# Patient Record
Sex: FEMALE | Race: WHITE | NOT HISPANIC OR LATINO
[De-identification: names, ages, dates, MRNs, and addresses within clinical notes are randomized per-mention and may not be internally consistent; named-entity substitution may affect disease eponyms.]

---

## 2018-05-10 ENCOUNTER — APPOINTMENT (OUTPATIENT)
Dept: GASTROENTEROLOGY | Facility: CLINIC | Age: 58
End: 2018-05-10

## 2019-03-20 ENCOUNTER — APPOINTMENT (OUTPATIENT)
Dept: PSYCHIATRY | Facility: CLINIC | Age: 59
End: 2019-03-20
Payer: MEDICARE

## 2019-03-20 PROCEDURE — 99205 OFFICE O/P NEW HI 60 MIN: CPT

## 2019-04-15 ENCOUNTER — RX RENEWAL (OUTPATIENT)
Age: 59
End: 2019-04-15

## 2019-05-13 ENCOUNTER — APPOINTMENT (OUTPATIENT)
Dept: PSYCHIATRY | Facility: CLINIC | Age: 59
End: 2019-05-13
Payer: MEDICARE

## 2019-05-13 PROCEDURE — 99214 OFFICE O/P EST MOD 30 MIN: CPT

## 2019-07-15 ENCOUNTER — EMERGENCY (EMERGENCY)
Facility: HOSPITAL | Age: 59
LOS: 1 days | Discharge: HOME | End: 2019-07-15
Attending: EMERGENCY MEDICINE
Payer: MEDICARE

## 2019-07-15 VITALS
DIASTOLIC BLOOD PRESSURE: 81 MMHG | OXYGEN SATURATION: 100 % | TEMPERATURE: 99 F | WEIGHT: 108.03 LBS | SYSTOLIC BLOOD PRESSURE: 149 MMHG | RESPIRATION RATE: 18 BRPM | HEIGHT: 63 IN | HEART RATE: 73 BPM

## 2019-07-15 VITALS
HEART RATE: 83 BPM | DIASTOLIC BLOOD PRESSURE: 84 MMHG | OXYGEN SATURATION: 100 % | TEMPERATURE: 98 F | SYSTOLIC BLOOD PRESSURE: 152 MMHG | RESPIRATION RATE: 17 BRPM

## 2019-07-15 LAB
ALBUMIN SERPL ELPH-MCNC: 4 G/DL — SIGNIFICANT CHANGE UP (ref 3.3–5)
ALP SERPL-CCNC: 51 U/L — SIGNIFICANT CHANGE UP (ref 40–120)
ALT FLD-CCNC: 64 U/L — HIGH (ref 10–45)
ANION GAP SERPL CALC-SCNC: 17 MMOL/L — SIGNIFICANT CHANGE UP (ref 5–17)
APTT BLD: 34.3 SEC — SIGNIFICANT CHANGE UP (ref 27.5–36.3)
AST SERPL-CCNC: 79 U/L — HIGH (ref 10–40)
BILIRUB SERPL-MCNC: 0.6 MG/DL — SIGNIFICANT CHANGE UP (ref 0.2–1.2)
BUN SERPL-MCNC: 8 MG/DL — SIGNIFICANT CHANGE UP (ref 7–23)
CALCIUM SERPL-MCNC: 9.4 MG/DL — SIGNIFICANT CHANGE UP (ref 8.4–10.5)
CHLORIDE SERPL-SCNC: 91 MMOL/L — LOW (ref 96–108)
CO2 SERPL-SCNC: 24 MMOL/L — SIGNIFICANT CHANGE UP (ref 22–31)
CREAT SERPL-MCNC: 0.57 MG/DL — SIGNIFICANT CHANGE UP (ref 0.5–1.3)
GLUCOSE SERPL-MCNC: 96 MG/DL — SIGNIFICANT CHANGE UP (ref 70–99)
HCT VFR BLD CALC: 39.8 % — SIGNIFICANT CHANGE UP (ref 34.5–45)
HGB BLD-MCNC: 13.7 G/DL — SIGNIFICANT CHANGE UP (ref 11.5–15.5)
INR BLD: 0.95 RATIO — SIGNIFICANT CHANGE UP (ref 0.88–1.16)
MCHC RBC-ENTMCNC: 34.5 GM/DL — SIGNIFICANT CHANGE UP (ref 32–36)
MCHC RBC-ENTMCNC: 35.6 PG — HIGH (ref 27–34)
MCV RBC AUTO: 103 FL — HIGH (ref 80–100)
PLATELET # BLD AUTO: 284 K/UL — SIGNIFICANT CHANGE UP (ref 150–400)
POTASSIUM SERPL-MCNC: 4.1 MMOL/L — SIGNIFICANT CHANGE UP (ref 3.5–5.3)
POTASSIUM SERPL-SCNC: 4.1 MMOL/L — SIGNIFICANT CHANGE UP (ref 3.5–5.3)
PROT SERPL-MCNC: 7.6 G/DL — SIGNIFICANT CHANGE UP (ref 6–8.3)
PROTHROM AB SERPL-ACNC: 10.8 SEC — SIGNIFICANT CHANGE UP (ref 10–12.9)
RBC # BLD: 3.86 M/UL — SIGNIFICANT CHANGE UP (ref 3.8–5.2)
RBC # FLD: 11.9 % — SIGNIFICANT CHANGE UP (ref 10.3–14.5)
SODIUM SERPL-SCNC: 132 MMOL/L — LOW (ref 135–145)
WBC # BLD: 9 K/UL — SIGNIFICANT CHANGE UP (ref 3.8–10.5)
WBC # FLD AUTO: 9 K/UL — SIGNIFICANT CHANGE UP (ref 3.8–10.5)

## 2019-07-15 PROCEDURE — 85610 PROTHROMBIN TIME: CPT

## 2019-07-15 PROCEDURE — 85027 COMPLETE CBC AUTOMATED: CPT

## 2019-07-15 PROCEDURE — 99284 EMERGENCY DEPT VISIT MOD MDM: CPT | Mod: GC

## 2019-07-15 PROCEDURE — 80053 COMPREHEN METABOLIC PANEL: CPT

## 2019-07-15 PROCEDURE — 99283 EMERGENCY DEPT VISIT LOW MDM: CPT

## 2019-07-15 PROCEDURE — 85730 THROMBOPLASTIN TIME PARTIAL: CPT

## 2019-07-15 NOTE — ED PROVIDER NOTE - NSFOLLOWUPINSTRUCTIONS_ED_ALL_ED_FT
Follow up with a primary care doctor within 1-2 weeks. Take copies of your reports given to you.  Take all of your medications as previously prescribed. Follow up with a primary care doctor within 1-2 weeks. Take copies of your reports given to you.  Take all of your medications as previously prescribed.    Please follow up within the next week with your hepatologist.

## 2019-07-15 NOTE — ED ADULT NURSE NOTE - CHPI ED NUR SYMPTOMS NEG
no vomiting/no fever/no blood in stool/no diarrhea/no chills/no hematuria/no abdominal distension/no burning urination/no nausea/no dysuria

## 2019-07-15 NOTE — ED ADULT NURSE NOTE - OBJECTIVE STATEMENT
pt is a 59 yr F presents with abd distention x 3 weeks. pt reports that she has had a 6lb weight gain and fluctuation in abd distention. pt drinks 1/2 pint of vodka per day x 5 years. last drink was Friday, pt has had no withdrawal symptoms and denies then in the past. pt has a hx of lung CA with R middle lobe wedge resection in November, former smoker. pt denies jaundice/sob/n/v/fever/chills/discoloration of urine or stool. abd soft, non-tender. pt has had a work up for cirrhosis last year which she reports was "mild to moderate". pt takes prescribed oxycodone and xanax. no acute distress.

## 2019-07-15 NOTE — ED PROVIDER NOTE - CARE PROVIDER_API CALL
Tim Davis (DO)  Internal Medicine  [address], [address]  [place] [address]  Phone: [phone]  Fax: [phone]  Follow Up Time:

## 2019-07-15 NOTE — ED PROVIDER NOTE - OBJECTIVE STATEMENT
Pt is a 60 yo female with hx of lung resection, EtOH ism (last drink Friday) who presents to the ED for abdominal distention. Over the past week, patient has noticed tightening in her pants and an increase in weight from 104 lb to 110 lbs despite no changes in her diet. She states that she is extremely worried and came to the emergency room.

## 2019-07-15 NOTE — ED ADULT NURSE NOTE - PMH
Chronic pain    Depression H/O panic attack    Diverticulitis of colon (without mention of hemorrhage)    S/P Laminectomy  Lumbar 3/10

## 2019-07-15 NOTE — ED PROVIDER NOTE - PHYSICAL EXAMINATION
PHYSICAL EXAM:  GENERAL: NAD, well-developed  HEAD:  Atraumatic, Normocephalic  EYES: EOMI, PERRLA, conjunctiva and sclera clear  NECK: Supple, No JVD  CHEST/LUNG: Clear to auscultation bilaterally; No wheeze  HEART: Regular rate and rhythm; No murmurs, rubs, or gallops  ABDOMEN: Soft, Nontender, distended; Bowel sounds present  EXTREMITIES:  2+ Peripheral Pulses, No clubbing, cyanosis, or edema  PSYCH: AAOx3  NEUROLOGY: non-focal  SKIN: No rashes or lesions PHYSICAL EXAM:  GENERAL: NAD, well-developed  HEAD:  Atraumatic, Normocephalic  EYES: EOMI, PERRLA, conjunctiva and sclera clear  NECK: Supple, No JVD  CHEST/LUNG: Clear to auscultation bilaterally; No wheeze  HEART: Regular rate and rhythm; No murmurs, rubs, or gallops  ABDOMEN: Soft, Nontender, nondistended; Bowel sounds present  EXTREMITIES:  2+ Peripheral Pulses, No clubbing, cyanosis, or edema  PSYCH: AAOx3  NEUROLOGY: non-focal  SKIN: No rashes or lesions

## 2019-07-15 NOTE — ED PROVIDER NOTE - CLINICAL SUMMARY MEDICAL DECISION MAKING FREE TEXT BOX
58 yo with hx of cancer and EtOHism coming into the ED for abdominal distension, but otherwise looks comfortable. Will get CBC, CMP, and Coags to assess for hepatic function. 60 yo with hx of cancer and EtOHism coming into the ED for abdominal distension, but otherwise looks comfortable. Will get CBC, CMP, and Coags to assess for hepatic function. and Reassess patient 60 yo with hx of cancer and EtOHism coming into the ED for abdominal distension, but otherwise looks comfortable. Abdomen was not distended during physical exam, which was overall normal as well. Will get CBC, CMP, and Coags to assess for hepatic function. and Reassess patient.  ATTG: Dr. Cope

## 2019-07-15 NOTE — ED PROVIDER NOTE - PROGRESS NOTE DETAILS
Labs showing mild transaminitis with POCUS showing no signs of ascites. ok to d/c with PMD and hepatology follow up. Labs showing mild transaminitis, otherwise normal. ok to d/c with PMD and hepatology follow up.

## 2019-07-25 ENCOUNTER — EMERGENCY (EMERGENCY)
Facility: HOSPITAL | Age: 59
LOS: 0 days | Discharge: HOME | End: 2019-07-25
Attending: EMERGENCY MEDICINE
Payer: MEDICARE

## 2019-07-25 VITALS
OXYGEN SATURATION: 99 % | SYSTOLIC BLOOD PRESSURE: 128 MMHG | HEART RATE: 68 BPM | DIASTOLIC BLOOD PRESSURE: 61 MMHG | TEMPERATURE: 98 F | RESPIRATION RATE: 16 BRPM

## 2019-07-25 VITALS — WEIGHT: 106.04 LBS | HEIGHT: 63 IN

## 2019-07-25 DIAGNOSIS — M54.9 DORSALGIA, UNSPECIFIED: ICD-10-CM

## 2019-07-25 DIAGNOSIS — Z87.891 PERSONAL HISTORY OF NICOTINE DEPENDENCE: ICD-10-CM

## 2019-07-25 DIAGNOSIS — F32.9 MAJOR DEPRESSIVE DISORDER, SINGLE EPISODE, UNSPECIFIED: ICD-10-CM
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
## 2019-07-25 DIAGNOSIS — Z88.0 ALLERGY STATUS TO PENICILLIN: ICD-10-CM
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
## 2019-07-25 LAB
ALBUMIN SERPL ELPH-MCNC: 3.5 G/DL — SIGNIFICANT CHANGE UP (ref 3.3–5)
ALP SERPL-CCNC: 51 U/L — SIGNIFICANT CHANGE UP (ref 40–120)
ALT FLD-CCNC: 59 U/L — SIGNIFICANT CHANGE UP (ref 12–78)
ANION GAP SERPL CALC-SCNC: 8 MMOL/L — SIGNIFICANT CHANGE UP (ref 5–17)
AST SERPL-CCNC: 60 U/L — HIGH (ref 15–37)
BASOPHILS # BLD AUTO: 0.03 K/UL — SIGNIFICANT CHANGE UP (ref 0–0.2)
BASOPHILS NFR BLD AUTO: 0.4 % — SIGNIFICANT CHANGE UP (ref 0–2)
BILIRUB SERPL-MCNC: 0.4 MG/DL — SIGNIFICANT CHANGE UP (ref 0.2–1.2)
BUN SERPL-MCNC: 13 MG/DL — SIGNIFICANT CHANGE UP (ref 7–23)
CALCIUM SERPL-MCNC: 9.1 MG/DL — SIGNIFICANT CHANGE UP (ref 8.5–10.1)
CHLORIDE SERPL-SCNC: 97 MMOL/L — SIGNIFICANT CHANGE UP (ref 96–108)
CO2 SERPL-SCNC: 28 MMOL/L — SIGNIFICANT CHANGE UP (ref 22–31)
CREAT SERPL-MCNC: 0.74 MG/DL — SIGNIFICANT CHANGE UP (ref 0.5–1.3)
EOSINOPHIL # BLD AUTO: 0.06 K/UL — SIGNIFICANT CHANGE UP (ref 0–0.5)
EOSINOPHIL NFR BLD AUTO: 0.9 % — SIGNIFICANT CHANGE UP (ref 0–6)
GLUCOSE SERPL-MCNC: 106 MG/DL — HIGH (ref 70–99)
HCT VFR BLD CALC: 37.5 % — SIGNIFICANT CHANGE UP (ref 34.5–45)
HGB BLD-MCNC: 12.9 G/DL — SIGNIFICANT CHANGE UP (ref 11.5–15.5)
IMM GRANULOCYTES NFR BLD AUTO: 0.7 % — SIGNIFICANT CHANGE UP (ref 0–1.5)
LYMPHOCYTES # BLD AUTO: 1.14 K/UL — SIGNIFICANT CHANGE UP (ref 1–3.3)
LYMPHOCYTES # BLD AUTO: 16.2 % — SIGNIFICANT CHANGE UP (ref 13–44)
MCHC RBC-ENTMCNC: 34.4 GM/DL — SIGNIFICANT CHANGE UP (ref 32–36)
MCHC RBC-ENTMCNC: 34.9 PG — HIGH (ref 27–34)
MCV RBC AUTO: 101.4 FL — HIGH (ref 80–100)
MONOCYTES # BLD AUTO: 0.62 K/UL — SIGNIFICANT CHANGE UP (ref 0–0.9)
MONOCYTES NFR BLD AUTO: 8.8 % — SIGNIFICANT CHANGE UP (ref 2–14)
NEUTROPHILS # BLD AUTO: 5.13 K/UL — SIGNIFICANT CHANGE UP (ref 1.8–7.4)
NEUTROPHILS NFR BLD AUTO: 73 % — SIGNIFICANT CHANGE UP (ref 43–77)
PLATELET # BLD AUTO: 309 K/UL — SIGNIFICANT CHANGE UP (ref 150–400)
POTASSIUM SERPL-MCNC: 4.1 MMOL/L — SIGNIFICANT CHANGE UP (ref 3.5–5.3)
POTASSIUM SERPL-SCNC: 4.1 MMOL/L — SIGNIFICANT CHANGE UP (ref 3.5–5.3)
PROT SERPL-MCNC: 7.6 GM/DL — SIGNIFICANT CHANGE UP (ref 6–8.3)
RBC # BLD: 3.7 M/UL — LOW (ref 3.8–5.2)
RBC # FLD: 12 % — SIGNIFICANT CHANGE UP (ref 10.3–14.5)
SODIUM SERPL-SCNC: 133 MMOL/L — LOW (ref 135–145)
WBC # BLD: 7.03 K/UL — SIGNIFICANT CHANGE UP (ref 3.8–10.5)
WBC # FLD AUTO: 7.03 K/UL — SIGNIFICANT CHANGE UP (ref 3.8–10.5)

## 2019-07-25 PROCEDURE — 72131 CT LUMBAR SPINE W/O DYE: CPT | Mod: 26

## 2019-07-25 PROCEDURE — 99284 EMERGENCY DEPT VISIT MOD MDM: CPT

## 2019-07-25 RX ORDER — DEXAMETHASONE 0.5 MG/5ML
6 ELIXIR ORAL ONCE
Refills: 0 | Status: COMPLETED | OUTPATIENT
Start: 2019-07-25 | End: 2019-07-25

## 2019-07-25 RX ORDER — HYDROMORPHONE HYDROCHLORIDE 2 MG/ML
1 INJECTION INTRAMUSCULAR; INTRAVENOUS; SUBCUTANEOUS ONCE
Refills: 0 | Status: DISCONTINUED | OUTPATIENT
Start: 2019-07-25 | End: 2019-07-25

## 2019-07-25 RX ORDER — CYCLOBENZAPRINE HYDROCHLORIDE 10 MG/1
1 TABLET, FILM COATED ORAL
Qty: 15 | Refills: 0
Start: 2019-07-25

## 2019-07-25 RX ORDER — CYCLOBENZAPRINE HYDROCHLORIDE 10 MG/1
10 TABLET, FILM COATED ORAL ONCE
Refills: 0 | Status: COMPLETED | OUTPATIENT
Start: 2019-07-25 | End: 2019-07-25

## 2019-07-25 RX ADMIN — HYDROMORPHONE HYDROCHLORIDE 1 MILLIGRAM(S): 2 INJECTION INTRAMUSCULAR; INTRAVENOUS; SUBCUTANEOUS at 11:06

## 2019-07-25 RX ADMIN — CYCLOBENZAPRINE HYDROCHLORIDE 10 MILLIGRAM(S): 10 TABLET, FILM COATED ORAL at 12:48

## 2019-07-25 RX ADMIN — HYDROMORPHONE HYDROCHLORIDE 1 MILLIGRAM(S): 2 INJECTION INTRAMUSCULAR; INTRAVENOUS; SUBCUTANEOUS at 14:00

## 2019-07-25 RX ADMIN — HYDROMORPHONE HYDROCHLORIDE 1 MILLIGRAM(S): 2 INJECTION INTRAMUSCULAR; INTRAVENOUS; SUBCUTANEOUS at 08:56

## 2019-07-25 RX ADMIN — HYDROMORPHONE HYDROCHLORIDE 1 MILLIGRAM(S): 2 INJECTION INTRAMUSCULAR; INTRAVENOUS; SUBCUTANEOUS at 11:34

## 2019-07-25 RX ADMIN — Medication 4 MILLIGRAM(S): at 12:48

## 2019-07-25 RX ADMIN — HYDROMORPHONE HYDROCHLORIDE 1 MILLIGRAM(S): 2 INJECTION INTRAMUSCULAR; INTRAVENOUS; SUBCUTANEOUS at 12:48

## 2019-07-25 NOTE — ED ADULT NURSE NOTE - OBJECTIVE STATEMENT
pt presents to ED from home, pt alert and [id number] VSS afebrle, pt c/o back pain sicne monday, worsening last night. pt states she has hx of chronic abck pain from failed back surgery, believes she "blew a disc: has chronic hip pain and torn ligaments, takes 20 mg of oxycodone 4 tiomes a day, 8-- miligrams of gababpentin 3 times a day and  has a 25mch fentanyl patch in place. pt states she gets only hydromprphone in the hospital and is requesting this medication for her pain. safety maintained, able to move all extremities, no motor or cognitive impairment, will continue to monitor

## 2019-07-25 NOTE — ED ADULT NURSE REASSESSMENT NOTE - NS ED NURSE REASSESS COMMENT FT1
Informed by primary RN that patient was requesting to speak to charge RN. Patient states she wishes to have MRI because she "heard her disc pop" and this has happened in the past. Patient educated on MRI protocol in . Patient became irate and began to yell at charge RN. In attempt to deescalate charge RN stated would get update from MD and ask if he could speak with her. Patient continued to yell at staff. Dr. De La Vega made aware.

## 2019-07-25 NOTE — ED PROVIDER NOTE - NSFOLLOWUPINSTRUCTIONS_ED_ALL_ED_FT
Please call and follow up with Dr. david.    Back Pain    WHAT YOU NEED TO KNOW:    Back pain is common. It can be caused by many conditions, such as arthritis or the breakdown of spinal discs. Your risk for back pain is increased by injuries, lack of activity, or repeated bending and twisting. You may feel sore or stiff on one or both sides of your back. The pain may spread to your buttocks or thighs.    DISCHARGE INSTRUCTIONS:    Return to the emergency department if:     You have pain, numbness, or weakness in one or both legs.      Your pain becomes so severe that you cannot walk.      You cannot control your urine or bowel movements.      You have severe back pain with chest pain.      You have severe back pain, nausea, and vomiting.      You have severe back pain that spreads to your side or genital area.    Contact your healthcare provider if:     You have back pain that does not get better with rest and pain medicine.      You have a fever.      You have pain that worsens when you are on your back or when you rest.      You have pain that worsens when you cough or sneeze.      You lose weight without trying.      You have questions or concerns about your condition or care.    Medicines:     NSAIDs help decrease swelling and pain. This medicine is available with or without a doctor's order. NSAIDs can cause stomach bleeding or kidney problems in certain people. If you take blood thinner medicine, always ask your healthcare provider if NSAIDs are safe for you. Always read the medicine label and follow directions.      Acetaminophen decreases pain and fever. It is available without a doctor's order. Ask how much to take and how often to take it. Follow directions. Read the labels of all other medicines you are using to see if they also contain acetaminophen, or ask your doctor or pharmacist. Acetaminophen can cause liver damage if not taken correctly. Do not use more than 4 grams (4,000 milligrams) total of acetaminophen in one day.       Muscle relaxers help decrease muscle spasms and back pain.      Prescription pain medicine may be given. Ask your healthcare provider how to take this medicine safely. Some prescription pain medicines contain acetaminophen. Do not take other medicines that contain acetaminophen without talking to your healthcare provider. Too much acetaminophen may cause liver damage. Prescription pain medicine may cause constipation. Ask your healthcare provider how to prevent or treat constipation.       Take your medicine as directed. Contact your healthcare provider if you think your medicine is not helping or if you have side effects. Tell him or her if you are allergic to any medicine. Keep a list of the medicines, vitamins, and herbs you take. Include the amounts, and when and why you take them. Bring the list or the pill bottles to follow-up visits. Carry your medicine list with you in case of an emergency.    How to manage your back pain:     Apply ice on your back for 15 to 20 minutes every hour or as directed. Use an ice pack, or put crushed ice in a plastic bag. Cover it with a towel before you apply it to your skin. Ice helps prevent tissue damage and decreases pain.      Apply heat on your back for 20 to 30 minutes every 2 hours for as many days as directed. Heat helps decrease pain and muscle spasms.      Stay active as much as you can without causing more pain. Bed rest could make your back pain worse. Avoid heavy lifting until your pain is gone.      Go to physical therapy as directed. A physical therapist can teach you exercises to help improve movement and strength, and to decrease pain.    Follow up with your healthcare provider in 2 weeks, or as directed: Write down your questions so you remember to ask them during your visits.

## 2019-07-25 NOTE — ED PROVIDER NOTE - MUSCULOSKELETAL, MLM
Spine appears normal, range of motion is not limited. +lumbar spine TTP. NVI LE. +mild pain to back with SLR

## 2019-07-25 NOTE — ED PROVIDER NOTE - PROGRESS NOTE DETAILS
Attending Dr. Kenn De La Vega - pain management called - [phone] and message left to return call. Tootie CORTEZ for ED attending, Dr. De La Vega: discussed with MD Chung who said that pt does not need to be d/c with meds. Also said that pt needs to f/u with him in office. Attending De La Vega, pt states pain better, + ambulation, plan d/c and will see pain doctor tomorrow

## 2019-07-25 NOTE — ED ADULT TRIAGE NOTE - CHIEF COMPLAINT QUOTE
pt c/o herniated disc , pain radiating down to Left leg x 4 days s/p lifting something heavy, pt has hx back surgery , chronic hip pain. Pt is on Fentanyl-25 patch and oxycodone maintenance

## 2019-07-25 NOTE — ED PROVIDER NOTE - CONSTITUTIONAL, MLM
normal... Well appearing, well nourished, awake, alert, oriented to person, place, time/situation and in mild distress 2/2 pain.

## 2019-07-25 NOTE — ED ADULT NURSE NOTE - NSIMPLEMENTINTERV_GEN_ALL_ED
Implemented All Fall Risk Interventions:  [place] to call system. Call bell, personal items and telephone within reach. Instruct patient to call for assistance. Room bathroom lighting operational. Non-slip footwear when patient is off stretcher. Physically safe environment: no spills, clutter or unnecessary equipment. Stretcher in lowest position, wheels locked, appropriate side rails in place. Provide visual cue, wrist band, yellow gown, etc. Monitor gait and stability. Monitor for mental status changes and reorient to person, place, and time. Review medications for side effects contributing to fall risk. Reinforce activity limits and safety measures with patient and family.

## 2019-07-25 NOTE — ED PROVIDER NOTE - OBJECTIVE STATEMENT
58 y/o F with PMHx of back surgery, chronic back pain, chronic hip pain, diverticulitis, depression, microdiscectomy 2010presenting to the ED c/o back pain. Pt reports back pain radiating down L leg for the past 4 days s/p lifting a heavy bag. Pt states she felt a "pop" in her back s/p bag lift, and has had pain since. Pt is on Fentanyl 25mcg patch and 20mg Oxycodone q6H for pain control. Pt tried calling pain MD yesterday but no response. This morning, pain was worse prompting taxi to ED. +former smoker; now uses vape. Denies fever, chills, N/V/D, or illicit drug use. Pain: Dr. Chung. PMD: Dr. Davis.

## 2019-07-30 ENCOUNTER — INPATIENT (INPATIENT)
Facility: HOSPITAL | Age: 59
LOS: 2 days | Discharge: LEFT BEFORE DISCHARGE | DRG: 439 | End: 2019-08-02
Attending: HOSPITALIST | Admitting: HOSPITALIST
Payer: MEDICARE

## 2019-07-30 VITALS
RESPIRATION RATE: 16 BRPM | OXYGEN SATURATION: 100 % | HEART RATE: 88 BPM | DIASTOLIC BLOOD PRESSURE: 103 MMHG | SYSTOLIC BLOOD PRESSURE: 152 MMHG | HEIGHT: 63 IN | TEMPERATURE: 98 F | WEIGHT: 126.99 LBS

## 2019-07-30 DIAGNOSIS — R03.0 ELEVATED BLOOD-PRESSURE READING, WITHOUT DIAGNOSIS OF HYPERTENSION: ICD-10-CM

## 2019-07-30 DIAGNOSIS — K85.90 ACUTE PANCREATITIS WITHOUT NECROSIS OR INFECTION, UNSPECIFIED: ICD-10-CM

## 2019-07-30 DIAGNOSIS — C34.90 MALIGNANT NEOPLASM OF UNSPECIFIED PART OF UNSPECIFIED BRONCHUS OR LUNG: ICD-10-CM

## 2019-07-30 DIAGNOSIS — Z85.118 PERSONAL HISTORY OF OTHER MALIGNANT NEOPLASM OF BRONCHUS AND LUNG: Chronic | ICD-10-CM

## 2019-07-30 DIAGNOSIS — Z90.49 ACQUIRED ABSENCE OF OTHER SPECIFIED PARTS OF DIGESTIVE TRACT: Chronic | ICD-10-CM

## 2019-07-30 DIAGNOSIS — E87.6 HYPOKALEMIA: ICD-10-CM

## 2019-07-30 DIAGNOSIS — Z98.890 OTHER SPECIFIED POSTPROCEDURAL STATES: Chronic | ICD-10-CM

## 2019-07-30 DIAGNOSIS — M35.1 OTHER OVERLAP SYNDROMES: ICD-10-CM

## 2019-07-30 DIAGNOSIS — F39 UNSPECIFIED MOOD [AFFECTIVE] DISORDER: ICD-10-CM

## 2019-07-30 DIAGNOSIS — M54.9 DORSALGIA, UNSPECIFIED: ICD-10-CM

## 2019-07-30 DIAGNOSIS — Z29.9 ENCOUNTER FOR PROPHYLACTIC MEASURES, UNSPECIFIED: ICD-10-CM

## 2019-07-30 DIAGNOSIS — D72.829 ELEVATED WHITE BLOOD CELL COUNT, UNSPECIFIED: ICD-10-CM

## 2019-07-30 DIAGNOSIS — R74.8 ABNORMAL LEVELS OF OTHER SERUM ENZYMES: ICD-10-CM

## 2019-07-30 LAB
ALBUMIN SERPL ELPH-MCNC: 3.5 G/DL — SIGNIFICANT CHANGE UP (ref 3.3–5)
ALP SERPL-CCNC: 56 U/L — SIGNIFICANT CHANGE UP (ref 30–120)
ALT FLD-CCNC: 138 U/L DA — HIGH (ref 10–60)
ANION GAP SERPL CALC-SCNC: 13 MMOL/L — SIGNIFICANT CHANGE UP (ref 5–17)
AST SERPL-CCNC: 158 U/L — HIGH (ref 10–40)
BASOPHILS # BLD AUTO: 0.03 K/UL — SIGNIFICANT CHANGE UP (ref 0–0.2)
BASOPHILS NFR BLD AUTO: 0.2 % — SIGNIFICANT CHANGE UP (ref 0–2)
BILIRUB SERPL-MCNC: 0.8 MG/DL — SIGNIFICANT CHANGE UP (ref 0.2–1.2)
BUN SERPL-MCNC: 16 MG/DL — SIGNIFICANT CHANGE UP (ref 7–23)
CALCIUM SERPL-MCNC: 10.1 MG/DL — SIGNIFICANT CHANGE UP (ref 8.4–10.5)
CHLORIDE SERPL-SCNC: 95 MMOL/L — LOW (ref 96–108)
CO2 SERPL-SCNC: 29 MMOL/L — SIGNIFICANT CHANGE UP (ref 22–31)
CREAT SERPL-MCNC: 0.81 MG/DL — SIGNIFICANT CHANGE UP (ref 0.5–1.3)
EOSINOPHIL # BLD AUTO: 0.09 K/UL — SIGNIFICANT CHANGE UP (ref 0–0.5)
EOSINOPHIL NFR BLD AUTO: 0.6 % — SIGNIFICANT CHANGE UP (ref 0–6)
ETHANOL SERPL-MCNC: <3 MG/DL — SIGNIFICANT CHANGE UP (ref 0–3)
GLUCOSE SERPL-MCNC: 111 MG/DL — HIGH (ref 70–99)
HCT VFR BLD CALC: 43.6 % — SIGNIFICANT CHANGE UP (ref 34.5–45)
HGB BLD-MCNC: 15 G/DL — SIGNIFICANT CHANGE UP (ref 11.5–15.5)
IMM GRANULOCYTES NFR BLD AUTO: 1.2 % — SIGNIFICANT CHANGE UP (ref 0–1.5)
LIDOCAIN IGE QN: 7410 U/L — HIGH (ref 73–393)
LYMPHOCYTES # BLD AUTO: 28.1 % — SIGNIFICANT CHANGE UP (ref 13–44)
LYMPHOCYTES # BLD AUTO: 4.19 K/UL — HIGH (ref 1–3.3)
MCHC RBC-ENTMCNC: 34.4 GM/DL — SIGNIFICANT CHANGE UP (ref 32–36)
MCHC RBC-ENTMCNC: 34.9 PG — HIGH (ref 27–34)
MCV RBC AUTO: 101.4 FL — HIGH (ref 80–100)
MONOCYTES # BLD AUTO: 0.66 K/UL — SIGNIFICANT CHANGE UP (ref 0–0.9)
MONOCYTES NFR BLD AUTO: 4.4 % — SIGNIFICANT CHANGE UP (ref 2–14)
NEUTROPHILS # BLD AUTO: 9.74 K/UL — HIGH (ref 1.8–7.4)
NEUTROPHILS NFR BLD AUTO: 65.5 % — SIGNIFICANT CHANGE UP (ref 43–77)
NRBC # BLD: 0 /100 WBCS — SIGNIFICANT CHANGE UP (ref 0–0)
PLATELET # BLD AUTO: 397 K/UL — SIGNIFICANT CHANGE UP (ref 150–400)
POTASSIUM SERPL-MCNC: 3.2 MMOL/L — LOW (ref 3.5–5.3)
POTASSIUM SERPL-SCNC: 3.2 MMOL/L — LOW (ref 3.5–5.3)
PROT SERPL-MCNC: 7.5 G/DL — SIGNIFICANT CHANGE UP (ref 6–8.3)
RBC # BLD: 4.3 M/UL — SIGNIFICANT CHANGE UP (ref 3.8–5.2)
RBC # FLD: 12.3 % — SIGNIFICANT CHANGE UP (ref 10.3–14.5)
SODIUM SERPL-SCNC: 137 MMOL/L — SIGNIFICANT CHANGE UP (ref 135–145)
TROPONIN I SERPL-MCNC: 0 NG/ML — LOW (ref 0.02–0.06)
WBC # BLD: 14.89 K/UL — HIGH (ref 3.8–10.5)
WBC # FLD AUTO: 14.89 K/UL — HIGH (ref 3.8–10.5)

## 2019-07-30 PROCEDURE — 99223 1ST HOSP IP/OBS HIGH 75: CPT

## 2019-07-30 PROCEDURE — 93010 ELECTROCARDIOGRAM REPORT: CPT

## 2019-07-30 PROCEDURE — 71045 X-RAY EXAM CHEST 1 VIEW: CPT | Mod: 26

## 2019-07-30 PROCEDURE — 76705 ECHO EXAM OF ABDOMEN: CPT | Mod: 26

## 2019-07-30 PROCEDURE — 99285 EMERGENCY DEPT VISIT HI MDM: CPT

## 2019-07-30 RX ORDER — SUCRALFATE 1 G
1 TABLET ORAL ONCE
Refills: 0 | Status: DISCONTINUED | OUTPATIENT
Start: 2019-07-30 | End: 2019-07-30

## 2019-07-30 RX ORDER — ONDANSETRON 8 MG/1
4 TABLET, FILM COATED ORAL ONCE
Refills: 0 | Status: COMPLETED | OUTPATIENT
Start: 2019-07-30 | End: 2019-07-30

## 2019-07-30 RX ORDER — FAMOTIDINE 10 MG/ML
20 INJECTION INTRAVENOUS ONCE
Refills: 0 | Status: COMPLETED | OUTPATIENT
Start: 2019-07-30 | End: 2019-07-30

## 2019-07-30 RX ORDER — POTASSIUM CHLORIDE 20 MEQ
40 PACKET (EA) ORAL ONCE
Refills: 0 | Status: DISCONTINUED | OUTPATIENT
Start: 2019-07-30 | End: 2019-07-30

## 2019-07-30 RX ORDER — ONDANSETRON 8 MG/1
4 TABLET, FILM COATED ORAL EVERY 6 HOURS
Refills: 0 | Status: DISCONTINUED | OUTPATIENT
Start: 2019-07-30 | End: 2019-08-02

## 2019-07-30 RX ORDER — SENNA PLUS 8.6 MG/1
2 TABLET ORAL AT BEDTIME
Refills: 0 | Status: DISCONTINUED | OUTPATIENT
Start: 2019-07-30 | End: 2019-08-02

## 2019-07-30 RX ORDER — FENTANYL CITRATE 50 UG/ML
1 INJECTION INTRAVENOUS
Refills: 0 | Status: DISCONTINUED | OUTPATIENT
Start: 2019-07-30 | End: 2019-08-02

## 2019-07-30 RX ORDER — HYDROMORPHONE HYDROCHLORIDE 2 MG/ML
1 INJECTION INTRAMUSCULAR; INTRAVENOUS; SUBCUTANEOUS ONCE
Refills: 0 | Status: DISCONTINUED | OUTPATIENT
Start: 2019-07-30 | End: 2019-07-30

## 2019-07-30 RX ORDER — SUCRALFATE 1 G
1 TABLET ORAL ONCE
Refills: 0 | Status: COMPLETED | OUTPATIENT
Start: 2019-07-30 | End: 2019-07-30

## 2019-07-30 RX ORDER — CYCLOBENZAPRINE HYDROCHLORIDE 10 MG/1
10 TABLET, FILM COATED ORAL THREE TIMES A DAY
Refills: 0 | Status: DISCONTINUED | OUTPATIENT
Start: 2019-07-30 | End: 2019-08-02

## 2019-07-30 RX ORDER — OXYCODONE HYDROCHLORIDE 5 MG/1
1 TABLET ORAL
Qty: 0 | Refills: 0 | DISCHARGE

## 2019-07-30 RX ORDER — TRAMADOL HYDROCHLORIDE 50 MG/1
25 TABLET ORAL EVERY 6 HOURS
Refills: 0 | Status: DISCONTINUED | OUTPATIENT
Start: 2019-07-30 | End: 2019-08-02

## 2019-07-30 RX ORDER — PANTOPRAZOLE SODIUM 20 MG/1
40 TABLET, DELAYED RELEASE ORAL
Refills: 0 | Status: DISCONTINUED | OUTPATIENT
Start: 2019-07-30 | End: 2019-08-02

## 2019-07-30 RX ORDER — POTASSIUM CHLORIDE 20 MEQ
40 PACKET (EA) ORAL ONCE
Refills: 0 | Status: COMPLETED | OUTPATIENT
Start: 2019-07-30 | End: 2019-07-30

## 2019-07-30 RX ORDER — GABAPENTIN 400 MG/1
800 CAPSULE ORAL THREE TIMES A DAY
Refills: 0 | Status: DISCONTINUED | OUTPATIENT
Start: 2019-07-30 | End: 2019-08-02

## 2019-07-30 RX ORDER — HYDROXYCHLOROQUINE SULFATE 200 MG
200 TABLET ORAL
Refills: 0 | Status: DISCONTINUED | OUTPATIENT
Start: 2019-07-31 | End: 2019-08-02

## 2019-07-30 RX ORDER — HYDROMORPHONE HYDROCHLORIDE 2 MG/ML
2 INJECTION INTRAMUSCULAR; INTRAVENOUS; SUBCUTANEOUS EVERY 4 HOURS
Refills: 0 | Status: DISCONTINUED | OUTPATIENT
Start: 2019-07-30 | End: 2019-07-31

## 2019-07-30 RX ORDER — OXYCODONE HYDROCHLORIDE 5 MG/1
20 TABLET ORAL ONCE
Refills: 0 | Status: DISCONTINUED | OUTPATIENT
Start: 2019-07-30 | End: 2019-07-30

## 2019-07-30 RX ORDER — HYDROMORPHONE HYDROCHLORIDE 2 MG/ML
1 INJECTION INTRAMUSCULAR; INTRAVENOUS; SUBCUTANEOUS EVERY 6 HOURS
Refills: 0 | Status: DISCONTINUED | OUTPATIENT
Start: 2019-07-30 | End: 2019-07-31

## 2019-07-30 RX ORDER — HYDROMORPHONE HYDROCHLORIDE 2 MG/ML
0.5 INJECTION INTRAMUSCULAR; INTRAVENOUS; SUBCUTANEOUS ONCE
Refills: 0 | Status: DISCONTINUED | OUTPATIENT
Start: 2019-07-30 | End: 2019-07-30

## 2019-07-30 RX ORDER — SODIUM CHLORIDE 9 MG/ML
3 INJECTION INTRAMUSCULAR; INTRAVENOUS; SUBCUTANEOUS ONCE
Refills: 0 | Status: COMPLETED | OUTPATIENT
Start: 2019-07-30 | End: 2019-07-30

## 2019-07-30 RX ORDER — DOCUSATE SODIUM 100 MG
100 CAPSULE ORAL THREE TIMES A DAY
Refills: 0 | Status: DISCONTINUED | OUTPATIENT
Start: 2019-07-30 | End: 2019-08-02

## 2019-07-30 RX ORDER — ALPRAZOLAM 0.25 MG
0.25 TABLET ORAL ONCE
Refills: 0 | Status: DISCONTINUED | OUTPATIENT
Start: 2019-07-30 | End: 2019-07-30

## 2019-07-30 RX ORDER — HYDRALAZINE HCL 50 MG
25 TABLET ORAL EVERY 6 HOURS
Refills: 0 | Status: DISCONTINUED | OUTPATIENT
Start: 2019-07-30 | End: 2019-07-31

## 2019-07-30 RX ORDER — SODIUM CHLORIDE 9 MG/ML
1000 INJECTION INTRAMUSCULAR; INTRAVENOUS; SUBCUTANEOUS ONCE
Refills: 0 | Status: COMPLETED | OUTPATIENT
Start: 2019-07-30 | End: 2019-07-30

## 2019-07-30 RX ORDER — SODIUM CHLORIDE 9 MG/ML
1000 INJECTION, SOLUTION INTRAVENOUS
Refills: 0 | Status: DISCONTINUED | OUTPATIENT
Start: 2019-07-30 | End: 2019-08-01

## 2019-07-30 RX ORDER — POTASSIUM CHLORIDE 20 MEQ
10 PACKET (EA) ORAL ONCE
Refills: 0 | Status: COMPLETED | OUTPATIENT
Start: 2019-07-30 | End: 2019-07-30

## 2019-07-30 RX ORDER — ALPRAZOLAM 0.25 MG
0.25 TABLET ORAL
Refills: 0 | Status: DISCONTINUED | OUTPATIENT
Start: 2019-07-30 | End: 2019-08-02

## 2019-07-30 RX ADMIN — Medication 0.25 MILLIGRAM(S): at 15:49

## 2019-07-30 RX ADMIN — HYDROMORPHONE HYDROCHLORIDE 2 MILLIGRAM(S): 2 INJECTION INTRAMUSCULAR; INTRAVENOUS; SUBCUTANEOUS at 23:28

## 2019-07-30 RX ADMIN — OXYCODONE HYDROCHLORIDE 20 MILLIGRAM(S): 5 TABLET ORAL at 21:28

## 2019-07-30 RX ADMIN — HYDROMORPHONE HYDROCHLORIDE 1 MILLIGRAM(S): 2 INJECTION INTRAMUSCULAR; INTRAVENOUS; SUBCUTANEOUS at 15:24

## 2019-07-30 RX ADMIN — HYDROMORPHONE HYDROCHLORIDE 0.5 MILLIGRAM(S): 2 INJECTION INTRAMUSCULAR; INTRAVENOUS; SUBCUTANEOUS at 13:15

## 2019-07-30 RX ADMIN — FENTANYL CITRATE 1 PATCH: 50 INJECTION INTRAVENOUS at 19:45

## 2019-07-30 RX ADMIN — HYDROMORPHONE HYDROCHLORIDE 2 MILLIGRAM(S): 2 INJECTION INTRAMUSCULAR; INTRAVENOUS; SUBCUTANEOUS at 19:26

## 2019-07-30 RX ADMIN — SODIUM CHLORIDE 1000 MILLILITER(S): 9 INJECTION INTRAMUSCULAR; INTRAVENOUS; SUBCUTANEOUS at 11:27

## 2019-07-30 RX ADMIN — SODIUM CHLORIDE 3 MILLILITER(S): 9 INJECTION INTRAMUSCULAR; INTRAVENOUS; SUBCUTANEOUS at 11:20

## 2019-07-30 RX ADMIN — GABAPENTIN 800 MILLIGRAM(S): 400 CAPSULE ORAL at 21:28

## 2019-07-30 RX ADMIN — SODIUM CHLORIDE 1000 MILLILITER(S): 9 INJECTION INTRAMUSCULAR; INTRAVENOUS; SUBCUTANEOUS at 14:00

## 2019-07-30 RX ADMIN — Medication 30 MILLILITER(S): at 12:22

## 2019-07-30 RX ADMIN — Medication 100 MILLIEQUIVALENT(S): at 12:45

## 2019-07-30 RX ADMIN — FAMOTIDINE 20 MILLIGRAM(S): 10 INJECTION INTRAVENOUS at 12:22

## 2019-07-30 RX ADMIN — SODIUM CHLORIDE 150 MILLILITER(S): 9 INJECTION, SOLUTION INTRAVENOUS at 17:03

## 2019-07-30 RX ADMIN — Medication 1 GRAM(S): at 13:54

## 2019-07-30 RX ADMIN — HYDROMORPHONE HYDROCHLORIDE 0.5 MILLIGRAM(S): 2 INJECTION INTRAMUSCULAR; INTRAVENOUS; SUBCUTANEOUS at 13:00

## 2019-07-30 RX ADMIN — Medication 30 MILLILITER(S): at 19:02

## 2019-07-30 RX ADMIN — ONDANSETRON 4 MILLIGRAM(S): 8 TABLET, FILM COATED ORAL at 21:28

## 2019-07-30 RX ADMIN — OXYCODONE HYDROCHLORIDE 20 MILLIGRAM(S): 5 TABLET ORAL at 16:32

## 2019-07-30 RX ADMIN — Medication 25 MILLIGRAM(S): at 21:29

## 2019-07-30 RX ADMIN — HYDROMORPHONE HYDROCHLORIDE 0.5 MILLIGRAM(S): 2 INJECTION INTRAMUSCULAR; INTRAVENOUS; SUBCUTANEOUS at 13:39

## 2019-07-30 RX ADMIN — Medication 10 MILLIEQUIVALENT(S): at 14:00

## 2019-07-30 RX ADMIN — OXYCODONE HYDROCHLORIDE 20 MILLIGRAM(S): 5 TABLET ORAL at 15:49

## 2019-07-30 RX ADMIN — HYDROMORPHONE HYDROCHLORIDE 2 MILLIGRAM(S): 2 INJECTION INTRAMUSCULAR; INTRAVENOUS; SUBCUTANEOUS at 18:46

## 2019-07-30 RX ADMIN — FENTANYL CITRATE 1 PATCH: 50 INJECTION INTRAVENOUS at 17:00

## 2019-07-30 RX ADMIN — OXYCODONE HYDROCHLORIDE 20 MILLIGRAM(S): 5 TABLET ORAL at 21:59

## 2019-07-30 RX ADMIN — ONDANSETRON 4 MILLIGRAM(S): 8 TABLET, FILM COATED ORAL at 12:44

## 2019-07-30 RX ADMIN — Medication 40 MILLIEQUIVALENT(S): at 12:36

## 2019-07-30 RX ADMIN — HYDROMORPHONE HYDROCHLORIDE 1 MILLIGRAM(S): 2 INJECTION INTRAMUSCULAR; INTRAVENOUS; SUBCUTANEOUS at 15:02

## 2019-07-30 RX ADMIN — HYDROMORPHONE HYDROCHLORIDE 0.5 MILLIGRAM(S): 2 INJECTION INTRAMUSCULAR; INTRAVENOUS; SUBCUTANEOUS at 13:55

## 2019-07-30 RX ADMIN — HYDROMORPHONE HYDROCHLORIDE 1 MILLIGRAM(S): 2 INJECTION INTRAMUSCULAR; INTRAVENOUS; SUBCUTANEOUS at 11:45

## 2019-07-30 RX ADMIN — HYDROMORPHONE HYDROCHLORIDE 1 MILLIGRAM(S): 2 INJECTION INTRAMUSCULAR; INTRAVENOUS; SUBCUTANEOUS at 11:24

## 2019-07-30 NOTE — ED ADULT NURSE NOTE - NSIMPLEMENTINTERV_GEN_ALL_ED
Implemented All Fall with Harm Risk Interventions:  [place] to call system. Call bell, personal items and telephone within reach. Instruct patient to call for assistance. Room bathroom lighting operational. Non-slip footwear when patient is off stretcher. Physically safe environment: no spills, clutter or unnecessary equipment. Stretcher in lowest position, wheels locked, appropriate side rails in place. Provide visual cue, wrist band, yellow gown, etc. Monitor gait and stability. Monitor for mental status changes and reorient to person, place, and time. Review medications for side effects contributing to fall risk. Reinforce activity limits and safety measures with patient and family. Provide visual clues: red socks.

## 2019-07-30 NOTE — H&P ADULT - NSHPPHYSICALEXAM_GEN_ALL_CORE
Vital Signs Last 24 Hrs  T(C): 36.5 (30 Jul 2019 12:55), Max: 36.8 (30 Jul 2019 10:41)  T(F): 97.7 (30 Jul 2019 12:55), Max: 98.3 (30 Jul 2019 10:41)  HR: 93 (30 Jul 2019 14:59) (88 - 94)  BP: 146/98 (30 Jul 2019 14:59) (134/93 - 152/103)  RR: 20 (30 Jul 2019 14:59) (16 - 20)  SpO2: 100% (30 Jul 2019 14:59) (100% - 100%)

## 2019-07-30 NOTE — ED ADULT NURSE NOTE - OBJECTIVE STATEMENT
59 yr. old female c/o severe burning chest pain since 0730 this morning.  Pt. also c/o chronic severe back pain.  Pt. screaming, crying, restless.  Pt. had epidural injection on Friday for back pain and has fentanyl patch on right shoulder.  Pt. states "I need dilaudid".  History of ETOH abuse. Pt. states last drink was a few days ago.  Ambulatory.

## 2019-07-30 NOTE — H&P ADULT - PROBLEM SELECTOR PLAN 7
DVT prophylaxis: SCDs    IMPROVE VTE Individual Risk Assessment          RISK                                                          Points  [  ] Previous VTE                                                3  [  ] Thrombophilia                                             2  [  ] Lower limb paralysis                                   2        (unable to hold up >15 seconds)    [  ] Current Cancer                                             2         (within 6 months)  [  ] Immobilization > 24 hrs                              1  [  ] ICU/CCU stay > 24 hours                             1  [  ] Age > 60                                                         1    IMPROVE VTE Score: 0 with depression and anxiety  continue home xanax PRN continue plaquenil 200mg qod stable, no active issues  former smoker s/p RML wedge resection in 2018

## 2019-07-30 NOTE — ED PROVIDER NOTE - OBJECTIVE STATEMENT
pt is a 60yo female with pmhx of chronic pain on pain mgmt, pancreatitis c/o diffuse pain x today. pt reports chest pain, back pain, abd pain with nausea without vomiting. pt took oxycodone and fentanyl patch which provided minimal relief. pt screaming and being uncooperative during evaluation. unable to obtain further hx. denies cardiac  hx, sob, v/d.

## 2019-07-30 NOTE — H&P ADULT - PROBLEM SELECTOR PLAN 10
DVT prophylaxis: SCDs    IMPROVE VTE Individual Risk Assessment          RISK                                                          Points  [  ] Previous VTE                                                3  [  ] Thrombophilia                                             2  [  ] Lower limb paralysis                                   2        (unable to hold up >15 seconds)    [  ] Current Cancer                                             2         (within 6 months)  [  ] Immobilization > 24 hrs                              1  [  ] ICU/CCU stay > 24 hours                             1  [  ] Age > 60                                                         1    IMPROVE VTE Score: 0

## 2019-07-30 NOTE — H&P ADULT - PROBLEM SELECTOR PLAN 3
ISTOP done - on oxycodone IR 20mg q6, fentanyl 25mcg patch at home  will continue fentanyl patch and hold home oxycodone with current pain regimen repleted in the ER  monitor K, Mag, Phos  replete electrolytes as needed some element of steroid induced leukocytosis  afebrile with no current signs of infection  continue to trend WBC - monitor for fevers likely in the context of pain and agitation  pain control. monitor hemodynamics  assess need for antihypertensive agents

## 2019-07-30 NOTE — ED PROVIDER NOTE - PROGRESS NOTE DETAILS
Tootie LAROSE for ED attending, Dr. Haro : 60 y/o female hx of chronic pain, sees Dr. Chung pain management, received trigger point injection in lower back on friday, c/o continued lower back pain, abd pain, CP , total body pain. Wearing a fentanyl patch at present, took her usual pain meds today, pt screaming and uncooperative in ER, will not allow proper exam, demanding pain meds before further evaluation.   PE: VSS, heart nl, lungs clear, abd soft, nontender, no rebound, healed surgical scar on lower lumbar spine with bandaid over right si joint, neuro intact, pt seen ambulating w/o difficulty  Impression: chronic pain, drug seeking, will do labs, speak with pt's pain management doctor for further guidance. pt with wbc elevation. pt currently on medrol dose pack. consulted dr young, was not "available" will re-attempt. pt improved. consulted dr young pain mgmt who advised he did epidural friday. advised acute mgmt in er and follow up with him in office. no rx needed at this time. will re-eval after labs and imaging. pt with wbc elevation. pt currently on medrol dose pack. consulted dr young, was not "available" will re-attempt. I stop reviewed reference #[id number]. got rx for oxycodone 120 pills on 7/18/19 and fentanyl 25mcg 30 days on 7/18/19. consulted dr saunders who advised admit to hospitalist gi consulted pending call back dr alec aldrich pt.

## 2019-07-30 NOTE — ED PROVIDER NOTE - CLINICAL SUMMARY MEDICAL DECISION MAKING FREE TEXT BOX
pt with diffuse pain, will do labs, trop, lipase to r/o pancreatitis, cxr, ekg, pain control, re-eval, consult pain mgmt.

## 2019-07-30 NOTE — H&P ADULT - NSICDXPASTMEDICALHX_GEN_ALL_CORE_FT
PAST MEDICAL HISTORY:  Adenocarcinoma of lung     Chronic pain     Depression H/O panic attack with anxiety    Diverticulitis of colon (without mention of hemorrhage)     Mixed connective tissue disease     S/P Laminectomy  Lumbar 3/10

## 2019-07-30 NOTE — H&P ADULT - PROBLEM SELECTOR PLAN 4
stable, no active issues  former smoker s/p RML wedge resection in 2018 ISTOP done - on oxycodone IR 20mg q6, fentanyl 25mcg patch at home  will continue fentanyl patch and hold home oxycodone with current pain regimen follows with pain management - Dr. Hawk  ISTOP done - on oxycodone IR 20mg q6, fentanyl 25mcg patch at home  will continue fentanyl patch and hold home oxycodone with current pain regimen  continue home gabapentin 800mg three times a day follows with pain management - Dr. Hawk  ISTOP done - on oxycodone IR 20mg q6, fentanyl 25mcg patch at home  will continue fentanyl patch and hold home oxycodone with current pain regimen  continue home gabapentin 800mg TID and flexeril q8 PRN repleted in the ER  monitor K, Mag, Phos  replete electrolytes as needed some element of steroid induced leukocytosis  afebrile with no current signs of infection  continue to trend WBC - monitor for fevers

## 2019-07-30 NOTE — H&P ADULT - PROBLEM SELECTOR PLAN 8
DVT prophylaxis: SCDs    IMPROVE VTE Individual Risk Assessment          RISK                                                          Points  [  ] Previous VTE                                                3  [  ] Thrombophilia                                             2  [  ] Lower limb paralysis                                   2        (unable to hold up >15 seconds)    [  ] Current Cancer                                             2         (within 6 months)  [  ] Immobilization > 24 hrs                              1  [  ] ICU/CCU stay > 24 hours                             1  [  ] Age > 60                                                         1    IMPROVE VTE Score: 0 with depression and anxiety  continue home xanax PRN continue plaquenil 200mg qod

## 2019-07-30 NOTE — H&P ADULT - NSICDXPASTSURGICALHX_GEN_ALL_CORE_FT
PAST SURGICAL HISTORY:  History of lung cancer s/p wedge resection of RML PAST SURGICAL HISTORY:  History of lung cancer s/p wedge resection of RML    S/P cholecystectomy     S/P lumbar laminectomy

## 2019-07-30 NOTE — H&P ADULT - NSICDXFAMILYHX_GEN_ALL_CORE_FT
FAMILY HISTORY:  Family history of leukemia, in father  FHx: rheumatoid arthritis, in mother - with RA associated lung fibrosis

## 2019-07-30 NOTE — H&P ADULT - PROBLEM SELECTOR PLAN 6
with depression and anxiety  continue home xanax PRN continue plaquenil 200mg qod stable, no active issues  former smoker s/p RML wedge resection in 2018 follows with pain management - Dr. Hawk  ISTOP done - on oxycodone IR 20mg q6, fentanyl 25mcg patch at home  will continue fentanyl patch and hold home oxycodone with current pain regimen  continue home gabapentin 800mg TID, medrol 4mg qd and flexeril q8 PRN

## 2019-07-30 NOTE — H&P ADULT - PROBLEM SELECTOR PLAN 2
f/u US abdomen  check hepatitis panel  monitor LFTs, avoid hepatotoxins f/u US abdomen  check hepatitis panel  monitor LFTs, avoid hepatotoxins  history of sporadic EtOH use - last drink was 7/28

## 2019-07-30 NOTE — ED ADULT NURSE NOTE - PAIN: NONVERBAL INDICATORS
aggression/guarding/agitated/rocking/anxious/inability to perform IADLs/eyes open wide/flailing/rubbing/jerking/squirming/restless

## 2019-07-30 NOTE — H&P ADULT - NSHPSOCIALHISTORY_GEN_ALL_CORE
Lives at home  Former smoker 1ppd >30 years quit in 2018  Admits sporadic ETOH use - last drink was 7/28  Denies illicit drug use

## 2019-07-30 NOTE — H&P ADULT - PROBLEM SELECTOR PLAN 5
continue plaquenil 200mg qod stable, no active issues  former smoker s/p RML wedge resection in 2018 follows with pain management - Dr. Hawk  ISTOP done - on oxycodone IR 20mg q6, fentanyl 25mcg patch at home  will continue fentanyl patch and hold home oxycodone with current pain regimen  continue home gabapentin 800mg TID, medrol 4mg qd and flexeril q8 PRN repleted in the ER  monitor K, Mag, Phos  replete electrolytes as needed

## 2019-07-30 NOTE — H&P ADULT - ASSESSMENT
60 y/o F with PMHx of chronic back pain, lung adenocarcinoma (s/p wedge resection of RML in 11/2018), mixed connective tissue disease (on plaquenil), anxiety, former smoker, ?history of MI (has not had cath and was told work up "showed scar tissue") admitted with pancreatitis.

## 2019-07-30 NOTE — H&P ADULT - HISTORY OF PRESENT ILLNESS
60 y/o F with PMHx of chronic back pain, lung adenocarcinoma (s/p wedge resection of RML in 11/2018), mixed connective tissue disease (on plaquenil), anxiety, former smoker, ?history of MI (has not had cath and was told work up "showed scar tissue") presented to the ED complaining of onset of chest pain traveling in to her whole body. Patient is on chronic opioid medications by pain management. Patient drinks alcohol sporadically with her last drink 7/28/2019. Has abdominal and back pain, rated at 10/10 in intensity despite being given dilaudid by the ED. Currently with pain throughout her whole body. Denies nausea, vomiting, palpitations, dyspnea, diarrhea, headache, dizziness, fevers or chills. Limited history as she is wailing in pain (although stops to converse with me)    In the ED, VS significant for hypertension to 152/103. Labs significant for: WBC 14.89 (on steroids), K 3.2, , , Lipase 7410. CXR negative. EKG (personally reviewed) NSR at 77bpm, poor quality. US abdomen pending.

## 2019-07-30 NOTE — H&P ADULT - PROBLEM SELECTOR PLAN 1
admit to medicine  pain control with IV dilaudid and tramadol (on chronic opioids)  continue IV fluids at 150cc/hr, keep NPO for now - advance to clears as tolerated  GI consult Dr. Llanos

## 2019-07-30 NOTE — H&P ADULT - PROBLEM SELECTOR PLAN 9
DVT prophylaxis: SCDs    IMPROVE VTE Individual Risk Assessment          RISK                                                          Points  [  ] Previous VTE                                                3  [  ] Thrombophilia                                             2  [  ] Lower limb paralysis                                   2        (unable to hold up >15 seconds)    [  ] Current Cancer                                             2         (within 6 months)  [  ] Immobilization > 24 hrs                              1  [  ] ICU/CCU stay > 24 hours                             1  [  ] Age > 60                                                         1    IMPROVE VTE Score: 0 with depression and anxiety  continue home xanax PRN

## 2019-07-30 NOTE — H&P ADULT - RS GEN PE MLT RESP DETAILS PC
clear to auscultation bilaterally/breath sounds equal/good air movement/respirations non-labored/no rhonchi/no rales/no wheezes

## 2019-07-31 LAB
ALBUMIN SERPL ELPH-MCNC: 2.6 G/DL — LOW (ref 3.3–5)
ALP SERPL-CCNC: 51 U/L — SIGNIFICANT CHANGE UP (ref 30–120)
ALT FLD-CCNC: 87 U/L DA — HIGH (ref 10–60)
ANION GAP SERPL CALC-SCNC: 7 MMOL/L — SIGNIFICANT CHANGE UP (ref 5–17)
AST SERPL-CCNC: 105 U/L — HIGH (ref 10–40)
BASOPHILS # BLD AUTO: 0.03 K/UL — SIGNIFICANT CHANGE UP (ref 0–0.2)
BASOPHILS NFR BLD AUTO: 0.2 % — SIGNIFICANT CHANGE UP (ref 0–2)
BILIRUB DIRECT SERPL-MCNC: 0.2 MG/DL — SIGNIFICANT CHANGE UP (ref 0–0.2)
BILIRUB INDIRECT FLD-MCNC: 0.6 MG/DL — SIGNIFICANT CHANGE UP (ref 0.2–1)
BILIRUB SERPL-MCNC: 0.8 MG/DL — SIGNIFICANT CHANGE UP (ref 0.2–1.2)
BUN SERPL-MCNC: 11 MG/DL — SIGNIFICANT CHANGE UP (ref 7–23)
CALCIUM SERPL-MCNC: 9 MG/DL — SIGNIFICANT CHANGE UP (ref 8.4–10.5)
CHLORIDE SERPL-SCNC: 97 MMOL/L — SIGNIFICANT CHANGE UP (ref 96–108)
CO2 SERPL-SCNC: 26 MMOL/L — SIGNIFICANT CHANGE UP (ref 22–31)
CREAT SERPL-MCNC: 0.85 MG/DL — SIGNIFICANT CHANGE UP (ref 0.5–1.3)
EOSINOPHIL # BLD AUTO: 0.03 K/UL — SIGNIFICANT CHANGE UP (ref 0–0.5)
EOSINOPHIL NFR BLD AUTO: 0.2 % — SIGNIFICANT CHANGE UP (ref 0–6)
GLUCOSE SERPL-MCNC: 130 MG/DL — HIGH (ref 70–99)
HAV IGM SER-ACNC: SIGNIFICANT CHANGE UP
HBV CORE IGM SER-ACNC: SIGNIFICANT CHANGE UP
HBV SURFACE AG SER-ACNC: SIGNIFICANT CHANGE UP
HCT VFR BLD CALC: 46.8 % — HIGH (ref 34.5–45)
HCV AB S/CO SERPL IA: 0.13 S/CO — SIGNIFICANT CHANGE UP (ref 0–0.99)
HCV AB SERPL-IMP: SIGNIFICANT CHANGE UP
HGB BLD-MCNC: 16.2 G/DL — HIGH (ref 11.5–15.5)
IMM GRANULOCYTES NFR BLD AUTO: 0.8 % — SIGNIFICANT CHANGE UP (ref 0–1.5)
LIDOCAIN IGE QN: 3212 U/L — HIGH (ref 73–393)
LYMPHOCYTES # BLD AUTO: 1.15 K/UL — SIGNIFICANT CHANGE UP (ref 1–3.3)
LYMPHOCYTES # BLD AUTO: 7.5 % — LOW (ref 13–44)
MAGNESIUM SERPL-MCNC: 1.8 MG/DL — SIGNIFICANT CHANGE UP (ref 1.6–2.6)
MCHC RBC-ENTMCNC: 34.6 GM/DL — SIGNIFICANT CHANGE UP (ref 32–36)
MCHC RBC-ENTMCNC: 35 PG — HIGH (ref 27–34)
MCV RBC AUTO: 101.1 FL — HIGH (ref 80–100)
MONOCYTES # BLD AUTO: 0.68 K/UL — SIGNIFICANT CHANGE UP (ref 0–0.9)
MONOCYTES NFR BLD AUTO: 4.4 % — SIGNIFICANT CHANGE UP (ref 2–14)
NEUTROPHILS # BLD AUTO: 13.37 K/UL — HIGH (ref 1.8–7.4)
NEUTROPHILS NFR BLD AUTO: 86.9 % — HIGH (ref 43–77)
NRBC # BLD: 0 /100 WBCS — SIGNIFICANT CHANGE UP (ref 0–0)
PHOSPHATE SERPL-MCNC: 3.9 MG/DL — SIGNIFICANT CHANGE UP (ref 2.5–4.5)
PLATELET # BLD AUTO: 207 K/UL — SIGNIFICANT CHANGE UP (ref 150–400)
POTASSIUM SERPL-MCNC: 4.2 MMOL/L — SIGNIFICANT CHANGE UP (ref 3.5–5.3)
POTASSIUM SERPL-SCNC: 4.2 MMOL/L — SIGNIFICANT CHANGE UP (ref 3.5–5.3)
PROT SERPL-MCNC: 6.5 G/DL — SIGNIFICANT CHANGE UP (ref 6–8.3)
RBC # BLD: 4.63 M/UL — SIGNIFICANT CHANGE UP (ref 3.8–5.2)
RBC # FLD: 12.4 % — SIGNIFICANT CHANGE UP (ref 10.3–14.5)
SODIUM SERPL-SCNC: 130 MMOL/L — LOW (ref 135–145)
WBC # BLD: 15.38 K/UL — HIGH (ref 3.8–10.5)
WBC # FLD AUTO: 15.38 K/UL — HIGH (ref 3.8–10.5)

## 2019-07-31 PROCEDURE — 99233 SBSQ HOSP IP/OBS HIGH 50: CPT

## 2019-07-31 RX ORDER — HYDROMORPHONE HYDROCHLORIDE 2 MG/ML
2 INJECTION INTRAMUSCULAR; INTRAVENOUS; SUBCUTANEOUS EVERY 4 HOURS
Refills: 0 | Status: DISCONTINUED | OUTPATIENT
Start: 2019-07-31 | End: 2019-08-01

## 2019-07-31 RX ORDER — HYDRALAZINE HCL 50 MG
25 TABLET ORAL EVERY 6 HOURS
Refills: 0 | Status: DISCONTINUED | OUTPATIENT
Start: 2019-07-31 | End: 2019-08-02

## 2019-07-31 RX ORDER — OXYCODONE HYDROCHLORIDE 5 MG/1
20 TABLET ORAL EVERY 6 HOURS
Refills: 0 | Status: DISCONTINUED | OUTPATIENT
Start: 2019-07-31 | End: 2019-07-31

## 2019-07-31 RX ORDER — METOPROLOL TARTRATE 50 MG
5 TABLET ORAL EVERY 6 HOURS
Refills: 0 | Status: DISCONTINUED | OUTPATIENT
Start: 2019-07-31 | End: 2019-08-02

## 2019-07-31 RX ADMIN — SODIUM CHLORIDE 150 MILLILITER(S): 9 INJECTION, SOLUTION INTRAVENOUS at 16:11

## 2019-07-31 RX ADMIN — FENTANYL CITRATE 1 PATCH: 50 INJECTION INTRAVENOUS at 07:01

## 2019-07-31 RX ADMIN — HYDROMORPHONE HYDROCHLORIDE 2 MILLIGRAM(S): 2 INJECTION INTRAMUSCULAR; INTRAVENOUS; SUBCUTANEOUS at 23:00

## 2019-07-31 RX ADMIN — GABAPENTIN 800 MILLIGRAM(S): 400 CAPSULE ORAL at 06:57

## 2019-07-31 RX ADMIN — Medication 1 TABLET(S): at 11:14

## 2019-07-31 RX ADMIN — HYDROMORPHONE HYDROCHLORIDE 2 MILLIGRAM(S): 2 INJECTION INTRAMUSCULAR; INTRAVENOUS; SUBCUTANEOUS at 00:10

## 2019-07-31 RX ADMIN — HYDROMORPHONE HYDROCHLORIDE 2 MILLIGRAM(S): 2 INJECTION INTRAMUSCULAR; INTRAVENOUS; SUBCUTANEOUS at 07:35

## 2019-07-31 RX ADMIN — Medication 200 MILLIGRAM(S): at 06:57

## 2019-07-31 RX ADMIN — HYDROMORPHONE HYDROCHLORIDE 2 MILLIGRAM(S): 2 INJECTION INTRAMUSCULAR; INTRAVENOUS; SUBCUTANEOUS at 18:45

## 2019-07-31 RX ADMIN — HYDROMORPHONE HYDROCHLORIDE 2 MILLIGRAM(S): 2 INJECTION INTRAMUSCULAR; INTRAVENOUS; SUBCUTANEOUS at 03:24

## 2019-07-31 RX ADMIN — HYDROMORPHONE HYDROCHLORIDE 2 MILLIGRAM(S): 2 INJECTION INTRAMUSCULAR; INTRAVENOUS; SUBCUTANEOUS at 18:28

## 2019-07-31 RX ADMIN — Medication 100 MILLIGRAM(S): at 22:29

## 2019-07-31 RX ADMIN — PANTOPRAZOLE SODIUM 40 MILLIGRAM(S): 20 TABLET, DELAYED RELEASE ORAL at 06:57

## 2019-07-31 RX ADMIN — Medication 25 MILLIGRAM(S): at 06:57

## 2019-07-31 RX ADMIN — Medication 5 MILLIGRAM(S): at 08:40

## 2019-07-31 RX ADMIN — Medication 0.25 MILLIGRAM(S): at 10:06

## 2019-07-31 RX ADMIN — HYDROMORPHONE HYDROCHLORIDE 2 MILLIGRAM(S): 2 INJECTION INTRAMUSCULAR; INTRAVENOUS; SUBCUTANEOUS at 03:55

## 2019-07-31 RX ADMIN — OXYCODONE HYDROCHLORIDE 20 MILLIGRAM(S): 5 TABLET ORAL at 12:29

## 2019-07-31 RX ADMIN — SODIUM CHLORIDE 150 MILLILITER(S): 9 INJECTION, SOLUTION INTRAVENOUS at 22:29

## 2019-07-31 RX ADMIN — HYDROMORPHONE HYDROCHLORIDE 2 MILLIGRAM(S): 2 INJECTION INTRAMUSCULAR; INTRAVENOUS; SUBCUTANEOUS at 08:00

## 2019-07-31 RX ADMIN — GABAPENTIN 800 MILLIGRAM(S): 400 CAPSULE ORAL at 22:29

## 2019-07-31 RX ADMIN — Medication 4 MILLIGRAM(S): at 06:57

## 2019-07-31 RX ADMIN — FENTANYL CITRATE 1 PATCH: 50 INJECTION INTRAVENOUS at 19:15

## 2019-07-31 RX ADMIN — OXYCODONE HYDROCHLORIDE 20 MILLIGRAM(S): 5 TABLET ORAL at 12:53

## 2019-07-31 RX ADMIN — HYDROMORPHONE HYDROCHLORIDE 2 MILLIGRAM(S): 2 INJECTION INTRAMUSCULAR; INTRAVENOUS; SUBCUTANEOUS at 22:26

## 2019-07-31 NOTE — DIETITIAN INITIAL EVALUATION ADULT. - OTHER INFO
58 y/o F with PMHx of chronic back pain, lung adenocarcinoma (s/p wedge resection of RML in 11/2018), mixed connective tissue disease  anxiety, diverticulitis, smoker? ,MI  presented to the ED complaining  of chest pain traveling in to her whole body. Patient is on chronic opioid medications by pain management. Patient drinks alcohol sporadically.  Pt was hunched over in pain during much of the interview: Unable to perform full Nutrition focused phyiscal exam. Pt appears thin: As per ER note 7/25/19 pt was 105# zosyn, vanco,lantus, lispro, gabapentin, lactobcillus, zocor, oxycodone, zestrilWeight on this admit  7/30 127#, 7/31/ 121#.pt states usual weight 110# . N edema noted but abdomen appears distended.  Pt with elevated LFT's and Lipase/amylase. As per GI note, suspect ETOH related. Pt reported that she had gallbladder removed recently. Reviewed low fat diet for this as well as pancreatitis. Pt states "I eat whats in my refridgerator." Pt did not seem engaged in conversation-was more concerned with lab work: 'My neutroplils are elevated-why arent I on antibiotics?" Pt requested to see MD. Related concern to MARINA. 58 y/o F with PMHx of chronic back pain, lung adenocarcinoma (s/p wedge resection of RML in 11/2018), mixed connective tissue disease  anxiety, diverticulitis, smoker? ,MI  presented to the ED complaining  of chest pain traveling in to her whole body. Patient is on chronic opioid medications by pain management. Patient drinks alcohol sporadically.  Pt was hunched over in pain during much of the interview: Unable to perform full Nutrition focused phyiscal exam. Pt appears thin and denies significant weight changes As per ER note 7/25/19 pt was 105#  Weight on this admit  7/30 127#, 7/31/ 121#.pt states usual weight 110# No edema noted but abdomen appears distended. Pt with elevated LFT's and Lipase. As per GI note, suspect ETOH related. Pt reported that she had gallbladder removed recently. Reviewed low fat diet for this as well as pancreatitis. When RD asked pt about typical diet PTA Pt stated "I eat whats in my refridgerator." Pt did not seem engaged in conversation-was more concerned with lab work: 'My neutroplils are elevated-why arent I on antibiotics?" Pt requested to see MD. Related concern to RN.

## 2019-07-31 NOTE — PROVIDER CONTACT NOTE (OTHER) - ACTION/TREATMENT ORDERED:
dr drummond aware. place patient on tlemetry monitor. to order additional blood pressure medication.

## 2019-07-31 NOTE — PROGRESS NOTE ADULT - PROBLEM SELECTOR PLAN 3
likely in the context of pain and agitation  pain control. monitor hemodynamics  assess need for antihypertensive agents  added Lopressor IV prn and cont Hydralazine po PRN .

## 2019-07-31 NOTE — DIETITIAN INITIAL EVALUATION ADULT. - PROBLEM SELECTOR PLAN 2
f/u US abdomen  check hepatitis panel  monitor LFTs, avoid hepatotoxins  history of sporadic EtOH use - last drink was 7/28

## 2019-07-31 NOTE — DIETITIAN INITIAL EVALUATION ADULT. - PROBLEM SELECTOR PLAN 3
likely in the context of pain and agitation  pain control. monitor hemodynamics  assess need for antihypertensive agents

## 2019-07-31 NOTE — CONSULT NOTE ADULT - ASSESSMENT
acute pancreatitis, suspect alcohol related disease    which include lft elevaton   duct dilation in absence of GB w/ normal bili-not not anticipate need for ercp or mrcp   f/u lft and lipase   if not improving will need CT a/p w/ iv and oral contrast   observe re etoh withdraw   send Hep C ab   cont w/ NS IVF @ 150cc /hr   clearliquids ok  send lipid panel

## 2019-07-31 NOTE — DIETITIAN INITIAL EVALUATION ADULT. - PROBLEM SELECTOR PLAN 4
some element of steroid induced leukocytosis  afebrile with no current signs of infection  continue to trend WBC - monitor for fevers

## 2019-07-31 NOTE — PROGRESS NOTE ADULT - PROBLEM SELECTOR PLAN 2
f/u US abdomen, < from: US Abdomen Limited (07.30.19 @ 16:34) >    Status post cholecystectomy. Biliary dilatation. See discussion above.  Edematous pancreas. Correlate with lipase levels for pancreatitis.   Recommend CT or MR for better evaluation.  Trace ascites  check hepatitis panel  monitor LFTs, avoid hepatotoxins  history of sporadic EtOH use - last drink was 7/28

## 2019-07-31 NOTE — CONSULT NOTE ADULT - SUBJECTIVE AND OBJECTIVE BOX
Chief Complaint:  Patient is a 59y old  Female who presents with a chief complaint of pancreatitis, generalized pain (2019 10:18)    Adenocarcinoma of lung  Mixed connective tissue disease  Depression H/O panic attack  S/P Laminectomy  Lumbar 3/10  Chronic pain  Diverticulitis of colon (without mention of hemorrhage)  S/P lumbar laminectomy  S/P cholecystectomy  History of lung cancer  No significant past surgical history     HPI:  60 y/o F with PMHx of chronic back pain, lung adenocarcinoma (s/p wedge resection of RML in 2018), mixed connective tissue disease (on plaquenil), anxiety, former smoker, ?history of MI (has not had cath and was told work up "showed scar tissue") presented to the ED complaining of onset of chest pain traveling in to her whole body. Patient is on chronic opioid medications by pain management. Patient drinks alcohol sporadically with her last drink 2019. Has abdominal and back pain, rated at 10/10 in intensity despite being given dilaudid by the ED. Currently with pain throughout her whole body. Denies nausea, vomiting, palpitations, dyspnea, diarrhea, headache, dizziness, fevers or chills.  noted elevation in lipase w/ etoh hx and lft elevatin   reports having had pancreatitis inthe past   sono abdomen notes abscence of GB, duct 11mm    penicillin (Swelling)      ALPRAZolam 0.25 milliGRAM(s) Oral two times a day PRN  aluminum hydroxide/magnesium hydroxide/simethicone Suspension 30 milliLiter(s) Oral every 6 hours PRN  artificial  tears Solution 1 Drop(s) Both EYES two times a day PRN  cyclobenzaprine 10 milliGRAM(s) Oral three times a day PRN  docusate sodium 100 milliGRAM(s) Oral three times a day  fentaNYL   Patch  25 MICROgram(s)/Hr 1 Patch Transdermal every 72 hours  gabapentin 800 milliGRAM(s) Oral three times a day  hydrALAZINE 25 milliGRAM(s) Oral every 6 hours PRN  HYDROmorphone  Injectable 2 milliGRAM(s) IV Push every 4 hours PRN  HYDROmorphone  Injectable 1 milliGRAM(s) IV Push every 6 hours PRN  hydroxychloroquine 200 milliGRAM(s) Oral <User Schedule>  lactated ringers. 1000 milliLiter(s) IV Continuous <Continuous>  methylPREDNISolone 4 milliGRAM(s) Oral daily  metoprolol tartrate Injectable 5 milliGRAM(s) IV Push every 6 hours PRN  multivitamin 1 Tablet(s) Oral daily  ondansetron Injectable 4 milliGRAM(s) IV Push every 6 hours PRN  pantoprazole    Tablet 40 milliGRAM(s) Oral before breakfast  senna 2 Tablet(s) Oral at bedtime PRN  traMADol 25 milliGRAM(s) Oral every 6 hours PRN        FAMILY HISTORY:  FHx: rheumatoid arthritis: in mother - with RA associated lung fibrosis  Family history of leukemia: in father        Review of Systems:    General:  No wt loss, fevers, chills, night sweats,fatigue,   Eyes:  Good vision, no reported pain  ENT:  No sore throat, pain, runny nose, dysphagia  CV:  No pain, palpitatioins, hypo/hypertension  Resp:  No dyspnea, cough, tachypnea, wheezing  GI:  +abd pain, No nausea, No vomiting, No diarrhea, No constipatiion, No weight loss, No fever, No pruritis, No rectal bleeding, No tarry stools, No dysphagia,  :  No pain, bleeding, incontinence, nocturia  Muscle:  No pain, weakness  Breast:  No pain, abscess, mass, discharge  Neuro:  No weakness, tingling, memory problems  Psych:  No fatigue, insomnia, mood problems, depression  Endocrine:  No polyuria, polydypsia, cold/heat intolerance  Heme:  No petechiae, ecchymosis, easy bruisability  Skin:  No rash, tattoos, scars, edema      Relevant Social History: etoh    on disability       Physical Exam:    Vital Signs:  Vital Signs Last 24 Hrs  T(C): 37.2 (2019 09:03), Max: 37.3 (2019 21:05)  T(F): 98.9 (2019 09:03), Max: 99.1 (2019 21:05)  HR: 105 (2019 09:03) (93 - 132)  BP: 152/93 (2019 09:03) (134/93 - 171/110)  BP(mean): --  RR: 20 (2019 09:03) (16 - 20)  SpO2: 96% (2019 09:03) (92% - 100%)  Daily     Daily Weight in k (2019 06:00)    General:  Appears stated age, well-groomed, well-nourished, no distress  HEENT:  NC/AT,  conjunctivae clear and pink, no thyromegaly, nodules, adenopathy, no JVD  Chest:  Full & symmetric excursion, no increased effort, breath sounds clear  Cardiovascular:  Regular rhythm, S1, S2, no murmur/rub/S3/S4, no abdominal bruit, no edema  Abdomen:  Soft, mild tender, +-distended, normoactive bowel sounds,  no masses ,no hepatosplenomeagaly, no signs of chronic liver disease  Extremities:  no cyanosis,clubbing or edema  Skin:  No rash/erythema/ecchymoses/petechiae/wounds/abscess/warm/dry  Neuro/Psych:  Alert, oriented, no asterixis, no tremor, no encephalopathy    Laboratory:                            16.2   15.38 )-----------( 207      ( 2019 07:54 )             46.8     07-31    130<L>  |  97  |  11  ----------------------------<  130<H>  4.2   |  26  |  0.85    Ca    9.0      2019 07:54  Phos  3.9     07-  Mg     1.8     -    TPro  6.5  /  Alb  2.6<L>  /  TBili  0.8  /  DBili  0.2  /  AST  105<H>  /  ALT  87<H>  /  AlkPhos  51  07-31    LIVER FUNCTIONS - ( 2019 07:54 )  Alb: 2.6 g/dL / Pro: 6.5 g/dL / ALK PHOS: 51 U/L / ALT: 87 U/L DA / AST: 105 U/L / GGT: x               Amylase Serum--      Lipase [id number]       Ammonia--  Amylase Serum--      Lipase [id number]       Ammonia--    Imaging:  < from: US Abdomen Limited (19 @ 16:34) >  Impression:    Status post cholecystectomy. Biliary dilatation. See discussion above.  Edematous pancreas. Correlate with lipase levels for pancreatitis.   Recommend CT or MR for better evaluation.  Trace ascites      < end of copied text >      Assessment:      Plan:

## 2019-07-31 NOTE — DIETITIAN INITIAL EVALUATION ADULT. - PROBLEM SELECTOR PLAN 6
follows with pain management - Dr. Hawk  ISTOP done - on oxycodone IR 20mg q6, fentanyl 25mcg patch at home  will continue fentanyl patch and hold home oxycodone with current pain regimen  continue home gabapentin 800mg TID, medrol 4mg qd and flexeril q8 PRN

## 2019-08-01 LAB
ANION GAP SERPL CALC-SCNC: 7 MMOL/L — SIGNIFICANT CHANGE UP (ref 5–17)
BUN SERPL-MCNC: 7 MG/DL — SIGNIFICANT CHANGE UP (ref 7–23)
CALCIUM SERPL-MCNC: 8.7 MG/DL — SIGNIFICANT CHANGE UP (ref 8.4–10.5)
CHLORIDE SERPL-SCNC: 94 MMOL/L — LOW (ref 96–108)
CHOLEST SERPL-MCNC: 146 MG/DL — SIGNIFICANT CHANGE UP (ref 10–199)
CO2 SERPL-SCNC: 28 MMOL/L — SIGNIFICANT CHANGE UP (ref 22–31)
CREAT SERPL-MCNC: 0.59 MG/DL — SIGNIFICANT CHANGE UP (ref 0.5–1.3)
GLUCOSE SERPL-MCNC: 82 MG/DL — SIGNIFICANT CHANGE UP (ref 70–99)
HCT VFR BLD CALC: 37.5 % — SIGNIFICANT CHANGE UP (ref 34.5–45)
HDLC SERPL-MCNC: 77 MG/DL — SIGNIFICANT CHANGE UP
HGB BLD-MCNC: 12.8 G/DL — SIGNIFICANT CHANGE UP (ref 11.5–15.5)
LIDOCAIN IGE QN: 1204 U/L — HIGH (ref 73–393)
LIPID PNL WITH DIRECT LDL SERPL: 54 MG/DL — SIGNIFICANT CHANGE UP
MCHC RBC-ENTMCNC: 34.1 GM/DL — SIGNIFICANT CHANGE UP (ref 32–36)
MCHC RBC-ENTMCNC: 35 PG — HIGH (ref 27–34)
MCV RBC AUTO: 102.5 FL — HIGH (ref 80–100)
NRBC # BLD: 0 /100 WBCS — SIGNIFICANT CHANGE UP (ref 0–0)
PLATELET # BLD AUTO: 137 K/UL — LOW (ref 150–400)
POTASSIUM SERPL-MCNC: 4.3 MMOL/L — SIGNIFICANT CHANGE UP (ref 3.5–5.3)
POTASSIUM SERPL-SCNC: 4.3 MMOL/L — SIGNIFICANT CHANGE UP (ref 3.5–5.3)
RBC # BLD: 3.66 M/UL — LOW (ref 3.8–5.2)
RBC # FLD: 12.3 % — SIGNIFICANT CHANGE UP (ref 10.3–14.5)
SODIUM SERPL-SCNC: 129 MMOL/L — LOW (ref 135–145)
TOTAL CHOLESTEROL/HDL RATIO MEASUREMENT: 1.9 RATIO — LOW (ref 3.3–7.1)
TRIGL SERPL-MCNC: 76 MG/DL — SIGNIFICANT CHANGE UP (ref 10–149)
WBC # BLD: 13.31 K/UL — HIGH (ref 3.8–10.5)
WBC # FLD AUTO: 13.31 K/UL — HIGH (ref 3.8–10.5)

## 2019-08-01 PROCEDURE — 74018 RADEX ABDOMEN 1 VIEW: CPT | Mod: 26

## 2019-08-01 PROCEDURE — 99233 SBSQ HOSP IP/OBS HIGH 50: CPT

## 2019-08-01 RX ORDER — OXYCODONE HYDROCHLORIDE 5 MG/1
20 TABLET ORAL EVERY 6 HOURS
Refills: 0 | Status: DISCONTINUED | OUTPATIENT
Start: 2019-08-01 | End: 2019-08-02

## 2019-08-01 RX ORDER — HYDROMORPHONE HYDROCHLORIDE 2 MG/ML
1 INJECTION INTRAMUSCULAR; INTRAVENOUS; SUBCUTANEOUS ONCE
Refills: 0 | Status: DISCONTINUED | OUTPATIENT
Start: 2019-08-01 | End: 2019-08-01

## 2019-08-01 RX ORDER — HYDROMORPHONE HYDROCHLORIDE 2 MG/ML
1 INJECTION INTRAMUSCULAR; INTRAVENOUS; SUBCUTANEOUS EVERY 4 HOURS
Refills: 0 | Status: DISCONTINUED | OUTPATIENT
Start: 2019-08-01 | End: 2019-08-02

## 2019-08-01 RX ORDER — SODIUM CHLORIDE 9 MG/ML
1000 INJECTION INTRAMUSCULAR; INTRAVENOUS; SUBCUTANEOUS
Refills: 0 | Status: DISCONTINUED | OUTPATIENT
Start: 2019-08-01 | End: 2019-08-02

## 2019-08-01 RX ADMIN — HYDROMORPHONE HYDROCHLORIDE 2 MILLIGRAM(S): 2 INJECTION INTRAMUSCULAR; INTRAVENOUS; SUBCUTANEOUS at 11:15

## 2019-08-01 RX ADMIN — HYDROMORPHONE HYDROCHLORIDE 1 MILLIGRAM(S): 2 INJECTION INTRAMUSCULAR; INTRAVENOUS; SUBCUTANEOUS at 23:41

## 2019-08-01 RX ADMIN — CYCLOBENZAPRINE HYDROCHLORIDE 10 MILLIGRAM(S): 10 TABLET, FILM COATED ORAL at 19:38

## 2019-08-01 RX ADMIN — Medication 100 MILLIGRAM(S): at 20:54

## 2019-08-01 RX ADMIN — HYDROMORPHONE HYDROCHLORIDE 2 MILLIGRAM(S): 2 INJECTION INTRAMUSCULAR; INTRAVENOUS; SUBCUTANEOUS at 15:18

## 2019-08-01 RX ADMIN — FENTANYL CITRATE 1 PATCH: 50 INJECTION INTRAVENOUS at 19:28

## 2019-08-01 RX ADMIN — HYDROMORPHONE HYDROCHLORIDE 1 MILLIGRAM(S): 2 INJECTION INTRAMUSCULAR; INTRAVENOUS; SUBCUTANEOUS at 18:55

## 2019-08-01 RX ADMIN — Medication 100 MILLIGRAM(S): at 06:29

## 2019-08-01 RX ADMIN — GABAPENTIN 800 MILLIGRAM(S): 400 CAPSULE ORAL at 20:55

## 2019-08-01 RX ADMIN — HYDROMORPHONE HYDROCHLORIDE 2 MILLIGRAM(S): 2 INJECTION INTRAMUSCULAR; INTRAVENOUS; SUBCUTANEOUS at 14:47

## 2019-08-01 RX ADMIN — HYDROMORPHONE HYDROCHLORIDE 1 MILLIGRAM(S): 2 INJECTION INTRAMUSCULAR; INTRAVENOUS; SUBCUTANEOUS at 21:15

## 2019-08-01 RX ADMIN — HYDROMORPHONE HYDROCHLORIDE 2 MILLIGRAM(S): 2 INJECTION INTRAMUSCULAR; INTRAVENOUS; SUBCUTANEOUS at 10:47

## 2019-08-01 RX ADMIN — Medication 0.25 MILLIGRAM(S): at 12:31

## 2019-08-01 RX ADMIN — Medication 100 MILLIGRAM(S): at 13:31

## 2019-08-01 RX ADMIN — GABAPENTIN 800 MILLIGRAM(S): 400 CAPSULE ORAL at 06:29

## 2019-08-01 RX ADMIN — GABAPENTIN 800 MILLIGRAM(S): 400 CAPSULE ORAL at 13:31

## 2019-08-01 RX ADMIN — HYDROMORPHONE HYDROCHLORIDE 1 MILLIGRAM(S): 2 INJECTION INTRAMUSCULAR; INTRAVENOUS; SUBCUTANEOUS at 19:05

## 2019-08-01 RX ADMIN — HYDROMORPHONE HYDROCHLORIDE 2 MILLIGRAM(S): 2 INJECTION INTRAMUSCULAR; INTRAVENOUS; SUBCUTANEOUS at 02:33

## 2019-08-01 RX ADMIN — FENTANYL CITRATE 1 PATCH: 50 INJECTION INTRAVENOUS at 07:15

## 2019-08-01 RX ADMIN — HYDROMORPHONE HYDROCHLORIDE 2 MILLIGRAM(S): 2 INJECTION INTRAMUSCULAR; INTRAVENOUS; SUBCUTANEOUS at 03:05

## 2019-08-01 RX ADMIN — HYDROMORPHONE HYDROCHLORIDE 2 MILLIGRAM(S): 2 INJECTION INTRAMUSCULAR; INTRAVENOUS; SUBCUTANEOUS at 06:28

## 2019-08-01 RX ADMIN — Medication 1 TABLET(S): at 11:46

## 2019-08-01 RX ADMIN — HYDROMORPHONE HYDROCHLORIDE 2 MILLIGRAM(S): 2 INJECTION INTRAMUSCULAR; INTRAVENOUS; SUBCUTANEOUS at 06:58

## 2019-08-01 RX ADMIN — PANTOPRAZOLE SODIUM 40 MILLIGRAM(S): 20 TABLET, DELAYED RELEASE ORAL at 06:29

## 2019-08-01 RX ADMIN — SODIUM CHLORIDE 125 MILLILITER(S): 9 INJECTION INTRAMUSCULAR; INTRAVENOUS; SUBCUTANEOUS at 15:15

## 2019-08-01 RX ADMIN — Medication 4 MILLIGRAM(S): at 06:29

## 2019-08-01 RX ADMIN — HYDROMORPHONE HYDROCHLORIDE 1 MILLIGRAM(S): 2 INJECTION INTRAMUSCULAR; INTRAVENOUS; SUBCUTANEOUS at 20:54

## 2019-08-01 RX ADMIN — SODIUM CHLORIDE 150 MILLILITER(S): 9 INJECTION, SOLUTION INTRAVENOUS at 12:31

## 2019-08-01 NOTE — PROGRESS NOTE ADULT - PROBLEM SELECTOR PLAN 1
admit to medicine  pain control with IV dilaudid and tramadol (on chronic opioids)  continue IV fluids at 125cc/hr, advance diet to clear liquid- advance to clears as tolerated  GI consult Dr. Llanos

## 2019-08-01 NOTE — PROGRESS NOTE ADULT - ASSESSMENT
acute pancreatitis, suspect alcohol related disease    which include lft elevaton   duct dilation in absence of GB w/ normal bili-not not anticipate need for ercp or mrcp   f/u lft and lipase--lipasemia resolving and pt w/ less discomfort      observe re etoh withdraw   send Hep C ab----negative.    cont w/ NS IVF @ 100cc/hr, start clear liquids today  send lipid panel  -AXR today r/o ileus, if w/o ileus consider total abdominal sono r/o ascites.

## 2019-08-02 ENCOUNTER — TRANSCRIPTION ENCOUNTER (OUTPATIENT)
Age: 59
End: 2019-08-02

## 2019-08-02 VITALS
DIASTOLIC BLOOD PRESSURE: 92 MMHG | HEART RATE: 85 BPM | TEMPERATURE: 99 F | SYSTOLIC BLOOD PRESSURE: 146 MMHG | RESPIRATION RATE: 18 BRPM | OXYGEN SATURATION: 99 %

## 2019-08-02 LAB
ALBUMIN SERPL ELPH-MCNC: 2.5 G/DL — LOW (ref 3.3–5)
ALP SERPL-CCNC: 42 U/L — SIGNIFICANT CHANGE UP (ref 30–120)
ALT FLD-CCNC: 76 U/L DA — HIGH (ref 10–60)
AMYLASE P1 CFR SERPL: 77 U/L — SIGNIFICANT CHANGE UP (ref 25–125)
ANION GAP SERPL CALC-SCNC: 10 MMOL/L — SIGNIFICANT CHANGE UP (ref 5–17)
AST SERPL-CCNC: 105 U/L — HIGH (ref 10–40)
BASOPHILS # BLD AUTO: 0.01 K/UL — SIGNIFICANT CHANGE UP (ref 0–0.2)
BASOPHILS NFR BLD AUTO: 0.1 % — SIGNIFICANT CHANGE UP (ref 0–2)
BILIRUB SERPL-MCNC: 1.2 MG/DL — SIGNIFICANT CHANGE UP (ref 0.2–1.2)
BUN SERPL-MCNC: 7 MG/DL — SIGNIFICANT CHANGE UP (ref 7–23)
CALCIUM SERPL-MCNC: 8.6 MG/DL — SIGNIFICANT CHANGE UP (ref 8.4–10.5)
CHLORIDE SERPL-SCNC: 94 MMOL/L — LOW (ref 96–108)
CO2 SERPL-SCNC: 26 MMOL/L — SIGNIFICANT CHANGE UP (ref 22–31)
CREAT SERPL-MCNC: 0.67 MG/DL — SIGNIFICANT CHANGE UP (ref 0.5–1.3)
EOSINOPHIL # BLD AUTO: 0.03 K/UL — SIGNIFICANT CHANGE UP (ref 0–0.5)
EOSINOPHIL NFR BLD AUTO: 0.3 % — SIGNIFICANT CHANGE UP (ref 0–6)
GLUCOSE SERPL-MCNC: 71 MG/DL — SIGNIFICANT CHANGE UP (ref 70–99)
HCT VFR BLD CALC: 34.7 % — SIGNIFICANT CHANGE UP (ref 34.5–45)
HGB BLD-MCNC: 11.7 G/DL — SIGNIFICANT CHANGE UP (ref 11.5–15.5)
IMM GRANULOCYTES NFR BLD AUTO: 0.7 % — SIGNIFICANT CHANGE UP (ref 0–1.5)
LIDOCAIN IGE QN: 469 U/L — HIGH (ref 73–393)
LYMPHOCYTES # BLD AUTO: 0.91 K/UL — LOW (ref 1–3.3)
LYMPHOCYTES # BLD AUTO: 7.9 % — LOW (ref 13–44)
MCHC RBC-ENTMCNC: 33.7 GM/DL — SIGNIFICANT CHANGE UP (ref 32–36)
MCHC RBC-ENTMCNC: 35 PG — HIGH (ref 27–34)
MCV RBC AUTO: 103.9 FL — HIGH (ref 80–100)
MONOCYTES # BLD AUTO: 0.99 K/UL — HIGH (ref 0–0.9)
MONOCYTES NFR BLD AUTO: 8.6 % — SIGNIFICANT CHANGE UP (ref 2–14)
NEUTROPHILS # BLD AUTO: 9.47 K/UL — HIGH (ref 1.8–7.4)
NEUTROPHILS NFR BLD AUTO: 82.4 % — HIGH (ref 43–77)
NRBC # BLD: 0 /100 WBCS — SIGNIFICANT CHANGE UP (ref 0–0)
PLATELET # BLD AUTO: 137 K/UL — LOW (ref 150–400)
POTASSIUM SERPL-MCNC: 3.4 MMOL/L — LOW (ref 3.5–5.3)
POTASSIUM SERPL-SCNC: 3.4 MMOL/L — LOW (ref 3.5–5.3)
PROT SERPL-MCNC: 6.6 G/DL — SIGNIFICANT CHANGE UP (ref 6–8.3)
RBC # BLD: 3.34 M/UL — LOW (ref 3.8–5.2)
RBC # FLD: 12.2 % — SIGNIFICANT CHANGE UP (ref 10.3–14.5)
SODIUM SERPL-SCNC: 130 MMOL/L — LOW (ref 135–145)
WBC # BLD: 11.49 K/UL — HIGH (ref 3.8–10.5)
WBC # FLD AUTO: 11.49 K/UL — HIGH (ref 3.8–10.5)

## 2019-08-02 PROCEDURE — 74177 CT ABD & PELVIS W/CONTRAST: CPT | Mod: 26

## 2019-08-02 PROCEDURE — 99239 HOSP IP/OBS DSCHRG MGMT >30: CPT

## 2019-08-02 PROCEDURE — 74018 RADEX ABDOMEN 1 VIEW: CPT | Mod: 26

## 2019-08-02 RX ORDER — POTASSIUM CHLORIDE 20 MEQ
10 PACKET (EA) ORAL
Refills: 0 | Status: COMPLETED | OUTPATIENT
Start: 2019-08-02 | End: 2019-08-02

## 2019-08-02 RX ORDER — SENNA PLUS 8.6 MG/1
2 TABLET ORAL
Qty: 0 | Refills: 0 | DISCHARGE
Start: 2019-08-02

## 2019-08-02 RX ORDER — IOHEXOL 300 MG/ML
30 INJECTION, SOLUTION INTRAVENOUS ONCE
Refills: 0 | Status: COMPLETED | OUTPATIENT
Start: 2019-08-02 | End: 2019-08-02

## 2019-08-02 RX ORDER — POLYETHYLENE GLYCOL 3350 17 G/17G
17 POWDER, FOR SOLUTION ORAL DAILY
Refills: 0 | Status: DISCONTINUED | OUTPATIENT
Start: 2019-08-02 | End: 2019-08-02

## 2019-08-02 RX ORDER — GABAPENTIN 400 MG/1
1 CAPSULE ORAL
Qty: 0 | Refills: 0 | DISCHARGE

## 2019-08-02 RX ORDER — DEXTROSE MONOHYDRATE, SODIUM CHLORIDE, AND POTASSIUM CHLORIDE 50; .745; 4.5 G/1000ML; G/1000ML; G/1000ML
1000 INJECTION, SOLUTION INTRAVENOUS
Refills: 0 | Status: DISCONTINUED | OUTPATIENT
Start: 2019-08-02 | End: 2019-08-02

## 2019-08-02 RX ORDER — POLYETHYLENE GLYCOL 3350 17 G/17G
17 POWDER, FOR SOLUTION ORAL
Qty: 0 | Refills: 0 | DISCHARGE
Start: 2019-08-02

## 2019-08-02 RX ORDER — POTASSIUM CHLORIDE 20 MEQ
40 PACKET (EA) ORAL ONCE
Refills: 0 | Status: DISCONTINUED | OUTPATIENT
Start: 2019-08-02 | End: 2019-08-02

## 2019-08-02 RX ORDER — DOCUSATE SODIUM 100 MG
1 CAPSULE ORAL
Qty: 0 | Refills: 0 | DISCHARGE
Start: 2019-08-02

## 2019-08-02 RX ADMIN — OXYCODONE HYDROCHLORIDE 20 MILLIGRAM(S): 5 TABLET ORAL at 02:00

## 2019-08-02 RX ADMIN — OXYCODONE HYDROCHLORIDE 20 MILLIGRAM(S): 5 TABLET ORAL at 08:30

## 2019-08-02 RX ADMIN — HYDROMORPHONE HYDROCHLORIDE 1 MILLIGRAM(S): 2 INJECTION INTRAMUSCULAR; INTRAVENOUS; SUBCUTANEOUS at 03:50

## 2019-08-02 RX ADMIN — Medication 0.25 MILLIGRAM(S): at 06:30

## 2019-08-02 RX ADMIN — GABAPENTIN 800 MILLIGRAM(S): 400 CAPSULE ORAL at 06:26

## 2019-08-02 RX ADMIN — OXYCODONE HYDROCHLORIDE 20 MILLIGRAM(S): 5 TABLET ORAL at 01:33

## 2019-08-02 RX ADMIN — Medication 100 MILLIEQUIVALENT(S): at 06:26

## 2019-08-02 RX ADMIN — PANTOPRAZOLE SODIUM 40 MILLIGRAM(S): 20 TABLET, DELAYED RELEASE ORAL at 06:26

## 2019-08-02 RX ADMIN — Medication 100 MILLIGRAM(S): at 06:26

## 2019-08-02 RX ADMIN — Medication 100 MILLIEQUIVALENT(S): at 04:39

## 2019-08-02 RX ADMIN — HYDROMORPHONE HYDROCHLORIDE 1 MILLIGRAM(S): 2 INJECTION INTRAMUSCULAR; INTRAVENOUS; SUBCUTANEOUS at 00:00

## 2019-08-02 RX ADMIN — Medication 4 MILLIGRAM(S): at 06:26

## 2019-08-02 RX ADMIN — Medication 100 MILLIEQUIVALENT(S): at 08:11

## 2019-08-02 RX ADMIN — HYDROMORPHONE HYDROCHLORIDE 1 MILLIGRAM(S): 2 INJECTION INTRAMUSCULAR; INTRAVENOUS; SUBCUTANEOUS at 03:23

## 2019-08-02 RX ADMIN — OXYCODONE HYDROCHLORIDE 20 MILLIGRAM(S): 5 TABLET ORAL at 08:09

## 2019-08-02 RX ADMIN — IOHEXOL 30 MILLILITER(S): 300 INJECTION, SOLUTION INTRAVENOUS at 08:45

## 2019-08-02 RX ADMIN — Medication 200 MILLIGRAM(S): at 06:26

## 2019-08-02 NOTE — PROGRESS NOTE ADULT - ASSESSMENT
58 y/o F with PMHx of chronic back pain, lung adenocarcinoma (s/p wedge resection of RML in 11/2018), mixed connective tissue disease (on plaquenil), anxiety, former smoker, ?history of MI (has not had cath and was told work up "showed scar tissue") admitted with pancreatitis. oral 60 y/o F with PMHx of chronic back pain, lung adenocarcinoma (s/p wedge resection of RML in 11/2018), mixed connective tissue disease (on plaquenil), anxiety, former smoker, ?history of MI (has not had cath and was told work up "showed scar tissue") admitted with pancreatitis.    Hypokalemia: corrected, changed IVF to Nacl Kcl.

## 2019-08-02 NOTE — PROGRESS NOTE ADULT - PROBLEM SELECTOR PLAN 1
admit to medicine  pain control with IV dilaudid and tramadol (on chronic opioids)  continue IV fluids at 100cc/hr, advance diet to clear liquid- advance to clears as tolerated  GI consult Dr. Llanos  Recheck CT abdomen due to abdominal pain.

## 2019-08-02 NOTE — PROGRESS NOTE ADULT - PROBLEM SELECTOR PLAN 5
repleted in the ER  monitor K, Mag, Phos  replete electrolytes as needed

## 2019-08-02 NOTE — DISCHARGE NOTE PROVIDER - NSDCCPCAREPLAN_GEN_ALL_CORE_FT
PRINCIPAL DISCHARGE DIAGNOSIS  Diagnosis: Pancreatitis  Assessment and Plan of Treatment: improving, cont clear liquid for 2 days and advance it as tolerated, f/u with your pcp  at am. continue po diet low fat.   cont po hysdration.

## 2019-08-02 NOTE — DISCHARGE NOTE NURSING/CASE MANAGEMENT/SOCIAL WORK - NSDCDPATPORTLINK_GEN_ALL_CORE
You can access the [facility] Portal, offered by [facility], by registering with the following website: [email or web address]

## 2019-08-02 NOTE — DISCHARGE NOTE NURSING/CASE MANAGEMENT/SOCIAL WORK - NSTRANSFEREYEGLASSESPAIRS_GEN_A_NUR
1 pair Closure 4 Information: This tab is for additional flaps and grafts above and beyond our usual structured repairs.  Please note if you enter information here it will not currently bill and you will need to add the billing information manually.

## 2019-08-02 NOTE — DISCHARGE NOTE PROVIDER - NSDCFUSCHEDAPPT_GEN_ALL_CORE_FT
JULIO CESAR FAYE ; 08/12/2019 ; [facility] [address] [place] [address] JULIO CESAR FAYE ; 08/12/2019 ; [facility] [address] [place] [address]

## 2019-08-02 NOTE — PROGRESS NOTE ADULT - REASON FOR ADMISSION
pancreatitis, generalized pain

## 2019-08-02 NOTE — PROGRESS NOTE ADULT - PROBLEM SELECTOR PLAN 6
follows with pain management - Dr. Hawk  ISTOP done - on oxycodone IR 20mg q6, fentanyl 25mcg patch at home  will continue fentanyl patch and hold home oxycodone with current pain regimen  continue home gabapentin 800mg TID, medrol 4mg qd and flexeril q8 PRN  with IV Dilaudid prn.
follows with pain management - Dr. Hawk  ISTOP done - on oxycodone IR 20mg q6, fentanyl 25mcg patch at home  will continue fentanyl patch and hold home oxycodone with current pain regimen  continue home gabapentin 800mg TID, medrol 4mg qd and flexeril q8 PRN  with IV Dilaudid prn.
follows with pain management - Dr. Hawk  ISTOP done - on oxycodone IR 20mg q6, fentanyl 25mcg patch at home  will continue fentanyl patch and hold home oxycodone with current pain regimen  continue home gabapentin 800mg TID, medrol 4mg qd and flexeril q8 PRN

## 2019-08-02 NOTE — DISCHARGE NOTE PROVIDER - CARE PROVIDER_API CALL
Tim Davis (DO)  Internal Medicine  [address], [address]  [place] [address]  Phone: [phone]  Fax: [phone]  Follow Up Time:     Jon Llanos)  Gastroenterology  [address]  [place] [address]  Phone: [phone]  Fax: [phone]  Follow Up Time:

## 2019-08-02 NOTE — CHART NOTE - NSCHARTNOTEFT_GEN_A_CORE
Pt reseen and examined for followup, ambulating comfortably and stating she feels slightly better. Had last bm on tues, minimal flatus  Plan: will discuss with primary hospitalist Dr Salvador, consider gen surg pending official cxr and ct abd read, monitor clinical course, f/u labs will addend on this note, pain control, added miralax to regimen, monitro for withdrawal signs Pt reseen and examined for followup, ambulating comfortably and stating she feels slightly better. Had last bm on tues, minimal flatus    Plan: will discuss with primary hospitalist Dr Salvador, consider gen surg pending official axr and ct abd read, monitor clinical course, f/u labs will addend on this note, pain control, added miralax to regimen, monitor for withdrawal signs, may need ng tube if worsened ileus

## 2019-08-02 NOTE — PROGRESS NOTE ADULT - SUBJECTIVE AND OBJECTIVE BOX
Chief Complaint:        INTERVAL HPI/OVERNIGHT EVENTS:    no significant events overnight reported   feeling better.     MEDICATIONS  (STANDING):  docusate sodium 100 milliGRAM(s) Oral three times a day  fentaNYL   Patch  25 MICROgram(s)/Hr 1 Patch Transdermal every 72 hours  gabapentin 800 milliGRAM(s) Oral three times a day  hydroxychloroquine 200 milliGRAM(s) Oral <User Schedule>  lactated ringers. 1000 milliLiter(s) (150 mL/Hr) IV Continuous <Continuous>  methylPREDNISolone 4 milliGRAM(s) Oral daily  multivitamin 1 Tablet(s) Oral daily  pantoprazole    Tablet 40 milliGRAM(s) Oral before breakfast    MEDICATIONS  (PRN):  ALPRAZolam 0.25 milliGRAM(s) Oral two times a day PRN anxiety  aluminum hydroxide/magnesium hydroxide/simethicone Suspension 30 milliLiter(s) Oral every 6 hours PRN Dyspepsia  artificial  tears Solution 1 Drop(s) Both EYES two times a day PRN Dry Eyes  cyclobenzaprine 10 milliGRAM(s) Oral three times a day PRN Muscle Spasm  hydrALAZINE 25 milliGRAM(s) Oral every 6 hours PRN For Systolic blood pressure > 160  HYDROmorphone  Injectable 2 milliGRAM(s) IV Push every 4 hours PRN Severe Pain (7 - 10)  metoprolol tartrate Injectable 5 milliGRAM(s) IV Push every 6 hours PRN if SBP > 170 or HR > 110  ondansetron Injectable 4 milliGRAM(s) IV Push every 6 hours PRN Nausea and/or Vomiting  senna 2 Tablet(s) Oral at bedtime PRN Constipation  traMADol 25 milliGRAM(s) Oral every 6 hours PRN Mild Pain (1 - 3)            Vital Signs Last 24 Hrs  T(C): 37.6 (01 Aug 2019 09:42), Max: 37.7 (31 Jul 2019 21:23)  T(F): 99.7 (01 Aug 2019 09:42), Max: 99.9 (31 Jul 2019 21:23)  HR: 103 (01 Aug 2019 10:47) (99 - 115)  BP: 150/82 (01 Aug 2019 10:47) (123/74 - 162/90)  BP(mean): --  RR: 19 (01 Aug 2019 09:42) (18 - 19)  SpO2: 93% (01 Aug 2019 09:42) (93% - 98%)    Physical exam:    abd soft, mild tender  cv s1s2  chest air entry        LABS:                        12.8   13.31 )-----------( 137      ( 01 Aug 2019 08:05 )             37.5     08-01    129<L>  |  94<L>  |  7   ----------------------------<  82  4.3   |  28  |  0.59    Ca    8.7      01 Aug 2019 08:05  Phos  3.9     07-31  Mg     1.8     07-31    TPro  6.5  /  Alb  2.6<L>  /  TBili  0.8  /  DBili  0.2  /  AST  105<H>  /  ALT  87<H>  /  AlkPhos  51  07-31      Lipase, Serum: 1204 U/L (08-01-19 @ 08:05)      RADIOLOGY & ADDITIONAL TESTS:
Patient is a 59y old  Female who presents with a chief complaint of pancreatitis, generalized pain (01 Aug 2019 11:48)      INTERVAL HPI/OVERNIGHT EVENTS: 58 y/o F with PMHx of chronic back pain, lung adenocarcinoma (s/p wedge resection of RML in 11/2018), mixed connective tissue disease (on plaquenil), anxiety, former smoker, ?history of MI (has not had cath and was told work up "showed scar tissue") admitted with pancreatitis. pt feels better,     MEDICATIONS  (STANDING):  docusate sodium 100 milliGRAM(s) Oral three times a day  fentaNYL   Patch  25 MICROgram(s)/Hr 1 Patch Transdermal every 72 hours  gabapentin 800 milliGRAM(s) Oral three times a day  hydroxychloroquine 200 milliGRAM(s) Oral <User Schedule>  methylPREDNISolone 4 milliGRAM(s) Oral daily  multivitamin 1 Tablet(s) Oral daily  pantoprazole    Tablet 40 milliGRAM(s) Oral before breakfast  sodium chloride 0.9%. 1000 milliLiter(s) (125 mL/Hr) IV Continuous <Continuous>    MEDICATIONS  (PRN):  ALPRAZolam 0.25 milliGRAM(s) Oral two times a day PRN anxiety  aluminum hydroxide/magnesium hydroxide/simethicone Suspension 30 milliLiter(s) Oral every 6 hours PRN Dyspepsia  artificial  tears Solution 1 Drop(s) Both EYES two times a day PRN Dry Eyes  cyclobenzaprine 10 milliGRAM(s) Oral three times a day PRN Muscle Spasm  hydrALAZINE 25 milliGRAM(s) Oral every 6 hours PRN For Systolic blood pressure > 160  HYDROmorphone  Injectable 2 milliGRAM(s) IV Push every 4 hours PRN Severe Pain (7 - 10)  metoprolol tartrate Injectable 5 milliGRAM(s) IV Push every 6 hours PRN if SBP > 170 or HR > 110  ondansetron Injectable 4 milliGRAM(s) IV Push every 6 hours PRN Nausea and/or Vomiting  senna 2 Tablet(s) Oral at bedtime PRN Constipation  traMADol 25 milliGRAM(s) Oral every 6 hours PRN Mild Pain (1 - 3)      Allergies    penicillin (Swelling)    Intolerances        REVIEW OF SYSTEMS:  CONSTITUTIONAL: No fever, weight loss, or fatigue  EYES: No eye pain, visual disturbances, or discharge  ENMT:  No difficulty hearing, tinnitus, vertigo; No sinus or throat pain  NECK: No pain or stiffness  BREASTS: No pain, masses, or nipple discharge  RESPIRATORY: No cough, wheezing, chills or hemoptysis; No shortness of breath  CARDIOVASCULAR: No chest pain, palpitations, dizziness, or leg swelling  GASTROINTESTINAL: No abdominal or epigastric pain. No nausea, vomiting, or hematemesis; No diarrhea or constipation. No melena or hematochezia.  GENITOURINARY: No dysuria, frequency, hematuria, or incontinence  NEUROLOGICAL: No headaches, memory loss, loss of strength, numbness, or tremors  SKIN: No itching, burning, rashes, or lesions   LYMPH NODES: No enlarged glands  ENDOCRINE: No heat or cold intolerance; No hair loss; No polydipsia or polyuria  MUSCULOSKELETAL: No joint pain or swelling; No muscle, back, or extremity pain  PSYCHIATRIC: No depression, anxiety, mood swings, or difficulty sleeping  HEME/LYMPH: No easy bruising, or bleeding gums  ALLERGY AND IMMUNOLOGIC: No hives or eczema    Vital Signs Last 24 Hrs  T(C): 37.6 (01 Aug 2019 09:42), Max: 37.7 (31 Jul 2019 21:23)  T(F): 99.7 (01 Aug 2019 09:42), Max: 99.9 (31 Jul 2019 21:23)  HR: 103 (01 Aug 2019 10:47) (99 - 115)  BP: 150/82 (01 Aug 2019 10:47) (123/74 - 162/90)  BP(mean): --  RR: 19 (01 Aug 2019 09:42) (18 - 19)  SpO2: 93% (01 Aug 2019 09:42) (93% - 98%)    PHYSICAL EXAM:  GENERAL: NAD, well-groomed, well-developed  HEAD:  Atraumatic, Normocephalic  EYES: EOMI, PERRLA, conjunctiva and sclera clear  ENMT: No tonsillar erythema, exudates, or enlargement; Moist mucous membranes, Good dentition, No lesions  NECK: Supple, No JVD, Normal thyroid  NERVOUS SYSTEM:  Alert & Oriented X3, Good concentration; Motor Strength 5/5 B/L upper and lower extremities; DTRs 2+ intact and symmetric  CHEST/LUNG: Clear to auscultation bilaterally; No rales, rhonchi, wheezing, or rubs  HEART: Regular rate and rhythm; No murmurs, rubs, or gallops  ABDOMEN: Soft,  +  tender, Nondistended; Bowel sounds present  EXTREMITIES:  2+ Peripheral Pulses, No clubbing, cyanosis, or edema  LYMPH: No lymphadenopathy noted  SKIN: No rashes or lesions    LABS:                        12.8   13.31 )-----------( 137      ( 01 Aug 2019 08:05 )             37.5     01 Aug 2019 08:05    129    |  94     |  7      ----------------------------<  82     4.3     |  28     |  0.59     Ca    8.7        01 Aug 2019 08:05          CAPILLARY BLOOD GLUCOSE          RADIOLOGY & ADDITIONAL TESTS:    Imaging Personally Reviewed:  [ ] YES  [ ] NO    Consultant(s) Notes Reviewed:  [x ] YES  [ ] NO    Care Discussed with Consultants/Other Providers [x ] YES  [ ] NO
Patient is a 59y old  Female who presents with a chief complaint of pancreatitis, generalized pain (02 Aug 2019 10:00)      INTERVAL HPI/OVERNIGHT EVENTS: 60 y/o F with PMHx of chronic back pain, lung adenocarcinoma (s/p wedge resection of RML in 11/2018), mixed connective tissue disease (on plaquenil), anxiety, former smoker, ?history of MI (has not had cath and was told work up "showed scar tissue") admitted with pancreatitis., pt feels better, still NPO,     MEDICATIONS  (STANDING):  docusate sodium 100 milliGRAM(s) Oral three times a day  fentaNYL   Patch  25 MICROgram(s)/Hr 1 Patch Transdermal every 72 hours  gabapentin 800 milliGRAM(s) Oral three times a day  hydroxychloroquine 200 milliGRAM(s) Oral <User Schedule>  iohexol 300 mG (iodine)/mL Oral Solution 30 milliLiter(s) Oral once  methylPREDNISolone 4 milliGRAM(s) Oral daily  multivitamin 1 Tablet(s) Oral daily  pantoprazole    Tablet 40 milliGRAM(s) Oral before breakfast  potassium chloride   Powder 40 milliEquivalent(s) Oral once  sodium chloride 0.9% with potassium chloride 20 mEq/L 1000 milliLiter(s) (100 mL/Hr) IV Continuous <Continuous>    MEDICATIONS  (PRN):  ALPRAZolam 0.25 milliGRAM(s) Oral two times a day PRN anxiety  aluminum hydroxide/magnesium hydroxide/simethicone Suspension 30 milliLiter(s) Oral every 6 hours PRN Dyspepsia  artificial  tears Solution 1 Drop(s) Both EYES two times a day PRN Dry Eyes  cyclobenzaprine 10 milliGRAM(s) Oral three times a day PRN Muscle Spasm  hydrALAZINE 25 milliGRAM(s) Oral every 6 hours PRN For Systolic blood pressure > 160  HYDROmorphone  Injectable 1 milliGRAM(s) IV Push every 4 hours PRN Severe Pain (7 - 10)  metoprolol tartrate Injectable 5 milliGRAM(s) IV Push every 6 hours PRN if SBP > 170 or HR > 110  ondansetron Injectable 4 milliGRAM(s) IV Push every 6 hours PRN Nausea and/or Vomiting  oxyCODONE    IR 20 milliGRAM(s) Oral every 6 hours PRN Moderate Pain (4 - 6)  polyethylene glycol 3350 17 Gram(s) Oral daily PRN Constipation  senna 2 Tablet(s) Oral at bedtime PRN Constipation  traMADol 25 milliGRAM(s) Oral every 6 hours PRN Mild Pain (1 - 3)      Allergies    penicillin (Swelling)    Intolerances        REVIEW OF SYSTEMS:  CONSTITUTIONAL: No fever, weight loss, or fatigue  EYES: No eye pain, visual disturbances, or discharge  ENMT:  No difficulty hearing, tinnitus, vertigo; No sinus or throat pain  NECK: No pain or stiffness  BREASTS: No pain, masses, or nipple discharge  RESPIRATORY: No cough, wheezing, chills or hemoptysis; No shortness of breath  CARDIOVASCULAR: No chest pain, palpitations, dizziness, or leg swelling  GASTROINTESTINAL: No abdominal or epigastric pain. No nausea, vomiting, or hematemesis; No diarrhea or constipation. No melena or hematochezia.  GENITOURINARY: No dysuria, frequency, hematuria, or incontinence  NEUROLOGICAL: No headaches, memory loss, loss of strength, numbness, or tremors  SKIN: No itching, burning, rashes, or lesions   LYMPH NODES: No enlarged glands  ENDOCRINE: No heat or cold intolerance; No hair loss; No polydipsia or polyuria  MUSCULOSKELETAL: No joint pain or swelling; No muscle, back, or extremity pain  PSYCHIATRIC: No depression, anxiety, mood swings, or difficulty sleeping  HEME/LYMPH: No easy bruising, or bleeding gums  ALLERGY AND IMMUNOLOGIC: No hives or eczema    Vital Signs Last 24 Hrs  T(C): 37.2 (02 Aug 2019 00:15), Max: 37.7 (01 Aug 2019 17:06)  T(F): 99 (02 Aug 2019 00:15), Max: 99.8 (01 Aug 2019 17:06)  HR: 88 (02 Aug 2019 00:15) (88 - 97)  BP: 156/86 (02 Aug 2019 00:15) (139/78 - 156/86)  BP(mean): --  RR: 18 (02 Aug 2019 00:15) (17 - 18)  SpO2: 95% (02 Aug 2019 00:15) (93% - 96%)    PHYSICAL EXAM:  GENERAL: NAD, well-groomed, well-developed  HEAD:  Atraumatic, Normocephalic  EYES: EOMI, PERRLA, conjunctiva and sclera clear  ENMT: No tonsillar erythema, exudates, or enlargement; Moist mucous membranes, Good dentition, No lesions  NECK: Supple, No JVD, Normal thyroid  NERVOUS SYSTEM:  Alert & Oriented X3, Good concentration; Motor Strength 5/5 B/L upper and lower extremities; DTRs 2+ intact and symmetric  CHEST/LUNG: Clear to auscultation bilaterally; No rales, rhonchi, wheezing, or rubs  HEART: Regular rate and rhythm; No murmurs, rubs, or gallops  ABDOMEN: Soft,  ++tender, +distended; Bowel sounds present  EXTREMITIES:  2+ Peripheral Pulses, No clubbing, cyanosis, or edema  LYMPH: No lymphadenopathy noted  SKIN: No rashes or lesions    LABS:                        11.7   11.49 )-----------( 137      ( 02 Aug 2019 03:41 )             34.7     02 Aug 2019 03:41    130    |  94     |  7      ----------------------------<  71     3.4     |  26     |  0.67     Ca    8.6        02 Aug 2019 03:41    TPro  6.6    /  Alb  2.5    /  TBili  1.2    /  DBili  x      /  AST  105    /  ALT  76     /  AlkPhos  42     02 Aug 2019 03:41        CAPILLARY BLOOD GLUCOSE          RADIOLOGY & ADDITIONAL TESTS:    Imaging Personally Reviewed:  [ x] YES  [ ] NO    Consultant(s) Notes Reviewed:  [x ] YES  [ ] NO    Care Discussed with Consultants/Other Providers [ x] YES  [ ] NO
Patient is a 59y old  Female who presents with a chief complaint of pancreatitis, generalized pain (30 Jul 2019 15:32)      INTERVAL HPI/OVERNIGHT EVENTS: 60 y/o F with PMHx of chronic back pain, lung adenocarcinoma (s/p wedge resection of RML in 11/2018), mixed connective tissue disease (on plaquenil), anxiety, former smoker, ?history of MI (has not had cath and was told work up "showed scar tissue") admitted with pancreatitis. pt feels better, but still has abdominal pain .    MEDICATIONS  (STANDING):  docusate sodium 100 milliGRAM(s) Oral three times a day  fentaNYL   Patch  25 MICROgram(s)/Hr 1 Patch Transdermal every 72 hours  gabapentin 800 milliGRAM(s) Oral three times a day  hydroxychloroquine 200 milliGRAM(s) Oral <User Schedule>  lactated ringers. 1000 milliLiter(s) (150 mL/Hr) IV Continuous <Continuous>  methylPREDNISolone 4 milliGRAM(s) Oral daily  multivitamin 1 Tablet(s) Oral daily  pantoprazole    Tablet 40 milliGRAM(s) Oral before breakfast    MEDICATIONS  (PRN):  ALPRAZolam 0.25 milliGRAM(s) Oral two times a day PRN anxiety  aluminum hydroxide/magnesium hydroxide/simethicone Suspension 30 milliLiter(s) Oral every 6 hours PRN Dyspepsia  artificial  tears Solution 1 Drop(s) Both EYES two times a day PRN Dry Eyes  cyclobenzaprine 10 milliGRAM(s) Oral three times a day PRN Muscle Spasm  hydrALAZINE 25 milliGRAM(s) Oral every 6 hours PRN For Systolic blood pressure > 160  HYDROmorphone  Injectable 2 milliGRAM(s) IV Push every 4 hours PRN Severe Pain (7 - 10)  HYDROmorphone  Injectable 1 milliGRAM(s) IV Push every 6 hours PRN Moderate Pain (4 - 6)  metoprolol tartrate Injectable 5 milliGRAM(s) IV Push every 6 hours PRN if SBP > 170 or HR > 110  ondansetron Injectable 4 milliGRAM(s) IV Push every 6 hours PRN Nausea and/or Vomiting  senna 2 Tablet(s) Oral at bedtime PRN Constipation  traMADol 25 milliGRAM(s) Oral every 6 hours PRN Mild Pain (1 - 3)      Allergies    penicillin (Swelling)    Intolerances        REVIEW OF SYSTEMS:  CONSTITUTIONAL: No fever, weight loss, or fatigue  EYES: No eye pain, visual disturbances, or discharge  ENMT:  No difficulty hearing, tinnitus, vertigo; No sinus or throat pain  NECK: No pain or stiffness  BREASTS: No pain, masses, or nipple discharge  RESPIRATORY: No cough, wheezing, chills or hemoptysis; No shortness of breath  CARDIOVASCULAR: No chest pain, palpitations, dizziness, or leg swelling  GASTROINTESTINAL: No abdominal or epigastric pain. No nausea, vomiting, or hematemesis; No diarrhea or constipation. No melena or hematochezia.  GENITOURINARY: No dysuria, frequency, hematuria, or incontinence  NEUROLOGICAL: No headaches, memory loss, loss of strength, numbness, or tremors  SKIN: No itching, burning, rashes, or lesions   LYMPH NODES: No enlarged glands  ENDOCRINE: No heat or cold intolerance; No hair loss; No polydipsia or polyuria  MUSCULOSKELETAL: No joint pain or swelling; No muscle, back, or extremity pain  PSYCHIATRIC: No depression, anxiety, mood swings, or difficulty sleeping  HEME/LYMPH: No easy bruising, or bleeding gums  ALLERGY AND IMMUNOLOGIC: No hives or eczema    Vital Signs Last 24 Hrs  T(C): 37.2 (31 Jul 2019 09:03), Max: 37.3 (30 Jul 2019 21:05)  T(F): 98.9 (31 Jul 2019 09:03), Max: 99.1 (30 Jul 2019 21:05)  HR: 105 (31 Jul 2019 09:03) (88 - 132)  BP: 152/93 (31 Jul 2019 09:03) (134/93 - 171/110)  BP(mean): --  RR: 20 (31 Jul 2019 09:03) (16 - 20)  SpO2: 96% (31 Jul 2019 09:03) (92% - 100%)    PHYSICAL EXAM:  GENERAL: NAD, well-groomed, well-developed  HEAD:  Atraumatic, Normocephalic  EYES: EOMI, PERRLA, conjunctiva and sclera clear  ENMT: No tonsillar erythema, exudates, or enlargement; Moist mucous membranes, Good dentition, No lesions  NECK: Supple, No JVD, Normal thyroid  NERVOUS SYSTEM:  Alert & Oriented X3, Good concentration; Motor Strength 5/5 B/L upper and lower extremities; DTRs 2+ intact and symmetric  CHEST/LUNG: Clear to auscultation bilaterally; No rales, rhonchi, wheezing, or rubs  HEART: Regular rate and rhythm; No murmurs, rubs, or gallops  ABDOMEN: Soft,  ++tender, Nondistended; Bowel sounds present  EXTREMITIES:  2+ Peripheral Pulses, No clubbing, cyanosis, or edema  LYMPH: No lymphadenopathy noted  SKIN: No rashes or lesions    LABS:                        16.2   15.38 )-----------( 207      ( 31 Jul 2019 07:54 )             46.8     31 Jul 2019 07:54    130    |  97     |  11     ----------------------------<  130    4.2     |  26     |  0.85     Ca    9.0        31 Jul 2019 07:54  Phos  3.9       31 Jul 2019 07:54  Mg     1.8       31 Jul 2019 07:54    TPro  6.5    /  Alb  2.6    /  TBili  0.8    /  DBili  0.2    /  AST  105    /  ALT  87     /  AlkPhos  51     31 Jul 2019 07:54        CAPILLARY BLOOD GLUCOSE          RADIOLOGY & ADDITIONAL TESTS:    Imaging Personally Reviewed:  [x ] YES  [ ] NO    Consultant(s) Notes Reviewed:  [x ] YES  [ ] NO    Care Discussed with Consultants/Other Providers [ ] YES  [ ] NO
Chief Complaint:        INTERVAL HPI/OVERNIGHT EVENTS:  no significant events overnight reported   feelig better.       MEDICATIONS  (STANDING):  docusate sodium 100 milliGRAM(s) Oral three times a day  fentaNYL   Patch  25 MICROgram(s)/Hr 1 Patch Transdermal every 72 hours  gabapentin 800 milliGRAM(s) Oral three times a day  hydroxychloroquine 200 milliGRAM(s) Oral <User Schedule>  iohexol 300 mG (iodine)/mL Oral Solution 30 milliLiter(s) Oral once  methylPREDNISolone 4 milliGRAM(s) Oral daily  multivitamin 1 Tablet(s) Oral daily  pantoprazole    Tablet 40 milliGRAM(s) Oral before breakfast  potassium chloride   Powder 40 milliEquivalent(s) Oral once  sodium chloride 0.9% with potassium chloride 20 mEq/L 1000 milliLiter(s) (100 mL/Hr) IV Continuous <Continuous>    MEDICATIONS  (PRN):  ALPRAZolam 0.25 milliGRAM(s) Oral two times a day PRN anxiety  aluminum hydroxide/magnesium hydroxide/simethicone Suspension 30 milliLiter(s) Oral every 6 hours PRN Dyspepsia  artificial  tears Solution 1 Drop(s) Both EYES two times a day PRN Dry Eyes  cyclobenzaprine 10 milliGRAM(s) Oral three times a day PRN Muscle Spasm  hydrALAZINE 25 milliGRAM(s) Oral every 6 hours PRN For Systolic blood pressure > 160  HYDROmorphone  Injectable 1 milliGRAM(s) IV Push every 4 hours PRN Severe Pain (7 - 10)  metoprolol tartrate Injectable 5 milliGRAM(s) IV Push every 6 hours PRN if SBP > 170 or HR > 110  ondansetron Injectable 4 milliGRAM(s) IV Push every 6 hours PRN Nausea and/or Vomiting  oxyCODONE    IR 20 milliGRAM(s) Oral every 6 hours PRN Moderate Pain (4 - 6)  polyethylene glycol 3350 17 Gram(s) Oral daily PRN Constipation  senna 2 Tablet(s) Oral at bedtime PRN Constipation  traMADol 25 milliGRAM(s) Oral every 6 hours PRN Mild Pain (1 - 3)            Vital Signs Last 24 Hrs  T(C): 37.2 (02 Aug 2019 00:15), Max: 37.7 (01 Aug 2019 17:06)  T(F): 99 (02 Aug 2019 00:15), Max: 99.8 (01 Aug 2019 17:06)  HR: 88 (02 Aug 2019 00:15) (88 - 103)  BP: 156/86 (02 Aug 2019 00:15) (139/78 - 156/86)  BP(mean): --  RR: 18 (02 Aug 2019 00:15) (17 - 18)  SpO2: 95% (02 Aug 2019 00:15) (93% - 96%)    Physical exam:    abd soft, non tender  cv s1s2  chest air entry        LABS:                        11.7   11.49 )-----------( 137      ( 02 Aug 2019 03:41 )             34.7     08-02    130<L>  |  94<L>  |  7   ----------------------------<  71  3.4<L>   |  26  |  0.67    Ca    8.6      02 Aug 2019 03:41    TPro  6.6  /  Alb  2.5<L>  /  TBili  1.2  /  DBili  x   /  AST  105<H>  /  ALT  76<H>  /  AlkPhos  42  08-02      Lipase, Serum: 469 U/L (08-02-19 @ 03:41)  Amylase, Serum Total: 77 U/L (08-02-19 @ 03:41)      RADIOLOGY & ADDITIONAL TESTS:

## 2019-08-02 NOTE — PROGRESS NOTE ADULT - PROBLEM SELECTOR PLAN 7
stable, no active issues  former smoker s/p RML wedge resection in 2018

## 2019-08-02 NOTE — DISCHARGE NOTE PROVIDER - HOSPITAL COURSE
60 y/o F with PMHx of chronic back pain, lung adenocarcinoma (s/p wedge resection of RML in 11/2018), mixed connective tissue disease (on plaquenil), anxiety, former smoker, ?history of MI (has not had cath and was told work up "showed scar tissue") presented to the ED complaining of onset of chest pain traveling in to her whole body. Patient is on chronic opioid medications by pain management. Patient drinks alcohol sporadically with her last drink 7/28/2019. Has abdominal and back pain, rated at 10/10 in intensity despite being given dilaudid by the ED. Currently with pain throughout her whole body. Denies nausea, vomiting, palpitations, dyspnea, diarrhea, headache, dizziness, fevers or chills. Limited history as she is wailing in pain (although stops to converse with me)        In the ED, VS significant for hypertension to 152/103. Labs significant for: WBC 14.89 (on steroids), K 3.2, , , Lipase 7410. CXR negative. EKG (personally reviewed) NSR at 77bpm, poor quality.     Acute pancreatitis , started on IVf and NPO, bowel rest, improved,  but still had some pain,  abdo xray showed ileus , Ct abdo : 1. Moderate to severe acute pancreatitis.        2. Small amount of abdominal and pelvic ascites.        3. Findings most compatible with left-sided pyelonephritis. Left-sided     renal infarction is considered less likely. Correlate with urinalysis.        4. New mild age indeterminate superior endplate compression fracture     deformity of T12. No retropulsion.        5. Small bilateral pleural effusions with adjacent atelectasis.        pt feels clinically better, no pain, I expalined her to stay t oresume diet slowly and monitor her electrolyte and check U/a  per Ct finding but she insist s to leave, She is AO times 3, opiod dependent treated with her home dose oxy IR and IV dilaudid , pt decided to sign AMA and leave hospital, she understand the consequence and complication of her decision but she understand about her decision consequences.

## 2019-08-02 NOTE — PROGRESS NOTE ADULT - ASSESSMENT
· Assessment		  acute pancreatitis, suspect alcohol related disease    which include lft elevaton   duct dilation in absence of GB w/ normal bili-not not anticipate need for ercp or mrcp   f/u lft and lipase--lipasemia resolving and pt w/ less discomfort      observe re etoh withdraw   send Hep C ab----negative.   Ileus- f/u CT a/p today  pt counseled re decreasing use of opiate pain medications

## 2019-08-02 NOTE — CHART NOTE - NSCHARTNOTEFT_GEN_A_CORE
58 y/o F with PMHx of chronic back pain, lung adenocarcinoma (s/p wedge resection of RML in 11/2018), mixed connective tissue disease (on plaquenil), anxiety, former smoker, ?history of MI (has not had cath and was told work up "showed scar tissue") admitted with pancreatitis. Called by RN to examine patient for worsening abdominal pain. Pt states her legs are edematous and she needs her 20mg oxycodone now to help her pain, she also feels her abdomen has more pain and is distended. On exam: pt guardining, semi soft, tender on palpation but nontender when distracted, b/l legs appear wnl some ecchymotic lesions b/l that are chronic     Plan:   acute pancreatitis  -dose oxycodone now  -upright abd xray  -keep npo  -obtain ct abd/pelvis with iv and po contrast  -apprec further GI recs  -check stat labs including UA, cbc with diff, cmp, lipase, amylase  -monitor clinical course  -discussed with RN, will call for any further issues or concerns

## 2019-08-02 NOTE — PROGRESS NOTE ADULT - PROBLEM SELECTOR PLAN 9
with depression and anxiety  continue home xanax PRN

## 2019-08-04 ENCOUNTER — TRANSCRIPTION ENCOUNTER (OUTPATIENT)
Age: 59
End: 2019-08-04

## 2019-08-12 ENCOUNTER — APPOINTMENT (OUTPATIENT)
Dept: PSYCHIATRY | Facility: CLINIC | Age: 59
End: 2019-08-12
Payer: MEDICARE

## 2019-08-12 PROCEDURE — 99214 OFFICE O/P EST MOD 30 MIN: CPT

## 2019-08-23 PROBLEM — C34.90 MALIGNANT NEOPLASM OF UNSPECIFIED PART OF UNSPECIFIED BRONCHUS OR LUNG: Chronic | Status: ACTIVE | Noted: 2019-07-30

## 2019-08-23 PROBLEM — M35.1 OTHER OVERLAP SYNDROMES: Chronic | Status: ACTIVE | Noted: 2019-07-30

## 2019-08-26 ENCOUNTER — APPOINTMENT (OUTPATIENT)
Dept: CT IMAGING | Facility: CLINIC | Age: 59
End: 2019-08-26
Payer: MEDICARE

## 2019-08-26 ENCOUNTER — OUTPATIENT (OUTPATIENT)
Dept: OUTPATIENT SERVICES | Facility: HOSPITAL | Age: 59
LOS: 1 days | End: 2019-08-26
Payer: MEDICARE

## 2019-08-26 DIAGNOSIS — Z85.118 PERSONAL HISTORY OF OTHER MALIGNANT NEOPLASM OF BRONCHUS AND LUNG: Chronic | ICD-10-CM

## 2019-08-26 DIAGNOSIS — Z00.8 ENCOUNTER FOR OTHER GENERAL EXAMINATION: ICD-10-CM

## 2019-08-26 DIAGNOSIS — Z90.49 ACQUIRED ABSENCE OF OTHER SPECIFIED PARTS OF DIGESTIVE TRACT: Chronic | ICD-10-CM

## 2019-08-26 DIAGNOSIS — Z98.890 OTHER SPECIFIED POSTPROCEDURAL STATES: Chronic | ICD-10-CM

## 2019-08-26 PROCEDURE — 82565 ASSAY OF CREATININE: CPT

## 2019-08-26 PROCEDURE — 74170 CT ABD WO CNTRST FLWD CNTRST: CPT | Mod: 26

## 2019-08-26 PROCEDURE — 74170 CT ABD WO CNTRST FLWD CNTRST: CPT

## 2019-09-19 PROCEDURE — 99285 EMERGENCY DEPT VISIT HI MDM: CPT | Mod: 25

## 2019-09-19 PROCEDURE — 96375 TX/PRO/DX INJ NEW DRUG ADDON: CPT

## 2019-09-19 PROCEDURE — 93005 ELECTROCARDIOGRAM TRACING: CPT

## 2019-09-19 PROCEDURE — 84484 ASSAY OF TROPONIN QUANT: CPT

## 2019-09-19 PROCEDURE — 80307 DRUG TEST PRSMV CHEM ANLYZR: CPT

## 2019-09-19 PROCEDURE — 84100 ASSAY OF PHOSPHORUS: CPT

## 2019-09-19 PROCEDURE — 80074 ACUTE HEPATITIS PANEL: CPT

## 2019-09-19 PROCEDURE — 82150 ASSAY OF AMYLASE: CPT

## 2019-09-19 PROCEDURE — 76705 ECHO EXAM OF ABDOMEN: CPT

## 2019-09-19 PROCEDURE — 74018 RADEX ABDOMEN 1 VIEW: CPT

## 2019-09-19 PROCEDURE — 36415 COLL VENOUS BLD VENIPUNCTURE: CPT

## 2019-09-19 PROCEDURE — 74177 CT ABD & PELVIS W/CONTRAST: CPT

## 2019-09-19 PROCEDURE — 80061 LIPID PANEL: CPT

## 2019-09-19 PROCEDURE — 83690 ASSAY OF LIPASE: CPT

## 2019-09-19 PROCEDURE — 96365 THER/PROPH/DIAG IV INF INIT: CPT

## 2019-09-19 PROCEDURE — 71045 X-RAY EXAM CHEST 1 VIEW: CPT

## 2019-09-19 PROCEDURE — 80076 HEPATIC FUNCTION PANEL: CPT

## 2019-09-19 PROCEDURE — 85027 COMPLETE CBC AUTOMATED: CPT

## 2019-09-19 PROCEDURE — 80053 COMPREHEN METABOLIC PANEL: CPT

## 2019-09-19 PROCEDURE — 96376 TX/PRO/DX INJ SAME DRUG ADON: CPT

## 2019-09-19 PROCEDURE — 83735 ASSAY OF MAGNESIUM: CPT

## 2019-09-19 PROCEDURE — 80048 BASIC METABOLIC PNL TOTAL CA: CPT

## 2019-10-15 ENCOUNTER — APPOINTMENT (OUTPATIENT)
Dept: PSYCHIATRY | Facility: CLINIC | Age: 59
End: 2019-10-15
Payer: MEDICARE

## 2019-10-15 PROCEDURE — 99214 OFFICE O/P EST MOD 30 MIN: CPT

## 2019-11-19 ENCOUNTER — OUTPATIENT (OUTPATIENT)
Dept: OUTPATIENT SERVICES | Facility: HOSPITAL | Age: 59
LOS: 1 days | End: 2019-11-19
Payer: MEDICARE

## 2019-11-19 ENCOUNTER — OTHER (OUTPATIENT)
Age: 59
End: 2019-11-19

## 2019-11-19 ENCOUNTER — APPOINTMENT (OUTPATIENT)
Dept: CT IMAGING | Facility: CLINIC | Age: 59
End: 2019-11-19
Payer: MEDICARE

## 2019-11-19 DIAGNOSIS — Z00.8 ENCOUNTER FOR OTHER GENERAL EXAMINATION: ICD-10-CM

## 2019-11-19 DIAGNOSIS — Z98.890 OTHER SPECIFIED POSTPROCEDURAL STATES: Chronic | ICD-10-CM

## 2019-11-19 DIAGNOSIS — Z90.49 ACQUIRED ABSENCE OF OTHER SPECIFIED PARTS OF DIGESTIVE TRACT: Chronic | ICD-10-CM

## 2019-11-19 DIAGNOSIS — Z85.118 PERSONAL HISTORY OF OTHER MALIGNANT NEOPLASM OF BRONCHUS AND LUNG: Chronic | ICD-10-CM

## 2019-11-19 PROCEDURE — 74170 CT ABD WO CNTRST FLWD CNTRST: CPT | Mod: 26

## 2019-11-19 PROCEDURE — 82565 ASSAY OF CREATININE: CPT

## 2019-11-19 PROCEDURE — 74170 CT ABD WO CNTRST FLWD CNTRST: CPT

## 2019-11-20 ENCOUNTER — RX RENEWAL (OUTPATIENT)
Age: 59
End: 2019-11-20

## 2019-11-27 ENCOUNTER — APPOINTMENT (OUTPATIENT)
Dept: PSYCHIATRY | Facility: CLINIC | Age: 59
End: 2019-11-27
Payer: MEDICARE

## 2019-11-27 PROCEDURE — 99213 OFFICE O/P EST LOW 20 MIN: CPT

## 2019-12-04 ENCOUNTER — MESSAGE (OUTPATIENT)
Age: 59
End: 2019-12-04

## 2019-12-11 ENCOUNTER — APPOINTMENT (OUTPATIENT)
Dept: PSYCHIATRY | Facility: CLINIC | Age: 59
End: 2019-12-11
Payer: MEDICARE

## 2019-12-11 PROCEDURE — 99213 OFFICE O/P EST LOW 20 MIN: CPT

## 2019-12-19 ENCOUNTER — APPOINTMENT (OUTPATIENT)
Dept: PSYCHIATRY | Facility: CLINIC | Age: 59
End: 2019-12-19
Payer: MEDICARE

## 2019-12-19 PROCEDURE — 99213 OFFICE O/P EST LOW 20 MIN: CPT

## 2020-01-09 ENCOUNTER — APPOINTMENT (OUTPATIENT)
Dept: PSYCHIATRY | Facility: CLINIC | Age: 60
End: 2020-01-09
Payer: MEDICARE

## 2020-01-09 PROCEDURE — 99213 OFFICE O/P EST LOW 20 MIN: CPT

## 2020-03-14 ENCOUNTER — APPOINTMENT (OUTPATIENT)
Dept: ORTHOPEDIC SURGERY | Facility: CLINIC | Age: 60
End: 2020-03-14

## 2020-04-07 ENCOUNTER — APPOINTMENT (OUTPATIENT)
Dept: PSYCHIATRY | Facility: CLINIC | Age: 60
End: 2020-04-07
Payer: MEDICARE

## 2020-04-07 PROCEDURE — 99214 OFFICE O/P EST MOD 30 MIN: CPT

## 2020-07-08 ENCOUNTER — APPOINTMENT (OUTPATIENT)
Dept: PSYCHIATRY | Facility: CLINIC | Age: 60
End: 2020-07-08
Payer: MEDICARE

## 2020-07-08 PROCEDURE — 99214 OFFICE O/P EST MOD 30 MIN: CPT | Mod: 95

## 2020-07-09 ENCOUNTER — EMERGENCY (EMERGENCY)
Facility: HOSPITAL | Age: 60
LOS: 1 days | Discharge: HOME | End: 2020-07-09
Attending: EMERGENCY MEDICINE | Admitting: EMERGENCY MEDICINE
Payer: MEDICARE

## 2020-07-09 VITALS
TEMPERATURE: 99 F | OXYGEN SATURATION: 100 % | WEIGHT: 110.01 LBS | RESPIRATION RATE: 18 BRPM | HEART RATE: 71 BPM | SYSTOLIC BLOOD PRESSURE: 170 MMHG | DIASTOLIC BLOOD PRESSURE: 85 MMHG | HEIGHT: 62 IN

## 2020-07-09 VITALS
RESPIRATION RATE: 16 BRPM | OXYGEN SATURATION: 99 % | SYSTOLIC BLOOD PRESSURE: 155 MMHG | DIASTOLIC BLOOD PRESSURE: 82 MMHG | HEART RATE: 76 BPM | TEMPERATURE: 98 F

## 2020-07-09 DIAGNOSIS — Z96.641 PRESENCE OF RIGHT ARTIFICIAL HIP JOINT: Chronic | ICD-10-CM

## 2020-07-09 DIAGNOSIS — Z90.721 ACQUIRED ABSENCE OF OVARIES, UNILATERAL: Chronic | ICD-10-CM

## 2020-07-09 DIAGNOSIS — Z90.49 ACQUIRED ABSENCE OF OTHER SPECIFIED PARTS OF DIGESTIVE TRACT: Chronic | ICD-10-CM

## 2020-07-09 DIAGNOSIS — Z85.118 PERSONAL HISTORY OF OTHER MALIGNANT NEOPLASM OF BRONCHUS AND LUNG: Chronic | ICD-10-CM

## 2020-07-09 DIAGNOSIS — Z98.890 OTHER SPECIFIED POSTPROCEDURAL STATES: Chronic | ICD-10-CM

## 2020-07-09 LAB
ALBUMIN SERPL ELPH-MCNC: 3.6 G/DL — SIGNIFICANT CHANGE UP (ref 3.3–5)
ALP SERPL-CCNC: 47 U/L — SIGNIFICANT CHANGE UP (ref 30–120)
ALT FLD-CCNC: 43 U/L DA — SIGNIFICANT CHANGE UP (ref 10–60)
AMPHET UR-MCNC: NEGATIVE — SIGNIFICANT CHANGE UP
ANION GAP SERPL CALC-SCNC: 7 MMOL/L — SIGNIFICANT CHANGE UP (ref 5–17)
APPEARANCE UR: CLEAR — SIGNIFICANT CHANGE UP
APTT BLD: 19.2 SEC — LOW (ref 27.5–35.5)
AST SERPL-CCNC: 66 U/L — HIGH (ref 10–40)
BARBITURATES UR SCN-MCNC: NEGATIVE — SIGNIFICANT CHANGE UP
BASOPHILS # BLD AUTO: 0.03 K/UL — SIGNIFICANT CHANGE UP (ref 0–0.2)
BASOPHILS NFR BLD AUTO: 0.3 % — SIGNIFICANT CHANGE UP (ref 0–2)
BENZODIAZ UR-MCNC: POSITIVE
BILIRUB SERPL-MCNC: 0.5 MG/DL — SIGNIFICANT CHANGE UP (ref 0.2–1.2)
BILIRUB UR-MCNC: NEGATIVE — SIGNIFICANT CHANGE UP
BUN SERPL-MCNC: 3 MG/DL — LOW (ref 7–23)
CALCIUM SERPL-MCNC: 9.3 MG/DL — SIGNIFICANT CHANGE UP (ref 8.4–10.5)
CHLORIDE SERPL-SCNC: 98 MMOL/L — SIGNIFICANT CHANGE UP (ref 96–108)
CO2 SERPL-SCNC: 29 MMOL/L — SIGNIFICANT CHANGE UP (ref 22–31)
COCAINE METAB.OTHER UR-MCNC: NEGATIVE — SIGNIFICANT CHANGE UP
COLOR SPEC: YELLOW — SIGNIFICANT CHANGE UP
CREAT SERPL-MCNC: 0.56 MG/DL — SIGNIFICANT CHANGE UP (ref 0.5–1.3)
DIFF PNL FLD: NEGATIVE — SIGNIFICANT CHANGE UP
EOSINOPHIL # BLD AUTO: 0.12 K/UL — SIGNIFICANT CHANGE UP (ref 0–0.5)
EOSINOPHIL NFR BLD AUTO: 1.4 % — SIGNIFICANT CHANGE UP (ref 0–6)
ETHANOL SERPL-MCNC: <3 MG/DL — SIGNIFICANT CHANGE UP (ref 0–3)
GLUCOSE SERPL-MCNC: 110 MG/DL — HIGH (ref 70–99)
GLUCOSE UR QL: NEGATIVE MG/DL — SIGNIFICANT CHANGE UP
HCT VFR BLD CALC: 39.2 % — SIGNIFICANT CHANGE UP (ref 34.5–45)
HGB BLD-MCNC: 12.6 G/DL — SIGNIFICANT CHANGE UP (ref 11.5–15.5)
IMM GRANULOCYTES NFR BLD AUTO: 0.6 % — SIGNIFICANT CHANGE UP (ref 0–1.5)
INR BLD: 0.9 RATIO — SIGNIFICANT CHANGE UP (ref 0.88–1.16)
KETONES UR-MCNC: NEGATIVE — SIGNIFICANT CHANGE UP
LEUKOCYTE ESTERASE UR-ACNC: ABNORMAL
LIDOCAIN IGE QN: 46 U/L — LOW (ref 73–393)
LYMPHOCYTES # BLD AUTO: 0.62 K/UL — LOW (ref 1–3.3)
LYMPHOCYTES # BLD AUTO: 7 % — LOW (ref 13–44)
MCHC RBC-ENTMCNC: 32.1 GM/DL — SIGNIFICANT CHANGE UP (ref 32–36)
MCHC RBC-ENTMCNC: 35 PG — HIGH (ref 27–34)
MCV RBC AUTO: 108.9 FL — HIGH (ref 80–100)
METHADONE UR-MCNC: NEGATIVE — SIGNIFICANT CHANGE UP
MONOCYTES # BLD AUTO: 0.49 K/UL — SIGNIFICANT CHANGE UP (ref 0–0.9)
MONOCYTES NFR BLD AUTO: 5.5 % — SIGNIFICANT CHANGE UP (ref 2–14)
NEUTROPHILS # BLD AUTO: 7.56 K/UL — HIGH (ref 1.8–7.4)
NEUTROPHILS NFR BLD AUTO: 85.2 % — HIGH (ref 43–77)
NITRITE UR-MCNC: NEGATIVE — SIGNIFICANT CHANGE UP
NRBC # BLD: 0 /100 WBCS — SIGNIFICANT CHANGE UP (ref 0–0)
OPIATES UR-MCNC: POSITIVE — SIGNIFICANT CHANGE UP
PCP SPEC-MCNC: SIGNIFICANT CHANGE UP
PCP UR-MCNC: NEGATIVE — SIGNIFICANT CHANGE UP
PH UR: 6 — SIGNIFICANT CHANGE UP (ref 5–8)
PLATELET # BLD AUTO: 190 K/UL — SIGNIFICANT CHANGE UP (ref 150–400)
POTASSIUM SERPL-MCNC: 4.3 MMOL/L — SIGNIFICANT CHANGE UP (ref 3.5–5.3)
POTASSIUM SERPL-SCNC: 4.3 MMOL/L — SIGNIFICANT CHANGE UP (ref 3.5–5.3)
PROT SERPL-MCNC: 7.4 G/DL — SIGNIFICANT CHANGE UP (ref 6–8.3)
PROT UR-MCNC: NEGATIVE MG/DL — SIGNIFICANT CHANGE UP
PROTHROM AB SERPL-ACNC: 10.9 SEC — SIGNIFICANT CHANGE UP (ref 10.6–13.6)
RBC # BLD: 3.6 M/UL — LOW (ref 3.8–5.2)
RBC # FLD: 14.6 % — HIGH (ref 10.3–14.5)
SODIUM SERPL-SCNC: 134 MMOL/L — LOW (ref 135–145)
SP GR SPEC: 1.01 — SIGNIFICANT CHANGE UP (ref 1.01–1.02)
THC UR QL: NEGATIVE — SIGNIFICANT CHANGE UP
UROBILINOGEN FLD QL: NEGATIVE MG/DL — SIGNIFICANT CHANGE UP
WBC # BLD: 8.87 K/UL — SIGNIFICANT CHANGE UP (ref 3.8–10.5)
WBC # FLD AUTO: 8.87 K/UL — SIGNIFICANT CHANGE UP (ref 3.8–10.5)
WBC UR QL: SIGNIFICANT CHANGE UP

## 2020-07-09 PROCEDURE — 85730 THROMBOPLASTIN TIME PARTIAL: CPT

## 2020-07-09 PROCEDURE — 93970 EXTREMITY STUDY: CPT

## 2020-07-09 PROCEDURE — 80307 DRUG TEST PRSMV CHEM ANLYZR: CPT

## 2020-07-09 PROCEDURE — 85610 PROTHROMBIN TIME: CPT

## 2020-07-09 PROCEDURE — 99284 EMERGENCY DEPT VISIT MOD MDM: CPT

## 2020-07-09 PROCEDURE — 73502 X-RAY EXAM HIP UNI 2-3 VIEWS: CPT

## 2020-07-09 PROCEDURE — 81001 URINALYSIS AUTO W/SCOPE: CPT

## 2020-07-09 PROCEDURE — 73502 X-RAY EXAM HIP UNI 2-3 VIEWS: CPT | Mod: 26,RT

## 2020-07-09 PROCEDURE — 93005 ELECTROCARDIOGRAM TRACING: CPT

## 2020-07-09 PROCEDURE — 36415 COLL VENOUS BLD VENIPUNCTURE: CPT

## 2020-07-09 PROCEDURE — 96374 THER/PROPH/DIAG INJ IV PUSH: CPT

## 2020-07-09 PROCEDURE — 80053 COMPREHEN METABOLIC PANEL: CPT

## 2020-07-09 PROCEDURE — 99284 EMERGENCY DEPT VISIT MOD MDM: CPT | Mod: 25

## 2020-07-09 PROCEDURE — 85027 COMPLETE CBC AUTOMATED: CPT

## 2020-07-09 PROCEDURE — 93970 EXTREMITY STUDY: CPT | Mod: 26

## 2020-07-09 PROCEDURE — 83690 ASSAY OF LIPASE: CPT

## 2020-07-09 PROCEDURE — 93010 ELECTROCARDIOGRAM REPORT: CPT

## 2020-07-09 RX ORDER — HYDROMORPHONE HYDROCHLORIDE 2 MG/ML
1 INJECTION INTRAMUSCULAR; INTRAVENOUS; SUBCUTANEOUS ONCE
Refills: 0 | Status: DISCONTINUED | OUTPATIENT
Start: 2020-07-09 | End: 2020-07-09

## 2020-07-09 RX ORDER — OXYCODONE HYDROCHLORIDE 5 MG/1
1 TABLET ORAL
Qty: 0 | Refills: 0 | DISCHARGE

## 2020-07-09 RX ADMIN — HYDROMORPHONE HYDROCHLORIDE 1 MILLIGRAM(S): 2 INJECTION INTRAMUSCULAR; INTRAVENOUS; SUBCUTANEOUS at 10:15

## 2020-07-09 NOTE — ED PROVIDER NOTE - OBJECTIVE STATEMENT
59y/o F with PMHx of chronic back pain, lung adenocarcinoma (s/p wedge resection of RML in 11/2018), mixed connective tissue disease (on plaquenil), anxiety, former smoker, ?history of MI (has not had cath and was told work up "showed scar tissue") presented to the ED complaining of right foot pain for 1 month after having rt hip replacement at [facility]. States she had outpt venous doppler last week which was negative. Has been using usual fentanyl patch and oxycodone but pain in rt leg is worse. . Patient is on chronic opioid medications by pain management. Patient drinks alcohol sporadically with her last drink 2 weeks ago. Has abdominal and back pain all the time. Currently with pain throughout her whole body. Denies nausea, vomiting, palpitations, dyspnea, diarrhea, headache, dizziness, fevers or chills. Limited history as she is wailing in pain (although stops to converse with me). Seen by outpt psych yesterday but states she currently has no PCP or orthopedist. Refusing to reveal names of any of her physicians stating she fired them all. Asking for referral to orthopedist and primary care. Patient demanding Dilaudid before she will allow any testing. Istop review reveals active narcotic Rxs from [facility] pain management doctor and his PA. Also Xanax from Psych Dr. Rollins. States she spoke with orthopedist last week who told her to elevate leg.

## 2020-07-09 NOTE — ED PROVIDER NOTE - CONSTITUTIONAL, MLM
normal... Well appearing, awake, alert, oriented to person, place, time/situation and agitated at times.

## 2020-07-09 NOTE — ED PROVIDER NOTE - CARE PLAN
Principal Discharge DX:	Leg edema, right  Secondary Diagnosis:	History of hip replacement, total, right

## 2020-07-09 NOTE — ED PROVIDER NOTE - PATIENT PORTAL LINK FT
You can access the FollowMyHealth Patient Portal offered by [facility] by registering at the following website: [email or web address]. By joining [employer]’s FollowMyHealth portal, you will also be able to view your health information using other applications (apps) compatible with our system.

## 2020-07-09 NOTE — ED ADULT NURSE NOTE - PMH
Adenocarcinoma of lung    Chronic pain    Depression H/O panic attack  with anxiety  Diverticulitis of colon (without mention of hemorrhage)    Mixed connective tissue disease    S/P Laminectomy  Lumbar 3/10

## 2020-07-09 NOTE — ED PROVIDER NOTE - PEDAL EDEMA LATERALITY
This office note has been dictated.   Medical Assistant/Nurse notes reviewed and accepted.  Problem List Reviewed.  Skin system in problem list updated.     PHYSICAL EXAM:    General Appearance:  Esther Booker is a pleasant, well-developed, well-nourished, oriented x3 female with normal affect.    DETAILED SKIN EXAM:    hair of scalp, scalp, ears, face, eyelids, lips, neck, chest, abdomen, back, right upper extremity, left upper extremity, right lower extremity, & left lower extremity.    She declines skin exam of genitalia, groin and buttocks.    Normal skin findings:  There are scattered benign appearing lentigines, freckles, nevi, seborrheic keratoses, and cherry hemangiomas on areas examined.  The locations of previously treated skin cancer(s) and/or precancerous skin lesion(s) show well healed treatment sites with no evidence of recurrence.    Remainder of history, detailed skin exam, impression and treatment plan have been dictated.          RIGHT

## 2020-07-09 NOTE — ED ADULT NURSE NOTE - NSIMPLEMENTINTERV_GEN_ALL_ED
Implemented All Fall with Harm Risk Interventions:  [place] to call system. Call bell, personal items and telephone within reach. Instruct patient to call for assistance. Room bathroom lighting operational. Non-slip footwear when patient is off stretcher. Physically safe environment: no spills, clutter or unnecessary equipment. Stretcher in lowest position, wheels locked, appropriate side rails in place. Provide visual cue, wrist band, yellow gown, etc. Monitor gait and stability. Monitor for mental status changes and reorient to person, place, and time. Review medications for side effects contributing to fall risk. Reinforce activity limits and safety measures with patient and family. Provide visual clues: red socks.

## 2020-07-09 NOTE — ED ADULT NURSE NOTE - PSH
History of hip replacement, total, right  6/7/2020  History of lung cancer  s/p wedge resection of RML  History of oophorectomy, unilateral  bilateral  S/P cholecystectomy    S/P lumbar laminectomy

## 2020-07-09 NOTE — ED PROVIDER NOTE - CLINICAL SUMMARY MEDICAL DECISION MAKING FREE TEXT BOX
rt lower leg redness and edema after hip surgery. Poor compliance. Chronic pain. Plan - RO DVT, Cellulitis. Labs, Venous doppler, pain meds.

## 2020-07-09 NOTE — ED ADULT NURSE NOTE - OBJECTIVE STATEMENT
Patient presents c/o pain to right leg from hip to foot with swelling, tingling and numbness to right lower leg and foot. Patient is post-op Right hip replacement at [facility] on 6/7/2020 and states she was being positioned on her first post-op day in the hospital and something popped and she has had pain and swelling progressing since. Patient refusing to divulge name of orthopedic surgeon because she doesn't want him informed that she is here. Patient states she spoke with him 1 week ago and informed him of the pain and swelling and he ordered an outpatient doppler with ruled out DVT. Patient states has scheduled follow up appt for 2 weeks from now. Incision line right hip healing well. No redness swelling or drainage around wound. Patient with redness and swelling from mid lower leg to foot and screams in pain to leg with any movement. Patient on multiple prescribed narcotics and states she applied a new fentanyl patch this morning and took 15 Oxycodone at 5AM today but "It doesn't help, I need hydromorphone."

## 2020-07-27 ENCOUNTER — INPATIENT (INPATIENT)
Facility: HOSPITAL | Age: 60
LOS: 3 days | Discharge: HOME | DRG: 439 | End: 2020-07-31
Attending: INTERNAL MEDICINE | Admitting: FAMILY MEDICINE
Payer: MEDICARE

## 2020-07-27 VITALS — WEIGHT: 110.01 LBS | HEIGHT: 63 IN

## 2020-07-27 DIAGNOSIS — Z90.49 ACQUIRED ABSENCE OF OTHER SPECIFIED PARTS OF DIGESTIVE TRACT: Chronic | ICD-10-CM

## 2020-07-27 DIAGNOSIS — Z90.721 ACQUIRED ABSENCE OF OVARIES, UNILATERAL: Chronic | ICD-10-CM

## 2020-07-27 DIAGNOSIS — Z96.641 PRESENCE OF RIGHT ARTIFICIAL HIP JOINT: Chronic | ICD-10-CM

## 2020-07-27 DIAGNOSIS — Z98.890 OTHER SPECIFIED POSTPROCEDURAL STATES: Chronic | ICD-10-CM

## 2020-07-27 DIAGNOSIS — K85.90 ACUTE PANCREATITIS WITHOUT NECROSIS OR INFECTION, UNSPECIFIED: ICD-10-CM

## 2020-07-27 DIAGNOSIS — Z85.118 PERSONAL HISTORY OF OTHER MALIGNANT NEOPLASM OF BRONCHUS AND LUNG: Chronic | ICD-10-CM

## 2020-07-27 LAB
ALBUMIN SERPL ELPH-MCNC: 3.8 G/DL — SIGNIFICANT CHANGE UP (ref 3.3–5)
ALP SERPL-CCNC: 75 U/L — SIGNIFICANT CHANGE UP (ref 40–120)
ALT FLD-CCNC: 338 U/L — HIGH (ref 12–78)
ANION GAP SERPL CALC-SCNC: 13 MMOL/L — SIGNIFICANT CHANGE UP (ref 5–17)
APPEARANCE UR: CLEAR — SIGNIFICANT CHANGE UP
AST SERPL-CCNC: 666 U/L — HIGH (ref 15–37)
BACTERIA # UR AUTO: ABNORMAL
BASOPHILS # BLD AUTO: 0.04 K/UL — SIGNIFICANT CHANGE UP (ref 0–0.2)
BASOPHILS NFR BLD AUTO: 0.2 % — SIGNIFICANT CHANGE UP (ref 0–2)
BILIRUB SERPL-MCNC: 0.8 MG/DL — SIGNIFICANT CHANGE UP (ref 0.2–1.2)
BILIRUB UR-MCNC: NEGATIVE — SIGNIFICANT CHANGE UP
BUN SERPL-MCNC: 17 MG/DL — SIGNIFICANT CHANGE UP (ref 7–23)
CALCIUM SERPL-MCNC: 8.5 MG/DL — SIGNIFICANT CHANGE UP (ref 8.5–10.1)
CHLORIDE SERPL-SCNC: 99 MMOL/L — SIGNIFICANT CHANGE UP (ref 96–108)
CO2 SERPL-SCNC: 26 MMOL/L — SIGNIFICANT CHANGE UP (ref 22–31)
COLOR SPEC: YELLOW — SIGNIFICANT CHANGE UP
CREAT SERPL-MCNC: 0.91 MG/DL — SIGNIFICANT CHANGE UP (ref 0.5–1.3)
DIFF PNL FLD: NEGATIVE — SIGNIFICANT CHANGE UP
EOSINOPHIL # BLD AUTO: 0 K/UL — SIGNIFICANT CHANGE UP (ref 0–0.5)
EOSINOPHIL NFR BLD AUTO: 0 % — SIGNIFICANT CHANGE UP (ref 0–6)
EPI CELLS # UR: SIGNIFICANT CHANGE UP
GLUCOSE SERPL-MCNC: 125 MG/DL — HIGH (ref 70–99)
GLUCOSE UR QL: NEGATIVE MG/DL — SIGNIFICANT CHANGE UP
HCT VFR BLD CALC: 47.3 % — HIGH (ref 34.5–45)
HGB BLD-MCNC: 16.2 G/DL — HIGH (ref 11.5–15.5)
IMM GRANULOCYTES NFR BLD AUTO: 1.3 % — SIGNIFICANT CHANGE UP (ref 0–1.5)
KETONES UR-MCNC: NEGATIVE — SIGNIFICANT CHANGE UP
LEUKOCYTE ESTERASE UR-ACNC: ABNORMAL
LIDOCAIN IGE QN: 3774 U/L — HIGH (ref 73–393)
LYMPHOCYTES # BLD AUTO: 1.41 K/UL — SIGNIFICANT CHANGE UP (ref 1–3.3)
LYMPHOCYTES # BLD AUTO: 8.1 % — LOW (ref 13–44)
MCHC RBC-ENTMCNC: 34.2 GM/DL — SIGNIFICANT CHANGE UP (ref 32–36)
MCHC RBC-ENTMCNC: 34.5 PG — HIGH (ref 27–34)
MCV RBC AUTO: 100.9 FL — HIGH (ref 80–100)
MONOCYTES # BLD AUTO: 0.72 K/UL — SIGNIFICANT CHANGE UP (ref 0–0.9)
MONOCYTES NFR BLD AUTO: 4.1 % — SIGNIFICANT CHANGE UP (ref 2–14)
NEUTROPHILS # BLD AUTO: 15.04 K/UL — HIGH (ref 1.8–7.4)
NEUTROPHILS NFR BLD AUTO: 86.3 % — HIGH (ref 43–77)
NITRITE UR-MCNC: NEGATIVE — SIGNIFICANT CHANGE UP
PH UR: 6.5 — SIGNIFICANT CHANGE UP (ref 5–8)
PLATELET # BLD AUTO: 242 K/UL — SIGNIFICANT CHANGE UP (ref 150–400)
POTASSIUM SERPL-MCNC: 3 MMOL/L — LOW (ref 3.5–5.3)
POTASSIUM SERPL-SCNC: 3 MMOL/L — LOW (ref 3.5–5.3)
PROT SERPL-MCNC: 7.5 GM/DL — SIGNIFICANT CHANGE UP (ref 6–8.3)
PROT UR-MCNC: 30 MG/DL
RBC # BLD: 4.69 M/UL — SIGNIFICANT CHANGE UP (ref 3.8–5.2)
RBC # FLD: 13.1 % — SIGNIFICANT CHANGE UP (ref 10.3–14.5)
RBC CASTS # UR COMP ASSIST: SIGNIFICANT CHANGE UP /HPF (ref 0–4)
SARS-COV-2 RNA SPEC QL NAA+PROBE: SIGNIFICANT CHANGE UP
SODIUM SERPL-SCNC: 138 MMOL/L — SIGNIFICANT CHANGE UP (ref 135–145)
SP GR SPEC: 1 — LOW (ref 1.01–1.02)
TROPONIN I SERPL-MCNC: <0.015 NG/ML — SIGNIFICANT CHANGE UP (ref 0.01–0.04)
UROBILINOGEN FLD QL: NEGATIVE MG/DL — SIGNIFICANT CHANGE UP
WBC # BLD: 17.44 K/UL — HIGH (ref 3.8–10.5)
WBC # FLD AUTO: 17.44 K/UL — HIGH (ref 3.8–10.5)
WBC UR QL: SIGNIFICANT CHANGE UP

## 2020-07-27 PROCEDURE — 80053 COMPREHEN METABOLIC PANEL: CPT

## 2020-07-27 PROCEDURE — 99223 1ST HOSP IP/OBS HIGH 75: CPT

## 2020-07-27 PROCEDURE — 93005 ELECTROCARDIOGRAM TRACING: CPT

## 2020-07-27 PROCEDURE — 84478 ASSAY OF TRIGLYCERIDES: CPT

## 2020-07-27 PROCEDURE — 82705 FATS/LIPIDS FECES QUAL: CPT

## 2020-07-27 PROCEDURE — 87507 IADNA-DNA/RNA PROBE TQ 12-25: CPT

## 2020-07-27 PROCEDURE — 81001 URINALYSIS AUTO W/SCOPE: CPT

## 2020-07-27 PROCEDURE — 80048 BASIC METABOLIC PNL TOTAL CA: CPT

## 2020-07-27 PROCEDURE — 93010 ELECTROCARDIOGRAM REPORT: CPT | Mod: 76

## 2020-07-27 PROCEDURE — 83735 ASSAY OF MAGNESIUM: CPT

## 2020-07-27 PROCEDURE — 86769 SARS-COV-2 COVID-19 ANTIBODY: CPT

## 2020-07-27 PROCEDURE — 87635 SARS-COV-2 COVID-19 AMP PRB: CPT

## 2020-07-27 PROCEDURE — 93970 EXTREMITY STUDY: CPT

## 2020-07-27 PROCEDURE — 71045 X-RAY EXAM CHEST 1 VIEW: CPT | Mod: 26

## 2020-07-27 PROCEDURE — 83690 ASSAY OF LIPASE: CPT

## 2020-07-27 PROCEDURE — 80074 ACUTE HEPATITIS PANEL: CPT

## 2020-07-27 PROCEDURE — 36415 COLL VENOUS BLD VENIPUNCTURE: CPT

## 2020-07-27 PROCEDURE — 83993 ASSAY FOR CALPROTECTIN FECAL: CPT

## 2020-07-27 PROCEDURE — 82656 EL-1 FECAL QUAL/SEMIQ: CPT

## 2020-07-27 PROCEDURE — 82248 BILIRUBIN DIRECT: CPT

## 2020-07-27 PROCEDURE — 84100 ASSAY OF PHOSPHORUS: CPT

## 2020-07-27 PROCEDURE — C9113: CPT

## 2020-07-27 PROCEDURE — 87493 C DIFF AMPLIFIED PROBE: CPT

## 2020-07-27 PROCEDURE — 85027 COMPLETE CBC AUTOMATED: CPT

## 2020-07-27 PROCEDURE — 85025 COMPLETE CBC W/AUTO DIFF WBC: CPT

## 2020-07-27 PROCEDURE — 86803 HEPATITIS C AB TEST: CPT

## 2020-07-27 PROCEDURE — 74177 CT ABD & PELVIS W/CONTRAST: CPT | Mod: 26

## 2020-07-27 RX ORDER — HYDROMORPHONE HYDROCHLORIDE 2 MG/ML
1 INJECTION INTRAMUSCULAR; INTRAVENOUS; SUBCUTANEOUS
Refills: 0 | Status: DISCONTINUED | OUTPATIENT
Start: 2020-07-27 | End: 2020-07-28

## 2020-07-27 RX ORDER — POTASSIUM CHLORIDE 20 MEQ
10 PACKET (EA) ORAL
Refills: 0 | Status: DISCONTINUED | OUTPATIENT
Start: 2020-07-27 | End: 2020-07-27

## 2020-07-27 RX ORDER — FENTANYL CITRATE 50 UG/ML
1 INJECTION INTRAVENOUS
Refills: 0 | Status: DISCONTINUED | OUTPATIENT
Start: 2020-07-27 | End: 2020-07-31

## 2020-07-27 RX ORDER — ONDANSETRON 8 MG/1
4 TABLET, FILM COATED ORAL EVERY 6 HOURS
Refills: 0 | Status: DISCONTINUED | OUTPATIENT
Start: 2020-07-27 | End: 2020-07-31

## 2020-07-27 RX ORDER — THIAMINE MONONITRATE (VIT B1) 100 MG
100 TABLET ORAL DAILY
Refills: 0 | Status: DISCONTINUED | OUTPATIENT
Start: 2020-07-27 | End: 2020-07-31

## 2020-07-27 RX ORDER — FOLIC ACID 0.8 MG
1 TABLET ORAL DAILY
Refills: 0 | Status: DISCONTINUED | OUTPATIENT
Start: 2020-07-27 | End: 2020-07-31

## 2020-07-27 RX ORDER — MORPHINE SULFATE 50 MG/1
4 CAPSULE, EXTENDED RELEASE ORAL EVERY 4 HOURS
Refills: 0 | Status: DISCONTINUED | OUTPATIENT
Start: 2020-07-27 | End: 2020-07-27

## 2020-07-27 RX ORDER — HYDROXYCHLOROQUINE SULFATE 200 MG
200 TABLET ORAL
Refills: 0 | Status: DISCONTINUED | OUTPATIENT
Start: 2020-07-27 | End: 2020-07-31

## 2020-07-27 RX ORDER — POTASSIUM CHLORIDE 20 MEQ
40 PACKET (EA) ORAL ONCE
Refills: 0 | Status: COMPLETED | OUTPATIENT
Start: 2020-07-27 | End: 2020-07-27

## 2020-07-27 RX ORDER — HALOPERIDOL DECANOATE 100 MG/ML
2.5 INJECTION INTRAMUSCULAR ONCE
Refills: 0 | Status: COMPLETED | OUTPATIENT
Start: 2020-07-27 | End: 2020-07-27

## 2020-07-27 RX ORDER — SODIUM CHLORIDE 9 MG/ML
1000 INJECTION INTRAMUSCULAR; INTRAVENOUS; SUBCUTANEOUS
Refills: 0 | Status: DISCONTINUED | OUTPATIENT
Start: 2020-07-27 | End: 2020-07-30

## 2020-07-27 RX ORDER — ONDANSETRON 8 MG/1
4 TABLET, FILM COATED ORAL ONCE
Refills: 0 | Status: COMPLETED | OUTPATIENT
Start: 2020-07-27 | End: 2020-07-27

## 2020-07-27 RX ORDER — SODIUM CHLORIDE 9 MG/ML
1000 INJECTION INTRAMUSCULAR; INTRAVENOUS; SUBCUTANEOUS ONCE
Refills: 0 | Status: COMPLETED | OUTPATIENT
Start: 2020-07-27 | End: 2020-07-27

## 2020-07-27 RX ORDER — FAMOTIDINE 10 MG/ML
20 INJECTION INTRAVENOUS ONCE
Refills: 0 | Status: COMPLETED | OUTPATIENT
Start: 2020-07-27 | End: 2020-07-27

## 2020-07-27 RX ORDER — HYDROMORPHONE HYDROCHLORIDE 2 MG/ML
1 INJECTION INTRAMUSCULAR; INTRAVENOUS; SUBCUTANEOUS ONCE
Refills: 0 | Status: DISCONTINUED | OUTPATIENT
Start: 2020-07-27 | End: 2020-07-27

## 2020-07-27 RX ORDER — ONDANSETRON 8 MG/1
4 TABLET, FILM COATED ORAL ONCE
Refills: 0 | Status: DISCONTINUED | OUTPATIENT
Start: 2020-07-27 | End: 2020-07-27

## 2020-07-27 RX ORDER — MORPHINE SULFATE 50 MG/1
4 CAPSULE, EXTENDED RELEASE ORAL ONCE
Refills: 0 | Status: DISCONTINUED | OUTPATIENT
Start: 2020-07-27 | End: 2020-07-27

## 2020-07-27 RX ORDER — PANTOPRAZOLE SODIUM 20 MG/1
40 TABLET, DELAYED RELEASE ORAL DAILY
Refills: 0 | Status: DISCONTINUED | OUTPATIENT
Start: 2020-07-27 | End: 2020-07-30

## 2020-07-27 RX ORDER — HYDROMORPHONE HYDROCHLORIDE 2 MG/ML
0.5 INJECTION INTRAMUSCULAR; INTRAVENOUS; SUBCUTANEOUS ONCE
Refills: 0 | Status: DISCONTINUED | OUTPATIENT
Start: 2020-07-27 | End: 2020-07-27

## 2020-07-27 RX ORDER — HYDROMORPHONE HYDROCHLORIDE 2 MG/ML
1.5 INJECTION INTRAMUSCULAR; INTRAVENOUS; SUBCUTANEOUS
Refills: 0 | Status: DISCONTINUED | OUTPATIENT
Start: 2020-07-27 | End: 2020-07-28

## 2020-07-27 RX ORDER — POTASSIUM CHLORIDE 20 MEQ
10 PACKET (EA) ORAL
Refills: 0 | Status: COMPLETED | OUTPATIENT
Start: 2020-07-27 | End: 2020-07-27

## 2020-07-27 RX ORDER — ENOXAPARIN SODIUM 100 MG/ML
40 INJECTION SUBCUTANEOUS DAILY
Refills: 0 | Status: DISCONTINUED | OUTPATIENT
Start: 2020-07-27 | End: 2020-07-31

## 2020-07-27 RX ADMIN — Medication 1.25 MILLIGRAM(S): at 20:04

## 2020-07-27 RX ADMIN — HYDROMORPHONE HYDROCHLORIDE 0.5 MILLIGRAM(S): 2 INJECTION INTRAMUSCULAR; INTRAVENOUS; SUBCUTANEOUS at 17:01

## 2020-07-27 RX ADMIN — Medication 40 MILLIEQUIVALENT(S): at 21:42

## 2020-07-27 RX ADMIN — Medication 100 MILLIEQUIVALENT(S): at 22:32

## 2020-07-27 RX ADMIN — FENTANYL CITRATE 1 PATCH: 50 INJECTION INTRAVENOUS at 20:57

## 2020-07-27 RX ADMIN — ONDANSETRON 4 MILLIGRAM(S): 8 TABLET, FILM COATED ORAL at 18:11

## 2020-07-27 RX ADMIN — HALOPERIDOL DECANOATE 2.5 MILLIGRAM(S): 100 INJECTION INTRAMUSCULAR at 15:11

## 2020-07-27 RX ADMIN — FAMOTIDINE 20 MILLIGRAM(S): 10 INJECTION INTRAVENOUS at 15:13

## 2020-07-27 RX ADMIN — Medication 100 MILLIEQUIVALENT(S): at 21:00

## 2020-07-27 RX ADMIN — HYDROMORPHONE HYDROCHLORIDE 1 MILLIGRAM(S): 2 INJECTION INTRAMUSCULAR; INTRAVENOUS; SUBCUTANEOUS at 19:31

## 2020-07-27 RX ADMIN — SODIUM CHLORIDE 1000 MILLILITER(S): 9 INJECTION INTRAMUSCULAR; INTRAVENOUS; SUBCUTANEOUS at 15:13

## 2020-07-27 RX ADMIN — SODIUM CHLORIDE 150 MILLILITER(S): 9 INJECTION INTRAMUSCULAR; INTRAVENOUS; SUBCUTANEOUS at 21:02

## 2020-07-27 RX ADMIN — HYDROMORPHONE HYDROCHLORIDE 1.5 MILLIGRAM(S): 2 INJECTION INTRAMUSCULAR; INTRAVENOUS; SUBCUTANEOUS at 22:14

## 2020-07-27 RX ADMIN — Medication 1 MILLIGRAM(S): at 21:44

## 2020-07-27 NOTE — ED PROVIDER NOTE - CLINICAL SUMMARY MEDICAL DECISION MAKING FREE TEXT BOX
Will obtain labs including lipase, pain control. Slight concern for opioid misuse as pt requesting Dilaudid upon me seeing pt.

## 2020-07-27 NOTE — ED PROVIDER NOTE - OBJECTIVE STATEMENT
61 y/o female with PMHx of chronic pancreatitis presents to the ED c/o worsening abd pain. Pt reports pain is similar to her pancreatitis. Last drank yesterday, "4 vodkas." +nausea. No vomiting. No other complaints at this time.
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
## 2020-07-27 NOTE — H&P ADULT - HISTORY OF PRESENT ILLNESS
CC:  Patient is a 60y old  Female who presents with a chief complaint of pancreatitis and generalized pain.    HPI:  60F.  hx chronic pain and follows w/ pain management.  admitted 07/27/20.  presented to ED c/o abdominal pain.  onset earlier in the day.  severity 10/10.  located mid epigastric region.  provoked with any movement.  associated w/ nausea.  she admits to binge drink and that her last drink of "4 vodkas" was on 07/26.    in ED,  lipase was over 3K and CT AB/pelvis c/w acute pancreatitis.  WBC 17K, but she takes prednisone chronically.  her BP was elevated.  she was given NS 1L and Pepcid.    iSTOP:  06/23/2020	06/25/2020	fentanyl 25 mcg/hr patch	10	30	Alyse Ochoa PA-C	[facility]  06/18/2020	06/18/2020	oxycodone hcl 5 mg tablet	30	5	HawkTheo martinez S	Medicare	[facility] [id number]  06/18/2020	06/18/2020	oxycodone hcl 10 mg tablet	150	30	HawkTheo martinez    ROS:      all other review of systems are negative unless indicated above.    PAST MEDICAL & SURGICAL HISTORY:  Adenocarcinoma of lung  Mixed connective tissue disease  Depression H/O panic attack: with anxiety  S/P Laminectomy  Lumbar 3/10  Chronic pain  Diverticulitis of colon (without mention of hemorrhage)  History of oophorectomy, unilateral: bilateral  History of hip replacement, total, right: 6/7/2020  S/P lumbar laminectomy  S/P cholecystectomy  History of lung cancer: s/p wedge resection of RML      Allergies    penicillin (Swelling)    Intolerances        Home Medications:  fentaNYL 25 mcg/hr transdermal film, extended release: 1 film(s) transdermal every 72 hours (09 Jul 2020 18:33)  Multiple Vitamins oral tablet: 1 tab(s) orally once a day (09 Jul 2020 18:33)  oxyCODONE: 15 milligram(s) orally every 4 hours, As Needed (09 Jul 2020 18:33)  Plaquenil 200 mg oral tablet: 1 tab(s) orally every other day (09 Jul 2020 18:33)  predniSONE: 4 milligram(s) orally once a day (09 Jul 2020 18:33)  Restasis 0.05% ophthalmic emulsion: 1 drop(s) to each affected eye every 12 hours (09 Jul 2020 18:33)  Xanax 0.25 mg oral tablet: 1 tab(s) orally 2 times a day, As Needed (09 Jul 2020 18:33)      Social History:  lives independently in the community.  has a friend who helps her out at home.  denied tobacco or illegal drugs.    FAMILY HISTORY:  FHx: rheumatoid arthritis: in mother - with RA associated lung fibrosis  Family history of leukemia: in father      Vital Signs Last 24 Hrs  T(C): 37 (27 Jul 2020 14:17), Max: 37 (27 Jul 2020 14:17)  T(F): 98.6 (27 Jul 2020 14:17), Max: 98.6 (27 Jul 2020 14:17)  HR: 90 (27 Jul 2020 19:30) (80 - 90)  BP: 176/101 (27 Jul 2020 20:22) (176/101 - 198/106)  BP(mean): --  RR: 18 (27 Jul 2020 19:30) (18 - 22)  SpO2: 98% (27 Jul 2020 19:30) (95% - 100%)    Constitutional: NAD.   HEENT: PERRL, EOMI, MMM.  Neck: Soft and supple, No carotid bruit, No JVD  Respiratory: Breath sounds are clear bilaterally, No wheezing, rales or rhonchi  Cardiovascular: S1 and S2, regular rate and rhythm, no murmur, rub or gallop.  Gastrointestinal: + epigastric tenderness.  Extremities: + peripheral edema  Vascular: 2+ peripheral pulses  Neurological: A/O x 3, no focal deficits  Musculoskeletal: 5/5 strength b/l upper and lower extremities  Skin:  no visible rashes.                             16.2   17.44 )-----------( 242      ( 27 Jul 2020 14:57 )             47.3           07-27    138  |  99  |  17  ----------------------------<  125<H>  3.0<L>   |  26  |  0.91    Ca    8.5      27 Jul 2020 14:57    TPro  7.5  /  Alb  3.8  /  TBili  0.8  /  DBili  x   /  AST  666<H>  /  ALT  338<H>  /  AlkPhos  75  07-27      LIVER FUNCTIONS - ( 27 Jul 2020 14:57 )  Alb: 3.8 g/dL / Pro: 7.5 gm/dL / ALK PHOS: 75 U/L / ALT: 338 U/L / AST: 666 U/L / GGT: x             CARDIAC MARKERS ( 27 Jul 2020 14:57 )  <0.015 ng/mL / x     / x     / x     / x        < from: Xray Chest 1 View-PORTABLE IMMEDIATE (07.27.20 @ 15:59) >    Impression: No active pulmonary disease.    < end of copied text >    < from: CT Abdomen and Pelvis w/ IV Cont (07.27.20 @ 15:56) >  IMPRESSION:  Moderately severe acute pancreatitis, without necrosis or fluid collection (CT severity index 4/10).  Mild biliary ductal dilatation. MRCP to further evaluate.    < end of copied text >

## 2020-07-27 NOTE — ED ADULT NURSE REASSESSMENT NOTE - NS ED NURSE REASSESS COMMENT FT1
Pt found to have 25mcg fentanyl patch to SUNDEEPE, which she presented with from home. Pt states patch was placed this morning. Pt took her own oxycodone pills, unknown amount. Medication sent to pharmacy. 5 vape pens sent to security.

## 2020-07-27 NOTE — ED ADULT TRIAGE NOTE - CHIEF COMPLAINT QUOTE
Patient comes in with left sided abd pain, radiating to back. Patient also endorses N/V & chest pain. Denies SOB.

## 2020-07-27 NOTE — ED PROVIDER NOTE - PROGRESS NOTE DETAILS
Acute pancreatitis on CT.  Labs consistent as well.  Pain controlled with analgesics here, but patient on several occasions noted to be drinking large quantities of water and taking her own percocet and xanax as per nursing report.  Still with acute pathology and will require admission and monitoring.  Further care per inpatient treatment team.

## 2020-07-28 LAB
ALBUMIN SERPL ELPH-MCNC: 2.7 G/DL — LOW (ref 3.3–5)
ALP SERPL-CCNC: 60 U/L — SIGNIFICANT CHANGE UP (ref 40–120)
ALT FLD-CCNC: 187 U/L — HIGH (ref 12–78)
ANION GAP SERPL CALC-SCNC: 10 MMOL/L — SIGNIFICANT CHANGE UP (ref 5–17)
AST SERPL-CCNC: 273 U/L — HIGH (ref 15–37)
BASOPHILS # BLD AUTO: 0.02 K/UL — SIGNIFICANT CHANGE UP (ref 0–0.2)
BASOPHILS NFR BLD AUTO: 0.1 % — SIGNIFICANT CHANGE UP (ref 0–2)
BILIRUB SERPL-MCNC: 0.7 MG/DL — SIGNIFICANT CHANGE UP (ref 0.2–1.2)
BUN SERPL-MCNC: 7 MG/DL — SIGNIFICANT CHANGE UP (ref 7–23)
CALCIUM SERPL-MCNC: 7.4 MG/DL — LOW (ref 8.5–10.1)
CHLORIDE SERPL-SCNC: 105 MMOL/L — SIGNIFICANT CHANGE UP (ref 96–108)
CO2 SERPL-SCNC: 21 MMOL/L — LOW (ref 22–31)
CREAT SERPL-MCNC: 0.56 MG/DL — SIGNIFICANT CHANGE UP (ref 0.5–1.3)
EOSINOPHIL # BLD AUTO: 0.02 K/UL — SIGNIFICANT CHANGE UP (ref 0–0.5)
EOSINOPHIL NFR BLD AUTO: 0.1 % — SIGNIFICANT CHANGE UP (ref 0–6)
GLUCOSE SERPL-MCNC: 122 MG/DL — HIGH (ref 70–99)
HCT VFR BLD CALC: 44.4 % — SIGNIFICANT CHANGE UP (ref 34.5–45)
HGB BLD-MCNC: 15.1 G/DL — SIGNIFICANT CHANGE UP (ref 11.5–15.5)
IMM GRANULOCYTES NFR BLD AUTO: 1 % — SIGNIFICANT CHANGE UP (ref 0–1.5)
LIDOCAIN IGE QN: 857 U/L — HIGH (ref 73–393)
LYMPHOCYTES # BLD AUTO: 1.06 K/UL — SIGNIFICANT CHANGE UP (ref 1–3.3)
LYMPHOCYTES # BLD AUTO: 7.8 % — LOW (ref 13–44)
MAGNESIUM SERPL-MCNC: 1.9 MG/DL — SIGNIFICANT CHANGE UP (ref 1.6–2.6)
MCHC RBC-ENTMCNC: 34 GM/DL — SIGNIFICANT CHANGE UP (ref 32–36)
MCHC RBC-ENTMCNC: 34.5 PG — HIGH (ref 27–34)
MCV RBC AUTO: 101.4 FL — HIGH (ref 80–100)
MONOCYTES # BLD AUTO: 0.6 K/UL — SIGNIFICANT CHANGE UP (ref 0–0.9)
MONOCYTES NFR BLD AUTO: 4.4 % — SIGNIFICANT CHANGE UP (ref 2–14)
NEUTROPHILS # BLD AUTO: 11.72 K/UL — HIGH (ref 1.8–7.4)
NEUTROPHILS NFR BLD AUTO: 86.6 % — HIGH (ref 43–77)
PLATELET # BLD AUTO: 140 K/UL — LOW (ref 150–400)
POTASSIUM SERPL-MCNC: 3.6 MMOL/L — SIGNIFICANT CHANGE UP (ref 3.5–5.3)
POTASSIUM SERPL-SCNC: 3.6 MMOL/L — SIGNIFICANT CHANGE UP (ref 3.5–5.3)
PROT SERPL-MCNC: 5.9 GM/DL — LOW (ref 6–8.3)
RBC # BLD: 4.38 M/UL — SIGNIFICANT CHANGE UP (ref 3.8–5.2)
RBC # FLD: 13.2 % — SIGNIFICANT CHANGE UP (ref 10.3–14.5)
SARS-COV-2 IGG SERPL QL IA: NEGATIVE — SIGNIFICANT CHANGE UP
SARS-COV-2 IGM SERPL IA-ACNC: <3.8 AU/ML — SIGNIFICANT CHANGE UP
SODIUM SERPL-SCNC: 136 MMOL/L — SIGNIFICANT CHANGE UP (ref 135–145)
WBC # BLD: 13.55 K/UL — HIGH (ref 3.8–10.5)
WBC # FLD AUTO: 13.55 K/UL — HIGH (ref 3.8–10.5)

## 2020-07-28 PROCEDURE — 93970 EXTREMITY STUDY: CPT | Mod: 26

## 2020-07-28 PROCEDURE — 99233 SBSQ HOSP IP/OBS HIGH 50: CPT

## 2020-07-28 RX ORDER — HYDROMORPHONE HYDROCHLORIDE 2 MG/ML
1 INJECTION INTRAMUSCULAR; INTRAVENOUS; SUBCUTANEOUS
Refills: 0 | Status: DISCONTINUED | OUTPATIENT
Start: 2020-07-28 | End: 2020-07-31

## 2020-07-28 RX ORDER — DIPHENHYDRAMINE HCL 50 MG
50 CAPSULE ORAL ONCE
Refills: 0 | Status: COMPLETED | OUTPATIENT
Start: 2020-07-28 | End: 2020-07-28

## 2020-07-28 RX ORDER — LIPASE/PROTEASE/AMYLASE 16-48-48K
6 CAPSULE,DELAYED RELEASE (ENTERIC COATED) ORAL
Refills: 0 | Status: DISCONTINUED | OUTPATIENT
Start: 2020-07-28 | End: 2020-07-31

## 2020-07-28 RX ORDER — LIPASE/PROTEASE/AMYLASE 16-48-48K
2 CAPSULE,DELAYED RELEASE (ENTERIC COATED) ORAL
Refills: 0 | Status: DISCONTINUED | OUTPATIENT
Start: 2020-07-28 | End: 2020-07-28

## 2020-07-28 RX ORDER — OXYCODONE HYDROCHLORIDE 5 MG/1
10 TABLET ORAL EVERY 4 HOURS
Refills: 0 | Status: DISCONTINUED | OUTPATIENT
Start: 2020-07-28 | End: 2020-07-31

## 2020-07-28 RX ADMIN — HYDROMORPHONE HYDROCHLORIDE 1 MILLIGRAM(S): 2 INJECTION INTRAMUSCULAR; INTRAVENOUS; SUBCUTANEOUS at 12:30

## 2020-07-28 RX ADMIN — Medication 6 CAPSULE(S): at 12:23

## 2020-07-28 RX ADMIN — ONDANSETRON 4 MILLIGRAM(S): 8 TABLET, FILM COATED ORAL at 12:34

## 2020-07-28 RX ADMIN — Medication 1 MILLIGRAM(S): at 11:00

## 2020-07-28 RX ADMIN — Medication 1 MILLIGRAM(S): at 22:17

## 2020-07-28 RX ADMIN — SODIUM CHLORIDE 100 MILLILITER(S): 9 INJECTION INTRAMUSCULAR; INTRAVENOUS; SUBCUTANEOUS at 02:56

## 2020-07-28 RX ADMIN — Medication 200 MILLIGRAM(S): at 15:45

## 2020-07-28 RX ADMIN — Medication 100 MILLIGRAM(S): at 10:59

## 2020-07-28 RX ADMIN — Medication 1.25 MILLIGRAM(S): at 00:25

## 2020-07-28 RX ADMIN — Medication 1.25 MILLIGRAM(S): at 17:44

## 2020-07-28 RX ADMIN — HYDROMORPHONE HYDROCHLORIDE 1 MILLIGRAM(S): 2 INJECTION INTRAMUSCULAR; INTRAVENOUS; SUBCUTANEOUS at 23:28

## 2020-07-28 RX ADMIN — HYDROMORPHONE HYDROCHLORIDE 1.5 MILLIGRAM(S): 2 INJECTION INTRAMUSCULAR; INTRAVENOUS; SUBCUTANEOUS at 04:52

## 2020-07-28 RX ADMIN — HYDROMORPHONE HYDROCHLORIDE 1.5 MILLIGRAM(S): 2 INJECTION INTRAMUSCULAR; INTRAVENOUS; SUBCUTANEOUS at 08:30

## 2020-07-28 RX ADMIN — Medication 4 MILLIGRAM(S): at 17:42

## 2020-07-28 RX ADMIN — Medication 1 TABLET(S): at 11:00

## 2020-07-28 RX ADMIN — FENTANYL CITRATE 1 PATCH: 50 INJECTION INTRAVENOUS at 19:56

## 2020-07-28 RX ADMIN — OXYCODONE HYDROCHLORIDE 10 MILLIGRAM(S): 5 TABLET ORAL at 17:40

## 2020-07-28 RX ADMIN — HYDROMORPHONE HYDROCHLORIDE 1 MILLIGRAM(S): 2 INJECTION INTRAMUSCULAR; INTRAVENOUS; SUBCUTANEOUS at 22:58

## 2020-07-28 RX ADMIN — SODIUM CHLORIDE 100 MILLILITER(S): 9 INJECTION INTRAMUSCULAR; INTRAVENOUS; SUBCUTANEOUS at 01:30

## 2020-07-28 RX ADMIN — HYDROMORPHONE HYDROCHLORIDE 1.5 MILLIGRAM(S): 2 INJECTION INTRAMUSCULAR; INTRAVENOUS; SUBCUTANEOUS at 01:31

## 2020-07-28 RX ADMIN — Medication 0.1 MILLIGRAM(S): at 12:21

## 2020-07-28 RX ADMIN — Medication 1.25 MILLIGRAM(S): at 05:12

## 2020-07-28 RX ADMIN — HYDROMORPHONE HYDROCHLORIDE 1.5 MILLIGRAM(S): 2 INJECTION INTRAMUSCULAR; INTRAVENOUS; SUBCUTANEOUS at 01:16

## 2020-07-28 RX ADMIN — PANTOPRAZOLE SODIUM 40 MILLIGRAM(S): 20 TABLET, DELAYED RELEASE ORAL at 12:27

## 2020-07-28 RX ADMIN — Medication 0.1 MILLIGRAM(S): at 21:02

## 2020-07-28 RX ADMIN — SODIUM CHLORIDE 100 MILLILITER(S): 9 INJECTION INTRAMUSCULAR; INTRAVENOUS; SUBCUTANEOUS at 12:49

## 2020-07-28 RX ADMIN — HYDROMORPHONE HYDROCHLORIDE 1.5 MILLIGRAM(S): 2 INJECTION INTRAMUSCULAR; INTRAVENOUS; SUBCUTANEOUS at 07:46

## 2020-07-28 RX ADMIN — Medication 50 MILLIGRAM(S): at 21:01

## 2020-07-28 RX ADMIN — OXYCODONE HYDROCHLORIDE 10 MILLIGRAM(S): 5 TABLET ORAL at 12:20

## 2020-07-28 RX ADMIN — ONDANSETRON 4 MILLIGRAM(S): 8 TABLET, FILM COATED ORAL at 02:59

## 2020-07-28 RX ADMIN — HYDROMORPHONE HYDROCHLORIDE 1.5 MILLIGRAM(S): 2 INJECTION INTRAMUSCULAR; INTRAVENOUS; SUBCUTANEOUS at 04:19

## 2020-07-28 RX ADMIN — HYDROMORPHONE HYDROCHLORIDE 1 MILLIGRAM(S): 2 INJECTION INTRAMUSCULAR; INTRAVENOUS; SUBCUTANEOUS at 11:26

## 2020-07-28 NOTE — CONSULT NOTE ADULT - SUBJECTIVE AND OBJECTIVE BOX
HPI:  CC:  Patient is a 60y old  Female who presents with a chief complaint of pancreatitis and generalized pain.    HPI:  60F.  hx chronic pain and follows w/ pain management.  admitted 07/27/20.  presented to ED c/o abdominal pain.  onset earlier in the day.  severity 10/10.  located mid epigastric region.  provoked with any movement.  associated w/ nausea.  she admits to binge drink and that her last drink of "4 vodkas" was on 07/26.    in ED,  lipase was over 3K and CT AB/pelvis c/w acute pancreatitis.  WBC 17K, but she takes prednisone chronically.  her BP was elevated.  she was given NS 1L and Pepcid.  --------------------------------  Pateint continues to have abdominal pain and also notes months of diarrhea. She declined to discuss her alcohol use with me although says that "I'm done now, I'm tired of this shit."  She used to smoke but quit 2018    PAST MEDICAL & SURGICAL HISTORY:  Adenocarcinoma of lung  Mixed connective tissue disease  Depression H/O panic attack: with anxiety  S/P Laminectomy  Lumbar 3/10  Chronic pain  Diverticulitis of colon (without mention of hemorrhage)  History of oophorectomy, unilateral: bilateral  History of hip replacement, total, right: 6/7/2020  S/P lumbar laminectomy  S/P cholecystectomy  History of lung cancer: s/p wedge resection of RML      Home Medications:  fentaNYL 25 mcg/hr transdermal film, extended release: 1 film(s) transdermal every 72 hours (09 Jul 2020 18:33)  Multiple Vitamins oral tablet: 1 tab(s) orally once a day (09 Jul 2020 18:33)  oxyCODONE: 15 milligram(s) orally every 4 hours, As Needed (09 Jul 2020 18:33)  Plaquenil 200 mg oral tablet: 1 tab(s) orally every other day (09 Jul 2020 18:33)  predniSONE: 4 milligram(s) orally once a day (09 Jul 2020 18:33)  Restasis 0.05% ophthalmic emulsion: 1 drop(s) to each affected eye every 12 hours (09 Jul 2020 18:33)  Xanax 0.25 mg oral tablet: 1 tab(s) orally 2 times a day, As Needed (09 Jul 2020 18:33)      MEDICATIONS  (STANDING):  cloNIDine 0.1 milliGRAM(s) Oral every 8 hours  enalaprilat Injectable 1.25 milliGRAM(s) IV Push every 6 hours  enoxaparin Injectable 40 milliGRAM(s) SubCutaneous daily  fentaNYL   Patch  25 MICROgram(s)/Hr 1 Patch Transdermal every 72 hours  folic acid 1 milliGRAM(s) Oral daily  hydroxychloroquine 200 milliGRAM(s) Oral <User Schedule>  LORazepam     Tablet 1 milliGRAM(s) Oral two times a day  multivitamin 1 Tablet(s) Oral daily  pantoprazole  Injectable 40 milliGRAM(s) IV Push daily  predniSONE   Tablet 4 milliGRAM(s) Oral daily  sodium chloride 0.9%. 1000 milliLiter(s) (100 mL/Hr) IV Continuous <Continuous>  thiamine 100 milliGRAM(s) Oral daily    MEDICATIONS  (PRN):  HYDROmorphone  Injectable 1 milliGRAM(s) IV Push every 3 hours PRN Moderate Pain (4 - 6)  HYDROmorphone  Injectable 1.5 milliGRAM(s) IV Push every 3 hours PRN Severe Pain (7 - 10)  LORazepam     Tablet 2 milliGRAM(s) Oral every 2 hours PRN CIWA-Ar score increase by 2 points and a total score of 7 or less  LORazepam     Tablet 2 milliGRAM(s) Oral every 1 hour PRN CIWA-Ar score 8 or greater  LORazepam   Injectable 2 milliGRAM(s) IV Push every 1 hour PRN CIWA-Ar score 8 or greater  LORazepam   Injectable 2 milliGRAM(s) IV Push every 2 hours PRN CIWA-Ar score increase by 2 points and a total score of 7 or less  ondansetron Injectable 4 milliGRAM(s) IV Push every 6 hours PRN Nausea and/or Vomiting      Allergies    penicillin (Swelling)    Intolerances        SOCIAL HISTORY:    FAMILY HISTORY:  FHx: rheumatoid arthritis: in mother - with RA associated lung fibrosis  Family history of leukemia: in father      ROS  As above  Otherwise unremarkable    Vital Signs Last 24 Hrs  T(C): 36.5 (28 Jul 2020 08:19), Max: 37 (27 Jul 2020 14:17)  T(F): 97.7 (28 Jul 2020 08:19), Max: 98.6 (27 Jul 2020 14:17)  HR: 94 (28 Jul 2020 08:19) (80 - 108)  BP: 169/97 (28 Jul 2020 08:19) (162/101 - 198/106)  BP(mean): --  RR: 18 (28 Jul 2020 08:19) (17 - 22)  SpO2: 95% (28 Jul 2020 08:19) (95% - 100%)    Constitutional: NAD, chronically ill appearing  Respiratory: CTAB  Cardiovascular: S1 and S2, RRR  Gastrointestinal: BS+, soft, mod epig tenderness  Psychiatric: Normal mood, normal affect  Skin: No rashes    LABS:                        16.2   17.44 )-----------( 242      ( 27 Jul 2020 14:57 )             47.3     07-27    138  |  99  |  17  ----------------------------<  125<H>  3.0<L>   |  26  |  0.91    Ca    8.5      27 Jul 2020 14:57    TPro  7.5  /  Alb  3.8  /  TBili  0.8  /  DBili  x   /  AST  666<H>  /  ALT  338<H>  /  AlkPhos  75  07-27      LIVER FUNCTIONS - ( 27 Jul 2020 14:57 )  Alb: 3.8 g/dL / Pro: 7.5 gm/dL / ALK PHOS: 75 U/L / ALT: 338 U/L / AST: 666 U/L / GGT: x             RADIOLOGY & ADDITIONAL STUDIES:

## 2020-07-28 NOTE — CHART NOTE - NSCHARTNOTEFT_GEN_A_CORE
Called by RN to evaluate pt with report of redness and swelling on neck.   Patient was seen and examined at bedside, pt report neck swelling and redness that started around 4 pm this afternoon. Has history of mixed connective tissue disease with occasional joint swelling. No report of shortness of breath, dysphagia, tongue swelling, tingling, sore throat, CP, wheezing, stridors or cough.     Vital Signs   T(F): 99.1 (28 Jul 2020 15:04), Max: 99.1 (28 Jul 2020 15:04)  HR: 94 (28 Jul 2020 15:04) (93 - 108)  BP: 168/98 (28 Jul 2020 15:04) (162/101 - 180/91)  RR: 18 (28 Jul 2020 15:04) (17 - 18)  SpO2: 98% (28 Jul 2020 15:04) (95% - 99%)    Gen: alert and oriented x 3, NAD  Neck: mild erythema and selling noted on left side of neck. Nontender, no warmth  Throat: no swelling or erythema, pharynx clear  Tongue: no erythema or swelling. Speech clear  Cardiac: S1 s2 regular  Lungs: good air entry b/l, no wheezing or stridor, no accessory muscle use   Abd: + BS, soft, NT, ND  Ext: no edema    61 y/o male with history of missed connective tissue disease now with c/o neck swelling. VSS, afebrile. No respiratory symptoms reported. Pt was started on Vasotec and Clonidine this visit. ? allergic reaction vs symptoms of her mixed connective tissue disease.   -Benadryl 50 mg IV once  -Continue with Prednisone  -Monitor VS and respiratory status closely  -Hold Vasotec for now    Will reassess Called by RN to evaluate pt with report of redness and swelling on neck.   Patient was seen and examined at bedside, pt report neck swelling and redness that started around 4 pm this afternoon. Has history of mixed connective tissue disease with occasional joint swelling. No report of shortness of breath, dysphagia, tongue or lip swelling, tingling, sore throat, CP, wheezing, stridors or cough.     Vital Signs   T(F): 99.1 (28 Jul 2020 15:04), Max: 99.1 (28 Jul 2020 15:04)  HR: 94 (28 Jul 2020 15:04) (93 - 108)  BP: 168/98 (28 Jul 2020 15:04) (162/101 - 180/91)  RR: 18 (28 Jul 2020 15:04) (17 - 18)  SpO2: 98% (28 Jul 2020 15:04) (95% - 99%)    Gen: alert and oriented x 3, NAD  Neck: mild erythema and selling noted on left side of neck. Nontender, no warmth  Throat: no swelling or erythema, pharynx clear  Tongue: no erythema or swelling. Speech clear  Cardiac: S1 s2 regular  Lungs: good air entry b/l, no wheezing or stridor, no accessory muscle use   Abd: + BS, soft, NT, ND  Ext: no edema    61 y/o male with history of missed connective tissue disease now with c/o neck swelling. VSS, afebrile. No respiratory symptoms reported. Pt was started on Vasotec and Clonidine this visit. ? allergic reaction vs symptoms of her mixed connective tissue disease.   -Benadryl 50 mg IV once  -Continue with Prednisone  -Monitor VS and respiratory status closely  -Hold Vasotec for now    Will reassess    Pt reassessed, swelling and redness improved

## 2020-07-28 NOTE — CONSULT NOTE ADULT - ASSESSMENT
Imp:  Likely acute on chronic pancreatitis all related to EtOH  Elevated LFTs, AST>ALT also likely alcohol related  Chronic diarrhea, perhaps from chronic pancreatitis    Rec:  Check acute hep serologies  Trial of Creon  Stop drinking -- d/w patient  MRCP pending  Importance of outpatient follow up emphasized

## 2020-07-29 LAB
ALBUMIN SERPL ELPH-MCNC: 2.6 G/DL — LOW (ref 3.3–5)
ALP SERPL-CCNC: 54 U/L — SIGNIFICANT CHANGE UP (ref 40–120)
ALT FLD-CCNC: 126 U/L — HIGH (ref 12–78)
ANION GAP SERPL CALC-SCNC: 12 MMOL/L — SIGNIFICANT CHANGE UP (ref 5–17)
AST SERPL-CCNC: 139 U/L — HIGH (ref 15–37)
BILIRUB SERPL-MCNC: 0.7 MG/DL — SIGNIFICANT CHANGE UP (ref 0.2–1.2)
BUN SERPL-MCNC: 3 MG/DL — LOW (ref 7–23)
CALCIUM SERPL-MCNC: 7.7 MG/DL — LOW (ref 8.5–10.1)
CHLORIDE SERPL-SCNC: 100 MMOL/L — SIGNIFICANT CHANGE UP (ref 96–108)
CO2 SERPL-SCNC: 22 MMOL/L — SIGNIFICANT CHANGE UP (ref 22–31)
CREAT SERPL-MCNC: 0.37 MG/DL — LOW (ref 0.5–1.3)
GLUCOSE SERPL-MCNC: 80 MG/DL — SIGNIFICANT CHANGE UP (ref 70–99)
HAV IGM SER-ACNC: SIGNIFICANT CHANGE UP
HBV CORE IGM SER-ACNC: SIGNIFICANT CHANGE UP
HBV SURFACE AG SER-ACNC: SIGNIFICANT CHANGE UP
HCT VFR BLD CALC: 40.9 % — SIGNIFICANT CHANGE UP (ref 34.5–45)
HCV AB S/CO SERPL IA: 0.06 S/CO — SIGNIFICANT CHANGE UP (ref 0–0.99)
HCV AB S/CO SERPL IA: 0.07 S/CO — SIGNIFICANT CHANGE UP (ref 0–0.99)
HCV AB SERPL-IMP: SIGNIFICANT CHANGE UP
HCV AB SERPL-IMP: SIGNIFICANT CHANGE UP
HGB BLD-MCNC: 13.6 G/DL — SIGNIFICANT CHANGE UP (ref 11.5–15.5)
LIDOCAIN IGE QN: 252 U/L — SIGNIFICANT CHANGE UP (ref 73–393)
MAGNESIUM SERPL-MCNC: 2 MG/DL — SIGNIFICANT CHANGE UP (ref 1.6–2.6)
MCHC RBC-ENTMCNC: 33.3 GM/DL — SIGNIFICANT CHANGE UP (ref 32–36)
MCHC RBC-ENTMCNC: 34.2 PG — HIGH (ref 27–34)
MCV RBC AUTO: 102.8 FL — HIGH (ref 80–100)
PHOSPHATE SERPL-MCNC: 1.7 MG/DL — LOW (ref 2.5–4.5)
PLATELET # BLD AUTO: 107 K/UL — LOW (ref 150–400)
POTASSIUM SERPL-MCNC: 3.4 MMOL/L — LOW (ref 3.5–5.3)
POTASSIUM SERPL-SCNC: 3.4 MMOL/L — LOW (ref 3.5–5.3)
PROT SERPL-MCNC: 5.9 GM/DL — LOW (ref 6–8.3)
RBC # BLD: 3.98 M/UL — SIGNIFICANT CHANGE UP (ref 3.8–5.2)
RBC # FLD: 13.3 % — SIGNIFICANT CHANGE UP (ref 10.3–14.5)
SODIUM SERPL-SCNC: 134 MMOL/L — LOW (ref 135–145)
TRIGL SERPL-MCNC: 52 MG/DL — SIGNIFICANT CHANGE UP (ref 10–149)
WBC # BLD: 9.65 K/UL — SIGNIFICANT CHANGE UP (ref 3.8–10.5)
WBC # FLD AUTO: 9.65 K/UL — SIGNIFICANT CHANGE UP (ref 3.8–10.5)

## 2020-07-29 PROCEDURE — 99233 SBSQ HOSP IP/OBS HIGH 50: CPT

## 2020-07-29 RX ORDER — GABAPENTIN 400 MG/1
600 CAPSULE ORAL
Refills: 0 | Status: DISCONTINUED | OUTPATIENT
Start: 2020-07-29 | End: 2020-07-31

## 2020-07-29 RX ORDER — HYDRALAZINE HCL 50 MG
10 TABLET ORAL ONCE
Refills: 0 | Status: COMPLETED | OUTPATIENT
Start: 2020-07-29 | End: 2020-07-29

## 2020-07-29 RX ORDER — POTASSIUM CHLORIDE 20 MEQ
40 PACKET (EA) ORAL EVERY 4 HOURS
Refills: 0 | Status: COMPLETED | OUTPATIENT
Start: 2020-07-29 | End: 2020-07-29

## 2020-07-29 RX ORDER — SODIUM,POTASSIUM PHOSPHATES 278-250MG
1 POWDER IN PACKET (EA) ORAL
Refills: 0 | Status: COMPLETED | OUTPATIENT
Start: 2020-07-29 | End: 2020-07-31

## 2020-07-29 RX ADMIN — Medication 0.2 MILLIGRAM(S): at 22:31

## 2020-07-29 RX ADMIN — Medication 1 MILLIGRAM(S): at 22:31

## 2020-07-29 RX ADMIN — Medication 1 MILLIGRAM(S): at 10:00

## 2020-07-29 RX ADMIN — Medication 0.1 MILLIGRAM(S): at 06:08

## 2020-07-29 RX ADMIN — Medication 1 TABLET(S): at 12:08

## 2020-07-29 RX ADMIN — HYDROMORPHONE HYDROCHLORIDE 1 MILLIGRAM(S): 2 INJECTION INTRAMUSCULAR; INTRAVENOUS; SUBCUTANEOUS at 15:42

## 2020-07-29 RX ADMIN — PANTOPRAZOLE SODIUM 40 MILLIGRAM(S): 20 TABLET, DELAYED RELEASE ORAL at 10:01

## 2020-07-29 RX ADMIN — Medication 6 CAPSULE(S): at 12:08

## 2020-07-29 RX ADMIN — OXYCODONE HYDROCHLORIDE 10 MILLIGRAM(S): 5 TABLET ORAL at 10:47

## 2020-07-29 RX ADMIN — Medication 6 CAPSULE(S): at 08:39

## 2020-07-29 RX ADMIN — Medication 40 MILLIEQUIVALENT(S): at 12:08

## 2020-07-29 RX ADMIN — Medication 10 MILLIGRAM(S): at 05:22

## 2020-07-29 RX ADMIN — HYDROMORPHONE HYDROCHLORIDE 1 MILLIGRAM(S): 2 INJECTION INTRAMUSCULAR; INTRAVENOUS; SUBCUTANEOUS at 02:59

## 2020-07-29 RX ADMIN — GABAPENTIN 600 MILLIGRAM(S): 400 CAPSULE ORAL at 22:31

## 2020-07-29 RX ADMIN — HYDROMORPHONE HYDROCHLORIDE 1 MILLIGRAM(S): 2 INJECTION INTRAMUSCULAR; INTRAVENOUS; SUBCUTANEOUS at 08:38

## 2020-07-29 RX ADMIN — FENTANYL CITRATE 1 PATCH: 50 INJECTION INTRAVENOUS at 13:02

## 2020-07-29 RX ADMIN — Medication 0.2 MILLIGRAM(S): at 13:46

## 2020-07-29 RX ADMIN — Medication 1 MILLIGRAM(S): at 10:01

## 2020-07-29 RX ADMIN — SODIUM CHLORIDE 100 MILLILITER(S): 9 INJECTION INTRAMUSCULAR; INTRAVENOUS; SUBCUTANEOUS at 13:21

## 2020-07-29 RX ADMIN — HYDROMORPHONE HYDROCHLORIDE 1 MILLIGRAM(S): 2 INJECTION INTRAMUSCULAR; INTRAVENOUS; SUBCUTANEOUS at 20:03

## 2020-07-29 RX ADMIN — HYDROMORPHONE HYDROCHLORIDE 1 MILLIGRAM(S): 2 INJECTION INTRAMUSCULAR; INTRAVENOUS; SUBCUTANEOUS at 12:01

## 2020-07-29 RX ADMIN — Medication 4 MILLIGRAM(S): at 15:43

## 2020-07-29 RX ADMIN — Medication 6 CAPSULE(S): at 18:15

## 2020-07-29 RX ADMIN — ONDANSETRON 4 MILLIGRAM(S): 8 TABLET, FILM COATED ORAL at 12:03

## 2020-07-29 RX ADMIN — ENOXAPARIN SODIUM 40 MILLIGRAM(S): 100 INJECTION SUBCUTANEOUS at 10:00

## 2020-07-29 RX ADMIN — OXYCODONE HYDROCHLORIDE 10 MILLIGRAM(S): 5 TABLET ORAL at 06:43

## 2020-07-29 RX ADMIN — OXYCODONE HYDROCHLORIDE 10 MILLIGRAM(S): 5 TABLET ORAL at 18:14

## 2020-07-29 RX ADMIN — Medication 40 MILLIEQUIVALENT(S): at 18:16

## 2020-07-29 RX ADMIN — Medication 1 TABLET(S): at 18:23

## 2020-07-29 RX ADMIN — FENTANYL CITRATE 1 PATCH: 50 INJECTION INTRAVENOUS at 18:25

## 2020-07-29 RX ADMIN — HYDROMORPHONE HYDROCHLORIDE 1 MILLIGRAM(S): 2 INJECTION INTRAMUSCULAR; INTRAVENOUS; SUBCUTANEOUS at 05:28

## 2020-07-29 RX ADMIN — HYDROMORPHONE HYDROCHLORIDE 1 MILLIGRAM(S): 2 INJECTION INTRAMUSCULAR; INTRAVENOUS; SUBCUTANEOUS at 02:26

## 2020-07-29 RX ADMIN — SODIUM CHLORIDE 100 MILLILITER(S): 9 INJECTION INTRAMUSCULAR; INTRAVENOUS; SUBCUTANEOUS at 01:05

## 2020-07-29 RX ADMIN — OXYCODONE HYDROCHLORIDE 10 MILLIGRAM(S): 5 TABLET ORAL at 22:30

## 2020-07-29 NOTE — DIETITIAN INITIAL EVALUATION ADULT. - PERTINENT LABORATORY DATA
07-29 Na134 mmol/L<L> Glu 80 mg/dL K+ 3.4 mmol/L<L> Cr  0.37 mg/dL<L> BUN 3 mg/dL<L> Phos 1.7 mg/dL<L> Alb 2.6 g/dL<L> PAB n/a

## 2020-07-29 NOTE — DIETITIAN INITIAL EVALUATION ADULT. - OTHER INFO
60F.  hx chronic pain and follows w/ pain management.  admitted 07/27/20.  presented to ED c/o abdominal pain.  onset earlier in the day.  severity 10/10.  located mid epigastric region.  provoked with any movement.  associated w/ nausea.  she admits to binge drink and that her last drink of "4 vodkas" was on 07/26.  in ED,  lipase was over 3K and CT AB/pelvis c/w acute pancreatitis.  WBC 17K, but she takes prednisone chronically.  her BP was elevated.  she was given NS 1L and Pepcid.    Noted possible pancreatitis r/t Etoh abuse. Lipase labs improving, pt advanced to clear liquid diet however continues to feel nauseous w/ clear liquids. Pt reports that she "eats when she wants" and will eat 3 meals throughout the day some days and then only have "a can of soup for the whole day" on some days. Pt quickly becomes upset speaking about food/appetite and requests to speak at another time. Pt reported UBW was 104# 12/2019, and then she was "binge eating" and gained weight and weighed 114#. Current admission wt 118#. Pt reports PTA she was experiencing persistent diarrhea where she would "spend the entire day in the bathroom". Noted by GI possibly r/t chronic pancreatitis.

## 2020-07-29 NOTE — DIETITIAN INITIAL EVALUATION ADULT. - PERTINENT MEDS FT
MEDICATIONS  (STANDING):  cloNIDine 0.1 milliGRAM(s) Oral every 8 hours  enalaprilat Injectable 1.25 milliGRAM(s) IV Push every 6 hours  enoxaparin Injectable 40 milliGRAM(s) SubCutaneous daily  fentaNYL   Patch  25 MICROgram(s)/Hr 1 Patch Transdermal every 72 hours  folic acid 1 milliGRAM(s) Oral daily  hydroxychloroquine 200 milliGRAM(s) Oral <User Schedule>  LORazepam     Tablet 1 milliGRAM(s) Oral two times a day  multivitamin 1 Tablet(s) Oral daily  pancrelipase  (CREON 12,000 Lipase Units) 6 Capsule(s) Oral three times a day with meals  pantoprazole  Injectable 40 milliGRAM(s) IV Push daily  potassium chloride    Tablet ER 40 milliEquivalent(s) Oral every 4 hours  potassium phosphate / sodium phosphate Tablet (K-PHOS No. 2) 1 Tablet(s) Oral three times a day with meals  predniSONE   Tablet 4 milliGRAM(s) Oral daily  sodium chloride 0.9%. 1000 milliLiter(s) (100 mL/Hr) IV Continuous <Continuous>  thiamine 100 milliGRAM(s) Oral daily    MEDICATIONS  (PRN):  HYDROmorphone  Injectable 1 milliGRAM(s) IV Push every 3 hours PRN Severe Pain (7 - 10)  LORazepam     Tablet 2 milliGRAM(s) Oral every 2 hours PRN CIWA-Ar score increase by 2 points and a total score of 7 or less  LORazepam     Tablet 2 milliGRAM(s) Oral every 1 hour PRN CIWA-Ar score 8 or greater  LORazepam   Injectable 2 milliGRAM(s) IV Push every 1 hour PRN CIWA-Ar score 8 or greater  LORazepam   Injectable 2 milliGRAM(s) IV Push every 2 hours PRN CIWA-Ar score increase by 2 points and a total score of 7 or less  ondansetron Injectable 4 milliGRAM(s) IV Push every 6 hours PRN Nausea and/or Vomiting  oxyCODONE    IR 10 milliGRAM(s) Oral every 4 hours PRN Moderate Pain (4 - 6)

## 2020-07-29 NOTE — DIETITIAN INITIAL EVALUATION ADULT. - MALNUTRITION
Based on current information pt does not meet criteria for malnutrition however is at high risk. Will continue to monitor.

## 2020-07-29 NOTE — DIETITIAN INITIAL EVALUATION ADULT. - NS FNS WEIGHT CHANGE REASON
unintentional/pt reports wt gain over the past 7 mths 2/2 reported "binge eating", possibly r/t etoh abuse. Pt reports inconsistent meal pattern PTA

## 2020-07-29 NOTE — DIETITIAN INITIAL EVALUATION ADULT. - ADD RECOMMEND
1. advance diet to regular as medically feasible 2. monitor po intake and tolerance 3. add gelatein BID to supplement po intake 4. continue to encourage etoh cessation 5. weekly wt 6. c/w MVI w/ minerals, folic acid, and thiamine daily 2/2 hx etoh abuse.

## 2020-07-30 LAB
ANION GAP SERPL CALC-SCNC: 15 MMOL/L — SIGNIFICANT CHANGE UP (ref 5–17)
BUN SERPL-MCNC: 6 MG/DL — LOW (ref 7–23)
CALCIUM SERPL-MCNC: 8.8 MG/DL — SIGNIFICANT CHANGE UP (ref 8.5–10.1)
CHLORIDE SERPL-SCNC: 97 MMOL/L — SIGNIFICANT CHANGE UP (ref 96–108)
CO2 SERPL-SCNC: 21 MMOL/L — LOW (ref 22–31)
CREAT SERPL-MCNC: 0.5 MG/DL — SIGNIFICANT CHANGE UP (ref 0.5–1.3)
GLUCOSE SERPL-MCNC: 69 MG/DL — LOW (ref 70–99)
HCT VFR BLD CALC: 41.5 % — SIGNIFICANT CHANGE UP (ref 34.5–45)
HGB BLD-MCNC: 13.4 G/DL — SIGNIFICANT CHANGE UP (ref 11.5–15.5)
MCHC RBC-ENTMCNC: 32.3 GM/DL — SIGNIFICANT CHANGE UP (ref 32–36)
MCHC RBC-ENTMCNC: 34.3 PG — HIGH (ref 27–34)
MCV RBC AUTO: 106.1 FL — HIGH (ref 80–100)
PHOSPHATE SERPL-MCNC: 2.2 MG/DL — LOW (ref 2.5–4.5)
PLATELET # BLD AUTO: 106 K/UL — LOW (ref 150–400)
POTASSIUM SERPL-MCNC: 4.4 MMOL/L — SIGNIFICANT CHANGE UP (ref 3.5–5.3)
POTASSIUM SERPL-SCNC: 4.4 MMOL/L — SIGNIFICANT CHANGE UP (ref 3.5–5.3)
RBC # BLD: 3.91 M/UL — SIGNIFICANT CHANGE UP (ref 3.8–5.2)
RBC # FLD: 13.5 % — SIGNIFICANT CHANGE UP (ref 10.3–14.5)
SODIUM SERPL-SCNC: 133 MMOL/L — LOW (ref 135–145)
WBC # BLD: 7.02 K/UL — SIGNIFICANT CHANGE UP (ref 3.8–10.5)
WBC # FLD AUTO: 7.02 K/UL — SIGNIFICANT CHANGE UP (ref 3.8–10.5)

## 2020-07-30 PROCEDURE — 99232 SBSQ HOSP IP/OBS MODERATE 35: CPT

## 2020-07-30 RX ORDER — ALPRAZOLAM 0.25 MG
0.25 TABLET ORAL ONCE
Refills: 0 | Status: DISCONTINUED | OUTPATIENT
Start: 2020-07-30 | End: 2020-07-30

## 2020-07-30 RX ORDER — PANTOPRAZOLE SODIUM 20 MG/1
40 TABLET, DELAYED RELEASE ORAL DAILY
Refills: 0 | Status: DISCONTINUED | OUTPATIENT
Start: 2020-07-30 | End: 2020-07-31

## 2020-07-30 RX ORDER — NICOTINE POLACRILEX 2 MG
1 GUM BUCCAL DAILY
Refills: 0 | Status: DISCONTINUED | OUTPATIENT
Start: 2020-07-30 | End: 2020-07-31

## 2020-07-30 RX ADMIN — HYDROMORPHONE HYDROCHLORIDE 1 MILLIGRAM(S): 2 INJECTION INTRAMUSCULAR; INTRAVENOUS; SUBCUTANEOUS at 00:38

## 2020-07-30 RX ADMIN — HYDROMORPHONE HYDROCHLORIDE 1 MILLIGRAM(S): 2 INJECTION INTRAMUSCULAR; INTRAVENOUS; SUBCUTANEOUS at 06:46

## 2020-07-30 RX ADMIN — HYDROMORPHONE HYDROCHLORIDE 1 MILLIGRAM(S): 2 INJECTION INTRAMUSCULAR; INTRAVENOUS; SUBCUTANEOUS at 18:01

## 2020-07-30 RX ADMIN — Medication 1 TABLET(S): at 08:28

## 2020-07-30 RX ADMIN — Medication 1 TABLET(S): at 10:23

## 2020-07-30 RX ADMIN — Medication 0.25 MILLIGRAM(S): at 23:23

## 2020-07-30 RX ADMIN — FENTANYL CITRATE 1 PATCH: 50 INJECTION INTRAVENOUS at 21:29

## 2020-07-30 RX ADMIN — Medication 1 MILLIGRAM(S): at 10:33

## 2020-07-30 RX ADMIN — OXYCODONE HYDROCHLORIDE 10 MILLIGRAM(S): 5 TABLET ORAL at 12:14

## 2020-07-30 RX ADMIN — HYDROMORPHONE HYDROCHLORIDE 1 MILLIGRAM(S): 2 INJECTION INTRAMUSCULAR; INTRAVENOUS; SUBCUTANEOUS at 00:17

## 2020-07-30 RX ADMIN — HYDROMORPHONE HYDROCHLORIDE 1 MILLIGRAM(S): 2 INJECTION INTRAMUSCULAR; INTRAVENOUS; SUBCUTANEOUS at 03:30

## 2020-07-30 RX ADMIN — Medication 1 PATCH: at 19:00

## 2020-07-30 RX ADMIN — GABAPENTIN 600 MILLIGRAM(S): 400 CAPSULE ORAL at 21:12

## 2020-07-30 RX ADMIN — HYDROMORPHONE HYDROCHLORIDE 1 MILLIGRAM(S): 2 INJECTION INTRAMUSCULAR; INTRAVENOUS; SUBCUTANEOUS at 21:42

## 2020-07-30 RX ADMIN — GABAPENTIN 600 MILLIGRAM(S): 400 CAPSULE ORAL at 10:33

## 2020-07-30 RX ADMIN — Medication 0.2 MILLIGRAM(S): at 21:13

## 2020-07-30 RX ADMIN — Medication 100 MILLIGRAM(S): at 10:23

## 2020-07-30 RX ADMIN — Medication 6 CAPSULE(S): at 12:16

## 2020-07-30 RX ADMIN — Medication 1 PATCH: at 10:23

## 2020-07-30 RX ADMIN — Medication 0.2 MILLIGRAM(S): at 06:47

## 2020-07-30 RX ADMIN — Medication 2 MILLIGRAM(S): at 06:55

## 2020-07-30 RX ADMIN — Medication 6 CAPSULE(S): at 19:03

## 2020-07-30 RX ADMIN — Medication 1 MILLIGRAM(S): at 10:23

## 2020-07-30 RX ADMIN — HYDROMORPHONE HYDROCHLORIDE 1 MILLIGRAM(S): 2 INJECTION INTRAMUSCULAR; INTRAVENOUS; SUBCUTANEOUS at 21:12

## 2020-07-30 RX ADMIN — FENTANYL CITRATE 1 PATCH: 50 INJECTION INTRAVENOUS at 07:00

## 2020-07-30 RX ADMIN — PANTOPRAZOLE SODIUM 40 MILLIGRAM(S): 20 TABLET, DELAYED RELEASE ORAL at 10:23

## 2020-07-30 RX ADMIN — ENOXAPARIN SODIUM 40 MILLIGRAM(S): 100 INJECTION SUBCUTANEOUS at 10:33

## 2020-07-30 RX ADMIN — Medication 0.2 MILLIGRAM(S): at 14:28

## 2020-07-30 RX ADMIN — Medication 1 TABLET(S): at 12:15

## 2020-07-30 RX ADMIN — HYDROMORPHONE HYDROCHLORIDE 1 MILLIGRAM(S): 2 INJECTION INTRAMUSCULAR; INTRAVENOUS; SUBCUTANEOUS at 14:30

## 2020-07-30 RX ADMIN — HYDROMORPHONE HYDROCHLORIDE 1 MILLIGRAM(S): 2 INJECTION INTRAMUSCULAR; INTRAVENOUS; SUBCUTANEOUS at 03:20

## 2020-07-30 RX ADMIN — OXYCODONE HYDROCHLORIDE 10 MILLIGRAM(S): 5 TABLET ORAL at 19:02

## 2020-07-30 RX ADMIN — Medication 200 MILLIGRAM(S): at 08:29

## 2020-07-30 RX ADMIN — Medication 6 CAPSULE(S): at 08:26

## 2020-07-30 RX ADMIN — Medication 4 MILLIGRAM(S): at 10:23

## 2020-07-30 RX ADMIN — Medication 1 TABLET(S): at 18:01

## 2020-07-30 RX ADMIN — FENTANYL CITRATE 1 PATCH: 50 INJECTION INTRAVENOUS at 21:21

## 2020-07-31 ENCOUNTER — TRANSCRIPTION ENCOUNTER (OUTPATIENT)
Age: 60
End: 2020-07-31

## 2020-07-31 VITALS
RESPIRATION RATE: 18 BRPM | HEART RATE: 74 BPM | DIASTOLIC BLOOD PRESSURE: 71 MMHG | SYSTOLIC BLOOD PRESSURE: 126 MMHG | OXYGEN SATURATION: 99 % | TEMPERATURE: 99 F

## 2020-07-31 LAB
C DIFF BY PCR RESULT: SIGNIFICANT CHANGE UP
C DIFF TOX GENS STL QL NAA+PROBE: SIGNIFICANT CHANGE UP
CULTURE RESULTS: SIGNIFICANT CHANGE UP
SPECIMEN SOURCE: SIGNIFICANT CHANGE UP

## 2020-07-31 PROCEDURE — 99239 HOSP IP/OBS DSCHRG MGMT >30: CPT

## 2020-07-31 RX ORDER — GABAPENTIN 400 MG/1
2 CAPSULE ORAL
Qty: 0 | Refills: 0 | DISCHARGE
Start: 2020-07-31

## 2020-07-31 RX ORDER — LOPERAMIDE HCL 2 MG
2 TABLET ORAL DAILY
Refills: 0 | Status: DISCONTINUED | OUTPATIENT
Start: 2020-07-31 | End: 2020-07-31

## 2020-07-31 RX ORDER — APIXABAN 2.5 MG/1
2 TABLET, FILM COATED ORAL
Qty: 56 | Refills: 0
Start: 2020-07-31

## 2020-07-31 RX ORDER — LOPERAMIDE HCL 2 MG
1 TABLET ORAL
Qty: 30 | Refills: 0
Start: 2020-07-31

## 2020-07-31 RX ORDER — ALPRAZOLAM 0.25 MG
0.25 TABLET ORAL
Refills: 0 | Status: DISCONTINUED | OUTPATIENT
Start: 2020-07-31 | End: 2020-07-31

## 2020-07-31 RX ORDER — ONDANSETRON 8 MG/1
1 TABLET, FILM COATED ORAL
Qty: 30 | Refills: 0
Start: 2020-07-31

## 2020-07-31 RX ORDER — PANTOPRAZOLE SODIUM 20 MG/1
1 TABLET, DELAYED RELEASE ORAL
Qty: 30 | Refills: 0
Start: 2020-07-31 | End: 2020-08-29

## 2020-07-31 RX ORDER — APIXABAN 2.5 MG/1
10 TABLET, FILM COATED ORAL
Refills: 0 | Status: DISCONTINUED | OUTPATIENT
Start: 2020-07-31 | End: 2020-07-31

## 2020-07-31 RX ORDER — LIPASE/PROTEASE/AMYLASE 16-48-48K
1 CAPSULE,DELAYED RELEASE (ENTERIC COATED) ORAL
Qty: 90 | Refills: 0
Start: 2020-07-31 | End: 2020-08-29

## 2020-07-31 RX ORDER — LIDOCAINE 4 G/100G
1 CREAM TOPICAL
Qty: 1 | Refills: 0
Start: 2020-07-31

## 2020-07-31 RX ADMIN — OXYCODONE HYDROCHLORIDE 10 MILLIGRAM(S): 5 TABLET ORAL at 05:39

## 2020-07-31 RX ADMIN — HYDROMORPHONE HYDROCHLORIDE 1 MILLIGRAM(S): 2 INJECTION INTRAMUSCULAR; INTRAVENOUS; SUBCUTANEOUS at 08:23

## 2020-07-31 RX ADMIN — OXYCODONE HYDROCHLORIDE 10 MILLIGRAM(S): 5 TABLET ORAL at 05:14

## 2020-07-31 RX ADMIN — Medication 0.25 MILLIGRAM(S): at 10:53

## 2020-07-31 RX ADMIN — Medication 1 PATCH: at 10:11

## 2020-07-31 RX ADMIN — Medication 100 MILLIGRAM(S): at 10:09

## 2020-07-31 RX ADMIN — ENOXAPARIN SODIUM 40 MILLIGRAM(S): 100 INJECTION SUBCUTANEOUS at 10:10

## 2020-07-31 RX ADMIN — OXYCODONE HYDROCHLORIDE 10 MILLIGRAM(S): 5 TABLET ORAL at 14:01

## 2020-07-31 RX ADMIN — HYDROMORPHONE HYDROCHLORIDE 1 MILLIGRAM(S): 2 INJECTION INTRAMUSCULAR; INTRAVENOUS; SUBCUTANEOUS at 00:43

## 2020-07-31 RX ADMIN — OXYCODONE HYDROCHLORIDE 10 MILLIGRAM(S): 5 TABLET ORAL at 10:09

## 2020-07-31 RX ADMIN — Medication 4 MILLIGRAM(S): at 10:11

## 2020-07-31 RX ADMIN — OXYCODONE HYDROCHLORIDE 10 MILLIGRAM(S): 5 TABLET ORAL at 01:07

## 2020-07-31 RX ADMIN — Medication 1 TABLET(S): at 10:09

## 2020-07-31 RX ADMIN — OXYCODONE HYDROCHLORIDE 10 MILLIGRAM(S): 5 TABLET ORAL at 01:37

## 2020-07-31 RX ADMIN — HYDROMORPHONE HYDROCHLORIDE 1 MILLIGRAM(S): 2 INJECTION INTRAMUSCULAR; INTRAVENOUS; SUBCUTANEOUS at 03:55

## 2020-07-31 RX ADMIN — Medication 6 CAPSULE(S): at 13:06

## 2020-07-31 RX ADMIN — Medication 1 TABLET(S): at 08:23

## 2020-07-31 RX ADMIN — GABAPENTIN 600 MILLIGRAM(S): 400 CAPSULE ORAL at 10:09

## 2020-07-31 RX ADMIN — Medication 6 CAPSULE(S): at 08:21

## 2020-07-31 RX ADMIN — HYDROMORPHONE HYDROCHLORIDE 1 MILLIGRAM(S): 2 INJECTION INTRAMUSCULAR; INTRAVENOUS; SUBCUTANEOUS at 00:13

## 2020-07-31 RX ADMIN — HYDROMORPHONE HYDROCHLORIDE 1 MILLIGRAM(S): 2 INJECTION INTRAMUSCULAR; INTRAVENOUS; SUBCUTANEOUS at 03:25

## 2020-07-31 RX ADMIN — Medication 1 MILLIGRAM(S): at 10:11

## 2020-07-31 RX ADMIN — PANTOPRAZOLE SODIUM 40 MILLIGRAM(S): 20 TABLET, DELAYED RELEASE ORAL at 10:11

## 2020-07-31 RX ADMIN — Medication 2 MILLIGRAM(S): at 13:05

## 2020-07-31 NOTE — PROGRESS NOTE ADULT - ASSESSMENT
Imp:  Resolving acute pancretitis, alcohol related  Diarrhea which has been for months, now recurring with increased PO intake    Rec:  Stool collection for infection, fat etc. (no need to stay in house for this as this can be pursued outpatient)  Patient agrees to consider MRCP outpatient
Imp:  Alcohol related pancreatitis  Alcohol related hepatitis, LFTs improving    Rec:  Clears  Await MRCP
Imp:  Alcohol related pancreatitis and hepatitis, both improving    Rec:  Advance to low fat diet  Cont creon  Change PPI to PO  D/C plan  I gave patient a copy of her records per her request  Encouraged again to follow up with me outpatient.  Seriousness of ongoing alcohol and tobacco use also discussed.

## 2020-07-31 NOTE — DISCHARGE NOTE PROVIDER - CARE PROVIDER_API CALL
Yon Gunter  CRITICAL CARE MEDICINE  [address] [place] [address]  Phone: [phone]  Fax: [phone]  Follow Up Time: 1 week

## 2020-07-31 NOTE — DISCHARGE NOTE NURSING/CASE MANAGEMENT/SOCIAL WORK - PATIENT PORTAL LINK FT
You can access the FollowMyHealth Patient Portal offered by [facility] by registering at the following website: [email or web address]. By joining [facility]’s FollowMyHealth portal, you will also be able to view your health information using other applications (apps) compatible with our system.

## 2020-07-31 NOTE — DISCHARGE NOTE NURSING/CASE MANAGEMENT/SOCIAL WORK - NSDCPEEMAIL_GEN_ALL_CORE
[facility] email [email or web address]

## 2020-07-31 NOTE — PROGRESS NOTE ADULT - SUBJECTIVE AND OBJECTIVE BOX
60F.  hx chronic pain and follows w/ pain management.  admitted 20.  presented to ED c/o abdominal pain.  onset earlier in the day.  severity 10/10.  located mid epigastric region.  provoked with any movement.  associated w/ nausea.  she admits to binge drink and that her last drink of "4 vodkas" was on .  in ED,  lipase was over 3K and CT AB/pelvis c/w acute pancreatitis.  WBC 17K, but she takes prednisone chronically.  her BP was elevated.  she was given NS 1L and Pepcid.        20: Patient seen and examined. Complaining od back and abd pain.       Vital Signs Last 24 Hrs  T(C): 36.7 (2020 12:57), Max: 36.8 (2020 21:47)  T(F): 98.1 (2020 12:57), Max: 98.2 (2020 21:47)  HR: 93 (2020 12:30) (88 - 108)  BP: 163/84 (2020 12:30) (162/101 - 198/106)  BP(mean): --  RR: 18 (2020 12:30) (17 - 20)  SpO2: 99% (2020 12:30) (95% - 99%)      Physical Exam:       Constitutional: NAD.   HEENT: PERRL, EOMI, MMM.  Neck: Soft and supple, No carotid bruit, No JVD  Respiratory: Breath sounds are clear bilaterally, No wheezing, rales or rhonchi  Cardiovascular: S1 and S2, regular rate and rhythm, no murmur, rub or gallop.  Gastrointestinal: + epigastric tenderness.  Extremities: + peripheral edema  Vascular: 2+ peripheral pulses  Neurological: A/O x 3, no focal deficits  Musculoskeletal: 5/5 strength b/l upper and lower extremities  Skin:  no visible rashes.           MEDICATIONS  (STANDING):  cloNIDine 0.1 milliGRAM(s) Oral every 8 hours  enalaprilat Injectable 1.25 milliGRAM(s) IV Push every 6 hours  enoxaparin Injectable 40 milliGRAM(s) SubCutaneous daily  fentaNYL   Patch  25 MICROgram(s)/Hr 1 Patch Transdermal every 72 hours  folic acid 1 milliGRAM(s) Oral daily  hydroxychloroquine 200 milliGRAM(s) Oral <User Schedule>  LORazepam     Tablet 1 milliGRAM(s) Oral two times a day  multivitamin 1 Tablet(s) Oral daily  pancrelipase  (CREON 12,000 Lipase Units) 6 Capsule(s) Oral three times a day with meals  pantoprazole  Injectable 40 milliGRAM(s) IV Push daily  predniSONE   Tablet 4 milliGRAM(s) Oral daily  sodium chloride 0.9%. 1000 milliLiter(s) (100 mL/Hr) IV Continuous <Continuous>  thiamine 100 milliGRAM(s) Oral daily    MEDICATIONS  (PRN):  HYDROmorphone  Injectable 1 milliGRAM(s) IV Push every 3 hours PRN Severe Pain (7 - 10)  LORazepam     Tablet 2 milliGRAM(s) Oral every 2 hours PRN CIWA-Ar score increase by 2 points and a total score of 7 or less  LORazepam     Tablet 2 milliGRAM(s) Oral every 1 hour PRN CIWA-Ar score 8 or greater  LORazepam   Injectable 2 milliGRAM(s) IV Push every 1 hour PRN CIWA-Ar score 8 or greater  LORazepam   Injectable 2 milliGRAM(s) IV Push every 2 hours PRN CIWA-Ar score increase by 2 points and a total score of 7 or less  ondansetron Injectable 4 milliGRAM(s) IV Push every 6 hours PRN Nausea and/or Vomiting  oxyCODONE    IR 10 milliGRAM(s) Oral every 4 hours PRN Moderate Pain (4 - 6)        Labs:                           15.1   13.55 )-----------( 140      ( 2020 10:46 )             44.4     2020 10:46    136    |  105    |  7      ----------------------------<  122    3.6     |  21     |  0.56     Ca    7.4        2020 10:46  Mg     1.9       2020 10:46    TPro  5.9    /  Alb  2.7    /  TBili  0.7    /  DBili  0.3    /  AST  273    /  ALT  187    /  AlkPhos  60     2020 10:46    LIVER FUNCTIONS - ( 2020 10:46 )  Alb: 2.7 g/dL / Pro: 5.9 gm/dL / ALK PHOS: 60 U/L / ALT: 187 U/L / AST: 273 U/L / GGT: x             CAPILLARY BLOOD GLUCOSE        CARDIAC MARKERS ( 2020 14:57 )  <0.015 ng/mL / x     / x     / x     / x          Urinalysis Basic - ( 2020 19:15 )    Color: Yellow / Appearance: Clear / S.005 / pH: x  Gluc: x / Ketone: Negative  / Bili: Negative / Urobili: Negative mg/dL   Blood: x / Protein: 30 mg/dL / Nitrite: Negative   Leuk Esterase: Trace / RBC: 0-2 /HPF / WBC 0-2   Sq Epi: x / Non Sq Epi: Occasional / Bacteria: Occasional              Assessment and Plan:   Assessment:  · Assessment		  60F.  admitted 20.  presented to ED c/o abdominal pain.    acute pancreatitis.  -due to ETOH  -CT AB/pelvis, moderately severe pancreatitis.  mild biliary ductal dilatation.  -MRCP pending  -trend LFTs. Improving  -monitor/supplement electrolytes.  -NPO for now  -IVF 0.9%NS  -pain management.  -Protonix.  -Zofran.  -GI consult from Dr Kingston appreciated    acute upon chronic pain.  -Continue dilaudid and oxycodone  -c/w fentalny patch.    EtOH abuse.  -Ativan CIWA protocol.  -MVI, thiamine and folic acid.  -GI prophylaxis w/ PPI.    elevated BP.  -suspect secondary to pain and anxiety.  -enalapril 1.25mg iv q6.  -Started clonidine    leukocytosis.  -suspect acute phase reactant due to pancreatitis and chronic steroids.  -afebrile.  -UA, no pyuria.  -CXR, no infiltrate.  -Resolving  -monitor.    hx mixed connective tissue disease.  -prednisone.  -hydroxychloroquine.    hx depression, panic attacks and anxiety.  -hold Xanax that she takes at home.  -Ativan 1mg po bid.    hx adenoCA of lung.  -s/p RML wedge resection (2018).  -former tobacco.  -no active issues.    DVT prophylaxis.  -LMWH.
60F.  hx chronic pain and follows w/ pain management.  admitted 20.  presented to ED c/o abdominal pain.  onset earlier in the day.  severity 10/10.  located mid epigastric region.  provoked with any movement.  associated w/ nausea.  she admits to binge drink and that her last drink of "4 vodkas" was on .  in ED,  lipase was over 3K and CT AB/pelvis c/w acute pancreatitis.  WBC 17K, but she takes prednisone chronically.  her BP was elevated.  she was given NS 1L and Pepcid.        20: Patient seen and examined. Complaining of back and abd pain.   20: Patient seen and examined. Complaining of left leg swelling and pain for several weeks. Abd pain improving.     Vital Signs Last 24 Hrs  T(C): 36.8 (2020 11:15), Max: 37.3 (2020 15:04)  T(F): 98.2 (2020 11:15), Max: 99.1 (2020 15:04)  HR: 87 (2020 11:15) (81 - 101)  BP: 181/94 (2020 11:15) (155/84 - 182/84)  BP(mean): --  RR: 18 (2020 11:15) (17 - 18)  SpO2: 97% (2020 11:15) (97% - 99%)      Physical Exam:       Constitutional: NAD.   HEENT: PERRL, EOMI, MMM.  Neck: Soft and supple, No carotid bruit, No JVD  Respiratory: Breath sounds are clear bilaterally, No wheezing, rales or rhonchi  Cardiovascular: S1 and S2, regular rate and rhythm, no murmur, rub or gallop.  Gastrointestinal: + epigastric tenderness.  Extremities: + peripheral edema  Vascular: 2+ peripheral pulses  Neurological: A/O x 3, no focal deficits  Musculoskeletal: 5/5 strength b/l upper and lower extremities  Skin:  no visible rashes.         MEDICATIONS  (STANDING):  cloNIDine 0.2 milliGRAM(s) Oral three times a day  enoxaparin Injectable 40 milliGRAM(s) SubCutaneous daily  fentaNYL   Patch  25 MICROgram(s)/Hr 1 Patch Transdermal every 72 hours  folic acid 1 milliGRAM(s) Oral daily  gabapentin 600 milliGRAM(s) Oral two times a day  hydroxychloroquine 200 milliGRAM(s) Oral <User Schedule>  LORazepam     Tablet 1 milliGRAM(s) Oral two times a day  methylPREDNISolone 4 milliGRAM(s) Oral daily  multivitamin 1 Tablet(s) Oral daily  pancrelipase  (CREON 12,000 Lipase Units) 6 Capsule(s) Oral three times a day with meals  pantoprazole  Injectable 40 milliGRAM(s) IV Push daily  potassium chloride    Tablet ER 40 milliEquivalent(s) Oral every 4 hours  potassium phosphate / sodium phosphate Tablet (K-PHOS No. 2) 1 Tablet(s) Oral three times a day with meals  sodium chloride 0.9%. 1000 milliLiter(s) (100 mL/Hr) IV Continuous <Continuous>  thiamine 100 milliGRAM(s) Oral daily    MEDICATIONS  (PRN):  HYDROmorphone  Injectable 1 milliGRAM(s) IV Push every 3 hours PRN Severe Pain (7 - 10)  LORazepam     Tablet 2 milliGRAM(s) Oral every 2 hours PRN CIWA-Ar score increase by 2 points and a total score of 7 or less  LORazepam     Tablet 2 milliGRAM(s) Oral every 1 hour PRN CIWA-Ar score 8 or greater  LORazepam   Injectable 2 milliGRAM(s) IV Push every 1 hour PRN CIWA-Ar score 8 or greater  LORazepam   Injectable 2 milliGRAM(s) IV Push every 2 hours PRN CIWA-Ar score increase by 2 points and a total score of 7 or less  ondansetron Injectable 4 milliGRAM(s) IV Push every 6 hours PRN Nausea and/or Vomiting  oxyCODONE    IR 10 milliGRAM(s) Oral every 4 hours PRN Moderate Pain (4 - 6)          Labs:                                                  13.6   9.65  )-----------( 107      ( 2020 08:16 )             40.9     2020 08:16    134    |  100    |  3      ----------------------------<  80     3.4     |  22     |  0.37     Ca    7.7        2020 08:16  Phos  1.7       2020 08:16  Mg     2.0       2020 08:16    TPro  5.9    /  Alb  2.6    /  TBili  0.7    /  DBili  x      /  AST  139    /  ALT  126    /  AlkPhos  54     2020 08:16    LIVER FUNCTIONS - ( 2020 08:16 )  Alb: 2.6 g/dL / Pro: 5.9 gm/dL / ALK PHOS: 54 U/L / ALT: 126 U/L / AST: 139 U/L / GGT: x             CAPILLARY BLOOD GLUCOSE        CARDIAC MARKERS ( 2020 14:57 )  <0.015 ng/mL / x     / x     / x     / x          Urinalysis Basic - ( 2020 19:15 )    Color: Yellow / Appearance: Clear / S.005 / pH: x  Gluc: x / Ketone: Negative  / Bili: Negative / Urobili: Negative mg/dL   Blood: x / Protein: 30 mg/dL / Nitrite: Negative   Leuk Esterase: Trace / RBC: 0-2 /HPF / WBC 0-2   Sq Epi: x / Non Sq Epi: Occasional / Bacteria: Occasional          Assessment and Plan:   Assessment:  · Assessment		  60F.  admitted 20.  presented to ED c/o abdominal pain.    acute pancreatitis. Resolving  -due to ETOH  -CT AB/pelvis, moderately severe pancreatitis.  mild biliary ductal dilatation.  -MRCP pending  -trend LFTs. Improving  -monitor/supplement electrolytes.  -Started clears  -IVF 0.9%NS  -pain management.  -Protonix.  -Zofran.  -GI follow up from Dr Kingston appreciated    acute upon chronic pain.  -Continue dilaudid and oxycodone  -c/w fentalny patch.    Hypokalemia and hypophosphatemia due to ETH-  Replacing, follow    Left Soleal vein thrombosis-patient aware  Follow up doppler  Hold AC for now    EtOH abuse.  -Ativan CIWA protocol.  -MVI, thiamine and folic acid.  -GI prophylaxis w/ PPI.    elevated BP.  -suspect secondary to pain and anxiety.  -Continue clonidine    leukocytosis. Resolved  -suspect acute phase reactant due to pancreatitis and chronic steroids.    hx mixed connective tissue disease.  -Continue  medrol  -hydroxychloroquine.    hx depression, panic attacks and anxiety.  -Ativan 1mg po bid.    hx adenoCA of lung.  -s/p RML wedge resection (2018).  -former tobacco.  -no active issues.    DVT prophylaxis.  -LMWH.
CC: Epigastric pain  · Subjective and Objective: 	  60F.  hx chronic pain and follows w/ pain management.  admitted 07/27/20.  presented to ED c/o abdominal pain.  onset earlier in the day.  severity 10/10.  located mid epigastric region.  provoked with any movement.  associated w/ nausea.  she admits to binge drink and that her last drink of "4 vodkas" was on 07/26.  in ED,  lipase was over 3K and CT AB/pelvis c/w acute pancreatitis.  WBC 17K, but she takes prednisone chronically.  her BP was elevated.  she was given NS 1L and Pepcid.        07/28/20: Patient seen and examined. Complaining of back and abd pain.   07/29/20: Patient seen and examined. Complaining of left leg swelling and pain for several weeks. Abd pain improving.   07/30: Comfortable, denies pain, tolerating diet. Denies fever, chills, N, V, abd pain, CP, SOB.    REVIEW OF SYSTEMS: All other review of systems is negative unless indicated above.    Vital Signs Last 24 Hrs  T(C): 36.8 (30 Jul 2020 14:30), Max: 37.4 (29 Jul 2020 18:48)  T(F): 98.2 (30 Jul 2020 14:30), Max: 99.4 (29 Jul 2020 18:48)  HR: 68 (30 Jul 2020 14:30) (68 - 84)  BP: 119/75 (30 Jul 2020 14:30) (93/58 - 173/83)  BP(mean): --  RR: 18 (30 Jul 2020 14:30) (17 - 18)  SpO2: 98% (30 Jul 2020 14:30) (96% - 100%)      Physical Exam:   Constitutional: NAD.   HEENT: PERRL, EOMI, MMM.  Neck: Soft and supple, No carotid bruit, No JVD  Respiratory: Breath sounds are clear bilaterally, No wheezing, rales or rhonchi  Cardiovascular: S1 and S2, regular rate and rhythm, no murmur, rub or gallop.  Gastrointestinal: + epigastric tenderness.  Extremities: + peripheral edema  Vascular: 2+ peripheral pulses  Neurological: A/O x 3, no focal deficits  Musculoskeletal: 5/5 strength b/l upper and lower extremities  Skin:  no visible rashes.     MEDICATIONS  (STANDING):  cloNIDine 0.2 milliGRAM(s) Oral three times a day  enoxaparin Injectable 40 milliGRAM(s) SubCutaneous daily  fentaNYL   Patch  25 MICROgram(s)/Hr 1 Patch Transdermal every 72 hours  folic acid 1 milliGRAM(s) Oral daily  gabapentin 600 milliGRAM(s) Oral two times a day  hydroxychloroquine 200 milliGRAM(s) Oral <User Schedule>  methylPREDNISolone 4 milliGRAM(s) Oral daily  multivitamin 1 Tablet(s) Oral daily  nicotine - 21 mG/24Hr(s) Patch 1 patch Transdermal daily  pancrelipase  (CREON 12,000 Lipase Units) 6 Capsule(s) Oral three times a day with meals  pantoprazole    Tablet 40 milliGRAM(s) Oral daily  potassium phosphate / sodium phosphate Tablet (K-PHOS No. 2) 1 Tablet(s) Oral three times a day with meals  thiamine 100 milliGRAM(s) Oral daily    MEDICATIONS  (PRN):  HYDROmorphone  Injectable 1 milliGRAM(s) IV Push every 3 hours PRN Severe Pain (7 - 10)  LORazepam     Tablet 2 milliGRAM(s) Oral every 2 hours PRN CIWA-Ar score increase by 2 points and a total score of 7 or less  LORazepam     Tablet 2 milliGRAM(s) Oral every 1 hour PRN CIWA-Ar score 8 or greater  LORazepam   Injectable 2 milliGRAM(s) IV Push every 1 hour PRN CIWA-Ar score 8 or greater  LORazepam   Injectable 2 milliGRAM(s) IV Push every 2 hours PRN CIWA-Ar score increase by 2 points and a total score of 7 or less  ondansetron Injectable 4 milliGRAM(s) IV Push every 6 hours PRN Nausea and/or Vomiting  oxyCODONE    IR 10 milliGRAM(s) Oral every 4 hours PRN Moderate Pain (4 - 6)                              13.4   7.02  )-----------( 106      ( 30 Jul 2020 07:45 )             41.5     07-30    133<L>  |  97  |  6<L>  ----------------------------<  69<L>  4.4   |  21<L>  |  0.50    Ca    8.8      30 Jul 2020 07:45  Phos  2.2     07-30  Mg     2.0     07-29    TPro  5.9<L>  /  Alb  2.6<L>  /  TBili  0.7  /  DBili  x   /  AST  139<H>  /  ALT  126<H>  /  AlkPhos  54  07-29    CAPILLARY BLOOD GLUCOSE        LIVER FUNCTIONS - ( 29 Jul 2020 08:16 )  Alb: 2.6 g/dL / Pro: 5.9 gm/dL / ALK PHOS: 54 U/L / ALT: 126 U/L / AST: 139 U/L / GGT: x               Assessment and Plan:  60F.  admitted 07/27/20.  presented to ED c/o abdominal pain.    acute pancreatitis / hepatits. Resolving  -due to ETOH  -CT AB/pelvis, moderately severe pancreatitis.  mild biliary ductal dilatation.  -MRCP declined by patient   -LFTs normalizing   -monitor/supplement electrolytes.  -advance diet  -stop IVFs  -pain management.  -Protonix.  -Zofran.  -GI follow up from Dr Kingston appreciated    acute upon chronic pain.  -Continue dilaudid and oxycodone  -c/w fentalny patch.    Hypokalemia and hypophosphatemia due to ETH-  Replacing, follow    Left Soleal vein thrombosis-patient aware  Follow up doppler  Hold AC for now    EtOH abuse.  -Ativan CIWA protocol.  -stop standing ativan today  -MVI, thiamine and folic acid.  -GI prophylaxis w/ PPI.    elevated BP.  -suspect secondary to pain and anxiety.  -Continue clonidine    leukocytosis. Resolved  -suspect acute phase reactant due to pancreatitis and chronic steroids.    hx mixed connective tissue disease.  -Continue  medrol  -hydroxychloroquine.    hx depression, panic attacks and anxiety.  -Ativan 1mg po bid.    hx adenoCA of lung.  -s/p RML wedge resection (2018).  -former tobacco.  -no active issues.    DVT prophylaxis.  -LMWH.    Dispo:  -d/c tomorrow if remains medically stable
Patient is a 60y old  Female who presents with a chief complaint of     Subective:  Feels a little better, less pain.    PAST MEDICAL & SURGICAL HISTORY:  Adenocarcinoma of lung  Mixed connective tissue disease  Depression H/O panic attack: with anxiety  S/P Laminectomy  Lumbar 3/10  Chronic pain  Diverticulitis of colon (without mention of hemorrhage)  History of oophorectomy, unilateral: bilateral  History of hip replacement, total, right: 6/7/2020  S/P lumbar laminectomy  S/P cholecystectomy  History of lung cancer: s/p wedge resection of RML      MEDICATIONS  (STANDING):  cloNIDine 0.1 milliGRAM(s) Oral every 8 hours  enalaprilat Injectable 1.25 milliGRAM(s) IV Push every 6 hours  enoxaparin Injectable 40 milliGRAM(s) SubCutaneous daily  fentaNYL   Patch  25 MICROgram(s)/Hr 1 Patch Transdermal every 72 hours  folic acid 1 milliGRAM(s) Oral daily  hydroxychloroquine 200 milliGRAM(s) Oral <User Schedule>  LORazepam     Tablet 1 milliGRAM(s) Oral two times a day  multivitamin 1 Tablet(s) Oral daily  pancrelipase  (CREON 12,000 Lipase Units) 6 Capsule(s) Oral three times a day with meals  pantoprazole  Injectable 40 milliGRAM(s) IV Push daily  predniSONE   Tablet 4 milliGRAM(s) Oral daily  sodium chloride 0.9%. 1000 milliLiter(s) (100 mL/Hr) IV Continuous <Continuous>  thiamine 100 milliGRAM(s) Oral daily    MEDICATIONS  (PRN):  HYDROmorphone  Injectable 1 milliGRAM(s) IV Push every 3 hours PRN Severe Pain (7 - 10)  LORazepam     Tablet 2 milliGRAM(s) Oral every 2 hours PRN CIWA-Ar score increase by 2 points and a total score of 7 or less  LORazepam     Tablet 2 milliGRAM(s) Oral every 1 hour PRN CIWA-Ar score 8 or greater  LORazepam   Injectable 2 milliGRAM(s) IV Push every 1 hour PRN CIWA-Ar score 8 or greater  LORazepam   Injectable 2 milliGRAM(s) IV Push every 2 hours PRN CIWA-Ar score increase by 2 points and a total score of 7 or less  ondansetron Injectable 4 milliGRAM(s) IV Push every 6 hours PRN Nausea and/or Vomiting  oxyCODONE    IR 10 milliGRAM(s) Oral every 4 hours PRN Moderate Pain (4 - 6)      REVIEW OF SYSTEMS:    RESPIRATORY: No shortness of breath  CARDIOVASCULAR: No chest pain  All other review of systems is negative unless indicated above.    Vital Signs Last 24 Hrs  T(C): 36.9 (29 Jul 2020 06:02), Max: 37.3 (28 Jul 2020 15:04)  T(F): 98.4 (29 Jul 2020 06:02), Max: 99.1 (28 Jul 2020 15:04)  HR: 90 (29 Jul 2020 06:02) (81 - 101)  BP: 182/84 (29 Jul 2020 06:02) (163/84 - 182/84)  BP(mean): --  RR: 18 (29 Jul 2020 06:02) (17 - 18)  SpO2: 99% (29 Jul 2020 06:02) (95% - 99%)    PHYSICAL EXAM:    Constitutional: NAD, well-developed  Respiratory: CTAB  Cardiovascular: S1 and S2, RRR  Gastrointestinal: BS+, soft, moderate epigastric tenderness  Extremities: No peripheral edema  Psychiatric: Normal mood, normal affect    LABS:                        15.1   13.55 )-----------( 140      ( 28 Jul 2020 10:46 )             44.4     07-28    136  |  105  |  7   ----------------------------<  122<H>  3.6   |  21<L>  |  0.56    Ca    7.4<L>      28 Jul 2020 10:46  Mg     1.9     07-28    TPro  5.9<L>  /  Alb  2.7<L>  /  TBili  0.7  /  DBili  0.3<H>  /  AST  273<H>  /  ALT  187<H>  /  AlkPhos  60  07-28      LIVER FUNCTIONS - ( 28 Jul 2020 10:46 )  Alb: 2.7 g/dL / Pro: 5.9 gm/dL / ALK PHOS: 60 U/L / ALT: 187 U/L / AST: 273 U/L / GGT: x             RADIOLOGY & ADDITIONAL STUDIES:
Patient is a 60y old  Female who presents with a chief complaint of     Subective:  Feels much better. No diarrhea.  refused MRI    PAST MEDICAL & SURGICAL HISTORY:  Adenocarcinoma of lung  Mixed connective tissue disease  Depression H/O panic attack: with anxiety  S/P Laminectomy  Lumbar 3/10  Chronic pain  Diverticulitis of colon (without mention of hemorrhage)  History of oophorectomy, unilateral: bilateral  History of hip replacement, total, right: 6/7/2020  S/P lumbar laminectomy  S/P cholecystectomy  History of lung cancer: s/p wedge resection of RML      MEDICATIONS  (STANDING):  cloNIDine 0.2 milliGRAM(s) Oral three times a day  enoxaparin Injectable 40 milliGRAM(s) SubCutaneous daily  fentaNYL   Patch  25 MICROgram(s)/Hr 1 Patch Transdermal every 72 hours  folic acid 1 milliGRAM(s) Oral daily  gabapentin 600 milliGRAM(s) Oral two times a day  hydroxychloroquine 200 milliGRAM(s) Oral <User Schedule>  LORazepam     Tablet 1 milliGRAM(s) Oral two times a day  methylPREDNISolone 4 milliGRAM(s) Oral daily  multivitamin 1 Tablet(s) Oral daily  pancrelipase  (CREON 12,000 Lipase Units) 6 Capsule(s) Oral three times a day with meals  pantoprazole    Tablet 40 milliGRAM(s) Oral daily  potassium phosphate / sodium phosphate Tablet (K-PHOS No. 2) 1 Tablet(s) Oral three times a day with meals  sodium chloride 0.9%. 1000 milliLiter(s) (100 mL/Hr) IV Continuous <Continuous>  thiamine 100 milliGRAM(s) Oral daily    MEDICATIONS  (PRN):  HYDROmorphone  Injectable 1 milliGRAM(s) IV Push every 3 hours PRN Severe Pain (7 - 10)  LORazepam     Tablet 2 milliGRAM(s) Oral every 2 hours PRN CIWA-Ar score increase by 2 points and a total score of 7 or less  LORazepam     Tablet 2 milliGRAM(s) Oral every 1 hour PRN CIWA-Ar score 8 or greater  LORazepam   Injectable 2 milliGRAM(s) IV Push every 1 hour PRN CIWA-Ar score 8 or greater  LORazepam   Injectable 2 milliGRAM(s) IV Push every 2 hours PRN CIWA-Ar score increase by 2 points and a total score of 7 or less  ondansetron Injectable 4 milliGRAM(s) IV Push every 6 hours PRN Nausea and/or Vomiting  oxyCODONE    IR 10 milliGRAM(s) Oral every 4 hours PRN Moderate Pain (4 - 6)      REVIEW OF SYSTEMS:    RESPIRATORY: No shortness of breath  CARDIOVASCULAR: No chest pain  All other review of systems is negative unless indicated above.    Vital Signs Last 24 Hrs  T(C): 37.2 (30 Jul 2020 06:52), Max: 37.7 (29 Jul 2020 14:38)  T(F): 99 (30 Jul 2020 06:52), Max: 99.8 (29 Jul 2020 14:38)  HR: 73 (30 Jul 2020 06:52) (73 - 90)  BP: 173/83 (30 Jul 2020 06:52) (144/67 - 181/94)  BP(mean): --  RR: 18 (30 Jul 2020 06:52) (17 - 18)  SpO2: 100% (30 Jul 2020 06:52) (96% - 100%)    PHYSICAL EXAM:    Constitutional: NAD, well-developed  Respiratory: CTAB  Cardiovascular: S1 and S2, RRR  Gastrointestinal: BS+, soft, NT/ND  Psychiatric: Normal mood, normal affect    LABS:                        13.6   9.65  )-----------( 107      ( 29 Jul 2020 08:16 )             40.9     07-29    134<L>  |  100  |  3<L>  ----------------------------<  80  3.4<L>   |  22  |  0.37<L>    Ca    7.7<L>      29 Jul 2020 08:16  Phos  1.7     07-29  Mg     2.0     07-29    TPro  5.9<L>  /  Alb  2.6<L>  /  TBili  0.7  /  DBili  x   /  AST  139<H>  /  ALT  126<H>  /  AlkPhos  54  07-29      LIVER FUNCTIONS - ( 29 Jul 2020 08:16 )  Alb: 2.6 g/dL / Pro: 5.9 gm/dL / ALK PHOS: 54 U/L / ALT: 126 U/L / AST: 139 U/L / GGT: x             RADIOLOGY & ADDITIONAL STUDIES:
Patient is a 60y old  Female who presents with a chief complaint of Epigastric pain. (30 Jul 2020 14:39)      Subective:  Eating well  However, notes diarrhea recurred when she started eating.    PAST MEDICAL & SURGICAL HISTORY:  Adenocarcinoma of lung  Mixed connective tissue disease  Depression H/O panic attack: with anxiety  S/P Laminectomy  Lumbar 3/10  Chronic pain  Diverticulitis of colon (without mention of hemorrhage)  History of oophorectomy, unilateral: bilateral  History of hip replacement, total, right: 6/7/2020  S/P lumbar laminectomy  S/P cholecystectomy  History of lung cancer: s/p wedge resection of RML      MEDICATIONS  (STANDING):  cloNIDine 0.2 milliGRAM(s) Oral three times a day  enoxaparin Injectable 40 milliGRAM(s) SubCutaneous daily  fentaNYL   Patch  25 MICROgram(s)/Hr 1 Patch Transdermal every 72 hours  folic acid 1 milliGRAM(s) Oral daily  gabapentin 600 milliGRAM(s) Oral two times a day  hydroxychloroquine 200 milliGRAM(s) Oral <User Schedule>  methylPREDNISolone 4 milliGRAM(s) Oral daily  multivitamin 1 Tablet(s) Oral daily  nicotine - 21 mG/24Hr(s) Patch 1 patch Transdermal daily  pancrelipase  (CREON 12,000 Lipase Units) 6 Capsule(s) Oral three times a day with meals  pantoprazole    Tablet 40 milliGRAM(s) Oral daily  potassium phosphate / sodium phosphate Tablet (K-PHOS No. 2) 1 Tablet(s) Oral three times a day with meals  thiamine 100 milliGRAM(s) Oral daily    MEDICATIONS  (PRN):  HYDROmorphone  Injectable 1 milliGRAM(s) IV Push every 3 hours PRN Severe Pain (7 - 10)  LORazepam     Tablet 2 milliGRAM(s) Oral every 2 hours PRN CIWA-Ar score increase by 2 points and a total score of 7 or less  LORazepam     Tablet 2 milliGRAM(s) Oral every 1 hour PRN CIWA-Ar score 8 or greater  LORazepam   Injectable 2 milliGRAM(s) IV Push every 1 hour PRN CIWA-Ar score 8 or greater  LORazepam   Injectable 2 milliGRAM(s) IV Push every 2 hours PRN CIWA-Ar score increase by 2 points and a total score of 7 or less  ondansetron Injectable 4 milliGRAM(s) IV Push every 6 hours PRN Nausea and/or Vomiting  oxyCODONE    IR 10 milliGRAM(s) Oral every 4 hours PRN Moderate Pain (4 - 6)      REVIEW OF SYSTEMS:    RESPIRATORY: No shortness of breath  CARDIOVASCULAR: No chest pain  All other review of systems is negative unless indicated above.    Vital Signs Last 24 Hrs  T(C): 36.8 (30 Jul 2020 21:00), Max: 37.1 (30 Jul 2020 18:52)  T(F): 98.3 (30 Jul 2020 21:00), Max: 98.7 (30 Jul 2020 18:52)  HR: 68 (30 Jul 2020 21:00) (58 - 75)  BP: 99/70 (31 Jul 2020 05:17) (93/58 - 119/75)  BP(mean): --  RR: 18 (30 Jul 2020 21:00) (17 - 18)  SpO2: 99% (30 Jul 2020 21:00) (96% - 99%)    PHYSICAL EXAM:    Constitutional: NAD, well-developed  Respiratory: CTAB  Cardiovascular: S1 and S2, RRR  Gastrointestinal: BS+, soft, NT/ND  Extremities: No peripheral edema  Psychiatric: Normal mood, normal affect    LABS:                        13.4   7.02  )-----------( 106      ( 30 Jul 2020 07:45 )             41.5     07-30    133<L>  |  97  |  6<L>  ----------------------------<  69<L>  4.4   |  21<L>  |  0.50    Ca    8.8      30 Jul 2020 07:45  Phos  2.2     07-30  Mg     2.0     07-29    TPro  5.9<L>  /  Alb  2.6<L>  /  TBili  0.7  /  DBili  x   /  AST  139<H>  /  ALT  126<H>  /  AlkPhos  54  07-29      LIVER FUNCTIONS - ( 29 Jul 2020 08:16 )  Alb: 2.6 g/dL / Pro: 5.9 gm/dL / ALK PHOS: 54 U/L / ALT: 126 U/L / AST: 139 U/L / GGT: x             RADIOLOGY & ADDITIONAL STUDIES:

## 2020-07-31 NOTE — DISCHARGE NOTE NURSING/CASE MANAGEMENT/SOCIAL WORK - NSDCPEWEB_GEN_ALL_CORE
[facility] for Tobacco Control website --- [email or web address] website --- [email or web address]

## 2020-07-31 NOTE — DISCHARGE NOTE PROVIDER - NSDCMRMEDTOKEN_GEN_ALL_CORE_FT
apixaban 5 mg oral tablet: 2 tab(s) orally 2 times a day x 7 days then   5mg BID x 3 months  cloNIDine 0.1 mg oral tablet: 1 tab(s) orally 2 times a day  fentaNYL 25 mcg/hr transdermal film, extended release: 1 film(s) transdermal every 72 hours  gabapentin 300 mg oral capsule: 2 cap(s) orally 2 times a day  lidocaine 0.5% topical gel: Apply topically to affected area 2 times a day, As Needed -for severe pain   loperamide 2 mg oral capsule: 1 cap(s) orally every 6 hours, As Needed -for diarrhea   Multiple Vitamins oral tablet: 1 tab(s) orally once a day  oxyCODONE: 15 milligram(s) orally every 4 hours, As Needed  pancrelipase 12,000 units-38,000 units-60,000 units oral delayed release capsule: 1 cap(s) orally 3 times a day  pantoprazole 40 mg oral delayed release tablet: 1 tab(s) orally once a day  Plaquenil 200 mg oral tablet: 1 tab(s) orally every other day  predniSONE: 4 milligram(s) orally once a day  Restasis 0.05% ophthalmic emulsion: 1 drop(s) to each affected eye every 12 hours  Xanax 0.25 mg oral tablet: 1 tab(s) orally 2 times a day, As Needed  Zofran 4 mg oral tablet: 1 tab(s) orally every 8 hours

## 2020-07-31 NOTE — DISCHARGE NOTE PROVIDER - NSDCCPCAREPLAN_GEN_ALL_CORE_FT
PRINCIPAL DISCHARGE DIAGNOSIS  Diagnosis: Acute pancreatitis, unspecified complication status, unspecified pancreatitis type  Assessment and Plan of Treatment: Resolved.    Avoid alcohol use.  follow up with pcp recommendation in discharge 1 week for repeat blood work.  Follow up with gastroenterologist for chronic diarrhea.      SECONDARY DISCHARGE DIAGNOSES  Diagnosis: DVT, lower extremity  Assessment and Plan of Treatment: Left lower extremity dvt - take eliquis x 3 months.  You need blood work monitoring with pcp to make sure your platelets remain stable.    Diagnosis: Hypertension  Assessment and Plan of Treatment: You need close BP monitoring while on clonidine.  Follow up with PCP 1 week.

## 2020-08-04 DIAGNOSIS — K86.1 OTHER CHRONIC PANCREATITIS: ICD-10-CM

## 2020-08-04 DIAGNOSIS — I10 ESSENTIAL (PRIMARY) HYPERTENSION: ICD-10-CM

## 2020-08-04 DIAGNOSIS — F10.10 ALCOHOL ABUSE, UNCOMPLICATED: ICD-10-CM

## 2020-08-04 DIAGNOSIS — F41.0 PANIC DISORDER [EPISODIC PAROXYSMAL ANXIETY]: ICD-10-CM

## 2020-08-04 DIAGNOSIS — M35.8 OTHER SPECIFIED SYSTEMIC INVOLVEMENT OF CONNECTIVE TISSUE: ICD-10-CM

## 2020-08-04 DIAGNOSIS — E87.6 HYPOKALEMIA: ICD-10-CM

## 2020-08-04 DIAGNOSIS — G89.29 OTHER CHRONIC PAIN: ICD-10-CM

## 2020-08-04 DIAGNOSIS — K70.10 ALCOHOLIC HEPATITIS WITHOUT ASCITES: ICD-10-CM

## 2020-08-04 DIAGNOSIS — K85.20 ALCOHOL INDUCED ACUTE PANCREATITIS WITHOUT NECROSIS OR INFECTION: ICD-10-CM

## 2020-08-04 DIAGNOSIS — Z85.118 PERSONAL HISTORY OF OTHER MALIGNANT NEOPLASM OF BRONCHUS AND LUNG: ICD-10-CM

## 2020-08-04 DIAGNOSIS — Z87.891 PERSONAL HISTORY OF NICOTINE DEPENDENCE: ICD-10-CM

## 2020-08-04 DIAGNOSIS — I82.462 ACUTE EMBOLISM AND THROMBOSIS OF LEFT CALF MUSCULAR VEIN: ICD-10-CM

## 2020-08-04 DIAGNOSIS — Z88.0 ALLERGY STATUS TO PENICILLIN: ICD-10-CM

## 2020-08-04 DIAGNOSIS — E83.39 OTHER DISORDERS OF PHOSPHORUS METABOLISM: ICD-10-CM

## 2020-08-04 DIAGNOSIS — R19.7 DIARRHEA, UNSPECIFIED: ICD-10-CM

## 2020-08-04 DIAGNOSIS — F32.9 MAJOR DEPRESSIVE DISORDER, SINGLE EPISODE, UNSPECIFIED: ICD-10-CM

## 2020-08-05 LAB — CALPROTECTIN STL-MCNT: 226 UG/G — HIGH (ref 0–120)

## 2020-08-06 LAB
ELASTASE PANC STL-MCNT: 65 — LOW
FAT STL QN: NORMAL — SIGNIFICANT CHANGE UP
FAT STL QN: NORMAL — SIGNIFICANT CHANGE UP

## 2020-08-11 ENCOUNTER — INPATIENT (INPATIENT)
Facility: HOSPITAL | Age: 60
LOS: 4 days | Discharge: HOME | DRG: 602 | End: 2020-08-16
Attending: INTERNAL MEDICINE | Admitting: INTERNAL MEDICINE
Payer: MEDICARE

## 2020-08-11 ENCOUNTER — EMERGENCY (EMERGENCY)
Facility: HOSPITAL | Age: 60
LOS: 1 days | Discharge: ANOTHER HOSPITAL | End: 2020-08-11
Attending: EMERGENCY MEDICINE | Admitting: EMERGENCY MEDICINE
Payer: MEDICARE

## 2020-08-11 VITALS
RESPIRATION RATE: 18 BRPM | SYSTOLIC BLOOD PRESSURE: 171 MMHG | TEMPERATURE: 98 F | WEIGHT: 110.01 LBS | DIASTOLIC BLOOD PRESSURE: 83 MMHG | HEIGHT: 62 IN | HEART RATE: 78 BPM | OXYGEN SATURATION: 97 %

## 2020-08-11 VITALS
RESPIRATION RATE: 16 BRPM | HEART RATE: 74 BPM | DIASTOLIC BLOOD PRESSURE: 77 MMHG | OXYGEN SATURATION: 98 % | TEMPERATURE: 98 F | SYSTOLIC BLOOD PRESSURE: 144 MMHG

## 2020-08-11 VITALS
SYSTOLIC BLOOD PRESSURE: 154 MMHG | HEART RATE: 85 BPM | RESPIRATION RATE: 18 BRPM | TEMPERATURE: 98 F | OXYGEN SATURATION: 98 % | DIASTOLIC BLOOD PRESSURE: 85 MMHG

## 2020-08-11 DIAGNOSIS — Z98.890 OTHER SPECIFIED POSTPROCEDURAL STATES: Chronic | ICD-10-CM

## 2020-08-11 DIAGNOSIS — Z90.49 ACQUIRED ABSENCE OF OTHER SPECIFIED PARTS OF DIGESTIVE TRACT: Chronic | ICD-10-CM

## 2020-08-11 DIAGNOSIS — Z90.721 ACQUIRED ABSENCE OF OVARIES, UNILATERAL: Chronic | ICD-10-CM

## 2020-08-11 DIAGNOSIS — F10.20 ALCOHOL DEPENDENCE, UNCOMPLICATED: ICD-10-CM

## 2020-08-11 DIAGNOSIS — E87.1 HYPO-OSMOLALITY AND HYPONATREMIA: ICD-10-CM

## 2020-08-11 DIAGNOSIS — Z96.641 PRESENCE OF RIGHT ARTIFICIAL HIP JOINT: Chronic | ICD-10-CM

## 2020-08-11 DIAGNOSIS — G89.4 CHRONIC PAIN SYNDROME: ICD-10-CM

## 2020-08-11 DIAGNOSIS — M35.1 OTHER OVERLAP SYNDROMES: ICD-10-CM

## 2020-08-11 DIAGNOSIS — I82.90 ACUTE EMBOLISM AND THROMBOSIS OF UNSPECIFIED VEIN: ICD-10-CM

## 2020-08-11 DIAGNOSIS — Z85.118 PERSONAL HISTORY OF OTHER MALIGNANT NEOPLASM OF BRONCHUS AND LUNG: Chronic | ICD-10-CM

## 2020-08-11 DIAGNOSIS — L03.90 CELLULITIS, UNSPECIFIED: ICD-10-CM

## 2020-08-11 LAB
ALBUMIN SERPL ELPH-MCNC: 3.8 G/DL — SIGNIFICANT CHANGE UP (ref 3.3–5)
ALP SERPL-CCNC: 41 U/L — SIGNIFICANT CHANGE UP (ref 30–120)
ALT FLD-CCNC: 41 U/L DA — SIGNIFICANT CHANGE UP (ref 10–60)
AMPHET UR-MCNC: NEGATIVE — SIGNIFICANT CHANGE UP
ANION GAP SERPL CALC-SCNC: 10 MMOL/L — SIGNIFICANT CHANGE UP (ref 5–17)
ANION GAP SERPL CALC-SCNC: 11 MMOL/L — SIGNIFICANT CHANGE UP (ref 5–17)
APPEARANCE UR: CLEAR — SIGNIFICANT CHANGE UP
APTT BLD: 33.1 SEC — SIGNIFICANT CHANGE UP (ref 27.5–35.5)
AST SERPL-CCNC: 36 U/L — SIGNIFICANT CHANGE UP (ref 10–40)
BACTERIA # UR AUTO: ABNORMAL
BARBITURATES UR SCN-MCNC: NEGATIVE — SIGNIFICANT CHANGE UP
BASOPHILS # BLD AUTO: 0.02 K/UL — SIGNIFICANT CHANGE UP (ref 0–0.2)
BASOPHILS NFR BLD AUTO: 0.3 % — SIGNIFICANT CHANGE UP (ref 0–2)
BENZODIAZ UR-MCNC: NEGATIVE — SIGNIFICANT CHANGE UP
BILIRUB SERPL-MCNC: 0.4 MG/DL — SIGNIFICANT CHANGE UP (ref 0.2–1.2)
BILIRUB UR-MCNC: NEGATIVE — SIGNIFICANT CHANGE UP
BUN SERPL-MCNC: 3 MG/DL — LOW (ref 7–23)
BUN SERPL-MCNC: 4 MG/DL — LOW (ref 7–23)
CALCIUM SERPL-MCNC: 9 MG/DL — SIGNIFICANT CHANGE UP (ref 8.4–10.5)
CALCIUM SERPL-MCNC: 9.4 MG/DL — SIGNIFICANT CHANGE UP (ref 8.5–10.1)
CHLORIDE SERPL-SCNC: 85 MMOL/L — LOW (ref 96–108)
CHLORIDE SERPL-SCNC: 94 MMOL/L — LOW (ref 96–108)
CO2 SERPL-SCNC: 23 MMOL/L — SIGNIFICANT CHANGE UP (ref 22–31)
CO2 SERPL-SCNC: 26 MMOL/L — SIGNIFICANT CHANGE UP (ref 22–31)
COCAINE METAB.OTHER UR-MCNC: NEGATIVE — SIGNIFICANT CHANGE UP
COLOR SPEC: YELLOW — SIGNIFICANT CHANGE UP
CREAT SERPL-MCNC: 0.4 MG/DL — LOW (ref 0.5–1.3)
CREAT SERPL-MCNC: 0.78 MG/DL — SIGNIFICANT CHANGE UP (ref 0.5–1.3)
DIFF PNL FLD: NEGATIVE — SIGNIFICANT CHANGE UP
EOSINOPHIL # BLD AUTO: 0.04 K/UL — SIGNIFICANT CHANGE UP (ref 0–0.5)
EOSINOPHIL NFR BLD AUTO: 0.7 % — SIGNIFICANT CHANGE UP (ref 0–6)
EPI CELLS # UR: SIGNIFICANT CHANGE UP
ETHANOL SERPL-MCNC: <3 MG/DL — SIGNIFICANT CHANGE UP (ref 0–3)
GLUCOSE SERPL-MCNC: 101 MG/DL — HIGH (ref 70–99)
GLUCOSE SERPL-MCNC: 104 MG/DL — HIGH (ref 70–99)
GLUCOSE UR QL: NEGATIVE MG/DL — SIGNIFICANT CHANGE UP
HCT VFR BLD CALC: 37 % — SIGNIFICANT CHANGE UP (ref 34.5–45)
HGB BLD-MCNC: 12.9 G/DL — SIGNIFICANT CHANGE UP (ref 11.5–15.5)
IMM GRANULOCYTES NFR BLD AUTO: 0.5 % — SIGNIFICANT CHANGE UP (ref 0–1.5)
INR BLD: 1.04 RATIO — SIGNIFICANT CHANGE UP (ref 0.88–1.16)
KETONES UR-MCNC: ABNORMAL
LACTATE SERPL-SCNC: 1.2 MMOL/L — SIGNIFICANT CHANGE UP (ref 0.7–2)
LEUKOCYTE ESTERASE UR-ACNC: ABNORMAL
LYMPHOCYTES # BLD AUTO: 1.06 K/UL — SIGNIFICANT CHANGE UP (ref 1–3.3)
LYMPHOCYTES # BLD AUTO: 18.1 % — SIGNIFICANT CHANGE UP (ref 13–44)
MCHC RBC-ENTMCNC: 34.5 PG — HIGH (ref 27–34)
MCHC RBC-ENTMCNC: 34.9 GM/DL — SIGNIFICANT CHANGE UP (ref 32–36)
MCV RBC AUTO: 98.9 FL — SIGNIFICANT CHANGE UP (ref 80–100)
METHADONE UR-MCNC: NEGATIVE — SIGNIFICANT CHANGE UP
MONOCYTES # BLD AUTO: 0.61 K/UL — SIGNIFICANT CHANGE UP (ref 0–0.9)
MONOCYTES NFR BLD AUTO: 10.4 % — SIGNIFICANT CHANGE UP (ref 2–14)
NEUTROPHILS # BLD AUTO: 4.09 K/UL — SIGNIFICANT CHANGE UP (ref 1.8–7.4)
NEUTROPHILS NFR BLD AUTO: 70 % — SIGNIFICANT CHANGE UP (ref 43–77)
NITRITE UR-MCNC: NEGATIVE — SIGNIFICANT CHANGE UP
NRBC # BLD: 0 /100 WBCS — SIGNIFICANT CHANGE UP (ref 0–0)
NT-PROBNP SERPL-SCNC: 428 PG/ML — HIGH (ref 0–125)
OPIATES UR-MCNC: NEGATIVE — SIGNIFICANT CHANGE UP
PCP SPEC-MCNC: SIGNIFICANT CHANGE UP
PCP UR-MCNC: NEGATIVE — SIGNIFICANT CHANGE UP
PH UR: 6.5 — SIGNIFICANT CHANGE UP (ref 5–8)
PLATELET # BLD AUTO: 361 K/UL — SIGNIFICANT CHANGE UP (ref 150–400)
POTASSIUM SERPL-MCNC: 3.6 MMOL/L — SIGNIFICANT CHANGE UP (ref 3.5–5.3)
POTASSIUM SERPL-MCNC: 3.9 MMOL/L — SIGNIFICANT CHANGE UP (ref 3.5–5.3)
POTASSIUM SERPL-SCNC: 3.6 MMOL/L — SIGNIFICANT CHANGE UP (ref 3.5–5.3)
POTASSIUM SERPL-SCNC: 3.9 MMOL/L — SIGNIFICANT CHANGE UP (ref 3.5–5.3)
PROT SERPL-MCNC: 7.2 G/DL — SIGNIFICANT CHANGE UP (ref 6–8.3)
PROT UR-MCNC: NEGATIVE MG/DL — SIGNIFICANT CHANGE UP
PROTHROM AB SERPL-ACNC: 12.6 SEC — SIGNIFICANT CHANGE UP (ref 10.6–13.6)
RBC # BLD: 3.74 M/UL — LOW (ref 3.8–5.2)
RBC # FLD: 12.2 % — SIGNIFICANT CHANGE UP (ref 10.3–14.5)
RBC CASTS # UR COMP ASSIST: SIGNIFICANT CHANGE UP /HPF (ref 0–4)
SARS-COV-2 RNA SPEC QL NAA+PROBE: SIGNIFICANT CHANGE UP
SODIUM SERPL-SCNC: 121 MMOL/L — LOW (ref 135–145)
SODIUM SERPL-SCNC: 128 MMOL/L — LOW (ref 135–145)
SP GR SPEC: 1.01 — SIGNIFICANT CHANGE UP (ref 1.01–1.02)
THC UR QL: NEGATIVE — SIGNIFICANT CHANGE UP
UROBILINOGEN FLD QL: NEGATIVE MG/DL — SIGNIFICANT CHANGE UP
WBC # BLD: 5.85 K/UL — SIGNIFICANT CHANGE UP (ref 3.8–10.5)
WBC # FLD AUTO: 5.85 K/UL — SIGNIFICANT CHANGE UP (ref 3.8–10.5)
WBC UR QL: SIGNIFICANT CHANGE UP

## 2020-08-11 PROCEDURE — 87086 URINE CULTURE/COLONY COUNT: CPT

## 2020-08-11 PROCEDURE — 93971 EXTREMITY STUDY: CPT | Mod: 26,RT

## 2020-08-11 PROCEDURE — 93010 ELECTROCARDIOGRAM REPORT: CPT

## 2020-08-11 PROCEDURE — 80307 DRUG TEST PRSMV CHEM ANLYZR: CPT

## 2020-08-11 PROCEDURE — 93971 EXTREMITY STUDY: CPT

## 2020-08-11 PROCEDURE — 99285 EMERGENCY DEPT VISIT HI MDM: CPT | Mod: 25

## 2020-08-11 PROCEDURE — 36415 COLL VENOUS BLD VENIPUNCTURE: CPT

## 2020-08-11 PROCEDURE — 85610 PROTHROMBIN TIME: CPT

## 2020-08-11 PROCEDURE — 99285 EMERGENCY DEPT VISIT HI MDM: CPT

## 2020-08-11 PROCEDURE — 83605 ASSAY OF LACTIC ACID: CPT

## 2020-08-11 PROCEDURE — 93005 ELECTROCARDIOGRAM TRACING: CPT

## 2020-08-11 PROCEDURE — 96361 HYDRATE IV INFUSION ADD-ON: CPT

## 2020-08-11 PROCEDURE — 80053 COMPREHEN METABOLIC PANEL: CPT

## 2020-08-11 PROCEDURE — 85027 COMPLETE CBC AUTOMATED: CPT

## 2020-08-11 PROCEDURE — 87040 BLOOD CULTURE FOR BACTERIA: CPT

## 2020-08-11 PROCEDURE — 96360 HYDRATION IV INFUSION INIT: CPT

## 2020-08-11 PROCEDURE — 81001 URINALYSIS AUTO W/SCOPE: CPT

## 2020-08-11 PROCEDURE — 85730 THROMBOPLASTIN TIME PARTIAL: CPT

## 2020-08-11 PROCEDURE — 99283 EMERGENCY DEPT VISIT LOW MDM: CPT

## 2020-08-11 RX ORDER — ONDANSETRON 8 MG/1
4 TABLET, FILM COATED ORAL EVERY 8 HOURS
Refills: 0 | Status: DISCONTINUED | OUTPATIENT
Start: 2020-08-11 | End: 2020-08-16

## 2020-08-11 RX ORDER — APIXABAN 2.5 MG/1
5 TABLET, FILM COATED ORAL
Refills: 0 | Status: DISCONTINUED | OUTPATIENT
Start: 2020-08-11 | End: 2020-08-11

## 2020-08-11 RX ORDER — VANCOMYCIN HCL 1 G
1000 VIAL (EA) INTRAVENOUS ONCE
Refills: 0 | Status: COMPLETED | OUTPATIENT
Start: 2020-08-11 | End: 2020-08-11

## 2020-08-11 RX ORDER — LIPASE/PROTEASE/AMYLASE 16-48-48K
1 CAPSULE,DELAYED RELEASE (ENTERIC COATED) ORAL
Refills: 0 | Status: DISCONTINUED | OUTPATIENT
Start: 2020-08-11 | End: 2020-08-16

## 2020-08-11 RX ORDER — HYDROXYCHLOROQUINE SULFATE 200 MG
200 TABLET ORAL
Refills: 0 | Status: DISCONTINUED | OUTPATIENT
Start: 2020-08-11 | End: 2020-08-16

## 2020-08-11 RX ORDER — VANCOMYCIN HCL 1 G
125 VIAL (EA) INTRAVENOUS ONCE
Refills: 0 | Status: COMPLETED | OUTPATIENT
Start: 2020-08-11 | End: 2020-08-11

## 2020-08-11 RX ORDER — GABAPENTIN 400 MG/1
600 CAPSULE ORAL
Refills: 0 | Status: DISCONTINUED | OUTPATIENT
Start: 2020-08-11 | End: 2020-08-11

## 2020-08-11 RX ORDER — LOPERAMIDE HCL 2 MG
2 TABLET ORAL EVERY 6 HOURS
Refills: 0 | Status: DISCONTINUED | OUTPATIENT
Start: 2020-08-11 | End: 2020-08-16

## 2020-08-11 RX ORDER — OXYCODONE HYDROCHLORIDE 5 MG/1
15 TABLET ORAL
Qty: 0 | Refills: 0 | DISCHARGE

## 2020-08-11 RX ORDER — SODIUM CHLORIDE 9 MG/ML
1550 INJECTION INTRAMUSCULAR; INTRAVENOUS; SUBCUTANEOUS ONCE
Refills: 0 | Status: COMPLETED | OUTPATIENT
Start: 2020-08-11 | End: 2020-08-11

## 2020-08-11 RX ORDER — PANTOPRAZOLE SODIUM 20 MG/1
40 TABLET, DELAYED RELEASE ORAL
Refills: 0 | Status: DISCONTINUED | OUTPATIENT
Start: 2020-08-11 | End: 2020-08-16

## 2020-08-11 RX ORDER — VANCOMYCIN HCL 1 G
250 VIAL (EA) INTRAVENOUS ONCE
Refills: 0 | Status: DISCONTINUED | OUTPATIENT
Start: 2020-08-11 | End: 2020-08-11

## 2020-08-11 RX ORDER — GABAPENTIN 400 MG/1
600 CAPSULE ORAL
Refills: 0 | Status: DISCONTINUED | OUTPATIENT
Start: 2020-08-11 | End: 2020-08-16

## 2020-08-11 RX ORDER — OXYCODONE HYDROCHLORIDE 5 MG/1
10 TABLET ORAL EVERY 6 HOURS
Refills: 0 | Status: DISCONTINUED | OUTPATIENT
Start: 2020-08-11 | End: 2020-08-16

## 2020-08-11 RX ORDER — SODIUM CHLORIDE 9 MG/ML
1000 INJECTION INTRAMUSCULAR; INTRAVENOUS; SUBCUTANEOUS
Refills: 0 | Status: DISCONTINUED | OUTPATIENT
Start: 2020-08-11 | End: 2020-08-16

## 2020-08-11 RX ORDER — OXYCODONE HYDROCHLORIDE 5 MG/1
15 TABLET ORAL EVERY 4 HOURS
Refills: 0 | Status: DISCONTINUED | OUTPATIENT
Start: 2020-08-11 | End: 2020-08-11

## 2020-08-11 RX ORDER — FENTANYL CITRATE 50 UG/ML
1 INJECTION INTRAVENOUS
Refills: 0 | Status: DISCONTINUED | OUTPATIENT
Start: 2020-08-11 | End: 2020-08-13

## 2020-08-11 RX ORDER — GABAPENTIN 400 MG/1
1200 CAPSULE ORAL
Refills: 0 | Status: DISCONTINUED | OUTPATIENT
Start: 2020-08-11 | End: 2020-08-11

## 2020-08-11 RX ADMIN — OXYCODONE HYDROCHLORIDE 10 MILLIGRAM(S): 5 TABLET ORAL at 21:50

## 2020-08-11 RX ADMIN — OXYCODONE HYDROCHLORIDE 10 MILLIGRAM(S): 5 TABLET ORAL at 20:50

## 2020-08-11 RX ADMIN — SODIUM CHLORIDE 1550 MILLILITER(S): 9 INJECTION INTRAMUSCULAR; INTRAVENOUS; SUBCUTANEOUS at 08:51

## 2020-08-11 RX ADMIN — Medication 0.1 MILLIGRAM(S): at 18:31

## 2020-08-11 RX ADMIN — SODIUM CHLORIDE 1550 MILLILITER(S): 9 INJECTION INTRAMUSCULAR; INTRAVENOUS; SUBCUTANEOUS at 10:57

## 2020-08-11 RX ADMIN — Medication 100 MILLIGRAM(S): at 18:48

## 2020-08-11 RX ADMIN — Medication 1 DROP(S): at 18:36

## 2020-08-11 RX ADMIN — Medication 1 TABLET(S): at 13:08

## 2020-08-11 RX ADMIN — Medication 125 MILLIGRAM(S): at 17:33

## 2020-08-11 RX ADMIN — Medication 1 CAPSULE(S): at 18:37

## 2020-08-11 NOTE — ED ADULT NURSE REASSESSMENT NOTE - NS ED NURSE REASSESS COMMENT FT1
Patient awake in stretcher.  Patient alert and orientated to person, place, and time; breathing unlabored; skin is warm and dry -color is good.  Patient was scheduled for chest x-ray - patient refused x-ray stating that she just got a chest x-ray at [facility].  Patient was scheduled for vanco 1gm but patient refused stating that she is taking vanco for her c-diff but she is not going to take it IV - MD made aware.  Will continue to monitor.

## 2020-08-11 NOTE — CONSULT NOTE ADULT - PROBLEM SELECTOR RECOMMENDATION 9
last drink 2 weeks ago - hx of J CARLOS - old records reviewed  hx of Left Soleal Vein Thrombosis - US doppler reviewed - from prior admission  may need repeat US doppler  PT eval  consider Neuro eval  no evidence of J CARLOS at present -   will check Tox screen  hx of lung ca - hx of Adeno Ca - Lobe resection right side -

## 2020-08-11 NOTE — ED ADULT NURSE NOTE - OBJECTIVE STATEMENT
Patient is a 60 year old female who came to the ED due to ETOH withdrawal - patient states that she drinks a half pint of vodka a day and her last drink was 2 weeks ago, patient also states that she is dependent on opioids and benzodiazepines.  Patient alert and orientated to person, place, and time; breathing unlabored; skin is warm and dry - color is good; no tremors noted; no tongue fasciculations. Patient is a 60 year old female who came to the ED due to ETOH withdrawal - patient states that she drinks a half pint of vodka a day and her last drink was 2 weeks ago, patient also states that she is dependent on opioids and benzodiazepines.  Patient alert and orientated to person, place, and time; breathing unlabored; skin is warm and dry - color is good; no tremors noted; no tongue fasciculations; gait steady; speech clear. Patient is a 60 year old female who came to the ED due to ETOH withdrawal - patient states that she drinks a half pint of vodka a day and her last drink was 2 weeks ago, patient also states that she is dependent on opioids and benzodiazepines; patient also is here for right leg pain and redness.  Patient alert and orientated to person, place, and time; breathing unlabored; skin is warm and dry - color is good; no tremors noted; no tongue fasciculations; gait steady; speech clear; right leg redness noted.

## 2020-08-11 NOTE — SBIRT NOTE ADULT - NSSBIRTBRIEFINTDET_GEN_A_CORE
provided brief intervention regarding alcohol use; patient reported that she is not interested in any referrals to outpatient substance use treatment b/c she feels this hospitalization was the final straw and she will not drink anymore.

## 2020-08-11 NOTE — ED ADULT NURSE REASSESSMENT NOTE - NS ED NURSE REASSESS COMMENT FT1
2338: Duragesic patch 25 mcg noted on pt"s left upper arm, by DENIS Sena RN and ANIVAL Ochoa RN ANM.

## 2020-08-11 NOTE — CONSULT NOTE ADULT - SUBJECTIVE AND OBJECTIVE BOX
Date/Time Patient Seen:  		  Referring MD:   Data Reviewed	       Patient is a 60y old  Female who presents with a chief complaint of     Subjective/HPI  in bed  seen and examined  vs and meds reviewed  labs reviewed  er provider note reviewed  states last drink 2 weeks ago  lives alone  has a pet dog  has a brother - disabled -  not working  denies tob  · Chief Complaint: The patient is a 60y Female complaining of see chief complaint quote.  · HPI Objective Statement: pt with hx alcohol and opiate dependence lung ca s/p resection, chornic pain s/p laminectomy c/o alcohol withdrawal, last drink 2 weeks ago and recurrent cellulitis to rle. pt relates hospitalized twice past 2 weeks for rle cellulitis, had resolved, but erythema warmth tenderness to right lower leg recurred. pt relates had normal rle art and venous dopplers when admitted 2 weeks ago. pt c/o feeling shaky from not drinking past 2 weeks. no fevers, chills, ha, d/n/v, cp, sob, abd pain.  pmd - none currently  · Presenting Symptoms: PAIN, REDNESS  · Negative Findings: no chills, no fever    Hospital Course:  Discharge Date	31-Jul-2020  Admission Date	27-Jul-2020 17:53  Reason for Admission	Abdominal Pain  Hospital Course	  CC: Epigastric pain  · Subjective and Objective:   60F.  hx chronic pain and follows w/ pain management.  admitted 07/27/20.  presented to ED c/o abdominal pain.  onset earlier in the day.  severity 10/10.  located mid epigastric region.  provoked with any movement.  associated w/ nausea.  she admits to binge drink and that her last drink of "4 vodkas" was on 07/26. In ED,  lipase was over 3K and CT AB/pelvis c/w acute pancreatitis.  WBC 17K, but she takes prednisone chronically.  her BP was elevated.  she was given NS 1L and Pepcid.  Pt was treated for acute pancreatitis, presumed due to alcohol/steroid use.  She was given IVFs, supportive care with resolution of pancreatitis.  Pt declined MRCP to further evaluate biliary tree.  Regardless her symptoms improving, she is tolerating diet and LFTs trending down.  Hospital course complicated by elevated BP improved with clonidine (allergic reaction to other bp meds).   Pt is HD stable, afebrile.  She complains of diarrhea for which is chronic and will need outpatient follow up.  Pt had ultrasound of right leg due to edema found to have Left soleal vein thrombosis for which she was started on anticoagulation with eliquis after discussion with pt.    Social History:  lives independently in the community.  has a friend who helps her out at home.  denied tobacco or illegal drugs.    FAMILY HISTORY:  FHx: rheumatoid arthritis: in mother - with RA associated lung fibrosis  Family history of leukemia: in father       PAST MEDICAL & SURGICAL HISTORY:  Adenocarcinoma of lung  Mixed connective tissue disease  Depression H/O panic attack: with anxiety  S/P Laminectomy  Lumbar 3/10  Chronic pain  Diverticulitis of colon (without mention of hemorrhage)  History of oophorectomy, unilateral: bilateral  History of hip replacement, total, right: 6/7/2020  S/P lumbar laminectomy  S/P cholecystectomy  History of lung cancer: s/p wedge resection of RML  No significant past surgical history        Medication list         MEDICATIONS  (STANDING):    MEDICATIONS  (PRN):         Vitals log        ICU Vital Signs Last 24 Hrs  T(C): 36.8 (11 Aug 2020 11:16), Max: 36.8 (11 Aug 2020 11:16)  T(F): 98.2 (11 Aug 2020 11:16), Max: 98.2 (11 Aug 2020 11:16)  HR: 74 (11 Aug 2020 11:16) (74 - 74)  BP: 144/77 (11 Aug 2020 11:16) (144/77 - 144/77)  BP(mean): --  ABP: --  ABP(mean): --  RR: 16 (11 Aug 2020 11:16) (16 - 16)  SpO2: 98% (11 Aug 2020 11:16) (98% - 98%)           Input and Output:  I&O's Detail      Lab Data                  Review of Systems	  ataxic as per pt    Objective     Physical Examination      heart s1s2  lung dec BS  abd soft  head nc  head at  RLE - erythema  verbal    Pertinent Lab findings & Imaging      Zurita:  NO   Adequate UO     I&O's Detail           Discussed with:     Cultures:	        Radiology      EXAM:  US DPLX LWR EXT VEINS COMPL BI                            PROCEDURE DATE:  07/28/2020          INTERPRETATION:  CLINICAL INFORMATION: bilateral lower extremity swelling    COMPARISON: 7/9/2020    TECHNIQUE: Duplex sonography of the BILATERAL LOWER extremity veins with color and spectral Doppler, with and without compression.    FINDINGS:    There is normal compressibility of the bilateral common femoral, femoral and popliteal veins.  Doppler examination shows normal spontaneous and phasic flow.    Left soleal vein thrombosis.    IMPRESSION:    Left soleal vein thrombosis.    No other evidence of deep venous thrombosis in either lower extremity.                  FABIOLA MARSHALL M.D., ATTENDING RADIOLOGIST  This document has been electronically signed. Jul 28 2020  2:35PM              EXAM:  XR CHEST PORTABLE IMMED 1V                            PROCEDURE DATE:  07/27/2020          INTERPRETATION:  History: Epigastric pain    Portable radiograph of the chest is performed. comparison is made to 7/30/2019.    The heart is not enlarged. The trachea is midline. The lungs are clear. Osseous structures are unremarkable.    Impression: No active pulmonary disease.              BIANCA REID M.D.,ATTENDING RADIOLOGIST  This document has been electronically signed. Jul 27 2020  5:05PM                EXAM:  CT ABDOMEN AND PELVIS IC                            PROCEDURE DATE:  07/27/2020          INTERPRETATION:  CLINICAL INFORMATION: Epigastric abdominal pain    COMPARISON: 11.19.19    PROCEDURE:  CT of the Abdomen and Pelvis was performed with intravenous contrast.  Arterial and Portal Venous phases were acquired.  Intravenous contrast: 90 ml Omnipaque 350. 10 ml discarded.  Oral contrast: Water was administered.  Sagittal and coronal reformats were performed.    FINDINGS:  LOWER CHEST: Coronary artery calcifications.    LIVER: Steatosis.  No focal lesion.  BILE DUCTS: Mildly biliary ductal dilatation with the common duct measuring 1.4 cm.  GALLBLADDER: Cholecystectomy.  SPLEEN: Within normal limits.  PANCREAS: Moderate amount of peripancreatic fluid, without discrete drainable collection. No necrosis or ductal dilatation. No discrete mass.  ADRENALS: Within normal limits.  KIDNEYS/URETERS: Within normal limits.    BLADDER: Within normal limits.  REPRODUCTIVE ORGANS: Uterus and adnexa within normal limits.    BOWEL: No bowel obstruction. Appendix normal.  PERITONEUM: Small amount of ascites.  VESSELS: Within normal limits.  RETROPERITONEUM/LYMPH NODES: No lymphadenopathy.  ABDOMINAL WALL: Within normal limits.  BONES: Right hip arthroplasty. Multilevel spinal compression deformities, not significantly changed. No acute abnormality.    IMPRESSION:  Moderately severe acute pancreatitis, without necrosis or fluid collection (CT severity index 4/10).  Mild biliary ductal dilatation. MRCP to further evaluate.                JEROD GENTILE M.D.,ATTENDING RADIOLOGIST  This document has been electronically signed. Jul 27 2020  4:49PM

## 2020-08-11 NOTE — ED ADULT NURSE NOTE - OBJECTIVE STATEMENT
Pt is a transfer from [place] s/p hyponatremia , Pt IV access noted on left a/c , flushes with ease. Pt's right lower leg is slightly reddish, + sensation, + capillary refill < 2 sec, + pulses Pt ambulates with a straight cane.

## 2020-08-11 NOTE — PHARMACOTHERAPY INTERVENTION NOTE - COMMENTS
Patient presents with complaints of diarrhea and positive C diff test - ER MD ordered vanco 250mg PO, s/w Dr Dumont, recommended 125mg PO as literature suggests equal efficacy as higher dose with less side effects; accepted

## 2020-08-11 NOTE — CONSULT NOTE ADULT - SUBJECTIVE AND OBJECTIVE BOX
Nephrology Consultation: MD YUNIER Vasquez, JULIO CESAR  60y    HPI:  60 yr old with PMH of ch pain with Opiate and ETOH dependency and abuse, alcoholic pancreatitis, Mixed connective tissue disorder, left soleal vein thrombosis, adeno ca lung- s/p RML resection 2018, ex smoker,rec leg cellulitis, was admitted in [facility] with alcoholic pancreatitis and HTN and discharged on 7/31, after one day at home pt went to [facility] for cellulitis treated with doxycycline and is improving, and 2 days back went to [facility] and lian told to drink lot of water and now says feeling weak and ataxic, In ED syo Na 120 , and received IV fluids, refused vancomycin as improving and is admitted to [facility] for Hyponatremia, likely  excessive water intake.pt took last drink 2 weeks back and thinks she is withdrawing.    she has a history of hyponatremia previously. she was treated for C. Diff Colitis at [facility] recently with oral vancomycin.  denies any use of NSAIDS  denies any use of Diuretics  she is now found to have a sodium of 121.       PAST MEDICAL & SURGICAL HISTORY:  Adenocarcinoma of lung  Mixed connective tissue disease  Depression H/O panic attack: with anxiety  S/P Laminectomy  Lumbar 3/10  Chronic pain  Diverticulitis of colon (without mention of hemorrhage)  History of oophorectomy, unilateral: bilateral  History of hip replacement, total, right: 6/7/2020  S/P lumbar laminectomy  S/P cholecystectomy  History of lung cancer: s/p wedge resection of RML      Allergies    penicillin (Swelling)    Intolerances        Home Medications:  fentaNYL 25 mcg/hr transdermal film, extended release: 1 film(s) transdermal every 72 hours (09 Jul 2020 18:33)  gabapentin 300 mg oral capsule: 2 cap(s) orally 2 times a day (31 Jul 2020 13:22)  Multiple Vitamins oral tablet: 1 tab(s) orally once a day (09 Jul 2020 18:33)  oxyCODONE: 15 milligram(s) orally every 4 hours, As Needed (09 Jul 2020 18:33)  Plaquenil 200 mg oral tablet: 1 tab(s) orally every other day (09 Jul 2020 18:33)  predniSONE: 4 milligram(s) orally once a day (09 Jul 2020 18:33)  Restasis 0.05% ophthalmic emulsion: 1 drop(s) to each affected eye every 12 hours (09 Jul 2020 18:33)  Xanax 0.25 mg oral tablet: 1 tab(s) orally 2 times a day, As Needed (09 Jul 2020 18:33)        FAMILY HISTORY:  FHx: rheumatoid arthritis: in mother - with RA associated lung fibrosis  Family history of leukemia: in father      SOCIAL HISTORY:    REVIEW OF SYSTEMS:    Constitutional: No fever, weight loss or fatigue  Eyes: No eye pain, visual disturbances, or discharge  ENT:  No difficulty hearing, tinnitus, vertigo; No sinus or throat pain  Neck: No pain or stiffness  Breasts: No pain, masses or nipple discharge  Respiratory: No cough, wheezing, chills or hemoptysis  Cardiovascular: No chest pain, palpitations, shortness of breath, dizziness or leg swelling  Gastrointestinal: No abdominal or epigastric pain. No nausea, vomiting or hematemesis; No diarrhea or constipation. No melena or hematochezia.  Genitourinary: No dysuria, frequency, hematuria or incontinence  Rectal: No pain, hemorrhoids or incontinence  Neurological: No headaches, memory loss, loss of strength, numbness or tremors  Skin: No itching, burning, rashes or lesions   Lymph Nodes: No enlarged glands  Endocrine: No heat or cold intolerance; No hair loss  Musculoskeletal: No joint pain or swelling; No muscle, back or extremity pain  Psychiatric: No depression, anxiety, mood swings or difficulty sleeping  Heme/Lymph: No easy bruising or bleeding gums  Allergy and Immunologic: No hives or eczema    current medications:    apixaban 5 milliGRAM(s) Oral two times a day  artificial tears (preservative free) Ophthalmic Solution 1 Drop(s) Both EYES two times a day  chlordiazePOXIDE 5 milliGRAM(s) Oral every 6 hours PRN  cloNIDine 0.1 milliGRAM(s) Oral two times a day  fentaNYL   Patch  25 MICROgram(s)/Hr 1 Patch Transdermal every 72 hours  gabapentin 600 milliGRAM(s) Oral two times a day  hydroxychloroquine 200 milliGRAM(s) Oral <User Schedule>  loperamide 2 milliGRAM(s) Oral every 6 hours PRN  multivitamin 1 Tablet(s) Oral daily  ondansetron    Tablet 4 milliGRAM(s) Oral every 8 hours PRN  oxyCODONE    IR 15 milliGRAM(s) Oral every 4 hours PRN  pancrelipase  (CREON 12,000 Lipase Units) 1 Capsule(s) Oral three times a day with meals  pantoprazole    Tablet 40 milliGRAM(s) Oral before breakfast  predniSONE  Solution 4 milliGRAM(s) Oral daily  vancomycin    Solution 125 milliGRAM(s) Oral once      Vital Signs Last 24 Hrs  T(C): 36.8 (11 Aug 2020 11:16), Max: 36.8 (11 Aug 2020 11:16)  T(F): 98.2 (11 Aug 2020 11:16), Max: 98.2 (11 Aug 2020 11:16)  HR: 74 (11 Aug 2020 11:16) (74 - 74)  BP: 144/77 (11 Aug 2020 11:16) (144/77 - 144/77)  BP(mean): --  RR: 16 (11 Aug 2020 11:16) (16 - 16)  SpO2: 98% (11 Aug 2020 11:16) (98% - 98%)    PHYSICAL EXAM:    Constitutional: NAD, well-groomed, well-developed  HEENT: PERRLA, EOMI, Normal Hearing, MMM  Neck: No LAD, No JVD  Back: Normal spine flexure, No CVA tenderness  Respiratory: CTAB/L   Cardiovascular: S1 and S2, RRR, no M/G/R  Gastrointestinal: BS+, soft, NT/ND  Extremities: No peripheral edema  Vascular: 2+ peripheral pulses  Neurological: A/O x 3, no focal deficits  Skin: No rashes      LABS:              Creatinine Trend: 0.50<--, 0.37<--, 0.56<--, 0.91<--    MICROBIOLOGY:  RECENT CULTURES:        RADIOLOGY & ADDITIONAL STUDIES:

## 2020-08-11 NOTE — ED ADULT TRIAGE NOTE - CHIEF COMPLAINT QUOTE
"I'm having alcohol withdrawal" pt reports her last drink was 2 weeks ago. reports drinking 1/2 vodka a day. pt also reports history of opiate dependency

## 2020-08-11 NOTE — ED ADULT NURSE REASSESSMENT NOTE - NS ED NURSE REASSESS COMMENT FT1
Patient awake in stretcher.  Patient is disposed for [place] ED.  Report given to PADMINI Bhakta.

## 2020-08-11 NOTE — H&P ADULT - HISTORY OF PRESENT ILLNESS
60 yr old with PMH of ch pain with Opiate and ETOH dependency and abuse, alcoholic pancreatitis, Mixed connective tissue disorder, left soleal vein thrombosis, adeno ca lung- s/p RML resection 2018, ex smoker,rec leg cellulitis, was admitted in [facility] with alcoholic pancreatitis and HTN and discharged on 7/31, after one day at home pt went to [facility] for cellulitis treated with doxycycline and is improving, and 2 days back went to [facility] and lian told to drink lot of water and now says feeling weak and ataxic, In ED syo Na 120 , and received IV fluids, refused vancomycin as improving and is admitted to [facility] for Hyponatremia, likely  excessive water intake.pt took last drink 2 weeks back and thinks she is withdrawing.  admitted for further care

## 2020-08-11 NOTE — H&P ADULT - NSICDXPASTSURGICALHX_GEN_ALL_CORE_FT
PAST SURGICAL HISTORY:  History of hip replacement, total, right 6/7/2020    History of lung cancer s/p wedge resection of RML    History of oophorectomy, unilateral bilateral    S/P cholecystectomy     S/P lumbar laminectomy

## 2020-08-11 NOTE — ED PROVIDER NOTE - OBJECTIVE STATEMENT
61yo F with sent as transfer from [place] for rle cellulitis and hyponatremia 121, receieved vanco and 1550 NS, pt has hx alcohol and opiate dependence lung ca s/p resection, chornic pain s/p laminectomy c/o alcohol withdrawal, last drink 2 weeks ago and recurrent cellulitis to rle. pt relates hospitalized twice past 2 weeks for rle cellulitis, had resolved, but erythema warmth tenderness to right lower leg recurred. pt relates had normal rle art and venous dopplers when admitted 2 weeks ago. pt c/o feeling shaky from not drinking past 2 weeks. no fevers, chills, ha, d/n/v, cp, sob, abd pain. 59yo F with sent as transfer from [place] for rle cellulitis and hyponatremia 121, receieved vanco and 1550 NS, pt has hx alcohol and opiate dependence lung ca s/p resection, chornic pain s/p laminectomy c/o alcohol withdrawal, last drink 2 weeks ago and recurrent cellulitis to rle. pt relates hospitalized twice past 2 weeks for rle cellulitis, had resolved, but erythema warmth tenderness to right lower leg recurred. pt relates had normal rle art and venous dopplers when admitted 2 weeks ago. pt c/o feeling shaky from not drinking past 2 weeks. no fevers, chills, ha, d/n/v, cp, sob, abd pain.  pt also states she has been having diarrhea and was positive for c diff

## 2020-08-11 NOTE — CONSULT NOTE ADULT - ASSESSMENT
The patient is a 60 year old female with a history of HTN, DVT, lung cancer s/p resection, opiate use, alcohol abuse who presents with right leg cellulitis and hyponatremia.    Plan:  - Continue clonidine 0.1 mg bid  - Images of previous LE venous duplex reviewed; consistent with soleal DVT  - Patient states there were additional studies at other hospitals not showing DVT, but patient is a poor historian  - Would continue apixaban 5 mg bid for total duration of 3 months  - IV fluids as per renal  - Renal follow-up

## 2020-08-11 NOTE — ED PROVIDER NOTE - OBJECTIVE STATEMENT
pt with hx alcohol and opiate dependence c/o alcohol withdrawal, last drink 2 weeks ago and recurrent cellulitis to rle. pt relates hospitalized twice past 2 weeks for rle cellulitis, had resolved, but erythema warmth tenderness to right lower leg recurred. pt relates had normal rle art and venous dopplers when admitted 2 weeks ago. pt c/o feeling shaky from not drinking past 2 weeks. no fevers, chills, ha, d/n/v, cp, sob, abd pain.  pmd - none currently pt with hx alcohol and opiate dependence lung ca s/p resection, chornic pain s/p laminectomy c/o alcohol withdrawal, last drink 2 weeks ago and recurrent cellulitis to rle. pt relates hospitalized twice past 2 weeks for rle cellulitis, had resolved, but erythema warmth tenderness to right lower leg recurred. pt relates had normal rle art and venous dopplers when admitted 2 weeks ago. pt c/o feeling shaky from not drinking past 2 weeks. no fevers, chills, ha, d/n/v, cp, sob, abd pain.  pmd - none currently

## 2020-08-11 NOTE — CONSULT NOTE ADULT - ASSESSMENT
60 female with a history of ETOH, RLE cellulitis and history of C Diff Colitis now admitted with feeling weak and tired with intact mental status.  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   Will start on Isotonic saline at 60 cc an hour with 1000 cc fluid restriction. Patient advised in detail. Avoid Diuretics. Supplement potassium as needed. Will follow closely.

## 2020-08-11 NOTE — CONSULT NOTE ADULT - SUBJECTIVE AND OBJECTIVE BOX
History of Present Illness: The patient is a 60 year old female with a history of HTN, DVT, lung cancer s/p resection, opiate use, alcohol abuse who presents with right leg swelling and hyponatremia. The patient is a poor historian. She has no current complaints of chest pain, shortness of breath, dizziness. The right leg has been swollen and red. She states she had dopplers done at multiple hospitals, all of which were negative except at [facility] where they found a soleal DVT.    Past Medical/Surgical History:  HTN, DVT, lung cancer s/p resection, opiate use, alcohol abuse     Medications:  Home Medications:  fentaNYL 25 mcg/hr transdermal film, extended release: 1 film(s) transdermal every 72 hours    *Of note: Patient applied patch this morning, 08/11/20 (11 Aug 2020 16:52)  gabapentin 300 mg oral capsule: 2 cap(s) orally 2 times a day (11 Aug 2020 16:52)  methylPREDNISolone 4 mg oral tablet: 1 tab(s) orally once a day (11 Aug 2020 16:52)  Multiple Vitamins oral tablet: 1 tab(s) orally once a day (11 Aug 2020 16:52)  oxyCODONE 10 mg oral tablet: 1 tab(s) orally 4-5times a day, As Needed (11 Aug 2020 16:52)  Plaquenil 200 mg oral tablet: 1 tab(s) orally every other day (11 Aug 2020 16:52)  Restasis 0.05% ophthalmic emulsion: 1 drop(s) to each affected eye every 12 hours (11 Aug 2020 16:52)  Xanax 0.25 mg oral tablet: 1 tab(s) orally 2 times a day, As Needed (11 Aug 2020 16:52)      Family History: Non-contributory family history of premature cardiovascular atherosclerotic disease    Social History: No tobacco, alcohol or drug use    Review of Systems:  General: No fevers, chills, weight loss or gain  Skin: No rashes, color changes  Cardiovascular: No chest pain, orthopnea  Respiratory: No shortness of breath, cough  Gastrointestinal: No nausea, abdominal pain  Genitourinary: No incontinence, pain with urination  Musculoskeletal: No pain, swelling, decreased range of motion  Neurological: No headache, weakness  Psychiatric: No depression, anxiety  Endocrine: No weight loss or gain, increased thirst  All other systems are comprehensively negative.    Physical Exam:  Vitals:        Vital Signs Last 24 Hrs  T(C): 36.8 (11 Aug 2020 11:16), Max: 36.8 (11 Aug 2020 11:16)  T(F): 98.2 (11 Aug 2020 11:16), Max: 98.2 (11 Aug 2020 11:16)  HR: 74 (11 Aug 2020 11:16) (74 - 74)  BP: 144/77 (11 Aug 2020 11:16) (144/77 - 144/77)  BP(mean): --  RR: 16 (11 Aug 2020 11:16) (16 - 16)  SpO2: 98% (11 Aug 2020 11:16) (98% - 98%)  General: NAD  HEENT: MMM  Neck: No JVD, no carotid bruit  Lungs: CTAB  CV: RRR, nl S1/S2, no M/R/G  Abdomen: S/NT/ND, +BS  Extremities: No LE edema, no cyanosis  Neuro: AAOx3, non-focal  Skin: No rash    Labs:                  ECG: NSR, normal axis, no ST abnormality

## 2020-08-11 NOTE — SBIRT NOTE ADULT - NSSBIRTDRGPOSREINDET_GEN_A_CORE
provided positive reinforcement regarding abstaining from the use of drugs for non-medical reasons.

## 2020-08-11 NOTE — ED PROVIDER NOTE - CLINICAL SUMMARY MEDICAL DECISION MAKING FREE TEXT BOX
pt with hx opiate and alcohol dependence c/o rle cellulitis and alcohol withdrawal - ekg/xr/labs/ivf/us/vanc

## 2020-08-11 NOTE — H&P ADULT - ATTENDING COMMENTS
80 minutes spent on this visit, 50% visit time spent in care co-ordination with other attendings and counselling patient  I have discussed care plan with patient and HCP,expressed understanding of problems treatment and their effect and side effects, alternatives in detail,I have asked if they have any questions and concerns and appropriately addressed them to best of my ability  Reviewed all diagonostic tests, lab results and drug drug interactions, and medications

## 2020-08-11 NOTE — ED PROVIDER NOTE - PHYSICAL EXAMINATION
Gen:  Well appearning in NAD  Head:  NC/AT  Resp: No distress   Ext: no deformities  Skin: erythema and warmth of rle to mid shin

## 2020-08-11 NOTE — H&P ADULT - ASSESSMENT
60 yr old with PMH of ch pain with Opiate and ETOH dependency and abuse, alcoholic pancreatitis, Mixed connective tissue disorder, left soleal vein thrombosis, adeno ca lung- s/p RML resection 2018, ex smoker,rec leg cellulitis, was admitted in [facility] with alcoholic pancreatitis and HTN and discharged on 7/31, after one day at home pt went to [facility] for cellulitis treated with doxycycline and is improving, and 2 days back went to [facility] and lian told to drink lot of water and now says feeling weak and ataxic, In ED syo Na 120 , and received IV fluids, refused vancomycin as improving and is admitted to [facility] for Hyponatremia, likely  excessive water intake.pt took last drink 2 weeks back and thinks she is withdrawing.  admitted for further care  hyponatremia  ETOH and opiate abuse and dependency  rt leg cellulitis improving on doxycycline taken out pt  ch pain syndrome  MCTD on steroids  soleal V thrombosis on eliquis  s/p RML resection for lung adenoca

## 2020-08-12 DIAGNOSIS — A04.72 ENTEROCOLITIS DUE TO CLOSTRIDIUM DIFFICILE, NOT SPECIFIED AS RECURRENT: ICD-10-CM

## 2020-08-12 LAB
ALBUMIN SERPL ELPH-MCNC: 3.3 G/DL — SIGNIFICANT CHANGE UP (ref 3.3–5)
ALP SERPL-CCNC: 34 U/L — LOW (ref 40–120)
ALT FLD-CCNC: 31 U/L — SIGNIFICANT CHANGE UP (ref 12–78)
ANION GAP SERPL CALC-SCNC: 8 MMOL/L — SIGNIFICANT CHANGE UP (ref 5–17)
AST SERPL-CCNC: 32 U/L — SIGNIFICANT CHANGE UP (ref 15–37)
BASOPHILS # BLD AUTO: 0.02 K/UL — SIGNIFICANT CHANGE UP (ref 0–0.2)
BASOPHILS NFR BLD AUTO: 0.4 % — SIGNIFICANT CHANGE UP (ref 0–2)
BILIRUB SERPL-MCNC: 0.3 MG/DL — SIGNIFICANT CHANGE UP (ref 0.2–1.2)
BUN SERPL-MCNC: 3 MG/DL — LOW (ref 7–23)
CALCIUM SERPL-MCNC: 8.6 MG/DL — SIGNIFICANT CHANGE UP (ref 8.5–10.1)
CHLORIDE SERPL-SCNC: 94 MMOL/L — LOW (ref 96–108)
CO2 SERPL-SCNC: 27 MMOL/L — SIGNIFICANT CHANGE UP (ref 22–31)
CREAT SERPL-MCNC: 0.46 MG/DL — LOW (ref 0.5–1.3)
CULTURE RESULTS: SIGNIFICANT CHANGE UP
EOSINOPHIL # BLD AUTO: 0.09 K/UL — SIGNIFICANT CHANGE UP (ref 0–0.5)
EOSINOPHIL NFR BLD AUTO: 2 % — SIGNIFICANT CHANGE UP (ref 0–6)
GLUCOSE SERPL-MCNC: 92 MG/DL — SIGNIFICANT CHANGE UP (ref 70–99)
HCT VFR BLD CALC: 34.5 % — SIGNIFICANT CHANGE UP (ref 34.5–45)
HGB BLD-MCNC: 12 G/DL — SIGNIFICANT CHANGE UP (ref 11.5–15.5)
IMM GRANULOCYTES NFR BLD AUTO: 0.4 % — SIGNIFICANT CHANGE UP (ref 0–1.5)
LYMPHOCYTES # BLD AUTO: 1.11 K/UL — SIGNIFICANT CHANGE UP (ref 1–3.3)
LYMPHOCYTES # BLD AUTO: 24.4 % — SIGNIFICANT CHANGE UP (ref 13–44)
MCHC RBC-ENTMCNC: 33.7 PG — SIGNIFICANT CHANGE UP (ref 27–34)
MCHC RBC-ENTMCNC: 34.8 GM/DL — SIGNIFICANT CHANGE UP (ref 32–36)
MCV RBC AUTO: 96.9 FL — SIGNIFICANT CHANGE UP (ref 80–100)
MONOCYTES # BLD AUTO: 0.57 K/UL — SIGNIFICANT CHANGE UP (ref 0–0.9)
MONOCYTES NFR BLD AUTO: 12.6 % — SIGNIFICANT CHANGE UP (ref 2–14)
NEUTROPHILS # BLD AUTO: 2.73 K/UL — SIGNIFICANT CHANGE UP (ref 1.8–7.4)
NEUTROPHILS NFR BLD AUTO: 60.2 % — SIGNIFICANT CHANGE UP (ref 43–77)
NRBC # BLD: 0 /100 WBCS — SIGNIFICANT CHANGE UP (ref 0–0)
PLATELET # BLD AUTO: 338 K/UL — SIGNIFICANT CHANGE UP (ref 150–400)
POTASSIUM SERPL-MCNC: 3.7 MMOL/L — SIGNIFICANT CHANGE UP (ref 3.5–5.3)
POTASSIUM SERPL-SCNC: 3.7 MMOL/L — SIGNIFICANT CHANGE UP (ref 3.5–5.3)
PROT SERPL-MCNC: 6.4 G/DL — SIGNIFICANT CHANGE UP (ref 6–8.3)
RBC # BLD: 3.56 M/UL — LOW (ref 3.8–5.2)
RBC # FLD: 12.3 % — SIGNIFICANT CHANGE UP (ref 10.3–14.5)
SARS-COV-2 IGG SERPL QL IA: NEGATIVE — SIGNIFICANT CHANGE UP
SARS-COV-2 IGM SERPL IA-ACNC: <0.1 INDEX — SIGNIFICANT CHANGE UP
SODIUM SERPL-SCNC: 129 MMOL/L — LOW (ref 135–145)
SPECIMEN SOURCE: SIGNIFICANT CHANGE UP
WBC # BLD: 4.54 K/UL — SIGNIFICANT CHANGE UP (ref 3.8–10.5)
WBC # FLD AUTO: 4.54 K/UL — SIGNIFICANT CHANGE UP (ref 3.8–10.5)

## 2020-08-12 RX ORDER — ALPRAZOLAM 0.25 MG
0.25 TABLET ORAL
Refills: 0 | Status: DISCONTINUED | OUTPATIENT
Start: 2020-08-12 | End: 2020-08-14

## 2020-08-12 RX ORDER — APIXABAN 2.5 MG/1
5 TABLET, FILM COATED ORAL
Refills: 0 | Status: DISCONTINUED | OUTPATIENT
Start: 2020-08-12 | End: 2020-08-16

## 2020-08-12 RX ORDER — ALPRAZOLAM 0.25 MG
0.25 TABLET ORAL ONCE
Refills: 0 | Status: DISCONTINUED | OUTPATIENT
Start: 2020-08-12 | End: 2020-08-12

## 2020-08-12 RX ORDER — VANCOMYCIN HCL 1 G
125 VIAL (EA) INTRAVENOUS EVERY 6 HOURS
Refills: 0 | Status: DISCONTINUED | OUTPATIENT
Start: 2020-08-12 | End: 2020-08-16

## 2020-08-12 RX ADMIN — OXYCODONE HYDROCHLORIDE 10 MILLIGRAM(S): 5 TABLET ORAL at 03:26

## 2020-08-12 RX ADMIN — Medication 0.1 MILLIGRAM(S): at 17:37

## 2020-08-12 RX ADMIN — Medication 0.1 MILLIGRAM(S): at 06:07

## 2020-08-12 RX ADMIN — SODIUM CHLORIDE 60 MILLILITER(S): 9 INJECTION INTRAMUSCULAR; INTRAVENOUS; SUBCUTANEOUS at 06:07

## 2020-08-12 RX ADMIN — Medication 1 CAPSULE(S): at 17:37

## 2020-08-12 RX ADMIN — OXYCODONE HYDROCHLORIDE 10 MILLIGRAM(S): 5 TABLET ORAL at 10:30

## 2020-08-12 RX ADMIN — Medication 1 CAPSULE(S): at 12:07

## 2020-08-12 RX ADMIN — PANTOPRAZOLE SODIUM 40 MILLIGRAM(S): 20 TABLET, DELAYED RELEASE ORAL at 06:07

## 2020-08-12 RX ADMIN — Medication 4 MILLIGRAM(S): at 06:07

## 2020-08-12 RX ADMIN — GABAPENTIN 600 MILLIGRAM(S): 400 CAPSULE ORAL at 06:07

## 2020-08-12 RX ADMIN — APIXABAN 5 MILLIGRAM(S): 2.5 TABLET, FILM COATED ORAL at 17:37

## 2020-08-12 RX ADMIN — GABAPENTIN 600 MILLIGRAM(S): 400 CAPSULE ORAL at 17:37

## 2020-08-12 RX ADMIN — Medication 0.25 MILLIGRAM(S): at 01:45

## 2020-08-12 RX ADMIN — Medication 0.25 MILLIGRAM(S): at 12:12

## 2020-08-12 RX ADMIN — OXYCODONE HYDROCHLORIDE 10 MILLIGRAM(S): 5 TABLET ORAL at 04:25

## 2020-08-12 RX ADMIN — Medication 125 MILLIGRAM(S): at 06:07

## 2020-08-12 RX ADMIN — Medication 1 DROP(S): at 06:07

## 2020-08-12 RX ADMIN — Medication 125 MILLIGRAM(S): at 17:37

## 2020-08-12 RX ADMIN — Medication 200 MILLIGRAM(S): at 06:07

## 2020-08-12 RX ADMIN — Medication 100 MILLIGRAM(S): at 06:07

## 2020-08-12 RX ADMIN — Medication 1 TABLET(S): at 12:07

## 2020-08-12 RX ADMIN — Medication 100 MILLIGRAM(S): at 17:37

## 2020-08-12 RX ADMIN — Medication 125 MILLIGRAM(S): at 12:07

## 2020-08-12 RX ADMIN — Medication 1 DROP(S): at 17:37

## 2020-08-12 RX ADMIN — Medication 1 CAPSULE(S): at 08:30

## 2020-08-12 RX ADMIN — OXYCODONE HYDROCHLORIDE 10 MILLIGRAM(S): 5 TABLET ORAL at 09:44

## 2020-08-12 RX ADMIN — Medication 2 MILLIGRAM(S): at 08:30

## 2020-08-12 NOTE — CONSULT NOTE ADULT - SUBJECTIVE AND OBJECTIVE BOX
HPI:  60 yr old with PMH of chronic pain with Opiate and ETOH dependency and abuse, alcoholic pancreatitis, Mixed connective tissue disorder, left soleal vein thrombosis, adeno ca lung- s/p RML resection 2018, ex smoker,rec leg cellulitis, was admitted in [facility] with alcoholic pancreatitis and HTN and discharged on 7/31, after one day at home pt went to [facility] for right LE cellulitis treated with doxycycline and is improving, and recent admission to [facility] for unclear reasons and also diagnosed with Cdiff on po vanc to 8/17/2020 now admitted with hyponatremia. No fever chills n/v. had some diarrhea but resolved. Denies abd pain.    Infectious Disease consult was called to evaluate pt and for need for antibiotics.    Past Medical & Surgical Hx:  PAST MEDICAL & SURGICAL HISTORY:  Lung cancer  Opiate dependence  Alcohol abuse  Adenocarcinoma of lung  Mixed connective tissue disease  Depression H/O panic attack: with anxiety  S/P Laminectomy  Lumbar 3/10  Chronic pain  Diverticulitis of colon (without mention of hemorrhage)  History of laminectomy  History of lung surgery  History of oophorectomy, unilateral: bilateral  History of hip replacement, total, right: 6/7/2020  S/P lumbar laminectomy  S/P cholecystectomy  History of lung cancer: s/p wedge resection of RML      Social History--  EtOH: denies   Tobacco: denies   Drug Use: denies     FAMILY HISTORY:  FHx: rheumatoid arthritis: in mother - with RA associated lung fibrosis  Family history of leukemia: in father      Allergies  penicillin (Swelling)    Intolerances  NONE      Home Medications:  ALPRAZolam 0.25 mg oral tablet: 0.25 milligram(s) orally 2 times a day, As Needed (12 Aug 2020 14:09)  cloNIDine 0.1 mg oral tablet: 1 tab(s) orally 2 times a day (12 Aug 2020 14:09)  Creon 12,000 units oral delayed release capsule: 1 cap(s) orally 3 times a day (12 Aug 2020 14:09)  doxycycline hyclate 100 mg oral capsule: 1 cap(s) orally 2 times a day (12 Aug 2020 14:09)  fentaNYL 25 mcg/hr transdermal film, extended release: 1 film(s) transdermal every 72 hours    *Of note: Patient applied patch this morning, 08/11/20 (12 Aug 2020 01:14)  fentaNYL 25 mcg/hr transdermal film, extended release: 1 film(s) transdermal every 72 hours (12 Aug 2020 14:09)  gabapentin 600 mg oral tablet: 600 milligram(s) orally 3 times a day (12 Aug 2020 14:09)  gabapentin 600 mg oral tablet: 2 tab(s) orally 2 times a day (12 Aug 2020 01:14)  hydroxychloroquine 200 mg oral tablet: 200 milligram(s) orally every other day (12 Aug 2020 14:09)  methylPREDNISolone 4 mg oral tablet: orally once a day (12 Aug 2020 14:09)  methylPREDNISolone 4 mg oral tablet: 1 tab(s) orally once a day (12 Aug 2020 01:14)  Multiple Vitamins oral tablet: 1 tab(s) orally once a day (12 Aug 2020 01:14)  ondansetron 4 mg oral tablet: 1 tab(s) orally every 8 hours (12 Aug 2020 14:09)  oxyCODONE 10 mg oral tablet: 10 milligram(s) orally every 4 hours, As Needed (12 Aug 2020 14:09)  oxyCODONE 10 mg oral tablet: 1 tab(s) orally 4-5times a day, As Needed (12 Aug 2020 01:14)  pantoprazole: 40 milligram(s) orally once a day (12 Aug 2020 14:09)  Plaquenil 200 mg oral tablet: 1 tab(s) orally every other day (12 Aug 2020 01:14)  Restasis 0.05% ophthalmic emulsion: 1 drop(s) to each affected eye every 12 hours (12 Aug 2020 01:14)  vancomycin 125 mg oral capsule: 1 cap(s) orally every 6 hours (12 Aug 2020 14:09)  vancomycin 125 mg oral capsule: 1 cap(s) orally every 6 hours    8am, 2pm, 8pm, 2am (12 Aug 2020 01:14)  Xanax 0.25 mg oral tablet: 1 tab(s) orally 2 times a day, As Needed (12 Aug 2020 01:14)    Current Inpatient Medications :    ANTIBIOTICS:   doxycycline hyclate Capsule 100 milliGRAM(s) Oral every 12 hours  hydroxychloroquine 200 milliGRAM(s) Oral <User Schedule>  vancomycin    Solution 125 milliGRAM(s) Oral every 6 hours      OTHER RELEVANT MEDICATIONS :  ALPRAZolam 0.25 milliGRAM(s) Oral two times a day PRN  apixaban 5 milliGRAM(s) Oral two times a day  artificial tears (preservative free) Ophthalmic Solution 1 Drop(s) Both EYES two times a day  chlordiazePOXIDE 5 milliGRAM(s) Oral every 6 hours PRN  cloNIDine 0.1 milliGRAM(s) Oral two times a day  fentaNYL   Patch  25 MICROgram(s)/Hr 1 Patch Transdermal every 72 hours  gabapentin 600 milliGRAM(s) Oral two times a day  loperamide 2 milliGRAM(s) Oral every 6 hours PRN  methylPREDNISolone 4 milliGRAM(s) Oral daily  multivitamin 1 Tablet(s) Oral daily  ondansetron    Tablet 4 milliGRAM(s) Oral every 8 hours PRN  oxyCODONE    IR 10 milliGRAM(s) Oral every 6 hours PRN  pancrelipase  (CREON 12,000 Lipase Units) 1 Capsule(s) Oral three times a day with meals  pantoprazole    Tablet 40 milliGRAM(s) Oral before breakfast  sodium chloride 0.9%. 1000 milliLiter(s) IV Continuous <Continuous>    ROS:  CONSTITUTIONAL:  Negative fever or chills  EYES:  Negative  blurry vision or double vision  CARDIOVASCULAR:  Negative for chest pain or palpitations  RESPIRATORY:  Negative for cough, wheezing, or SOB   GASTROINTESTINAL:  Negative for nausea, vomiting, diarrhea, constipation, or abdominal pain  GENITOURINARY:  Negative frequency, urgency , dysuria or hematuria   NEUROLOGIC:  No headache, confusion, dizziness, lightheadedness  All other systems were reviewed and are negative      Physical Exam:  Vital Signs Last 24 Hrs  T(C): 36.9 (12 Aug 2020 14:26), Max: 37.2 (12 Aug 2020 00:10)  T(F): 98.4 (12 Aug 2020 14:26), Max: 98.9 (12 Aug 2020 00:10)  HR: 74 (12 Aug 2020 14:26) (65 - 76)  BP: 114/73 (12 Aug 2020 14:26) (114/73 - 148/87)  RR: 17 (12 Aug 2020 14:26) (16 - 20)  SpO2: 97% (12 Aug 2020 14:26) (96% - 100%)      General: no acute distress  Eyes: sclera anicteric, pupils equal and reactive to light  ENMT: buccal mucosa moist, pharynx not injected  Neck: supple, trachea midline  Lungs: clear, no wheeze/rhonchi  Cardiovascular: regular rate and rhythm, S1 S2  Abdomen: soft, nontender, no organomegaly present, bowel sounds normal  Neurological:  alert and oriented x3, Cranial Nerves II-XII grossly intact  Skin:no increased ecchymosis/petechiae/purpura  Lymph Nodes: no palpable cervical/supraclavicular lymph nodes enlargements  Extremities: Right LE serythema improving    Labs:                         12.0   4.54  )-----------( 338      ( 12 Aug 2020 08:24 )             34.5   08-12    129<L>  |  94<L>  |  3<L>  ----------------------------<  92  3.7   |  27  |  0.46<L>    Ca    8.6      12 Aug 2020 08:24    TPro  6.4  /  Alb  3.3  /  TBili  0.3  /  DBili  x   /  AST  32  /  ALT  31  /  AlkPhos  34<L>  08-12      RECENT CULTURES:  pending      RADIOLOGY & ADDITIONAL STUDIES:  EXAM:  XR CHEST PORTABLE IMMED 1V                            PROCEDURE DATE:  07/27/2020          INTERPRETATION:  History: Epigastric pain    Portable radiograph of the chest is performed. comparison is made to 7/30/2019.    The heart is not enlarged. The trachea is midline. The lungs are clear. Osseous structures are unremarkable.    Impression: No active pulmonary disease.      Assessment and plan.  1. Resolving Right LE cellulitis.  Complete course of Doxycycline from outpt   Elevate leg    2. Cdiff  Diarrhea appears to have resolved  Finish course of po Vancomycin till 8/17/2020    Stable from ID standpoint    D/w DR Avendaño      Continue with present regime .  Approptiate use of antibiotics and adverse effects reviewed.      I have discussed the above plan of care with patient/family in detail. They expressed understanding of the treatment plan . Risks, benefits and alternatives discussed in detail. I have asked if they have any questions or concerns and appropriately addressed them to the best of my ability .      > 45 minutes spent in direct patient care reviewing  the notes, lab data/ imaging , discussion with multidisciplinary team. All questions were addressed and answered to the best of my capacity .    Thank you for allowing me to participate in the care of your patient .      Demetria Hancock MD  Infectious Disease  [phone]

## 2020-08-12 NOTE — PROGRESS NOTE ADULT - SUBJECTIVE AND OBJECTIVE BOX
Date/Time Patient Seen:  		  Referring MD:   Data Reviewed	       Patient is a 60y old  Female who presents with a chief complaint of rt leg cellulitis, dizziness (11 Aug 2020 16:16)      Subjective/HPI     PAST MEDICAL & SURGICAL HISTORY:  Adenocarcinoma of lung  Mixed connective tissue disease  Depression H/O panic attack: with anxiety  S/P Laminectomy  Lumbar 3/10  Chronic pain  Diverticulitis of colon (without mention of hemorrhage)  History of oophorectomy, unilateral: bilateral  History of hip replacement, total, right: 6/7/2020  S/P lumbar laminectomy  S/P cholecystectomy  History of lung cancer: s/p wedge resection of RML  No significant past surgical history        Medication list         MEDICATIONS  (STANDING):  artificial tears (preservative free) Ophthalmic Solution 1 Drop(s) Both EYES two times a day  cloNIDine 0.1 milliGRAM(s) Oral two times a day  doxycycline hyclate Capsule 100 milliGRAM(s) Oral every 12 hours  fentaNYL   Patch  25 MICROgram(s)/Hr 1 Patch Transdermal every 72 hours  gabapentin 600 milliGRAM(s) Oral two times a day  hydroxychloroquine 200 milliGRAM(s) Oral <User Schedule>  methylPREDNISolone 4 milliGRAM(s) Oral daily  multivitamin 1 Tablet(s) Oral daily  pancrelipase  (CREON 12,000 Lipase Units) 1 Capsule(s) Oral three times a day with meals  pantoprazole    Tablet 40 milliGRAM(s) Oral before breakfast  sodium chloride 0.9%. 1000 milliLiter(s) (60 mL/Hr) IV Continuous <Continuous>  vancomycin    Solution 125 milliGRAM(s) Oral every 6 hours    MEDICATIONS  (PRN):  chlordiazePOXIDE 5 milliGRAM(s) Oral every 6 hours PRN for ciwa > 6  loperamide 2 milliGRAM(s) Oral every 6 hours PRN Diarrhea  ondansetron    Tablet 4 milliGRAM(s) Oral every 8 hours PRN Nausea and/or Vomiting  oxyCODONE    IR 10 milliGRAM(s) Oral every 6 hours PRN Moderate Pain (4 - 6)         Vitals log        ICU Vital Signs Last 24 Hrs  T(C): 36.3 (12 Aug 2020 05:09), Max: 37.2 (12 Aug 2020 00:10)  T(F): 97.3 (12 Aug 2020 05:09), Max: 98.9 (12 Aug 2020 00:10)  HR: 66 (12 Aug 2020 08:32) (65 - 78)  BP: 129/77 (12 Aug 2020 08:32) (120/78 - 148/87)  BP(mean): --  ABP: --  ABP(mean): --  RR: 20 (12 Aug 2020 08:32) (16 - 20)  SpO2: 98% (12 Aug 2020 08:32) (96% - 100%)           Input and Output:  I&O's Detail      Lab Data                        12.0   4.54  )-----------( 338      ( 12 Aug 2020 08:24 )             34.5     08-11    128<L>  |  94<L>  |  3<L>  ----------------------------<  101<H>  3.9   |  23  |  0.40<L>    Ca    9.4      11 Aug 2020 16:36    TPro  7.4  /  Alb  3.7  /  TBili  0.4  /  DBili  .10  /  AST  37  /  ALT  38  /  AlkPhos  42  08-11            Review of Systems	      Objective     Physical Examination  heart s1s2  lung dec BS  abd soft        Pertinent Lab findings & Imaging      Parminder:  NO   Adequate UO     I&O's Detail           Discussed with:     Cultures:	        Radiology

## 2020-08-12 NOTE — PROGRESS NOTE ADULT - SUBJECTIVE AND OBJECTIVE BOX
Patient is a 60y old  Female who presents with a chief complaint of rt leg cellulitis, dizziness (12 Aug 2020 09:05)      INTERVAL HPI/OVERNIGHT EVENTS:overnight events noted    Home Medications:  fentaNYL 25 mcg/hr transdermal film, extended release: 1 film(s) transdermal every 72 hours    *Of note: Patient applied patch this morning, 08/11/20 (12 Aug 2020 01:14)  gabapentin 600 mg oral tablet: 2 tab(s) orally 2 times a day (12 Aug 2020 01:14)  methylPREDNISolone 4 mg oral tablet: 1 tab(s) orally once a day (12 Aug 2020 01:14)  Multiple Vitamins oral tablet: 1 tab(s) orally once a day (12 Aug 2020 01:14)  oxyCODONE 10 mg oral tablet: 1 tab(s) orally 4-5times a day, As Needed (12 Aug 2020 01:14)  Plaquenil 200 mg oral tablet: 1 tab(s) orally every other day (12 Aug 2020 01:14)  Restasis 0.05% ophthalmic emulsion: 1 drop(s) to each affected eye every 12 hours (12 Aug 2020 01:14)  vancomycin 125 mg oral capsule: 1 cap(s) orally every 6 hours    8am, 2pm, 8pm, 2am (12 Aug 2020 01:14)  Xanax 0.25 mg oral tablet: 1 tab(s) orally 2 times a day, As Needed (12 Aug 2020 01:14)      MEDICATIONS  (STANDING):  apixaban 5 milliGRAM(s) Oral two times a day  artificial tears (preservative free) Ophthalmic Solution 1 Drop(s) Both EYES two times a day  cloNIDine 0.1 milliGRAM(s) Oral two times a day  doxycycline hyclate Capsule 100 milliGRAM(s) Oral every 12 hours  fentaNYL   Patch  25 MICROgram(s)/Hr 1 Patch Transdermal every 72 hours  gabapentin 600 milliGRAM(s) Oral two times a day  hydroxychloroquine 200 milliGRAM(s) Oral <User Schedule>  methylPREDNISolone 4 milliGRAM(s) Oral daily  multivitamin 1 Tablet(s) Oral daily  pancrelipase  (CREON 12,000 Lipase Units) 1 Capsule(s) Oral three times a day with meals  pantoprazole    Tablet 40 milliGRAM(s) Oral before breakfast  sodium chloride 0.9%. 1000 milliLiter(s) (60 mL/Hr) IV Continuous <Continuous>  vancomycin    Solution 125 milliGRAM(s) Oral every 6 hours    MEDICATIONS  (PRN):  ALPRAZolam 0.25 milliGRAM(s) Oral two times a day PRN anxiety  chlordiazePOXIDE 5 milliGRAM(s) Oral every 6 hours PRN for ciwa > 6  loperamide 2 milliGRAM(s) Oral every 6 hours PRN Diarrhea  ondansetron    Tablet 4 milliGRAM(s) Oral every 8 hours PRN Nausea and/or Vomiting  oxyCODONE    IR 10 milliGRAM(s) Oral every 6 hours PRN Moderate Pain (4 - 6)      Allergies    penicillin (Swelling)    Intolerances        REVIEW OF SYSTEMS:  CONSTITUTIONAL: No fever, weight loss, or fatigue  EYES: No eye pain, visual disturbances, or discharge  ENMT:  No difficulty hearing, tinnitus, vertigo; No sinus or throat pain  NECK: No pain or stiffness  BREASTS: No pain, masses, or nipple discharge  RESPIRATORY: No cough, wheezing, chills or hemoptysis; No shortness of breath  CARDIOVASCULAR: No chest pain, palpitations, dizziness, or leg swelling  GASTROINTESTINAL: No abdominal or epigastric pain. No nausea, vomiting, or hematemesis; No diarrhea or constipation. No melena or hematochezia.  GENITOURINARY: No dysuria, frequency, hematuria, or incontinence  NEUROLOGICAL: No headaches, memory loss, loss of strength, has ch LE numbness, tingling  SKIN: No itching, burning, rashes, or lesions   LYMPH NODES: No enlarged glands  ENDOCRINE: No heat or cold intolerance; No hair loss  MUSCULOSKELETAL: No joint pain or swelling; No muscle, back, or extremity pain  PSYCHIATRIC: No depression, anxiety, mood swings, or difficulty sleeping  HEME/LYMPH: No easy bruising, or bleeding gums  ALLERGY AND IMMUNOLOGIC: No hives or eczema    Vital Signs Last 24 Hrs  T(C): 36.3 (12 Aug 2020 05:09), Max: 37.2 (12 Aug 2020 00:10)  T(F): 97.3 (12 Aug 2020 05:09), Max: 98.9 (12 Aug 2020 00:10)  HR: 66 (12 Aug 2020 08:32) (65 - 78)  BP: 129/77 (12 Aug 2020 08:32) (120/78 - 148/87)  BP(mean): --  RR: 20 (12 Aug 2020 08:32) (16 - 20)  SpO2: 98% (12 Aug 2020 08:32) (96% - 100%)    PHYSICAL EXAM:  GENERAL: well-groomed, well-developed  HEAD:  Atraumatic, Normocephalic  EYES: EOMI, PERRLA, conjunctiva and sclera clear  ENMT: Moist mucous membranes,   NECK: Supple, No JVD, Normal thyroid  NERVOUS SYSTEM:  Alert & Oriented X3, Good concentration; Motor Strength 5/5 B/L upper and lower extremities; DTRs 2+ intact and symmetric  CHEST/LUNG: Clear to percussion bilaterally; No rales, rhonchi, wheezing, or rubs  HEART: Regular rate and rhythm; No murmurs, rubs, or gallops  ABDOMEN: Soft, Nontender, Nondistended; Bowel sounds present  EXTREMITIES:  2+ Peripheral Pulses, rt leg erythema improving  LYMPH: No lymphadenopathy noted  SKIN: No rashes or lesions    LABS:                        12.0   4.54  )-----------( 338      ( 12 Aug 2020 08:24 )             34.5     08-12    129<L>  |  94<L>  |  3<L>  ----------------------------<  92  3.7   |  27  |  0.46<L>    Ca    8.6      12 Aug 2020 08:24    TPro  6.4  /  Alb  3.3  /  TBili  0.3  /  DBili  x   /  AST  32  /  ALT  31  /  AlkPhos  34<L>  08-12        CAPILLARY BLOOD GLUCOSE              I&O's Summary      RADIOLOGY & ADDITIONAL TESTS:    Imaging Personally Reviewed:  [x ] YES  [ ] NO    Consultant(s) Notes Reviewed:  [ x] YES  [ ] NO    Care Discussed with Consultants/Other Providers [x ] YES  [ ] NO

## 2020-08-12 NOTE — PROGRESS NOTE ADULT - SUBJECTIVE AND OBJECTIVE BOX
Chief Complaint: Right leg cellulitis    Interval Events: No events overnight.    Review of Systems:  General: No fevers, chills, weight loss or gain  Skin: No rashes, color changes  Cardiovascular: No chest pain, orthopnea  Respiratory: No shortness of breath, cough  Gastrointestinal: No nausea, abdominal pain  Genitourinary: No incontinence, pain with urination  Musculoskeletal: No pain, swelling, decreased range of motion  Neurological: No headache, weakness  Psychiatric: No depression, anxiety  Endocrine: No weight loss or gain, increased thirst  All other systems are comprehensively negative.    Physical Exam:  Vitals:        Vital Signs Last 24 Hrs  T(C): 36.3 (12 Aug 2020 05:09), Max: 37.2 (12 Aug 2020 00:10)  T(F): 97.3 (12 Aug 2020 05:09), Max: 98.9 (12 Aug 2020 00:10)  HR: 66 (12 Aug 2020 08:32) (65 - 78)  BP: 129/77 (12 Aug 2020 08:32) (120/78 - 148/87)  BP(mean): --  RR: 20 (12 Aug 2020 08:32) (16 - 20)  SpO2: 98% (12 Aug 2020 08:32) (96% - 100%)  General: NAD  HEENT: MMM  Neck: No JVD, no carotid bruit  Lungs: CTAB  CV: RRR, nl S1/S2, no M/R/G  Abdomen: S/NT/ND, +BS  Extremities: No LE edema, no cyanosis  Neuro: AAOx3, non-focal  Skin: Right leg erythema    Labs:                        12.0   4.54  )-----------( 338      ( 12 Aug 2020 08:24 )             34.5     08-12    129<L>  |  94<L>  |  3<L>  ----------------------------<  92  3.7   |  27  |  0.46<L>    Ca    8.6      12 Aug 2020 08:24    TPro  6.4  /  Alb  3.3  /  TBili  0.3  /  DBili  x   /  AST  32  /  ALT  31  /  AlkPhos  34<L>  08-12

## 2020-08-12 NOTE — PROGRESS NOTE ADULT - ASSESSMENT
The patient is a 60 year old female with a history of HTN, DVT, lung cancer s/p resection, opiate use, alcohol abuse who presents with right leg cellulitis and hyponatremia.    Plan:  - Continue clonidine 0.1 mg bid  - Images of previous LE venous duplex reviewed; consistent with soleal DVT  - Restart apixaban 5 mg bid; continue for duration of 3 months  - Renal follow-up

## 2020-08-12 NOTE — PROGRESS NOTE ADULT - ASSESSMENT
60 female with a history of ETOH, RLE cellulitis and history of C Diff Colitis now admitted with feeling weak and tired with intact mental status. Sodium improved from yesterday. 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 Will continue on Isotonic saline at 60 cc an hour with 1000 cc fluid restriction. Patient advised in detail. Avoid Diuretics. Supplement potassium as needed. Will follow closely.

## 2020-08-12 NOTE — PROGRESS NOTE ADULT - ASSESSMENT
60 yr old with PMH of ch pain with Opiate and ETOH dependency and abuse, alcoholic pancreatitis, Mixed connective tissue disorder, left soleal vein thrombosis, adeno ca lung- s/p RML resection 2018, ex smoker,rec leg cellulitis, was admitted in [facility] with alcoholic pancreatitis and HTN and discharged on 7/31, after one day at home pt went to [facility] for cellulitis treated with doxycycline and is improving, and 2 days back went to [facility] and lian told to drink lot of water and now says feeling weak and ataxic, In ED syo Na 120 , and received IV fluids, refused vancomycin as improving and is admitted to [facility] for Hyponatremia, likely  excessive water intake.pt took last drink 2 weeks back and thinks she is withdrawing.  admitted for further care  hyponatremia  ETOH and opiate abuse and dependency  rt leg cellulitis improving on doxycycline taken out pt  ch pain syndrome  MCTD on steroids  soleal V thrombosis   s/p RML resection for lung adenoca  peripheral neuropathy  electrolytes improving   c diff- pt on vancomycin completing course  d/w HCP [id number]

## 2020-08-12 NOTE — PROGRESS NOTE ADULT - SUBJECTIVE AND OBJECTIVE BOX
JULIO CESAR FAYE  60y  Female    Patient is a 60y old  Female who presents with a chief complaint of rt leg cellulitis, dizziness (12 Aug 2020 10:10)      out of bed to chair  feels much better      PAST MEDICAL & SURGICAL HISTORY:  Lung cancer  Opiate dependence  Alcohol abuse  Adenocarcinoma of lung  Mixed connective tissue disease  Depression H/O panic attack: with anxiety  S/P Laminectomy  Lumbar 3/10  Chronic pain  Diverticulitis of colon (without mention of hemorrhage)  History of laminectomy  History of lung surgery  History of oophorectomy, unilateral: bilateral  History of hip replacement, total, right: 2020  S/P lumbar laminectomy  S/P cholecystectomy  History of lung cancer: s/p wedge resection of RML          PHYSICAL EXAM:    T(C): 36.9 (20 @ 14:26), Max: 37.2 (20 @ 00:10)  HR: 74 (20 @ 14:26) (65 - 78)  BP: 114/73 (20 @ 14:26) (114/73 - 148/87)  RR: 17 (20 @ 14:26) (16 - 20)  SpO2: 97% (20 @ 14:26) (96% - 100%)  Wt(kg): --    I&O's Detail      Respiratory: clear anteriorly, decreased BS at bases  Cardiovascular: S1 S2  Gastrointestinal: soft NT ND +BS  Extremities: trace edema   Neuro: Awake and alert    MEDICATIONS  (STANDING):  apixaban 5 milliGRAM(s) Oral two times a day  artificial tears (preservative free) Ophthalmic Solution 1 Drop(s) Both EYES two times a day  cloNIDine 0.1 milliGRAM(s) Oral two times a day  doxycycline hyclate Capsule 100 milliGRAM(s) Oral every 12 hours  fentaNYL   Patch  25 MICROgram(s)/Hr 1 Patch Transdermal every 72 hours  gabapentin 600 milliGRAM(s) Oral two times a day  hydroxychloroquine 200 milliGRAM(s) Oral <User Schedule>  methylPREDNISolone 4 milliGRAM(s) Oral daily  multivitamin 1 Tablet(s) Oral daily  pancrelipase  (CREON 12,000 Lipase Units) 1 Capsule(s) Oral three times a day with meals  pantoprazole    Tablet 40 milliGRAM(s) Oral before breakfast  sodium chloride 0.9%. 1000 milliLiter(s) (60 mL/Hr) IV Continuous <Continuous>  vancomycin    Solution 125 milliGRAM(s) Oral every 6 hours    MEDICATIONS  (PRN):  ALPRAZolam 0.25 milliGRAM(s) Oral two times a day PRN anxiety  chlordiazePOXIDE 5 milliGRAM(s) Oral every 6 hours PRN for ciwa > 6  loperamide 2 milliGRAM(s) Oral every 6 hours PRN Diarrhea  ondansetron    Tablet 4 milliGRAM(s) Oral every 8 hours PRN Nausea and/or Vomiting  oxyCODONE    IR 10 milliGRAM(s) Oral every 6 hours PRN Moderate Pain (4 - 6)                            12.0   4.54  )-----------( 338      ( 12 Aug 2020 08:24 )             34.5       08-12    129<L>  |  94<L>  |  3<L>  ----------------------------<  92  3.7   |  27  |  0.46<L>    Ca    8.6      12 Aug 2020 08:24    TPro  6.4  /  Alb  3.3  /  TBili  0.3  /  DBili  x   /  AST  32  /  ALT  31  /  AlkPhos  34<L>  08-12      Creatinine Trend: Creatinine Trend: 0.46<--, 0.40<--, 0.78<--, 0.50<--, 0.37<--, 0.56<--    Urinalysis Basic - ( 11 Aug 2020 09:14 )    Color: Yellow / Appearance: Clear / S.010 / pH: x  Gluc: x / Ketone: Moderate  / Bili: Negative / Urobili: Negative mg/dL   Blood: x / Protein: Negative mg/dL / Nitrite: Negative   Leuk Esterase: Small / RBC: 0-2 /HPF / WBC 0-2   Sq Epi: x / Non Sq Epi: Few / Bacteria: Few

## 2020-08-13 DIAGNOSIS — F10.10 ALCOHOL ABUSE, UNCOMPLICATED: ICD-10-CM

## 2020-08-13 DIAGNOSIS — R56.9 UNSPECIFIED CONVULSIONS: ICD-10-CM

## 2020-08-13 LAB
ANION GAP SERPL CALC-SCNC: 13 MMOL/L — SIGNIFICANT CHANGE UP (ref 5–17)
BASOPHILS # BLD AUTO: 0.02 K/UL — SIGNIFICANT CHANGE UP (ref 0–0.2)
BASOPHILS NFR BLD AUTO: 0.2 % — SIGNIFICANT CHANGE UP (ref 0–2)
BUN SERPL-MCNC: 4 MG/DL — LOW (ref 7–23)
CALCIUM SERPL-MCNC: 9.2 MG/DL — SIGNIFICANT CHANGE UP (ref 8.5–10.1)
CHLORIDE SERPL-SCNC: 99 MMOL/L — SIGNIFICANT CHANGE UP (ref 96–108)
CO2 SERPL-SCNC: 20 MMOL/L — LOW (ref 22–31)
CREAT SERPL-MCNC: 0.84 MG/DL — SIGNIFICANT CHANGE UP (ref 0.5–1.3)
EOSINOPHIL # BLD AUTO: 0.01 K/UL — SIGNIFICANT CHANGE UP (ref 0–0.5)
EOSINOPHIL NFR BLD AUTO: 0.1 % — SIGNIFICANT CHANGE UP (ref 0–6)
GLUCOSE SERPL-MCNC: 183 MG/DL — HIGH (ref 70–99)
HCT VFR BLD CALC: 34.2 % — LOW (ref 34.5–45)
HGB BLD-MCNC: 11.7 G/DL — SIGNIFICANT CHANGE UP (ref 11.5–15.5)
IMM GRANULOCYTES NFR BLD AUTO: 0.8 % — SIGNIFICANT CHANGE UP (ref 0–1.5)
LYMPHOCYTES # BLD AUTO: 0.64 K/UL — LOW (ref 1–3.3)
LYMPHOCYTES # BLD AUTO: 7.2 % — LOW (ref 13–44)
MAGNESIUM SERPL-MCNC: 1.9 MG/DL — SIGNIFICANT CHANGE UP (ref 1.6–2.6)
MCHC RBC-ENTMCNC: 34.2 GM/DL — SIGNIFICANT CHANGE UP (ref 32–36)
MCHC RBC-ENTMCNC: 34.4 PG — HIGH (ref 27–34)
MCV RBC AUTO: 100.6 FL — HIGH (ref 80–100)
MONOCYTES # BLD AUTO: 0.77 K/UL — SIGNIFICANT CHANGE UP (ref 0–0.9)
MONOCYTES NFR BLD AUTO: 8.7 % — SIGNIFICANT CHANGE UP (ref 2–14)
NEUTROPHILS # BLD AUTO: 7.35 K/UL — SIGNIFICANT CHANGE UP (ref 1.8–7.4)
NEUTROPHILS NFR BLD AUTO: 83 % — HIGH (ref 43–77)
NRBC # BLD: 0 /100 WBCS — SIGNIFICANT CHANGE UP (ref 0–0)
PHOSPHATE SERPL-MCNC: 2.7 MG/DL — SIGNIFICANT CHANGE UP (ref 2.5–4.5)
PLATELET # BLD AUTO: 325 K/UL — SIGNIFICANT CHANGE UP (ref 150–400)
POTASSIUM SERPL-MCNC: 3.1 MMOL/L — LOW (ref 3.5–5.3)
POTASSIUM SERPL-SCNC: 3.1 MMOL/L — LOW (ref 3.5–5.3)
RBC # BLD: 3.4 M/UL — LOW (ref 3.8–5.2)
RBC # FLD: 12.5 % — SIGNIFICANT CHANGE UP (ref 10.3–14.5)
SODIUM SERPL-SCNC: 132 MMOL/L — LOW (ref 135–145)
WBC # BLD: 8.86 K/UL — SIGNIFICANT CHANGE UP (ref 3.8–10.5)
WBC # FLD AUTO: 8.86 K/UL — SIGNIFICANT CHANGE UP (ref 3.8–10.5)

## 2020-08-13 PROCEDURE — 70450 CT HEAD/BRAIN W/O DYE: CPT | Mod: 26

## 2020-08-13 RX ORDER — LEVETIRACETAM 250 MG/1
1000 TABLET, FILM COATED ORAL ONCE
Refills: 0 | Status: COMPLETED | OUTPATIENT
Start: 2020-08-13 | End: 2020-08-13

## 2020-08-13 RX ORDER — POTASSIUM CHLORIDE 20 MEQ
40 PACKET (EA) ORAL EVERY 4 HOURS
Refills: 0 | Status: COMPLETED | OUTPATIENT
Start: 2020-08-13 | End: 2020-08-13

## 2020-08-13 RX ORDER — SODIUM CHLORIDE 9 MG/ML
1000 INJECTION INTRAMUSCULAR; INTRAVENOUS; SUBCUTANEOUS ONCE
Refills: 0 | Status: COMPLETED | OUTPATIENT
Start: 2020-08-13 | End: 2020-08-13

## 2020-08-13 RX ORDER — LEVETIRACETAM 250 MG/1
250 TABLET, FILM COATED ORAL EVERY 12 HOURS
Refills: 0 | Status: DISCONTINUED | OUTPATIENT
Start: 2020-08-13 | End: 2020-08-14

## 2020-08-13 RX ORDER — SODIUM CHLORIDE 9 MG/ML
500 INJECTION INTRAMUSCULAR; INTRAVENOUS; SUBCUTANEOUS ONCE
Refills: 0 | Status: COMPLETED | OUTPATIENT
Start: 2020-08-13 | End: 2020-08-13

## 2020-08-13 RX ADMIN — Medication 1 DROP(S): at 18:21

## 2020-08-13 RX ADMIN — GABAPENTIN 600 MILLIGRAM(S): 400 CAPSULE ORAL at 18:21

## 2020-08-13 RX ADMIN — Medication 4 MILLIGRAM(S): at 07:17

## 2020-08-13 RX ADMIN — Medication 1 MILLIGRAM(S): at 00:21

## 2020-08-13 RX ADMIN — APIXABAN 5 MILLIGRAM(S): 2.5 TABLET, FILM COATED ORAL at 18:22

## 2020-08-13 RX ADMIN — Medication 1 TABLET(S): at 11:45

## 2020-08-13 RX ADMIN — Medication 125 MILLIGRAM(S): at 18:24

## 2020-08-13 RX ADMIN — LEVETIRACETAM 400 MILLIGRAM(S): 250 TABLET, FILM COATED ORAL at 10:43

## 2020-08-13 RX ADMIN — GABAPENTIN 600 MILLIGRAM(S): 400 CAPSULE ORAL at 07:18

## 2020-08-13 RX ADMIN — Medication 1 DROP(S): at 07:29

## 2020-08-13 RX ADMIN — Medication 0.1 MILLIGRAM(S): at 07:17

## 2020-08-13 RX ADMIN — Medication 1 CAPSULE(S): at 11:45

## 2020-08-13 RX ADMIN — SODIUM CHLORIDE 500 MILLILITER(S): 9 INJECTION INTRAMUSCULAR; INTRAVENOUS; SUBCUTANEOUS at 01:50

## 2020-08-13 RX ADMIN — LEVETIRACETAM 440 MILLIGRAM(S): 250 TABLET, FILM COATED ORAL at 00:27

## 2020-08-13 RX ADMIN — SODIUM CHLORIDE 60 MILLILITER(S): 9 INJECTION INTRAMUSCULAR; INTRAVENOUS; SUBCUTANEOUS at 02:58

## 2020-08-13 RX ADMIN — LEVETIRACETAM 400 MILLIGRAM(S): 250 TABLET, FILM COATED ORAL at 21:28

## 2020-08-13 RX ADMIN — PANTOPRAZOLE SODIUM 40 MILLIGRAM(S): 20 TABLET, DELAYED RELEASE ORAL at 07:17

## 2020-08-13 RX ADMIN — Medication 125 MILLIGRAM(S): at 11:45

## 2020-08-13 RX ADMIN — Medication 1 CAPSULE(S): at 18:21

## 2020-08-13 RX ADMIN — APIXABAN 5 MILLIGRAM(S): 2.5 TABLET, FILM COATED ORAL at 07:17

## 2020-08-13 RX ADMIN — SODIUM CHLORIDE 1000 MILLILITER(S): 9 INJECTION INTRAMUSCULAR; INTRAVENOUS; SUBCUTANEOUS at 03:43

## 2020-08-13 RX ADMIN — Medication 40 MILLIEQUIVALENT(S): at 21:29

## 2020-08-13 RX ADMIN — Medication 125 MILLIGRAM(S): at 07:19

## 2020-08-13 RX ADMIN — Medication 40 MILLIEQUIVALENT(S): at 18:22

## 2020-08-13 NOTE — CHART NOTE - NSCHARTNOTEFT_GEN_A_CORE
Resident Rapid Response Note    Rapid response was called at 00:04 for seizures.    Events leading up to Rapid Response:    First seizure 00:03  ativan 1mg at 00:09  converted to code stroke  Second seizure 00:19  ativan 1mg at 00:21  keppra 1g at 00:27    Patient was seen and examined at the bedside by the rapid response team and resident medicine team.  ___ () / ICU PA at bedside. Pt actively seizing upon arrival, with tonic clonic seizing and frothing at mouth. Eyes closed, pt unresponsive to verbal and noxious stimuli. First seizure broke after 1mg ativan IV. Pt responding to voice and following verbal commands, opened eyes. L upper extremity flaccid. During second seizure pt turned head towards L with eyes deviated to L. Seizure began with tonic clonic movements on her L side. Second dose 1mg ativan given and seizure stopped.    Rapid Response Vital Signs:  BP: 163/92  HR: 152  RR: 20  SpO2: 86% on RA  Temp: 98.9  FS: 245    Vital Signs Last 24 Hrs  T(C): 36.3 (12 Aug 2020 20:12), Max: 36.9 (12 Aug 2020 14:26)  T(F): 97.3 (12 Aug 2020 20:12), Max: 98.4 (12 Aug 2020 14:26)  HR: 66 (12 Aug 2020 20:12) (65 - 74)  BP: 143/75 (12 Aug 2020 20:12) (114/73 - 143/75)  BP(mean): --  RR: 17 (12 Aug 2020 20:12) (17 - 20)  SpO2: 98% (12 Aug 2020 20:12) (97% - 98%)    Physical Exam:  General: Well developed, well nourished, in no acute distress  HEENT: NCAT, PERRLA, EOMI bl, moist mucous membranes   Neck: Supple, nontender, no mass  Neurology: A&Ox3, nonfocal, CN II-XII grossly intact, sensation intact, no gait abnormalities   Respiratory: CTA B/L, No wheezing, rales, or rhonchi  CV: RRR, S1/S2 present, no murmurs, rubs, or gallops  Abdominal: Soft, nontender, non-distended, normoactive bowel sounds. BMx1  Extremities: No C/C/E, peripheral pulses present  MSK: Normal ROM, no joint erythema or warmth, no joint swelling   Skin: warm, dry, normal color, no obvious rash or abnormal lesions      Assessment/Plan:  60 year old female with a history of HTN, DVT, lung cancer s/p resection, opiate use, alcohol abuse who presented to the hospital with right leg cellulitis and hyponatremia. Currently treated with Eliquis for DVT.    -CT head  -transfer to tele    -Discussed with  , who agreed with above plan.  -Will continue to follow, RN to call if any changes. Resident Rapid Response Note    Rapid response was called at 00:04 for seizures.    Events leading up to Rapid Response:    First seizure 00:03  ativan 1mg at 00:09  converted to code stroke  Second seizure 00:19  ativan 1mg at 00:21  keppra 1g at 00:27    Patient was seen and examined at the bedside by the rapid response team and resident medicine team and ICU MADONNA Randall at bedside. Pt actively seizing upon arrival, with tonic clonic seizing and frothing at mouth. Eyes closed, pt unresponsive to verbal and noxious stimuli. First seizure broke after 1mg ativan IV. Pt responding to voice and following verbal commands, opened eyes. L upper extremity flaccid. During second seizure pt turned head towards L with eyes deviated to L. Seizure began with tonic clonic movements on her L side. Second dose 1mg ativan given and seizure stopped.       Rapid Response Vital Signs:  BP: 163/92  HR: 152  RR: 20  SpO2: 86% on RA  Temp: 98.9  FS: 245    Vital Signs Last 24 Hrs  T(C): 36.3 (12 Aug 2020 20:12), Max: 36.9 (12 Aug 2020 14:26)  T(F): 97.3 (12 Aug 2020 20:12), Max: 98.4 (12 Aug 2020 14:26)  HR: 66 (12 Aug 2020 20:12) (65 - 74)  BP: 143/75 (12 Aug 2020 20:12) (114/73 - 143/75)  BP(mean): --  RR: 17 (12 Aug 2020 20:12) (17 - 20)  SpO2: 98% (12 Aug 2020 20:12) (97% - 98%)    Physical Exam:  General: Unresponsive to verbal and noxious stimuli and actively having seizure  Neurology: A&Ox2,    Respiratory: CTA B/L, No wheezing,  CV: RRR, S1/S2 present, no murmurs,   Abdominal: Soft,, non-distended, normoactive bowel sounds. BMx1(during Seizure)  Extremities: No C/C/E, peripheral pulses present  MSK: Normal ROM, no joint erythema or warmth, no joint swelling   Skin: warm, dry, normal color, no obvious rash or abnormal lesions      Assessment/Plan:  60 year old female with a history of HTN, DVT, lung cancer s/p resection, opiate use, alcohol abuse who presented to the hospital with right leg cellulitis and hyponatremia. Currently treated with Eliquis for DVT.    -CT head  -transfer to tele    -Discussed with  , who agreed with above plan.  -Will continue to follow, RN to call if any changes. Resident Rapid Response Note    Rapid response was called at 00:04 for seizures.    Events leading up to Rapid Response:    First seizure 00:03  ativan 1mg at 00:09  converted to code stroke  Second seizure 00:19  ativan 1mg at 00:21  keppra 1g at 00:27    Patient was seen and examined at the bedside by the rapid response team and resident medicine team and ICU MADONNA Randall at bedside. Pt actively seizing upon arrival, with tonic clonic seizing and frothing at mouth. Eyes closed, pt unresponsive to verbal and noxious stimuli. First seizure broke after 1mg ativan IV. Pt responding to voice and following verbal commands, opened eyes. L upper extremity flaccid. During second seizure pt turned head towards L with eyes deviated to L. Seizure began with tonic clonic movements on her L side. Second dose 1mg ativan given and seizure stopped.       Rapid Response Vital Signs:  BP: 163/92  HR: 152  RR: 20  SpO2: 86% on RA  Temp: 98.9  FS: 245    Vital Signs Last 24 Hrs  T(C): 36.3 (12 Aug 2020 20:12), Max: 36.9 (12 Aug 2020 14:26)  T(F): 97.3 (12 Aug 2020 20:12), Max: 98.4 (12 Aug 2020 14:26)  HR: 66 (12 Aug 2020 20:12) (65 - 74)  BP: 143/75 (12 Aug 2020 20:12) (114/73 - 143/75)  BP(mean): --  RR: 17 (12 Aug 2020 20:12) (17 - 20)  SpO2: 98% (12 Aug 2020 20:12) (97% - 98%)    Physical Exam:  General: Unresponsive to verbal and noxious stimuli and actively seizing  Neurology: unresponsive, Left sided weakness.   Respiratory: CTA B/L, No wheezing,  CV: tachycardic, S1/S2 present, no murmurs,   Abdominal: Soft,, non-distended, normoactive bowel sounds. BMx1(during Seizure)  Extremities: No C/C/E, peripheral pulses present     Physical Exam between 1st and 2nd seizure  General: awake, no acute distress   Neurology: opened her eyes to name, followed verbal commands, patient remained nonverbal, Left UE and LE flaccid , strength 5/5  RUE, and RLE  Respiratory: CTA B/L, No wheezing,  CV:  tachycardic , S1/S2 present, no murmurs,   Abdominal: Soft, non-distended, normoactive bowel sounds. BMx1(during Seizure)  Extremities: No C/C/E, peripheral pulses present       Assessment/Plan:  60 year old female with a history of HTN, DVT, lung cancer s/p resection, anxiety disorder, opiate use, alcohol abuse who presented to the hospital with right leg cellulitis and hyponatremia. Currently treated with Eliquis for DVT.    Status epilepticus secondary to unknown  - Placed on Non rebreather for desaturation to 86 on RA, Saturation improved to 97 on Non rebreather  - STAT IV Ativan 1mg X 2 dose . Seizures stopped after 2nd dose  - Started on IV keppra 1000mg x 1 dose  - STAT CT head performed to r/o stroke< from: CT Head No Cont (08.13.20 @ 00:58) > There is no CT evidence of acute intracranial hemorrhage, mass effect, midline shift, or acute, large territorial infarct.    The ventricles and sulci are normal in size and configuration. The basal cisterns are patent. Few scattered periventricular and subcortical white matter hypodensities are nonspecific, although likely on the basis of chronic small vessel ischemic disease.  - STAT labs drawn: CBC, BMP Mg, Phos  - Transferred to telemetry unit for cardiac and pulse oximetry monitoring  - Neurology Dr. Mccarty consulted  - Plan discussed with Senior resident, Dr. Vo , and ICU PA at bedside  - Call placed to Dr. Avendaño, attending physician of record, awaiting call back  - Will continue to follow, RN to call with status changes    ADDENDUM     RN called for SBP <70. Gave STAT 500 cc NS bolus. Will  follow up with repeat measurement        -Dr faulkner was called.  -transfer to tele Resident Rapid Response Note    Rapid response was called at 00:04 for seizures.    Events leading up to Rapid Response:    First seizure 00:03  ativan 1mg at 00:09  converted to code stroke  Second seizure 00:19  ativan 1mg at 00:21  keppra 1g at 00:27    Patient was seen and examined at the bedside by the rapid response team and resident medicine team and ICU MADONNA Randall at bedside. Pt actively seizing upon arrival, with tonic clonic seizing and frothing at mouth. Eyes closed, pt unresponsive to verbal and noxious stimuli. First seizure broke after 1mg ativan IV. Pt responding to voice and following verbal commands, opened eyes. L upper extremity flaccid. During second seizure pt turned head towards L with eyes deviated to L. Seizure began with tonic clonic movements on her L side. Second dose 1mg ativan given and seizure stopped.       Rapid Response Vital Signs:  BP: 163/92  HR: 152  RR: 20  SpO2: 86% on RA  Temp: 98.9  FS: 245    Vital Signs Last 24 Hrs  T(C): 36.3 (12 Aug 2020 20:12), Max: 36.9 (12 Aug 2020 14:26)  T(F): 97.3 (12 Aug 2020 20:12), Max: 98.4 (12 Aug 2020 14:26)  HR: 66 (12 Aug 2020 20:12) (65 - 74)  BP: 143/75 (12 Aug 2020 20:12) (114/73 - 143/75)  BP(mean): --  RR: 17 (12 Aug 2020 20:12) (17 - 20)  SpO2: 98% (12 Aug 2020 20:12) (97% - 98%)    Physical Exam:  General: Unresponsive to verbal and noxious stimuli and actively seizing  Neurology: unresponsive, Left sided weakness.   Respiratory: CTA B/L, No wheezing,  CV: tachycardic, S1/S2 present, no murmurs,   Abdominal: Soft,, non-distended, normoactive bowel sounds. BMx1(during Seizure)  Extremities: No C/C/E, peripheral pulses present     Physical Exam between 1st and 2nd seizure  General: awake, no acute distress   Neurology: opened her eyes to name, followed verbal commands, patient remained nonverbal, Left UE and LE flaccid , strength 5/5  RUE, and RLE  Respiratory: CTA B/L, No wheezing,  CV:  tachycardic , S1/S2 present, no murmurs,   Abdominal: Soft, non-distended, normoactive bowel sounds. BMx1(during Seizure)  Extremities: No C/C/E, peripheral pulses present       Assessment/Plan:  60 year old female with a history of HTN, DVT, lung cancer s/p resection, anxiety disorder, opiate use, alcohol abuse who presented to the hospital with right leg cellulitis and hyponatremia. Currently treated with Eliquis for DVT.    Status epilepticus secondary to unknown. Pt's lab was +ve for Opioid and Benzo on the 9th which is 2 days before this hospital admission, Withdrawal could be a possible cause of seizure.  - Placed on Non rebreather for desaturation to 86 on RA, Saturation improved to 97 on Non rebreather  - STAT IV Ativan 1mg X 2 dose . Seizures stopped after 2nd dose  - Started on IV keppra 1000mg x 1 dose  - STAT CT head performed to r/o stroke< from: CT Head No Cont (08.13.20 @ 00:58) > There is no CT evidence of acute intracranial hemorrhage, mass effect, midline shift, or acute, large territorial infarct.    The ventricles and sulci are normal in size and configuration. The basal cisterns are patent. Few scattered periventricular and subcortical white matter hypodensities are nonspecific, although likely on the basis of chronic small vessel ischemic disease.  - STAT labs drawn: CBC, BMP Mg, Phos  - Transferred to telemetry unit for cardiac and pulse oximetry monitoring  - Neurology Dr. Mccarty consulted  - Plan discussed with Senior resident, Dr. Vo , and ICU PA at bedside  - Call placed to Dr. Avendaño, attending physician of record, awaiting call back  - Will continue to follow, RN to call with status changes    ADDENDUM     RN called for SBP <70. Gave STAT 500 cc NS bolus. Will  follow up with repeat measurement        -Dr faulkner was called.  -transfer to tele

## 2020-08-13 NOTE — PROGRESS NOTE ADULT - ASSESSMENT
60 yr old with PMH of ch pain with Opiate and ETOH dependency and abuse, alcoholic pancreatitis, Mixed connective tissue disorder, left soleal vein thrombosis, adeno ca lung- s/p RML resection 2018, ex smoker,rec leg cellulitis, was admitted in [facility] with alcoholic pancreatitis and HTN and discharged on 7/31, ?CAD,after one day at home pt went to [facility] for cellulitis treated with doxycycline and is improving, and 2 days back went to [facility] and lian told to drink lot of water and now says feeling weak and ataxic, In ED syo Na 120 , and received IV fluids, refused vancomycin as improving and is admitted to [facility] for Hyponatremia, likely  excessive water intake.pt took last drink 2 weeks back and thinks she is withdrawing.  admitted for further care  hyponatremia  ETOH and opiate abuse and dependency  rt leg cellulitis improving on doxycycline taken out pt  ch pain syndrome  MCTD on steroids  soleal V thrombosis   s/p RML resection for lung adenoca  peripheral neuropathy  electrolytes improving   c diff- pt on vancomycin completing course- no diarrhea,  had seizures overnight 8/13- received ATIVan, post ictal hypotensive, received IV fluids and had retention of urine and is on araya,   Seizures suspect secondary  to substance use with withdrawal patient on a lot of substances on outside and unknown quantity of xanax    EEG showes seizure activity and started on Keppra by Neuro  Memory issues, suspect this could be mild cognitive impairment versus possibly secondary to a history of substance use ataxia, suspect multifactorial, possibly secondary to spinal disc disease, possibly alcohol-induced neuropathy, deconditioning.  Hypotension 8/13- clonidine held and receive IV fluids  d/w HCP Anita [phone]  all questions answered and discussed prognosis and management.

## 2020-08-13 NOTE — CHART NOTE - NSCHARTNOTEFT_GEN_A_CORE
RAPID RESPONSE FOLLOW UP NOTE    Patient seen and examined at bedside. RR called @  00:04 am for Seizure. Pt reassessed around 2:15 Patient drowsy abd sleepy but arousable. No verbal communication, follows command.  -Pt receiving NS bolus      Vital Signs Last 24 Hrs  T(C): 36.4 (13 Aug 2020 01:15), Max: 36.9 (12 Aug 2020 14:26)  T(F): 97.5 (13 Aug 2020 01:15), Max: 98.4 (12 Aug 2020 14:26)  HR: 92 (13 Aug 2020 01:) (65 - 92)  BP: 74/54 (13 Aug 2020 01:) (63/47 - 143/75)  BP(mean): --  RR: 17 (13 Aug 2020 01:25) (17 - 20)  SpO2: 97% (13 Aug 2020 01:25) (89% - 98%)      PHYSICAL EXAM:  GENERAL: Drowsy and sleepy but arousable  HEAD:  Atraumatic   CHEST/LUNG: Clear to auscultation bilaterally; No rales, rhonchi,   HEART: Regular rate and rhythm; No murmurs  ABDOMEN: Bowel sounds present; Soft, Nondistended.   EXTREMITIES:  2+ Peripheral Pulses, brisk capillary refill. No clubbing, cyanosis, or edema  NERVOUS SYSTEM:  Alert & Oriented X2, follows verbal commands open eyes, Motor strength in LUE 0/5, and LLE is 3/5. Pt able to move her left lower ext to stimulation of her sole. Strength is 5/5 on right upper and lower ext.          LABS:                        12.0   4.54  )-----------( 338      ( 12 Aug 2020 08:24 )             34.5       08-13    132<L>  |  99  |  4<L>  ----------------------------<  183<H>  3.1<L>   |  20<L>  |  0.84    Ca    9.2      13 Aug 2020 01:23  Phos  2.7     08-13  Mg     1.9     08-13    TPro  6.4  /  Alb  3.3  /  TBili  0.3  /  DBili  x   /  AST  32  /  ALT  31  /  AlkPhos  34<L>  08-12      LIVER FUNCTIONS - ( 12 Aug 2020 08:24 )  Alb: 3.3 g/dL / Pro: 6.4 g/dL / ALK PHOS: 34 U/L / ALT: 31 U/L / AST: 32 U/L / GGT: x             CAPILLARY BLOOD GLUCOSE      POCT Blood Glucose.: 245 mg/dL (13 Aug 2020 00:09)      PT/INR - ( 11 Aug 2020 08:14 )   PT: 12.6 sec;   INR: 1.04 ratio         PTT - ( 11 Aug 2020 08:14 )  PTT:33.1 sec    Urinalysis Basic - ( 11 Aug 2020 09:14 )    Color: Yellow / Appearance: Clear / S.010 / pH: x  Gluc: x / Ketone: Moderate  / Bili: Negative / Urobili: Negative mg/dL   Blood: x / Protein: Negative mg/dL / Nitrite: Negative   Leuk Esterase: Small / RBC: 0-2 /HPF / WBC 0-2   Sq Epi: x / Non Sq Epi: Few / Bacteria: Few           ASSESSMENT/ PLAN:  60 year old female with a history of HTN, DVT, lung cancer s/p resection, anxiety disorder, opiate use, alcohol abuse who presented to the hospital with right leg cellulitis and hyponatremia. Currently treated with Eliquis for DVT.    -Pt still receiving her NS bolus of 500cc b/c of hypotension.  -RN informs no seizure like activity  -Labs reviewed  -RN to call with fresh vitals once bolus is complete  -RN to call with any new changes.      Addendum:  -RN called informing that the pt's BP post NS bolus is 74/54, Hr 78, temp 98 and saturating at 94% on non rebreather  -will give another bolus of NS 1000cc  -Pt removed from no rebreather for 7 mints and pt was able to maintain the saturation around 94-96% on RA  -will hold non rebreather for now.  -RN to call if with any new changes RAPID RESPONSE FOLLOW UP NOTE    Patient seen and examined at bedside. RR called @  00:04 am for Seizure. Pt reassessed around 2:15 Patient drowsy abd sleepy but arousable. No verbal communication, follows command.  -Pt receiving NS bolus      Vital Signs Last 24 Hrs  T(C): 36.4 (13 Aug 2020 01:15), Max: 36.9 (12 Aug 2020 14:26)  T(F): 97.5 (13 Aug 2020 01:15), Max: 98.4 (12 Aug 2020 14:26)  HR: 92 (13 Aug 2020 01:25) (65 - 92)  BP: 74/54 (13 Aug 2020 01:25) (63/47 - 143/75)  BP(mean): --  RR: 17 (13 Aug 2020 01:25) (17 - 20)  SpO2: 97% (13 Aug 2020 01:25) (89% - 98%)      PHYSICAL EXAM:  GENERAL: Drowsy and sleepy but arousable  HEAD:  Atraumatic   CHEST/LUNG: Clear to auscultation bilaterally; No rales, rhonchi,   HEART: Regular rate and rhythm; No murmurs  ABDOMEN: Bowel sounds present; Soft, Nondistended.   EXTREMITIES:  2+ Peripheral Pulses, brisk capillary refill. No clubbing, cyanosis, or edema  NERVOUS SYSTEM:  Alert & Oriented X2, follows verbal commands open eyes, Motor strength in LUE 0/5, and LLE is 3/5. Pt able to move her left lower ext to stimulation of her sole. Strength is 5/5 on right upper and lower ext.          LABS:                        12.0   4.54  )-----------( 338      ( 12 Aug 2020 08:24 )             34.5       08-13    132<L>  |  99  |  4<L>  ----------------------------<  183<H>  3.1<L>   |  20<L>  |  0.84    Ca    9.2      13 Aug 2020 01:23  Phos  2.7     08-13  Mg     1.9     08-13    TPro  6.4  /  Alb  3.3  /  TBili  0.3  /  DBili  x   /  AST  32  /  ALT  31  /  AlkPhos  34<L>  08-12         ASSESSMENT/ PLAN:  60 year old female with a history of HTN, DVT, lung cancer s/p resection, anxiety disorder, opiate use, alcohol abuse who presented to the hospital with right leg cellulitis and hyponatremia. Currently treated with Eliquis for DVT.    -Pt still receiving her NS bolus of 500cc b/c of hypotension.  -RN informs no seizure like activity  -Labs reviewed  -RN to call with fresh vitals once bolus is complete  -RN to call with any new changes.      Addendum:  -RN called informing that the pt's BP post NS bolus is 74/54, Hr 78, temp 98 and saturating at 94% on non rebreather  -will give another bolus of NS 1000cc  -Pt removed from no rebreather for 7 mints and pt was able to maintain the saturation around 94-96% on RA  -will hold non rebreather for now.  - Ordered STAT CBC

## 2020-08-13 NOTE — PROGRESS NOTE ADULT - SUBJECTIVE AND OBJECTIVE BOX
Chief Complaint: Right leg cellulitis    Interval Events: Seizure and hypotension overnight.    Review of Systems:  General: No fevers, chills, weight loss or gain  Skin: No rashes, color changes  Cardiovascular: No chest pain, orthopnea  Respiratory: No shortness of breath, cough  Gastrointestinal: No nausea, abdominal pain  Genitourinary: No incontinence, pain with urination  Musculoskeletal: No pain, swelling, decreased range of motion  Neurological: No headache, weakness  Psychiatric: No depression, anxiety  Endocrine: No weight loss or gain, increased thirst  All other systems are comprehensively negative.    Physical Exam:  Vitals:        Vital Signs Last 24 Hrs  T(C): 36.3 (12 Aug 2020 05:09), Max: 37.2 (12 Aug 2020 00:10)  T(F): 97.3 (12 Aug 2020 05:09), Max: 98.9 (12 Aug 2020 00:10)  HR: 66 (12 Aug 2020 08:32) (65 - 78)  BP: 129/77 (12 Aug 2020 08:32) (120/78 - 148/87)  BP(mean): --  RR: 20 (12 Aug 2020 08:32) (16 - 20)  SpO2: 98% (12 Aug 2020 08:32) (96% - 100%)  General: NAD  HEENT: MMM  Neck: No JVD, no carotid bruit  Lungs: CTAB  CV: RRR, nl S1/S2, no M/R/G  Abdomen: S/NT/ND, +BS  Extremities: No LE edema, no cyanosis  Neuro: AAOx3, non-focal  Skin: Right leg erythema    Labs:                        12.0   4.54  )-----------( 338      ( 12 Aug 2020 08:24 )             34.5     08-12    129<L>  |  94<L>  |  3<L>  ----------------------------<  92  3.7   |  27  |  0.46<L>    Ca    8.6      12 Aug 2020 08:24    TPro  6.4  /  Alb  3.3  /  TBili  0.3  /  DBili  x   /  AST  32  /  ALT  31  /  AlkPhos  34<L>  08-12    Telemetry: Sinus rhythm

## 2020-08-13 NOTE — PROGRESS NOTE ADULT - ASSESSMENT
60 female with a history of ETOH, RLE cellulitis and history of C Diff Colitis now admitted with feeling weak and tired with intact mental status. Sodium improved from yesterday. 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 Will continue on Isotonic saline. Supplement potassium as ordered. ETOH withdrawal protocol in place.

## 2020-08-13 NOTE — PROGRESS NOTE ADULT - SUBJECTIVE AND OBJECTIVE BOX
Date/Time Patient Seen:  		  Referring MD:   Data Reviewed	       Patient is a 60y old  Female who presents with a chief complaint of rt leg cellulitis, dizziness (12 Aug 2020 15:50)      Subjective/HPI     PAST MEDICAL & SURGICAL HISTORY:  Lung cancer  Opiate dependence  Alcohol abuse  Adenocarcinoma of lung  Mixed connective tissue disease  Depression H/O panic attack: with anxiety  S/P Laminectomy  Lumbar 3/10  Chronic pain  Diverticulitis of colon (without mention of hemorrhage)  History of laminectomy  History of lung surgery  History of oophorectomy, unilateral: bilateral  History of hip replacement, total, right: 6/7/2020  S/P lumbar laminectomy  S/P cholecystectomy  History of lung cancer: s/p wedge resection of RML  No significant past surgical history        Medication list         MEDICATIONS  (STANDING):  apixaban 5 milliGRAM(s) Oral two times a day  artificial tears (preservative free) Ophthalmic Solution 1 Drop(s) Both EYES two times a day  fentaNYL   Patch  25 MICROgram(s)/Hr 1 Patch Transdermal every 72 hours  gabapentin 600 milliGRAM(s) Oral two times a day  hydroxychloroquine 200 milliGRAM(s) Oral <User Schedule>  methylPREDNISolone 4 milliGRAM(s) Oral daily  multivitamin 1 Tablet(s) Oral daily  pancrelipase  (CREON 12,000 Lipase Units) 1 Capsule(s) Oral three times a day with meals  pantoprazole    Tablet 40 milliGRAM(s) Oral before breakfast  sodium chloride 0.9%. 1000 milliLiter(s) (60 mL/Hr) IV Continuous <Continuous>  vancomycin    Solution 125 milliGRAM(s) Oral every 6 hours    MEDICATIONS  (PRN):  ALPRAZolam 0.25 milliGRAM(s) Oral two times a day PRN anxiety  chlordiazePOXIDE 5 milliGRAM(s) Oral every 6 hours PRN for ciwa > 6  loperamide 2 milliGRAM(s) Oral every 6 hours PRN Diarrhea  ondansetron    Tablet 4 milliGRAM(s) Oral every 8 hours PRN Nausea and/or Vomiting  oxyCODONE    IR 10 milliGRAM(s) Oral every 6 hours PRN Moderate Pain (4 - 6)         Vitals log        ICU Vital Signs Last 24 Hrs  T(C): 36.6 (13 Aug 2020 04:36), Max: 36.9 (12 Aug 2020 14:26)  T(F): 97.9 (13 Aug 2020 04:36), Max: 98.4 (12 Aug 2020 14:26)  HR: 87 (13 Aug 2020 04:36) (66 - 92)  BP: 84/60 (13 Aug 2020 04:36) (63/47 - 143/75)  BP(mean): --  ABP: --  ABP(mean): --  RR: 17 (13 Aug 2020 04:36) (17 - 20)  SpO2: 95% (13 Aug 2020 06:37) (81% - 98%)           Input and Output:  I&O's Detail      Lab Data                        11.7   8.86  )-----------( 325      ( 13 Aug 2020 07:26 )             34.2     08-13    132<L>  |  99  |  4<L>  ----------------------------<  183<H>  3.1<L>   |  20<L>  |  0.84    Ca    9.2      13 Aug 2020 01:23  Phos  2.7     08-13  Mg     1.9     08-13    TPro  6.4  /  Alb  3.3  /  TBili  0.3  /  DBili  x   /  AST  32  /  ALT  31  /  AlkPhos  34<L>  08-12            Review of Systems	      Objective     Physical Examination    heart s1s2  lung dec BS  abd soft      Pertinent Lab findings & Imaging      Parminder:  NO   Adequate UO     I&O's Detail           Discussed with:     Cultures:	        Radiology

## 2020-08-13 NOTE — PROGRESS NOTE ADULT - SUBJECTIVE AND OBJECTIVE BOX
JULIO CESAR FAYE is a 60yFemale , patient examined and chart reviewed.    INTERVAL HPI/ OVERNIGHT EVENTS:   Events noted. Had Seizure and AUR last night.   Now on Tele. NAD. Afebrile.    PAST MEDICAL & SURGICAL HISTORY:  Lung cancer  Opiate dependence  Alcohol abuse  Adenocarcinoma of lung  Mixed connective tissue disease  Depression H/O panic attack: with anxiety  S/P Laminectomy  Lumbar 3/10  Chronic pain  Diverticulitis of colon (without mention of hemorrhage)  History of laminectomy  History of lung surgery  History of oophorectomy, unilateral: bilateral  History of hip replacement, total, right: 6/7/2020  S/P lumbar laminectomy  S/P cholecystectomy  History of lung cancer: s/p wedge resection of RML      For details regarding the patient's social history, family history, and other miscellaneous elements, please refer the initial infectious diseases consultation and/or the admitting history and physical examination for this admission.      ROS:  CONSTITUTIONAL:  Negative fever or chills   EYES:  Negative  blurry vision or double vision  CARDIOVASCULAR:  Negative for chest pain or palpitations  RESPIRATORY:  Negative for cough, wheezing, or SOB   GASTROINTESTINAL:  Negative for nausea, vomiting, diarrhea, constipation, or abdominal pain  GENITOURINARY:  Negative frequency, urgency or dysuria  NEUROLOGIC:  No headache, confusion, dizziness, lightheadedness  All other systems were reviewed and are negative     ALLERGIES:  penicillin (Swelling)      Current inpatient medications :    ANTIBIOTICS/RELEVANT:  hydroxychloroquine 200 milliGRAM(s) Oral <User Schedule>  vancomycin    Solution 125 milliGRAM(s) Oral every 6 hours      ALPRAZolam 0.25 milliGRAM(s) Oral two times a day PRN  apixaban 5 milliGRAM(s) Oral two times a day  artificial tears (preservative free) Ophthalmic Solution 1 Drop(s) Both EYES two times a day  chlordiazePOXIDE 5 milliGRAM(s) Oral every 6 hours PRN  gabapentin 600 milliGRAM(s) Oral two times a day  levETIRAcetam  IVPB 250 milliGRAM(s) IV Intermittent every 12 hours  loperamide 2 milliGRAM(s) Oral every 6 hours PRN  methylPREDNISolone 4 milliGRAM(s) Oral daily  multivitamin 1 Tablet(s) Oral daily  ondansetron    Tablet 4 milliGRAM(s) Oral every 8 hours PRN  oxyCODONE    IR 10 milliGRAM(s) Oral every 6 hours PRN  pancrelipase  (CREON 12,000 Lipase Units) 1 Capsule(s) Oral three times a day with meals  pantoprazole    Tablet 40 milliGRAM(s) Oral before breakfast  potassium chloride    Tablet ER 40 milliEquivalent(s) Oral every 4 hours  sodium chloride 0.9%. 1000 milliLiter(s) IV Continuous <Continuous>      Objective:    T(C): 36.5 (08-13-20 @ 12:59), Max: 36.6 (08-13-20 @ 04:36)  HR: 88 (08-13-20 @ 12:59) (66 - 92)  BP: 95/62 (08-13-20 @ 12:59) (63/47 - 143/75)  RR: 18 (08-13-20 @ 12:59) (17 - 18)  SpO2: 95% (08-13-20 @ 12:59) (81% - 98%)    Physical Exam:  General:  no acute distress  Eyes: sclera anicteric, pupils equal and reactive to light  ENMT: buccal mucosa moist, pharynx not injected  Neck: supple, trachea midline  Lungs: clear, no wheeze/rhonchi  Cardiovascular: regular rate and rhythm, S1 S2  Abdomen: soft, nontender, no organomegaly present, bowel sounds normal  Neurological: alert and oriented x3, Cranial Nerves II-XII grossly intact  Skin: no increased ecchymosis/petechiae/purpura  Lymph Nodes: no palpable cervical/supraclavicular lymph nodes enlargements  Extremities: Right LE erythema resolving nicely    LABS:                        11.7   8.86  )-----------( 325      ( 13 Aug 2020 07:26 )             34.2       08-13    132<L>  |  99  |  4<L>  ----------------------------<  183<H>  3.1<L>   |  20<L>  |  0.84    Ca    9.2      13 Aug 2020 01:23  Phos  2.7     08-13  Mg     1.9     08-13    TPro  6.4  /  Alb  3.3  /  TBili  0.3  /  DBili  x   /  AST  32  /  ALT  31  /  AlkPhos  34<L>  08-12      MICROBIOLOGY:    Culture - Urine (collected 11 Aug 2020 15:23)  Source: .Urine Clean Catch (Midstream)  Final Report (12 Aug 2020 14:52):    <10,000 CFU/mL Normal Urogenital Rubia    Culture - Blood (collected 11 Aug 2020 15:21)  Source: .Blood Blood-Peripheral  Preliminary Report (12 Aug 2020 16:01):    No growth to date.    Culture - Blood (collected 11 Aug 2020 15:21)  Source: .Blood Blood-Peripheral  Preliminary Report (12 Aug 2020 16:01):    No growth to date    RADIOLOGY & ADDITIONAL STUDIES:  EXAM:  XR CHEST PORTABLE IMMED 1V                            PROCEDURE DATE:  07/27/2020          INTERPRETATION:  History: Epigastric pain    Portable radiograph of the chest is performed. comparison is made to 7/30/2019.    The heart is not enlarged. The trachea is midline. The lungs are clear. Osseous structures are unremarkable.    Impression: No active pulmonary disease.      Assessment and plan.  1. Right LE cellulitis- Improved  Completed course of Doxycycline  Elevate leg    2. Cdiff  Diarrhea appears to have resolved  Finish course of po Vancomycin till 8/17/2020    3. seizure per neuro    4. AUR with [place] per primary team      Stable from ID standpoint    D/w DR Avendaño        Continue with present regiment.  Appropriate use of antibiotics and adverse effects reviewed.      I have discussed the above plan of care with patient/ family in detail. They expressed understanding of the  treatment plan . Risks, benefits and alternatives discussed in detail. I have asked if they have any questions or concerns and appropriately addressed them to the best of my ability .    > 35 minutes were spent in direct patient care reviewing notes, medications ,labs data/ imaging , discussion with multidisciplinary team.    Thank you for allowing me to participate in care of your patient .    Demetria Hancock MD  Infectious Disease  [phone]

## 2020-08-13 NOTE — CHART NOTE - NSCHARTNOTEFT_GEN_A_CORE
-RN called informing that the pt's urine out put so far has been negligible.  -Vitals after her 1000cc NS bolus are- BP 84/60, HR 87, temp97.9 and sating at 95% on RA  - RN advised to get bladder scan.  -RN to call once bladder scan is done.      Addendum:  -RN called informing that the pt's bladder scan showed 999cc.  -advised to straight cath   -RN to call with any changes.

## 2020-08-13 NOTE — PROGRESS NOTE ADULT - SUBJECTIVE AND OBJECTIVE BOX
Patient is a 60y old  Female who presents with a chief complaint of rt leg cellulitis, dizziness (13 Aug 2020 09:25)      INTERVAL HPI/OVERNIGHT EVENTS:overnight events noted, had seizures and hypotension and retention of urine    Home Medications:  ALPRAZolam 0.25 mg oral tablet: 0.25 milligram(s) orally 2 times a day, As Needed (12 Aug 2020 14:09)  cloNIDine 0.1 mg oral tablet: 1 tab(s) orally 2 times a day (12 Aug 2020 14:09)  Creon 12,000 units oral delayed release capsule: 1 cap(s) orally 3 times a day (12 Aug 2020 14:09)  doxycycline hyclate 100 mg oral capsule: 1 cap(s) orally 2 times a day (12 Aug 2020 14:09)  fentaNYL 25 mcg/hr transdermal film, extended release: 1 film(s) transdermal every 72 hours    *Of note: Patient applied patch this morning, 08/11/20 (12 Aug 2020 01:14)  fentaNYL 25 mcg/hr transdermal film, extended release: 1 film(s) transdermal every 72 hours (12 Aug 2020 14:09)  gabapentin 600 mg oral tablet: 600 milligram(s) orally 3 times a day (12 Aug 2020 14:09)  gabapentin 600 mg oral tablet: 2 tab(s) orally 2 times a day (12 Aug 2020 01:14)  hydroxychloroquine 200 mg oral tablet: 200 milligram(s) orally every other day (12 Aug 2020 14:09)  methylPREDNISolone 4 mg oral tablet: orally once a day (12 Aug 2020 14:09)  methylPREDNISolone 4 mg oral tablet: 1 tab(s) orally once a day (12 Aug 2020 01:14)  Multiple Vitamins oral tablet: 1 tab(s) orally once a day (12 Aug 2020 01:14)  ondansetron 4 mg oral tablet: 1 tab(s) orally every 8 hours (12 Aug 2020 14:09)  oxyCODONE 10 mg oral tablet: 10 milligram(s) orally every 4 hours, As Needed (12 Aug 2020 14:09)  oxyCODONE 10 mg oral tablet: 1 tab(s) orally 4-5times a day, As Needed (12 Aug 2020 01:14)  pantoprazole: 40 milligram(s) orally once a day (12 Aug 2020 14:09)  Plaquenil 200 mg oral tablet: 1 tab(s) orally every other day (12 Aug 2020 01:14)  Restasis 0.05% ophthalmic emulsion: 1 drop(s) to each affected eye every 12 hours (12 Aug 2020 01:14)  vancomycin 125 mg oral capsule: 1 cap(s) orally every 6 hours (12 Aug 2020 14:09)  vancomycin 125 mg oral capsule: 1 cap(s) orally every 6 hours    8am, 2pm, 8pm, 2am (12 Aug 2020 01:14)  Xanax 0.25 mg oral tablet: 1 tab(s) orally 2 times a day, As Needed (12 Aug 2020 01:14)      MEDICATIONS  (STANDING):  apixaban 5 milliGRAM(s) Oral two times a day  artificial tears (preservative free) Ophthalmic Solution 1 Drop(s) Both EYES two times a day  gabapentin 600 milliGRAM(s) Oral two times a day  hydroxychloroquine 200 milliGRAM(s) Oral <User Schedule>  levETIRAcetam  IVPB 250 milliGRAM(s) IV Intermittent every 12 hours  methylPREDNISolone 4 milliGRAM(s) Oral daily  multivitamin 1 Tablet(s) Oral daily  pancrelipase  (CREON 12,000 Lipase Units) 1 Capsule(s) Oral three times a day with meals  pantoprazole    Tablet 40 milliGRAM(s) Oral before breakfast  sodium chloride 0.9%. 1000 milliLiter(s) (60 mL/Hr) IV Continuous <Continuous>  vancomycin    Solution 125 milliGRAM(s) Oral every 6 hours    MEDICATIONS  (PRN):  ALPRAZolam 0.25 milliGRAM(s) Oral two times a day PRN anxiety  chlordiazePOXIDE 5 milliGRAM(s) Oral every 6 hours PRN for ciwa > 6  loperamide 2 milliGRAM(s) Oral every 6 hours PRN Diarrhea  ondansetron    Tablet 4 milliGRAM(s) Oral every 8 hours PRN Nausea and/or Vomiting  oxyCODONE    IR 10 milliGRAM(s) Oral every 6 hours PRN Moderate Pain (4 - 6)      Allergies    penicillin (Other)  penicillin (Swelling)    Intolerances        REVIEW OF SYSTEMS:  CONSTITUTIONAL: No fever, weight loss, has  fatigue  EYES: No eye pain, visual disturbances, or discharge  ENMT:  No difficulty hearing, tinnitus, vertigo; No sinus or throat pain  NECK: No pain or stiffness  BREASTS: No pain, masses, or nipple discharge  RESPIRATORY: No cough, wheezing, chills or hemoptysis; No shortness of breath  CARDIOVASCULAR: No chest pain, palpitations, dizziness, or leg swelling  GASTROINTESTINAL: No abdominal or epigastric pain. No nausea, vomiting, or hematemesis; No diarrhea or constipation. No melena or hematochezia.  GENITOURINARY: No dysuria, frequency, hematuria, or incontinence  NEUROLOGICAL: has  memory loss, loss of strength,ataxic gait  SKIN: No itching, burning, rashes, or lesions   ENDOCRINE: No heat or cold intolerance; No hair loss  MUSCULOSKELETAL: No joint pain or swelling; No muscle, back, or extremity pain  PSYCHIATRIC: No depression, anxiety, mood swings, or difficulty sleeping  HEME/LYMPH: No easy bruising, or bleeding gums  ALLERGY AND IMMUNOLOGIC: No hives or eczema    Vital Signs Last 24 Hrs  T(C): 36.4 (13 Aug 2020 08:32), Max: 36.9 (12 Aug 2020 14:26)  T(F): 97.5 (13 Aug 2020 08:32), Max: 98.4 (12 Aug 2020 14:26)  HR: 90 (13 Aug 2020 08:32) (66 - 92)  BP: 89/58 (13 Aug 2020 08:32) (63/47 - 143/75)  BP(mean): --  RR: 18 (13 Aug 2020 08:32) (17 - 18)  SpO2: 93% (13 Aug 2020 08:32) (81% - 98%)    PHYSICAL EXAM:  GENERAL: , well-groomed, well-developed  HEAD:  Atraumatic, Normocephalic  EYES: EOMI, PERRLA, conjunctiva and sclera clear  ENMT: Moist mucous membranes,   NECK: Supple, No JVD, Normal thyroid  NERVOUS SYSTEM:  Alert & Oriented X3, Good concentration; Motor Strength 5/5 B/L upper and lower extremities; DTRs 2+ intact and symmetric  CHEST/LUNG: Clear to percussion bilaterally; No rales, rhonchi, wheezing, or rubs  HEART: Regular rate and rhythm; No murmurs, rubs, or gallops  ABDOMEN: Soft, Nontender, Nondistended; Bowel sounds present  EXTREMITIES:  2+ Peripheral Pulses, No clubbing, cyanosis, or edema  LYMPH: No lymphadenopathy noted  SKIN: No rashes or lesions    LABS:                        11.7   8.86  )-----------( 325      ( 13 Aug 2020 07:26 )             34.2     08-13    132<L>  |  99  |  4<L>  ----------------------------<  183<H>  3.1<L>   |  20<L>  |  0.84    Ca    9.2      13 Aug 2020 01:23  Phos  2.7     08-13  Mg     1.9     08-13    TPro  6.4  /  Alb  3.3  /  TBili  0.3  /  DBili  x   /  AST  32  /  ALT  31  /  AlkPhos  34<L>  08-12        CAPILLARY BLOOD GLUCOSE      POCT Blood Glucose.: 245 mg/dL (13 Aug 2020 00:09)        Culture - Urine (collected 08-11-20 @ 15:23)  Source: .Urine Clean Catch (Midstream)  Final Report (08-12-20 @ 14:52):    <10,000 CFU/mL Normal Urogenital Rubia    Culture - Blood (collected 08-11-20 @ 15:21)  Source: .Blood Blood-Peripheral  Preliminary Report (08-12-20 @ 16:01):    No growth to date.    Culture - Blood (collected 08-11-20 @ 15:21)  Source: .Blood Blood-Peripheral  Preliminary Report (08-12-20 @ 16:01):    No growth to date.        I&O's Summary      RADIOLOGY & ADDITIONAL TESTS:    Imaging Personally Reviewed:  [ ] YES  [ ] NO    Consultant(s) Notes Reviewed:  [ ] YES  [ ] NO    Care Discussed with Consultants/Other Providers [ ] YES  [ ] NO Patient is a 60y old  Female who presents with a chief complaint of rt leg cellulitis, dizziness (13 Aug 2020 09:25)      INTERVAL HPI/OVERNIGHT EVENTS:overnight events noted, had seizures and hypotension and retention of urine    Home Medications:  ALPRAZolam 0.25 mg oral tablet: 0.25 milligram(s) orally 2 times a day, As Needed (12 Aug 2020 14:09)  cloNIDine 0.1 mg oral tablet: 1 tab(s) orally 2 times a day (12 Aug 2020 14:09)  Creon 12,000 units oral delayed release capsule: 1 cap(s) orally 3 times a day (12 Aug 2020 14:09)  doxycycline hyclate 100 mg oral capsule: 1 cap(s) orally 2 times a day (12 Aug 2020 14:09)  fentaNYL 25 mcg/hr transdermal film, extended release: 1 film(s) transdermal every 72 hours    *Of note: Patient applied patch this morning, 08/11/20 (12 Aug 2020 01:14)  fentaNYL 25 mcg/hr transdermal film, extended release: 1 film(s) transdermal every 72 hours (12 Aug 2020 14:09)  gabapentin 600 mg oral tablet: 600 milligram(s) orally 3 times a day (12 Aug 2020 14:09)  gabapentin 600 mg oral tablet: 2 tab(s) orally 2 times a day (12 Aug 2020 01:14)  hydroxychloroquine 200 mg oral tablet: 200 milligram(s) orally every other day (12 Aug 2020 14:09)  methylPREDNISolone 4 mg oral tablet: orally once a day (12 Aug 2020 14:09)  methylPREDNISolone 4 mg oral tablet: 1 tab(s) orally once a day (12 Aug 2020 01:14)  Multiple Vitamins oral tablet: 1 tab(s) orally once a day (12 Aug 2020 01:14)  ondansetron 4 mg oral tablet: 1 tab(s) orally every 8 hours (12 Aug 2020 14:09)  oxyCODONE 10 mg oral tablet: 10 milligram(s) orally every 4 hours, As Needed (12 Aug 2020 14:09)  oxyCODONE 10 mg oral tablet: 1 tab(s) orally 4-5times a day, As Needed (12 Aug 2020 01:14)  pantoprazole: 40 milligram(s) orally once a day (12 Aug 2020 14:09)  Plaquenil 200 mg oral tablet: 1 tab(s) orally every other day (12 Aug 2020 01:14)  Restasis 0.05% ophthalmic emulsion: 1 drop(s) to each affected eye every 12 hours (12 Aug 2020 01:14)  vancomycin 125 mg oral capsule: 1 cap(s) orally every 6 hours (12 Aug 2020 14:09)  vancomycin 125 mg oral capsule: 1 cap(s) orally every 6 hours    8am, 2pm, 8pm, 2am (12 Aug 2020 01:14)  Xanax 0.25 mg oral tablet: 1 tab(s) orally 2 times a day, As Needed (12 Aug 2020 01:14)      MEDICATIONS  (STANDING):  apixaban 5 milliGRAM(s) Oral two times a day  artificial tears (preservative free) Ophthalmic Solution 1 Drop(s) Both EYES two times a day  gabapentin 600 milliGRAM(s) Oral two times a day  hydroxychloroquine 200 milliGRAM(s) Oral <User Schedule>  levETIRAcetam  IVPB 250 milliGRAM(s) IV Intermittent every 12 hours  methylPREDNISolone 4 milliGRAM(s) Oral daily  multivitamin 1 Tablet(s) Oral daily  pancrelipase  (CREON 12,000 Lipase Units) 1 Capsule(s) Oral three times a day with meals  pantoprazole    Tablet 40 milliGRAM(s) Oral before breakfast  sodium chloride 0.9%. 1000 milliLiter(s) (60 mL/Hr) IV Continuous <Continuous>  vancomycin    Solution 125 milliGRAM(s) Oral every 6 hours    MEDICATIONS  (PRN):  ALPRAZolam 0.25 milliGRAM(s) Oral two times a day PRN anxiety  chlordiazePOXIDE 5 milliGRAM(s) Oral every 6 hours PRN for ciwa > 6  loperamide 2 milliGRAM(s) Oral every 6 hours PRN Diarrhea  ondansetron    Tablet 4 milliGRAM(s) Oral every 8 hours PRN Nausea and/or Vomiting  oxyCODONE    IR 10 milliGRAM(s) Oral every 6 hours PRN Moderate Pain (4 - 6)      Allergies    penicillin (Other)  penicillin (Swelling)    Intolerances        REVIEW OF SYSTEMS:denies any pain or SOB, stays non verbal and answers on lot of insistence, possibly sedated,clear speech when talks  Vital Signs Last 24 Hrs  T(C): 36.4 (13 Aug 2020 08:32), Max: 36.9 (12 Aug 2020 14:26)  T(F): 97.5 (13 Aug 2020 08:32), Max: 98.4 (12 Aug 2020 14:26)  HR: 90 (13 Aug 2020 08:32) (66 - 92)  BP: 89/58 (13 Aug 2020 08:32) (63/47 - 143/75)  BP(mean): --  RR: 18 (13 Aug 2020 08:32) (17 - 18)  SpO2: 93% (13 Aug 2020 08:32) (81% - 98%)    PHYSICAL EXAM:  GENERAL: , well-groomed, well-developed  HEAD:  Atraumatic, Normocephalic  EYES: EOMI, PERRLA, conjunctiva and sclera clear  ENMT: Moist mucous membranes,   NECK: Supple, No JVD, Normal thyroid  NERVOUS SYSTEM: non verbal but on chest rub talks in clear voice that she is ok and does not want to be disturbed  CHEST/LUNG: Clear to percussion bilaterally; No rales, rhonchi, wheezing, or rubs  HEART: Regular rate and rhythm; No murmurs, rubs, or gallops  ABDOMEN: Soft, Nontender, Nondistended; Bowel sounds present  EXTREMITIES:  2+ Peripheral Pulses, No clubbing, cyanosis, or edema  SKIN: No rashes or lesions    LABS:                        11.7   8.86  )-----------( 325      ( 13 Aug 2020 07:26 )             34.2     08-13    132<L>  |  99  |  4<L>  ----------------------------<  183<H>  3.1<L>   |  20<L>  |  0.84    Ca    9.2      13 Aug 2020 01:23  Phos  2.7     08-13  Mg     1.9     08-13    TPro  6.4  /  Alb  3.3  /  TBili  0.3  /  DBili  x   /  AST  32  /  ALT  31  /  AlkPhos  34<L>  08-12        CAPILLARY BLOOD GLUCOSE      POCT Blood Glucose.: 245 mg/dL (13 Aug 2020 00:09)        Culture - Urine (collected 08-11-20 @ 15:23)  Source: .Urine Clean Catch (Midstream)  Final Report (08-12-20 @ 14:52):    <10,000 CFU/mL Normal Urogenital Rubia    Culture - Blood (collected 08-11-20 @ 15:21)  Source: .Blood Blood-Peripheral  Preliminary Report (08-12-20 @ 16:01):    No growth to date.    Culture - Blood (collected 08-11-20 @ 15:21)  Source: .Blood Blood-Peripheral  Preliminary Report (08-12-20 @ 16:01):    No growth to date.        I&O's Summary  < from: CT Head No Cont (08.13.20 @ 00:58) >  No acute intracranial abnormality.    < end of copied text >      RADIOLOGY & ADDITIONAL TESTS:    Imaging Personally Reviewed:  [x ] YES  [ ] NO    Consultant(s) Notes Reviewed:  [ x] YES  [ ] NO    Care Discussed with Consultants/Other Providers [ x] YES  [ ] NO

## 2020-08-13 NOTE — PROGRESS NOTE ADULT - SUBJECTIVE AND OBJECTIVE BOX
QUIRINOJACKIE PAULA  60y  Female    Patient is a 60y old  Female who presents with a chief complaint of rt leg cellulitis, dizziness (13 Aug 2020 11:08)    events noted, patient had a seizure last night.   now on telemetry on ETOH withdrawal protocol  was hypotensive on IVF    PAST MEDICAL & SURGICAL HISTORY:  Lung cancer  Opiate dependence  Alcohol abuse  Adenocarcinoma of lung  Mixed connective tissue disease  Depression H/O panic attack: with anxiety  S/P Laminectomy  Lumbar 3/10  Chronic pain  Diverticulitis of colon (without mention of hemorrhage)  History of laminectomy  History of lung surgery  History of oophorectomy, unilateral: bilateral  History of hip replacement, total, right: 6/7/2020  S/P lumbar laminectomy  S/P cholecystectomy  History of lung cancer: s/p wedge resection of RML      PHYSICAL EXAM:    T(C): 36.5 (08-13-20 @ 12:59), Max: 36.6 (08-13-20 @ 04:36)  HR: 88 (08-13-20 @ 12:59) (66 - 92)  BP: 95/62 (08-13-20 @ 12:59) (63/47 - 143/75)  RR: 18 (08-13-20 @ 12:59) (17 - 18)  SpO2: 95% (08-13-20 @ 12:59) (81% - 98%)  Wt(kg): --    I&O's Detail      Respiratory: clear anteriorly, decreased BS at bases  Cardiovascular: S1 S2  Gastrointestinal: soft NT ND +BS  Extremities: edema   Neuro: Awake and alert    MEDICATIONS  (STANDING):  apixaban 5 milliGRAM(s) Oral two times a day  artificial tears (preservative free) Ophthalmic Solution 1 Drop(s) Both EYES two times a day  gabapentin 600 milliGRAM(s) Oral two times a day  hydroxychloroquine 200 milliGRAM(s) Oral <User Schedule>  levETIRAcetam  IVPB 250 milliGRAM(s) IV Intermittent every 12 hours  methylPREDNISolone 4 milliGRAM(s) Oral daily  multivitamin 1 Tablet(s) Oral daily  pancrelipase  (CREON 12,000 Lipase Units) 1 Capsule(s) Oral three times a day with meals  pantoprazole    Tablet 40 milliGRAM(s) Oral before breakfast  sodium chloride 0.9%. 1000 milliLiter(s) (60 mL/Hr) IV Continuous <Continuous>  vancomycin    Solution 125 milliGRAM(s) Oral every 6 hours    MEDICATIONS  (PRN):  ALPRAZolam 0.25 milliGRAM(s) Oral two times a day PRN anxiety  chlordiazePOXIDE 5 milliGRAM(s) Oral every 6 hours PRN for ciwa > 6  loperamide 2 milliGRAM(s) Oral every 6 hours PRN Diarrhea  ondansetron    Tablet 4 milliGRAM(s) Oral every 8 hours PRN Nausea and/or Vomiting  oxyCODONE    IR 10 milliGRAM(s) Oral every 6 hours PRN Moderate Pain (4 - 6)                            11.7   8.86  )-----------( 325      ( 13 Aug 2020 07:26 )             34.2       08-13    132<L>  |  99  |  4<L>  ----------------------------<  183<H>  3.1<L>   |  20<L>  |  0.84    Ca    9.2      13 Aug 2020 01:23  Phos  2.7     08-13  Mg     1.9     08-13    TPro  6.4  /  Alb  3.3  /  TBili  0.3  /  DBili  x   /  AST  32  /  ALT  31  /  AlkPhos  34<L>  08-12      Creatinine Trend: Creatinine Trend: 0.84<--, 0.46<--, 0.40<--, 0.78<--, 0.50<--, 0.37<--

## 2020-08-13 NOTE — PROGRESS NOTE ADULT - SUBJECTIVE AND OBJECTIVE BOX
Neurology follow up note    JULIO CESAR [id number]      Interval History:    Patient events noted resting in bed     MEDICATIONS    ALPRAZolam 0.25 milliGRAM(s) Oral two times a day PRN  apixaban 5 milliGRAM(s) Oral two times a day  artificial tears (preservative free) Ophthalmic Solution 1 Drop(s) Both EYES two times a day  chlordiazePOXIDE 5 milliGRAM(s) Oral every 6 hours PRN  gabapentin 600 milliGRAM(s) Oral two times a day  hydroxychloroquine 200 milliGRAM(s) Oral <User Schedule>  loperamide 2 milliGRAM(s) Oral every 6 hours PRN  methylPREDNISolone 4 milliGRAM(s) Oral daily  multivitamin 1 Tablet(s) Oral daily  ondansetron    Tablet 4 milliGRAM(s) Oral every 8 hours PRN  oxyCODONE    IR 10 milliGRAM(s) Oral every 6 hours PRN  pancrelipase  (CREON 12,000 Lipase Units) 1 Capsule(s) Oral three times a day with meals  pantoprazole    Tablet 40 milliGRAM(s) Oral before breakfast  sodium chloride 0.9%. 1000 milliLiter(s) IV Continuous <Continuous>  vancomycin    Solution 125 milliGRAM(s) Oral every 6 hours      Allergies    penicillin (Other)  penicillin (Swelling)    Intolerances            Vital Signs Last 24 Hrs  T(C): 36.4 (13 Aug 2020 08:32), Max: 36.9 (12 Aug 2020 14:26)  T(F): 97.5 (13 Aug 2020 08:32), Max: 98.4 (12 Aug 2020 14:26)  HR: 90 (13 Aug 2020 08:32) (66 - 92)  BP: 89/58 (13 Aug 2020 08:32) (63/47 - 143/75)  BP(mean): --  RR: 18 (13 Aug 2020 08:32) (17 - 18)  SpO2: 93% (13 Aug 2020 08:32) (81% - 98%)      REVIEW OF SYSTEMS:  Limited or unable to obtain secondary to patient's poor mental status.    On Neurological Examination:    Mental Status - Patient is Lethargic       Follow simple commands    Speech -  Non Verbal                         Cranial Nerves - Pupils 3 mm equal and reactive to light,   extraocular eye movements intact.   smile symmetric  intact bilateral NLF    Motor Exam -     With stimuli positive movement of all 4 extremities    Muscle tone - is normal all over.  No asymmetry is seen.      GENERAL Exam: Nontoxic , No Acute Distress   	  HEENT:  normocephalic, atraumatic  		  LUNGS: Decreased bilaterally  	  HEART: Normal S1S2   No murmur RRR        	  GI/ ABDOMEN:  Soft  Non tender    EXTREMITIES:   No Edema  No Clubbing  No Cyanosis   	   SKIN: Normal  No Ecchymosis               LABS:  CBC Full  -  ( 13 Aug 2020 07:26 )  WBC Count : 8.86 K/uL  RBC Count : 3.40 M/uL  Hemoglobin : 11.7 g/dL  Hematocrit : 34.2 %  Platelet Count - Automated : 325 K/uL  Mean Cell Volume : 100.6 fl  Mean Cell Hemoglobin : 34.4 pg  Mean Cell Hemoglobin Concentration : 34.2 gm/dL  Auto Neutrophil # : 7.35 K/uL  Auto Lymphocyte # : 0.64 K/uL  Auto Monocyte # : 0.77 K/uL  Auto Eosinophil # : 0.01 K/uL  Auto Basophil # : 0.02 K/uL  Auto Neutrophil % : 83.0 %  Auto Lymphocyte % : 7.2 %  Auto Monocyte % : 8.7 %  Auto Eosinophil % : 0.1 %  Auto Basophil % : 0.2 %      08-13    132<L>  |  99  |  4<L>  ----------------------------<  183<H>  3.1<L>   |  20<L>  |  0.84    Ca    9.2      13 Aug 2020 01:23  Phos  2.7     08-13  Mg     1.9     08-13    TPro  6.4  /  Alb  3.3  /  TBili  0.3  /  DBili  x   /  AST  32  /  ALT  31  /  AlkPhos  34<L>  08-12    Hemoglobin A1C:     LIVER FUNCTIONS - ( 12 Aug 2020 08:24 )  Alb: 3.3 g/dL / Pro: 6.4 g/dL / ALK PHOS: 34 U/L / ALT: 31 U/L / AST: 32 U/L / GGT: x           Vitamin B12         RADIOLOGY    ANALYSIS AND PLAN:  This is a 60-year-old with a history of alcohol and substance use, memory issues, and ataxia.  1.	For memory issues, suspect this could be mild cognitive impairment versus possibly secondary to a history of substance use  2.	seizures suspect secondary  to substance use with withdrawal patient on a lot of substances on outside and unknown quantity of xanax   3.	check EEG   4.	For history of ataxia, suspect multifactorial, possibly secondary to spinal disc disease, possibly alcohol-induced neuropathy, deconditioning.  5.	For history of mixed connective tissue disorder, continue the patient on her steroids.  6.	For history of hypertension, monitor systolic blood pressure.  7.	For spinal disc disease, limit pain medications if possible.  8.	For history of alcohol use, detoxification protocol.  9.	For history of DVT, anticoagulation as needed.  10.	The patient was counseled in regard to alcohol cessation.  11.	Physical Therapy evaluation.  12.	Fall precaution.  13.	The patient's cognitive status was evaluated to the best of my ability.  14.	spoke to HCP Anita    15.	Greater than 50 minutes of time was spent with the patient, plan of care, reviewing data, speaking to the multidisciplinary healthcare team.    Thank you for the courtesy of this consultation.    Greater than 45 minutes spent in direct patient care reviewing  the notes, lab data/ imaging , discussion with multidisciplinary team. Neurology follow up note    JULIO CESAR [id number]      Interval History:    Patient events noted resting in bed     MEDICATIONS    ALPRAZolam 0.25 milliGRAM(s) Oral two times a day PRN  apixaban 5 milliGRAM(s) Oral two times a day  artificial tears (preservative free) Ophthalmic Solution 1 Drop(s) Both EYES two times a day  chlordiazePOXIDE 5 milliGRAM(s) Oral every 6 hours PRN  gabapentin 600 milliGRAM(s) Oral two times a day  hydroxychloroquine 200 milliGRAM(s) Oral <User Schedule>  loperamide 2 milliGRAM(s) Oral every 6 hours PRN  methylPREDNISolone 4 milliGRAM(s) Oral daily  multivitamin 1 Tablet(s) Oral daily  ondansetron    Tablet 4 milliGRAM(s) Oral every 8 hours PRN  oxyCODONE    IR 10 milliGRAM(s) Oral every 6 hours PRN  pancrelipase  (CREON 12,000 Lipase Units) 1 Capsule(s) Oral three times a day with meals  pantoprazole    Tablet 40 milliGRAM(s) Oral before breakfast  sodium chloride 0.9%. 1000 milliLiter(s) IV Continuous <Continuous>  vancomycin    Solution 125 milliGRAM(s) Oral every 6 hours      Allergies    penicillin (Other)  penicillin (Swelling)    Intolerances            Vital Signs Last 24 Hrs  T(C): 36.4 (13 Aug 2020 08:32), Max: 36.9 (12 Aug 2020 14:26)  T(F): 97.5 (13 Aug 2020 08:32), Max: 98.4 (12 Aug 2020 14:26)  HR: 90 (13 Aug 2020 08:32) (66 - 92)  BP: 89/58 (13 Aug 2020 08:32) (63/47 - 143/75)  BP(mean): --  RR: 18 (13 Aug 2020 08:32) (17 - 18)  SpO2: 93% (13 Aug 2020 08:32) (81% - 98%)      REVIEW OF SYSTEMS:  Limited or unable to obtain secondary to patient's poor mental status.    On Neurological Examination:    Mental Status - Patient is Lethargic       Follow simple commands    Speech -  Non Verbal                         Cranial Nerves - Pupils 3 mm equal and reactive to light,   extraocular eye movements intact.   smile symmetric  intact bilateral NLF    Motor Exam -     With stimuli positive movement of all 4 extremities    Muscle tone - is normal all over.  No asymmetry is seen.      GENERAL Exam: Nontoxic , No Acute Distress   	  HEENT:  normocephalic, atraumatic  		  LUNGS: Decreased bilaterally  	  HEART: Normal S1S2   No murmur RRR        	  GI/ ABDOMEN:  Soft  Non tender    EXTREMITIES:   No Edema  No Clubbing  No Cyanosis   	   SKIN: Normal  No Ecchymosis               LABS:  CBC Full  -  ( 13 Aug 2020 07:26 )  WBC Count : 8.86 K/uL  RBC Count : 3.40 M/uL  Hemoglobin : 11.7 g/dL  Hematocrit : 34.2 %  Platelet Count - Automated : 325 K/uL  Mean Cell Volume : 100.6 fl  Mean Cell Hemoglobin : 34.4 pg  Mean Cell Hemoglobin Concentration : 34.2 gm/dL  Auto Neutrophil # : 7.35 K/uL  Auto Lymphocyte # : 0.64 K/uL  Auto Monocyte # : 0.77 K/uL  Auto Eosinophil # : 0.01 K/uL  Auto Basophil # : 0.02 K/uL  Auto Neutrophil % : 83.0 %  Auto Lymphocyte % : 7.2 %  Auto Monocyte % : 8.7 %  Auto Eosinophil % : 0.1 %  Auto Basophil % : 0.2 %      08-13    132<L>  |  99  |  4<L>  ----------------------------<  183<H>  3.1<L>   |  20<L>  |  0.84    Ca    9.2      13 Aug 2020 01:23  Phos  2.7     08-13  Mg     1.9     08-13    TPro  6.4  /  Alb  3.3  /  TBili  0.3  /  DBili  x   /  AST  32  /  ALT  31  /  AlkPhos  34<L>  08-12    Hemoglobin A1C:     LIVER FUNCTIONS - ( 12 Aug 2020 08:24 )  Alb: 3.3 g/dL / Pro: 6.4 g/dL / ALK PHOS: 34 U/L / ALT: 31 U/L / AST: 32 U/L / GGT: x           Vitamin B12         RADIOLOGY    ANALYSIS AND PLAN:  This is a 60-year-old with a history of alcohol and substance use, memory issues, and ataxia.  1.	For memory issues, suspect this could be mild cognitive impairment versus possibly secondary to a history of substance use  2.	seizures suspect secondary  to substance use with withdrawal patient on a lot of substances on outside and unknown quantity of xanax   3.	check EEG   4.	For history of ataxia, suspect multifactorial, possibly secondary to spinal disc disease, possibly alcohol-induced neuropathy, deconditioning.  5.	For history of mixed connective tissue disorder, continue the patient on her steroids.  6.	For history of hypertension, monitor systolic blood pressure.  7.	For spinal disc disease, limit pain medications if possible.  8.	For history of alcohol use, detoxification protocol.  9.	For history of DVT, anticoagulation as needed.  10.	The patient was counseled in regard to alcohol cessation.  11.	Physical Therapy evaluation.  12.	Fall precaution.  13.	Do to SBP issues will start Keppra low dose unable to give BZD once SBP better then would rec dc keppra and go with BDZ detox protocol   14.	spoke to HCP Anita    15.	Greater than 50 minutes of time was spent with the patient, plan of care, reviewing data, speaking to the multidisciplinary healthcare team.    Thank you for the courtesy of this consultation.    Greater than 45 minutes spent in direct patient care reviewing  the notes, lab data/ imaging , discussion with multidisciplinary team.

## 2020-08-13 NOTE — PROGRESS NOTE ADULT - ASSESSMENT
The patient is a 60 year old female with a history of HTN, DVT, lung cancer s/p resection, opiate use, alcohol abuse who presents with right leg cellulitis and hyponatremia.    Plan:  - Discontinue clonidine for now  - Continue IV fluids  - If remains hypotensive, start midodrine 5 mg tid  - Continue apixaban 5 mg bid  - CT head negative for acute pathology  - Neurology follow-up

## 2020-08-14 DIAGNOSIS — F19.20 OTHER PSYCHOACTIVE SUBSTANCE DEPENDENCE, UNCOMPLICATED: ICD-10-CM

## 2020-08-14 LAB
ALBUMIN SERPL ELPH-MCNC: 3 G/DL — LOW (ref 3.3–5)
ALP SERPL-CCNC: 35 U/L — LOW (ref 40–120)
ALT FLD-CCNC: 28 U/L — SIGNIFICANT CHANGE UP (ref 12–78)
ANION GAP SERPL CALC-SCNC: 13 MMOL/L — SIGNIFICANT CHANGE UP (ref 5–17)
AST SERPL-CCNC: 34 U/L — SIGNIFICANT CHANGE UP (ref 15–37)
BASOPHILS # BLD AUTO: 0.03 K/UL — SIGNIFICANT CHANGE UP (ref 0–0.2)
BASOPHILS NFR BLD AUTO: 0.4 % — SIGNIFICANT CHANGE UP (ref 0–2)
BILIRUB SERPL-MCNC: 0.4 MG/DL — SIGNIFICANT CHANGE UP (ref 0.2–1.2)
BUN SERPL-MCNC: 2 MG/DL — LOW (ref 7–23)
CALCIUM SERPL-MCNC: 8.8 MG/DL — SIGNIFICANT CHANGE UP (ref 8.5–10.1)
CHLORIDE SERPL-SCNC: 106 MMOL/L — SIGNIFICANT CHANGE UP (ref 96–108)
CO2 SERPL-SCNC: 18 MMOL/L — LOW (ref 22–31)
CREAT SERPL-MCNC: 0.44 MG/DL — LOW (ref 0.5–1.3)
EOSINOPHIL # BLD AUTO: 0.06 K/UL — SIGNIFICANT CHANGE UP (ref 0–0.5)
EOSINOPHIL NFR BLD AUTO: 0.8 % — SIGNIFICANT CHANGE UP (ref 0–6)
GLUCOSE SERPL-MCNC: 90 MG/DL — SIGNIFICANT CHANGE UP (ref 70–99)
HCT VFR BLD CALC: 36.6 % — SIGNIFICANT CHANGE UP (ref 34.5–45)
HGB BLD-MCNC: 12.3 G/DL — SIGNIFICANT CHANGE UP (ref 11.5–15.5)
IMM GRANULOCYTES NFR BLD AUTO: 0.4 % — SIGNIFICANT CHANGE UP (ref 0–1.5)
LYMPHOCYTES # BLD AUTO: 1.52 K/UL — SIGNIFICANT CHANGE UP (ref 1–3.3)
LYMPHOCYTES # BLD AUTO: 19.4 % — SIGNIFICANT CHANGE UP (ref 13–44)
MAGNESIUM SERPL-MCNC: 1.9 MG/DL — SIGNIFICANT CHANGE UP (ref 1.6–2.6)
MCHC RBC-ENTMCNC: 33.5 PG — SIGNIFICANT CHANGE UP (ref 27–34)
MCHC RBC-ENTMCNC: 33.6 GM/DL — SIGNIFICANT CHANGE UP (ref 32–36)
MCV RBC AUTO: 99.7 FL — SIGNIFICANT CHANGE UP (ref 80–100)
MONOCYTES # BLD AUTO: 0.72 K/UL — SIGNIFICANT CHANGE UP (ref 0–0.9)
MONOCYTES NFR BLD AUTO: 9.2 % — SIGNIFICANT CHANGE UP (ref 2–14)
NEUTROPHILS # BLD AUTO: 5.46 K/UL — SIGNIFICANT CHANGE UP (ref 1.8–7.4)
NEUTROPHILS NFR BLD AUTO: 69.8 % — SIGNIFICANT CHANGE UP (ref 43–77)
NRBC # BLD: 0 /100 WBCS — SIGNIFICANT CHANGE UP (ref 0–0)
PLATELET # BLD AUTO: 362 K/UL — SIGNIFICANT CHANGE UP (ref 150–400)
POTASSIUM SERPL-MCNC: 4 MMOL/L — SIGNIFICANT CHANGE UP (ref 3.5–5.3)
POTASSIUM SERPL-SCNC: 4 MMOL/L — SIGNIFICANT CHANGE UP (ref 3.5–5.3)
PROT SERPL-MCNC: 6.3 G/DL — SIGNIFICANT CHANGE UP (ref 6–8.3)
RBC # BLD: 3.67 M/UL — LOW (ref 3.8–5.2)
RBC # FLD: 12.7 % — SIGNIFICANT CHANGE UP (ref 10.3–14.5)
SODIUM SERPL-SCNC: 137 MMOL/L — SIGNIFICANT CHANGE UP (ref 135–145)
WBC # BLD: 7.82 K/UL — SIGNIFICANT CHANGE UP (ref 3.8–10.5)
WBC # FLD AUTO: 7.82 K/UL — SIGNIFICANT CHANGE UP (ref 3.8–10.5)

## 2020-08-14 PROCEDURE — 99232 SBSQ HOSP IP/OBS MODERATE 35: CPT

## 2020-08-14 RX ORDER — LEVETIRACETAM 250 MG/1
500 TABLET, FILM COATED ORAL EVERY 12 HOURS
Refills: 0 | Status: DISCONTINUED | OUTPATIENT
Start: 2020-08-14 | End: 2020-08-16

## 2020-08-14 RX ORDER — LEVETIRACETAM 250 MG/1
250 TABLET, FILM COATED ORAL ONCE
Refills: 0 | Status: COMPLETED | OUTPATIENT
Start: 2020-08-14 | End: 2020-08-14

## 2020-08-14 RX ADMIN — GABAPENTIN 600 MILLIGRAM(S): 400 CAPSULE ORAL at 06:57

## 2020-08-14 RX ADMIN — Medication 5 MILLIGRAM(S): at 17:48

## 2020-08-14 RX ADMIN — Medication 1 MILLIGRAM(S): at 18:21

## 2020-08-14 RX ADMIN — Medication 1 CAPSULE(S): at 17:48

## 2020-08-14 RX ADMIN — PANTOPRAZOLE SODIUM 40 MILLIGRAM(S): 20 TABLET, DELAYED RELEASE ORAL at 06:57

## 2020-08-14 RX ADMIN — APIXABAN 5 MILLIGRAM(S): 2.5 TABLET, FILM COATED ORAL at 17:48

## 2020-08-14 RX ADMIN — Medication 2 MILLIGRAM(S): at 16:22

## 2020-08-14 RX ADMIN — Medication 1 DROP(S): at 07:06

## 2020-08-14 RX ADMIN — Medication 1 TABLET(S): at 11:01

## 2020-08-14 RX ADMIN — Medication 1 CAPSULE(S): at 13:36

## 2020-08-14 RX ADMIN — LEVETIRACETAM 420 MILLIGRAM(S): 250 TABLET, FILM COATED ORAL at 16:10

## 2020-08-14 RX ADMIN — Medication 200 MILLIGRAM(S): at 06:57

## 2020-08-14 RX ADMIN — Medication 1 DROP(S): at 17:49

## 2020-08-14 RX ADMIN — Medication 1 MILLIGRAM(S): at 15:43

## 2020-08-14 RX ADMIN — SODIUM CHLORIDE 60 MILLILITER(S): 9 INJECTION INTRAMUSCULAR; INTRAVENOUS; SUBCUTANEOUS at 16:35

## 2020-08-14 RX ADMIN — Medication 0.25 MILLIGRAM(S): at 00:04

## 2020-08-14 RX ADMIN — LEVETIRACETAM 400 MILLIGRAM(S): 250 TABLET, FILM COATED ORAL at 18:24

## 2020-08-14 RX ADMIN — GABAPENTIN 600 MILLIGRAM(S): 400 CAPSULE ORAL at 16:22

## 2020-08-14 RX ADMIN — Medication 125 MILLIGRAM(S): at 00:04

## 2020-08-14 RX ADMIN — SODIUM CHLORIDE 60 MILLILITER(S): 9 INJECTION INTRAMUSCULAR; INTRAVENOUS; SUBCUTANEOUS at 11:01

## 2020-08-14 RX ADMIN — Medication 5 MILLIGRAM(S): at 11:00

## 2020-08-14 RX ADMIN — Medication 1 CAPSULE(S): at 09:31

## 2020-08-14 RX ADMIN — Medication 125 MILLIGRAM(S): at 13:36

## 2020-08-14 RX ADMIN — Medication 125 MILLIGRAM(S): at 06:58

## 2020-08-14 RX ADMIN — APIXABAN 5 MILLIGRAM(S): 2.5 TABLET, FILM COATED ORAL at 06:57

## 2020-08-14 RX ADMIN — LEVETIRACETAM 400 MILLIGRAM(S): 250 TABLET, FILM COATED ORAL at 06:57

## 2020-08-14 RX ADMIN — Medication 125 MILLIGRAM(S): at 17:52

## 2020-08-14 RX ADMIN — Medication 4 MILLIGRAM(S): at 06:57

## 2020-08-14 NOTE — PROGRESS NOTE ADULT - SUBJECTIVE AND OBJECTIVE BOX
Patient is a 60y old  Female who presents with a chief complaint of rt leg cellulitis, dizziness (14 Aug 2020 10:08)      INTERVAL HPI/OVERNIGHT EVENTS:overnight evnts noted    Home Medications:  ALPRAZolam 0.25 mg oral tablet: 0.25 milligram(s) orally 2 times a day, As Needed (12 Aug 2020 14:09)  cloNIDine 0.1 mg oral tablet: 1 tab(s) orally 2 times a day (12 Aug 2020 14:09)  Creon 12,000 units oral delayed release capsule: 1 cap(s) orally 3 times a day (12 Aug 2020 14:09)  doxycycline hyclate 100 mg oral capsule: 1 cap(s) orally 2 times a day (12 Aug 2020 14:09)  fentaNYL 25 mcg/hr transdermal film, extended release: 1 film(s) transdermal every 72 hours    *Of note: Patient applied patch this morning, 08/11/20 (12 Aug 2020 01:14)  fentaNYL 25 mcg/hr transdermal film, extended release: 1 film(s) transdermal every 72 hours (12 Aug 2020 14:09)  gabapentin 600 mg oral tablet: 600 milligram(s) orally 3 times a day (12 Aug 2020 14:09)  gabapentin 600 mg oral tablet: 2 tab(s) orally 2 times a day (12 Aug 2020 01:14)  hydroxychloroquine 200 mg oral tablet: 200 milligram(s) orally every other day (12 Aug 2020 14:09)  methylPREDNISolone 4 mg oral tablet: orally once a day (12 Aug 2020 14:09)  methylPREDNISolone 4 mg oral tablet: 1 tab(s) orally once a day (12 Aug 2020 01:14)  Multiple Vitamins oral tablet: 1 tab(s) orally once a day (12 Aug 2020 01:14)  ondansetron 4 mg oral tablet: 1 tab(s) orally every 8 hours (12 Aug 2020 14:09)  oxyCODONE 10 mg oral tablet: 10 milligram(s) orally every 4 hours, As Needed (12 Aug 2020 14:09)  oxyCODONE 10 mg oral tablet: 1 tab(s) orally 4-5times a day, As Needed (12 Aug 2020 01:14)  pantoprazole: 40 milligram(s) orally once a day (12 Aug 2020 14:09)  Plaquenil 200 mg oral tablet: 1 tab(s) orally every other day (12 Aug 2020 01:14)  Restasis 0.05% ophthalmic emulsion: 1 drop(s) to each affected eye every 12 hours (12 Aug 2020 01:14)  vancomycin 125 mg oral capsule: 1 cap(s) orally every 6 hours (12 Aug 2020 14:09)  vancomycin 125 mg oral capsule: 1 cap(s) orally every 6 hours    8am, 2pm, 8pm, 2am (12 Aug 2020 01:14)  Xanax 0.25 mg oral tablet: 1 tab(s) orally 2 times a day, As Needed (12 Aug 2020 01:14)      MEDICATIONS  (STANDING):  apixaban 5 milliGRAM(s) Oral two times a day  artificial tears (preservative free) Ophthalmic Solution 1 Drop(s) Both EYES two times a day  gabapentin 600 milliGRAM(s) Oral two times a day  hydroxychloroquine 200 milliGRAM(s) Oral <User Schedule>  levETIRAcetam  IVPB 250 milliGRAM(s) IV Intermittent every 12 hours  methylPREDNISolone 4 milliGRAM(s) Oral daily  multivitamin 1 Tablet(s) Oral daily  pancrelipase  (CREON 12,000 Lipase Units) 1 Capsule(s) Oral three times a day with meals  pantoprazole    Tablet 40 milliGRAM(s) Oral before breakfast  sodium chloride 0.9%. 1000 milliLiter(s) (60 mL/Hr) IV Continuous <Continuous>  vancomycin    Solution 125 milliGRAM(s) Oral every 6 hours    MEDICATIONS  (PRN):  ALPRAZolam 0.25 milliGRAM(s) Oral two times a day PRN anxiety  chlordiazePOXIDE 5 milliGRAM(s) Oral every 6 hours PRN for ciwa > 6  loperamide 2 milliGRAM(s) Oral every 6 hours PRN Diarrhea  ondansetron    Tablet 4 milliGRAM(s) Oral every 8 hours PRN Nausea and/or Vomiting  oxyCODONE    IR 10 milliGRAM(s) Oral every 6 hours PRN Moderate Pain (4 - 6)      Allergies    penicillin (Other)  penicillin (Swelling)    Intolerances        REVIEW OF SYSTEMS:patient refuses to answer questions , just says she knows what we are saying but will not answer all questions, no pain no sob  CONSTITUTIONAL: No fever, weight loss, has fatigue  EYES: No eye pain, visual disturbances, or discharge  ENMT:  No difficulty hearing, tinnitus, vertigo; No sinus or throat pain  NECK: No pain or stiffness  BREASTS: No pain, masses, or nipple discharge  RESPIRATORY: No cough, wheezing, chills or hemoptysis; No shortness of breath  CARDIOVASCULAR: No chest pain, palpitations, dizziness, or leg swelling  GASTROINTESTINAL: No abdominal or epigastric pain. No nausea, vomiting, or hematemesis; No diarrhea or constipation. No melena or hematochezia.  GENITOURINARY: No dysuria, frequency, hematuria, or incontinence  NEUROLOGICAL: No headaches, memory loss, loss of strength, numbness, or tremors  SKIN: No itching, burning, rashes, or lesions   ENDOCRINE: No heat or cold intolerance; No hair loss  MUSCULOSKELETAL: ch back pain  PSYCHIATRIC: No depression, anxiety, mood swings, or difficulty sleeping  HEME/LYMPH: No easy bruising, or bleeding gums  ALLERGY AND IMMUNOLOGIC: No hives or eczema    Vital Signs Last 24 Hrs  T(C): 37.2 (14 Aug 2020 07:30), Max: 37.2 (14 Aug 2020 07:30)  T(F): 99 (14 Aug 2020 07:30), Max: 99 (14 Aug 2020 07:30)  HR: 88 (14 Aug 2020 07:30) (79 - 99)  BP: 121/81 (14 Aug 2020 07:30) (93/60 - 121/81)  BP(mean): --  RR: 17 (14 Aug 2020 07:30) (17 - 18)  SpO2: 99% (14 Aug 2020 07:30) (90% - 99%)    PHYSICAL EXAM:  GENERAL: well-groomed, well-developed  HEAD:  Atraumatic, Normocephalic  EYES: EOMI, PERRLA, conjunctiva and sclera clear  ENMT: Moist mucous membranes,   NECK: Supple, No JVD, Normal thyroid  NERVOUS SYSTEM:  Alert & Oriented X3, poor concentration; flat affect  CHEST/LUNG: Clear to percussion bilaterally; No rales, rhonchi, wheezing, or rubs  HEART: Regular rate and rhythm; No murmurs, rubs, or gallops  ABDOMEN: Soft, Nontender, Nondistended; Bowel sounds present  EXTREMITIES:  2+ Peripheral Pulses, No clubbing, cyanosis, or edema  LYMPH: No lymphadenopathy noted  SKIN: No rashes or lesions    LABS:                        12.3   7.82  )-----------( 362      ( 14 Aug 2020 06:32 )             36.6     08-14    137  |  106  |  2<L>  ----------------------------<  90  4.0   |  18<L>  |  0.44<L>    Ca    8.8      14 Aug 2020 06:32  Phos  2.7     08-13  Mg     1.9     08-14    TPro  6.3  /  Alb  3.0<L>  /  TBili  0.4  /  DBili  x   /  AST  34  /  ALT  28  /  AlkPhos  35<L>  08-14        CAPILLARY BLOOD GLUCOSE            Culture - Urine (collected 08-11-20 @ 15:23)  Source: .Urine Clean Catch (Midstream)  Final Report (08-12-20 @ 14:52):    <10,000 CFU/mL Normal Urogenital Rubia    Culture - Blood (collected 08-11-20 @ 15:21)  Source: .Blood Blood-Peripheral  Preliminary Report (08-12-20 @ 16:01):    No growth to date.    Culture - Blood (collected 08-11-20 @ 15:21)  Source: .Blood Blood-Peripheral  Preliminary Report (08-12-20 @ 16:01):    No growth to date.        I&O's Summary    13 Aug 2020 07:01  -  14 Aug 2020 07:00  --------------------------------------------------------  IN: 720 mL / OUT: 2800 mL / NET: -2080 mL        RADIOLOGY & ADDITIONAL TESTS:    Imaging Personally Reviewed:  [ x] YES  [ ] NO    Consultant(s) Notes Reviewed:  [x] YES  [ ] NO    Care Discussed with Consultants/Other Providers [x ] YES  [ ] NO

## 2020-08-14 NOTE — BEHAVIORAL HEALTH ASSESSMENT NOTE - NSBHCHARTREVIEWIMAGING_PSY_A_CORE FT
< from: CT Head No Cont (08.13.20 @ 00:58) >    IMPRESSION:  No acute intracranial abnormality.              GREGORIA MORA MD; Attending Radiologist  This document has been electronically signed. Aug 13 2020  1:12AM        < end of copied text >

## 2020-08-14 NOTE — BEHAVIORAL HEALTH ASSESSMENT NOTE - NSBHCHARTREVIEWVS_PSY_A_CORE FT
Vital Signs Last 24 Hrs  T(C): 37.1 (14 Aug 2020 12:40), Max: 37.2 (14 Aug 2020 07:30)  T(F): 98.8 (14 Aug 2020 12:40), Max: 99 (14 Aug 2020 07:30)  HR: 89 (14 Aug 2020 12:40) (79 - 99)  BP: 107/74 (14 Aug 2020 12:40) (95/60 - 121/81)  BP(mean): --  RR: 18 (14 Aug 2020 12:40) (17 - 18)  SpO2: 94% (14 Aug 2020 12:40) (90% - 99%)

## 2020-08-14 NOTE — PROGRESS NOTE ADULT - ASSESSMENT
The patient is a 60 year old female with a history of HTN, DVT, lung cancer s/p resection, opiate use, alcohol abuse who presents with right leg cellulitis and hyponatremia.    Plan:  - Will resume clonidine if BP trends up further  - Continue apixaban 5 mg bid  - CT head negative for acute pathology  - Neurology follow-up

## 2020-08-14 NOTE — PROGRESS NOTE ADULT - SUBJECTIVE AND OBJECTIVE BOX
Chief Complaint: Right leg cellulitis    Interval Events: AMS and not communicating this morning.    Review of Systems:  Unable to obtain    Physical Exam:  Vitals:        Vital Signs Last 24 Hrs  T(C): 36.3 (12 Aug 2020 05:09), Max: 37.2 (12 Aug 2020 00:10)  T(F): 97.3 (12 Aug 2020 05:09), Max: 98.9 (12 Aug 2020 00:10)  HR: 66 (12 Aug 2020 08:32) (65 - 78)  BP: 129/77 (12 Aug 2020 08:32) (120/78 - 148/87)  BP(mean): --  RR: 20 (12 Aug 2020 08:32) (16 - 20)  SpO2: 98% (12 Aug 2020 08:32) (96% - 100%)  General: NAD  HEENT: MMM  Neck: No JVD, no carotid bruit  Lungs: CTAB  CV: RRR, nl S1/S2, no M/R/G  Abdomen: S/NT/ND, +BS  Extremities: No LE edema, no cyanosis  Neuro: Non-focal  Skin: Right leg erythema    Labs:    08-14    137  |  106  |  2<L>  ----------------------------<  90  4.0   |  18<L>  |  0.44<L>    Ca    8.8      14 Aug 2020 06:32  Phos  2.7     08-13  Mg     1.9     08-13    TPro  6.3  /  Alb  3.0<L>  /  TBili  0.4  /  DBili  x   /  AST  34  /  ALT  28  /  AlkPhos  35<L>  08-14                        12.3   7.82  )-----------( 362      ( 14 Aug 2020 06:32 )             36.6       Telemetry: Sinus rhythm

## 2020-08-14 NOTE — PROVIDER CONTACT NOTE (CHANGE IN STATUS NOTIFICATION) - SITUATION
pt's on/off seizure like mild shaking and unclear it is real.   Shaking stops within 5 seconds  on Keppra 500mg ivp q 12 hr

## 2020-08-14 NOTE — PROGRESS NOTE ADULT - SUBJECTIVE AND OBJECTIVE BOX
JULIO CESAR FAYE  60y  Female    Patient is a 60y old  Female who presents with a chief complaint of rt leg cellulitis, dizziness (14 Aug 2020 11:35)    denies any chest pain or shortness of breath.     PAST MEDICAL & SURGICAL HISTORY:  Lung cancer  Opiate dependence  Alcohol abuse  Adenocarcinoma of lung  Mixed connective tissue disease  Depression H/O panic attack: with anxiety  S/P Laminectomy  Lumbar 3/10  Chronic pain  Diverticulitis of colon (without mention of hemorrhage)  History of laminectomy  History of lung surgery  History of oophorectomy, unilateral: bilateral  History of hip replacement, total, right: 6/7/2020  S/P lumbar laminectomy  S/P cholecystectomy  History of lung cancer: s/p wedge resection of RML          PHYSICAL EXAM:    T(C): 37.1 (08-14-20 @ 12:40), Max: 37.2 (08-14-20 @ 07:30)  HR: 89 (08-14-20 @ 12:40) (79 - 99)  BP: 107/74 (08-14-20 @ 12:40) (95/60 - 121/81)  RR: 18 (08-14-20 @ 12:40) (17 - 18)  SpO2: 94% (08-14-20 @ 12:40) (90% - 99%)  Wt(kg): --    I&O's Detail    13 Aug 2020 07:01  -  14 Aug 2020 07:00  --------------------------------------------------------  IN:    sodium chloride 0.9%.: 720 mL  Total IN: 720 mL    OUT:    Indwelling Catheter - Urethral: 2800 mL  Total OUT: 2800 mL    Total NET: -2080 mL      Respiratory: clear anteriorly, decreased BS at bases  Cardiovascular: S1 S2  Gastrointestinal: soft NT ND +BS  Extremities: trace edema   Neuro: Awake and alert    MEDICATIONS  (STANDING):  apixaban 5 milliGRAM(s) Oral two times a day  artificial tears (preservative free) Ophthalmic Solution 1 Drop(s) Both EYES two times a day  gabapentin 600 milliGRAM(s) Oral two times a day  hydroxychloroquine 200 milliGRAM(s) Oral <User Schedule>  levETIRAcetam  IVPB 500 milliGRAM(s) IV Intermittent every 12 hours  levETIRAcetam  IVPB 250 milliGRAM(s) IV Intermittent once  LORazepam     Tablet 1 milliGRAM(s) Oral every 6 hours  methylPREDNISolone 4 milliGRAM(s) Oral daily  multivitamin 1 Tablet(s) Oral daily  pancrelipase  (CREON 12,000 Lipase Units) 1 Capsule(s) Oral three times a day with meals  pantoprazole    Tablet 40 milliGRAM(s) Oral before breakfast  sodium chloride 0.9%. 1000 milliLiter(s) (60 mL/Hr) IV Continuous <Continuous>  vancomycin    Solution 125 milliGRAM(s) Oral every 6 hours    MEDICATIONS  (PRN):  chlordiazePOXIDE 5 milliGRAM(s) Oral every 6 hours PRN for ciwa > 6  loperamide 2 milliGRAM(s) Oral every 6 hours PRN Diarrhea  ondansetron    Tablet 4 milliGRAM(s) Oral every 8 hours PRN Nausea and/or Vomiting  oxyCODONE    IR 10 milliGRAM(s) Oral every 6 hours PRN Moderate Pain (4 - 6)                            12.3   7.82  )-----------( 362      ( 14 Aug 2020 06:32 )             36.6       08-14    137  |  106  |  2<L>  ----------------------------<  90  4.0   |  18<L>  |  0.44<L>    Ca    8.8      14 Aug 2020 06:32  Phos  2.7     08-13  Mg     1.9     08-14    TPro  6.3  /  Alb  3.0<L>  /  TBili  0.4  /  DBili  x   /  AST  34  /  ALT  28  /  AlkPhos  35<L>  08-14      Creatinine Trend: Creatinine Trend: 0.44<--, 0.84<--, 0.46<--, 0.40<--, 0.78<--, 0.50<--

## 2020-08-14 NOTE — BEHAVIORAL HEALTH ASSESSMENT NOTE - HPI (INCLUDE ILLNESS QUALITY, SEVERITY, DURATION, TIMING, CONTEXT, MODIFYING FACTORS, ASSOCIATED SIGNS AND SYMPTOMS)
Patient seen, evaluated and chart reviewed. Patient is a 60 yr old WF, with no prior psychiatric hospitalizations, with PMH of ch pain with Opiate and ETOH dependency and abuse, alcoholic pancreatitis, Mixed connective tissue disorder, left soleal vein thrombosis, adeno ca lung- s/p RML resection 2018, ex smoker, rec leg cellulitis, was admitted in [facility] with alcoholic pancreatitis and HTN and discharged on 7/31, after one day at home pt went to [facility] for cellulitis treated with doxycycline and is improving, and 2 days back went to [facility] and lian told to drink lot of water and now says feeling weak and ataxic, In ED syo Na 120 , and received IV fluids, refused vancomycin as improving and is admitted to [facility] for Hyponatremia, likely  excessive water. Patient at this time appears to be confused, having difficulty to engage, likely suggestive of ongoing delirium.

## 2020-08-14 NOTE — BEHAVIORAL HEALTH ASSESSMENT NOTE - SUMMARY
60 yr old WF, with no prior psychiatric hospitalizations, with PMH of ch pain with Opiate and ETOH dependency and abuse, alcoholic pancreatitis, Mixed connective tissue disorder, left soleal vein thrombosis, adeno ca lung- s/p RML resection 2018, ex smoker, rec leg cellulitis, was admitted in [facility] with alcoholic pancreatitis and HTN and discharged on 7/31, after one day at home pt went to [facility] for cellulitis treated with doxycycline and is improving, and 2 days back went to [facility] and lian told to drink lot of water and now says feeling weak and ataxic, In ED syo Na 120 , and received IV fluids, refused vancomycin as improving and is admitted to [facility] for Hyponatremia, likely  excessive water. Patient at this time appears to be confused, having difficulty to engage, likely suggestive of ongoing delirium.    IMP: Polysubstance dependence    REC: Ativan 1 mg po q 6 hourly - hold for sedation

## 2020-08-14 NOTE — BEHAVIORAL HEALTH ASSESSMENT NOTE - RISK ASSESSMENT
Low Acute Suicide Risk Patient has chronic opioid use and alcohol which increases her risk of overdose

## 2020-08-14 NOTE — PROGRESS NOTE ADULT - SUBJECTIVE AND OBJECTIVE BOX
JULIO CESAR FAYE is a 60yFemale , patient examined and chart reviewed.    INTERVAL HPI/ OVERNIGHT EVENTS:   Intermittent seizures On IV kappra.  Neurology following.    PAST MEDICAL & SURGICAL HISTORY:  Lung cancer  Opiate dependence  Alcohol abuse  Adenocarcinoma of lung  Mixed connective tissue disease  Depression H/O panic attack: with anxiety  S/P Laminectomy  Lumbar 3/10  Chronic pain  Diverticulitis of colon (without mention of hemorrhage)  History of laminectomy  History of lung surgery  History of oophorectomy, unilateral: bilateral  History of hip replacement, total, right: 6/7/2020  S/P lumbar laminectomy  S/P cholecystectomy  History of lung cancer: s/p wedge resection of RML      For details regarding the patient's social history, family history, and other miscellaneous elements, please refer the initial infectious diseases consultation and/or the admitting history and physical examination for this admission.      ROS: UTO sec pt's condition    ALLERGIES:  penicillin (Swelling)      Current inpatient medications :    ANTIBIOTICS/RELEVANT:  hydroxychloroquine 200 milliGRAM(s) Oral <User Schedule>  vancomycin    Solution 125 milliGRAM(s) Oral every 6 hours      ALPRAZolam 0.25 milliGRAM(s) Oral two times a day PRN  apixaban 5 milliGRAM(s) Oral two times a day  artificial tears (preservative free) Ophthalmic Solution 1 Drop(s) Both EYES two times a day  chlordiazePOXIDE 5 milliGRAM(s) Oral every 6 hours PRN  gabapentin 600 milliGRAM(s) Oral two times a day  levETIRAcetam  IVPB 250 milliGRAM(s) IV Intermittent every 12 hours  loperamide 2 milliGRAM(s) Oral every 6 hours PRN  methylPREDNISolone 4 milliGRAM(s) Oral daily  multivitamin 1 Tablet(s) Oral daily  ondansetron    Tablet 4 milliGRAM(s) Oral every 8 hours PRN  oxyCODONE    IR 10 milliGRAM(s) Oral every 6 hours PRN  pancrelipase  (CREON 12,000 Lipase Units) 1 Capsule(s) Oral three times a day with meals  pantoprazole    Tablet 40 milliGRAM(s) Oral before breakfast  potassium chloride    Tablet ER 40 milliEquivalent(s) Oral every 4 hours  sodium chloride 0.9%. 1000 milliLiter(s) IV Continuous <Continuous>      Objective:    Vital Signs Last 24 Hrs  T(C): 36.9 (14 Aug 2020 16:12), Max: 37.2 (14 Aug 2020 07:30)  T(F): 98.4 (14 Aug 2020 16:12), Max: 99 (14 Aug 2020 07:30)  HR: 97 (14 Aug 2020 16:12) (79 - 99)  BP: 141/78 (14 Aug 2020 16:12) (95/60 - 141/78)  RR: 24 (14 Aug 2020 16:12) (17 - 24)  SpO2: 96% (14 Aug 2020 16:12) (90% - 99%)    Physical Exam:  General:  no acute distress  Eyes: sclera anicteric, pupils equal and reactive to light  ENMT: buccal mucosa moist, pharynx not injected  Neck: supple, trachea midline  Lungs: clear, no wheeze/rhonchi  Cardiovascular: regular rate and rhythm, S1 S2  Abdomen: soft, nontender, no organomegaly present, bowel sounds normal  Neurological: Minimally verbal awake  Skin: no increased ecchymosis/petechiae/purpura  Lymph Nodes: no palpable cervical/supraclavicular lymph nodes enlargements  Extremities: Right LE erythema resolving nicely    LABS:                        12.3   7.82  )-----------( 362      ( 14 Aug 2020 06:32 )             36.6   08-14    137  |  106  |  2<L>  ----------------------------<  90  4.0   |  18<L>  |  0.44<L>    Ca    8.8      14 Aug 2020 06:32  Phos  2.7     08-13  Mg     1.9     08-14    TPro  6.3  /  Alb  3.0<L>  /  TBili  0.4  /  DBili  x   /  AST  34  /  ALT  28  /  AlkPhos  35<L>  08-14      MICROBIOLOGY:    Culture - Urine (collected 11 Aug 2020 15:23)  Source: .Urine Clean Catch (Midstream)  Final Report (12 Aug 2020 14:52):    <10,000 CFU/mL Normal Urogenital Rubia    Culture - Blood (collected 11 Aug 2020 15:21)  Source: .Blood Blood-Peripheral  Preliminary Report (12 Aug 2020 16:01):    No growth to date.    Culture - Blood (collected 11 Aug 2020 15:21)  Source: .Blood Blood-Peripheral  Preliminary Report (12 Aug 2020 16:01):    No growth to date    RADIOLOGY & ADDITIONAL STUDIES:  EXAM:  XR CHEST PORTABLE IMMED 1V                            PROCEDURE DATE:  07/27/2020          INTERPRETATION:  History: Epigastric pain    Portable radiograph of the chest is performed. comparison is made to 7/30/2019.    The heart is not enlarged. The trachea is midline. The lungs are clear. Osseous structures are unremarkable.    Impression: No active pulmonary disease.      Assessment and plan.  1. Right LE cellulitis- Improved  Completed course of Doxycycline  Elevate leg    2. Cdiff  Diarrhea appears to have resolved  Finish course of po Vancomycin till 8/17/2020    3. seizure per neuro    4. AUR with [place] per primary team      Stable from ID standpoint    D/w DR Avendaño        Continue with present regiment.  Appropriate use of antibiotics and adverse effects reviewed.      I have discussed the above plan of care with patient/ family in detail. They expressed understanding of the  treatment plan . Risks, benefits and alternatives discussed in detail. I have asked if they have any questions or concerns and appropriately addressed them to the best of my ability .    > 35 minutes were spent in direct patient care reviewing notes, medications ,labs data/ imaging , discussion with multidisciplinary team.    Thank you for allowing me to participate in care of your patient .    Demetria Hancock MD  Infectious Disease  [phone]

## 2020-08-14 NOTE — BEHAVIORAL HEALTH ASSESSMENT NOTE - NSBHCHARTREVIEWINVESTIGATE_PSY_A_CORE FT
< from: 12 Lead ECG (08.11.20 @ 08:45) >    Ventricular Rate 72 BPM    Atrial Rate 72 BPM    P-R Interval 120 ms    QRS Duration 80 ms    Q-T Interval 410 ms    QTC Calculation(Bezet) 448 ms    P Axis 51 degrees    R Axis -3 degrees    T Axis 19 degrees    Diagnosis Line Normal sinus rhythm  Normal ECG  No previous ECGs available  Confirmed by Jenaro YOUNG, Rocio ([id number]) on 8/12/2020 10:38:57 AM    < end of copied text >

## 2020-08-14 NOTE — PROGRESS NOTE ADULT - ASSESSMENT
60 female with a history of ETOH, RLE cellulitis and history of C Diff Colitis now admitted with feeling weak and tired with intact mental status. Sodium improved from yesterday. 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 Will continue on Isotonic saline. Potassium improved now. ETOH withdrawal protocol in place.

## 2020-08-14 NOTE — PROGRESS NOTE ADULT - ASSESSMENT
60 yr old with PMH of ch pain with Opiate and ETOH dependency and abuse, alcoholic pancreatitis, Mixed connective tissue disorder, left soleal vein thrombosis, adeno ca lung- s/p RML resection 2018, ex smoker,rec leg cellulitis, was admitted in [facility] with alcoholic pancreatitis and HTN and discharged on 7/31, ?CAD,after one day at home pt went to [facility] for cellulitis treated with doxycycline and is improving, and 2 days back went to [facility] and lian told to drink lot of water and now says feeling weak and ataxic, In ED syo Na 120 , and received IV fluids, refused vancomycin as improving and is admitted to [facility] for Hyponatremia, likely  excessive water intake.pt took last drink 2 weeks back and thinks she is withdrawing.  admitted for further care  hyponatremia  ETOH and opiate abuse and dependency  rt leg cellulitis improving on doxycycline taken out pt  ch pain syndrome  MCTD on steroids  soleal V thrombosis   s/p RML resection for lung adenoca  peripheral neuropathy  electrolytes improving   c diff- pt on vancomycin completing course- no diarrhea,  had seizures overnight 8/13- received ATIVan, post ictal hypotensive, received IV fluids and had retention of urine and is on araya,   Seizures suspect secondary  to substance use with withdrawal patient on a lot of substances on outside and unknown quantity of xanax    EEG showes seizure activity and started on Keppra by Neuro  Memory issues, suspect this could be mild cognitive impairment versus possibly secondary to a history of substance use ataxia, suspect multifactorial, possibly secondary to spinal disc disease, possibly alcohol-induced neuropathy, deconditioning.  Hypotension 8/13- clonidine held and received IV fluids  Pt is removing IV lines, flat affect, refusing to answer questions,    Psych consult called  Addiction specialist opn board   neuro following.  d/w HCP Anita [phone]  all questions answered and discussed prognosis and management.

## 2020-08-14 NOTE — BEHAVIORAL HEALTH ASSESSMENT NOTE - NSBHCHARTREVIEWLAB_PSY_A_CORE FT
12.3   7.82  )-----------( 362      ( 14 Aug 2020 06:32 )             36.6   08-14    137  |  106  |  2<L>  ----------------------------<  90  4.0   |  18<L>  |  0.44<L>    Ca    8.8      14 Aug 2020 06:32  Phos  2.7     08-13  Mg     1.9     08-14    TPro  6.3  /  Alb  3.0<L>  /  TBili  0.4  /  DBili  x   /  AST  34  /  ALT  28  /  AlkPhos  35<L>  08-14

## 2020-08-14 NOTE — PROGRESS NOTE ADULT - SUBJECTIVE AND OBJECTIVE BOX
Neurology follow up note    JULIO CESAR [id number]      Interval History:    Patient feels ok    MEDICATIONS    ALPRAZolam 0.25 milliGRAM(s) Oral two times a day PRN  apixaban 5 milliGRAM(s) Oral two times a day  artificial tears (preservative free) Ophthalmic Solution 1 Drop(s) Both EYES two times a day  chlordiazePOXIDE 5 milliGRAM(s) Oral every 6 hours PRN  gabapentin 600 milliGRAM(s) Oral two times a day  hydroxychloroquine 200 milliGRAM(s) Oral <User Schedule>  levETIRAcetam  IVPB 250 milliGRAM(s) IV Intermittent every 12 hours  loperamide 2 milliGRAM(s) Oral every 6 hours PRN  methylPREDNISolone 4 milliGRAM(s) Oral daily  multivitamin 1 Tablet(s) Oral daily  ondansetron    Tablet 4 milliGRAM(s) Oral every 8 hours PRN  oxyCODONE    IR 10 milliGRAM(s) Oral every 6 hours PRN  pancrelipase  (CREON 12,000 Lipase Units) 1 Capsule(s) Oral three times a day with meals  pantoprazole    Tablet 40 milliGRAM(s) Oral before breakfast  sodium chloride 0.9%. 1000 milliLiter(s) IV Continuous <Continuous>  vancomycin    Solution 125 milliGRAM(s) Oral every 6 hours      Allergies    penicillin (Other)  penicillin (Swelling)    Intolerances            Vital Signs Last 24 Hrs  T(C): 37.2 (14 Aug 2020 07:30), Max: 37.2 (14 Aug 2020 07:30)  T(F): 99 (14 Aug 2020 07:30), Max: 99 (14 Aug 2020 07:30)  HR: 88 (14 Aug 2020 07:30) (79 - 99)  BP: 121/81 (14 Aug 2020 07:30) (93/60 - 121/81)  BP(mean): --  RR: 17 (14 Aug 2020 07:30) (17 - 18)  SpO2: 99% (14 Aug 2020 07:30) (90% - 99%)    REVIEW OF SYSTEMS:  Constitutional:  No fever, chills, or night sweats.  Head:  No headaches.  Eyes:  No double vision or blurry vision.  Ears:  No ringing in the ears.  Neck:  No neck pain.  Respiratory:  No shortness of breath.  Cardiovascular:  No chest pain.  Abdomen:  No nausea, vomiting, or abdominal pain.  Extremities/Neurological:  Positive numbness and tingling in bilateral lower extremities, also pain in her right ankle.  General:  Positive history of difficulty ambulating and history of back pain.    PHYSICAL EXAMINATION:   HEENT:  Head:  Normocephalic, atraumatic.  Eyes:  No scleral icterus.  Ears:  Hearing bilaterally intact.  NECK:  Supple.  RESPIRATORY:  Good air entry bilaterally.  CARDIOVASCULAR:  S1 and S2 heard.  ABDOMEN:  Soft and nontender.  EXTREMITIES:  No clubbing or cyanosis were noted.      NEUROLOGIC:  The patient is awake and alert.  Location was hospital, year was 2020, able to tell HCP name  able to name simple objects.  Upon conversing with the patient, she appeared to have cognitive deficits.  Speech was fluent.  Smile was symmetric.  Extraocular movements were intact.  Motor:  Bilateral upper were 4/5, bilateral lower were 4-/5.  The patient had impaired proprioception in bilateral lower extremities.              LABS:  CBC Full  -  ( 14 Aug 2020 06:32 )  WBC Count : 7.82 K/uL  RBC Count : 3.67 M/uL  Hemoglobin : 12.3 g/dL  Hematocrit : 36.6 %  Platelet Count - Automated : 362 K/uL  Mean Cell Volume : 99.7 fl  Mean Cell Hemoglobin : 33.5 pg  Mean Cell Hemoglobin Concentration : 33.6 gm/dL  Auto Neutrophil # : 5.46 K/uL  Auto Lymphocyte # : 1.52 K/uL  Auto Monocyte # : 0.72 K/uL  Auto Eosinophil # : 0.06 K/uL  Auto Basophil # : 0.03 K/uL  Auto Neutrophil % : 69.8 %  Auto Lymphocyte % : 19.4 %  Auto Monocyte % : 9.2 %  Auto Eosinophil % : 0.8 %  Auto Basophil % : 0.4 %      08-14    137  |  106  |  2<L>  ----------------------------<  90  4.0   |  18<L>  |  0.44<L>    Ca    8.8      14 Aug 2020 06:32  Phos  2.7     08-13  Mg     1.9     08-14    TPro  6.3  /  Alb  3.0<L>  /  TBili  0.4  /  DBili  x   /  AST  34  /  ALT  28  /  AlkPhos  35<L>  08-14    Hemoglobin A1C:     LIVER FUNCTIONS - ( 14 Aug 2020 06:32 )  Alb: 3.0 g/dL / Pro: 6.3 g/dL / ALK PHOS: 35 U/L / ALT: 28 U/L / AST: 34 U/L / GGT: x           Vitamin B12         RADIOLOGY      ANALYSIS AND PLAN:  This is a 60-year-old with a history of alcohol and substance use, memory issues, and ataxia.  1.	For memory issues, suspect this could be mild cognitive impairment versus possibly secondary to a history of substance use  2.	seizures suspect secondary  to substance use with withdrawal patient on a lot of substances on outside and unknown quantity of xanax   3.	check EEG   4.	For history of ataxia, suspect multifactorial, possibly secondary to spinal disc disease, possibly alcohol-induced neuropathy, deconditioning.  5.	For history of mixed connective tissue disorder, continue the patient on her steroids.  6.	For history of hypertension, monitor systolic blood pressure.  7.	For spinal disc disease, limit pain medications if possible.  8.	For history of alcohol use, detoxification protocol.  9.	For history of DVT, anticoagulation as needed.  10.	The patient was counseled in regard to alcohol cessation.  11.	Physical Therapy evaluation.  12.	Fall precaution.  13.	Do to SBP issues will start Keppra low dose unable to give BZD once SBP better then would rec dc keppra and go with BDZ detox protocol if possible--- state she only xanax 0.25 twice a day  14.	spoke to HCP Anita  8/14/2020  15.	Greater than 42 minutes of time was spent with the patient, plan of care, reviewing data, speaking to the multidisciplinary healthcare team.

## 2020-08-15 LAB
CK MB BLD-MCNC: 2 % — SIGNIFICANT CHANGE UP (ref 0–3.5)
CK MB BLD-MCNC: 3.3 % — SIGNIFICANT CHANGE UP (ref 0–3.5)
CK MB CFR SERPL CALC: 2 NG/ML — SIGNIFICANT CHANGE UP (ref 0–3.6)
CK MB CFR SERPL CALC: 3 NG/ML — SIGNIFICANT CHANGE UP (ref 0–3.6)
CK SERPL-CCNC: 92 U/L — SIGNIFICANT CHANGE UP (ref 26–192)
CK SERPL-CCNC: 98 U/L — SIGNIFICANT CHANGE UP (ref 26–192)
HCT VFR BLD CALC: 40.2 % — SIGNIFICANT CHANGE UP (ref 34.5–45)
HGB BLD-MCNC: 13.3 G/DL — SIGNIFICANT CHANGE UP (ref 11.5–15.5)
MAGNESIUM SERPL-MCNC: 2 MG/DL — SIGNIFICANT CHANGE UP (ref 1.6–2.6)
MCHC RBC-ENTMCNC: 33.1 GM/DL — SIGNIFICANT CHANGE UP (ref 32–36)
MCHC RBC-ENTMCNC: 33.8 PG — SIGNIFICANT CHANGE UP (ref 27–34)
MCV RBC AUTO: 102.3 FL — HIGH (ref 80–100)
NRBC # BLD: 0 /100 WBCS — SIGNIFICANT CHANGE UP (ref 0–0)
PHOSPHATE SERPL-MCNC: 2.5 MG/DL — SIGNIFICANT CHANGE UP (ref 2.5–4.5)
PLATELET # BLD AUTO: 359 K/UL — SIGNIFICANT CHANGE UP (ref 150–400)
RBC # BLD: 3.93 M/UL — SIGNIFICANT CHANGE UP (ref 3.8–5.2)
RBC # FLD: 12.9 % — SIGNIFICANT CHANGE UP (ref 10.3–14.5)
TROPONIN I SERPL-MCNC: 0.67 NG/ML — HIGH (ref 0.01–0.04)
TROPONIN I SERPL-MCNC: 0.81 NG/ML — HIGH (ref 0.01–0.04)
WBC # BLD: 7.49 K/UL — SIGNIFICANT CHANGE UP (ref 3.8–10.5)
WBC # FLD AUTO: 7.49 K/UL — SIGNIFICANT CHANGE UP (ref 3.8–10.5)

## 2020-08-15 PROCEDURE — 93010 ELECTROCARDIOGRAM REPORT: CPT

## 2020-08-15 PROCEDURE — 70450 CT HEAD/BRAIN W/O DYE: CPT | Mod: 26

## 2020-08-15 RX ORDER — LEVETIRACETAM 250 MG/1
500 TABLET, FILM COATED ORAL ONCE
Refills: 0 | Status: COMPLETED | OUTPATIENT
Start: 2020-08-15 | End: 2020-08-15

## 2020-08-15 RX ORDER — ASPIRIN/CALCIUM CARB/MAGNESIUM 324 MG
325 TABLET ORAL DAILY
Refills: 0 | Status: DISCONTINUED | OUTPATIENT
Start: 2020-08-15 | End: 2020-08-16

## 2020-08-15 RX ORDER — ATORVASTATIN CALCIUM 80 MG/1
40 TABLET, FILM COATED ORAL AT BEDTIME
Refills: 0 | Status: DISCONTINUED | OUTPATIENT
Start: 2020-08-15 | End: 2020-08-16

## 2020-08-15 RX ADMIN — APIXABAN 5 MILLIGRAM(S): 2.5 TABLET, FILM COATED ORAL at 05:30

## 2020-08-15 RX ADMIN — Medication 1 MILLIGRAM(S): at 00:01

## 2020-08-15 RX ADMIN — PANTOPRAZOLE SODIUM 40 MILLIGRAM(S): 20 TABLET, DELAYED RELEASE ORAL at 06:34

## 2020-08-15 RX ADMIN — Medication 1 MILLIGRAM(S): at 08:31

## 2020-08-15 RX ADMIN — Medication 1 MILLIGRAM(S): at 05:30

## 2020-08-15 RX ADMIN — Medication 1 TABLET(S): at 12:01

## 2020-08-15 RX ADMIN — SODIUM CHLORIDE 60 MILLILITER(S): 9 INJECTION INTRAMUSCULAR; INTRAVENOUS; SUBCUTANEOUS at 09:51

## 2020-08-15 RX ADMIN — GABAPENTIN 600 MILLIGRAM(S): 400 CAPSULE ORAL at 05:30

## 2020-08-15 RX ADMIN — Medication 125 MILLIGRAM(S): at 00:01

## 2020-08-15 RX ADMIN — GABAPENTIN 600 MILLIGRAM(S): 400 CAPSULE ORAL at 18:00

## 2020-08-15 RX ADMIN — LEVETIRACETAM 420 MILLIGRAM(S): 250 TABLET, FILM COATED ORAL at 05:30

## 2020-08-15 RX ADMIN — LEVETIRACETAM 420 MILLIGRAM(S): 250 TABLET, FILM COATED ORAL at 15:58

## 2020-08-15 RX ADMIN — Medication 4 MILLIGRAM(S): at 05:30

## 2020-08-15 RX ADMIN — Medication 1 DROP(S): at 05:31

## 2020-08-15 RX ADMIN — Medication 1 MILLIGRAM(S): at 10:26

## 2020-08-15 RX ADMIN — Medication 1 DROP(S): at 19:32

## 2020-08-15 RX ADMIN — LEVETIRACETAM 420 MILLIGRAM(S): 250 TABLET, FILM COATED ORAL at 19:31

## 2020-08-15 RX ADMIN — ATORVASTATIN CALCIUM 40 MILLIGRAM(S): 80 TABLET, FILM COATED ORAL at 22:16

## 2020-08-15 RX ADMIN — Medication 125 MILLIGRAM(S): at 18:00

## 2020-08-15 RX ADMIN — Medication 125 MILLIGRAM(S): at 05:30

## 2020-08-15 RX ADMIN — APIXABAN 5 MILLIGRAM(S): 2.5 TABLET, FILM COATED ORAL at 18:01

## 2020-08-15 RX ADMIN — Medication 125 MILLIGRAM(S): at 12:01

## 2020-08-15 NOTE — PROVIDER CONTACT NOTE (OTHER) - BACKGROUND
raises her arm up in the air.  Skin warm and  dry.  Pt sleeping on and off.  easily aroused to verbal/ tactile stimuli

## 2020-08-15 NOTE — PROVIDER CONTACT NOTE (OTHER) - SITUATION
pt s/p rapid response for seizure like activity.  Pt was given ativan 1 mg IV push. Pt remains lethargic, pupils sluggish but equal and reacting to light... Spoke with Jarocho and she was notified of

## 2020-08-15 NOTE — PROGRESS NOTE ADULT - SUBJECTIVE AND OBJECTIVE BOX
Patient is a 60y old  Female who presents with a chief complaint of rt leg cellulitis, dizziness (15 Aug 2020 11:40)      INTERVAL HPI/OVERNIGHT EVENTS:overnight events noted    Home Medications:  ALPRAZolam 0.25 mg oral tablet: 0.25 milligram(s) orally 2 times a day, As Needed (12 Aug 2020 14:09)  cloNIDine 0.1 mg oral tablet: 1 tab(s) orally 2 times a day (12 Aug 2020 14:09)  Creon 12,000 units oral delayed release capsule: 1 cap(s) orally 3 times a day (12 Aug 2020 14:09)  doxycycline hyclate 100 mg oral capsule: 1 cap(s) orally 2 times a day (12 Aug 2020 14:09)  fentaNYL 25 mcg/hr transdermal film, extended release: 1 film(s) transdermal every 72 hours    *Of note: Patient applied patch this morning, 08/11/20 (12 Aug 2020 01:14)  fentaNYL 25 mcg/hr transdermal film, extended release: 1 film(s) transdermal every 72 hours (12 Aug 2020 14:09)  gabapentin 600 mg oral tablet: 600 milligram(s) orally 3 times a day (12 Aug 2020 14:09)  gabapentin 600 mg oral tablet: 2 tab(s) orally 2 times a day (12 Aug 2020 01:14)  hydroxychloroquine 200 mg oral tablet: 200 milligram(s) orally every other day (12 Aug 2020 14:09)  methylPREDNISolone 4 mg oral tablet: orally once a day (12 Aug 2020 14:09)  methylPREDNISolone 4 mg oral tablet: 1 tab(s) orally once a day (12 Aug 2020 01:14)  Multiple Vitamins oral tablet: 1 tab(s) orally once a day (12 Aug 2020 01:14)  ondansetron 4 mg oral tablet: 1 tab(s) orally every 8 hours (12 Aug 2020 14:09)  oxyCODONE 10 mg oral tablet: 10 milligram(s) orally every 4 hours, As Needed (12 Aug 2020 14:09)  oxyCODONE 10 mg oral tablet: 1 tab(s) orally 4-5times a day, As Needed (12 Aug 2020 01:14)  pantoprazole: 40 milligram(s) orally once a day (12 Aug 2020 14:09)  Plaquenil 200 mg oral tablet: 1 tab(s) orally every other day (12 Aug 2020 01:14)  Restasis 0.05% ophthalmic emulsion: 1 drop(s) to each affected eye every 12 hours (12 Aug 2020 01:14)  vancomycin 125 mg oral capsule: 1 cap(s) orally every 6 hours (12 Aug 2020 14:09)  vancomycin 125 mg oral capsule: 1 cap(s) orally every 6 hours    8am, 2pm, 8pm, 2am (12 Aug 2020 01:14)  Xanax 0.25 mg oral tablet: 1 tab(s) orally 2 times a day, As Needed (12 Aug 2020 01:14)      MEDICATIONS  (STANDING):  apixaban 5 milliGRAM(s) Oral two times a day  artificial tears (preservative free) Ophthalmic Solution 1 Drop(s) Both EYES two times a day  gabapentin 600 milliGRAM(s) Oral two times a day  hydroxychloroquine 200 milliGRAM(s) Oral <User Schedule>  levETIRAcetam  IVPB 500 milliGRAM(s) IV Intermittent every 12 hours  LORazepam     Tablet 1 milliGRAM(s) Oral every 6 hours  methylPREDNISolone 4 milliGRAM(s) Oral daily  multivitamin 1 Tablet(s) Oral daily  pancrelipase  (CREON 12,000 Lipase Units) 1 Capsule(s) Oral three times a day with meals  pantoprazole    Tablet 40 milliGRAM(s) Oral before breakfast  sodium chloride 0.9%. 1000 milliLiter(s) (60 mL/Hr) IV Continuous <Continuous>  vancomycin    Solution 125 milliGRAM(s) Oral every 6 hours    MEDICATIONS  (PRN):  chlordiazePOXIDE 5 milliGRAM(s) Oral every 6 hours PRN for ciwa > 6  loperamide 2 milliGRAM(s) Oral every 6 hours PRN Diarrhea  ondansetron    Tablet 4 milliGRAM(s) Oral every 8 hours PRN Nausea and/or Vomiting  oxyCODONE    IR 10 milliGRAM(s) Oral every 6 hours PRN Moderate Pain (4 - 6)      Allergies    penicillin (Other)  penicillin (Swelling)    Intolerances        REVIEW OF SYSTEMS:does not give answers to many questions just says she is fine in clear tone       Vital Signs Last 24 Hrs  T(C): 37.3 (15 Aug 2020 12:21), Max: 37.3 (15 Aug 2020 12:21)  T(F): 99.1 (15 Aug 2020 12:21), Max: 99.1 (15 Aug 2020 12:21)  HR: 87 (15 Aug 2020 12:21) (81 - 99)  BP: 119/81 (15 Aug 2020 12:21) (106/71 - 141/78)  BP(mean): --  RR: 18 (15 Aug 2020 12:21) (18 - 24)  SpO2: 97% (15 Aug 2020 12:21) (93% - 100%)    PHYSICAL EXAM:  GENERAL:  well-groomed, well-developed  HEAD:  Atraumatic, Normocephalic  EYES: EOMI, PERRLA, conjunctiva and sclera clear  ENMT: Moist mucous membranes,   NECK: Supple, No JVD, Normal thyroid  NERVOUS SYSTEM:  Alert & Oriented X2, moves left arm unintentionally but does not move when asked to move.on chest rub moved all extremeties  CHEST/LUNG: Clear to percussion bilaterally; No rales, rhonchi, wheezing, or rubs  HEART: Regular rate and rhythm; No murmurs, rubs, or gallops  ABDOMEN: Soft, Nontender, Nondistended; Bowel sounds present  EXTREMITIES:  2+ Peripheral Pulses, No clubbing, cyanosis, or edema  SKIN: No rashes or lesions    LABS:                        13.3   7.49  )-----------( 359      ( 15 Aug 2020 10:23 )             40.2     08-15    142  |  108  |  2<L>  ----------------------------<  103<H>  4.6   |  28  |  0.82    Ca    9.4      15 Aug 2020 10:23  Phos  2.5     08-15  Mg     2.0     08-15    TPro  6.6  /  Alb  3.2<L>  /  TBili  0.4  /  DBili  x   /  AST  35  /  ALT  26  /  AlkPhos  36<L>  08-15        CAPILLARY BLOOD GLUCOSE      POCT Blood Glucose.: 105 mg/dL (15 Aug 2020 08:21)        Culture - Urine (collected 08-11-20 @ 15:23)  Source: .Urine Clean Catch (Midstream)  Final Report (08-12-20 @ 14:52):    <10,000 CFU/mL Normal Urogenital Rubia    Culture - Blood (collected 08-11-20 @ 15:21)  Source: .Blood Blood-Peripheral  Preliminary Report (08-12-20 @ 16:01):    No growth to date.    Culture - Blood (collected 08-11-20 @ 15:21)  Source: .Blood Blood-Peripheral  Preliminary Report (08-12-20 @ 16:01):    No growth to date.        I&O's Summary    14 Aug 2020 07:01  -  15 Aug 2020 07:00  --------------------------------------------------------  IN: 0 mL / OUT: 1850 mL / NET: -1850 mL        RADIOLOGY & ADDITIONAL TESTS:    Imaging Personally Reviewed:  [x ] YES  [ ] NO    Consultant(s) Notes Reviewed:  [x ] YES  [ ] NO    Care Discussed with Consultants/Other Providers [x ] YES  [ ] NO

## 2020-08-15 NOTE — PROVIDER CONTACT NOTE (OTHER) - SITUATION
after each episode pt was awake and answered my question, when asked are you ok? pt replied, " I am messed up from the seizure."  Left arm continues to be floppy, but when pt has these episodes she

## 2020-08-15 NOTE — PROGRESS NOTE ADULT - ASSESSMENT
The patient is a 60 year old female with a history of HTN, DVT, lung cancer s/p resection, opiate use, alcohol abuse who presents with right leg cellulitis and hyponatremia.    8/15/20  Patient lying flat, sleeping comfortably  Somewhat responsive to stimuli  Had earlier seizure and given Ativan  Troponin-0.808. CPK-92, CPK-MB-3.0  EKG-NSR with new diffuse ST changes    Plan:  - Will follow cardiac enzymes and EKG  - Magnesium and Phosphorus ordered  - Echocardiogram ordered  - Will resume clonidine if BP trends up further  - Continue apixaban 5 mg bid  - CT head negative for acute pathology  - Being followed by Neurology, Psychiatry, Nephrology, ID

## 2020-08-15 NOTE — PROGRESS NOTE ADULT - SUBJECTIVE AND OBJECTIVE BOX
Neurology Follow up note    JULIO CESAR [id number]    HPI:  60 yr old with PMH of ch pain with Opiate and ETOH dependency and abuse, alcoholic pancreatitis, Mixed connective tissue disorder, left soleal vein thrombosis, adeno ca lung- s/p RML resection 2018, ex smoker,rec leg cellulitis, was admitted in [facility] with alcoholic pancreatitis and HTN and discharged on 7/31, after one day at home pt went to [facility] for cellulitis treated with doxycycline and is improving, and 2 days back went to [facility] and lian told to drink lot of water and now says feeling weak and ataxic, In ED syo Na 120 , and received IV fluids, refused vancomycin as improving and is admitted to [facility] for Hyponatremia, likely  excessive water intake.pt took last drink 2 weeks back and thinks she is withdrawing.  admitted for further care (11 Aug 2020 15:36)      Interval History -events noted  report of multiple seizure;     Patient is seen, chart was reviewed and case was discussed with the treatment team.  Pt is not in any distress.   Lying on bed comfortably.   No events reported overnight.       Vital Signs Last 24 Hrs  T(C): 36.6 (15 Aug 2020 15:28), Max: 37.3 (15 Aug 2020 12:21)  T(F): 97.8 (15 Aug 2020 15:28), Max: 99.1 (15 Aug 2020 12:21)  HR: 92 (15 Aug 2020 15:28) (81 - 99)  BP: 129/82 (15 Aug 2020 15:28) (106/71 - 129/82)  BP(mean): --  RR: 18 (15 Aug 2020 15:28) (18 - 20)  SpO2: 95% (15 Aug 2020 15:28) (93% - 100%)        REVIEW OF SYSTEMS:    Constitutional: No fever,  Eyes: No eye pain, visual disturbances, or discharge  ENT:  No difficulty hearing, tinnitus,   Neck: No pain or stiffness  Respiratory: No cough, wheezing, chills or hemoptysis  Cardiovascular: No chest pain, palpitations,   Gastrointestinal: No abdominal or epigastric pain. No nausea, vomiting or hematemesis;   Genitourinary: No dysuria, frequency, hematuria or incontinence  Neurological: No headaches,   Psychiatric: No , mood swings or difficulty sleeping  Musculoskeletal: No joint pain or swelling; No muscle, back or extremity pain  Skin: No itching, burning, rashes or lesions   Lymph Nodes: No enlarged glands  Endocrine: No heat or cold intolerance; No hair loss,  Allergy and Immunologic: No hives or eczema    On Neurological Examination:    Mental Status - Pt is lethargic but arousible following some simple commands      Speech -  dysarthria.    Cranial Nerves - Pupils 3 mm equal and reactive to light, extraocular eye movements intact  Pt has no  facial asymmetry. Facial sensation is intact.  Tongue - is in midline.    Muscle tone - is normal     Motor Exam -left hemiparesis 1-2/5    Sensory Exam -. Pt withdraws all extremities equally on stimulation. No asymmetry seen. No complaints of tingling, numbness.        .    Deep tendon Reflexes - 2 plus all over.         Neck Supple -  Yes.     MEDICATIONS    apixaban 5 milliGRAM(s) Oral two times a day  artificial tears (preservative free) Ophthalmic Solution 1 Drop(s) Both EYES two times a day  chlordiazePOXIDE 5 milliGRAM(s) Oral every 6 hours PRN  gabapentin 600 milliGRAM(s) Oral two times a day  hydroxychloroquine 200 milliGRAM(s) Oral <User Schedule>  levETIRAcetam  IVPB 500 milliGRAM(s) IV Intermittent every 12 hours  loperamide 2 milliGRAM(s) Oral every 6 hours PRN  LORazepam     Tablet 1 milliGRAM(s) Oral every 6 hours  methylPREDNISolone 4 milliGRAM(s) Oral daily  multivitamin 1 Tablet(s) Oral daily  ondansetron    Tablet 4 milliGRAM(s) Oral every 8 hours PRN  oxyCODONE    IR 10 milliGRAM(s) Oral every 6 hours PRN  pancrelipase  (CREON 12,000 Lipase Units) 1 Capsule(s) Oral three times a day with meals  pantoprazole    Tablet 40 milliGRAM(s) Oral before breakfast  sodium chloride 0.9%. 1000 milliLiter(s) IV Continuous <Continuous>  vancomycin    Solution 125 milliGRAM(s) Oral every 6 hours      Allergies    penicillin (Other)  penicillin (Swelling)    Intolerances        LABS:  CBC Full  -  ( 15 Aug 2020 10:23 )  WBC Count : 7.49 K/uL  RBC Count : 3.93 M/uL  Hemoglobin : 13.3 g/dL  Hematocrit : 40.2 %  Platelet Count - Automated : 359 K/uL  Mean Cell Volume : 102.3 fl  Mean Cell Hemoglobin : 33.8 pg  Mean Cell Hemoglobin Concentration : 33.1 gm/dL  Auto Neutrophil # : x  x  Auto Basophil % : x      08-15    142  |  108  |  2<L>  ----------------------------<  103<H>  4.6   |  28  |  0.82    Ca    9.4      15 Aug 2020 10:23  Phos  2.5     08-15  Mg     2.0     08-15    TPro  6.6  /  Alb  3.2<L>  /  TBili  0.4  /  DBili  x   /  AST  35  /  ALT  26  /  AlkPhos  36<L>  08-15    Hemoglobin A1C:     Vitamin B12     RADIOLOGY    ASSESSMENT AND PLAN:      multiple episodes of sided seizure followed by weakness ? Dwayne's palsy  PNES.  AMS  SUBSTANCE ABUSE  ALCOHOL WITHDRAWAL    INCREASE KEPPRA  MG BID  ATIVAN PRN  WOULD CONSIDER VIDEO EEG MONITORING  Physical therapy evaluation.  Pain is accessed and addressed.  Would continue to follow.

## 2020-08-15 NOTE — CHART NOTE - NSCHARTNOTEFT_GEN_A_CORE
Rapid Response   Rapid response was called at 0825 for seizure    Events leading up to Rapid Response:    Patient was seen and examined at the bedside by the rapid response team and resident medicine team and ICU. Pt actively seizing upon arrival, with tonic clonic seizing and frothing at mouth. Eyes closed, pt unresponsive to verbal and noxious stimulus. Patient seizure broke after 1mg ativan IV. Pt responding to voice and following verbal commands, opened eyes. L upper extremity flaccid. During second seizure pt turned head towards L with eyes deviated to L. Seizure began with tonic clonic movements on her L side. Second dose 1mg ativan given and seizure stopped.       PAST MEDICAL & SURGICAL HISTORY:  Lung cancer  Opiate dependence  Alcohol abuse  Adenocarcinoma of lung  Mixed connective tissue disease  Depression H/O panic attack: with anxiety  S/P Laminectomy  Lumbar 3/10  Chronic pain  Diverticulitis of colon (without mention of hemorrhage)  History of laminectomy  History of lung surgery  History of oophorectomy, unilateral: bilateral  History of hip replacement, total, right: 6/7/2020  S/P lumbar laminectomy  S/P cholecystectomy  History of lung cancer: s/p wedge resection of RML      Vital Signs Last 24 Hrs  T(C): 37.1 (15 Aug 2020 08:22), Max: 37.2 (15 Aug 2020 07:36)  T(F): 98.7 (15 Aug 2020 08:22), Max: 99 (15 Aug 2020 07:36)  HR: 90 (15 Aug 2020 08:22) (81 - 99)  BP: 111/73 (15 Aug 2020 08:22) (106/71 - 141/78)  BP(mean): --  RR: 20 (15 Aug 2020 08:22) (18 - 24)  SpO2: 97% (15 Aug 2020 08:22) (93% - 100%)          GENERAL: The patient is awake and alert in no apparent distress.   HEENT: Head is normocephalic and atraumatic. Extraocular muscles are intact. Mucous membranes are moist. No throat erythema/exudates no lymphadenopathy, no JVD,   NECK: Supple.  LUNGS: Clear to auscultation BL without wheezing, rales or rhonchi; respirations unlabored  HEART: Regular rate and rhythm ,+S1/+S2, no murmurs, rubs, gallops  ABDOMEN: Soft, nontender, and nondistended, no rebound, guarding rigidity, bowel sounds in all 4 quadrants  EXTREMITIES: Without any cyanosis, clubbing, rash, lesions or edema.  SKIN: No new rashes or lesions.  MSK: strength equal BL  VASCULAR: Radial and Dorsal pedal pulses palpable BL  NEUROLOGIC: Grossly intact.  PSYCH: No new changes.    08-14 @ 07:01  -  08-15 @ 07:00  --------------------------------------------------------  IN: 0 mL / OUT: 1850 mL / NET: -1850 mL                              12.3   7.82  )-----------( 362      ( 14 Aug 2020 06:32 )             36.6     08-14    137  |  106  |  2<L>  ----------------------------<  90  4.0   |  18<L>  |  0.44<L>    Ca    8.8      14 Aug 2020 06:32  Mg     1.9     08-14    TPro  6.3  /  Alb  3.0<L>  /  TBili  0.4  /  DBili  x   /  AST  34  /  ALT  28  /  AlkPhos  35<L>  08-14         LIVER FUNCTIONS - ( 14 Aug 2020 06:32 )  Alb: 3.0 g/dL / Pro: 6.3 g/dL / ALK PHOS: 35 U/L / ALT: 28 U/L / AST: 34 U/L / GGT: x                  MEDICATIONS  (STANDING):  apixaban 5 milliGRAM(s) Oral two times a day  artificial tears (preservative free) Ophthalmic Solution 1 Drop(s) Both EYES two times a day  gabapentin 600 milliGRAM(s) Oral two times a day  hydroxychloroquine 200 milliGRAM(s) Oral <User Schedule>  levETIRAcetam  IVPB 500 milliGRAM(s) IV Intermittent every 12 hours  LORazepam     Tablet 1 milliGRAM(s) Oral every 6 hours  LORazepam   Injectable 1 milliGRAM(s) IV Push once  methylPREDNISolone 4 milliGRAM(s) Oral daily  multivitamin 1 Tablet(s) Oral daily  pancrelipase  (CREON 12,000 Lipase Units) 1 Capsule(s) Oral three times a day with meals  pantoprazole    Tablet 40 milliGRAM(s) Oral before breakfast  sodium chloride 0.9%. 1000 milliLiter(s) (60 mL/Hr) IV Continuous <Continuous>  vancomycin    Solution 125 milliGRAM(s) Oral every 6 hours    MEDICATIONS  (PRN):  chlordiazePOXIDE 5 milliGRAM(s) Oral every 6 hours PRN for ciwa > 6  loperamide 2 milliGRAM(s) Oral every 6 hours PRN Diarrhea  ondansetron    Tablet 4 milliGRAM(s) Oral every 8 hours PRN Nausea and/or Vomiting  oxyCODONE    IR 10 milliGRAM(s) Oral every 6 hours PRN Moderate Pain (4 - 6)      Assessment- Rapid Response called for 60y year old Female with a past medical history of     Plan-

## 2020-08-15 NOTE — PROGRESS NOTE ADULT - ASSESSMENT
60 yr old with PMH of ch pain with Opiate and ETOH dependency and abuse, alcoholic pancreatitis, Mixed connective tissue disorder, left soleal vein thrombosis, adeno ca lung- s/p RML resection 2018, ex smoker,rec leg cellulitis, was admitted in [facility] with alcoholic pancreatitis and HTN and discharged on 7/31, ?CAD,after one day at home pt went to [facility] for cellulitis treated with doxycycline and is improving, and 2 days back went to [facility] and lian told to drink lot of water and now says feeling weak and ataxic, In ED syo Na 120 , and received IV fluids, refused vancomycin as improving and is admitted to [facility] for Hyponatremia, likely  excessive water intake.pt took last drink 2 weeks back and thinks she is withdrawing.  admitted for further care  hyponatremia  ETOH and opiate abuse and dependency  rt leg cellulitis improving on doxycycline taken out pt  ch pain syndrome  MCTD on steroids  soleal V thrombosis   s/p RML resection for lung adenoca  peripheral neuropathy  electrolytes improving   c diff- pt on vancomycin completing course- no diarrhea,  had seizures overnight 8/13- received ATIVan, post ictal hypotensive, received IV fluids and had retention of urine and is on araya,   Seizures suspect secondary  to substance use with withdrawal patient on a lot of substances on outside and unknown quantity of xanax    EEG showes seizure activity and started on Keppra by Neuro  Memory issues, suspect this could be mild cognitive impairment versus possibly secondary to a history of substance use ataxia, suspect multifactorial, possibly secondary to spinal disc disease, possibly alcohol-induced neuropathy, deconditioning.  Hypotension 8/13- clonidine held and received IV fluids  Pt is removing IV lines, flat affect, refusing to answer questions,    Psych consult called  Addiction specialist on board   neuro following.had overnight seizure unclear cause, , continue tt and observation as per neuro.  d/w HCP Anita [phone]  all questions answered and discussed prognosis and management.

## 2020-08-15 NOTE — CHART NOTE - NSCHARTNOTEFT_GEN_A_CORE
Resident Rapid Response Note    Patient is a 60y old  Female who presents with a chief complaint of rt leg cellulitis, dizziness (14 Aug 2020 15:25)    Rapid response was called at on this 60y Female patient for seizure. Patient was seen and examined at the bedside by the rapid response team and resident medicine team and ICU. Pt actively seizing upon arrival, with patient's head turned towards L with eyes deviated to L. Patient seizure broke after 1 mg ativan IVP. Pt responsive to verbal and noxious stimulus. Pt responding to voice and following verbal commands, opened eyes, turned heard towards center, refused to turn head towards R. L upper extremity and L lower extremity flaccid.     Rapid Response Vital Signs:  BP: 111/73  HR: 87  RR: 16  SpO2: 99% on RA   Temp: 98.7  FS: 105    Vital Signs Last 24 Hrs  T(C): 37.1 (15 Aug 2020 08:22), Max: 37.2 (15 Aug 2020 07:36)  T(F): 98.7 (15 Aug 2020 08:22), Max: 99 (15 Aug 2020 07:36)  HR: 90 (15 Aug 2020 08:22) (81 - 99)  BP: 111/73 (15 Aug 2020 08:22) (106/71 - 141/78)  BP(mean): --  RR: 20 (15 Aug 2020 08:22) (18 - 24)  SpO2: 97% (15 Aug 2020 08:22) (93% - 100%)    Physical Exam:  General: Well developed, well nourished, responsive to verbal and noxious stimuli and actively seizing  HEENT: NCAT, PERRL, head turned towards L and eyes deviated to L, moist mucous membranes   Neck: Supple, nontender  Neurology: A&Ox2, opened her eyes to name, followed verbal commands, Left UE and LE flaccid, strength 5/5 RUE and RLE  Respiratory: CTA B/L, No wheezing, rales, or rhonchi  CV: RRR, S1/S2 present, no murmurs, rubs, or gallops  Abdominal: Soft, nontender, non-distended, normoactive bowel sounds  Extremities: No C/C/E, peripheral pulses present  MSK: no joint erythema or warmth, no joint swelling   Skin: warm, dry, normal color    LABS:    Ca    8.8        14 Aug 2020 06:32    CAPILLARY BLOOD GLUCOSE    POCT Blood Glucose.: 105 mg/dL (15 Aug 2020 08:21)      RADIOLOGY & ADDITIONAL TESTS: No new imaging      Assessment/Plan: 60 year old female with a history of HTN, DVT, lung cancer s/p resection, anxiety disorder, opiate use, alcohol abuse who presented to the hospital with right leg cellulitis and hyponatremia. Currently treated with Eliquis for DVT. Rapid response called for seizure activity    Seizure activity likely 2/2 substance abuse with withdrawal   - STAT IV Ativan 1 mg X 1 dose. Seizure stopped.   - STAT EKG - T wave inversions anterior, lateral and inferior leads - will compare to prior EKG  - Call placed to Dr. Avendaño, attending physician of record, who agrees with plan above  - Will continue to follow, RN to call if any changes. Resident Rapid Response Note    Patient is a 60y old  Female who presents with a chief complaint of rt leg cellulitis, dizziness (14 Aug 2020 15:25)    Rapid response was called at on this 60y Female patient for seizure. Patient was seen and examined at the bedside by the rapid response team and resident medicine team and ICU. Pt actively seizing upon arrival, with patient's head turned towards L with eyes deviated to L. Patient seizure broke after 1 mg ativan IVP. Pt responsive to verbal and noxious stimulus. Pt responding to voice and following verbal commands, opened eyes, turned heard towards center, refused to turn head towards R. L upper extremity and L lower extremity flaccid.     Rapid Response Vital Signs:  BP: 111/73  HR: 87  RR: 16  SpO2: 99% on RA   Temp: 98.7  FS: 105    Vital Signs Last 24 Hrs  T(C): 37.1 (15 Aug 2020 08:22), Max: 37.2 (15 Aug 2020 07:36)  T(F): 98.7 (15 Aug 2020 08:22), Max: 99 (15 Aug 2020 07:36)  HR: 90 (15 Aug 2020 08:22) (81 - 99)  BP: 111/73 (15 Aug 2020 08:22) (106/71 - 141/78)  BP(mean): --  RR: 20 (15 Aug 2020 08:22) (18 - 24)  SpO2: 97% (15 Aug 2020 08:22) (93% - 100%)    Physical Exam:  General: Well developed, well nourished, responsive to verbal and noxious stimuli and actively seizing  HEENT: NCAT, PERRL, head turned towards L and eyes deviated to L, moist mucous membranes   Neck: Supple, nontender  Neurology: A&Ox2, opened her eyes to name, followed verbal commands, Left UE and LE flaccid, strength 5/5 RUE and RLE  Respiratory: CTA B/L, No wheezing, rales, or rhonchi  CV: RRR, S1/S2 present, no murmurs, rubs, or gallops  Abdominal: Soft, nontender, non-distended, normoactive bowel sounds  Extremities: No C/C/E, peripheral pulses present  MSK: no joint erythema or warmth, no joint swelling   Skin: warm, dry, normal color    LABS:    Ca    8.8        14 Aug 2020 06:32    CAPILLARY BLOOD GLUCOSE    POCT Blood Glucose.: 105 mg/dL (15 Aug 2020 08:21)      RADIOLOGY & ADDITIONAL TESTS: No new imaging      Assessment/Plan: 60 year old female with a history of HTN, DVT, lung cancer s/p resection, anxiety disorder, opiate use, alcohol abuse who presented to the hospital with right leg cellulitis and hyponatremia. Currently treated with Eliquis for DVT. Rapid response called for seizure activity    Seizure activity likely 2/2 substance abuse with withdrawal   - STAT IV Ativan 1 mg X 1 dose. Seizure stopped.   - STAT EKG - T wave inversions anterior, lateral and inferior leads - will compare to prior EKG  - Ordered cardiac enzymes  - Call placed to Dr. Avendaño, attending physician of record, who agrees with plan above  - Will continue to follow, RN to call if any changes. Resident Rapid Response Note    Patient is a 60y old  Female who presents with a chief complaint of rt leg cellulitis, dizziness (14 Aug 2020 15:25)    Rapid response was called at on this 60y Female patient for seizure. Patient was seen and examined at the bedside by the rapid response team and resident medicine team and ICU. Pt actively seizing upon arrival, with patient's head turned towards L with eyes deviated to L. Patient seizure broke after 1 mg ativan IVP. Pt responsive to verbal and noxious stimulus. Pt responding to voice and following verbal commands, opened eyes, turned heard towards center, refused to turn head towards R. L upper extremity and L lower extremity flaccid.     Rapid Response Vital Signs:  BP: 111/73  HR: 87  RR: 16  SpO2: 99% on RA   Temp: 98.7  FS: 105    Vital Signs Last 24 Hrs  T(C): 37.1 (15 Aug 2020 08:22), Max: 37.2 (15 Aug 2020 07:36)  T(F): 98.7 (15 Aug 2020 08:22), Max: 99 (15 Aug 2020 07:36)  HR: 90 (15 Aug 2020 08:22) (81 - 99)  BP: 111/73 (15 Aug 2020 08:22) (106/71 - 141/78)  BP(mean): --  RR: 20 (15 Aug 2020 08:22) (18 - 24)  SpO2: 97% (15 Aug 2020 08:22) (93% - 100%)    Physical Exam:  General: Well developed, well nourished, responsive to verbal and noxious stimuli and actively seizing  HEENT: NCAT, PERRL, head turned towards L and eyes deviated to L, moist mucous membranes   Neck: Supple, nontender  Neurology: A&Ox2, opened her eyes to name, followed verbal commands, Left UE and LE flaccid, strength 5/5 RUE and RLE  Respiratory: CTA B/L, No wheezing, rales, or rhonchi  CV: RRR, S1/S2 present, no murmurs, rubs, or gallops  Abdominal: Soft, nontender, non-distended, normoactive bowel sounds  Extremities: No C/C/E, peripheral pulses present  MSK: no joint erythema or warmth, no joint swelling   Skin: warm, dry, normal color    LABS:    Ca    8.8        14 Aug 2020 06:32    CAPILLARY BLOOD GLUCOSE    POCT Blood Glucose.: 105 mg/dL (15 Aug 2020 08:21)      RADIOLOGY & ADDITIONAL TESTS: No new imaging      Assessment/Plan: 60 year old female with a history of HTN, DVT, lung cancer s/p resection, anxiety disorder, opiate use, alcohol abuse who presented to the hospital with right leg cellulitis and hyponatremia. Currently treated with Eliquis for DVT. Rapid response called for seizure activity    Seizure activity likely 2/2 substance abuse with withdrawal   - STAT IV Ativan 1 mg X 1 dose. Seizure stopped.   - STAT EKG - T wave inversions anterior, lateral leads, new changes when compared to prior EKG from 8/11  - STAT cardiac enzymes, mag, phos  - Ordered echocardiogram   - Discussed case with cardiologist, Dr. Eason, who agrees with plan above   - Call placed to Dr. Avendaño, attending physician of record, who agrees with plan above  - Will continue to follow, RN to call if any changes.

## 2020-08-15 NOTE — PROGRESS NOTE ADULT - SUBJECTIVE AND OBJECTIVE BOX
JULIO CESAR FAYE is a 60yFemale , patient examined and chart reviewed.    INTERVAL HPI/ OVERNIGHT EVENTS:   Afebrile. Weak.     PAST MEDICAL & SURGICAL HISTORY:  Lung cancer  Opiate dependence  Alcohol abuse  Adenocarcinoma of lung  Mixed connective tissue disease  Depression H/O panic attack: with anxiety  S/P Laminectomy  Lumbar 3/10  Chronic pain  Diverticulitis of colon (without mention of hemorrhage)  History of laminectomy  History of lung surgery  History of oophorectomy, unilateral: bilateral  History of hip replacement, total, right: 6/7/2020  S/P lumbar laminectomy  S/P cholecystectomy  History of lung cancer: s/p wedge resection of RML      For details regarding the patient's social history, family history, and other miscellaneous elements, please refer the initial infectious diseases consultation and/or the admitting history and physical examination for this admission.      ROS: UTO sec pt's condition    ALLERGIES:  penicillin (Swelling)      Current inpatient medications :    ANTIBIOTICS/RELEVANT:  vancomycin    Solution 125 milliGRAM(s) Oral every 6 hours      MEDICATIONS  (STANDING):  apixaban 5 milliGRAM(s) Oral two times a day  artificial tears (preservative free) Ophthalmic Solution 1 Drop(s) Both EYES two times a day  gabapentin 600 milliGRAM(s) Oral two times a day  hydroxychloroquine 200 milliGRAM(s) Oral <User Schedule>  levETIRAcetam  IVPB 500 milliGRAM(s) IV Intermittent every 12 hours  LORazepam     Tablet 1 milliGRAM(s) Oral every 6 hours  methylPREDNISolone 4 milliGRAM(s) Oral daily  multivitamin 1 Tablet(s) Oral daily  pancrelipase  (CREON 12,000 Lipase Units) 1 Capsule(s) Oral three times a day with meals  pantoprazole    Tablet 40 milliGRAM(s) Oral before breakfast  sodium chloride 0.9%. 1000 milliLiter(s) (60 mL/Hr) IV Continuous <Continuous>    MEDICATIONS  (PRN):  chlordiazePOXIDE 5 milliGRAM(s) Oral every 6 hours PRN for ciwa > 6  loperamide 2 milliGRAM(s) Oral every 6 hours PRN Diarrhea  ondansetron    Tablet 4 milliGRAM(s) Oral every 8 hours PRN Nausea and/or Vomiting  oxyCODONE    IR 10 milliGRAM(s) Oral every 6 hours PRN Moderate Pain (4 - 6)      Objective:  Vital Signs Last 24 Hrs  T(C): 36.6 (15 Aug 2020 15:28), Max: 37.3 (15 Aug 2020 12:21)  T(F): 97.8 (15 Aug 2020 15:28), Max: 99.1 (15 Aug 2020 12:21)  HR: 92 (15 Aug 2020 15:28) (81 - 99)  BP: 129/82 (15 Aug 2020 15:28) (106/71 - 129/82)  RR: 18 (15 Aug 2020 15:28) (18 - 20)  SpO2: 95% (15 Aug 2020 15:28) (93% - 100%)    Physical Exam:  General:  no acute distress  Eyes: sclera anicteric, pupils equal and reactive to light  ENMT: buccal mucosa moist, pharynx not injected  Neck: supple, trachea midline  Lungs: clear, no wheeze/rhonchi  Cardiovascular: regular rate and rhythm, S1 S2  Abdomen: soft, nontender, no organomegaly present, bowel sounds normal  Neurological: awake ox3  Skin: no increased ecchymosis/petechiae/purpura  Lymph Nodes: no palpable cervical/supraclavicular lymph nodes enlargements  Extremities: Right LE erythema resolving nicely    LABS:                        13.3   7.49  )-----------( 359      ( 15 Aug 2020 10:23 )             40.2   08-15    142  |  108  |  2<L>  ----------------------------<  103<H>  4.6   |  28  |  0.82    Ca    9.4      15 Aug 2020 10:23  Phos  2.5     08-15  Mg     2.0     08-15    TPro  6.6  /  Alb  3.2<L>  /  TBili  0.4  /  DBili  x   /  AST  35  /  ALT  26  /  AlkPhos  36<L>  08-15    MICROBIOLOGY:    Culture - Urine (collected 11 Aug 2020 15:23)  Source: .Urine Clean Catch (Midstream)  Final Report (12 Aug 2020 14:52):    <10,000 CFU/mL Normal Urogenital Rubia    Culture - Blood (collected 11 Aug 2020 15:21)  Source: .Blood Blood-Peripheral  Preliminary Report (12 Aug 2020 16:01):    No growth to date.    Culture - Blood (collected 11 Aug 2020 15:21)  Source: .Blood Blood-Peripheral  Preliminary Report (12 Aug 2020 16:01):    No growth to date    RADIOLOGY & ADDITIONAL STUDIES:  EXAM:  XR CHEST PORTABLE IMMED 1V                            PROCEDURE DATE:  07/27/2020          INTERPRETATION:  History: Epigastric pain    Portable radiograph of the chest is performed. comparison is made to 7/30/2019.    The heart is not enlarged. The trachea is midline. The lungs are clear. Osseous structures are unremarkable.    Impression: No active pulmonary disease.      Assessment and plan.  1. Right LE cellulitis- Improved  Completed course of Doxycycline  Elevate leg    2. Cdiff  Diarrhea appears to have resolved  Finish course of po Vancomycin till 8/17/2020    3. seizure per neuro    4. AUR with [place] per primary team      Stable from ID standpoint    D/w DR Avendaño        Continue with present regiment.  Appropriate use of antibiotics and adverse effects reviewed.      I have discussed the above plan of care with patient/ family in detail. They expressed understanding of the  treatment plan . Risks, benefits and alternatives discussed in detail. I have asked if they have any questions or concerns and appropriately addressed them to the best of my ability .    > 35 minutes were spent in direct patient care reviewing notes, medications ,labs data/ imaging , discussion with multidisciplinary team.    Thank you for allowing me to participate in care of your patient .    Demetria Hancock MD  Infectious Disease  [phone]

## 2020-08-15 NOTE — CONSULT NOTE ADULT - SUBJECTIVE AND OBJECTIVE BOX
REASON FOR CONSULT: Seizure vs Pseudoseizure    CONSULT REQUESTED BY: Dr Ortiz    Patient is a 60y old  Female who presents with a chief complaint of rt leg cellulitis, dizziness (15 Aug 2020 17:18)      BRIEF HOSPITAL COURSE: 60 year old female PMHx Chronic Pain ( Narc Dependant), Alcohol abuse, Alcoholic Pancreatitis, Lung Ca ( [place] Ca S/P Rxn RML 2018),  Mixed Connective Tissue Do, DVT, Lower Extremity Cellulitis. HTN, Ex-Smoker.  Recent hospitalization DC 7/31 [place] for Alcoholic Pancreatitis.  Following day admitted to [facility] with Celluitis.  Admitted 8/11/2020 with ataxia and weakness and founf to be hyponatremic at 120.  Likely from polydypsea and was tx on medical floor with improved serum sodium levels.  Hospital course complicated by seizure activity.      Events last 24 hours:  RRT / Code stroke today Seizure episodes followed by weakness.    PAST MEDICAL & SURGICAL HISTORY:  Lung cancer  Opiate dependence  Alcohol abuse  Adenocarcinoma of lung  Mixed connective tissue disease  Depression H/O panic attack: with anxiety  S/P Laminectomy  Lumbar 3/10  Chronic pain  Diverticulitis of colon (without mention of hemorrhage)  History of laminectomy  History of lung surgery  History of oophorectomy, unilateral: bilateral  History of hip replacement, total, right: 6/7/2020  S/P lumbar laminectomy  S/P cholecystectomy  History of lung cancer: s/p wedge resection of RML    Allergies    penicillin (Other)  penicillin (Swelling)    Intolerances      FAMILY HISTORY:  FHx: rheumatoid arthritis: in mother - with RA associated lung fibrosis  Family history of leukemia: in father      Social History: Unable to access     Review of Systems: Unable to access       Medications:  hydroxychloroquine 200 milliGRAM(s) Oral <User Schedule>  vancomycin    Solution 125 milliGRAM(s) Oral every 6 hours  aspirin 325 milliGRAM(s) Oral daily  chlordiazePOXIDE 5 milliGRAM(s) Oral every 6 hours PRN  gabapentin 600 milliGRAM(s) Oral two times a day  levETIRAcetam  IVPB 500 milliGRAM(s) IV Intermittent every 12 hours  LORazepam     Tablet 1 milliGRAM(s) Oral every 6 hours  ondansetron    Tablet 4 milliGRAM(s) Oral every 8 hours PRN  oxyCODONE    IR 10 milliGRAM(s) Oral every 6 hours PRN  apixaban 5 milliGRAM(s) Oral two times a day  loperamide 2 milliGRAM(s) Oral every 6 hours PRN  pancrelipase  (CREON 12,000 Lipase Units) 1 Capsule(s) Oral three times a day with meals  pantoprazole    Tablet 40 milliGRAM(s) Oral before breakfast  atorvastatin 40 milliGRAM(s) Oral at bedtime  methylPREDNISolone 4 milliGRAM(s) Oral daily  multivitamin 1 Tablet(s) Oral daily  sodium chloride 0.9%. 1000 milliLiter(s) IV Continuous <Continuous>  artificial tears (preservative free) Ophthalmic Solution 1 Drop(s) Both EYES two times a day          Vital Signs Last 24 Hrs  T(C): 37.1 (15 Aug 2020 20:24), Max: 37.3 (15 Aug 2020 12:21)  T(F): 98.8 (15 Aug 2020 20:24), Max: 99.1 (15 Aug 2020 12:21)  HR: 95 (15 Aug 2020 20:24) (81 - 99)  BP: 132/84 (15 Aug 2020 20:24) (106/71 - 132/84)  RR: 18 (15 Aug 2020 20:24) (18 - 20)  SpO2: 98% (15 Aug 2020 20:24) (93% - 100%)        I&O's Detail    14 Aug 2020 07:01  -  15 Aug 2020 07:00  --------------------------------------------------------  IN:    sodium chloride 0.9%.: 60 mL  Total IN: 60 mL    OUT:    Indwelling Catheter - Urethral: 1850 mL  Total OUT: 1850 mL    Total NET: -1790 mL      15 Aug 2020 07:01  -  15 Aug 2020 21:59  --------------------------------------------------------  IN:    sodium chloride 0.9%.: 660 mL    Solution: 200 mL  Total IN: 860 mL    OUT:    Indwelling Catheter - Urethral: 1100 mL  Total OUT: 1100 mL    Total NET: -240 mL            LABS:                        13.3   7.49  )-----------( 359      ( 15 Aug 2020 10:23 )             40.2     08-15    142  |  108  |  2<L>  ----------------------------<  103<H>  4.6   |  28  |  0.82    Ca    9.4      15 Aug 2020 10:23  Phos  2.5     08-15  Mg     2.0     08-15    TPro  6.6  /  Alb  3.2<L>  /  TBili  0.4  /  DBili  x   /  AST  35  /  ALT  26  /  AlkPhos  36<L>  08-15      CARDIAC MARKERS ( 15 Aug 2020 17:26 )  .674 ng/mL / x     / 98 U/L / x     / 2.0 ng/mL  CARDIAC MARKERS ( 15 Aug 2020 10:23 )  .808 ng/mL / x     / 92 U/L / x     / 3.0 ng/mL      CAPILLARY BLOOD GLUCOSE      POCT Blood Glucose.: 105 mg/dL (15 Aug 2020 08:21)        CULTURES:  Culture Results:   <10,000 CFU/mL Normal Urogenital Rubia (08-11 @ 15:23)  Culture Results:   No growth to date. (08-11 @ 15:21)  Culture Results:   No growth to date. (08-11 @ 15:21)          Physical Examination:    General:  Awake.  Alert, oriented, interactive, nonfocal    HEENT: Pupils equal, reactive to light.  Symmetric. No JVD     PULM: Clear to auscultation bilaterally, no significant sputum production    CVS: Regular rate and rhythm, no murmurs, rubs, or gallops    ABD: Soft, nondistended, nontender, normoactive bowel sounds, no masses    EXT: No edema, nontender    SKIN: Warm and well perfused, no rashes noted.    RADIOLOGY: ***    CRITICAL CARE TIME SPENT: ***    (Assessing presenting problems of acute illness, which pose high probability of life threatening deterioration or end organ damage/dysfunction, as well as medical decision making including initiating plan of care, reviewing data, reviewing radiologic exams, discussing with multidisciplinary team,  discussing goals of care with patient/family, and writing this note.  Non-inclusive of procedures performed) REASON FOR CONSULT: Seizure vs Pseudoseizure    CONSULT REQUESTED BY: Dr Ortiz    Patient is a 60y old  Female who presents with a chief complaint of rt leg cellulitis, dizziness (15 Aug 2020 17:18)      BRIEF HOSPITAL COURSE: 60 year old female PMHx Chronic Pain ( Narc Dependant), Alcohol abuse, Alcoholic Pancreatitis, Lung Ca ( Adeno Ca S/P Rxn RML 2018),  Mixed Connective Tissue Do, DVT, Lower Extremity Cellulitis. HTN, Ex-Smoker.  Recent hospitalization DC 7/31 [place] for Alcoholic Pancreatitis.  Following day admitted to [facility] with Celluitis.  Admitted 8/11/2020 with ataxia and weakness and found to be hyponatremic at 120.  Likely from polydipsia and was tx on medical floor with improved serum sodium levels.  Hospital course complicated by seizure activity.      Events last 24 hours:  RRT / Code stroke today Seizure episodes followed by weakness.    PAST MEDICAL & SURGICAL HISTORY:  Lung cancer  Opiate dependence  Alcohol abuse  Adenocarcinoma of lung  Mixed connective tissue disease  Depression H/O panic attack: with anxiety  S/P Laminectomy  Lumbar 3/10  Chronic pain  Diverticulitis of colon (without mention of hemorrhage)  History of laminectomy  History of lung surgery  History of oophorectomy, unilateral: bilateral  History of hip replacement, total, right: 6/7/2020  S/P lumbar laminectomy  S/P cholecystectomy  History of lung cancer: s/p wedge resection of RML    Allergies    penicillin (Other)  penicillin (Swelling)    Intolerances      FAMILY HISTORY:  FHx: rheumatoid arthritis: in mother - with RA associated lung fibrosis  Family history of leukemia: in father      Social History: Unable to access     Review of Systems: Unable to access       Medications:  hydroxychloroquine 200 milliGRAM(s) Oral <User Schedule>  vancomycin    Solution 125 milliGRAM(s) Oral every 6 hours  aspirin 325 milliGRAM(s) Oral daily  chlordiazePOXIDE 5 milliGRAM(s) Oral every 6 hours PRN  gabapentin 600 milliGRAM(s) Oral two times a day  levETIRAcetam  IVPB 500 milliGRAM(s) IV Intermittent every 12 hours  LORazepam     Tablet 1 milliGRAM(s) Oral every 6 hours  ondansetron    Tablet 4 milliGRAM(s) Oral every 8 hours PRN  oxyCODONE    IR 10 milliGRAM(s) Oral every 6 hours PRN  apixaban 5 milliGRAM(s) Oral two times a day  loperamide 2 milliGRAM(s) Oral every 6 hours PRN  pancrelipase  (CREON 12,000 Lipase Units) 1 Capsule(s) Oral three times a day with meals  pantoprazole    Tablet 40 milliGRAM(s) Oral before breakfast  atorvastatin 40 milliGRAM(s) Oral at bedtime  methylPREDNISolone 4 milliGRAM(s) Oral daily  multivitamin 1 Tablet(s) Oral daily  sodium chloride 0.9%. 1000 milliLiter(s) IV Continuous <Continuous>  artificial tears (preservative free) Ophthalmic Solution 1 Drop(s) Both EYES two times a day          Vital Signs Last 24 Hrs  T(C): 37.1 (15 Aug 2020 20:24), Max: 37.3 (15 Aug 2020 12:21)  T(F): 98.8 (15 Aug 2020 20:24), Max: 99.1 (15 Aug 2020 12:21)  HR: 95 (15 Aug 2020 20:24) (81 - 99)  BP: 132/84 (15 Aug 2020 20:24) (106/71 - 132/84)  RR: 18 (15 Aug 2020 20:24) (18 - 20)  SpO2: 98% (15 Aug 2020 20:24) (93% - 100%)        I&O's Detail    14 Aug 2020 07:01  -  15 Aug 2020 07:00  --------------------------------------------------------  IN:    sodium chloride 0.9%.: 60 mL  Total IN: 60 mL    OUT:    Indwelling Catheter - Urethral: 1850 mL  Total OUT: 1850 mL    Total NET: -1790 mL      15 Aug 2020 07:01  -  15 Aug 2020 21:59  --------------------------------------------------------  IN:    sodium chloride 0.9%.: 660 mL    Solution: 200 mL  Total IN: 860 mL    OUT:    Indwelling Catheter - Urethral: 1100 mL  Total OUT: 1100 mL    Total NET: -240 mL            LABS:                        13.3   7.49  )-----------( 359      ( 15 Aug 2020 10:23 )             40.2     08-15    142  |  108  |  2<L>  ----------------------------<  103<H>  4.6   |  28  |  0.82    Ca    9.4      15 Aug 2020 10:23  Phos  2.5     08-15  Mg     2.0     08-15    TPro  6.6  /  Alb  3.2<L>  /  TBili  0.4  /  DBili  x   /  AST  35  /  ALT  26  /  AlkPhos  36<L>  08-15      CARDIAC MARKERS ( 15 Aug 2020 17:26 )  .674 ng/mL / x     / 98 U/L / x     / 2.0 ng/mL  CARDIAC MARKERS ( 15 Aug 2020 10:23 )  .808 ng/mL / x     / 92 U/L / x     / 3.0 ng/mL      CAPILLARY BLOOD GLUCOSE      POCT Blood Glucose.: 105 mg/dL (15 Aug 2020 08:21)        CULTURES:  Culture Results:   <10,000 CFU/mL Normal Urogenital Rubia (08-11 @ 15:23)  Culture Results:   No growth to date. (08-11 @ 15:21)  Culture Results:   No growth to date. (08-11 @ 15:21)          Physical Examination:    General:  Somnolent arousable to noxious stim, protecting airway.    HEENT: Pupils equal, reactive to light.  Symmetric. No JVD     PULM: Clear to auscultation bilaterally, no significant sputum production    CVS: Regular rate and rhythm, no murmurs, rubs, or gallops    ABD: Soft, nondistended, nontender, normoactive bowel sounds, no masses    EXT: No edema, nontender    SKIN: Warm and well perfused, no rashes noted.        RADIOLOGY: < from: CT Head No Cont (08.15.20 @ 18:28) >  EXAM:  CT BRAIN                            PROCEDURE DATE:  08/15/2020          INTERPRETATION:  CLINICAL INFORMATION:  Facial asymmettry    TECHNIQUE:  Axial CT images were acquired through the head.  Intravenous contrast: None  Two-dimensional reformats were generated.    COMPARISON STUDY: CT head 8/13/2020.    FINDINGS:    There is no CT evidence of acute intracranial hemorrhage, mass effect, midline shift, or acute, large territorial infarct.    The ventricles and sulci are normal in size and configuration. The basal cisterns are patent.    The mastoid air cells and middle ear cavities are grossly clear. There is no significant paranasal sinus mucosal thickening.    The calvarium and skull base are grossly intact. .    IMPRESSION:  No acute intracranial abnormality.    < end of copied text >  < from: EEG Awake or Drowsy (08.13.20 @ 11:29) >   EXAM:  EEG-AWAKE AND DROWSY        PROCEDURE DATE:  08/13/2020        INTERPRETATION:  GENERAL DESCRIPTION:  The EEG was recorded with a 32-channel digital EEG machine. Standard interval 10/20 lateral replacements were used.    CONDITIONS OF RECORDING: The EEG was carried out with the patient in the brief awake mostly sleeping state. Muscle and artifacts are present during the record.    DESCRIPTION: The background rhythms consist of moderate voltage, symmetrical activity which is fairly well organized. The frequency spectrum shows a moderate amount of theta and of delta activity. A posterior dominant rhythm 5-6 Hz seen.    Symmetrical sleep spindles and vertex wave activity was seen for both hemispheres.    Hyperventilation was not performed because a medical reasons. Intermittent photic stimulation from 2-20 flashes per second fails to elicit any abnormality.    There was intermittent areas of sharp's and polyspike discharges that were generalized lasting up to 10 seconds in durationwith no clinical correlate reported    IMPRESSION Abnormal EEG due to intermittent sharp and polyspike wave discharges with generalized slowing.    COMMENT: Patient at increased risk of generalized epilepsy.    < end of copied text >      CRITICAL CARE TIME SPENT: 30 minutes     (Assessing presenting problems of acute illness, which pose high probability of life threatening deterioration or end organ damage/dysfunction, as well as medical decision making including initiating plan of care, reviewing data, reviewing radiologic exams, discussing with multidisciplinary team,  discussing goals of care with patient/family, and writing this note.  Non-inclusive of procedures performed)

## 2020-08-15 NOTE — PROGRESS NOTE ADULT - SUBJECTIVE AND OBJECTIVE BOX
Patient is a 60y Female with a known history of :  Polysubstance dependence (F19.20)  Seizure (R56.9)  Alcohol abuse (F10.10)  Clostridium difficile diarrhea (A04.72)  Venous thrombosis (I82.90)  Chronic pain syndrome (G89.4)  Mixed connective tissue disease (M35.1)  Cellulitis (L03.90)  Hyponatremia (E87.1)  Alcoholism (F10.20)    HPI:  60 yr old with PMH of ch pain with Opiate and ETOH dependency and abuse, alcoholic pancreatitis, Mixed connective tissue disorder, left soleal vein thrombosis, adeno ca lung- s/p RML resection 2018, ex smoker,rec leg cellulitis, was admitted in [facility] with alcoholic pancreatitis and HTN and discharged on 7/31, after one day at home pt went to [facility] for cellulitis treated with doxycycline and is improving, and 2 days back went to [facility] and lian told to drink lot of water and now says feeling weak and ataxic, In ED syo Na 120 , and received IV fluids, refused vancomycin as improving and is admitted to [facility] for Hyponatremia, likely  excessive water intake.pt took last drink 2 weeks back and thinks she is withdrawing.  admitted for further care (11 Aug 2020 15:36)      REVIEW OF SYSTEMS:    CONSTITUTIONAL: No fever, weight loss, or fatigue  EYES: No eye pain, visual disturbances, or discharge  ENMT:  No difficulty hearing, tinnitus, vertigo; No sinus or throat pain  NECK: No pain or stiffness  BREASTS: No pain, masses, or nipple discharge  RESPIRATORY: No cough, wheezing, chills or hemoptysis; No shortness of breath  CARDIOVASCULAR: No chest pain, palpitations, dizziness, or leg swelling  GASTROINTESTINAL: No abdominal or epigastric pain. No nausea, vomiting, or hematemesis; No diarrhea or constipation. No melena or hematochezia.  GENITOURINARY: No dysuria, frequency, hematuria, or incontinence  NEUROLOGICAL: No headaches, memory loss, loss of strength, numbness, or tremors  SKIN: No itching, burning, rashes, or lesions   LYMPH NODES: No enlarged glands  ENDOCRINE: No heat or cold intolerance; No hair loss  MUSCULOSKELETAL: No joint pain or swelling; No muscle, back, or extremity pain  PSYCHIATRIC: No depression, anxiety, mood swings, or difficulty sleeping  HEME/LYMPH: No easy bruising, or bleeding gums  ALLERGY AND IMMUNOLOGIC: No hives or eczema    MEDICATIONS  (STANDING):  apixaban 5 milliGRAM(s) Oral two times a day  artificial tears (preservative free) Ophthalmic Solution 1 Drop(s) Both EYES two times a day  gabapentin 600 milliGRAM(s) Oral two times a day  hydroxychloroquine 200 milliGRAM(s) Oral <User Schedule>  levETIRAcetam  IVPB 500 milliGRAM(s) IV Intermittent every 12 hours  LORazepam     Tablet 1 milliGRAM(s) Oral every 6 hours  methylPREDNISolone 4 milliGRAM(s) Oral daily  multivitamin 1 Tablet(s) Oral daily  pancrelipase  (CREON 12,000 Lipase Units) 1 Capsule(s) Oral three times a day with meals  pantoprazole    Tablet 40 milliGRAM(s) Oral before breakfast  sodium chloride 0.9%. 1000 milliLiter(s) (60 mL/Hr) IV Continuous <Continuous>  vancomycin    Solution 125 milliGRAM(s) Oral every 6 hours    MEDICATIONS  (PRN):  chlordiazePOXIDE 5 milliGRAM(s) Oral every 6 hours PRN for ciwa > 6  loperamide 2 milliGRAM(s) Oral every 6 hours PRN Diarrhea  ondansetron    Tablet 4 milliGRAM(s) Oral every 8 hours PRN Nausea and/or Vomiting  oxyCODONE    IR 10 milliGRAM(s) Oral every 6 hours PRN Moderate Pain (4 - 6)      ALLERGIES: penicillin (Other)  penicillin (Swelling)      FAMILY HISTORY:  FHx: rheumatoid arthritis: in mother - with RA associated lung fibrosis  Family history of leukemia: in father      Social history:  Alochol:   Smoking:   Drug Use:   Marital Status:     PHYSICAL EXAMINATION:  -----------------------------  T(C): 37.1 (08-15-20 @ 08:22), Max: 37.2 (08-15-20 @ 07:36)  HR: 90 (08-15-20 @ 08:22) (81 - 99)  BP: 111/73 (08-15-20 @ 08:22) (106/71 - 141/78)  RR: 20 (08-15-20 @ 08:22) (18 - 24)  SpO2: 97% (08-15-20 @ 08:22) (93% - 100%)  Wt(kg): --    08-14 @ 07:01  -  08-15 @ 07:00  --------------------------------------------------------  IN:  Total IN: 0 mL    OUT:    Indwelling Catheter - Urethral: 1850 mL  Total OUT: 1850 mL    Total NET: -1850 mL            Constitutional: well developed, normal appearance, well groomed, well nourished, no deformities and no acute distress.   Eyes: the conjunctiva exhibited no abnormalities and the eyelids demonstrated no xanthelasmas.   HEENT: normal oral mucosa, no oral pallor and no oral cyanosis.   Neck: normal jugular venous A waves present, normal jugular venous V waves present and no jugular venous carlos A waves.   Pulmonary: no respiratory distress, normal respiratory rhythm and effort, no accessory muscle use and lungs were clear to auscultation bilaterally. Anteriorly  Cardiovascular: heart rate and rhythm were normal, normal S1 and S2 and no murmur, gallop, rub, heave or thrill are present.   Musculoskeletal: the gait could not be assessed.   Extremities: no clubbing of the fingernails, no localized cyanosis, no petechial hemorrhages and no ischemic changes.   Skin: normal skin color and pigmentation, no rash, no venous stasis, no skin lesions, no skin ulcer and no xanthoma was observed.     LABS:   --------  08-15    142  |  108  |  2<L>  ----------------------------<  103<H>  4.6   |  28  |  0.82    Ca    9.4      15 Aug 2020 10:23  Phos  2.5     08-15  Mg     2.0     08-15    TPro  6.6  /  Alb  3.2<L>  /  TBili  0.4  /  DBili  x   /  AST  35  /  ALT  26  /  AlkPhos  36<L>  08-15                         13.3   7.49  )-----------( 359      ( 15 Aug 2020 10:23 )             40.2         08-15 @ 10:23 CPK total:--, CKMB --, Troponin I - .808 ng/mL<H>            Radiology:

## 2020-08-15 NOTE — CHART NOTE - NSCHARTNOTEFT_GEN_A_CORE
Resident Rapid Response Note    Patient is a 60y old  Female who presents with a chief complaint of rt leg cellulitis, dizziness (15 Aug 2020 12:28)      Rapid response was called at on this 60y Female patient for seizure    Vital Signs Last 24 Hrs  T(C): 37.3 (15 Aug 2020 12:21), Max: 37.3 (15 Aug 2020 12:21)  T(F): 99.1 (15 Aug 2020 12:21), Max: 99.1 (15 Aug 2020 12:21)  HR: 87 (15 Aug 2020 12:21) (81 - 99)  BP: 119/81 (15 Aug 2020 12:21) (106/71 - 141/78)  BP(mean): --  RR: 18 (15 Aug 2020 12:21) (18 - 24)  SpO2: 97% (15 Aug 2020 12:21) (93% - 100%)    Physical Exam:  General: Well developed, well nourished, responsive to verbal commands  HEENT: NCAT, PERRL, head turned towards L and eyes deviated to L, moist mucous membranes   Neck: Supple, nontender  Neurology: A&Ox3, opened her eyes to name, followed verbal commands, Left UE and LE flaccid, strength 5/5 RUE and RLE  Respiratory: CTA B/L, No wheezing, rales, or rhonchi  CV: RRR, S1/S2 present, no murmurs, rubs, or gallops  Abdominal: Soft, nontender, non-distended, normoactive bowel sounds  Extremities: No C/C/E, peripheral pulses present  MSK: no joint erythema or warmth, no joint swelling, patient placed right hand over head after I stepped out of the room  Skin: warm, dry, normal color      LABS:                        13.3   7.49  )-----------( 359      ( 15 Aug 2020 10:23 )             40.2     15 Aug 2020 10:23    142    |  108    |  2      ----------------------------<  103    4.6     |  28     |  0.82     Ca    9.4        15 Aug 2020 10:23  Phos  2.5       15 Aug 2020 10:23  Mg     2.0       15 Aug 2020 10:23    TPro  6.6    /  Alb  3.2    /  TBili  0.4    /  DBili  x      /  AST  35     /  ALT  26     /  AlkPhos  36     15 Aug 2020 10:23        CAPILLARY BLOOD GLUCOSE      POCT Blood Glucose.: 105 mg/dL (15 Aug 2020 08:21)        RADIOLOGY & ADDITIONAL TESTS:  < from: 12 Lead ECG (08.15.20 @ 08:19) >    Ventricular Rate 87 BPM    Atrial Rate 87 BPM    P-R Interval 120 ms    QRS Duration 70 ms    Q-T Interval 408 ms    QTC Calculation(Bezet) 490 ms    P Axis 33 degrees    R Axis -34 degrees    T Axis -84 degrees    Diagnosis Line Normal sinus rhythm  Left axis deviation  Inferior infarct , age undetermined  ST & T wave abnormality, consider anterolateral ischemia    < end of copied text >          Assessment/Plan: 60 year old female with a history of HTN, DVT, lung cancer s/p resection, anxiety disorder, opiate use, alcohol abuse who presented to the hospital with right leg cellulitis and hyponatremia. Currently treated with Eliquis for DVT. Rapid response was called for seizure activity.    Seizure activity likely 2/2 pseudoseizure vs. substance abuse with withdrawal   - Neurology (Dr. Mccarty) d/w neurology, hold ativan  - F/u Dr. Avendaño, awaiting call back   - Will continue to follow, RN to call if any changes. Resident Rapid Response Note    Patient is a 60y old  Female who presents with a chief complaint of rt leg cellulitis, dizziness (15 Aug 2020 12:28)      Rapid response was called at on this 60y Female patient for seizure    Vital Signs Last 24 Hrs  T(C): 37.3 (15 Aug 2020 12:21), Max: 37.3 (15 Aug 2020 12:21)  T(F): 99.1 (15 Aug 2020 12:21), Max: 99.1 (15 Aug 2020 12:21)  HR: 87 (15 Aug 2020 12:21) (81 - 99)  BP: 119/81 (15 Aug 2020 12:21) (106/71 - 141/78)  BP(mean): --  RR: 18 (15 Aug 2020 12:21) (18 - 24)  SpO2: 97% (15 Aug 2020 12:21) (93% - 100%)    Physical Exam:  General: Well developed, well nourished, responsive to verbal commands  HEENT: NCAT, PERRL, head turned towards L and eyes deviated to L, moist mucous membranes   Neck: Supple, nontender  Neurology: A&Ox3, opened her eyes to name, followed verbal commands, Left UE and LE flaccid, strength 5/5 RUE and RLE  Respiratory: CTA B/L, No wheezing, rales, or rhonchi  CV: RRR, S1/S2 present, no murmurs, rubs, or gallops  Abdominal: Soft, nontender, non-distended, normoactive bowel sounds  Extremities: No C/C/E, peripheral pulses present  MSK: no joint erythema or warmth, no joint swelling, patient placed right hand over head after I stepped out of the room  Skin: warm, dry, normal color      LABS:                        13.3   7.49  )-----------( 359      ( 15 Aug 2020 10:23 )             40.2     15 Aug 2020 10:23    142    |  108    |  2      ----------------------------<  103    4.6     |  28     |  0.82     Ca    9.4        15 Aug 2020 10:23  Phos  2.5       15 Aug 2020 10:23  Mg     2.0       15 Aug 2020 10:23    TPro  6.6    /  Alb  3.2    /  TBili  0.4    /  DBili  x      /  AST  35     /  ALT  26     /  AlkPhos  36     15 Aug 2020 10:23        CAPILLARY BLOOD GLUCOSE      POCT Blood Glucose.: 105 mg/dL (15 Aug 2020 08:21)        RADIOLOGY & ADDITIONAL TESTS:  < from: 12 Lead ECG (08.15.20 @ 08:19) >    Ventricular Rate 87 BPM    Atrial Rate 87 BPM    P-R Interval 120 ms    QRS Duration 70 ms    Q-T Interval 408 ms    QTC Calculation(Bezet) 490 ms    P Axis 33 degrees    R Axis -34 degrees    T Axis -84 degrees    Diagnosis Line Normal sinus rhythm  Left axis deviation  Inferior infarct , age undetermined  ST & T wave abnormality, consider anterolateral ischemia    < end of copied text >          Assessment/Plan: 60 year old female with a history of HTN, DVT, lung cancer s/p resection, anxiety disorder, opiate use, alcohol abuse who presented to the hospital with right leg cellulitis and hyponatremia. Currently treated with Eliquis for DVT. Rapid response was called for seizure activity.    Seizure activity likely 2/2 pseudoseizure vs. substance abuse with withdrawal   - Troponin elevated 0.8, CKs wnl likely 2/2 demand ischemia   - F/u repeat Alda at 16:23  - Neurology (Dr. Mccarty) d/w neurology, hold ativan  - F/u Dr. Avendaño, awaiting call back   - Will continue to follow, RN to call if any changes.

## 2020-08-15 NOTE — PROVIDER CONTACT NOTE (OTHER) - REASON
lethargy, not moving right arm and left leg when asked, but late seen with right arm up in the air lethargy, not moving left arm and left leg when asked, but late seen with left arm up in the air

## 2020-08-15 NOTE — CHART NOTE - NSCHARTNOTEFT_GEN_A_CORE
Called by RN for R sided facial dyssymmetry. Patient seen and examined at bedside. Patient states she feels "horrible" and has had seizures. Denies any other complaints at this time.    Vitals:   T(C): 36.6 (08-15-20 @ 15:28), Max: 37.3 (08-15-20 @ 12:21)  HR: 92 (08-15-20 @ 15:28) (81 - 99)  BP: 129/82 (08-15-20 @ 15:28) (106/71 - 129/82)  RR: 18 (08-15-20 @ 15:28) (18 - 20)  SpO2: 95% (08-15-20 @ 15:28) (93% - 100%)    Physical Exam:  General: Well developed, well nourished, responsive to verbal and noxious stimuli  HEENT: NCAT, PERRL, head turned towards L, moist mucous membranes   Neck: Supple, nontender  Neurology: A&Ox2, opened her eyes to name, followed verbal commands, Left UE and LE flaccid, strength 5/5 RUE and RLE  Respiratory: CTA B/L, No wheezing, rales, or rhonchi  CV: RRR, S1/S2 present, no murmurs, rubs, or gallops  Abdominal: Soft, nontender, non-distended, normoactive bowel sounds  Extremities: No C/C/E, peripheral pulses present  MSK: no joint erythema or warmth, no joint swelling   Skin: warm, dry, normal color    Assessment/Plan: 60 year old female with a history of HTN, DVT, lung cancer s/p resection, anxiety disorder, opiate use, alcohol abuse who presented to the hospital with right leg cellulitis and hyponatremia. Currently treated with Eliquis for DVT.    R sided facial dyssymmetry likely 2/2 pseudoseizure vs. substance abuse with withdrawal   - STAT CT head  - Carotid dopplers  - F/u lipid panel, TSH  - Will continue to monitor, RN to call for any changes

## 2020-08-15 NOTE — CONSULT NOTE ADULT - ASSESSMENT
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 HTN, Ex-Smoker.  Recent hospitalization DC 7/31 [place] for Alcoholic Pancreatitis.  Following day admitted to [facility] with Celluitis.  Admitted 8/11/2020 with ataxia and weakness and founf to be hyponatremic at 120.  Likely from polydipsia and was tx on medical floor with improved serum sodium levels.  Hospital course complicated by seizure activity.      Seizure vs Pseudoseizure - EEG with sharp and polyspike wave discharges with generalized slowing.  Alcohol / Substance Abuse   s/p Hyponatremia       Episode during exam.  Self limited - with stiffening of Left arm and R Leg shaking - lasted 30 seconds - pupils were reactive throughout episode.  Responded to noxious stim without postictal state.    Neuro Following   Seizure precautions  Keppra increased   on Standing Ativan   Case D/W eICU Dr Salgado who agrees that at this time there is no role for ICU level of care.

## 2020-08-15 NOTE — PROVIDER CONTACT NOTE (CRITICAL VALUE NOTIFICATION) - ACTION/TREATMENT ORDERED:
Dr. Eason notified of elevated troponin, no new orders received at this time.  Dr. Eason to review and evaluate for further orders if indicated.

## 2020-08-15 NOTE — CHART NOTE - NSCHARTNOTEFT_GEN_A_CORE
Rapid Response PGY 2/ PGY 3 Note    Rapid response was called at approximately 1019 on this 60y Female patient for seizure. Patient was seen and examined at the bedside by the rapid response team and resident medicine team and ICU. Pt actively seizing upon arrival, with patient's head turned towards L with eyes deviated to L. Patient seizure broke after 1 mg ativan IVP. Pt responsive to verbal and noxious stimulus. Pt responding to voice and following verbal commands, opened eyes, turned heard towards center, refused to turn head towards R. L upper extremity and L lower extremity flaccid.     Rapid Vital Signs:  BP:   HR:  SPo2:  Glucose:    Vital Signs Last 24 Hrs  T(C): 37.1 (15 Aug 2020 08:22), Max: 37.2 (15 Aug 2020 07:36)  T(F): 98.7 (15 Aug 2020 08:22), Max: 99 (15 Aug 2020 07:36)  HR: 90 (15 Aug 2020 08:22) (81 - 99)  BP: 111/73 (15 Aug 2020 08:22) (106/71 - 141/78)  BP(mean): --  RR: 20 (15 Aug 2020 08:22) (18 - 24)  SpO2: 97% (15 Aug 2020 08:22) (93% - 100%)      GENERAL: The patient is awake and alert in no apparent distress.   HEENT: Head is normocephalic and atraumatic. Extraocular muscles are intact. Mucous membranes are moist. No throat erythema/exudates no lymphadenopathy, no JVD,   NECK: Supple.  LUNGS: Clear to auscultation BL without wheezing, rales or rhonchi; respirations unlabored  HEART: Regular rate and rhythm ,+S1/+S2, no murmurs, rubs, gallops  ABDOMEN: Soft, nontender, and nondistended, no rebound, guarding rigidity, bowel sounds in all 4 quadrants  EXTREMITIES: Without any cyanosis, clubbing, rash, lesions or edema.  SKIN: No new rashes or lesions.  MSK: strength equal BL  VASCULAR: Radial and Dorsal pedal pulses palpable BL  NEUROLOGIC: Grossly intact.  PSYCH: No new changes.                          12.3   7.82  )-----------( 362      ( 14 Aug 2020 06:32 )             36.6     08-14    137  |  106  |  2<L>  ----------------------------<  90  4.0   |  18<L>  |  0.44<L>    Ca    8.8      14 Aug 2020 06:32  Mg     1.9     08-14    TPro  6.3  /  Alb  3.0<L>  /  TBili  0.4  /  DBili  x   /  AST  34  /  ALT  28  /  AlkPhos  35<L>  08-14         LIVER FUNCTIONS - ( 14 Aug 2020 06:32 )  Alb: 3.0 g/dL / Pro: 6.3 g/dL / ALK PHOS: 35 U/L / ALT: 28 U/L / AST: 34 U/L / GGT: x                MEDICATIONS  (STANDING):  apixaban 5 milliGRAM(s) Oral two times a day  artificial tears (preservative free) Ophthalmic Solution 1 Drop(s) Both EYES two times a day  gabapentin 600 milliGRAM(s) Oral two times a day  hydroxychloroquine 200 milliGRAM(s) Oral <User Schedule>  levETIRAcetam  IVPB 500 milliGRAM(s) IV Intermittent every 12 hours  LORazepam     Tablet 1 milliGRAM(s) Oral every 6 hours  LORazepam   Injectable 1 milliGRAM(s) IV Push once  methylPREDNISolone 4 milliGRAM(s) Oral daily  multivitamin 1 Tablet(s) Oral daily  pancrelipase  (CREON 12,000 Lipase Units) 1 Capsule(s) Oral three times a day with meals  pantoprazole    Tablet 40 milliGRAM(s) Oral before breakfast  sodium chloride 0.9%. 1000 milliLiter(s) (60 mL/Hr) IV Continuous <Continuous>  vancomycin    Solution 125 milliGRAM(s) Oral every 6 hours    MEDICATIONS  (PRN):  chlordiazePOXIDE 5 milliGRAM(s) Oral every 6 hours PRN for ciwa > 6  loperamide 2 milliGRAM(s) Oral every 6 hours PRN Diarrhea  ondansetron    Tablet 4 milliGRAM(s) Oral every 8 hours PRN Nausea and/or Vomiting  oxyCODONE    IR 10 milliGRAM(s) Oral every 6 hours PRN Moderate Pain (4 - 6)      Assessment- Rapid Response called for 60y year old Female with a past medical history of     Plan  - Ativan 1Mg  - CBC Rapid Response Note    Rapid response was called at on this 60y Female patient for seizure. Patient was seen and examined at the bedside by the rapid response team and resident medicine team and ICU. Pt actively seizing upon arrival, with patient's head turned towards L with eyes deviated to L. When asked how she is feeling, patient states "probably a seizure." Patient given 1 mg ativan IVP. Pt responsive to verbal and noxious stimulus. Pt responding to voice and following verbal commands, opened eyes. L upper extremity and L lower extremity flaccid.     Rapid Response Vital Signs:  BP:   HR:  RR:  SpO2:  Temp:   FS:     Vital Signs Last 24 Hrs  T(C): 37.1 (15 Aug 2020 08:22), Max: 37.2 (15 Aug 2020 07:36)  T(F): 98.7 (15 Aug 2020 08:22), Max: 99 (15 Aug 2020 07:36)  HR: 90 (15 Aug 2020 08:22) (81 - 99)  BP: 111/73 (15 Aug 2020 08:22) (106/71 - 141/78)  BP(mean): --  RR: 20 (15 Aug 2020 08:22) (18 - 24)  SpO2: 97% (15 Aug 2020 08:22) (93% - 100%)    Physical Exam:  General: Well developed, well nourished, responsive to verbal and noxious stimuli and actively seizing  HEENT: NCAT, PERRL, head turned towards L and eyes deviated to L, moist mucous membranes   Neck: Supple, nontender  Neurology: A&Ox2, opened her eyes to name, followed verbal commands, Left UE and LE flaccid, strength 5/5 RUE and RLE  Respiratory: CTA B/L, No wheezing, rales, or rhonchi  CV: RRR, S1/S2 present, no murmurs, rubs, or gallops  Abdominal: Soft, nontender, non-distended, normoactive bowel sounds  Extremities: No C/C/E, peripheral pulses present  MSK: no joint erythema or warmth, no joint swelling   Skin: warm, dry, normal color    LABS:    Ca    8.8        14 Aug 2020 06:32    CAPILLARY BLOOD GLUCOSE    POCT Blood Glucose.: 105 mg/dL (15 Aug 2020 08:21)      RADIOLOGY & ADDITIONAL TESTS: No new imaging      Assessment/Plan: 60 year old female with a history of HTN, DVT, lung cancer s/p resection, anxiety disorder, opiate use, alcohol abuse who presented to the hospital with right leg cellulitis and hyponatremia. Currently treated with Eliquis for DVT. Rapid response called for seizure activity    Seizure activity likely 2/2 pseudoseizure vs. substance abuse with withdrawal   - STAT IV Ativan 1 mg X 1 dose. Seizure stopped.   - Discussed case with neurologist, Dr. Mccarty, who states do not give more Ativan and agrees with plan above   - Call placed to Dr. Avendaño, attending physician of record, awaiting call back   - Will continue to follow, RN to call if any changes. Rapid Response Note    Rapid response was called at on this 60y Female patient for seizure. Patient was seen and examined at the bedside by the rapid response team and resident medicine team and ICU. Pt actively seizing upon arrival, with patient's head turned towards L with eyes deviated to L. When asked how she is feeling, patient states "probably a seizure." Patient given 1 mg ativan IVP. Pt responsive to verbal and noxious stimulus. Pt responding to voice and following verbal commands, opened eyes. L upper extremity and L lower extremity flaccid.     Rapid Response Vital Signs:  BP: 116/76  HR: 90  RR: 17  SpO2: 97  Temp: 98.7    Vital Signs Last 24 Hrs  T(C): 37.1 (15 Aug 2020 08:22), Max: 37.2 (15 Aug 2020 07:36)  T(F): 98.7 (15 Aug 2020 08:22), Max: 99 (15 Aug 2020 07:36)  HR: 90 (15 Aug 2020 08:22) (81 - 99)  BP: 111/73 (15 Aug 2020 08:22) (106/71 - 141/78)  RR: 20 (15 Aug 2020 08:22) (18 - 24)  SpO2: 97% (15 Aug 2020 08:22) (93% - 100%)    Physical Exam:  General: Well developed, well nourished, responsive to verbal and noxious stimuli and actively seizing  HEENT: NCAT, PERRL, head turned towards L and eyes deviated to L, moist mucous membranes   Neck: Supple, nontender  Neurology: A&Ox2, opened her eyes to name, followed verbal commands, Left UE and LE flaccid, strength 5/5 RUE and RLE  Respiratory: CTA B/L, No wheezing, rales, or rhonchi  CV: RRR, S1/S2 present, no murmurs, rubs, or gallops  Abdominal: Soft, nontender, non-distended, normoactive bowel sounds  Extremities: No C/C/E, peripheral pulses present  MSK: no joint erythema or warmth, no joint swelling   Skin: warm, dry, normal color    LABS:    Ca    8.8        14 Aug 2020 06:32    CAPILLARY BLOOD GLUCOSE    POCT Blood Glucose.: 105 mg/dL (15 Aug 2020 08:21)      RADIOLOGY & ADDITIONAL TESTS: No new imaging        Assessment/Plan: 60 year old female with a history of HTN, DVT, lung cancer s/p resection, anxiety disorder, opiate use, alcohol abuse who presented to the hospital with right leg cellulitis and hyponatremia. Currently treated with Eliquis for DVT. Rapid response called for seizure activity    Seizure activity likely 2/2 pseudoseizure vs. substance abuse with withdrawal   - STAT IV Ativan 1 mg X 1 dose. Seizure stopped.   - Discussed case with neurologist, Dr. Mccarty, who states do not give more Ativan and agrees with plan above   - Call placed to Dr. Avendaño, attending physician of record, awaiting call back   - Will continue to follow, RN to call if any changes.

## 2020-08-15 NOTE — PROVIDER CONTACT NOTE (OTHER) - REASON
pt had 2 more episodes of left side muscle twitching and shaking lasting 15 sec and then 20 sec each, involving left side of face, left arm and trace movement of left leg

## 2020-08-15 NOTE — PROVIDER CONTACT NOTE (OTHER) - BACKGROUND
pts status, asked that  be notified.  Dr. Mccarty notified and stated no new orders needed at this time.  As per Dr Quispe pt has had same status in past.

## 2020-08-16 ENCOUNTER — INPATIENT (INPATIENT)
Facility: HOSPITAL | Age: 60
LOS: 11 days | Discharge: HOME | DRG: 97 | End: 2020-08-28
Attending: STUDENT IN AN ORGANIZED HEALTH CARE EDUCATION/TRAINING PROGRAM | Admitting: SPECIALIST
Payer: MEDICARE

## 2020-08-16 ENCOUNTER — TRANSCRIPTION ENCOUNTER (OUTPATIENT)
Age: 60
End: 2020-08-16

## 2020-08-16 VITALS
SYSTOLIC BLOOD PRESSURE: 137 MMHG | HEART RATE: 77 BPM | RESPIRATION RATE: 23 BRPM | DIASTOLIC BLOOD PRESSURE: 92 MMHG | OXYGEN SATURATION: 100 % | TEMPERATURE: 99 F

## 2020-08-16 VITALS — TEMPERATURE: 98 F

## 2020-08-16 DIAGNOSIS — Z96.641 PRESENCE OF RIGHT ARTIFICIAL HIP JOINT: Chronic | ICD-10-CM

## 2020-08-16 DIAGNOSIS — Z98.890 OTHER SPECIFIED POSTPROCEDURAL STATES: Chronic | ICD-10-CM

## 2020-08-16 DIAGNOSIS — G40.919 EPILEPSY, UNSPECIFIED, INTRACTABLE, WITHOUT STATUS EPILEPTICUS: ICD-10-CM

## 2020-08-16 DIAGNOSIS — Z90.721 ACQUIRED ABSENCE OF OVARIES, UNILATERAL: Chronic | ICD-10-CM

## 2020-08-16 DIAGNOSIS — Z85.118 PERSONAL HISTORY OF OTHER MALIGNANT NEOPLASM OF BRONCHUS AND LUNG: Chronic | ICD-10-CM

## 2020-08-16 DIAGNOSIS — Z90.49 ACQUIRED ABSENCE OF OTHER SPECIFIED PARTS OF DIGESTIVE TRACT: Chronic | ICD-10-CM

## 2020-08-16 LAB
ALBUMIN SERPL ELPH-MCNC: 2.7 G/DL — LOW (ref 3.3–5)
ALBUMIN SERPL ELPH-MCNC: 3.4 G/DL — SIGNIFICANT CHANGE UP (ref 3.3–5)
ALP SERPL-CCNC: 29 U/L — LOW (ref 40–120)
ALP SERPL-CCNC: 30 U/L — LOW (ref 40–120)
ALT FLD-CCNC: 19 U/L — SIGNIFICANT CHANGE UP (ref 10–45)
ALT FLD-CCNC: 22 U/L — SIGNIFICANT CHANGE UP (ref 12–78)
AMYLASE P1 CFR SERPL: 24 U/L — LOW (ref 25–125)
ANION GAP SERPL CALC-SCNC: 10 MMOL/L — SIGNIFICANT CHANGE UP (ref 5–17)
ANION GAP SERPL CALC-SCNC: 14 MMOL/L — SIGNIFICANT CHANGE UP (ref 5–17)
APPEARANCE UR: ABNORMAL
APTT BLD: 37 SEC — HIGH (ref 27.5–35.5)
APTT BLD: 43.7 SEC — HIGH (ref 27.5–35.5)
AST SERPL-CCNC: 35 U/L — SIGNIFICANT CHANGE UP (ref 10–40)
AST SERPL-CCNC: 36 U/L — SIGNIFICANT CHANGE UP (ref 15–37)
BACTERIA # UR AUTO: ABNORMAL
BASE EXCESS BLDA CALC-SCNC: -3.1 MMOL/L — LOW (ref -2–2)
BASOPHILS # BLD AUTO: 0.01 K/UL — SIGNIFICANT CHANGE UP (ref 0–0.2)
BASOPHILS NFR BLD AUTO: 0.1 % — SIGNIFICANT CHANGE UP (ref 0–2)
BILIRUB SERPL-MCNC: 0.3 MG/DL — SIGNIFICANT CHANGE UP (ref 0.2–1.2)
BILIRUB SERPL-MCNC: 0.4 MG/DL — SIGNIFICANT CHANGE UP (ref 0.2–1.2)
BILIRUB UR-MCNC: NEGATIVE — SIGNIFICANT CHANGE UP
BLOOD GAS COMMENTS ARTERIAL: SIGNIFICANT CHANGE UP
BUN SERPL-MCNC: 4 MG/DL — LOW (ref 7–23)
BUN SERPL-MCNC: 4 MG/DL — LOW (ref 7–23)
CALCIUM SERPL-MCNC: 7.7 MG/DL — LOW (ref 8.5–10.1)
CALCIUM SERPL-MCNC: 9 MG/DL — SIGNIFICANT CHANGE UP (ref 8.4–10.5)
CHLORIDE SERPL-SCNC: 102 MMOL/L — SIGNIFICANT CHANGE UP (ref 96–108)
CHLORIDE SERPL-SCNC: 110 MMOL/L — HIGH (ref 96–108)
CK MB BLD-MCNC: 1.2 % — SIGNIFICANT CHANGE UP (ref 0–3.5)
CK MB CFR SERPL CALC: 1.6 NG/ML — SIGNIFICANT CHANGE UP (ref 0–3.6)
CK SERPL-CCNC: 129 U/L — SIGNIFICANT CHANGE UP (ref 26–192)
CO2 SERPL-SCNC: 18 MMOL/L — LOW (ref 22–31)
CO2 SERPL-SCNC: 21 MMOL/L — LOW (ref 22–31)
COLOR SPEC: YELLOW — SIGNIFICANT CHANGE UP
COMMENT - URINE: SIGNIFICANT CHANGE UP
CREAT SERPL-MCNC: 0.43 MG/DL — LOW (ref 0.5–1.3)
CREAT SERPL-MCNC: 0.47 MG/DL — LOW (ref 0.5–1.3)
CULTURE RESULTS: SIGNIFICANT CHANGE UP
CULTURE RESULTS: SIGNIFICANT CHANGE UP
DIFF PNL FLD: ABNORMAL
EOSINOPHIL # BLD AUTO: 0.02 K/UL — SIGNIFICANT CHANGE UP (ref 0–0.5)
EOSINOPHIL NFR BLD AUTO: 0.3 % — SIGNIFICANT CHANGE UP (ref 0–6)
EPI CELLS # UR: SIGNIFICANT CHANGE UP
FOLATE SERPL-MCNC: >20 NG/ML — SIGNIFICANT CHANGE UP
GAS PNL BLDA: SIGNIFICANT CHANGE UP
GLUCOSE SERPL-MCNC: 121 MG/DL — HIGH (ref 70–99)
GLUCOSE SERPL-MCNC: 96 MG/DL — SIGNIFICANT CHANGE UP (ref 70–99)
GLUCOSE UR QL: NEGATIVE — SIGNIFICANT CHANGE UP
HCO3 BLDA-SCNC: 22 MMOL/L — LOW (ref 23–27)
HCT VFR BLD CALC: 35.3 % — SIGNIFICANT CHANGE UP (ref 34.5–45)
HCT VFR BLD CALC: 37 % — SIGNIFICANT CHANGE UP (ref 34.5–45)
HGB BLD-MCNC: 11.8 G/DL — SIGNIFICANT CHANGE UP (ref 11.5–15.5)
HGB BLD-MCNC: 12 G/DL — SIGNIFICANT CHANGE UP (ref 11.5–15.5)
HOROWITZ INDEX BLDA+IHG-RTO: 30 — SIGNIFICANT CHANGE UP
IMM GRANULOCYTES NFR BLD AUTO: 0.4 % — SIGNIFICANT CHANGE UP (ref 0–1.5)
INR BLD: 1.33 RATIO — HIGH (ref 0.88–1.16)
KETONES UR-MCNC: ABNORMAL
LACTATE SERPL-SCNC: 1 MMOL/L — SIGNIFICANT CHANGE UP (ref 0.7–2)
LEUKOCYTE ESTERASE UR-ACNC: NEGATIVE — SIGNIFICANT CHANGE UP
LIDOCAIN IGE QN: 13 U/L — SIGNIFICANT CHANGE UP (ref 7–60)
LYMPHOCYTES # BLD AUTO: 0.4 K/UL — LOW (ref 1–3.3)
LYMPHOCYTES # BLD AUTO: 5.6 % — LOW (ref 13–44)
MAGNESIUM SERPL-MCNC: 1.7 MG/DL — SIGNIFICANT CHANGE UP (ref 1.6–2.6)
MAGNESIUM SERPL-MCNC: 2.7 MG/DL — HIGH (ref 1.6–2.6)
MCHC RBC-ENTMCNC: 32.4 GM/DL — SIGNIFICANT CHANGE UP (ref 32–36)
MCHC RBC-ENTMCNC: 33.4 GM/DL — SIGNIFICANT CHANGE UP (ref 32–36)
MCHC RBC-ENTMCNC: 33.8 PG — SIGNIFICANT CHANGE UP (ref 27–34)
MCHC RBC-ENTMCNC: 34.3 PG — HIGH (ref 27–34)
MCV RBC AUTO: 102.6 FL — HIGH (ref 80–100)
MCV RBC AUTO: 104.2 FL — HIGH (ref 80–100)
MONOCYTES # BLD AUTO: 0.34 K/UL — SIGNIFICANT CHANGE UP (ref 0–0.9)
MONOCYTES NFR BLD AUTO: 4.7 % — SIGNIFICANT CHANGE UP (ref 2–14)
NEUTROPHILS # BLD AUTO: 6.38 K/UL — SIGNIFICANT CHANGE UP (ref 1.8–7.4)
NEUTROPHILS NFR BLD AUTO: 88.9 % — HIGH (ref 43–77)
NITRITE UR-MCNC: NEGATIVE — SIGNIFICANT CHANGE UP
NRBC # BLD: 0 /100 WBCS — SIGNIFICANT CHANGE UP (ref 0–0)
NRBC # BLD: 0 /100 WBCS — SIGNIFICANT CHANGE UP (ref 0–0)
PCO2 BLDA: 31 MMHG — LOW (ref 32–46)
PH BLDA: 7.43 — SIGNIFICANT CHANGE UP (ref 7.35–7.45)
PH UR: 6 — SIGNIFICANT CHANGE UP (ref 5–8)
PHOSPHATE SERPL-MCNC: 4 MG/DL — SIGNIFICANT CHANGE UP (ref 2.5–4.5)
PHOSPHATE SERPL-MCNC: 4.1 MG/DL — SIGNIFICANT CHANGE UP (ref 2.5–4.5)
PLATELET # BLD AUTO: 306 K/UL — SIGNIFICANT CHANGE UP (ref 150–400)
PLATELET # BLD AUTO: 359 K/UL — SIGNIFICANT CHANGE UP (ref 150–400)
PO2 BLDA: 136 MMHG — HIGH (ref 74–108)
POTASSIUM SERPL-MCNC: 3.1 MMOL/L — LOW (ref 3.5–5.3)
POTASSIUM SERPL-MCNC: 4.3 MMOL/L — SIGNIFICANT CHANGE UP (ref 3.5–5.3)
POTASSIUM SERPL-SCNC: 3.1 MMOL/L — LOW (ref 3.5–5.3)
POTASSIUM SERPL-SCNC: 4.3 MMOL/L — SIGNIFICANT CHANGE UP (ref 3.5–5.3)
PROCALCITONIN SERPL-MCNC: 0.12 NG/ML — HIGH (ref 0.02–0.1)
PROT SERPL-MCNC: 5.7 G/DL — LOW (ref 6–8.3)
PROT SERPL-MCNC: 6.1 G/DL — SIGNIFICANT CHANGE UP (ref 6–8.3)
PROT UR-MCNC: NEGATIVE — SIGNIFICANT CHANGE UP
PROTHROM AB SERPL-ACNC: 15.6 SEC — HIGH (ref 10.6–13.6)
RBC # BLD: 3.44 M/UL — LOW (ref 3.8–5.2)
RBC # BLD: 3.55 M/UL — LOW (ref 3.8–5.2)
RBC # FLD: 12.9 % — SIGNIFICANT CHANGE UP (ref 10.3–14.5)
RBC # FLD: 13 % — SIGNIFICANT CHANGE UP (ref 10.3–14.5)
RBC CASTS # UR COMP ASSIST: >50 /HPF (ref 0–4)
SAO2 % BLDA: 99 % — HIGH (ref 92–96)
SODIUM SERPL-SCNC: 134 MMOL/L — LOW (ref 135–145)
SODIUM SERPL-SCNC: 141 MMOL/L — SIGNIFICANT CHANGE UP (ref 135–145)
SP GR SPEC: 1.01 — SIGNIFICANT CHANGE UP (ref 1.01–1.02)
SPECIMEN SOURCE: SIGNIFICANT CHANGE UP
SPECIMEN SOURCE: SIGNIFICANT CHANGE UP
TRIGL SERPL-MCNC: 289 MG/DL — HIGH (ref 10–149)
TROPONIN I SERPL-MCNC: 0.62 NG/ML — HIGH (ref 0.01–0.04)
UROBILINOGEN FLD QL: NEGATIVE — SIGNIFICANT CHANGE UP
VIT B12 SERPL-MCNC: 753 PG/ML — SIGNIFICANT CHANGE UP (ref 232–1245)
WBC # BLD: 7.18 K/UL — SIGNIFICANT CHANGE UP (ref 3.8–10.5)
WBC # BLD: 8.42 K/UL — SIGNIFICANT CHANGE UP (ref 3.8–10.5)
WBC # FLD AUTO: 7.18 K/UL — SIGNIFICANT CHANGE UP (ref 3.8–10.5)
WBC # FLD AUTO: 8.42 K/UL — SIGNIFICANT CHANGE UP (ref 3.8–10.5)
WBC UR QL: SIGNIFICANT CHANGE UP

## 2020-08-16 PROCEDURE — 95816 EEG AWAKE AND DROWSY: CPT

## 2020-08-16 PROCEDURE — 84484 ASSAY OF TROPONIN QUANT: CPT

## 2020-08-16 PROCEDURE — 81001 URINALYSIS AUTO W/SCOPE: CPT

## 2020-08-16 PROCEDURE — 93306 TTE W/DOPPLER COMPLETE: CPT

## 2020-08-16 PROCEDURE — 82550 ASSAY OF CK (CPK): CPT

## 2020-08-16 PROCEDURE — 99291 CRITICAL CARE FIRST HOUR: CPT | Mod: 25

## 2020-08-16 PROCEDURE — 80053 COMPREHEN METABOLIC PANEL: CPT

## 2020-08-16 PROCEDURE — 71045 X-RAY EXAM CHEST 1 VIEW: CPT

## 2020-08-16 PROCEDURE — 85730 THROMBOPLASTIN TIME PARTIAL: CPT

## 2020-08-16 PROCEDURE — 87635 SARS-COV-2 COVID-19 AMP PRB: CPT

## 2020-08-16 PROCEDURE — 82803 BLOOD GASES ANY COMBINATION: CPT

## 2020-08-16 PROCEDURE — 83605 ASSAY OF LACTIC ACID: CPT

## 2020-08-16 PROCEDURE — 87040 BLOOD CULTURE FOR BACTERIA: CPT

## 2020-08-16 PROCEDURE — 84443 ASSAY THYROID STIM HORMONE: CPT

## 2020-08-16 PROCEDURE — 87086 URINE CULTURE/COLONY COUNT: CPT

## 2020-08-16 PROCEDURE — 80048 BASIC METABOLIC PNL TOTAL CA: CPT

## 2020-08-16 PROCEDURE — 99285 EMERGENCY DEPT VISIT HI MDM: CPT

## 2020-08-16 PROCEDURE — 83036 HEMOGLOBIN GLYCOSYLATED A1C: CPT

## 2020-08-16 PROCEDURE — 84100 ASSAY OF PHOSPHORUS: CPT

## 2020-08-16 PROCEDURE — 36415 COLL VENOUS BLD VENIPUNCTURE: CPT

## 2020-08-16 PROCEDURE — 80076 HEPATIC FUNCTION PANEL: CPT

## 2020-08-16 PROCEDURE — 71045 X-RAY EXAM CHEST 1 VIEW: CPT | Mod: 26,77

## 2020-08-16 PROCEDURE — 82746 ASSAY OF FOLIC ACID SERUM: CPT

## 2020-08-16 PROCEDURE — 71045 X-RAY EXAM CHEST 1 VIEW: CPT | Mod: 26

## 2020-08-16 PROCEDURE — 70450 CT HEAD/BRAIN W/O DYE: CPT

## 2020-08-16 PROCEDURE — 83880 ASSAY OF NATRIURETIC PEPTIDE: CPT

## 2020-08-16 PROCEDURE — 93005 ELECTROCARDIOGRAM TRACING: CPT

## 2020-08-16 PROCEDURE — 82607 VITAMIN B-12: CPT

## 2020-08-16 PROCEDURE — 84145 PROCALCITONIN (PCT): CPT

## 2020-08-16 PROCEDURE — 86769 SARS-COV-2 COVID-19 ANTIBODY: CPT

## 2020-08-16 PROCEDURE — 94002 VENT MGMT INPAT INIT DAY: CPT

## 2020-08-16 PROCEDURE — 85027 COMPLETE CBC AUTOMATED: CPT

## 2020-08-16 PROCEDURE — 99291 CRITICAL CARE FIRST HOUR: CPT

## 2020-08-16 PROCEDURE — 93010 ELECTROCARDIOGRAM REPORT: CPT

## 2020-08-16 PROCEDURE — 83735 ASSAY OF MAGNESIUM: CPT

## 2020-08-16 PROCEDURE — 82962 GLUCOSE BLOOD TEST: CPT

## 2020-08-16 PROCEDURE — 82553 CREATINE MB FRACTION: CPT

## 2020-08-16 PROCEDURE — C9254: CPT

## 2020-08-16 RX ORDER — CYCLOSPORINE 0.5 MG/ML
1 EMULSION OPHTHALMIC
Qty: 0 | Refills: 0 | DISCHARGE

## 2020-08-16 RX ORDER — FENTANYL CITRATE 50 UG/ML
1 INJECTION INTRAVENOUS
Qty: 0 | Refills: 0 | DISCHARGE

## 2020-08-16 RX ORDER — PANTOPRAZOLE SODIUM 20 MG/1
40 TABLET, DELAYED RELEASE ORAL
Qty: 0 | Refills: 0 | DISCHARGE

## 2020-08-16 RX ORDER — GABAPENTIN 400 MG/1
600 CAPSULE ORAL
Qty: 0 | Refills: 0 | DISCHARGE

## 2020-08-16 RX ORDER — LEVETIRACETAM 250 MG/1
1000 TABLET, FILM COATED ORAL EVERY 12 HOURS
Refills: 0 | Status: DISCONTINUED | OUTPATIENT
Start: 2020-08-16 | End: 2020-08-23

## 2020-08-16 RX ORDER — HEPARIN SODIUM 5000 [USP'U]/ML
INJECTION INTRAVENOUS; SUBCUTANEOUS
Qty: 25000 | Refills: 0 | Status: DISCONTINUED | OUTPATIENT
Start: 2020-08-16 | End: 2020-08-16

## 2020-08-16 RX ORDER — LEVETIRACETAM 250 MG/1
1000 TABLET, FILM COATED ORAL ONCE
Refills: 0 | Status: COMPLETED | OUTPATIENT
Start: 2020-08-16 | End: 2020-08-16

## 2020-08-16 RX ORDER — LACOSAMIDE 50 MG/1
20 TABLET ORAL
Qty: 0 | Refills: 0 | DISCHARGE
Start: 2020-08-16

## 2020-08-16 RX ORDER — GABAPENTIN 400 MG/1
1 CAPSULE ORAL
Qty: 0 | Refills: 0 | DISCHARGE
Start: 2020-08-16

## 2020-08-16 RX ORDER — ACETAMINOPHEN 500 MG
650 TABLET ORAL ONCE
Refills: 0 | Status: DISCONTINUED | OUTPATIENT
Start: 2020-08-16 | End: 2020-08-16

## 2020-08-16 RX ORDER — PANTOPRAZOLE SODIUM 20 MG/1
40 TABLET, DELAYED RELEASE ORAL
Refills: 0 | Status: DISCONTINUED | OUTPATIENT
Start: 2020-08-16 | End: 2020-08-21

## 2020-08-16 RX ORDER — MIDAZOLAM HYDROCHLORIDE 1 MG/ML
1 INJECTION, SOLUTION INTRAMUSCULAR; INTRAVENOUS
Qty: 0 | Refills: 0 | DISCHARGE
Start: 2020-08-16

## 2020-08-16 RX ORDER — CEFTRIAXONE 500 MG/1
2000 INJECTION, POWDER, FOR SOLUTION INTRAMUSCULAR; INTRAVENOUS EVERY 12 HOURS
Refills: 0 | Status: DISCONTINUED | OUTPATIENT
Start: 2020-08-16 | End: 2020-08-20

## 2020-08-16 RX ORDER — LEVETIRACETAM 250 MG/1
1000 TABLET, FILM COATED ORAL EVERY 12 HOURS
Refills: 0 | Status: DISCONTINUED | OUTPATIENT
Start: 2020-08-16 | End: 2020-08-16

## 2020-08-16 RX ORDER — NOREPINEPHRINE BITARTRATE/D5W 8 MG/250ML
0.1 PLASTIC BAG, INJECTION (ML) INTRAVENOUS
Qty: 0 | Refills: 0 | DISCHARGE
Start: 2020-08-16

## 2020-08-16 RX ORDER — ACYCLOVIR SODIUM 500 MG
480 VIAL (EA) INTRAVENOUS EVERY 8 HOURS
Refills: 0 | Status: DISCONTINUED | OUTPATIENT
Start: 2020-08-17 | End: 2020-08-20

## 2020-08-16 RX ORDER — LACOSAMIDE 50 MG/1
200 TABLET ORAL EVERY 12 HOURS
Refills: 0 | Status: DISCONTINUED | OUTPATIENT
Start: 2020-08-16 | End: 2020-08-16

## 2020-08-16 RX ORDER — HYDROXYCHLOROQUINE SULFATE 200 MG
200 TABLET ORAL
Qty: 0 | Refills: 0 | DISCHARGE

## 2020-08-16 RX ORDER — LEVETIRACETAM 250 MG/1
10 TABLET, FILM COATED ORAL
Qty: 0 | Refills: 0 | DISCHARGE
Start: 2020-08-16

## 2020-08-16 RX ORDER — HEPARIN SODIUM 5000 [USP'U]/ML
2000 INJECTION INTRAVENOUS; SUBCUTANEOUS EVERY 6 HOURS
Refills: 0 | Status: DISCONTINUED | OUTPATIENT
Start: 2020-08-16 | End: 2020-08-16

## 2020-08-16 RX ORDER — PANTOPRAZOLE SODIUM 20 MG/1
40 TABLET, DELAYED RELEASE ORAL
Qty: 0 | Refills: 0 | DISCHARGE
Start: 2020-08-16

## 2020-08-16 RX ORDER — ALPRAZOLAM 0.25 MG
0.25 TABLET ORAL
Qty: 0 | Refills: 0 | DISCHARGE

## 2020-08-16 RX ORDER — MIDAZOLAM HYDROCHLORIDE 1 MG/ML
0.05 INJECTION, SOLUTION INTRAMUSCULAR; INTRAVENOUS
Qty: 100 | Refills: 0 | Status: DISCONTINUED | OUTPATIENT
Start: 2020-08-16 | End: 2020-08-16

## 2020-08-16 RX ORDER — HYDROXYCHLOROQUINE SULFATE 200 MG
1 TABLET ORAL
Qty: 0 | Refills: 0 | DISCHARGE
Start: 2020-08-16

## 2020-08-16 RX ORDER — ATORVASTATIN CALCIUM 80 MG/1
1 TABLET, FILM COATED ORAL
Qty: 0 | Refills: 0 | DISCHARGE
Start: 2020-08-16

## 2020-08-16 RX ORDER — ACETAMINOPHEN 500 MG
650 TABLET ORAL EVERY 6 HOURS
Refills: 0 | Status: DISCONTINUED | OUTPATIENT
Start: 2020-08-16 | End: 2020-08-16

## 2020-08-16 RX ORDER — LIPASE/PROTEASE/AMYLASE 16-48-48K
1 CAPSULE,DELAYED RELEASE (ENTERIC COATED) ORAL
Qty: 0 | Refills: 0 | DISCHARGE
Start: 2020-08-16

## 2020-08-16 RX ORDER — HEPARIN SODIUM 5000 [USP'U]/ML
2000 INJECTION INTRAVENOUS; SUBCUTANEOUS EVERY 6 HOURS
Refills: 0 | Status: DISCONTINUED | OUTPATIENT
Start: 2020-08-16 | End: 2020-08-17

## 2020-08-16 RX ORDER — ALPRAZOLAM 0.25 MG
1 TABLET ORAL
Qty: 0 | Refills: 0 | DISCHARGE

## 2020-08-16 RX ORDER — HEPARIN SODIUM 5000 [USP'U]/ML
4000 INJECTION INTRAVENOUS; SUBCUTANEOUS EVERY 6 HOURS
Refills: 0 | Status: DISCONTINUED | OUTPATIENT
Start: 2020-08-16 | End: 2020-08-16

## 2020-08-16 RX ORDER — ACYCLOVIR SODIUM 500 MG
VIAL (EA) INTRAVENOUS
Refills: 0 | Status: DISCONTINUED | OUTPATIENT
Start: 2020-08-17 | End: 2020-08-20

## 2020-08-16 RX ORDER — OXYCODONE HYDROCHLORIDE 5 MG/1
1 TABLET ORAL
Qty: 0 | Refills: 0 | DISCHARGE

## 2020-08-16 RX ORDER — POTASSIUM CHLORIDE 20 MEQ
2 PACKET (EA) ORAL
Qty: 0 | Refills: 0 | DISCHARGE
Start: 2020-08-16

## 2020-08-16 RX ORDER — CHLORHEXIDINE GLUCONATE 213 G/1000ML
15 SOLUTION TOPICAL EVERY 12 HOURS
Refills: 0 | Status: DISCONTINUED | OUTPATIENT
Start: 2020-08-16 | End: 2020-08-20

## 2020-08-16 RX ORDER — CHLORHEXIDINE GLUCONATE 213 G/1000ML
1 SOLUTION TOPICAL
Refills: 0 | Status: DISCONTINUED | OUTPATIENT
Start: 2020-08-16 | End: 2020-08-23

## 2020-08-16 RX ORDER — HYDROXYCHLOROQUINE SULFATE 200 MG
1 TABLET ORAL
Qty: 0 | Refills: 0 | DISCHARGE

## 2020-08-16 RX ORDER — GABAPENTIN 400 MG/1
2 CAPSULE ORAL
Qty: 0 | Refills: 0 | DISCHARGE
Start: 2020-08-16

## 2020-08-16 RX ORDER — CHLORHEXIDINE GLUCONATE 213 G/1000ML
15 SOLUTION TOPICAL EVERY 12 HOURS
Refills: 0 | Status: DISCONTINUED | OUTPATIENT
Start: 2020-08-16 | End: 2020-08-16

## 2020-08-16 RX ORDER — POTASSIUM CHLORIDE 20 MEQ
40 PACKET (EA) ORAL EVERY 4 HOURS
Refills: 0 | Status: DISCONTINUED | OUTPATIENT
Start: 2020-08-16 | End: 2020-08-16

## 2020-08-16 RX ORDER — ACYCLOVIR SODIUM 500 MG
480 VIAL (EA) INTRAVENOUS ONCE
Refills: 0 | Status: COMPLETED | OUTPATIENT
Start: 2020-08-16 | End: 2020-08-17

## 2020-08-16 RX ORDER — VANCOMYCIN HCL 1 G
125 VIAL (EA) INTRAVENOUS EVERY 6 HOURS
Refills: 0 | Status: DISCONTINUED | OUTPATIENT
Start: 2020-08-16 | End: 2020-08-18

## 2020-08-16 RX ORDER — MIDAZOLAM HYDROCHLORIDE 1 MG/ML
0.08 INJECTION, SOLUTION INTRAMUSCULAR; INTRAVENOUS
Qty: 100 | Refills: 0 | Status: DISCONTINUED | OUTPATIENT
Start: 2020-08-16 | End: 2020-08-19

## 2020-08-16 RX ORDER — LACOSAMIDE 50 MG/1
200 TABLET ORAL EVERY 12 HOURS
Refills: 0 | Status: DISCONTINUED | OUTPATIENT
Start: 2020-08-16 | End: 2020-08-22

## 2020-08-16 RX ORDER — METRONIDAZOLE 500 MG
500 TABLET ORAL ONCE
Refills: 0 | Status: COMPLETED | OUTPATIENT
Start: 2020-08-16 | End: 2020-08-16

## 2020-08-16 RX ORDER — HEPARIN SODIUM 5000 [USP'U]/ML
1 INJECTION INTRAVENOUS; SUBCUTANEOUS
Qty: 0 | Refills: 0 | DISCHARGE
Start: 2020-08-16

## 2020-08-16 RX ORDER — PANTOPRAZOLE SODIUM 20 MG/1
40 TABLET, DELAYED RELEASE ORAL DAILY
Refills: 0 | Status: DISCONTINUED | OUTPATIENT
Start: 2020-08-16 | End: 2020-08-16

## 2020-08-16 RX ORDER — HYDROCORTISONE 20 MG
50 TABLET ORAL
Qty: 0 | Refills: 0 | DISCHARGE
Start: 2020-08-16

## 2020-08-16 RX ORDER — PROPOFOL 10 MG/ML
50 INJECTION, EMULSION INTRAVENOUS
Qty: 0 | Refills: 0 | DISCHARGE
Start: 2020-08-16

## 2020-08-16 RX ORDER — SODIUM CHLORIDE 9 MG/ML
1000 INJECTION, SOLUTION INTRAVENOUS
Refills: 0 | Status: DISCONTINUED | OUTPATIENT
Start: 2020-08-16 | End: 2020-08-18

## 2020-08-16 RX ORDER — SODIUM CHLORIDE 9 MG/ML
1000 INJECTION, SOLUTION INTRAVENOUS ONCE
Refills: 0 | Status: COMPLETED | OUTPATIENT
Start: 2020-08-16 | End: 2020-08-16

## 2020-08-16 RX ORDER — PROPOFOL 10 MG/ML
10 INJECTION, EMULSION INTRAVENOUS
Qty: 1000 | Refills: 0 | Status: DISCONTINUED | OUTPATIENT
Start: 2020-08-16 | End: 2020-08-16

## 2020-08-16 RX ORDER — PROPOFOL 10 MG/ML
50 INJECTION, EMULSION INTRAVENOUS
Qty: 1000 | Refills: 0 | Status: DISCONTINUED | OUTPATIENT
Start: 2020-08-16 | End: 2020-08-18

## 2020-08-16 RX ORDER — LACOSAMIDE 50 MG/1
200 TABLET ORAL ONCE
Refills: 0 | Status: DISCONTINUED | OUTPATIENT
Start: 2020-08-16 | End: 2020-08-16

## 2020-08-16 RX ORDER — HEPARIN SODIUM 5000 [USP'U]/ML
4000 INJECTION INTRAVENOUS; SUBCUTANEOUS EVERY 6 HOURS
Refills: 0 | Status: DISCONTINUED | OUTPATIENT
Start: 2020-08-16 | End: 2020-08-17

## 2020-08-16 RX ORDER — METRONIDAZOLE 500 MG
TABLET ORAL
Refills: 0 | Status: DISCONTINUED | OUTPATIENT
Start: 2020-08-16 | End: 2020-08-17

## 2020-08-16 RX ORDER — VANCOMYCIN HCL 1 G
1 VIAL (EA) INTRAVENOUS
Qty: 0 | Refills: 0 | DISCHARGE

## 2020-08-16 RX ORDER — MAGNESIUM SULFATE 500 MG/ML
2 VIAL (ML) INJECTION ONCE
Refills: 0 | Status: COMPLETED | OUTPATIENT
Start: 2020-08-16 | End: 2020-08-16

## 2020-08-16 RX ORDER — NOREPINEPHRINE BITARTRATE/D5W 8 MG/250ML
0.05 PLASTIC BAG, INJECTION (ML) INTRAVENOUS
Qty: 8 | Refills: 0 | Status: DISCONTINUED | OUTPATIENT
Start: 2020-08-16 | End: 2020-08-16

## 2020-08-16 RX ORDER — METRONIDAZOLE 500 MG
500 TABLET ORAL EVERY 8 HOURS
Refills: 0 | Status: DISCONTINUED | OUTPATIENT
Start: 2020-08-17 | End: 2020-08-17

## 2020-08-16 RX ORDER — HEPARIN SODIUM 5000 [USP'U]/ML
INJECTION INTRAVENOUS; SUBCUTANEOUS
Qty: 25000 | Refills: 0 | Status: DISCONTINUED | OUTPATIENT
Start: 2020-08-16 | End: 2020-08-18

## 2020-08-16 RX ORDER — LIPASE/PROTEASE/AMYLASE 16-48-48K
1 CAPSULE,DELAYED RELEASE (ENTERIC COATED) ORAL
Qty: 0 | Refills: 0 | DISCHARGE

## 2020-08-16 RX ORDER — LOPERAMIDE HCL 2 MG
1 TABLET ORAL
Qty: 0 | Refills: 0 | DISCHARGE
Start: 2020-08-16

## 2020-08-16 RX ORDER — OXYCODONE HYDROCHLORIDE 5 MG/1
10 TABLET ORAL
Qty: 0 | Refills: 0 | DISCHARGE

## 2020-08-16 RX ORDER — CHLORHEXIDINE GLUCONATE 213 G/1000ML
1 SOLUTION TOPICAL
Refills: 0 | Status: DISCONTINUED | OUTPATIENT
Start: 2020-08-16 | End: 2020-08-16

## 2020-08-16 RX ORDER — HYDROCORTISONE 20 MG
50 TABLET ORAL EVERY 6 HOURS
Refills: 0 | Status: DISCONTINUED | OUTPATIENT
Start: 2020-08-16 | End: 2020-08-16

## 2020-08-16 RX ORDER — ONDANSETRON 8 MG/1
1 TABLET, FILM COATED ORAL
Qty: 0 | Refills: 0 | DISCHARGE

## 2020-08-16 RX ADMIN — Medication 1 DROP(S): at 18:18

## 2020-08-16 RX ADMIN — Medication 1 CAPSULE(S): at 18:20

## 2020-08-16 RX ADMIN — Medication 125 MILLIGRAM(S): at 05:22

## 2020-08-16 RX ADMIN — Medication 50 MILLIGRAM(S): at 18:20

## 2020-08-16 RX ADMIN — LACOSAMIDE 200 MILLIGRAM(S): 50 TABLET ORAL at 13:19

## 2020-08-16 RX ADMIN — APIXABAN 5 MILLIGRAM(S): 2.5 TABLET, FILM COATED ORAL at 05:17

## 2020-08-16 RX ADMIN — Medication 1 MILLIGRAM(S): at 00:10

## 2020-08-16 RX ADMIN — PROPOFOL 2.86 MICROGRAM(S)/KG/MIN: 10 INJECTION, EMULSION INTRAVENOUS at 17:36

## 2020-08-16 RX ADMIN — HEPARIN SODIUM 900 UNIT(S)/HR: 5000 INJECTION INTRAVENOUS; SUBCUTANEOUS at 23:27

## 2020-08-16 RX ADMIN — Medication 2 MILLIGRAM(S): at 13:14

## 2020-08-16 RX ADMIN — GABAPENTIN 600 MILLIGRAM(S): 400 CAPSULE ORAL at 18:09

## 2020-08-16 RX ADMIN — Medication 200 MILLIGRAM(S): at 05:16

## 2020-08-16 RX ADMIN — Medication 1 MILLIGRAM(S): at 12:56

## 2020-08-16 RX ADMIN — Medication 1 DROP(S): at 05:22

## 2020-08-16 RX ADMIN — SODIUM CHLORIDE 1000 MILLILITER(S): 9 INJECTION, SOLUTION INTRAVENOUS at 18:02

## 2020-08-16 RX ADMIN — Medication 125 MILLIGRAM(S): at 00:10

## 2020-08-16 RX ADMIN — LEVETIRACETAM 420 MILLIGRAM(S): 250 TABLET, FILM COATED ORAL at 05:17

## 2020-08-16 RX ADMIN — Medication 125 MILLIGRAM(S): at 18:21

## 2020-08-16 RX ADMIN — CHLORHEXIDINE GLUCONATE 15 MILLILITER(S): 213 SOLUTION TOPICAL at 18:09

## 2020-08-16 RX ADMIN — Medication 100 MILLIGRAM(S): at 22:38

## 2020-08-16 RX ADMIN — CHLORHEXIDINE GLUCONATE 1 APPLICATION(S): 213 SOLUTION TOPICAL at 18:19

## 2020-08-16 RX ADMIN — Medication 40 MILLIEQUIVALENT(S): at 18:08

## 2020-08-16 RX ADMIN — LEVETIRACETAM 440 MILLIGRAM(S): 250 TABLET, FILM COATED ORAL at 13:02

## 2020-08-16 RX ADMIN — MIDAZOLAM HYDROCHLORIDE 2.39 MG/KG/HR: 1 INJECTION, SOLUTION INTRAMUSCULAR; INTRAVENOUS at 17:37

## 2020-08-16 RX ADMIN — LACOSAMIDE 140 MILLIGRAM(S): 50 TABLET ORAL at 17:59

## 2020-08-16 RX ADMIN — GABAPENTIN 600 MILLIGRAM(S): 400 CAPSULE ORAL at 05:12

## 2020-08-16 RX ADMIN — SODIUM CHLORIDE 75 MILLILITER(S): 9 INJECTION, SOLUTION INTRAVENOUS at 23:11

## 2020-08-16 RX ADMIN — HEPARIN SODIUM 900 UNIT(S)/HR: 5000 INJECTION INTRAVENOUS; SUBCUTANEOUS at 17:59

## 2020-08-16 RX ADMIN — Medication 4 MILLIGRAM(S): at 13:02

## 2020-08-16 RX ADMIN — LEVETIRACETAM 400 MILLIGRAM(S): 250 TABLET, FILM COATED ORAL at 23:58

## 2020-08-16 RX ADMIN — PANTOPRAZOLE SODIUM 40 MILLIGRAM(S): 20 TABLET, DELAYED RELEASE ORAL at 18:08

## 2020-08-16 RX ADMIN — Medication 1 MILLIGRAM(S): at 05:12

## 2020-08-16 RX ADMIN — Medication 50 GRAM(S): at 18:08

## 2020-08-16 RX ADMIN — Medication 4 MILLIGRAM(S): at 05:17

## 2020-08-16 RX ADMIN — PANTOPRAZOLE SODIUM 40 MILLIGRAM(S): 20 TABLET, DELAYED RELEASE ORAL at 05:23

## 2020-08-16 NOTE — H&P ADULT - NSHPPHYSICALEXAM_GEN_ALL_CORE
ICU Vital Signs Last 24 Hrs  T(C): 36.7 (16 Aug 2020 19:32), Max: 38.7 (16 Aug 2020 14:00)  T(F): 98 (16 Aug 2020 19:32), Max: 101.6 (16 Aug 2020 14:00)  HR: 75 (16 Aug 2020 20:48) (75 - 104)  BP: 96/53 (16 Aug 2020 18:30) (79/51 - 167/84)  BP(mean): 67 (16 Aug 2020 18:30) (60 - 113)  ABP: --  ABP(mean): --  RR: 28 (16 Aug 2020 18:30) (16 - 40)  SpO2: 100% (16 Aug 2020 20:48) (95% - 100%)    PHYSICAL EXAM  GENERAL: NAD, lying comfortably in bed intubated and sedated  HEAD:  Atraumatic, Normocephalic  EYES: conjunctiva and sclera clear  NECK: Supple, No LAD   CHEST/LUNG: Clear to auscultation bilaterally; No wheeze or ronchi  HEART: Regular rate and rhythm; S1 and S2 present, No murmurs, rubs, or gallops  ABDOMEN: Soft, Nontender, Nondistended; Bowel sounds present  EXTREMITIES:  2+ Peripheral Pulses, No edema  NEURO: sedated, brief left arm twitching   SKIN: No rashes or lesions

## 2020-08-16 NOTE — DISCHARGE NOTE PROVIDER - HOSPITAL COURSE
60F PMH chronic pain (on fentanyl patch), EtOH abuse (last 3 weeks ago), h/o EtOH pancreatitis, anxiety (on alprazolam), polysubstance abuse, MCTD (on methylprednisolone/hydroxychloroquine), Lung adenocarcinoma (s/p RML resection 2018), L soleal DVT on apixaban, HTN, recent reported C diff (completing oral vancomycin), and former smoker who now presents with status epilepticus with acute hypoxemic respiratory failure requiring intubation.        She was recently hospitalized at [facility] (end of July) with alcoholic pancreatitis, then [facility] with cellulitis, then [facility], then presented to  on 8/11 with weakness and ataxia. She was found to have hyponatremia to 120, which was attributed to primary polydipsia and improved with fluid restriction. Last EtOH drink reportedly 2 weeks prior to admission.         Hospital course complicated by seizures on 8/13, which improved with IV lorazepam and starting levetiracetam. EEG showed epileptiform activity and she continued to have episodic seizures. CT head negative.        Today with status epilepticus. Seizures start in LUE/LLE and extend proximal to involve the face, including contralateral side. During RRT today, she received lorazepam 10 mg IV, levetiracetam 1000 mg IV bolus, and lacosamide 200 mg IV. She continued to experience seizure activity without recovery of mental status and progressively worsening hypoxemia requiring emergent intubation. She was started on diprivan gtt and transferred to ICU.        - Continue diprivan gtt, now at 50 mcg/kg/min. Start midazolam gtt given persistent seizure activity (about 5 additional seizures in ICU after transfer). Continue levetiracetam at 1000 mg q12h. Start lacosamide 200 mg IV q12h. Continue gabapentin. Will need transfer to [facility] MICU for continuous video EEG monitoring    - Hypotension due to sedatives. LR 1 liter bolus. Start norepinephrine, goal MAP>65. Start stress hydrocortisone, hold home methylprednisolone while on stress steroids    - Continue lung protective ventilation. ABG without hypercapnia or hypoxemia. Check sputum culture given fever    - Keep NPO for transfer to [facility]. OG tube in stomach. Continue home PPI. Restart pancreatic enzymes when resumes po    - Stable kidney function and lytes, strict I/O's    - Fever likely related to seizure. No evidence of active infection. Check blood, sputum, and urine cultures. Has 1 more day of vancomycin oral for reported C diff    - On apixaban for L soleal DVT. Hold for now and start heparin gtt. Hold aspirin for now    - Continue hydroxychloroquine for MCTD, on stress steroids, eventually to resume home medrol 4 mg    - Prognosis guarded, full code, PA discussed with cousin Anita (HCP)

## 2020-08-16 NOTE — H&P ADULT - NSICDXPASTMEDICALHX_GEN_ALL_CORE_FT
PAST MEDICAL HISTORY:  Adenocarcinoma of lung     Alcohol abuse     Chronic pain     Depression H/O panic attack with anxiety    Diverticulitis of colon (without mention of hemorrhage)     Lung cancer     Mixed connective tissue disease     Opiate dependence     S/P Laminectomy  Lumbar 3/10

## 2020-08-16 NOTE — DISCHARGE NOTE PROVIDER - NSDCMRMEDTOKEN_GEN_ALL_CORE_FT
atorvastatin 40 mg oral tablet: 1 tab(s) orally once a day (at bedtime)  gabapentin 600 mg oral tablet: 1 tab(s) orally 2 times a day  heparin 100 units/mL-D5% intravenous solution: 1  intravenous once a day  continuous infusion  hydrocortisone: 50 milligram(s) intravenous every 6 hours  hydroxychloroquine 200 mg oral tablet: 1 tab(s) orally   lacosamide 200 mg/20 mL intravenous solution: 20 milliliter(s) intravenous every 12 hours  levETIRAcetam 100 mg/mL intravenous solution: 10 milliliter(s) intravenous every 12 hours  loperamide 2 mg oral capsule: 1 cap(s) orally every 6 hours, As needed, Diarrhea  midazolam: 1 milligram(s) intravenous every hour  Multiple Vitamins oral tablet: 1 tab(s) orally once a day  norepinephrine: 0.1 microgram(s) intravenous every hour  continuous infusion  ocular lubricant ophthalmic solution: 1 drop(s) to each affected eye 2 times a day  pancrelipase 12,000 units-38,000 units-60,000 units oral delayed release capsule: 1 cap(s) orally 3 times a day (with meals)  pantoprazole 40 mg intravenous injection: 40 milligram(s) intravenous once a day  potassium chloride 20 mEq oral powder for reconstitution: 2 packet(s) orally every 4 hours  propofol: 50 microgram(s) intravenous infusion  vancomycin 125 mg oral capsule: 1 cap(s) orally every 6 hours

## 2020-08-16 NOTE — PROGRESS NOTE ADULT - PROBLEM SELECTOR PLAN 2
pt completed doxycycline
pt completed doxycycline
pt completing doxycycline
pt completed doxycycline
pt completing doxycycline

## 2020-08-16 NOTE — CONSULT NOTE ADULT - REASON FOR ADMISSION
rt leg cellulitis, dizziness

## 2020-08-16 NOTE — PROVIDER CONTACT NOTE (OTHER) - REASON
pt having frequent episodes of left arm up and downward movements with  head turned to left with left fixed gaze, and twitching of left side of face.

## 2020-08-16 NOTE — H&P ADULT - NSHPREVIEWOFSYSTEMS_GEN_ALL_CORE
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 She was treated on the medicine service with an appropriate rate of correction in serum Na. Noted to have focal seizure activity of LUE on 8/13 which broke with IV lorazepam. Seizure activity believed to be secondary to benzodiazepine withdrawal. EEG at one point showed epileptiform activity and she was started on Keppra although continued to have periodic seizure activity. CT head unremarkable for acute pathology.     On 8/16, RRT called because patient found to be unresponsive with generalized seizure activity with left UE/LE/facial twitching. Seizure activity would break transiently with IV lorazepam but recurred repetitively. After 8mg lorazepam she began to lose airway protection abilities and developed hypoxemia (SpO2 70s). She was emergent intubated and given keppra load 1000mg, vimpat load 200 mg and propofol 10 mcg/kg/min infusion, which successfully suppressed her seizures. Found to have new fever of 101.6 F and hypotensive post-intubation. Started on levophed drip.      Transferred to [facility] for EEG study.      Review of Systems: Unable to obtain secondary to AMS/sedation/intubation Review of Systems: Unable to obtain secondary to AMS/sedation/intubation

## 2020-08-16 NOTE — PROGRESS NOTE ADULT - ATTENDING COMMENTS
45 minutes spent on this visit, 50% visit time spent in care co-ordination with other attendings and counselling patient  I have discussed care plan with patient and HCP,expressed understanding of problems treatment and their effect and side effects, alternatives in detail,I have asked if they have any questions and concerns and appropriately addressed them to best of my ability  Reviewed all diagonostic tests, lab results and drug drug interactions, and medications

## 2020-08-16 NOTE — PROGRESS NOTE ADULT - SUBJECTIVE AND OBJECTIVE BOX
Patient is a 60y Female with a known history of :  Polysubstance dependence (F19.20)  Seizure (R56.9)  Alcohol abuse (F10.10)  Clostridium difficile diarrhea (A04.72)  Venous thrombosis (I82.90)  Chronic pain syndrome (G89.4)  Mixed connective tissue disease (M35.1)  Cellulitis (L03.90)  Hyponatremia (E87.1)  Alcoholism (F10.20)    HPI:  60 yr old with PMH of ch pain with Opiate and ETOH dependency and abuse, alcoholic pancreatitis, Mixed connective tissue disorder, left soleal vein thrombosis, adeno ca lung- s/p RML resection 2018, ex smoker,rec leg cellulitis, was admitted in [facility] with alcoholic pancreatitis and HTN and discharged on 7/31, after one day at home pt went to [facility] for cellulitis treated with doxycycline and is improving, and 2 days back went to [facility] and lian told to drink lot of water and now says feeling weak and ataxic, In ED syo Na 120 , and received IV fluids, refused vancomycin as improving and is admitted to [facility] for Hyponatremia, likely  excessive water intake.pt took last drink 2 weeks back and thinks she is withdrawing.  admitted for further care (11 Aug 2020 15:36)      REVIEW OF SYSTEMS:    CONSTITUTIONAL: No fever, weight loss, or fatigue  EYES: No eye pain, visual disturbances, or discharge  ENMT:  No difficulty hearing, tinnitus, vertigo; No sinus or throat pain  NECK: No pain or stiffness  BREASTS: No pain, masses, or nipple discharge  RESPIRATORY: No cough, wheezing, chills or hemoptysis; No shortness of breath  CARDIOVASCULAR: No chest pain, palpitations, dizziness, or leg swelling  GASTROINTESTINAL: No abdominal or epigastric pain. No nausea, vomiting, or hematemesis; No diarrhea or constipation. No melena or hematochezia.  GENITOURINARY: No dysuria, frequency, hematuria, or incontinence  NEUROLOGICAL: No headaches, memory loss, loss of strength, numbness, or tremors  SKIN: No itching, burning, rashes, or lesions   LYMPH NODES: No enlarged glands  ENDOCRINE: No heat or cold intolerance; No hair loss  MUSCULOSKELETAL: No joint pain or swelling; No muscle, back, or extremity pain  PSYCHIATRIC: No depression, anxiety, mood swings, or difficulty sleeping  HEME/LYMPH: No easy bruising, or bleeding gums  ALLERGY AND IMMUNOLOGIC: No hives or eczema    MEDICATIONS  (STANDING):  apixaban 5 milliGRAM(s) Oral two times a day  artificial tears (preservative free) Ophthalmic Solution 1 Drop(s) Both EYES two times a day  aspirin 325 milliGRAM(s) Oral daily  atorvastatin 40 milliGRAM(s) Oral at bedtime  gabapentin 600 milliGRAM(s) Oral two times a day  hydroxychloroquine 200 milliGRAM(s) Oral <User Schedule>  levETIRAcetam  IVPB 500 milliGRAM(s) IV Intermittent every 12 hours  LORazepam     Tablet 1 milliGRAM(s) Oral every 6 hours  methylPREDNISolone 4 milliGRAM(s) Oral daily  multivitamin 1 Tablet(s) Oral daily  pancrelipase  (CREON 12,000 Lipase Units) 1 Capsule(s) Oral three times a day with meals  pantoprazole    Tablet 40 milliGRAM(s) Oral before breakfast  sodium chloride 0.9%. 1000 milliLiter(s) (60 mL/Hr) IV Continuous <Continuous>  vancomycin    Solution 125 milliGRAM(s) Oral every 6 hours    MEDICATIONS  (PRN):  chlordiazePOXIDE 5 milliGRAM(s) Oral every 6 hours PRN for ciwa > 6  loperamide 2 milliGRAM(s) Oral every 6 hours PRN Diarrhea  ondansetron    Tablet 4 milliGRAM(s) Oral every 8 hours PRN Nausea and/or Vomiting  oxyCODONE    IR 10 milliGRAM(s) Oral every 6 hours PRN Moderate Pain (4 - 6)      ALLERGIES: penicillin (Other)  penicillin (Swelling)      FAMILY HISTORY:  FHx: rheumatoid arthritis: in mother - with RA associated lung fibrosis  Family history of leukemia: in father      Social history:  Alochol:   Smoking:   Drug Use:   Marital Status:     PHYSICAL EXAMINATION:  -----------------------------  T(C): 37.7 (08-16-20 @ 07:46), Max: 38.1 (08-16-20 @ 04:42)  HR: 97 (08-16-20 @ 07:46) (87 - 104)  BP: 104/66 (08-16-20 @ 07:46) (104/66 - 132/84)  RR: 19 (08-16-20 @ 07:46) (18 - 20)  SpO2: 96% (08-16-20 @ 07:46) (95% - 98%)  Wt(kg): --    08-15 @ 07:01  -  08-16 @ 07:00  --------------------------------------------------------  IN:    sodium chloride 0.9%.: 660 mL    Solution: 200 mL  Total IN: 860 mL    OUT:    Indwelling Catheter - Urethral: 1500 mL  Total OUT: 1500 mL    Total NET: -640 mL            Constitutional: well developed, normal appearance, well groomed, well nourished, no deformities and no acute distress.   Eyes: the conjunctiva exhibited no abnormalities and the eyelids demonstrated no xanthelasmas.   HEENT: normal oral mucosa, no oral pallor and no oral cyanosis.   Neck: normal jugular venous A waves present, normal jugular venous V waves present and no jugular venous carlos A waves.   Pulmonary: no respiratory distress, normal respiratory rhythm and effort, no accessory muscle use and lungs were clear to auscultation bilaterally. Anteriorly  Cardiovascular: heart rate and rhythm were normal, normal S1 and S2 and no murmur, gallop, rub, heave or thrill are present.   Musculoskeletal: the gait could not be assessed.  Extremities: no clubbing of the fingernails, no localized cyanosis, no petechial hemorrhages and no ischemic changes.   Skin: normal skin color and pigmentation, no rash, no venous stasis, no skin lesions, no skin ulcer and no xanthoma was observed.      LABS:   --------  08-15    142  |  108  |  2<L>  ----------------------------<  103<H>  4.6   |  28  |  0.82    Ca    9.4      15 Aug 2020 10:23  Phos  2.5     08-15  Mg     2.0     08-15    TPro  6.6  /  Alb  3.2<L>  /  TBili  0.4  /  DBili  x   /  AST  35  /  ALT  26  /  AlkPhos  36<L>  08-15                         13.3   7.49  )-----------( 359      ( 15 Aug 2020 10:23 )             40.2         08-16 @ 07:59 CPK total:--, CKMB --, Troponin I - .624 ng/mL<H>  08-15 @ 17:26 CPK total:--, CKMB --, Troponin I - .674 ng/mL<H>  08-15 @ 10:23 CPK total:--, CKMB --, Troponin I - .808 ng/mL<H>            Radiology:

## 2020-08-16 NOTE — PROGRESS NOTE ADULT - ASSESSMENT
The patient is a 60 year old female with a history of HTN, DVT, lung cancer s/p resection, opiate use, alcohol abuse who presents with right leg cellulitis and hyponatremia.    8/16/20  Patient lying flat  Appears somewhat more awake  Still with seizure activity  Mildly elevated troponins with normal CPK and CPK-MB  EKG-NSR returned to baseline    Plan:  - Slightly elevated troponin with normal CPK and CPK-MB probably represents demand ischemia  - Echocardiogram report pending  - Will resume clonidine if BP trends up further  - Continue apixaban 5 mg bid  - CT head negative for acute pathology  - Being followed by Neurology, Psychiatry, Nephrology, ID The patient is a 60 year old female with a history of HTN, DVT, lung cancer s/p resection, opiate use, alcohol abuse who presents with right leg cellulitis and hyponatremia.    8/16/20  Patient lying flat  Appears somewhat more awake  Still with seizure activity  Had temperature spike to 100.6  Mildly elevated troponins with normal CPK and CPK-MB  EKG-NSR returned to baseline    Plan:  - Slightly elevated troponin with normal CPK and CPK-MB probably represents demand ischemia  - Echocardiogram report pending  - Will resume clonidine if BP trends up further  - Continue apixaban 5 mg bid  - CT head negative for acute pathology  - Being followed by Neurology, Psychiatry, Nephrology, ID

## 2020-08-16 NOTE — PROGRESS NOTE ADULT - PROBLEM SELECTOR PLAN 1
60 year old female with a history of HTN, DVT, lung cancer s/p resection, opiate use, alcohol abuse who presents with right leg cellulitis and hyponatremia.  on steroids for MCTD  Cardio eval noted  Ciwa monitoring - Librium PRN - vitamins - education - counseling  hx of Lung Ca - lobectomy hx - 2018  LE cellulitis - STSI eval in progress - ID eval pending - Cx in progress  vs noted  SW eval -
received IV fluids, nephro consult noted improved
60 year old female with a history of HTN, DVT, lung cancer s/p resection, opiate use, alcohol abuse who presents with right leg cellulitis and hyponatremia.  overnight events noted - RRT reviewed - hypotension - urinary retention -   remains on ABX for C diff hx  on Plaquenil for MCTD   on Steroids for MCTD  on Opioids - may need to Hold Fentanyl Patch and Oxycodone - if remains Hypotensive -   Cardio eval noted - follow up reviewed -   Ciwa monitoring - Librium PRN - vitamins - education - counseling  hx of Lung Ca - lobectomy hx - 2018  LE cellulitis - STSI eval in progress - ID eval noted - Cx in progress  vs noted  SW eval
received IV fluids, nephro consult noted improved
received IV fluids, nephro consult noted improving
received IV fluids, nephro consult noted improved
received IV fluids, nephro consult noted improving

## 2020-08-16 NOTE — PROGRESS NOTE ADULT - PROVIDER SPECIALTY LIST ADULT
Cardiology
Infectious Disease
Internal Medicine
Internal Medicine
Nephrology
Neurology
Pulmonology
Infectious Disease
Cardiology
Addiction Medicine
Internal Medicine

## 2020-08-16 NOTE — CONSULT NOTE ADULT - ASSESSMENT
Assessment: 59yo woman w/ h/o chronic pain, lung adenocarcinoma (s/p RML wedge resection 2018), ?mixed connective tissue disease, anxiety, right hip replacement, lumbar laminectomy, b/l oophorectomy, diverticulitis, recent acute pancreatitis and found to have right soleal DVT on eliquis, recent RLE cellulitis and recent c.diff infection initially admitted to [facility] for hyponatremia with ccb seizure witnessed 8/13 now transferred for further seizures requiring intubation and mechanical ventilation with Neurology consult for such. Neurological examination at this time limited given intubated and sedated. CT head noncon negative.     Impression: seizures 2/2 ddx including alcohol withdrawal (per chart review) vs infectious or inflammatory cause    Recommendations:  [ ] if patient has GTC or seizure activity > 2min, STAT ativan 2mg IVP  [ ] c/w keppra 1g q12h & sedation  [ ] if patient despite sedation and current AEDs and ativan goes into convulsive status epilepticus,  load with depakote 1g IV x 1 and then maintenance depakote 250mg iv q8h  [ ] if depakote given overnight, AM CBC, CMP, ammonia, and after first two doses of maintenance, depakote level  monitor for thrombocytopenia and increasing LFTs & ammonia as potential side effects of depakote  [ ] to be hooked up in AM to vEEG  [ ] LP in AM per primary team; please order glucose, protein, cell count, CSF culture & gram stain, CSF PCR, HSV  [ ] given fever spike at [place], would consider adding acyclovir - per primary team    -Management & disposition to be discussed with Neurology Attending Dr. Ochoa; discussed with Epilepsy Dr. Nicolas; d/w MICU team Assessment: 59yo woman w/ h/o chronic pain, lung adenocarcinoma (s/p RML wedge resection 2018), ?mixed connective tissue disease, anxiety, right hip replacement, lumbar laminectomy, b/l oophorectomy, diverticulitis, recent acute pancreatitis and found to have right soleal DVT on eliquis, recent RLE cellulitis and recent c.diff infection initially admitted to [facility] for hyponatremia with ccb seizure witnessed 8/13 now transferred for further seizures requiring intubation and mechanical ventilation with Neurology consult for such. Neurological examination at this time limited given intubated and sedated. CT head noncon negative.     Impression: seizures 2/2 ddx including alcohol withdrawal (per chart review) vs infectious or inflammatory cause    Recommendations:  [ ] if patient has GTC or seizure activity > 2min, STAT ativan 2mg IVP  [ ] c/w keppra 1g q12h & sedation  [ ] if patient despite sedation and current AEDs and ativan goes into convulsive status epilepticus,  load with depakote 1g IV x 1 and then maintenance depakote 250mg iv q8h  [ ] if depakote given overnight, AM CBC, CMP, ammonia, and after first two doses of maintenance, depakote level  monitor for thrombocytopenia and increasing LFTs & ammonia as potential side effects of depakote  [ ] to be hooked up in AM to vEEG  [ ] LP per primary team; please order glucose, protein, cell count, CSF culture & gram stain, CSF PCR, HSV  [ ] given fever spike at [place], would consider adding acyclovir - per primary team    -Management & disposition to be discussed with Neurology Attending Dr. Ochoa; discussed with Epilepsy Dr. Nicolas; d/w MICU team

## 2020-08-16 NOTE — H&P ADULT - NSHPLABSRESULTS_GEN_ALL_CORE
Personally reviewed labs, imaging, ekg                           11.8   8.42  )-----------( 359      ( 16 Aug 2020 16:50 )             35.3           141  |  110<H>  |  4<L>  ----------------------------<  96  3.1<L>   |  21<L>  |  0.47<L>    Ca    7.7<L>      16 Aug 2020 16:50  Phos  4.1       Mg     1.7         TPro  5.7<L>  /  Alb  2.7<L>  /  TBili  0.4  /  DBili  x   /  AST  36  /  ALT  22  /  AlkPhos  29<L>            ABG - ( 16 Aug 2020 14:32 )  pH, Arterial: 7.43  pH, Blood: x     /  pCO2: 31    /  pO2: 136   / HCO3: 22    / Base Excess: -3.1  /  SaO2: 99                  Urinalysis Basic - ( 16 Aug 2020 15:09 )    Color: Yellow / Appearance: Slightly Turbid / S.015 / pH: x  Gluc: x / Ketone: Moderate  / Bili: Negative / Urobili: Negative   Blood: x / Protein: Negative / Nitrite: Negative   Leuk Esterase: Negative / RBC: >50 /HPF / WBC 0-2   Sq Epi: x / Non Sq Epi: Occasional / Bacteria: Occasional        PTT - ( 16 Aug 2020 17:19 )  PTT:37.0 sec    Lactate Trend   @ 16:50 Lactate:1.0       CARDIAC MARKERS ( 16 Aug 2020 07:59 )  .624 ng/mL / x     / 129 U/L / x     / 1.6 ng/mL  CARDIAC MARKERS ( 15 Aug 2020 17:26 )  .674 ng/mL / x     / 98 U/L / x     / 2.0 ng/mL  CARDIAC MARKERS ( 15 Aug 2020 10:23 )  .808 ng/mL / x     / 92 U/L / x     / 3.0 ng/mL        CAPILLARY BLOOD GLUCOSE      POCT Blood Glucose.: 103 mg/dL (16 Aug 2020 12:45)        Culture Results:   <10,000 CFU/mL Normal Urogenital Rubia ( @ 15:23)  Culture Results:   No Growth Final ( @ 15:21)  Culture Results:   No Growth Final ( @ 15:21)  Culture Results:   GI PCR Results: NOT detected  *******Please Note:*******  GI panel PCR evaluates for:  Campylobacter, Plesiomonas shigelloides, Salmonella,  Vibrio, Yersinia enterocolitica, Enteroaggregative  Escherichia coli (EAEC), Enteropathogenic E.coli (EPEC),  Enterotoxigenic E. coli (ETEC) lt/st, Shiga-like  toxin-producing E. coli (STEC) stx1/stx2,  Shigella/ Enteroinvasive E. coli (EIEC), Cryptosporidium,  Cyclospora cayetanensis, Entamoeba histolytica,  Giardia lamblia, Adenovirus F 40/41, Astrovirus,  Norovirus GI/GII, Rotavirus A, Sapovirus ( @ 12:12)      EKG:

## 2020-08-16 NOTE — PROGRESS NOTE ADULT - PROBLEM SELECTOR PLAN 7
likely withdrawl,ct head neg, startec on Keppra

## 2020-08-16 NOTE — PROVIDER CONTACT NOTE (OTHER) - SITUATION
pt admitted with cellulitis, had seizure while in hospital.  PMH ETOH, Lung Ca, opiate dependence, depression, mixed connective tissue disease.

## 2020-08-16 NOTE — PATIENT PROFILE ADULT - FUNCTIONAL SCREEN CURRENT LEVEL: COMMUNICATION, MLM
0 = understands/communicates without difficulty 3 = unable to speak (not related to language barrier)

## 2020-08-16 NOTE — DISCHARGE NOTE PROVIDER - CARE PROVIDER_API CALL
Nory Mccarty  NEUROLOGY  [address] [place] [address]  Phone: [phone]  Fax: [phone]  Follow Up Time:

## 2020-08-16 NOTE — DISCHARGE NOTE NURSING/CASE MANAGEMENT/SOCIAL WORK - PATIENT PORTAL LINK FT
You can access the FollowMyHealth Patient Portal offered by [facility] by registering at the following website: [email or web address]. By joining [employer]’s FollowMyHealth portal, you will also be able to view your health information using other applications (apps) compatible with our system.

## 2020-08-16 NOTE — PROVIDER CONTACT NOTE (OTHER) - ACTION/TREATMENT ORDERED:
No new orders received, as per Bibiana Azul to cont to monitor VS and asked that MD notified if change in vital signs

## 2020-08-16 NOTE — H&P ADULT - ATTENDING COMMENTS
60F Hx Lung AdenoCA s/p RML Resection '18 MCTD on PLQ and Prednisone, Chronic LBP s/p Lumbar Laminectomy, DVT on ELQ, Polysubstance Abuse, ETOH Pancreatitis, Alcohol Withdrawal Syndrome, C.Diff +ve 8/3 admitted to [facility] for Hyponatremia, Psychogenic Polydipsia was in RRT today with New Onset GTC Seizure, Febrile Sepsis, Status Epilepticus intubated and loaded with AEDs on Ventilator Support.   1. [facility]ileptic[facility] with Hypoxic Respiratory Failure on Ventilator Support   2. Seizure and Aspiration Precaution, Neuro Check and vEEG Monitoring and titration of AEDs and IV Propofol/Versed Gtt   3. Preliminary CTH form [facility] negative but will get MRI give Hx of Lung CA   4. Presumed Septic Shock on IV Pressor Support titration,   5. Empiric Rocephin and Acyclovir Coverage and Eliquis switch to IV Heparin pending LP Study R/O Meningitis/ Encephalitis   6. Continue C.Diff treatment and IV Hydration with IV D5LR @75cc/Hr     7. Enteral feeding plan as tolerated   8. CXR, ABG, CBC, CMPs, EKG F/U and I&O Monitoring; DVT GI Prophylaxis     Patient seen and examined with ICU Resident/Fellow at bedside after lab data, medical records and radiology reports reviewed. I have read and agreeable in general with resident's Documented Note, Assessment and Management Plan which reflected my opinions from bedside round and discussion.   Total Critical Care Time = 45 Min excluding teaching and procedure activity. 60F Hx Lung AdenoCA s/p RML Resection '18 MCTD on PLQ and Prednisone, Chronic LBP s/p Lumbar Laminectomy, DVT on ELQ, Polysubstance Abuse, ETOH Pancreatitis, Alcohol Withdrawal Syndrome, C.Diff +ve 8/3 admitted to [facility] for Hyponatremia, Psychogenic Polydipsia was in RRT today with New Onset GTC Seizure, Febrile Sepsis, Status Epilepticus intubated and loaded with AEDs on Ventilator Support.   [facility]. [facility] with Hypoxic Respiratory Failure on Ventilator Support   2. Seizure and Aspiration Precaution, Neuro Check and vEEG Monitoring and titration of AEDs and IV Propofol/Versed Gtt   3. Preliminary CTH form [facility] negative but will get MRI give Hx of Lung CA   4. Presumed Septic Shock on IV Pressor Support titration,   5. Empiric Rocephin and Acyclovir Coverage and Eliquis switch to IV Heparin pending LP Study R/O Meningitis/ Encephalitis   6. Continue C.Diff treatment and IV Hydration with IV D5LR @75cc/Hr     7. NGT insertion to initiate Enteral feeding plan as tolerated   8. CXR, ABG, CBC, CMPs, EKG F/U and I&O Monitoring; DVT GI Prophylaxis     Patient seen and examined with ICU Resident/Fellow at bedside after lab data, medical records and radiology reports reviewed. I have read and agreeable in general with resident's Documented Note, Assessment and Management Plan which reflected my opinions from bedside round and discussion.   Total Critical Care Time = 45 Min excluding teaching and procedure activity.

## 2020-08-16 NOTE — CHART NOTE - NSCHARTNOTEFT_GEN_A_CORE
Rapid Response Note  Patient is a 60y old  Female admitted for rt leg cellulitis, dizziness   Rapid response team called for active seizure    Patient was seen and examined at the bedside by the rapid response team.  Patient was seen and examined at the bedside by the rapid response team and resident medicine team and ICU. Pt actively seizing upon arrival, with patient's head turned towards L with eyes deviated to L. Patient given 8 mg ativan IVP and keppra 1g stat, and vimpat 200mg x1. Pt actively seizing throughout rapid response. ICU team bedside with decision to [id number].  Allergies    penicillin (Other)  penicillin (Swelling)    Intolerances        PAST MEDICAL & SURGICAL HISTORY:  Lung cancer  Opiate dependence  Alcohol abuse  Adenocarcinoma of lung  Mixed connective tissue disease  Depression H/O panic attack: with anxiety  S/P Laminectomy  Lumbar 3/10  Chronic pain  Diverticulitis of colon (without mention of hemorrhage)  History of laminectomy  History of lung surgery  History of oophorectomy, unilateral: bilateral  History of hip replacement, total, right: 6/7/2020  S/P lumbar laminectomy  S/P cholecystectomy  History of lung cancer: s/p wedge resection of RML    Rapid Response Vital Signs:  BP:   HR:   RR:   SpO2:   Temp:        Vital Signs Last 24 Hrs  T(C): 38.6 (16 Aug 2020 12:00), Max: 38.6 (16 Aug 2020 12:00)  T(F): 101.5 (16 Aug 2020 12:00), Max: 101.5 (16 Aug 2020 12:00)  HR: 100 (16 Aug 2020 11:28) (92 - 104)  BP: 130/83 (16 Aug 2020 11:28) (104/66 - 132/84)  BP(mean): --  RR: 24 (16 Aug 2020 11:28) (18 - 24)  SpO2: 95% (16 Aug 2020 11:28) (95% - 98%)          GENERAL: The patient is awake and alert in no apparent distress.   HEENT: Head is normocephalic and atraumatic. Extraocular muscles are intact. Mucous membranes are moist. No throat erythema/exudates no lymphadenopathy, no JVD,   NECK: Supple.  LUNGS: Clear to auscultation BL without wheezing, rales or rhonchi; respirations unlabored  HEART: Regular rate and rhythm ,+S1/+S2, no murmurs, rubs, gallops  ABDOMEN: Soft, nontender, and nondistended, no rebound, guarding rigidity, bowel sounds in all 4 quadrants  EXTREMITIES: Without any cyanosis, clubbing, rash, lesions or edema.  SKIN: No new rashes or lesions.  MSK: strength equal BL  VASCULAR: Radial and Dorsal pedal pulses palpable BL  NEUROLOGIC: Grossly intact.  PSYCH: No new changes.    08-15 @ 07:01  -  08-16 @ 07:00  --------------------------------------------------------  IN: 860 mL / OUT: 1500 mL / NET: -640 mL                              13.3   7.49  )-----------( 359      ( 15 Aug 2020 10:23 )             40.2     08-15    142  |  108  |  2<L>  ----------------------------<  103<H>  4.6   |  28  |  0.82    Ca    9.4      15 Aug 2020 10:23  Phos  2.5     08-15  Mg     2.0     08-15    TPro  6.6  /  Alb  3.2<L>  /  TBili  0.4  /  DBili  x   /  AST  35  /  ALT  26  /  AlkPhos  36<L>  08-15         LIVER FUNCTIONS - ( 15 Aug 2020 10:23 )  Alb: 3.2 g/dL / Pro: 6.6 g/dL / ALK PHOS: 36 U/L / ALT: 26 U/L / AST: 35 U/L / GGT: x                  MEDICATIONS  (STANDING):  acetaminophen  Suppository .. 650 milliGRAM(s) Rectal once  apixaban 5 milliGRAM(s) Oral two times a day  artificial tears (preservative free) Ophthalmic Solution 1 Drop(s) Both EYES two times a day  aspirin 325 milliGRAM(s) Oral daily  atorvastatin 40 milliGRAM(s) Oral at bedtime  gabapentin 600 milliGRAM(s) Oral two times a day  hydroxychloroquine 200 milliGRAM(s) Oral <User Schedule>  lacosamide Injectable 200 milliGRAM(s) IV Push once  levETIRAcetam  IVPB 1000 milliGRAM(s) IV Intermittent once  levETIRAcetam  IVPB 1000 milliGRAM(s) IV Intermittent every 12 hours  LORazepam     Tablet 1 milliGRAM(s) Oral every 6 hours  LORazepam   Injectable 1 milliGRAM(s) IV Push once  LORazepam   Injectable 2 milliGRAM(s) IV Push once  LORazepam   Injectable 4 milliGRAM(s) IV Push once  methylPREDNISolone 4 milliGRAM(s) Oral daily  multivitamin 1 Tablet(s) Oral daily  pancrelipase  (CREON 12,000 Lipase Units) 1 Capsule(s) Oral three times a day with meals  pantoprazole    Tablet 40 milliGRAM(s) Oral before breakfast  sodium chloride 0.9%. 1000 milliLiter(s) (60 mL/Hr) IV Continuous <Continuous>  vancomycin    Solution 125 milliGRAM(s) Oral every 6 hours    MEDICATIONS  (PRN):  acetaminophen   Tablet .. 650 milliGRAM(s) Oral every 6 hours PRN Temp greater or equal to 38C (100.4F)  chlordiazePOXIDE 5 milliGRAM(s) Oral every 6 hours PRN for ciwa > 6  loperamide 2 milliGRAM(s) Oral every 6 hours PRN Diarrhea  LORazepam   Injectable 1 milliGRAM(s) IV Push every 3 hours PRN seizure  ondansetron    Tablet 4 milliGRAM(s) Oral every 8 hours PRN Nausea and/or Vomiting  oxyCODONE    IR 10 milliGRAM(s) Oral every 6 hours PRN Moderate Pain (4 - 6)      Assessment/Plan: 60 year old female with a history of HTN, DVT, lung cancer s/p resection, anxiety disorder, opiate use, alcohol abuse who presented to the hospital with right leg cellulitis and hyponatremia. Currently treated with Eliquis for DVT. Rapid response called for seizure activity  Seizure activity likely 2/2 pseudoseizure vs. substance abuse with withdrawal   - STAT IV Ativan w/ total of 8mg. Seizure stopped.   - Discussed case with neurologist, Dr. Mccarty, who states do not give more Ativan, keppra, and vimpat and agrees with plan above   - Call placed to Dr. Avendaño, attending physician of record, awaiting call back   - Will continue to follow, RN to call if any changes. Rapid Response Note  Patient is a 60y old  Female admitted for rt leg cellulitis, dizziness   Rapid response team called for active seizure    Patient was seen and examined at the bedside by the rapid response team.  Patient was seen and examined at the bedside by the rapid response team and resident medicine team and ICU. Pt actively seizing upon arrival, with patient's head turned towards L with eyes deviated to L. Patient given 8 mg ativan IVP and keppra 1g stat, and vimpat 200mg x1. Pt actively seizing throughout rapid response. ICU team bedside with decision to intubate.   Allergies    penicillin (Other)  penicillin (Swelling)    Intolerances        PAST MEDICAL & SURGICAL HISTORY:  Lung cancer  Opiate dependence  Alcohol abuse  Adenocarcinoma of lung  Mixed connective tissue disease  Depression H/O panic attack: with anxiety  S/P Laminectomy  Lumbar 3/10  Chronic pain  Diverticulitis of colon (without mention of hemorrhage)  History of laminectomy  History of lung surgery  History of oophorectomy, unilateral: bilateral  History of hip replacement, total, right: 6/7/2020  S/P lumbar laminectomy  S/P cholecystectomy  History of lung cancer: s/p wedge resection of RML    Rapid Response Vital Signs:  BP:   HR:   RR:   SpO2:   Temp:        Vital Signs Last 24 Hrs  T(C): 38.6 (16 Aug 2020 12:00), Max: 38.6 (16 Aug 2020 12:00)  T(F): 101.5 (16 Aug 2020 12:00), Max: 101.5 (16 Aug 2020 12:00)  HR: 100 (16 Aug 2020 11:28) (92 - 104)  BP: 130/83 (16 Aug 2020 11:28) (104/66 - 132/84)  BP(mean): --  RR: 24 (16 Aug 2020 11:28) (18 - 24)  SpO2: 95% (16 Aug 2020 11:28) (95% - 98%)          GENERAL: The patient is awake and alert in no apparent distress.   HEENT: Head is normocephalic and atraumatic. Extraocular muscles are intact. Mucous membranes are moist. No throat erythema/exudates no lymphadenopathy, no JVD,   NECK: Supple.  LUNGS: Clear to auscultation BL without wheezing, rales or rhonchi; respirations unlabored  HEART: Regular rate and rhythm ,+S1/+S2, no murmurs, rubs, gallops  ABDOMEN: Soft, nontender, and nondistended, no rebound, guarding rigidity, bowel sounds in all 4 quadrants  EXTREMITIES: Without any cyanosis, clubbing, rash, lesions or edema.  SKIN: No new rashes or lesions.  MSK: strength equal BL  VASCULAR: Radial and Dorsal pedal pulses palpable BL  NEUROLOGIC: Grossly intact.  PSYCH: No new changes.    08-15 @ 07:01  -  08-16 @ 07:00  --------------------------------------------------------  IN: 860 mL / OUT: 1500 mL / NET: -640 mL                              13.3   7.49  )-----------( 359      ( 15 Aug 2020 10:23 )             40.2     08-15    142  |  108  |  2<L>  ----------------------------<  103<H>  4.6   |  28  |  0.82    Ca    9.4      15 Aug 2020 10:23  Phos  2.5     08-15  Mg     2.0     08-15    TPro  6.6  /  Alb  3.2<L>  /  TBili  0.4  /  DBili  x   /  AST  35  /  ALT  26  /  AlkPhos  36<L>  08-15         LIVER FUNCTIONS - ( 15 Aug 2020 10:23 )  Alb: 3.2 g/dL / Pro: 6.6 g/dL / ALK PHOS: 36 U/L / ALT: 26 U/L / AST: 35 U/L / GGT: x                  MEDICATIONS  (STANDING):  acetaminophen  Suppository .. 650 milliGRAM(s) Rectal once  apixaban 5 milliGRAM(s) Oral two times a day  artificial tears (preservative free) Ophthalmic Solution 1 Drop(s) Both EYES two times a day  aspirin 325 milliGRAM(s) Oral daily  atorvastatin 40 milliGRAM(s) Oral at bedtime  gabapentin 600 milliGRAM(s) Oral two times a day  hydroxychloroquine 200 milliGRAM(s) Oral <User Schedule>  lacosamide Injectable 200 milliGRAM(s) IV Push once  levETIRAcetam  IVPB 1000 milliGRAM(s) IV Intermittent once  levETIRAcetam  IVPB 1000 milliGRAM(s) IV Intermittent every 12 hours  LORazepam     Tablet 1 milliGRAM(s) Oral every 6 hours  LORazepam   Injectable 1 milliGRAM(s) IV Push once  LORazepam   Injectable 2 milliGRAM(s) IV Push once  LORazepam   Injectable 4 milliGRAM(s) IV Push once  methylPREDNISolone 4 milliGRAM(s) Oral daily  multivitamin 1 Tablet(s) Oral daily  pancrelipase  (CREON 12,000 Lipase Units) 1 Capsule(s) Oral three times a day with meals  pantoprazole    Tablet 40 milliGRAM(s) Oral before breakfast  sodium chloride 0.9%. 1000 milliLiter(s) (60 mL/Hr) IV Continuous <Continuous>  vancomycin    Solution 125 milliGRAM(s) Oral every 6 hours    MEDICATIONS  (PRN):  acetaminophen   Tablet .. 650 milliGRAM(s) Oral every 6 hours PRN Temp greater or equal to 38C (100.4F)  chlordiazePOXIDE 5 milliGRAM(s) Oral every 6 hours PRN for ciwa > 6  loperamide 2 milliGRAM(s) Oral every 6 hours PRN Diarrhea  LORazepam   Injectable 1 milliGRAM(s) IV Push every 3 hours PRN seizure  ondansetron    Tablet 4 milliGRAM(s) Oral every 8 hours PRN Nausea and/or Vomiting  oxyCODONE    IR 10 milliGRAM(s) Oral every 6 hours PRN Moderate Pain (4 - 6)      Assessment/Plan: 60 year old female with a history of HTN, DVT, lung cancer s/p resection, anxiety disorder, opiate use, alcohol abuse who presented to the hospital with right leg cellulitis and hyponatremia. Currently treated with Eliquis for DVT. Rapid response called for seizure activity  Seizure activity likely 2/2 pseudoseizure vs. substance abuse with withdrawal  - Discussed case with neurologist, Dr. Mccarty, who states do not give more Ativan, keppra, and vimpat and agrees with plan above   - STAT IV Ativan w/ total of 8mg, keppra 1g, vimpat 200mg.     - s/p intubation  - transfer to ICU unit  - Call placed to Dr. Avendaño, attending physician of record, awaiting call back Rapid Response Note  Patient is a 60y old  Female admitted for rt leg cellulitis, dizziness   Rapid response team called for active seizure    Patient was seen and examined at the bedside by the rapid response team.  Patient was seen and examined at the bedside by the rapid response team, resident medicine team and ICU. Pt actively seizing upon arrival, with patient's head turned towards L with eyes deviated to L. Patient given 8 mg ativan IVP and keppra 1g stat, and vimpat 200mg x1. Pt actively seizing throughout rapid response, with eventual conversion from partial to generalized seizure. ICU team bedside with decision to intubate.   Allergies    penicillin (Other)  penicillin (Swelling)    Intolerances    PAST MEDICAL & SURGICAL HISTORY:  Lung cancer  Opiate dependence  Alcohol abuse  Adenocarcinoma of lung  Mixed connective tissue disease  Depression H/O panic attack: with anxiety  S/P Laminectomy  Lumbar 3/10  Chronic pain  Diverticulitis of colon (without mention of hemorrhage)  History of laminectomy  History of lung surgery  History of oophorectomy, unilateral: bilateral  History of hip replacement, total, right: 6/7/2020  S/P lumbar laminectomy  S/P cholecystectomy  History of lung cancer: s/p wedge resection of RML    Rapid Response Vital Signs:  BP: 131/77  HR: 122  RR: 24  SpO2: 96  Temp: 101.5    Vital Signs Last 24 Hrs  T(C): 38.6 (16 Aug 2020 12:00), Max: 38.6 (16 Aug 2020 12:00)  T(F): 101.5 (16 Aug 2020 12:00), Max: 101.5 (16 Aug 2020 12:00)  HR: 100 (16 Aug 2020 11:28) (92 - 104)  BP: 130/83 (16 Aug 2020 11:28) (104/66 - 132/84)  BP(mean): --  RR: 24 (16 Aug 2020 11:28) (18 - 24)  SpO2: 95% (16 Aug 2020 11:28) (95% - 98%)    GENERAL: The patient is actively seizing  HEENT: Head is normocephalic and atraumatic. Extraocular muscles are intact. Mucous membranes are moist. No throat erythema/exudates no lymphadenopathy, no JVD,   NECK: Supple.  LUNGS: Clear to auscultation BL without wheezing, rales or rhonchi; respirations unlabored  HEART: Tachycardic, Regular rhythm ,+S1/+S2, no murmurs, rubs, gallops  ABDOMEN: Soft, nontender, and nondistended, no rebound, guarding rigidity, bowel sounds in all 4 quadrants  EXTREMITIES: Without any cyanosis, clubbing, rash, lesions or edema.  SKIN: No new rashes or lesions.  VASCULAR: Radial and Dorsal pedal pulses palpable BL  NEUROLOGIC: Patient unresponsive to verbal commands. Left eye deviation towards the left. Left upper and lower extremity with obvious tonic-clonic activity, unable to assess strength or sensory. Right upper and lower extremity grossly intact.  PSYCH: No new changes.    08-15 @ 07:01  -  08-16 @ 07:00  --------------------------------------------------------  IN: 860 mL / OUT: 1500 mL / NET: -640 mL                          13.3   7.49  )-----------( 359      ( 15 Aug 2020 10:23 )             40.2     08-15    142  |  108  |  2<L>  ----------------------------<  103<H>  4.6   |  28  |  0.82    Ca    9.4      15 Aug 2020 10:23  Phos  2.5     08-15  Mg     2.0     08-15    TPro  6.6  /  Alb  3.2<L>  /  TBili  0.4  /  DBili  x   /  AST  35  /  ALT  26  /  AlkPhos  36<L>  08-15         LIVER FUNCTIONS - ( 15 Aug 2020 10:23 )  Alb: 3.2 g/dL / Pro: 6.6 g/dL / ALK PHOS: 36 U/L / ALT: 26 U/L / AST: 35 U/L / GGT: x              MEDICATIONS  (STANDING):  acetaminophen  Suppository .. 650 milliGRAM(s) Rectal once  apixaban 5 milliGRAM(s) Oral two times a day  artificial tears (preservative free) Ophthalmic Solution 1 Drop(s) Both EYES two times a day  aspirin 325 milliGRAM(s) Oral daily  atorvastatin 40 milliGRAM(s) Oral at bedtime  gabapentin 600 milliGRAM(s) Oral two times a day  hydroxychloroquine 200 milliGRAM(s) Oral <User Schedule>  lacosamide Injectable 200 milliGRAM(s) IV Push once  levETIRAcetam  IVPB 1000 milliGRAM(s) IV Intermittent once  levETIRAcetam  IVPB 1000 milliGRAM(s) IV Intermittent every 12 hours  LORazepam     Tablet 1 milliGRAM(s) Oral every 6 hours  LORazepam   Injectable 1 milliGRAM(s) IV Push once  LORazepam   Injectable 2 milliGRAM(s) IV Push once  LORazepam   Injectable 4 milliGRAM(s) IV Push once  methylPREDNISolone 4 milliGRAM(s) Oral daily  multivitamin 1 Tablet(s) Oral daily  pancrelipase  (CREON 12,000 Lipase Units) 1 Capsule(s) Oral three times a day with meals  pantoprazole    Tablet 40 milliGRAM(s) Oral before breakfast  sodium chloride 0.9%. 1000 milliLiter(s) (60 mL/Hr) IV Continuous <Continuous>  vancomycin    Solution 125 milliGRAM(s) Oral every 6 hours    MEDICATIONS  (PRN):  acetaminophen   Tablet .. 650 milliGRAM(s) Oral every 6 hours PRN Temp greater or equal to 38C (100.4F)  chlordiazePOXIDE 5 milliGRAM(s) Oral every 6 hours PRN for ciwa > 6  loperamide 2 milliGRAM(s) Oral every 6 hours PRN Diarrhea  LORazepam   Injectable 1 milliGRAM(s) IV Push every 3 hours PRN seizure  ondansetron    Tablet 4 milliGRAM(s) Oral every 8 hours PRN Nausea and/or Vomiting  oxyCODONE    IR 10 milliGRAM(s) Oral every 6 hours PRN Moderate Pain (4 - 6)      Assessment/Plan: 60 year old female with a history of HTN, DVT, lung cancer s/p resection, anxiety disorder, opiate use, alcohol abuse who presented to the hospital with right leg cellulitis and hyponatremia. Currently treated with Eliquis for DVT. Rapid response called for seizure activity  Seizure activity likely 2/2 pseudoseizure vs. substance abuse with withdrawal  - Discussed case with neurologist, Dr. Mccarty who agrees with plan above  - Patient with fever 101.5 in the setting on new onset seizure, consider LP  - STAT IV Ativan w/ total of 8mg, keppra 1g, vimpat 200mg.     - Propofol   - s/p intubation  - transfer to ICU unit  - Call placed to Dr. Avendaño, attending physician of record, awaiting call back Rapid Response Note  Patient is a 60y old  Female admitted for rt leg cellulitis, dizziness   Rapid response team called for active seizure    Patient was seen and examined at the bedside by the rapid response team.  Patient was seen and examined at the bedside by the rapid response team, resident medicine team and ICU. Pt actively seizing upon arrival, with patient's head turned towards L with eyes deviated to L. Pt actively seizing throughout rapid response, with eventual conversion from partial to generalized seizure. ICU team bedside with decision to intubate.   Allergies    penicillin (Other)  penicillin (Swelling)    Intolerances    PAST MEDICAL & SURGICAL HISTORY:  Lung cancer  Opiate dependence  Alcohol abuse  Adenocarcinoma of lung  Mixed connective tissue disease  Depression H/O panic attack: with anxiety  S/P Laminectomy  Lumbar 3/10  Chronic pain  Diverticulitis of colon (without mention of hemorrhage)  History of laminectomy  History of lung surgery  History of oophorectomy, unilateral: bilateral  History of hip replacement, total, right: 6/7/2020  S/P lumbar laminectomy  S/P cholecystectomy  History of lung cancer: s/p wedge resection of RML    Rapid Response Vital Signs:  BP: 131/77  HR: 122  RR: 24  SpO2: 96  Temp: 101.5    Vital Signs Last 24 Hrs  T(C): 38.6 (16 Aug 2020 12:00), Max: 38.6 (16 Aug 2020 12:00)  T(F): 101.5 (16 Aug 2020 12:00), Max: 101.5 (16 Aug 2020 12:00)  HR: 100 (16 Aug 2020 11:28) (92 - 104)  BP: 130/83 (16 Aug 2020 11:28) (104/66 - 132/84)  BP(mean): --  RR: 24 (16 Aug 2020 11:28) (18 - 24)  SpO2: 95% (16 Aug 2020 11:28) (95% - 98%)    GENERAL: The patient is actively seizing  HEENT: Head is normocephalic and atraumatic. Extraocular muscles are intact. Mucous membranes are moist. No throat erythema/exudates no lymphadenopathy, no JVD,   NECK: Supple.  LUNGS: Clear to auscultation BL without wheezing, rales or rhonchi; respirations unlabored  HEART: Tachycardic, Regular rhythm ,+S1/+S2, no murmurs, rubs, gallops  ABDOMEN: Soft, nontender, and nondistended, no rebound, guarding rigidity, bowel sounds in all 4 quadrants  EXTREMITIES: Without any cyanosis, clubbing, rash, lesions or edema.  SKIN: No new rashes or lesions.  VASCULAR: Radial and Dorsal pedal pulses palpable BL  NEUROLOGIC: Patient unresponsive to verbal commands. Left eye deviation towards the left. Left upper and lower extremity with obvious tonic-clonic activity, unable to assess strength or sensory. Right upper and lower extremity grossly intact.  PSYCH: No new changes.    08-15 @ 07:01  -  08-16 @ 07:00  --------------------------------------------------------  IN: 860 mL / OUT: 1500 mL / NET: -640 mL                          13.3   7.49  )-----------( 359      ( 15 Aug 2020 10:23 )             40.2     08-15    142  |  108  |  2<L>  ----------------------------<  103<H>  4.6   |  28  |  0.82    Ca    9.4      15 Aug 2020 10:23  Phos  2.5     08-15  Mg     2.0     08-15    TPro  6.6  /  Alb  3.2<L>  /  TBili  0.4  /  DBili  x   /  AST  35  /  ALT  26  /  AlkPhos  36<L>  08-15         LIVER FUNCTIONS - ( 15 Aug 2020 10:23 )  Alb: 3.2 g/dL / Pro: 6.6 g/dL / ALK PHOS: 36 U/L / ALT: 26 U/L / AST: 35 U/L / GGT: x              MEDICATIONS  (STANDING):  acetaminophen  Suppository .. 650 milliGRAM(s) Rectal once  apixaban 5 milliGRAM(s) Oral two times a day  artificial tears (preservative free) Ophthalmic Solution 1 Drop(s) Both EYES two times a day  aspirin 325 milliGRAM(s) Oral daily  atorvastatin 40 milliGRAM(s) Oral at bedtime  gabapentin 600 milliGRAM(s) Oral two times a day  hydroxychloroquine 200 milliGRAM(s) Oral <User Schedule>  lacosamide Injectable 200 milliGRAM(s) IV Push once  levETIRAcetam  IVPB 1000 milliGRAM(s) IV Intermittent once  levETIRAcetam  IVPB 1000 milliGRAM(s) IV Intermittent every 12 hours  LORazepam     Tablet 1 milliGRAM(s) Oral every 6 hours  LORazepam   Injectable 1 milliGRAM(s) IV Push once  LORazepam   Injectable 2 milliGRAM(s) IV Push once  LORazepam   Injectable 4 milliGRAM(s) IV Push once  methylPREDNISolone 4 milliGRAM(s) Oral daily  multivitamin 1 Tablet(s) Oral daily  pancrelipase  (CREON 12,000 Lipase Units) 1 Capsule(s) Oral three times a day with meals  pantoprazole    Tablet 40 milliGRAM(s) Oral before breakfast  sodium chloride 0.9%. 1000 milliLiter(s) (60 mL/Hr) IV Continuous <Continuous>  vancomycin    Solution 125 milliGRAM(s) Oral every 6 hours    MEDICATIONS  (PRN):  acetaminophen   Tablet .. 650 milliGRAM(s) Oral every 6 hours PRN Temp greater or equal to 38C (100.4F)  chlordiazePOXIDE 5 milliGRAM(s) Oral every 6 hours PRN for ciwa > 6  loperamide 2 milliGRAM(s) Oral every 6 hours PRN Diarrhea  LORazepam   Injectable 1 milliGRAM(s) IV Push every 3 hours PRN seizure  ondansetron    Tablet 4 milliGRAM(s) Oral every 8 hours PRN Nausea and/or Vomiting  oxyCODONE    IR 10 milliGRAM(s) Oral every 6 hours PRN Moderate Pain (4 - 6)      Assessment/Plan: 60 year old female with a history of HTN, DVT, lung cancer s/p resection, anxiety disorder, opiate use, alcohol abuse who presented to the hospital with right leg cellulitis and hyponatremia. Currently being treated with Eliquis for DVT. Rapid response called for seizure activity    Seizure activity likely 2/2 pseudoseizure vs. substance abuse with withdrawal  - Patient given 8 mg ativan IVP and keppra 1g stat, and vimpat 200mg x1 and subsequently intubated for airway protection  - Discussed case with neurologist, Dr. Mccarty who agrees with plan above  - Patient with fever 101.5 in the setting on new onset seizure -- consider LP. Given 650mg tylenol suppository  - STAT IV Ativan w/ total of 8mg, keppra 1g, vimpat 200mg BID     - Propofol gtt for sedation  - transfer to ICU unit  - Call placed to Dr. Avendaño, attending physician of record, awaiting call back    Dr. Alexander, PGY-3

## 2020-08-16 NOTE — PROGRESS NOTE ADULT - REASON FOR ADMISSION
rt leg cellulitis, dizziness

## 2020-08-16 NOTE — PROGRESS NOTE ADULT - PROBLEM SELECTOR PROBLEM 4
Mixed connective tissue disease

## 2020-08-16 NOTE — PROGRESS NOTE ADULT - PROBLEM SELECTOR PLAN 5
continue pain meds as at home

## 2020-08-16 NOTE — CONSULT NOTE ADULT - ASSESSMENT
61 y/o F with a h/o EtOH and opiate abuse, chronic pain, alcoholic pancreatitis, mixed connective tissue disorder, left soleal vein thrombosis (on apixaban), lung adenocarcinoma (s/p RML resection 2018), recent LE cellulitis, recent CDiff (on PO vancomycin to finish 8/17), with status epilepticus, acute hypoxemic respiratory failure, resolved hyponatremia.    Transfer to ICU for further management. Case discussed in detail with ICU physician, Dr. Fishman.    - etiology of seizure activity not entirely clear, possible benzodiazepine withdrawal? Last EtOH drink was reportedly 3 weeks ago so she is out of that window. Serum Na had been corrected at an appropriate rate earlier in hospital course. CT head neg for acute pathology x2 (8/13 and 8/15). New onset low grade fever today although WBC normal and no evidence of meningismus.  - reloaded with 1000mg Keppra and started [id number]  - add lacosamide 200mg [id number]  - actively increasing propofol dose rate to suppress seizure activity  - add midazolam infusion  - will look to transfer to tertiary care facility for continuous EEG monitoring, will likely require burst suppression  - will need MRI  - emergently intubated for airway protection, no obvious signs of aspiration, CXR clear, titrating ventilator settings to maintain SpO2 > 92%  - PO vancomycin to finish tomorrow 59 y/o F with a h/o EtOH and opiate abuse, chronic pain, alcoholic pancreatitis, mixed connective tissue disorder, left soleal vein thrombosis (on apixaban), lung adenocarcinoma (s/p RML resection 2018), recent LE cellulitis, recent CDiff (on PO vancomycin to finish 8/17), with status epilepticus, acute hypoxemic respiratory failure, resolved hyponatremia.    Transfer to ICU for further management. Case discussed in detail with ICU physician, Dr. Fishman.    - etiology of seizure activity not entirely clear, possible benzodiazepine withdrawal? Last EtOH drink was reportedly 3 weeks ago so she is out of that window. Serum Na had been corrected at an appropriate rate earlier in hospital course. CT head neg for acute pathology x2 (8/13 and 8/15). New onset low grade fever today although WBC normal and no evidence of meningismus.  - reloaded with 1000mg Keppra and started [id number]  - add lacosamide 200mg [id number]  - actively increasing propofol dose rate to suppress seizure activity  - add midazolam infusion  - will look to transfer to tertiary care facility for continuous EEG monitoring, will likely require burst suppression  - will need MRI  - emergently intubated for airway protection, no obvious signs of aspiration, CXR clear, titrating ventilator settings to maintain SpO2 > 92%  - PO vancomycin to finish tomorrow    Discussed the new events with patient's HCP, Anita, who has been following the case day by day with the medicine team. She understands all aspects of the case and the management plan moving forward. 61 y/o F with a h/o EtOH and opiate abuse, chronic pain, alcoholic pancreatitis, mixed connective tissue disorder, left soleal vein thrombosis (on apixaban), lung adenocarcinoma (s/p RML resection 2018), recent LE cellulitis, recent CDiff (on PO vancomycin to finish 8/17), with status epilepticus, acute hypoxemic respiratory failure, resolved hyponatremia.    Transfer to ICU for further management. Case discussed in detail with ICU physician, Dr. Fishman.    - etiology of seizure activity not entirely clear, possible benzodiazepine withdrawal? Last EtOH drink was reportedly 3 weeks ago so she is out of that window. Serum Na had been corrected at an appropriate rate earlier in hospital course. CT head neg for acute pathology x2 (8/13 and 8/15). New onset low grade fever today although WBC normal and no evidence of meningismus.  - reloaded with 1000mg Keppra and started [id number]  - add lacosamide 200mg [id number]  - actively increasing propofol dose rate to suppress seizure activity  - add midazolam infusion  - will look to transfer to tertiary care facility for continuous EEG monitoring, will likely require burst suppression  - will need MRI  - hold apixaban for potential LP  - emergently intubated for airway protection, no obvious signs of aspiration, CXR clear, titrating ventilator settings to maintain SpO2 > 92%  - PO vancomycin to finish tomorrow    Discussed the new events with patient's HCP, Anita, who has been following the case day by day with the medicine team. She understands all aspects of the case and the management plan moving forward. 59 y/o F with a h/o EtOH and opiate abuse, chronic pain, alcoholic pancreatitis, mixed connective tissue disorder, left soleal vein thrombosis (on apixaban), lung adenocarcinoma (s/p RML resection 2018), recent LE cellulitis, recent CDiff (on PO vancomycin to finish 8/17), with status epilepticus, acute hypoxemic respiratory failure, resolved hyponatremia.    Transfer to ICU for further management. Case discussed in detail with ICU physician, Dr. Fishman.    - etiology of seizure activity not entirely clear, possible benzodiazepine withdrawal? Last EtOH drink was reportedly 3 weeks ago so she is out of that window. Serum Na had been corrected at an appropriate rate earlier in hospital course. CT head neg for acute pathology x2 (8/13 and 8/15). New onset low grade fever today although WBC normal and no evidence of meningismus.  - reloaded with 1000mg Keppra and started [id number]  - add lacosamide 200mg [id number]  - actively increasing propofol dose rate to suppress seizure activity  - add midazolam infusion  - will look to transfer to tertiary care facility for continuous EEG monitoring, will likely require burst suppression  - will need MRI  - hold apixaban for potential LP, start heparin infusion  - blood and urine cultures are pending, send sputum culture and procalcitonin  - PO vancomycin to finish tomorrow  - emergently intubated for airway protection, no obvious signs of aspiration, CXR clear, titrating ventilator settings to maintain SpO2 > 92%      Discussed the new events with patient's HCP, Anita, who has been following the case day by day with the medicine team. She understands all aspects of the case and the management plan moving forward. 59 y/o F with a h/o EtOH and opiate abuse, chronic pain, alcoholic pancreatitis, mixed connective tissue disorder, left soleal vein thrombosis (on apixaban), lung adenocarcinoma (s/p RML resection 2018), recent LE cellulitis, recent CDiff (on PO vancomycin to finish 8/17), with status epilepticus, acute hypoxemic respiratory failure, shock, resolved hyponatremia.    Transfer to ICU for further management. Case discussed in detail with ICU physician, Dr. Fishman.    - etiology of seizure activity not entirely clear, possible benzodiazepine withdrawal? Last EtOH drink was reportedly 3 weeks ago so she is out of that window. Serum Na had been corrected at an appropriate rate earlier in hospital course. CT head neg for acute pathology x2 (8/13 and 8/15). New onset low grade fever today although WBC normal and no evidence of meningismus.  - reloaded with 1000mg Keppra and started [id number]  - add lacosamide 200mg [id number]  - actively increasing propofol dose rate to suppress seizure activity  - add midazolam infusion  - will look to transfer to tertiary care facility for continuous EEG monitoring, will likely require burst suppression  - will need MRI  - hold apixaban for potential LP, start heparin infusion  - blood and urine cultures are pending, send sputum culture and procalcitonin  - PO vancomycin to finish tomorrow  - emergently intubated for airway protection, no obvious signs of aspiration, CXR clear, titrating ventilator settings to maintain SpO2 > 92%  - suspect shock state is distributive in nature secondary to escalating propofol requirement + newly introduced intrathoracic positive pressure from mechanical ventilation, 1L LR bolus, start norepinephrine infusion and titrate to maintain a MAP > 65, add stress dose hydrocortisone given her h/o chronic steroid use      Discussed the new events with patient's HCP, Anita, who has been following the case day by day with the medicine team. She understands all aspects of the case and the management plan moving forward.

## 2020-08-16 NOTE — PATIENT PROFILE ADULT - FALL HARM RISK
bones(Osteoporosis,prev fx,steroid use,metastatic bone ca)/other coagulation(Bleeding disorder R/T clinical cond/anti-coags)/bones(Osteoporosis,prev fx,steroid use,metastatic bone ca)/other

## 2020-08-16 NOTE — PROGRESS NOTE ADULT - ASSESSMENT
60 yr old with PMH of ch pain with Opiate and ETOH dependency and abuse, alcoholic pancreatitis, Mixed connective tissue disorder, left soleal vein thrombosis, adeno ca lung- s/p RML resection 2018, ex smoker,rec leg cellulitis, was admitted in [facility] with alcoholic pancreatitis and HTN and discharged on 7/31, ?CAD,after one day at home pt went to [facility] for cellulitis treated with doxycycline and is improving, and 2 days back went to [facility] and lian told to drink lot of water and now says feeling weak and ataxic, In ED syo Na 120 , and received IV fluids, refused vancomycin as improving and is admitted to [facility] for Hyponatremia, likely  excessive water intake.pt took last drink 2 weeks back and thinks she is withdrawing.  admitted for further care  hyponatremia  ETOH and opiate abuse and dependency  rt leg cellulitis improving on doxycycline taken out pt  ch pain syndrome  MCTD on steroids  soleal V thrombosis   s/p RML resection for lung adenoca  peripheral neuropathy  electrolytes improving   c diff- pt on vancomycin completing course- no diarrhea,  had seizures overnight 8/13- received ATIVan, post ictal hypotensive, received IV fluids and had retention of urine and is on araya,   Seizures suspect secondary  to substance use with withdrawal patient on a lot of substances on outside and unknown quantity of xanax    EEG showes seizure activity and started on Keppra by Neuro  Memory issues, suspect this could be mild cognitive impairment versus possibly secondary to a history of substance use ataxia, suspect multifactorial, possibly secondary to spinal disc disease, possibly alcohol-induced neuropathy, deconditioning.  Hypotension 8/13- clonidine held and received IV fluids  Pt is removing IV lines, flat affect, refusing to answer questions,    Psych consult called  Addiction specialist on board  patient is having seizure likely pseudo seizures while awake, neuro and psych following, ICU consult noted.   peudoseizures/ malingering with drug seeking behaviour.       d/w HCP Anita [phone]  all questions answered and discussed prognosis and management.

## 2020-08-16 NOTE — H&P ADULT - HISTORY OF PRESENT ILLNESS
61 y/o F with a h/o EtOH and opiate abuse s/p recent admissions for alcoholic pancreatitis and alcohol withdrawal, mixed connective tissue disorder (on plaquenil and methylprednisolone), chronic pain s/p lumbar laminectomy, left soleal vein thrombosis (started on apixaban, 7/2020), lung adenocarcinoma (s/p RML resection 2018), recent RLE cellulitis s/p doxycycline, recent CDiff colitis (on PO vancomycin, started 8/3, to finish 8/17), CAD s/p possible MI (ECHO ) admitted on 8/11 with symptomatic 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 She was treated on the medicine service with an appropriate rate of correction in serum Na. Noted to have focal seizure activity of LUE on 8/13 which broke with IV lorazepam. Seizure activity believed to be secondary to benzodiazepine withdrawal. EEG at one point showed epileptiform activity and she was started on Keppra although continued to have periodic seizure activity. CT head unremarkable for acute pathology.     On 8/16, RRT called because patient found to be unresponsive with generalized seizure activity with left UE/LE/facial twitching. Seizure activity would break transiently with IV lorazepam but recurred repetitively. After 8mg lorazepam she began to lose airway protection abilities and developed hypoxemia (SpO2 70s). She was emergent intubated and given keppra load 1000mg, vimpat load 200 mg and propofol 10 mcg/kg/min infusion, which successfully suppressed her seizures. Found to have new fever of 101.6 F and hypotensive post-intubation. Started on levophed drip.      Transferred to [facility] for EEG study. 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 She was treated on the medicine service with an appropriate rate of correction in serum Na. Noted to have focal seizure activity of LUE on 8/13 which broke with IV lorazepam. Seizure activity believed to be secondary to benzodiazepine withdrawal. EEG at one point showed epileptiform activity and she was started on Keppra although continued to have periodic seizure activity. CT head unremarkable for acute pathology.     On 8/16, RRT called because patient found to be unresponsive with generalized seizure activity with left UE/LE/facial twitching. Seizure activity would break transiently with IV lorazepam but recurred repetitively. After 8mg lorazepam she began to lose airway protection abilities and developed hypoxemia (SpO2 70s). She was emergent intubated and given keppra load 1000mg, vimpat load 200 mg and propofol 10 mcg/kg/min infusion, which successfully suppressed her seizures. Found to have new fever of 101.6 F and hypotensive post-intubation. Started on levophed drip.      Transferred to [facility] for EEG study.     Collateral:   Anita River (Healthcare Proxy): Ms. River lives in [place], and says that over the past several months Ms. Mendoza has had several hospitalizations, but has had difficulty getting a full history from the patient who she says is routinely an unreliable historian. Ms. River believes that Ms. Mendoza has had a previous MI several years ago, and has a history of lung cancer for which she had a lobectomy for in 2019, but is again unsure of the exact medical history. She does not believe Ms. Mendoza has a diagnosed psychiatric history, but knows she has had multiple years of drug and alcohol abuse.

## 2020-08-16 NOTE — PROGRESS NOTE ADULT - SUBJECTIVE AND OBJECTIVE BOX
JULIO CESAR FAYE is a 60yFemale , patient examined and chart reviewed.    INTERVAL HPI/ OVERNIGHT EVENTS:   Events noted. Had grand mal seizure and was intubated. Febrile to 101.5     PAST MEDICAL & SURGICAL HISTORY:  Lung cancer  Opiate dependence  Alcohol abuse  Adenocarcinoma of lung  Mixed connective tissue disease  Depression H/O panic attack: with anxiety  S/P Laminectomy  Lumbar 3/10  Chronic pain  Diverticulitis of colon (without mention of hemorrhage)  History of laminectomy  History of lung surgery  History of oophorectomy, unilateral: bilateral  History of hip replacement, total, right: 6/7/2020  S/P lumbar laminectomy  S/P cholecystectomy  History of lung cancer: s/p wedge resection of RML      For details regarding the patient's social history, family history, and other miscellaneous elements, please refer the initial infectious diseases consultation and/or the admitting history and physical examination for this admission.      ROS: UTO sec pt's condition    ALLERGIES:  penicillin (Swelling)      Current inpatient medications :    ANTIBIOTICS/RELEVANT:  vancomycin    Solution 125 milliGRAM(s) Oral every 6 hours    MEDICATIONS  (STANDING):  acetaminophen  Suppository .. 650 milliGRAM(s) Rectal once  artificial tears (preservative free) Ophthalmic Solution 1 Drop(s) Both EYES two times a day  atorvastatin 40 milliGRAM(s) Oral at bedtime  chlorhexidine 0.12% Liquid 15 milliLiter(s) Oral Mucosa every 12 hours  chlorhexidine 2% Cloths 1 Application(s) Topical <User Schedule>  gabapentin 600 milliGRAM(s) Oral two times a day  heparin  Infusion.  Unit(s)/Hr (9 mL/Hr) IV Continuous <Continuous>  hydrocortisone sodium succinate Injectable 50 milliGRAM(s) IV Push every 6 hours  hydroxychloroquine 200 milliGRAM(s) Oral <User Schedule>  lacosamide IVPB 200 milliGRAM(s) IV Intermittent every 12 hours  levETIRAcetam  IVPB 1000 milliGRAM(s) IV Intermittent every 12 hours  midazolam Infusion. 0.05 mG/kG/Hr (2.39 mL/Hr) IV Continuous <Continuous>  multivitamin 1 Tablet(s) Oral daily  norepinephrine Infusion 0.05 MICROgram(s)/kG/Min (4.47 mL/Hr) IV Continuous <Continuous>  pancrelipase  (CREON 12,000 Lipase Units) 1 Capsule(s) Oral three times a day with meals  pantoprazole  Injectable 40 milliGRAM(s) IV Push daily  potassium chloride   Powder 40 milliEquivalent(s) Oral every 4 hours  propofol Infusion 10 MICROgram(s)/kG/Min (2.86 mL/Hr) IV Continuous <Continuous>  sodium chloride 0.9%. 1000 milliLiter(s) (60 mL/Hr) IV Continuous <Continuous>    MEDICATIONS  (PRN):  acetaminophen    Suspension .. 650 milliGRAM(s) Oral every 6 hours PRN Temp greater or equal to 38C (100.4F)  heparin   Injectable 4000 Unit(s) IV Push every 6 hours PRN For aPTT less than 40  heparin   Injectable 2000 Unit(s) IV Push every 6 hours PRN For aPTT between 40 - 57  loperamide 2 milliGRAM(s) Oral every 6 hours PRN Diarrhea      Objective:  ICU Vital Signs Last 24 Hrs  T(C): 36.7 (16 Aug 2020 19:32), Max: 38.7 (16 Aug 2020 14:00)  T(F): 98 (16 Aug 2020 19:32), Max: 101.6 (16 Aug 2020 14:00)  HR: 75 (16 Aug 2020 18:30) (75 - 104)  BP: 96/53 (16 Aug 2020 18:30) (79/51 - 167/84)  BP(mean): 67 (16 Aug 2020 18:30) (60 - 113)  RR: 28 (16 Aug 2020 18:30) (16 - 40)  SpO2: 100% (16 Aug 2020 18:30) (95% - 100%)      Physical Exam:  General: Vented sedated  Eyes: sclera anicteric, pupils equal and reactive to light  ENMT: buccal mucosa moist, pharynx not injected  Neck: supple, trachea midline  Lungs: clear, no wheeze/rhonchi  Cardiovascular: regular rate and rhythm, S1 S2  Abdomen: soft, nontender, no organomegaly present, bowel sounds normal  Neurological: Sedated  Skin: no increased ecchymosis/petechiae/purpura  Lymph Nodes: no palpable cervical/supraclavicular lymph nodes enlargements  Extremities: Right LE erythema resolved    LABS:                        11.8   8.42  )-----------( 359      ( 16 Aug 2020 16:50 )             35.3   08-16    141  |  110<H>  |  4<L>  ----------------------------<  96  3.1<L>   |  21<L>  |  0.47<L>    Ca    7.7<L>      16 Aug 2020 16:50  Phos  4.1     08-16  Mg     1.7     08-16    TPro  5.7<L>  /  Alb  2.7<L>  /  TBili  0.4  /  DBili  x   /  AST  36  /  ALT  22  /  AlkPhos  29<L>  08-16      MICROBIOLOGY:    Culture - Urine (collected 11 Aug 2020 15:23)  Source: .Urine Clean Catch (Midstream)  Final Report (12 Aug 2020 14:52):    <10,000 CFU/mL Normal Urogenital Rubia    Culture - Blood (collected 11 Aug 2020 15:21)  Source: .Blood Blood-Peripheral  Preliminary Report (12 Aug 2020 16:01):    No growth to date.    Culture - Blood (collected 11 Aug 2020 15:21)  Source: .Blood Blood-Peripheral  Preliminary Report (12 Aug 2020 16:01):    No growth to date    RADIOLOGY & ADDITIONAL STUDIES:  EXAM:  XR CHEST PORTABLE IMMED 1V                            PROCEDURE DATE:  07/27/2020          INTERPRETATION:  History: Epigastric pain    Portable radiograph of the chest is performed. comparison is made to 7/30/2019.    The heart is not enlarged. The trachea is midline. The lungs are clear. Osseous structures are unremarkable.    Impression: No active pulmonary disease.        EXAM:  XR CHEST PORTABLE URGENT 1V                            PROCEDURE DATE:  08/16/2020          INTERPRETATION:  XR CHEST URGENT    Single AP view    HISTORY:  ETT placement. Status epilepticus.    Comparison:  Chest x-ray 7/27/2020    Intubatedwith the tip of the endotracheal tube proximal 1.5 cm above the nimesh. Enteric tube with tip in the stomach.    Right lung apex omitted from the field-of-view, otherwise imaged lungs are clear.    Heart normal in size.    IMPRESSION:    Intubated with the tip the endotracheal tube approximately 1.5 cm above the nimesh and should be pulled back.    Enteric tube the tip in the stomach.    Assessment and plan.  1. Ne fever likely sec seizure  pancultured   Trend temps and cbc    2. Right LE cellulitis- Improved  Completed course of Doxycycline  Elevate leg    3 Cdiff ( on therapy from outpt )  Diarrhea appears to have resolved  Finish course of po Vancomycin as scheduled outpt till 8/17/2020    4. Grand mal seizure now intubated for air way protection  Vent per ICU    5 AUR with Zurita per primary team    D/w Dr Fishman and ICU team    Continue with present regiment.  Appropriate use of antibiotics and adverse effects reviewed.      I have discussed the above plan of care with patient/ family in detail. They expressed understanding of the  treatment plan . Risks, benefits and alternatives discussed in detail. I have asked if they have any questions or concerns and appropriately addressed them to the best of my ability .    Critical care > 35 minutes were spent in direct patient care reviewing notes, medications ,labs data/ imaging , discussion with multidisciplinary team.    Thank you for allowing me to participate in care of your patient .    Demetria Hancock MD  Infectious Disease  [phone]

## 2020-08-16 NOTE — PROGRESS NOTE ADULT - PROBLEM SELECTOR PROBLEM 5
Chronic pain syndrome

## 2020-08-16 NOTE — PROGRESS NOTE ADULT - PROBLEM SELECTOR PLAN 6
on vancomycin  complete course

## 2020-08-16 NOTE — CONSULT NOTE ADULT - SUBJECTIVE AND OBJECTIVE BOX
Patient is a 60y old  Female who presents with a chief complaint of rt leg cellulitis, dizziness (16 Aug 2020 10:45)      BRIEF HOSPITAL COURSE: ***    Events last 24 hours: ***      PAST MEDICAL & SURGICAL HISTORY:  Lung cancer  Opiate dependence  Alcohol abuse  Adenocarcinoma of lung  Mixed connective tissue disease  Depression H/O panic attack: with anxiety  S/P Laminectomy  Lumbar 3/10  Chronic pain  Diverticulitis of colon (without mention of hemorrhage)  History of laminectomy  History of lung surgery  History of oophorectomy, unilateral: bilateral  History of hip replacement, total, right: 6/7/2020  S/P lumbar laminectomy  S/P cholecystectomy  History of lung cancer: s/p wedge resection of RML    Allergies    penicillin (Other)  penicillin (Swelling)    Intolerances      FAMILY HISTORY:  FHx: rheumatoid arthritis: in mother - with RA associated lung fibrosis  Family history of leukemia: in father      Review of Systems:  CONSTITUTIONAL: No fever, chills, or fatigue  EYES: No eye pain, visual disturbances, or discharge  ENMT:  No difficulty hearing, tinnitus, vertigo; No sinus or throat pain  NECK: No pain or stiffness  RESPIRATORY: No cough, wheezing, chills or hemoptysis; No shortness of breath  CARDIOVASCULAR: No chest pain, palpitations, dizziness, or leg swelling  GASTROINTESTINAL: No abdominal or epigastric pain. No nausea, vomiting, or hematemesis; No diarrhea or constipation. No melena or hematochezia.  GENITOURINARY: No dysuria, frequency, hematuria, or incontinence  NEUROLOGICAL: No headaches, memory loss, loss of strength, numbness, or tremors  SKIN: No itching, burning, rashes, or lesions   MUSCULOSKELETAL: No joint pain or swelling; No muscle, back, or extremity pain  PSYCHIATRIC: No depression, anxiety, mood swings, or difficulty sleeping      Social History: ***      Medications:  hydroxychloroquine 200 milliGRAM(s) Oral <User Schedule>  vancomycin    Solution 125 milliGRAM(s) Oral every 6 hours    norepinephrine Infusion 0.05 MICROgram(s)/kG/Min IV Continuous <Continuous>      acetaminophen    Suspension .. 650 milliGRAM(s) Oral every 6 hours PRN  acetaminophen  Suppository .. 650 milliGRAM(s) Rectal once  aspirin 325 milliGRAM(s) Oral daily  gabapentin 600 milliGRAM(s) Oral two times a day  lacosamide IVPB 200 milliGRAM(s) IV Intermittent every 12 hours  levETIRAcetam  IVPB 1000 milliGRAM(s) IV Intermittent every 12 hours  propofol Infusion 10 MICROgram(s)/kG/Min IV Continuous <Continuous>      apixaban 5 milliGRAM(s) Oral two times a day    loperamide 2 milliGRAM(s) Oral every 6 hours PRN  pancrelipase  (CREON 12,000 Lipase Units) 1 Capsule(s) Oral three times a day with meals  pantoprazole  Injectable 40 milliGRAM(s) IV Push daily      atorvastatin 40 milliGRAM(s) Oral at bedtime  methylPREDNISolone 4 milliGRAM(s) Oral daily    multivitamin 1 Tablet(s) Oral daily  sodium chloride 0.9%. 1000 milliLiter(s) IV Continuous <Continuous>      artificial tears (preservative free) Ophthalmic Solution 1 Drop(s) Both EYES two times a day  chlorhexidine 0.12% Liquid 15 milliLiter(s) Oral Mucosa every 12 hours  chlorhexidine 2% Cloths 1 Application(s) Topical <User Schedule>        Mode: AC/ CMV (Assist Control/ Continuous Mandatory Ventilation)  RR (machine): 16  TV (machine): 400  FiO2: 30  PEEP: 5  MAP: 9  PIP: 19      ICU Vital Signs Last 24 Hrs  T(C): 38.7 (16 Aug 2020 14:00), Max: 38.7 (16 Aug 2020 14:00)  T(F): 101.6 (16 Aug 2020 14:00), Max: 101.6 (16 Aug 2020 14:00)  HR: 86 (16 Aug 2020 14:50) (77 - 104)  BP: 112/74 (16 Aug 2020 14:40) (79/51 - 167/84)  BP(mean): 88 (16 Aug 2020 14:40) (60 - 102)  ABP: --  ABP(mean): --  RR: 16 (16 Aug 2020 14:40) (16 - 24)  SpO2: 99% (16 Aug 2020 14:50) (95% - 100%)    Vital Signs Last 24 Hrs  T(C): 38.7 (16 Aug 2020 14:00), Max: 38.7 (16 Aug 2020 14:00)  T(F): 101.6 (16 Aug 2020 14:00), Max: 101.6 (16 Aug 2020 14:00)  HR: 86 (16 Aug 2020 14:50) (77 - 104)  BP: 112/74 (16 Aug 2020 14:40) (79/51 - 167/84)  BP(mean): 88 (16 Aug 2020 14:40) (60 - 102)  RR: 16 (16 Aug 2020 14:40) (16 - 24)  SpO2: 99% (16 Aug 2020 14:50) (95% - 100%)    ABG - ( 16 Aug 2020 14:32 )  pH, Arterial: 7.43  pH, Blood: x     /  pCO2: 31    /  pO2: 136   / HCO3: 22    / Base Excess: -3.1  /  SaO2: 99                  I&O's Detail    15 Aug 2020 07:01  -  16 Aug 2020 07:00  --------------------------------------------------------  IN:    sodium chloride 0.9%.: 720 mL    Solution: 200 mL  Total IN: 920 mL    OUT:    Indwelling Catheter - Urethral: 1500 mL  Total OUT: 1500 mL    Total NET: -580 mL      16 Aug 2020 07:01  -  16 Aug 2020 15:02  --------------------------------------------------------  IN:    sodium chloride 0.9%.: 300 mL  Total IN: 300 mL    OUT:  Total OUT: 0 mL    Total NET: 300 mL            LABS:                        13.3   7.49  )-----------( 359      ( 15 Aug 2020 10:23 )             40.2     08-15    142  |  108  |  2<L>  ----------------------------<  103<H>  4.6   |  28  |  0.82    Ca    9.4      15 Aug 2020 10:23  Phos  2.5     08-15  Mg     2.0     08-15    TPro  6.6  /  Alb  3.2<L>  /  TBili  0.4  /  DBili  x   /  AST  35  /  ALT  26  /  AlkPhos  36<L>  08-15      CARDIAC MARKERS ( 16 Aug 2020 07:59 )  .624 ng/mL / x     / 129 U/L / x     / 1.6 ng/mL  CARDIAC MARKERS ( 15 Aug 2020 17:26 )  .674 ng/mL / x     / 98 U/L / x     / 2.0 ng/mL  CARDIAC MARKERS ( 15 Aug 2020 10:23 )  .808 ng/mL / x     / 92 U/L / x     / 3.0 ng/mL      CAPILLARY BLOOD GLUCOSE      POCT Blood Glucose.: 103 mg/dL (16 Aug 2020 12:45)        CULTURES:  Culture Results:   <10,000 CFU/mL Normal Urogenital Rubia (08-11 @ 15:23)  Culture Results:   No growth to date. (08-11 @ 15:21)  Culture Results:   No growth to date. (08-11 @ 15:21)        Physical Examination:    General: No acute distress.  Alert, oriented, interactive, nonfocal    HEENT: Pupils equal, reactive to light.  Symmetric.    PULM: Clear to auscultation bilaterally, no significant sputum production    CVS: Regular rate and rhythm, no murmurs, rubs, or gallops    ABD: Soft, nondistended, nontender, normoactive bowel sounds, no masses    EXT: No edema, nontender    SKIN: Warm and well perfused, no rashes noted.    NEURO: A&Ox3, strength 5/5 all extremities, cranial nerves grossly intact, no focal deficits      RADIOLOGY: ***        CRITICAL CARE TIME SPENT: ***  Time spent evaluating/treating patient with medical issues that pose a high risk for life threatening deterioration and/or end-organ damage, reviewing data/labs/imaging, discussing case with multidisciplinary team, discussing plan/goals of care with patient/family. Non-inclusive of procedure time. Patient is a 60y old  Female who presents with a chief complaint of rt leg cellulitis, dizziness (16 Aug 2020 10:45)      BRIEF HOSPITAL COURSE: 59 y/o F with a h/o EtOH and opiate abuse, chronic pain, alcoholic pancreatitis, mixed connective tissue disorder, left soleal vein thrombosis (on apixaban), lung adenocarcinoma (s/p RML resection 2018), recent LE cellulitis, recent CDiff (on PO vancomycin to finish 8/17) admitted on 8/11 with symptomatic hyponatremia secondary to polydipsia. She was treated on the medicine service with an appropriate rate of correction in serum Na. Noted to have focal seizure activity of LUE on 8/13 which broke with IV lorazepam. Seizure activity believed to be secondary to benzodiazepine withdrawal. EEG at one point showed epileptiform activity and she was started on Keppra although continued to have periodic seizure activity. CT head unremarkable for acute pathology.    Events last 24 hours: Responded to RRT today to find patient with generalized seizure activity- LUE/LLE and left facial twitching, unresponsive. Seizure activity would break transiently with IV lorazepam but recurred repetitively. After 8mg lorazepam she began to lose airway protection abilities and developed hypoxemia (SpO2 70s). She was emergent intubated and started on a propofol infusion with good suppression of seizures. New onset fevers today (TMax 101.6'F).      PAST MEDICAL & SURGICAL HISTORY:  Lung cancer  Opiate dependence  Alcohol abuse  Adenocarcinoma of lung  Mixed connective tissue disease  Depression H/O panic attack: with anxiety  S/P Laminectomy  Lumbar 3/10  Chronic pain  Diverticulitis of colon (without mention of hemorrhage)  History of laminectomy  History of lung surgery  History of oophorectomy, unilateral: bilateral  History of hip replacement, total, right: 6/7/2020  S/P lumbar laminectomy  S/P cholecystectomy  History of lung cancer: s/p wedge resection of RML    Allergies    penicillin (Other)  penicillin (Swelling)    Intolerances      FAMILY HISTORY:  FHx: rheumatoid arthritis: in mother - with RA associated lung fibrosis  Family history of leukemia: in father      Review of Systems:  Unable to obtain secondary to AMS/sedation/intubation        Medications:  hydroxychloroquine 200 milliGRAM(s) Oral <User Schedule>  vancomycin    Solution 125 milliGRAM(s) Oral every 6 hours  norepinephrine Infusion 0.05 MICROgram(s)/kG/Min IV Continuous <Continuous>  acetaminophen    Suspension .. 650 milliGRAM(s) Oral every 6 hours PRN  acetaminophen  Suppository .. 650 milliGRAM(s) Rectal once  aspirin 325 milliGRAM(s) Oral daily  gabapentin 600 milliGRAM(s) Oral two times a day  lacosamide IVPB 200 milliGRAM(s) IV Intermittent every 12 hours  levETIRAcetam  IVPB 1000 milliGRAM(s) IV Intermittent every 12 hours  propofol Infusion 10 MICROgram(s)/kG/Min IV Continuous <Continuous>  apixaban 5 milliGRAM(s) Oral two times a day  loperamide 2 milliGRAM(s) Oral every 6 hours PRN  pancrelipase  (CREON 12,000 Lipase Units) 1 Capsule(s) Oral three times a day with meals  pantoprazole  Injectable 40 milliGRAM(s) IV Push daily  atorvastatin 40 milliGRAM(s) Oral at bedtime  methylPREDNISolone 4 milliGRAM(s) Oral daily  multivitamin 1 Tablet(s) Oral daily  sodium chloride 0.9%. 1000 milliLiter(s) IV Continuous <Continuous>  artificial tears (preservative free) Ophthalmic Solution 1 Drop(s) Both EYES two times a day  chlorhexidine 0.12% Liquid 15 milliLiter(s) Oral Mucosa every 12 hours  chlorhexidine 2% Cloths 1 Application(s) Topical <User Schedule>        Mode: AC/ CMV (Assist Control/ Continuous Mandatory Ventilation)  RR (machine): 16  TV (machine): 400  FiO2: 30  PEEP: 5  MAP: 9  PIP: 19      ICU Vital Signs Last 24 Hrs  T(C): 38.7 (16 Aug 2020 14:00), Max: 38.7 (16 Aug 2020 14:00)  T(F): 101.6 (16 Aug 2020 14:00), Max: 101.6 (16 Aug 2020 14:00)  HR: 86 (16 Aug 2020 14:50) (77 - 104)  BP: 112/74 (16 Aug 2020 14:40) (79/51 - 167/84)  BP(mean): 88 (16 Aug 2020 14:40) (60 - 102)  ABP: --  ABP(mean): --  RR: 16 (16 Aug 2020 14:40) (16 - 24)  SpO2: 99% (16 Aug 2020 14:50) (95% - 100%)    Vital Signs Last 24 Hrs  T(C): 38.7 (16 Aug 2020 14:00), Max: 38.7 (16 Aug 2020 14:00)  T(F): 101.6 (16 Aug 2020 14:00), Max: 101.6 (16 Aug 2020 14:00)  HR: 86 (16 Aug 2020 14:50) (77 - 104)  BP: 112/74 (16 Aug 2020 14:40) (79/51 - 167/84)  BP(mean): 88 (16 Aug 2020 14:40) (60 - 102)  RR: 16 (16 Aug 2020 14:40) (16 - 24)  SpO2: 99% (16 Aug 2020 14:50) (95% - 100%)    ABG - ( 16 Aug 2020 14:32 )  pH, Arterial: 7.43  pH, Blood: x     /  pCO2: 31    /  pO2: 136   / HCO3: 22    / Base Excess: -3.1  /  SaO2: 99                  I&O's Detail    15 Aug 2020 07:01  -  16 Aug 2020 07:00  --------------------------------------------------------  IN:    sodium chloride 0.9%.: 720 mL    Solution: 200 mL  Total IN: 920 mL    OUT:    Indwelling Catheter - Urethral: 1500 mL  Total OUT: 1500 mL    Total NET: -580 mL      16 Aug 2020 07:01  -  16 Aug 2020 15:02  --------------------------------------------------------  IN:    sodium chloride 0.9%.: 300 mL  Total IN: 300 mL    OUT:  Total OUT: 0 mL    Total NET: 300 mL            LABS:                        13.3   7.49  )-----------( 359      ( 15 Aug 2020 10:23 )             40.2     08-15    142  |  108  |  2<L>  ----------------------------<  103<H>  4.6   |  28  |  0.82    Ca    9.4      15 Aug 2020 10:23  Phos  2.5     08-15  Mg     2.0     08-15    TPro  6.6  /  Alb  3.2<L>  /  TBili  0.4  /  DBili  x   /  AST  35  /  ALT  26  /  AlkPhos  36<L>  08-15      CARDIAC MARKERS ( 16 Aug 2020 07:59 )  .624 ng/mL / x     / 129 U/L / x     / 1.6 ng/mL  CARDIAC MARKERS ( 15 Aug 2020 17:26 )  .674 ng/mL / x     / 98 U/L / x     / 2.0 ng/mL  CARDIAC MARKERS ( 15 Aug 2020 10:23 )  .808 ng/mL / x     / 92 U/L / x     / 3.0 ng/mL      CAPILLARY BLOOD GLUCOSE      POCT Blood Glucose.: 103 mg/dL (16 Aug 2020 12:45)        CULTURES:  Culture Results:   <10,000 CFU/mL Normal Urogenital Rubia (08-11 @ 15:23)  Culture Results:   No growth to date. (08-11 @ 15:21)  Culture Results:   No growth to date. (08-11 @ 15:21)        Physical Examination:    General: now deeply sedated, intubated, frail appearing    HEENT: Pupils equal, reactive to light.  Symmetric.    PULM: Clear to auscultation bilaterally, no significant sputum production    CVS: tachycardic, reg rhythm, no murmurs, rubs, or gallops    ABD: Soft, nondistended, nontender, normoactive bowel sounds, no masses    EXT: No edema, nontender    SKIN: Warm and well perfused, no rashes noted.    NEURO: sedated, reaching for ETT with right hand, LUE now flaccid      RADIOLOGY:     < from: Xray Chest 1 View- PORTABLE-Urgent (08.16.20 @ 15:12) >  Intubatedwith the tip of the endotracheal tube proximal 1.5 cm above the nimesh. Enteric tube with tip in the stomach.    Right lung apex omitted from the field-of-view, otherwise imaged lungs are clear.    Heart normal in size.    IMPRESSION:    Intubated with the tip the endotracheal tube approximately 1.5 cm above the nimesh and should be pulled back.    Enteric tube the tip in the stomach.        < from: CT Head No Cont (08.15.20 @ 18:28) >    FINDINGS:    There is no CT evidence of acute intracranial hemorrhage, mass effect, midline shift, or acute, large territorial infarct.    The ventricles and sulci are normal in size and configuration. The basal cisterns are patent.    The mastoid air cells and middle ear cavities are grossly clear. There is no significant paranasal sinus mucosal thickening.    The calvarium and skull base are grossly intact. .    IMPRESSION:  No acute intracranial abnormality.            CRITICAL CARE TIME SPENT: 80 mins  Time spent evaluating/treating patient with medical issues that pose a high risk for life threatening deterioration and/or end-organ damage, reviewing data/labs/imaging, discussing case with multidisciplinary team, discussing plan/goals of care with patient/family. Non-inclusive of procedure time. Patient is a 60y old  Female who presents with a chief complaint of rt leg cellulitis, dizziness (16 Aug 2020 10:45)      BRIEF HOSPITAL COURSE: 59 y/o F with a h/o EtOH and opiate abuse, chronic pain, alcoholic pancreatitis, mixed connective tissue disorder (on chronic methylprednisolone), left soleal vein thrombosis (on apixaban), lung adenocarcinoma (s/p RML resection 2018), recent LE cellulitis, recent CDiff (on PO vancomycin to finish 8/17) admitted on 8/11 with symptomatic hyponatremia secondary to polydipsia. She was treated on the medicine service with an appropriate rate of correction in serum Na. Noted to have focal seizure activity of LUE on 8/13 which broke with IV lorazepam. Seizure activity believed to be secondary to benzodiazepine withdrawal. EEG at one point showed epileptiform activity and she was started on Keppra although continued to have periodic seizure activity. CT head unremarkable for acute pathology.    Events last 24 hours: Responded to RRT today to find patient with generalized seizure activity- LUE/LLE and left facial twitching, unresponsive. Seizure activity would break transiently with IV lorazepam but recurred repetitively. After 8mg lorazepam she began to lose airway protection abilities and developed hypoxemia (SpO2 70s). She was emergent intubated and started on a propofol infusion with good suppression of seizures. New onset fevers today (TMax 101.6'F). Hypotensive post-intubation and started on IV vasopressor therapy.      PAST MEDICAL & SURGICAL HISTORY:  Lung cancer  Opiate dependence  Alcohol abuse  Adenocarcinoma of lung  Mixed connective tissue disease  Depression H/O panic attack: with anxiety  S/P Laminectomy  Lumbar 3/10  Chronic pain  Diverticulitis of colon (without mention of hemorrhage)  History of laminectomy  History of lung surgery  History of oophorectomy, unilateral: bilateral  History of hip replacement, total, right: 6/7/2020  S/P lumbar laminectomy  S/P cholecystectomy  History of lung cancer: s/p wedge resection of RML    Allergies    penicillin (Other)  penicillin (Swelling)    Intolerances      FAMILY HISTORY:  FHx: rheumatoid arthritis: in mother - with RA associated lung fibrosis  Family history of leukemia: in father      Review of Systems:  Unable to obtain secondary to AMS/sedation/intubation        Medications:  hydroxychloroquine 200 milliGRAM(s) Oral <User Schedule>  vancomycin    Solution 125 milliGRAM(s) Oral every 6 hours  norepinephrine Infusion 0.05 MICROgram(s)/kG/Min IV Continuous <Continuous>  acetaminophen    Suspension .. 650 milliGRAM(s) Oral every 6 hours PRN  acetaminophen  Suppository .. 650 milliGRAM(s) Rectal once  aspirin 325 milliGRAM(s) Oral daily  gabapentin 600 milliGRAM(s) Oral two times a day  lacosamide IVPB 200 milliGRAM(s) IV Intermittent every 12 hours  levETIRAcetam  IVPB 1000 milliGRAM(s) IV Intermittent every 12 hours  propofol Infusion 10 MICROgram(s)/kG/Min IV Continuous <Continuous>  apixaban 5 milliGRAM(s) Oral two times a day  loperamide 2 milliGRAM(s) Oral every 6 hours PRN  pancrelipase  (CREON 12,000 Lipase Units) 1 Capsule(s) Oral three times a day with meals  pantoprazole  Injectable 40 milliGRAM(s) IV Push daily  atorvastatin 40 milliGRAM(s) Oral at bedtime  methylPREDNISolone 4 milliGRAM(s) Oral daily  multivitamin 1 Tablet(s) Oral daily  sodium chloride 0.9%. 1000 milliLiter(s) IV Continuous <Continuous>  artificial tears (preservative free) Ophthalmic Solution 1 Drop(s) Both EYES two times a day  chlorhexidine 0.12% Liquid 15 milliLiter(s) Oral Mucosa every 12 hours  chlorhexidine 2% Cloths 1 Application(s) Topical <User Schedule>        Mode: AC/ CMV (Assist Control/ Continuous Mandatory Ventilation)  RR (machine): 16  TV (machine): 400  FiO2: 30  PEEP: 5  MAP: 9  PIP: 19      ICU Vital Signs Last 24 Hrs  T(C): 38.7 (16 Aug 2020 14:00), Max: 38.7 (16 Aug 2020 14:00)  T(F): 101.6 (16 Aug 2020 14:00), Max: 101.6 (16 Aug 2020 14:00)  HR: 86 (16 Aug 2020 14:50) (77 - 104)  BP: 112/74 (16 Aug 2020 14:40) (79/51 - 167/84)  BP(mean): 88 (16 Aug 2020 14:40) (60 - 102)  ABP: --  ABP(mean): --  RR: 16 (16 Aug 2020 14:40) (16 - 24)  SpO2: 99% (16 Aug 2020 14:50) (95% - 100%)    Vital Signs Last 24 Hrs  T(C): 38.7 (16 Aug 2020 14:00), Max: 38.7 (16 Aug 2020 14:00)  T(F): 101.6 (16 Aug 2020 14:00), Max: 101.6 (16 Aug 2020 14:00)  HR: 86 (16 Aug 2020 14:50) (77 - 104)  BP: 112/74 (16 Aug 2020 14:40) (79/51 - 167/84)  BP(mean): 88 (16 Aug 2020 14:40) (60 - 102)  RR: 16 (16 Aug 2020 14:40) (16 - 24)  SpO2: 99% (16 Aug 2020 14:50) (95% - 100%)    ABG - ( 16 Aug 2020 14:32 )  pH, Arterial: 7.43  pH, Blood: x     /  pCO2: 31    /  pO2: 136   / HCO3: 22    / Base Excess: -3.1  /  SaO2: 99                  I&O's Detail    15 Aug 2020 07:01  -  16 Aug 2020 07:00  --------------------------------------------------------  IN:    sodium chloride 0.9%.: 720 mL    Solution: 200 mL  Total IN: 920 mL    OUT:    Indwelling Catheter - Urethral: 1500 mL  Total OUT: 1500 mL    Total NET: -580 mL      16 Aug 2020 07:01  -  16 Aug 2020 15:02  --------------------------------------------------------  IN:    sodium chloride 0.9%.: 300 mL  Total IN: 300 mL    OUT:  Total OUT: 0 mL    Total NET: 300 mL            LABS:                        13.3   7.49  )-----------( 359      ( 15 Aug 2020 10:23 )             40.2     08-15    142  |  108  |  2<L>  ----------------------------<  103<H>  4.6   |  28  |  0.82    Ca    9.4      15 Aug 2020 10:23  Phos  2.5     08-15  Mg     2.0     08-15    TPro  6.6  /  Alb  3.2<L>  /  TBili  0.4  /  DBili  x   /  AST  35  /  ALT  26  /  AlkPhos  36<L>  08-15      CARDIAC MARKERS ( 16 Aug 2020 07:59 )  .624 ng/mL / x     / 129 U/L / x     / 1.6 ng/mL  CARDIAC MARKERS ( 15 Aug 2020 17:26 )  .674 ng/mL / x     / 98 U/L / x     / 2.0 ng/mL  CARDIAC MARKERS ( 15 Aug 2020 10:23 )  .808 ng/mL / x     / 92 U/L / x     / 3.0 ng/mL      CAPILLARY BLOOD GLUCOSE      POCT Blood Glucose.: 103 mg/dL (16 Aug 2020 12:45)        CULTURES:  Culture Results:   <10,000 CFU/mL Normal Urogenital Rubia (08-11 @ 15:23)  Culture Results:   No growth to date. (08-11 @ 15:21)  Culture Results:   No growth to date. (08-11 @ 15:21)        Physical Examination:    General: now deeply sedated, intubated, frail appearing    HEENT: Pupils equal, reactive to light.  Symmetric.    PULM: Clear to auscultation bilaterally, no significant sputum production    CVS: tachycardic, reg rhythm, no murmurs, rubs, or gallops    ABD: Soft, nondistended, nontender, normoactive bowel sounds, no masses    EXT: No edema, nontender    SKIN: Warm and well perfused, no rashes noted.    NEURO: sedated, reaching for ETT with right hand, LUE now flaccid      RADIOLOGY:     < from: Xray Chest 1 View- PORTABLE-Urgent (08.16.20 @ 15:12) >  Intubatedwith the tip of the endotracheal tube proximal 1.5 cm above the nimesh. Enteric tube with tip in the stomach.    Right lung apex omitted from the field-of-view, otherwise imaged lungs are clear.    Heart normal in size.    IMPRESSION:    Intubated with the tip the endotracheal tube approximately 1.5 cm above the nimesh and should be pulled back.    Enteric tube the tip in the stomach.        < from: CT Head No Cont (08.15.20 @ 18:28) >    FINDINGS:    There is no CT evidence of acute intracranial hemorrhage, mass effect, midline shift, or acute, large territorial infarct.    The ventricles and sulci are normal in size and configuration. The basal cisterns are patent.    The mastoid air cells and middle ear cavities are grossly clear. There is no significant paranasal sinus mucosal thickening.    The calvarium and skull base are grossly intact. .    IMPRESSION:  No acute intracranial abnormality.            CRITICAL CARE TIME SPENT: 80 mins  Time spent evaluating/treating patient with medical issues that pose a high risk for life threatening deterioration and/or end-organ damage, reviewing data/labs/imaging, discussing case with multidisciplinary team, discussing plan/goals of care with patient/family. Non-inclusive of procedure time.

## 2020-08-16 NOTE — H&P ADULT - ASSESSMENT
Assessment: 59 y/o F with a h/o EtOH and opiate abuse s/p recent admissions for alcoholic pancreatitis and alcohol withdrawal, mixed connective tissue disorder (on plaquenil and methylprednisolone), chronic pain s/p lumbar laminectomy, left soleal vein thrombosis (started on apixaban, 7/2020), lung adenocarcinoma (s/p RML resection 2018), recent RLE cellulitis s/p doxycycline, recent CDiff colitis (on PO vancomycin, started 8/3, to finish 8/17), CAD s/p possible MI (ECHO ) admitted on 8/11 with symptomatic 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 Transferred from [facility] following RRT after new onset generalized seizure on propofol drip, keppra load and vimpat load. C/b by hypoxic respiratory failure requiring intubation, new fever of 101.6 F and hypotension following intubation requiring increasing levophed requirements.          #NEURO     //Status Epilepticus     -Intubated and sedated     -Wean off propofol drip     -Start on versed drip for better seizure control     -Continue keppra 100 mg BID     - D/C vimpat     -24 hour EEG for epileptiform activity in AM     -Send prolactin, CK level     -Neurology recs appreciated          //Meningitis     -Concern for meningitis/encephalitis with fever in setting of new onset seizures     -CT head negative for acute intracranial pathology     -Unable to do LP at this time, last Elliquis dose at 5 AM     -Transition to heparin drip     - Consider sending BCx2      - Consider starting empiric IV ceftriaxone               #CARDIO     -Hypotensive following intubation, concern for septic shock     -MAP >60 on levophed drip     -Holding home clonidine          #RESP     -Hypoxic respiratory failure requiring intubation     -Vent settings: 30/400/16/5     -Goal SpO2 >95%          //Hx of adenocarcinoma     -s/p RML wedge resection in 2018          //Current smoker     -C/w Nicotine patch          #GI     //Chronic pancreatitis     -NPO     -Place NGT tube     -Start IV D5 LR     -Amylase, lipase levels     -C/w Creon 12,000 Lipase Units TID     -Protonix 40 mg BID     -C/w Folic Acid 1mg      -C/w Multivitamin 1 tablet QD          //Clostridium difficile infection     -C/w oral vancomycin     -Start IV flagyl     -Start IV D5 LR     -Repeat C diff PCR     -Contact isolation     -Hold home loperamide to prevent worsening intra-abdominal infection          #ID     //Meningitis     - New-onset seizures in setting of fever of 101.6 F, no WBC     -Plan for LP once transitioned to heparin drip     -Start empiric IV ceftriaxone     - Trend Procalcitonin     - CBC q12           //C Diff infection     -Plan as above          #Renal     -Start on D5 LR, last CBC hypokalemic and hypoglycemic           #Rheum     //Hx of mixed connective tissue disorder     -C/w hydroxychloroquine 200 mg     -C/w methylprednisolone 4 mg          #Heme     //Anticoagulation     -D/C Elliquis for AM LP     -Heparin ggt               //Chronic Macrocytic anemia likely 2/2 to alcohol-induced anemia     -Recent folate, B12 levels wnl               #PSYCH     //Alcohol withdrawal     - Last drink 3 weeks ago, seizures unlikely 2/2 to withdrawal           //Nicotine withdrawal     -Hold Nicotine patch          //Severe depression and anxiety     -Holding Xanax 0.25 mg q12h PRN          //Chronic pain     - Follows with established pain doctor for chronic back pain     -Holding home Fentanyl patch, gabapentin, oxycodone           #GOC 59 y/o F with a h/o EtOH and opiate abuse s/p recent admissions for alcoholic pancreatitis and alcohol withdrawal, mixed connective tissue disorder (on plaquenil and methylprednisolone), chronic pain s/p lumbar laminectomy, left soleal vein thrombosis (apixaban, 7/2020), lung adenocarcinoma (s/p RML resection 2018), recent RLE cellulitis s/p doxycycline, recent CDiff colitis (PO vancomycin, to finish 8/17), CAD s/p possible MI (ECHO ) admitted on 8/11 with symptomatic 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 Transferred from [facility] following RRT after new onset generalized seizure s/p keppra load and vimpat load, intubated for hypoxic respiratory failure after ativan with new fever 101.6 F, admitted to MICU for further management          #NEURO   //Status Epilepticus   -OSH s/p keppra and vimat load  -start versed and wean off propfol   -Continue keppra 1000 mg BID   -cw vimpat  -monitor for seizures on EEG  -Neurology recs appreciated     // rule out Meningitis   -Concern for meningitis/encephalitis with fever in setting of new onset seizures   -CT head negative  -last Eliquis 8/15 5am. Plan for LP in the AM  -start ayclovir and ceftraxione    -neurology recs     #CV  Septic Shock  -wean levo as tolerated MAP >60  -hold home clonidine     #RESP   //Hypoxic Respiratory failure 2/2 seizures  -Vent settings: 30/400/16/5   -ABG daily   -CXR confirm ET placement     //Hx of adenocarcinoma   -s/p RML wedge resection in 2018      #GI   //Chronic pancreatitis   -NPO   -Place NGT tube   -Start IV D5 LR   -f/u Amylase, lipase levels and TG  -C/w Creon 12,000 Lipase Units TID   -Protonix 40 mg BID   -C/w Folic Acid 1mg  and Multivitamin 1 tablet QD       #ID   //Rule out Meningitis   - New-onset seizures in setting of fever of 101.6 F, no WBC   -Plan for LP AM  -Start empiric IV ceftriaxone and acyclovir  -neurology recs appreciated    //Clostridium difficile infection   -dx 8/3, last dose of PO vancomycin 8/17  -cw PO vancomycin and start IV flagyl  -hold off C diff testing given it remain positive  -Contact isolation      #Renal   -no active issues  -Zurita    #Rheum   //Hx of mixed connective tissue disorder   -C/w hydroxychloroquine 200 mg   -solumederol 20q8hr    #Heme   -hold eliquis and start heparin gtt    //Chronic Macrocytic anemia likely 2/2 to alcohol-induced anemia     -Recent folate, B12 levels wnl     #PSYCH     //Alcohol withdrawal   -last drink 3 weeks ago, seizures unlikely 2/2 to withdrawal       //Nicotine withdrawal   -Hold Nicotine patch     //Severe depression and anxiety   -Holding Xanax 0.25 mg q12h PRN     //Chronic pain   -Follows with established pain doctor for chronic back pain   -Holding home Fentanyl patch, gabapentin, oxycodone 59 y/o F with a h/o EtOH withdrawal, polysubstance abuse, EtoH pancreatitis, mixed connective tissue disorder (plaquenil and methylprednisolone), chronic pain s/p lumbar laminectomy, left soleal vein thrombosis (apixaban) lung adenocarcinoma (s/p RML resection 2018), recent RLE cellulitis s/p doxycycline, recent CDiff colitis, CAD admitted to OSH for symptomatic hyponatremia 2/2 psychogenic polydipsia complicated with new onset generalized seizures, intubated for hypoxic respiratory failure transferred to [facility] MICU for further management      #NEURO   //Status Epilepticus   -OSH s/p keppra and vimat load  -start versed and wean off propfol   -Continue keppra 1000 mg BID   -cw vimpat  -monitor for seizures on EEG  -Neurology recs appreciated     #CV  -wean levo as tolerated MAP >60  -hold home clonidine     #RESP   //Hypoxic Respiratory failure 2/2 seizures  -Vent settings: 30/400/16/5   -ABG daily   -CXR confirm ET placement     //Hx of adenocarcinoma   -s/p RML wedge resection in 2018      #GI   //Chronic pancreatitis   -NPO   -Place NGT tube   -Start IV D5 LR   -f/u Amylase, lipase levels and TG  -C/w Creon 12,000 Lipase Units TID   -Protonix 40 mg BID   -C/w Folic Acid 1mg  and Multivitamin 1 tablet QD       #ID   //Rule out Meningitis   - New-onset seizures in setting of fever of 101.6 F, no WBC. CTH neg  -Plan for LP AM. Last Eliquis 8/15 5am.   -Start empiric IV ceftriaxone and acyclovir  -neurology recs appreciated    //Clostridium difficile infection   -dx 8/3, last dose of PO vancomycin 8/17  -cw PO vancomycin and start IV flagyl  -hold off C diff testing given it remain positive  -Contact isolation      #Renal   -no active issues  -Zurita    #Rheum   //Hx of mixed connective tissue disorder   -C/w hydroxychloroquine 200 mg   -solumederol 20q8hr    #Heme   -hold Eliquis and start heparin gtt    #PSYCH   //Alcohol withdrawal   -last drink 3 weeks ago, seizures unlikely 2/2 to withdrawal

## 2020-08-16 NOTE — CONSULT NOTE ADULT - ATTENDING COMMENTS
60F PMH chronic pain (on fentanyl patch), EtOH abuse (last 3 weeks ago), h/o EtOH pancreatitis, anxiety (on alprazolam), polysubstance abuse, MCTD (on methylprednisolone/hydroxychloroquine), Lung adenocarcinoma (s/p RML resection 2018), L soleal DVT on apixaban, HTN, recent reported C diff (completing oral vancomycin), and former smoker who now presents with status epilepticus with acute hypoxemic respiratory failure requiring intubation.    She was recently hospitalized at [facility] (end of July) with alcoholic pancreatitis, then [facility] with cellulitis, then [facility], then presented to  on 8/11 with weakness and ataxia. She was found to have hyponatremia to 120, which was attributed to primary polydipsia and improved with fluid restriction. Last EtOH drink reportedly 2 weeks prior to admission.     Hospital course complicated by seizures on 8/13, which improved with IV lorazepam and starting levetiracetam. EEG showed epileptiform activity and she continued to have episodic seizures. CT head negative.    Today with status epilepticus. Seizures start in LUE/LLE and extend proximal to involve the face, including contralateral side. During RRT today, she received lorazepam 10 mg IV, levetiracetam 1000 mg IV bolus, and lacosamide 200 mg IV. She continued to experience seizure activity without recovery of mental status and progressively worsening hypoxemia requiring emergent intubation. She was started on diprivan gtt and transferred to ICU.    - Continue diprivan gtt, now at 50 mcg/kg/min. Start midazolam gtt given persistent seizure activity (about 5 additional seizures in ICU after transfer). Continue levetiracetam at 1000 mg q12h. Start lacosamide 200 mg IV q12h. Continue gabapentin. Will need transfer to [facility] MICU for continuous video EEG monitoring  - Hypotension due to sedatives. LR 1 liter bolus. Start norepinephrine, goal MAP>65. Start stress hydrocortisone, hold home methylprednisolone while on stress steroids  - Continue lung protective ventilation. ABG without hypercapnia or hypoxemia. Check sputum culture given fever  - Keep NPO for transfer to [facility]. OG tube in stomach. Continue home PPI. Restart pancreatic enzymes when resumes po  - Stable kidney function and lytes, strict I/O's  - Fever likely related to seizure. No evidence of active infection. Check blood, sputum, and urine cultures. Has 1 more day of vancomycin oral for reported C diff  - On apixaban for L soleal DVT. Hold for now and start heparin gtt. Hold aspirin for now  - Continue hydroxychloroquine for MCTD, on stress steroids, eventually to resume home medrol 4 mg  - Prognosis guarded, full code, PA discussed with cousin Anita (HCP)  CC time spent: 50 min

## 2020-08-16 NOTE — H&P ADULT - NSICDXPASTSURGICALHX_GEN_ALL_CORE_FT
PAST SURGICAL HISTORY:  History of hip replacement, total, right 6/7/2020    History of laminectomy     History of lung cancer s/p wedge resection of RML    History of lung surgery     History of oophorectomy, unilateral bilateral    S/P cholecystectomy     S/P lumbar laminectomy

## 2020-08-16 NOTE — PROGRESS NOTE ADULT - NSHPATTENDINGPLANDISCUSS_GEN_ALL_CORE
staff, DR Mckeon, Dr Eason, Dr Hyde.

## 2020-08-16 NOTE — CHART NOTE - NSCHARTNOTEFT_GEN_A_CORE
Rapid Response Follow up note  Patient is a 60y old  Female admitted for rt leg cellulitis, dizziness   Rapid response team called for active seizure    Patient was seen and examined at the bedside in the ICU. Now sedated, intubated, and on pressors. Patient reportedly had additional seizure while in the ICU per nursing.   Allergies    penicillin (Other)  penicillin (Swelling)    Intolerances    PAST MEDICAL & SURGICAL HISTORY:  Lung cancer  Opiate dependence  Alcohol abuse  Adenocarcinoma of lung  Mixed connective tissue disease  Depression H/O panic attack: with anxiety  S/P Laminectomy  Lumbar 3/10  Chronic pain  Diverticulitis of colon (without mention of hemorrhage)  History of laminectomy  History of lung surgery  History of oophorectomy, unilateral: bilateral  History of hip replacement, total, right: 6/7/2020  S/P lumbar laminectomy  S/P cholecystectomy  History of lung cancer: s/p wedge resection of RML    ICU Vital Signs:  T(C): 38.7 (16 Aug 2020 14:00), Max: 38.7 (16 Aug 2020 14:00)  T(F): 101.6 (16 Aug 2020 14:00), Max: 101.6 (16 Aug 2020 14:00)  HR: 86 (16 Aug 2020 14:50) (77 - 104)  BP: 112/74 (16 Aug 2020 14:40) (79/51 - 167/84)  BP(mean): 88 (16 Aug 2020 14:40) (60 - 102)  RR: 16 (16 Aug 2020 14:40) (16 - 24)  SpO2: 99% (16 Aug 2020 14:50) (95% - 100%)      Vital Signs Last 24 Hrs  T(C): 38.6 (16 Aug 2020 12:00), Max: 38.6 (16 Aug 2020 12:00)  T(F): 101.5 (16 Aug 2020 12:00), Max: 101.5 (16 Aug 2020 12:00)  HR: 100 (16 Aug 2020 11:28) (92 - 104)  BP: 130/83 (16 Aug 2020 11:28) (104/66 - 132/84)  BP(mean): --  RR: 24 (16 Aug 2020 11:28) (18 - 24)  SpO2: 95% (16 Aug 2020 11:28) (95% - 98%)    GENERAL: The patient sedated and intubated  HEENT: Head is normocephalic and atraumatic. Extraocular muscles are intact. Mucous membranes are moist. No throat erythema/exudates no lymphadenopathy, no JVD,   NECK: Supple.  LUNGS: Clear to auscultation BL without wheezing, rales or rhonchi; respirations unlabored  HEART: Regular rate and rhythm ,+S1/+S2, no murmurs, rubs, gallops  ABDOMEN: Soft, nontender, and nondistended, no rebound, guarding rigidity, bowel sounds in all 4 quadrants  EXTREMITIES: Without any cyanosis, clubbing, rash, lesions or edema.  SKIN: No new rashes or lesions.  VASCULAR: Radial and Dorsal pedal pulses palpable BL  NEUROLOGIC: Unable to assess 2/2 sedation  PSYCH: No new changes.    08-15 @ 07:01  -  08-16 @ 07:00  --------------------------------------------------------  IN: 860 mL / OUT: 1500 mL / NET: -640 mL                          13.3   7.49  )-----------( 359      ( 15 Aug 2020 10:23 )             40.2     08-15    142  |  108  |  2<L>  ----------------------------<  103<H>  4.6   |  28  |  0.82    Ca    9.4      15 Aug 2020 10:23  Phos  2.5     08-15  Mg     2.0     08-15    TPro  6.6  /  Alb  3.2<L>  /  TBili  0.4  /  DBili  x   /  AST  35  /  ALT  26  /  AlkPhos  36<L>  08-15         LIVER FUNCTIONS - ( 15 Aug 2020 10:23 )  Alb: 3.2 g/dL / Pro: 6.6 g/dL / ALK PHOS: 36 U/L / ALT: 26 U/L / AST: 35 U/L / GGT: x              MEDICATIONS  (STANDING):  acetaminophen  Suppository .. 650 milliGRAM(s) Rectal once  apixaban 5 milliGRAM(s) Oral two times a day  artificial tears (preservative free) Ophthalmic Solution 1 Drop(s) Both EYES two times a day  aspirin 325 milliGRAM(s) Oral daily  atorvastatin 40 milliGRAM(s) Oral at bedtime  gabapentin 600 milliGRAM(s) Oral two times a day  hydroxychloroquine 200 milliGRAM(s) Oral <User Schedule>  lacosamide Injectable 200 milliGRAM(s) IV Push once  levETIRAcetam  IVPB 1000 milliGRAM(s) IV Intermittent once  levETIRAcetam  IVPB 1000 milliGRAM(s) IV Intermittent every 12 hours  LORazepam     Tablet 1 milliGRAM(s) Oral every 6 hours  LORazepam   Injectable 1 milliGRAM(s) IV Push once  LORazepam   Injectable 2 milliGRAM(s) IV Push once  LORazepam   Injectable 4 milliGRAM(s) IV Push once  methylPREDNISolone 4 milliGRAM(s) Oral daily  multivitamin 1 Tablet(s) Oral daily  pancrelipase  (CREON 12,000 Lipase Units) 1 Capsule(s) Oral three times a day with meals  pantoprazole    Tablet 40 milliGRAM(s) Oral before breakfast  sodium chloride 0.9%. 1000 milliLiter(s) (60 mL/Hr) IV Continuous <Continuous>  vancomycin    Solution 125 milliGRAM(s) Oral every 6 hours    MEDICATIONS  (PRN):  acetaminophen   Tablet .. 650 milliGRAM(s) Oral every 6 hours PRN Temp greater or equal to 38C (100.4F)  chlordiazePOXIDE 5 milliGRAM(s) Oral every 6 hours PRN for ciwa > 6  loperamide 2 milliGRAM(s) Oral every 6 hours PRN Diarrhea  LORazepam   Injectable 1 milliGRAM(s) IV Push every 3 hours PRN seizure  ondansetron    Tablet 4 milliGRAM(s) Oral every 8 hours PRN Nausea and/or Vomiting  oxyCODONE    IR 10 milliGRAM(s) Oral every 6 hours PRN Moderate Pain (4 - 6)      Assessment/Plan: 60 year old female with a history of HTN, DVT, lung cancer s/p resection, anxiety disorder, opiate use, alcohol abuse who presented to the hospital with right leg cellulitis and hyponatremia. Currently treated with Eliquis for DVT. Rapid response called for seizure activity  Seizure activity likely 2/2 pseudoseizure vs. substance abuse with withdrawal  - 200mg Lacosamide BID  - 1000 Keppra BID  - s/p 8mg ativan  - Discussed case with neurologist, Dr. Mccarty who agrees with plan above  - Discussed case with Dr. Fishman who agrees with plan above   - Call placed to Dr. Avendaño, attending physician of record, awaiting call back Rapid Response Follow up note  Patient is a 60y old  Female admitted for rt leg cellulitis, dizziness   Rapid response team called for active seizure    Patient was seen and examined at the bedside in the ICU. Now sedated, intubated, and on pressors. Patient reportedly had additional seizure while in the ICU per nursing.   Allergies    penicillin (Other)  penicillin (Swelling)    Intolerances    PAST MEDICAL & SURGICAL HISTORY:  Lung cancer  Opiate dependence  Alcohol abuse  Adenocarcinoma of lung  Mixed connective tissue disease  Depression H/O panic attack: with anxiety  S/P Laminectomy  Lumbar 3/10  Chronic pain  Diverticulitis of colon (without mention of hemorrhage)  History of laminectomy  History of lung surgery  History of oophorectomy, unilateral: bilateral  History of hip replacement, total, right: 6/7/2020  S/P lumbar laminectomy  S/P cholecystectomy  History of lung cancer: s/p wedge resection of RML    ICU Vital Signs:  T(C): 38.7 (16 Aug 2020 14:00), Max: 38.7 (16 Aug 2020 14:00)  T(F): 101.6 (16 Aug 2020 14:00), Max: 101.6 (16 Aug 2020 14:00)  HR: 86 (16 Aug 2020 14:50) (77 - 104)  BP: 112/74 (16 Aug 2020 14:40) (79/51 - 167/84)  BP(mean): 88 (16 Aug 2020 14:40) (60 - 102)  RR: 16 (16 Aug 2020 14:40) (16 - 24)  SpO2: 99% (16 Aug 2020 14:50) (95% - 100%)      Vital Signs Last 24 Hrs  T(C): 38.6 (16 Aug 2020 12:00), Max: 38.6 (16 Aug 2020 12:00)  T(F): 101.5 (16 Aug 2020 12:00), Max: 101.5 (16 Aug 2020 12:00)  HR: 100 (16 Aug 2020 11:28) (92 - 104)  BP: 130/83 (16 Aug 2020 11:28) (104/66 - 132/84)  BP(mean): --  RR: 24 (16 Aug 2020 11:28) (18 - 24)  SpO2: 95% (16 Aug 2020 11:28) (95% - 98%)    GENERAL: The patient sedated and intubated  HEENT: Head is normocephalic and atraumatic. Extraocular muscles are intact. Mucous membranes are moist. No throat erythema/exudates no lymphadenopathy, no JVD,   NECK: Supple.  LUNGS: intubated  HEART: Regular rate and rhythm ,+S1/+S2, no murmurs, rubs, gallops  ABDOMEN: Soft, nontender, and nondistended, no rebound, guarding rigidity  EXTREMITIES: Without any cyanosis, clubbing, rash, lesions or edema.  VASCULAR: Radial and Dorsal pedal pulses palpable BL  NEUROLOGIC: Unable to assess 2/2 sedation  PSYCH: No new changes.    08-15 @ 07:01  -  08-16 @ 07:00  --------------------------------------------------------  IN: 860 mL / OUT: 1500 mL / NET: -640 mL                          13.3   7.49  )-----------( 359      ( 15 Aug 2020 10:23 )             40.2     08-15    142  |  108  |  2<L>  ----------------------------<  103<H>  4.6   |  28  |  0.82    Ca    9.4      15 Aug 2020 10:23  Phos  2.5     08-15  Mg     2.0     08-15    TPro  6.6  /  Alb  3.2<L>  /  TBili  0.4  /  DBili  x   /  AST  35  /  ALT  26  /  AlkPhos  36<L>  08-15         LIVER FUNCTIONS - ( 15 Aug 2020 10:23 )  Alb: 3.2 g/dL / Pro: 6.6 g/dL / ALK PHOS: 36 U/L / ALT: 26 U/L / AST: 35 U/L / GGT: x              MEDICATIONS  (STANDING):  acetaminophen  Suppository .. 650 milliGRAM(s) Rectal once  apixaban 5 milliGRAM(s) Oral two times a day  artificial tears (preservative free) Ophthalmic Solution 1 Drop(s) Both EYES two times a day  aspirin 325 milliGRAM(s) Oral daily  atorvastatin 40 milliGRAM(s) Oral at bedtime  gabapentin 600 milliGRAM(s) Oral two times a day  hydroxychloroquine 200 milliGRAM(s) Oral <User Schedule>  lacosamide Injectable 200 milliGRAM(s) IV Push once  levETIRAcetam  IVPB 1000 milliGRAM(s) IV Intermittent once  levETIRAcetam  IVPB 1000 milliGRAM(s) IV Intermittent every 12 hours  LORazepam     Tablet 1 milliGRAM(s) Oral every 6 hours  LORazepam   Injectable 1 milliGRAM(s) IV Push once  LORazepam   Injectable 2 milliGRAM(s) IV Push once  LORazepam   Injectable 4 milliGRAM(s) IV Push once  methylPREDNISolone 4 milliGRAM(s) Oral daily  multivitamin 1 Tablet(s) Oral daily  pancrelipase  (CREON 12,000 Lipase Units) 1 Capsule(s) Oral three times a day with meals  pantoprazole    Tablet 40 milliGRAM(s) Oral before breakfast  sodium chloride 0.9%. 1000 milliLiter(s) (60 mL/Hr) IV Continuous <Continuous>  vancomycin    Solution 125 milliGRAM(s) Oral every 6 hours    MEDICATIONS  (PRN):  acetaminophen   Tablet .. 650 milliGRAM(s) Oral every 6 hours PRN Temp greater or equal to 38C (100.4F)  chlordiazePOXIDE 5 milliGRAM(s) Oral every 6 hours PRN for ciwa > 6  loperamide 2 milliGRAM(s) Oral every 6 hours PRN Diarrhea  LORazepam   Injectable 1 milliGRAM(s) IV Push every 3 hours PRN seizure  ondansetron    Tablet 4 milliGRAM(s) Oral every 8 hours PRN Nausea and/or Vomiting  oxyCODONE    IR 10 milliGRAM(s) Oral every 6 hours PRN Moderate Pain (4 - 6)      Assessment/Plan: 60 year old female with a history of HTN, DVT, lung cancer s/p resection, anxiety disorder, opiate use, alcohol abuse who presented to the hospital with right leg cellulitis and hyponatremia. Currently treated with Eliquis for DVT. Rapid response called for seizure activity  Seizure activity likely 2/2 pseudoseizure vs. substance abuse with withdrawal  - 200mg Lacosamide BID  - 1000 Keppra BID  - s/p 8mg ativan  - Discussed case with neurologist, Dr. Mccarty who agrees with plan above  - Discussed case with Dr. Fishman who agrees with plan above   - Call placed to Dr. Avendaño, attending physician of record, awaiting call back Rapid Response Follow up note  Patient is a 60y old  Female admitted for rt leg cellulitis, dizziness   Rapid response team called for active seizure    Patient was seen and examined at the bedside in the ICU. Now sedated, intubated, and on pressors. Patient reportedly had additional seizure while in the ICU per nursing.   Allergies    penicillin (Other)  penicillin (Swelling)    Intolerances    PAST MEDICAL & SURGICAL HISTORY:  Lung cancer  Opiate dependence  Alcohol abuse  Adenocarcinoma of lung  Mixed connective tissue disease  Depression H/O panic attack: with anxiety  S/P Laminectomy  Lumbar 3/10  Chronic pain  Diverticulitis of colon (without mention of hemorrhage)  History of laminectomy  History of lung surgery  History of oophorectomy, unilateral: bilateral  History of hip replacement, total, right: 6/7/2020  S/P lumbar laminectomy  S/P cholecystectomy  History of lung cancer: s/p wedge resection of RML    ICU Vital Signs:  T(C): 38.7 (16 Aug 2020 14:00), Max: 38.7 (16 Aug 2020 14:00)  T(F): 101.6 (16 Aug 2020 14:00), Max: 101.6 (16 Aug 2020 14:00)  HR: 86 (16 Aug 2020 14:50) (77 - 104)  BP: 112/74 (16 Aug 2020 14:40) (79/51 - 167/84)  BP(mean): 88 (16 Aug 2020 14:40) (60 - 102)  RR: 16 (16 Aug 2020 14:40) (16 - 24)  SpO2: 99% (16 Aug 2020 14:50) (95% - 100%)      Vital Signs Last 24 Hrs  T(C): 38.6 (16 Aug 2020 12:00), Max: 38.6 (16 Aug 2020 12:00)  T(F): 101.5 (16 Aug 2020 12:00), Max: 101.5 (16 Aug 2020 12:00)  HR: 100 (16 Aug 2020 11:28) (92 - 104)  BP: 130/83 (16 Aug 2020 11:28) (104/66 - 132/84)  BP(mean): --  RR: 24 (16 Aug 2020 11:28) (18 - 24)  SpO2: 95% (16 Aug 2020 11:28) (95% - 98%)    GENERAL: The patient sedated and intubated  HEENT: Head is normocephalic and atraumatic. Extraocular muscles are intact. Mucous membranes are moist. No throat erythema/exudates no lymphadenopathy, no JVD,   LUNGS: intubated  HEART: Regular rate and rhythm ,+S1/+S2, no murmurs, rubs, gallops  ABDOMEN: Soft, nontender, and nondistended, no rebound, guarding rigidity  EXTREMITIES: no c/c/e  VASCULAR: Radial and Dorsal pedal pulses palpable BL  NEUROLOGIC: Unable to assess 2/2 sedation  PSYCH:  sedated    08-15 @ 07:01  -  08-16 @ 07:00  --------------------------------------------------------  IN: 860 mL / OUT: 1500 mL / NET: -640 mL                          13.3   7.49  )-----------( 359      ( 15 Aug 2020 10:23 )             40.2     08-15    142  |  108  |  2<L>  ----------------------------<  103<H>  4.6   |  28  |  0.82    Ca    9.4      15 Aug 2020 10:23  Phos  2.5     08-15  Mg     2.0     08-15    TPro  6.6  /  Alb  3.2<L>  /  TBili  0.4  /  DBili  x   /  AST  35  /  ALT  26  /  AlkPhos  36<L>  08-15         LIVER FUNCTIONS - ( 15 Aug 2020 10:23 )  Alb: 3.2 g/dL / Pro: 6.6 g/dL / ALK PHOS: 36 U/L / ALT: 26 U/L / AST: 35 U/L / GGT: x              MEDICATIONS  (STANDING):  acetaminophen  Suppository .. 650 milliGRAM(s) Rectal once  apixaban 5 milliGRAM(s) Oral two times a day  artificial tears (preservative free) Ophthalmic Solution 1 Drop(s) Both EYES two times a day  aspirin 325 milliGRAM(s) Oral daily  atorvastatin 40 milliGRAM(s) Oral at bedtime  gabapentin 600 milliGRAM(s) Oral two times a day  hydroxychloroquine 200 milliGRAM(s) Oral <User Schedule>  lacosamide Injectable 200 milliGRAM(s) IV Push once  levETIRAcetam  IVPB 1000 milliGRAM(s) IV Intermittent once  levETIRAcetam  IVPB 1000 milliGRAM(s) IV Intermittent every 12 hours  LORazepam     Tablet 1 milliGRAM(s) Oral every 6 hours  LORazepam   Injectable 1 milliGRAM(s) IV Push once  LORazepam   Injectable 2 milliGRAM(s) IV Push once  LORazepam   Injectable 4 milliGRAM(s) IV Push once  methylPREDNISolone 4 milliGRAM(s) Oral daily  multivitamin 1 Tablet(s) Oral daily  pancrelipase  (CREON 12,000 Lipase Units) 1 Capsule(s) Oral three times a day with meals  pantoprazole    Tablet 40 milliGRAM(s) Oral before breakfast  sodium chloride 0.9%. 1000 milliLiter(s) (60 mL/Hr) IV Continuous <Continuous>  vancomycin    Solution 125 milliGRAM(s) Oral every 6 hours    MEDICATIONS  (PRN):  acetaminophen   Tablet .. 650 milliGRAM(s) Oral every 6 hours PRN Temp greater or equal to 38C (100.4F)  chlordiazePOXIDE 5 milliGRAM(s) Oral every 6 hours PRN for ciwa > 6  loperamide 2 milliGRAM(s) Oral every 6 hours PRN Diarrhea  LORazepam   Injectable 1 milliGRAM(s) IV Push every 3 hours PRN seizure  ondansetron    Tablet 4 milliGRAM(s) Oral every 8 hours PRN Nausea and/or Vomiting  oxyCODONE    IR 10 milliGRAM(s) Oral every 6 hours PRN Moderate Pain (4 - 6)      Assessment/Plan: 60 year old female with a history of HTN, DVT, lung cancer s/p resection, anxiety disorder, opiate use, alcohol abuse who presented to the hospital with right leg cellulitis and hyponatremia. Currently treated with Eliquis for DVT. Rapid response called for seizure activity  Seizure activity likely 2/2 pseudoseizure vs. substance abuse with withdrawal  - 200mg Lacosamide BID, 1000 Keppra BID, s/p 8mg ativan  - Discussed case with neurologist, Dr. Mccarty who agrees with plan   - Discussed case with Dr. Fishman who agrees with plan above   - d/w attending Dr. Avendaño, who is aware and agrees with plan    Dr. Alexander, PGY1

## 2020-08-16 NOTE — CONSULT NOTE ADULT - SUBJECTIVE AND OBJECTIVE BOX
HPI: 61yo woman w/ h/o chronic pain, lung adenocarcinoma (s/p RML wedge resection ), ?mixed connective tissue disease, anxiety, right hip replacement, lumbar laminectomy, b/l oophorectomy, diverticulitis, recent acute pancreatitis and found to have right soleal DVT on eliquis, recent RLE cellulitis and recent c.diff infection initially admitted to [facility] for hyponatremia with ccb seizure witnessed  now transferred for further seizures requiring intubation and mechanical ventilation with Neurology consult for such. At [place], on , EEG was obtained demonstrating intermittent sharp and polyspike wave discharges with generalized slowing with patient at risk for seizures and was initially started on keppra. On 8/15, patient had a code stroke called at [place] for a reported R facial droop with CTH noncon negative that was equivocally attributed to Dwayne's paralysis as she had witnessed seizures the same day. On , patient had rapid response at [place] with noted  head version towards L with eyes deviated to L and eventual GTC s/p 8mg ativan, 1g keppra, 200mg vimpat and was subsequently intubated for airway protection. During this most recent rapid response at [place], patient noted to have temperature 101.5F, but LP was deferred due to patient being on eliquis. Patient is currently on keppra 1g q12h and propofol and midazolam.     EEG :    There was intermittent areas of sharp's and polyspike discharges that were generalized lasting up to 10 seconds in duration with no clinical correlate reported    IMPRESSION Abnormal EEG due to intermittent sharp and polyspike wave discharges with generalized slowing.    COMMENT: Patient at increased risk of generalized epilepsy.    REVIEW OF SYSTEMS  unable to obtain at this time    PAST MEDICAL & SURGICAL HISTORY:  Lung cancer  Opiate dependence  Alcohol abuse  Adenocarcinoma of lung  Mixed connective tissue disease  Depression H/O panic attack: with anxiety  S/P Laminectomy  Lumbar 3/10  Chronic pain  Diverticulitis of colon (without mention of hemorrhage)  History of laminectomy  History of lung surgery  History of oophorectomy, unilateral: bilateral  History of hip replacement, total, right: 2020  S/P lumbar laminectomy  S/P cholecystectomy  History of lung cancer: s/p wedge resection of RML    FAMILY HISTORY:  FHx: rheumatoid arthritis: in mother - with RA associated lung fibrosis  Family history of leukemia: in father    MEDICATIONS (HOME):  Home Medications:  atorvastatin 40 mg oral tablet: 1 tab(s) orally once a day (at bedtime) (16 Aug 2020 18:50)  gabapentin 600 mg oral tablet: 1 tab(s) orally 2 times a day (16 Aug 2020 18:50)  heparin 100 units/mL-D5% intravenous solution: 1  intravenous once a day  continuous infusion (16 Aug 2020 18:50)  hydrocortisone: 50 milligram(s) intravenous every 6 hours (16 Aug 2020 18:50)  hydroxychloroquine 200 mg oral tablet: 1 tab(s) orally  (16 Aug 2020 18:50)  lacosamide 200 mg/20 mL intravenous solution: 20 milliliter(s) intravenous every 12 hours (16 Aug 2020 18:50)  levETIRAcetam 100 mg/mL intravenous solution: 10 milliliter(s) intravenous every 12 hours (16 Aug 2020 18:50)  loperamide 2 mg oral capsule: 1 cap(s) orally every 6 hours, As needed, Diarrhea (16 Aug 2020 18:50)  midazolam: 1 milligram(s) intravenous every hour (16 Aug 2020 18:50)  Multiple Vitamins oral tablet: 1 tab(s) orally once a day (16 Aug 2020 18:50)  norepinephrine: 0.1 microgram(s) intravenous every hour  continuous infusion (16 Aug 2020 18:50)  ocular lubricant ophthalmic solution: 1 drop(s) to each affected eye 2 times a day (16 Aug 2020 18:50)  pancrelipase 12,000 units-38,000 units-60,000 units oral delayed release capsule: 1 cap(s) orally 3 times a day (with meals) (16 Aug 2020 18:50)  pantoprazole 40 mg intravenous injection: 40 milligram(s) intravenous once a day (16 Aug 2020 18:50)  potassium chloride 20 mEq oral powder for reconstitution: 2 packet(s) orally every 4 hours (16 Aug 2020 18:50)  propofol: 50 microgram(s) intravenous infusion (16 Aug 2020 18:50)  vancomycin 125 mg oral capsule: 1 cap(s) orally every 6 hours (12 Aug 2020 14:09)    MEDICATIONS  (STANDING):  acyclovir IVPB      cefTRIAXone   IVPB 2000 milliGRAM(s) IV Intermittent every 12 hours  chlorhexidine 0.12% Liquid 15 milliLiter(s) Oral Mucosa every 12 hours  chlorhexidine 4% Liquid 1 Application(s) Topical <User Schedule>  dextrose 5% + lactated ringers. 1000 milliLiter(s) (75 mL/Hr) IV Continuous <Continuous>  heparin  Infusion.  Unit(s)/Hr (9 mL/Hr) IV Continuous <Continuous>  methylPREDNISolone sodium succinate Injectable 20 milliGRAM(s) IV Push every 8 hours  metroNIDAZOLE  IVPB 500 milliGRAM(s) IV Intermittent once  metroNIDAZOLE  IVPB      midazolam Infusion. 0.084 mG/kG/Hr (4 mL/Hr) IV Continuous <Continuous>  propofol Infusion. 50 MICROgram(s)/kG/Min (14.3 mL/Hr) IV Continuous <Continuous>  vancomycin    Solution 125 milliGRAM(s) Oral every 6 hours    MEDICATIONS  (PRN):  heparin   Injectable 4000 Unit(s) IV Push every 6 hours PRN For aPTT less than 40  heparin   Injectable 2000 Unit(s) IV Push every 6 hours PRN For aPTT between 40 - 57    ALLERGIES/INTOLERANCES:  Allergies  penicillin (Other)  penicillin (Swelling)    VITALS & EXAMINATION:  Vital Signs Last 24 Hrs  T(C): 36.7 (16 Aug 2020 19:32), Max: 38.7 (16 Aug 2020 14:00)  T(F): 98 (16 Aug 2020 19:32), Max: 101.6 (16 Aug 2020 14:00)  HR: 75 (16 Aug 2020 20:48) (75 - 104)  BP: 96/53 (16 Aug 2020 18:30) (79/51 - 167/84)  BP(mean): 67 (16 Aug 2020 18:30) (60 - 113)  RR: 28 (16 Aug 2020 18:30) (16 - 40)  SpO2: 100% (16 Aug 2020 20:48) (95% - 100%)     Neurological (>12):  MS: intubated, sedated on propofol 50 and versed 2, does not respond to verbal stimuli, postures with noxious stimuli upon all extremities and with nurse suctioning    CNs: 2mm pupils OD, OS, minimally reactive to light, no btt b/l, no gross facial asymmetry    Motor: extensor posturing in both arms noted when applying deep nailbed pressure  triple flexion in both legs upon deep nailbed pressure in both feet     Reflexes:                  Plantar Resp  R		mute   L		mute     LABORATORY:  CBC                       11.8   8.42  )-----------( 359      ( 16 Aug 2020 16:50 )             35.3     Chem 08-16    141  |  110<H>  |  4<L>  ----------------------------<  96  3.1<L>   |  21<L>  |  0.47<L>    Ca    7.7<L>      16 Aug 2020 16:50  Phos  4.1       Mg     1.7         TPro  5.7<L>  /  Alb  2.7<L>  /  TBili  0.4  /  DBili  x   /  AST  36  /  ALT  22  /  AlkPhos  29<L>      LFTs LIVER FUNCTIONS - ( 16 Aug 2020 16:50 )  Alb: 2.7 g/dL / Pro: 5.7 g/dL / ALK PHOS: 29 U/L / ALT: 22 U/L / AST: 36 U/L / GGT: x           Coagulopathy PTT - ( 16 Aug 2020 17:19 )  PTT:37.0 sec  Lipid Panel  Chol -- LDL -- HDL -- Trig 52  A1c   Cardiac enzymes CARDIAC MARKERS ( 16 Aug 2020 07:59 )  .624 ng/mL / x     / 129 U/L / x     / 1.6 ng/mL  CARDIAC MARKERS ( 15 Aug 2020 17:26 )  .674 ng/mL / x     / 98 U/L / x     / 2.0 ng/mL  CARDIAC MARKERS ( 15 Aug 2020 10:23 )  .808 ng/mL / x     / 92 U/L / x     / 3.0 ng/mL      U/A Urinalysis Basic - ( 16 Aug 2020 15:09 )    Color: Yellow / Appearance: Slightly Turbid / S.015 / pH: x  Gluc: x / Ketone: Moderate  / Bili: Negative / Urobili: Negative   Blood: x / Protein: Negative / Nitrite: Negative   Leuk Esterase: Negative / RBC: >50 /HPF / WBC 0-2   Sq Epi: x / Non Sq Epi: Occasional / Bacteria: Occasional      STUDIES & IMAGING:  Studies (EKG, EEG, EMG, etc):     Radiology (XR, CT, MR, U/S, TTE/GROVER):    < from: CT Head No Cont (08.15.20 @ 18:28) >  FINDINGS:    There is no CT evidence of acute intracranial hemorrhage, mass effect, midline shift, or acute, large territorial infarct.    The ventricles and sulci are normal in size and configuration. The basal cisterns are patent.    The mastoid air cells and middle ear cavities are grossly clear. There is no significant paranasal sinus mucosal thickening.    The calvarium and skull base are grossly intact. .    IMPRESSION:  No acute intracranial abnormality.    < end of copied text > HPI: 59yo woman w/ h/o chronic pain, lung adenocarcinoma (s/p RML wedge resection 2018), ?mixed connective tissue disease, anxiety, right hip replacement, lumbar laminectomy, b/l oophorectomy, diverticulitis, recent acute pancreatitis and found to have right soleal DVT on eliquis, recent RLE cellulitis and recent c.diff infection initially admitted to [facility] for hyponatremia with ccb seizure witnessed  now transferred for further seizures requiring intubation and mechanical ventilation with Neurology consult for such. At [place] [facility], on , EEG was obtained demonstrating intermittent sharp and polyspike wave discharges with generalized slowing with patient at risk for seizures and was initially started on keppra. On 8/15, patient had a code stroke called at [facility] for a reported R facial droop with CTH noncon negative that was equivocally attributed to Dwayne's paralysis as she had witnessed seizures the same day. On , patient had rapid response at [facility] with noted  head version towards L with eyes deviated to L and eventual GTC s/p 8mg ativan, 1g keppra, 200mg vimpat and was subsequently intubated for airway protection. During this most recent rapid response at [facility], patient noted to have temperature 101.5F, but LP was deferred due to patient being on eliquis. Patient is currently on keppra 1g q12h and propofol and midazolam.     Was notified by nursing  while being set up in [facility] MICU that patient had a 30 second focal seizure starting with twitching of the left arm, eye blinking, left leg twitching more than the right leg, but since went away within 30 seconds, was not given anything.    EEG :    There was intermittent areas of sharp's and polyspike discharges that were generalized lasting up to 10 seconds in duration with no clinical correlate reported    IMPRESSION Abnormal EEG due to intermittent sharp and polyspike wave discharges with generalized slowing.    COMMENT: Patient at increased risk of generalized epilepsy.    REVIEW OF SYSTEMS  unable to obtain at this time    PAST MEDICAL & SURGICAL HISTORY:  Lung cancer  Opiate dependence  Alcohol abuse  Adenocarcinoma of lung  Mixed connective tissue disease  Depression H/O panic attack: with anxiety  S/P Laminectomy  Lumbar 3/10  Chronic pain  Diverticulitis of colon (without mention of hemorrhage)  History of laminectomy  History of lung surgery  History of oophorectomy, unilateral: bilateral  History of hip replacement, total, right: 2020  S/P lumbar laminectomy  S/P cholecystectomy  History of lung cancer: s/p wedge resection of RML    FAMILY HISTORY:  FHx: rheumatoid arthritis: in mother - with RA associated lung fibrosis  Family history of leukemia: in father    MEDICATIONS (HOME):  Home Medications:  atorvastatin 40 mg oral tablet: 1 tab(s) orally once a day (at bedtime) (16 Aug 2020 18:50)  gabapentin 600 mg oral tablet: 1 tab(s) orally 2 times a day (16 Aug 2020 18:50)  heparin 100 units/mL-D5% intravenous solution: 1  intravenous once a day  continuous infusion (16 Aug 2020 18:50)  hydrocortisone: 50 milligram(s) intravenous every 6 hours (16 Aug 2020 18:50)  hydroxychloroquine 200 mg oral tablet: 1 tab(s) orally  (16 Aug 2020 18:50)  lacosamide 200 mg/20 mL intravenous solution: 20 milliliter(s) intravenous every 12 hours (16 Aug 2020 18:50)  levETIRAcetam 100 mg/mL intravenous solution: 10 milliliter(s) intravenous every 12 hours (16 Aug 2020 18:50)  loperamide 2 mg oral capsule: 1 cap(s) orally every 6 hours, As needed, Diarrhea (16 Aug 2020 18:50)  midazolam: 1 milligram(s) intravenous every hour (16 Aug 2020 18:50)  Multiple Vitamins oral tablet: 1 tab(s) orally once a day (16 Aug 2020 18:50)  norepinephrine: 0.1 microgram(s) intravenous every hour  continuous infusion (16 Aug 2020 18:50)  ocular lubricant ophthalmic solution: 1 drop(s) to each affected eye 2 times a day (16 Aug 2020 18:50)  pancrelipase 12,000 units-38,000 units-60,000 units oral delayed release capsule: 1 cap(s) orally 3 times a day (with meals) (16 Aug 2020 18:50)  pantoprazole 40 mg intravenous injection: 40 milligram(s) intravenous once a day (16 Aug 2020 18:50)  potassium chloride 20 mEq oral powder for reconstitution: 2 packet(s) orally every 4 hours (16 Aug 2020 18:50)  propofol: 50 microgram(s) intravenous infusion (16 Aug 2020 18:50)  vancomycin 125 mg oral capsule: 1 cap(s) orally every 6 hours (12 Aug 2020 14:09)    MEDICATIONS  (STANDING):  acyclovir IVPB      cefTRIAXone   IVPB 2000 milliGRAM(s) IV Intermittent every 12 hours  chlorhexidine 0.12% Liquid 15 milliLiter(s) Oral Mucosa every 12 hours  chlorhexidine 4% Liquid 1 Application(s) Topical <User Schedule>  dextrose 5% + lactated ringers. 1000 milliLiter(s) (75 mL/Hr) IV Continuous <Continuous>  heparin  Infusion.  Unit(s)/Hr (9 mL/Hr) IV Continuous <Continuous>  methylPREDNISolone sodium succinate Injectable 20 milliGRAM(s) IV Push every 8 hours  metroNIDAZOLE  IVPB 500 milliGRAM(s) IV Intermittent once  metroNIDAZOLE  IVPB      midazolam Infusion. 0.084 mG/kG/Hr (4 mL/Hr) IV Continuous <Continuous>  propofol Infusion. 50 MICROgram(s)/kG/Min (14.3 mL/Hr) IV Continuous <Continuous>  vancomycin    Solution 125 milliGRAM(s) Oral every 6 hours    MEDICATIONS  (PRN):  heparin   Injectable 4000 Unit(s) IV Push every 6 hours PRN For aPTT less than 40  heparin   Injectable 2000 Unit(s) IV Push every 6 hours PRN For aPTT between 40 - 57    ALLERGIES/INTOLERANCES:  Allergies  penicillin (Other)  penicillin (Swelling)    VITALS & EXAMINATION:  Vital Signs Last 24 Hrs  T(C): 36.7 (16 Aug 2020 19:32), Max: 38.7 (16 Aug 2020 14:00)  T(F): 98 (16 Aug 2020 19:32), Max: 101.6 (16 Aug 2020 14:00)  HR: 75 (16 Aug 2020 20:48) (75 - 104)  BP: 96/53 (16 Aug 2020 18:30) (79/51 - 167/84)  BP(mean): 67 (16 Aug 2020 18:30) (60 - 113)  RR: 28 (16 Aug 2020 18:30) (16 - 40)  SpO2: 100% (16 Aug 2020 20:48) (95% - 100%)     Neurological (>12):  MS: intubated, sedated on propofol 50 and versed 2, does not respond to verbal stimuli, postures with noxious stimuli upon all extremities and with nurse suctioning    CNs: 2mm pupils OD, OS, minimally reactive to light, no btt b/l, no gross facial asymmetry    Motor: extensor posturing in both arms noted when applying deep nailbed pressure  triple flexion in both legs upon deep nailbed pressure in both feet     Reflexes:                  Plantar Resp  R		mute   L		mute     LABORATORY:  CBC                       11.8   8.42  )-----------( 359      ( 16 Aug 2020 16:50 )             35.3     Chem 08-16    141  |  110<H>  |  4<L>  ----------------------------<  96  3.1<L>   |  21<L>  |  0.47<L>    Ca    7.7<L>      16 Aug 2020 16:50  Phos  4.1     08-  Mg     1.7     08    TPro  5.7<L>  /  Alb  2.7<L>  /  TBili  0.4  /  DBili  x   /  AST  36  /  ALT  22  /  AlkPhos  29<L>  08    LFTs LIVER FUNCTIONS - ( 16 Aug 2020 16:50 )  Alb: 2.7 g/dL / Pro: 5.7 g/dL / ALK PHOS: 29 U/L / ALT: 22 U/L / AST: 36 U/L / GGT: x           Coagulopathy PTT - ( 16 Aug 2020 17:19 )  PTT:37.0 sec  Lipid Panel 07-29 Chol -- LDL -- HDL -- Trig 52  A1c   Cardiac enzymes CARDIAC MARKERS ( 16 Aug 2020 07:59 )  .624 ng/mL / x     / 129 U/L / x     / 1.6 ng/mL  CARDIAC MARKERS ( 15 Aug 2020 17:26 )  .674 ng/mL / x     / 98 U/L / x     / 2.0 ng/mL  CARDIAC MARKERS ( 15 Aug 2020 10:23 )  .808 ng/mL / x     / 92 U/L / x     / 3.0 ng/mL      U/A Urinalysis Basic - ( 16 Aug 2020 15:09 )    Color: Yellow / Appearance: Slightly Turbid / S.015 / pH: x  Gluc: x / Ketone: Moderate  / Bili: Negative / Urobili: Negative   Blood: x / Protein: Negative / Nitrite: Negative   Leuk Esterase: Negative / RBC: >50 /HPF / WBC 0-2   Sq Epi: x / Non Sq Epi: Occasional / Bacteria: Occasional      STUDIES & IMAGING:  Studies (EKG, EEG, EMG, etc):     Radiology (XR, CT, MR, U/S, TTE/GROVER):    < from: CT Head No Cont (08.15.20 @ 18:28) >  FINDINGS:    There is no CT evidence of acute intracranial hemorrhage, mass effect, midline shift, or acute, large territorial infarct.    The ventricles and sulci are normal in size and configuration. The basal cisterns are patent.    The mastoid air cells and middle ear cavities are grossly clear. There is no significant paranasal sinus mucosal thickening.    The calvarium and skull base are grossly intact. .    IMPRESSION:  No acute intracranial abnormality.    < end of copied text > HPI: 61yo woman w/ h/o chronic pain, lung adenocarcinoma (s/p RML wedge resection 2018), ?mixed connective tissue disease, anxiety, right hip replacement, lumbar laminectomy, b/l oophorectomy, diverticulitis, recent acute pancreatitis and found to have right soleal DVT on eliquis, recent RLE cellulitis and recent c.diff infection initially admitted to [facility] for hyponatremia Na 121 with ccb seizure witnessed  now transferred for further seizures requiring intubation and mechanical ventilation with Neurology consult for such. At [place], on , EEG was obtained demonstrating intermittent sharp and polyspike wave discharges with generalized slowing with patient at risk for seizures and was initially started on keppra. On 8/15, patient had a code stroke called at [place] for a reported R facial droop with CTH noncon negative that was equivocally attributed to Dwayne's paralysis as she had witnessed seizures the same day. On , patient had rapid response at [place] with noted  head version towards L with eyes deviated to L and eventual GTC s/p 8mg ativan, 1g keppra, 200mg vimpat and was subsequently intubated for airway protection. During this most recent rapid response at [place], patient noted to have temperature 101.5F, but LP was deferred due to patient being on eliquis. Patient is currently on keppra 1g q12h and propofol and midazolam.     Was notified by nursing  while being set up in [facility] MICU that patient had a 30 second focal seizure starting with twitching of the left arm, eye blinking, left leg twitching more than the right leg, but since went away within 30 seconds, was not given anything.    EEG :    There was intermittent areas of sharp's and polyspike discharges that were generalized lasting up to 10 seconds in duration with no clinical correlate reported    IMPRESSION Abnormal EEG due to intermittent sharp and polyspike wave discharges with generalized slowing.    COMMENT: Patient at increased risk of generalized epilepsy.    REVIEW OF SYSTEMS  unable to obtain at this time    PAST MEDICAL & SURGICAL HISTORY:  Lung cancer  Opiate dependence  Alcohol abuse  Adenocarcinoma of lung  Mixed connective tissue disease  Depression H/O panic attack: with anxiety  S/P Laminectomy  Lumbar 3/10  Chronic pain  Diverticulitis of colon (without mention of hemorrhage)  History of laminectomy  History of lung surgery  History of oophorectomy, unilateral: bilateral  History of hip replacement, total, right: 2020  S/P lumbar laminectomy  S/P cholecystectomy  History of lung cancer: s/p wedge resection of RML    FAMILY HISTORY:  FHx: rheumatoid arthritis: in mother - with RA associated lung fibrosis  Family history of leukemia: in father    MEDICATIONS (HOME):  Home Medications:  atorvastatin 40 mg oral tablet: 1 tab(s) orally once a day (at bedtime) (16 Aug 2020 18:50)  gabapentin 600 mg oral tablet: 1 tab(s) orally 2 times a day (16 Aug 2020 18:50)  heparin 100 units/mL-D5% intravenous solution: 1  intravenous once a day  continuous infusion (16 Aug 2020 18:50)  hydrocortisone: 50 milligram(s) intravenous every 6 hours (16 Aug 2020 18:50)  hydroxychloroquine 200 mg oral tablet: 1 tab(s) orally  (16 Aug 2020 18:50)  lacosamide 200 mg/20 mL intravenous solution: 20 milliliter(s) intravenous every 12 hours (16 Aug 2020 18:50)  levETIRAcetam 100 mg/mL intravenous solution: 10 milliliter(s) intravenous every 12 hours (16 Aug 2020 18:50)  loperamide 2 mg oral capsule: 1 cap(s) orally every 6 hours, As needed, Diarrhea (16 Aug 2020 18:50)  midazolam: 1 milligram(s) intravenous every hour (16 Aug 2020 18:50)  Multiple Vitamins oral tablet: 1 tab(s) orally once a day (16 Aug 2020 18:50)  norepinephrine: 0.1 microgram(s) intravenous every hour  continuous infusion (16 Aug 2020 18:50)  ocular lubricant ophthalmic solution: 1 drop(s) to each affected eye 2 times a day (16 Aug 2020 18:50)  pancrelipase 12,000 units-38,000 units-60,000 units oral delayed release capsule: 1 cap(s) orally 3 times a day (with meals) (16 Aug 2020 18:50)  pantoprazole 40 mg intravenous injection: 40 milligram(s) intravenous once a day (16 Aug 2020 18:50)  potassium chloride 20 mEq oral powder for reconstitution: 2 packet(s) orally every 4 hours (16 Aug 2020 18:50)  propofol: 50 microgram(s) intravenous infusion (16 Aug 2020 18:50)  vancomycin 125 mg oral capsule: 1 cap(s) orally every 6 hours (12 Aug 2020 14:09)    MEDICATIONS  (STANDING):  acyclovir IVPB      cefTRIAXone   IVPB 2000 milliGRAM(s) IV Intermittent every 12 hours  chlorhexidine 0.12% Liquid 15 milliLiter(s) Oral Mucosa every 12 hours  chlorhexidine 4% Liquid 1 Application(s) Topical <User Schedule>  dextrose 5% + lactated ringers. 1000 milliLiter(s) (75 mL/Hr) IV Continuous <Continuous>  heparin  Infusion.  Unit(s)/Hr (9 mL/Hr) IV Continuous <Continuous>  methylPREDNISolone sodium succinate Injectable 20 milliGRAM(s) IV Push every 8 hours  metroNIDAZOLE  IVPB 500 milliGRAM(s) IV Intermittent once  metroNIDAZOLE  IVPB      midazolam Infusion. 0.084 mG/kG/Hr (4 mL/Hr) IV Continuous <Continuous>  propofol Infusion. 50 MICROgram(s)/kG/Min (14.3 mL/Hr) IV Continuous <Continuous>  vancomycin    Solution 125 milliGRAM(s) Oral every 6 hours    MEDICATIONS  (PRN):  heparin   Injectable 4000 Unit(s) IV Push every 6 hours PRN For aPTT less than 40  heparin   Injectable 2000 Unit(s) IV Push every 6 hours PRN For aPTT between 40 - 57    ALLERGIES/INTOLERANCES:  Allergies  penicillin (Other)  penicillin (Swelling)    VITALS & EXAMINATION:  Vital Signs Last 24 Hrs  T(C): 36.7 (16 Aug 2020 19:32), Max: 38.7 (16 Aug 2020 14:00)  T(F): 98 (16 Aug 2020 19:32), Max: 101.6 (16 Aug 2020 14:00)  HR: 75 (16 Aug 2020 20:48) (75 - 104)  BP: 96/53 (16 Aug 2020 18:30) (79/51 - 167/84)  BP(mean): 67 (16 Aug 2020 18:30) (60 - 113)  RR: 28 (16 Aug 2020 18:30) (16 - 40)  SpO2: 100% (16 Aug 2020 20:48) (95% - 100%)     Neurological (>12):  MS: intubated, sedated on propofol 50 and versed 2, does not respond to verbal stimuli, postures with noxious stimuli upon all extremities and with nurse suctioning    CNs: 2mm pupils OD, OS, minimally reactive to light, no btt b/l, no gross facial asymmetry    Motor: extensor posturing in both arms noted when applying deep nailbed pressure  triple flexion in both legs upon deep nailbed pressure in both feet     Reflexes:                  Plantar Resp  R		mute   L		mute     LABORATORY:  CBC                       11.8   8.42  )-----------( 359      ( 16 Aug 2020 16:50 )             35.3     Chem 08-16    141  |  110<H>  |  4<L>  ----------------------------<  96  3.1<L>   |  21<L>  |  0.47<L>    Ca    7.7<L>      16 Aug 2020 16:50  Phos  4.1     08-  Mg     1.7     08-16    TPro  5.7<L>  /  Alb  2.7<L>  /  TBili  0.4  /  DBili  x   /  AST  36  /  ALT  22  /  AlkPhos  29<L>  08-16    LFTs LIVER FUNCTIONS - ( 16 Aug 2020 16:50 )  Alb: 2.7 g/dL / Pro: 5.7 g/dL / ALK PHOS: 29 U/L / ALT: 22 U/L / AST: 36 U/L / GGT: x           Coagulopathy PTT - ( 16 Aug 2020 17:19 )  PTT:37.0 sec  Lipid Panel 07-29 Chol -- LDL -- HDL -- Trig 52  A1c   Cardiac enzymes CARDIAC MARKERS ( 16 Aug 2020 07:59 )  .624 ng/mL / x     / 129 U/L / x     / 1.6 ng/mL  CARDIAC MARKERS ( 15 Aug 2020 17:26 )  .674 ng/mL / x     / 98 U/L / x     / 2.0 ng/mL  CARDIAC MARKERS ( 15 Aug 2020 10:23 )  .808 ng/mL / x     / 92 U/L / x     / 3.0 ng/mL      U/A Urinalysis Basic - ( 16 Aug 2020 15:09 )    Color: Yellow / Appearance: Slightly Turbid / S.015 / pH: x  Gluc: x / Ketone: Moderate  / Bili: Negative / Urobili: Negative   Blood: x / Protein: Negative / Nitrite: Negative   Leuk Esterase: Negative / RBC: >50 /HPF / WBC 0-2   Sq Epi: x / Non Sq Epi: Occasional / Bacteria: Occasional      STUDIES & IMAGING:  Studies (EKG, EEG, EMG, etc):     Radiology (XR, CT, MR, U/S, TTE/GROVER):    < from: CT Head No Cont (08.15.20 @ 18:28) >  FINDINGS:    There is no CT evidence of acute intracranial hemorrhage, mass effect, midline shift, or acute, large territorial infarct.    The ventricles and sulci are normal in size and configuration. The basal cisterns are patent.    The mastoid air cells and middle ear cavities are grossly clear. There is no significant paranasal sinus mucosal thickening.    The calvarium and skull base are grossly intact. .    IMPRESSION:  No acute intracranial abnormality.    < end of copied text >

## 2020-08-16 NOTE — PROCEDURE NOTE - NSTRACHPOSTINTU_RESP_A_CORE
Breath sounds equal/Breath sounds bilateral/Chest X-Ray/Positive end tidal Co2 noted/Chest excursion noted

## 2020-08-16 NOTE — PROGRESS NOTE ADULT - SUBJECTIVE AND OBJECTIVE BOX
Neurology Follow up note    JULIO CESAR [id number]    HPI:  60 yr old with PMH of ch pain with Opiate and ETOH dependency and abuse, alcoholic pancreatitis, Mixed connective tissue disorder, left soleal vein thrombosis, adeno ca lung- s/p RML resection 2018, ex smoker,rec leg cellulitis, was admitted in [facility] with alcoholic pancreatitis and HTN and discharged on 7/31, after one day at home pt went to [facility] for cellulitis treated with doxycycline and is improving, and 2 days back went to [facility] and lian told to drink lot of water and now says feeling weak and ataxic, In ED syo Na 120 , and received IV fluids, refused vancomycin as improving and is admitted to [facility] for Hyponatremia, likely  excessive water intake.pt took last drink 2 weeks back and thinks she is withdrawing.  admitted for further care (11 Aug 2020 15:36)      Interval History -noted to have left sided focal motor seizure with generalization    Patient is seen, chart was reviewed and case was discussed with the treatment team.  Pt is not in any distress.   Lying on bed comfortably.   No events reported overnight.       Vital Signs Last 24 Hrs  T(C): 38.7 (16 Aug 2020 14:00), Max: 38.7 (16 Aug 2020 14:00)  T(F): 101.6 (16 Aug 2020 14:00), Max: 101.6 (16 Aug 2020 14:00)  HR: 86 (16 Aug 2020 14:50) (77 - 104)  BP: 112/74 (16 Aug 2020 14:40) (79/51 - 167/84)  BP(mean): 88 (16 Aug 2020 14:40) (60 - 102)  RR: 16 (16 Aug 2020 14:40) (16 - 24)  SpO2: 99% (16 Aug 2020 14:50) (95% - 100%)        REVIEW OF SYSTEMS:    unobtainable as she is having seizure    On Neurological Examination:    Mental Status - Pt is unresposive      Cranial Nerves - Pupils 3 mm equal and reactive to light,   Pt has no  facial asymmetry.   Tongue - is in midline.    Muscle tone - is normal     Motor Exam -unable to assess due ongoing seizure(gtc)  .    Deep tendon Reflexes - 2 plus all over.         Neck Supple -  Yes.     MEDICATIONS    apixaban 5 milliGRAM(s) Oral two times a day  artificial tears (preservative free) Ophthalmic Solution 1 Drop(s) Both EYES two times a day  chlordiazePOXIDE 5 milliGRAM(s) Oral every 6 hours PRN  gabapentin 600 milliGRAM(s) Oral two times a day  hydroxychloroquine 200 milliGRAM(s) Oral <User Schedule>  levETIRAcetam  IVPB 500 milliGRAM(s) IV Intermittent every 12 hours  loperamide 2 milliGRAM(s) Oral every 6 hours PRN  LORazepam     Tablet 1 milliGRAM(s) Oral every 6 hours  methylPREDNISolone 4 milliGRAM(s) Oral daily  multivitamin 1 Tablet(s) Oral daily  ondansetron    Tablet 4 milliGRAM(s) Oral every 8 hours PRN  oxyCODONE    IR 10 milliGRAM(s) Oral every 6 hours PRN  pancrelipase  (CREON 12,000 Lipase Units) 1 Capsule(s) Oral three times a day with meals  pantoprazole    Tablet 40 milliGRAM(s) Oral before breakfast  sodium chloride 0.9%. 1000 milliLiter(s) IV Continuous <Continuous>  vancomycin    Solution 125 milliGRAM(s) Oral every 6 hours      Allergies    penicillin (Other)  penicillin (Swelling)    Intolerances            08-15    142  |  108  |  2<L>  ----------------------------<  103<H>  4.6   |  28  |  0.82    Ca    9.4      15 Aug 2020 10:23  Phos  2.5     08-15  Mg     2.0     08-15    TPro  6.6  /  Alb  3.2<L>  /  TBili  0.4  /  DBili  x   /  AST  35  /  ALT  26  /  AlkPhos  36<L>  08-15      Hemoglobin A1C:     Vitamin B12     RADIOLOGY    ASSESSMENT AND PLAN:      seen multiple episodes of sided seizure followed by weakness ? Dwayne's palsy  status epilepticus  AMS  SUBSTANCE ABUSE  ALCOHOL WITHDRAWAL    WAS GIVEN SEVERAL DOSES OF ATIVAN (10 MG)  keppra 1000 mg ivpb XI FOLLOWED BY 1000 MG BID  ICU EVAL; BEING INTUBATED  ADD VIMPAT  Physical therapy evaluation.  Pain is accessed and addressed.  Would continue to follow.

## 2020-08-16 NOTE — PROCEDURE NOTE - ADDITIONAL PROCEDURE DETAILS
Patient with status epilepticus and acute hypoxemic respiratory failure. Anterior airway- bottom half of vocal cords visualized. Intubated on first attempt with 7.5mm ETT without complications. Procedure performed independently of critical care time.

## 2020-08-16 NOTE — DISCHARGE NOTE PROVIDER - NSDCCPCAREPLAN_GEN_ALL_CORE_FT
PRINCIPAL DISCHARGE DIAGNOSIS  Diagnosis: Cellulitis  Assessment and Plan of Treatment: status epilepticus      SECONDARY DISCHARGE DIAGNOSES  Diagnosis: Hyponatremia  Assessment and Plan of Treatment:

## 2020-08-16 NOTE — PROGRESS NOTE ADULT - SUBJECTIVE AND OBJECTIVE BOX
Patient is a 60y old  Female who presents with a chief complaint of rt leg cellulitis, dizziness (16 Aug 2020 10:36)      INTERVAL HPI/OVERNIGHT EVENTS:overnight events noted    Home Medications:  ALPRAZolam 0.25 mg oral tablet: 0.25 milligram(s) orally 2 times a day, As Needed (12 Aug 2020 14:09)  cloNIDine 0.1 mg oral tablet: 1 tab(s) orally 2 times a day (12 Aug 2020 14:09)  Creon 12,000 units oral delayed release capsule: 1 cap(s) orally 3 times a day (12 Aug 2020 14:09)  doxycycline hyclate 100 mg oral capsule: 1 cap(s) orally 2 times a day (12 Aug 2020 14:09)  fentaNYL 25 mcg/hr transdermal film, extended release: 1 film(s) transdermal every 72 hours    *Of note: Patient applied patch this morning, 08/11/20 (12 Aug 2020 01:14)  fentaNYL 25 mcg/hr transdermal film, extended release: 1 film(s) transdermal every 72 hours (12 Aug 2020 14:09)  gabapentin 600 mg oral tablet: 600 milligram(s) orally 3 times a day (12 Aug 2020 14:09)  gabapentin 600 mg oral tablet: 2 tab(s) orally 2 times a day (12 Aug 2020 01:14)  hydroxychloroquine 200 mg oral tablet: 200 milligram(s) orally every other day (12 Aug 2020 14:09)  methylPREDNISolone 4 mg oral tablet: orally once a day (12 Aug 2020 14:09)  methylPREDNISolone 4 mg oral tablet: 1 tab(s) orally once a day (12 Aug 2020 01:14)  Multiple Vitamins oral tablet: 1 tab(s) orally once a day (12 Aug 2020 01:14)  ondansetron 4 mg oral tablet: 1 tab(s) orally every 8 hours (12 Aug 2020 14:09)  oxyCODONE 10 mg oral tablet: 10 milligram(s) orally every 4 hours, As Needed (12 Aug 2020 14:09)  oxyCODONE 10 mg oral tablet: 1 tab(s) orally 4-5times a day, As Needed (12 Aug 2020 01:14)  pantoprazole: 40 milligram(s) orally once a day (12 Aug 2020 14:09)  Plaquenil 200 mg oral tablet: 1 tab(s) orally every other day (12 Aug 2020 01:14)  Restasis 0.05% ophthalmic emulsion: 1 drop(s) to each affected eye every 12 hours (12 Aug 2020 01:14)  vancomycin 125 mg oral capsule: 1 cap(s) orally every 6 hours (12 Aug 2020 14:09)  vancomycin 125 mg oral capsule: 1 cap(s) orally every 6 hours    8am, 2pm, 8pm, 2am (12 Aug 2020 01:14)  Xanax 0.25 mg oral tablet: 1 tab(s) orally 2 times a day, As Needed (12 Aug 2020 01:14)      MEDICATIONS  (STANDING):  apixaban 5 milliGRAM(s) Oral two times a day  artificial tears (preservative free) Ophthalmic Solution 1 Drop(s) Both EYES two times a day  aspirin 325 milliGRAM(s) Oral daily  atorvastatin 40 milliGRAM(s) Oral at bedtime  gabapentin 600 milliGRAM(s) Oral two times a day  hydroxychloroquine 200 milliGRAM(s) Oral <User Schedule>  levETIRAcetam  IVPB 500 milliGRAM(s) IV Intermittent every 12 hours  LORazepam     Tablet 1 milliGRAM(s) Oral every 6 hours  methylPREDNISolone 4 milliGRAM(s) Oral daily  multivitamin 1 Tablet(s) Oral daily  pancrelipase  (CREON 12,000 Lipase Units) 1 Capsule(s) Oral three times a day with meals  pantoprazole    Tablet 40 milliGRAM(s) Oral before breakfast  sodium chloride 0.9%. 1000 milliLiter(s) (60 mL/Hr) IV Continuous <Continuous>  vancomycin    Solution 125 milliGRAM(s) Oral every 6 hours    MEDICATIONS  (PRN):  chlordiazePOXIDE 5 milliGRAM(s) Oral every 6 hours PRN for ciwa > 6  loperamide 2 milliGRAM(s) Oral every 6 hours PRN Diarrhea  ondansetron    Tablet 4 milliGRAM(s) Oral every 8 hours PRN Nausea and/or Vomiting  oxyCODONE    IR 10 milliGRAM(s) Oral every 6 hours PRN Moderate Pain (4 - 6)      Allergies    penicillin (Other)  penicillin (Swelling)    Intolerances        REVIEW OF SYSTEMS:pt gives limited answers and i sin constant pain  Vital Signs Last 24 Hrs  T(C): 37.7 (16 Aug 2020 07:46), Max: 38.1 (16 Aug 2020 04:42)  T(F): 99.8 (16 Aug 2020 07:46), Max: 100.6 (16 Aug 2020 04:42)  HR: 97 (16 Aug 2020 07:46) (87 - 104)  BP: 104/66 (16 Aug 2020 07:46) (104/66 - 132/84)  BP(mean): --  RR: 19 (16 Aug 2020 07:46) (18 - 20)  SpO2: 96% (16 Aug 2020 07:46) (95% - 98%)    PHYSICAL EXAM:  GENERAL:  well-groomed, well-developed  HEAD:  Atraumatic, Normocephalic  EYES: EOMI, PERRLA, conjunctiva and sclera clear  ENMT: Moist mucous membranes,   NECK: Supple, No JVD, Normal thyroid  NERVOUS SYSTEM:  Alert & Oriented X3, difficulty ambulating, intermittently having seizures suspected pseudo seizures  CHEST/LUNG: Clear to percussion bilaterally; No rales, rhonchi, wheezing, or rubs  HEART: Regular rate and rhythm; No murmurs, rubs, or gallops  ABDOMEN: Soft, Nontender, Nondistended; Bowel sounds present  EXTREMITIES:  2+ Peripheral Pulses, No clubbing, cyanosis, or edema  SKIN: No rashes or lesions    LABS:                        13.3   7.49  )-----------( 359      ( 15 Aug 2020 10:23 )             40.2     08-15    142  |  108  |  2<L>  ----------------------------<  103<H>  4.6   |  28  |  0.82    Ca    9.4      15 Aug 2020 10:23  Phos  2.5     08-15  Mg     2.0     08-15    TPro  6.6  /  Alb  3.2<L>  /  TBili  0.4  /  DBili  x   /  AST  35  /  ALT  26  /  AlkPhos  36<L>  08-15        CAPILLARY BLOOD GLUCOSE            Culture - Urine (collected 08-11-20 @ 15:23)  Source: .Urine Clean Catch (Midstream)  Final Report (08-12-20 @ 14:52):    <10,000 CFU/mL Normal Urogenital Rubia    Culture - Blood (collected 08-11-20 @ 15:21)  Source: .Blood Blood-Peripheral  Preliminary Report (08-12-20 @ 16:01):    No growth to date.    Culture - Blood (collected 08-11-20 @ 15:21)  Source: .Blood Blood-Peripheral  Preliminary Report (08-12-20 @ 16:01):    No growth to date.        I&O's Summary    15 Aug 2020 07:01  -  16 Aug 2020 07:00  --------------------------------------------------------  IN: 860 mL / OUT: 1500 mL / NET: -640 mL        RADIOLOGY & ADDITIONAL TESTS:    Imaging Personally Reviewed:  [x ] YES  [ ] NO    Consultant(s) Notes Reviewed:  [x ] YES  [ ] NO    Care Discussed with Consultants/Other Providers [ x] YES  [ ] NO

## 2020-08-17 PROBLEM — F10.10 ALCOHOL ABUSE, UNCOMPLICATED: Chronic | Status: ACTIVE | Noted: 2020-08-11

## 2020-08-17 PROBLEM — C34.90 MALIGNANT NEOPLASM OF UNSPECIFIED PART OF UNSPECIFIED BRONCHUS OR LUNG: Chronic | Status: ACTIVE | Noted: 2020-08-11

## 2020-08-17 PROBLEM — F11.20 OPIOID DEPENDENCE, UNCOMPLICATED: Chronic | Status: ACTIVE | Noted: 2020-08-11

## 2020-08-17 LAB
APTT BLD: 123.5 SEC — CRITICAL HIGH (ref 27.5–35.5)
APTT BLD: 54.7 SEC — HIGH (ref 27.5–35.5)
APTT BLD: 75.7 SEC — HIGH (ref 27.5–35.5)
CULTURE RESULTS: SIGNIFICANT CHANGE UP
FERRITIN SERPL-MCNC: 583 NG/ML — HIGH (ref 15–150)
GLUCOSE BLDC GLUCOMTR-MCNC: 121 MG/DL — HIGH (ref 70–99)
GLUCOSE BLDC GLUCOMTR-MCNC: 139 MG/DL — HIGH (ref 70–99)
HAPTOGLOB SERPL-MCNC: 181 MG/DL — SIGNIFICANT CHANGE UP (ref 34–200)
HCT VFR BLD CALC: 28.9 % — LOW (ref 34.5–45)
HGB BLD-MCNC: 8.9 G/DL — LOW (ref 11.5–15.5)
IRON SATN MFR SERPL: 17 % — SIGNIFICANT CHANGE UP (ref 14–50)
IRON SATN MFR SERPL: 36 UG/DL — SIGNIFICANT CHANGE UP (ref 30–160)
LDH SERPL L TO P-CCNC: 218 U/L — SIGNIFICANT CHANGE UP (ref 50–242)
MCHC RBC-ENTMCNC: 30.8 GM/DL — LOW (ref 32–36)
MCHC RBC-ENTMCNC: 36.3 PG — HIGH (ref 27–34)
MCV RBC AUTO: 118 FL — HIGH (ref 80–100)
NRBC # BLD: 0 /100 WBCS — SIGNIFICANT CHANGE UP (ref 0–0)
PLATELET # BLD AUTO: 187 K/UL — SIGNIFICANT CHANGE UP (ref 150–400)
PROLACTIN SERPL-MCNC: 24.4 NG/ML — HIGH (ref 3.4–24.1)
RBC # BLD: 2.45 M/UL — LOW (ref 3.8–5.2)
RBC # BLD: 3.28 M/UL — LOW (ref 3.8–5.2)
RBC # FLD: 13.2 % — SIGNIFICANT CHANGE UP (ref 10.3–14.5)
RETICS #: 57.1 K/UL — SIGNIFICANT CHANGE UP (ref 25–125)
RETICS/RBC NFR: 1.7 % — SIGNIFICANT CHANGE UP (ref 0.5–2.5)
SARS-COV-2 IGG SERPL QL IA: NEGATIVE — SIGNIFICANT CHANGE UP
SARS-COV-2 IGM SERPL IA-ACNC: <0.1 INDEX — SIGNIFICANT CHANGE UP
SPECIMEN SOURCE: SIGNIFICANT CHANGE UP
TIBC SERPL-MCNC: 216 UG/DL — LOW (ref 220–430)
TRANSFERRIN SERPL-MCNC: 167 MG/DL — LOW (ref 200–360)
UIBC SERPL-MCNC: 180 UG/DL — SIGNIFICANT CHANGE UP (ref 110–370)
WBC # BLD: 4.05 K/UL — SIGNIFICANT CHANGE UP (ref 3.8–10.5)
WBC # FLD AUTO: 4.05 K/UL — SIGNIFICANT CHANGE UP (ref 3.8–10.5)

## 2020-08-17 PROCEDURE — 99223 1ST HOSP IP/OBS HIGH 75: CPT

## 2020-08-17 PROCEDURE — 95720 EEG PHY/QHP EA INCR W/VEEG: CPT

## 2020-08-17 PROCEDURE — 99291 CRITICAL CARE FIRST HOUR: CPT | Mod: 25

## 2020-08-17 PROCEDURE — 31500 INSERT EMERGENCY AIRWAY: CPT

## 2020-08-17 PROCEDURE — 71045 X-RAY EXAM CHEST 1 VIEW: CPT | Mod: 26

## 2020-08-17 RX ORDER — VANCOMYCIN HCL 1 G
750 VIAL (EA) INTRAVENOUS ONCE
Refills: 0 | Status: COMPLETED | OUTPATIENT
Start: 2020-08-17 | End: 2020-08-17

## 2020-08-17 RX ORDER — NOREPINEPHRINE BITARTRATE/D5W 8 MG/250ML
0.05 PLASTIC BAG, INJECTION (ML) INTRAVENOUS
Qty: 8 | Refills: 0 | Status: DISCONTINUED | OUTPATIENT
Start: 2020-08-17 | End: 2020-08-19

## 2020-08-17 RX ORDER — FOSPHENYTOIN 50 MG/ML
140 INJECTION INTRAMUSCULAR; INTRAVENOUS EVERY 12 HOURS
Refills: 0 | Status: DISCONTINUED | OUTPATIENT
Start: 2020-08-18 | End: 2020-08-22

## 2020-08-17 RX ORDER — HYDROXYCHLOROQUINE SULFATE 200 MG
200 TABLET ORAL DAILY
Refills: 0 | Status: DISCONTINUED | OUTPATIENT
Start: 2020-08-17 | End: 2020-08-26

## 2020-08-17 RX ORDER — FOSPHENYTOIN 50 MG/ML
950 INJECTION INTRAMUSCULAR; INTRAVENOUS ONCE
Refills: 0 | Status: COMPLETED | OUTPATIENT
Start: 2020-08-17 | End: 2020-08-17

## 2020-08-17 RX ORDER — VANCOMYCIN HCL 1 G
750 VIAL (EA) INTRAVENOUS EVERY 12 HOURS
Refills: 0 | Status: DISCONTINUED | OUTPATIENT
Start: 2020-08-18 | End: 2020-08-19

## 2020-08-17 RX ORDER — HYDROXYCHLOROQUINE SULFATE 200 MG
200 TABLET ORAL DAILY
Refills: 0 | Status: DISCONTINUED | OUTPATIENT
Start: 2020-08-17 | End: 2020-08-17

## 2020-08-17 RX ORDER — VANCOMYCIN HCL 1 G
VIAL (EA) INTRAVENOUS
Refills: 0 | Status: DISCONTINUED | OUTPATIENT
Start: 2020-08-17 | End: 2020-08-19

## 2020-08-17 RX ADMIN — HEPARIN SODIUM 1000 UNIT(S)/HR: 5000 INJECTION INTRAVENOUS; SUBCUTANEOUS at 13:48

## 2020-08-17 RX ADMIN — Medication 200 MILLIGRAM(S): at 11:55

## 2020-08-17 RX ADMIN — CHLORHEXIDINE GLUCONATE 15 MILLILITER(S): 213 SOLUTION TOPICAL at 05:03

## 2020-08-17 RX ADMIN — Medication 20 MILLIGRAM(S): at 01:10

## 2020-08-17 RX ADMIN — LACOSAMIDE 140 MILLIGRAM(S): 50 TABLET ORAL at 12:54

## 2020-08-17 RX ADMIN — SODIUM CHLORIDE 40 MILLILITER(S): 9 INJECTION, SOLUTION INTRAVENOUS at 18:55

## 2020-08-17 RX ADMIN — CEFTRIAXONE 100 MILLIGRAM(S): 500 INJECTION, POWDER, FOR SOLUTION INTRAMUSCULAR; INTRAVENOUS at 12:12

## 2020-08-17 RX ADMIN — Medication 125 MILLIGRAM(S): at 23:57

## 2020-08-17 RX ADMIN — Medication 250 MILLIGRAM(S): at 18:13

## 2020-08-17 RX ADMIN — Medication 259.6 MILLIGRAM(S): at 16:31

## 2020-08-17 RX ADMIN — Medication 125 MILLIGRAM(S): at 18:29

## 2020-08-17 RX ADMIN — Medication 20 MILLIGRAM(S): at 18:12

## 2020-08-17 RX ADMIN — PROPOFOL 14.3 MICROGRAM(S)/KG/MIN: 10 INJECTION, EMULSION INTRAVENOUS at 11:54

## 2020-08-17 RX ADMIN — LEVETIRACETAM 400 MILLIGRAM(S): 250 TABLET, FILM COATED ORAL at 10:08

## 2020-08-17 RX ADMIN — CHLORHEXIDINE GLUCONATE 1 APPLICATION(S): 213 SOLUTION TOPICAL at 05:02

## 2020-08-17 RX ADMIN — Medication 259.6 MILLIGRAM(S): at 00:20

## 2020-08-17 RX ADMIN — Medication 125 MILLIGRAM(S): at 11:54

## 2020-08-17 RX ADMIN — CHLORHEXIDINE GLUCONATE 15 MILLILITER(S): 213 SOLUTION TOPICAL at 18:12

## 2020-08-17 RX ADMIN — FOSPHENYTOIN 138 MILLIGRAM(S) PE: 50 INJECTION INTRAMUSCULAR; INTRAVENOUS at 18:10

## 2020-08-17 RX ADMIN — Medication 100 MILLIGRAM(S): at 05:03

## 2020-08-17 RX ADMIN — SODIUM CHLORIDE 75 MILLILITER(S): 9 INJECTION, SOLUTION INTRAVENOUS at 11:54

## 2020-08-17 RX ADMIN — Medication 259.6 MILLIGRAM(S): at 07:36

## 2020-08-17 RX ADMIN — CEFTRIAXONE 100 MILLIGRAM(S): 500 INJECTION, POWDER, FOR SOLUTION INTRAMUSCULAR; INTRAVENOUS at 01:26

## 2020-08-17 RX ADMIN — Medication 125 MILLIGRAM(S): at 01:11

## 2020-08-17 RX ADMIN — Medication 125 MILLIGRAM(S): at 05:03

## 2020-08-17 RX ADMIN — HEPARIN SODIUM 900 UNIT(S)/HR: 5000 INJECTION INTRAVENOUS; SUBCUTANEOUS at 05:53

## 2020-08-17 RX ADMIN — PANTOPRAZOLE SODIUM 40 MILLIGRAM(S): 20 TABLET, DELAYED RELEASE ORAL at 05:03

## 2020-08-17 RX ADMIN — PANTOPRAZOLE SODIUM 40 MILLIGRAM(S): 20 TABLET, DELAYED RELEASE ORAL at 18:12

## 2020-08-17 RX ADMIN — LACOSAMIDE 140 MILLIGRAM(S): 50 TABLET ORAL at 02:00

## 2020-08-17 RX ADMIN — PROPOFOL 14.3 MICROGRAM(S)/KG/MIN: 10 INJECTION, EMULSION INTRAVENOUS at 21:00

## 2020-08-17 RX ADMIN — Medication 20 MILLIGRAM(S): at 10:08

## 2020-08-17 RX ADMIN — LEVETIRACETAM 400 MILLIGRAM(S): 250 TABLET, FILM COATED ORAL at 22:00

## 2020-08-17 NOTE — PROGRESS NOTE ADULT - ATTENDING COMMENTS
1. Acute hypoxemic respiratory failure after seizures. Continue current AC vent settings.  2. Neuro. Statu epilepticus. Continue Versed and propofol.  Continue Keppra and Vimpat. Await EEG. Etiology of seizures unclear at this time. CT head negative for acute changes. Plan for LP tomorrow after Eliquis off for > 48 hrs. Continue with Acyclovir , ceftriaxone and add Vancomycin. Obtain MRI.  3. H/o of polysubstance abuse. Pt currently sedated.  4/ DVT soleal. Was on Eliquis now on IV heparin. ? DVT more extensive. Repeat DVT study. 1. Acute hypoxemic respiratory failure after seizures. Continue current AC vent settings.  2. Neuro. Statu epilepticus. Continue Versed and propofol.  Continue Keppra and Vimpat. Await EEG. Etiology of seizures unclear at this time. CT head negative for acute changes. Plan for LP tomorrow after Eliquis off for > 48 hrs. Continue with Acyclovir , ceftriaxone and add Vancomycin. Obtain MRI.  3. H/o of polysubstance abuse. Pt currently sedated.  4/ DVT soleal. Was on Eliquis now on IV heparin. ? DVT more extensive. Repeat DVT study.  5. H/o Lung ca with RML lobectomy.  6. H/O  INNA. On steroids and Plaquenil.

## 2020-08-17 NOTE — CONSULT NOTE ADULT - SUBJECTIVE AND OBJECTIVE BOX
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 She was treated on the medicine service with an appropriate rate of correction in serum Na. Noted to have focal seizure activity of LUE on  which broke with IV lorazepam. Seizure activity believed to be secondary to benzodiazepine withdrawal. EEG at one point showed epileptiform activity and she was started on Keppra although continued to have periodic seizure activity. CT head unremarkable for acute pathology.     On , RRT called because patient found to be unresponsive with generalized seizure activity with left UE/LE/facial twitching. Seizure activity would break transiently with IV lorazepam but recurred repetitively. After 8mg lorazepam she began to lose airway protection abilities and developed hypoxemia (SpO2 70s). She was emergent intubated and given keppra load 1000mg, vimpat load 200 mg and propofol 10 mcg/kg/min infusion, which successfully suppressed her seizures. Found to have new fever of 101.6 F and hypotensive post-intubation. Started on levophed drip.      Transferred to [facility] for EEG study.     Collateral:   Anita River (Healthcare Proxy): Ms. River lives in [place], and says that over the past several months Ms. Mendoza has had several hospitalizations, but has had difficulty getting a full history from the patient who she says is routinely an unreliable historian. Ms. River believes that Ms. Mendoza has had a previous MI several years ago, and has a history of lung cancer for which she had a lobectomy for in 2019, but is again unsure of the exact medical history. She does not believe Ms. Mendoza has a diagnosed psychiatric history, but knows she has had multiple years of drug and alcohol abuse. (16 Aug 2020 20:53)    this am pt sedated, EEG leads attached to head        PAST MEDICAL & SURGICAL HISTORY:  Lung cancer  Opiate dependence  Alcohol abuse  Adenocarcinoma of lung  Mixed connective tissue disease  Depression H/O panic attack: with anxiety  S/P Laminectomy  Lumbar 3/10  Chronic pain  Diverticulitis of colon (without mention of hemorrhage)  History of laminectomy  History of lung surgery  History of oophorectomy, unilateral: bilateral  History of hip replacement, total, right: 2020  S/P lumbar laminectomy  S/P cholecystectomy  History of lung cancer: s/p wedge resection of RML      FAMILY HISTORY:  FHx: rheumatoid arthritis: in mother - with RA associated lung fibrosis  Family history of leukemia: in father      SOCIAL HISTORY:    Allergies    penicillin (Other)  penicillin (Swelling)    Intolerances          REVIEW OF SYSEMS:  General: no weakness, no fever/chills, no weight loss/gain  Skin/Breast: no rash, no jaundice  Ophthalmologic: no vision changes, no dry eyes   Respiratory and Thorax: no cough, no wheezing, no hemoptysis, no dyspnea  Cardiovascular: no chest pain, no shortness of breath, no orthopnea  Gastrointestinal: no n/v/d, no abdominal pain, no dysphagia   Genitourinary: no dysuria, no frequency, no nocturia, no hematuria  Musculoskeletal: no trauma, no sprain/strain, no myalgias, no arthralgias, no fracture  Neurological: no HA, no dizziness, no weakness, no numbness  Psychiatric: no depression, no SI/HI  Hematology/Lymphatics: no easy bruising  Endocrine: no heat or cold intolerance. no weight gain or loss  Allergic/Immunologic: no allergy or recent reaction       Vital Signs Last 24 Hrs  T(C): 37 (17 Aug 2020 12:00), Max: 37.6 (17 Aug 2020 09:00)  T(F): 98.6 (17 Aug 2020 12:00), Max: 99.7 (17 Aug 2020 09:00)  HR: 75 (17 Aug 2020 15:00) (56 - 92)  BP: 156/97 (17 Aug 2020 15:00) (80/56 - 156/97)  BP(mean): 120 (17 Aug 2020 15:00) (64 - 120)  RR: 16 (17 Aug 2020 15:00) (16 - 40)  SpO2: 100% (17 Aug 2020 15:00) (94% - 100%)  I&O's Summary    16 Aug 2020 07:01  -  17 Aug 2020 07:00  --------------------------------------------------------  IN: 1601.5 mL / OUT: 795 mL / NET: 806.5 mL    17 Aug 2020 07:01  -  17 Aug 2020 15:44  --------------------------------------------------------  IN: 1357.8 mL / OUT: 280 mL / NET: 1077.8 mL        PHYSICAL EXAM:  GENERAL: NAD, Comfortable  HEAD:  Atraumatic, Normocephalic  EYES: EOMI, PERRLA, conjunctiva and sclera clear  NECK: Supple, No JVD  CHEST/LUNG: Clear to auscultation bilaterally; No wheeze  HEART: Regular rate and rhythm; No murmurs, rubs, or gallops  ABDOMEN: Soft, Nontender, Nondistended; Bowel sounds present  Neuro: AAOx3, no focal deficit, 5/5 b/l extremities  EXTREMITIES:  2+ Peripheral Pulses, No clubbing, cyanosis, or edema  SKIN: No rashes or lesions    LABS:                        8.9    4.05  )-----------( 187      ( 17 Aug 2020 05:29 )             28.9     08-16    134<L>  |  102  |  4<L>  ----------------------------<  121<H>  4.3   |  18<L>  |  0.43<L>    Ca    9.0      16 Aug 2020 22:16  Phos  4.0     08-16  Mg     2.7     08-16    TPro  6.1  /  Alb  3.4  /  TBili  0.3  /  DBili  x   /  AST  35  /  ALT  19  /  AlkPhos  30<L>  08-16    PT/INR - ( 16 Aug 2020 22:16 )   PT: 15.6 sec;   INR: 1.33 ratio         PTT - ( 17 Aug 2020 11:57 )  PTT:54.7 sec  CAPILLARY BLOOD GLUCOSE      POCT Blood Glucose.: 121 mg/dL (17 Aug 2020 11:52)    CARDIAC MARKERS ( 16 Aug 2020 07:59 )  .624 ng/mL / x     / 129 U/L / x     / 1.6 ng/mL  CARDIAC MARKERS ( 15 Aug 2020 17:26 )  .674 ng/mL / x     / 98 U/L / x     / 2.0 ng/mL      Urinalysis Basic - ( 16 Aug 2020 15:09 )    Color: Yellow / Appearance: Slightly Turbid / S.015 / pH: x  Gluc: x / Ketone: Moderate  / Bili: Negative / Urobili: Negative   Blood: x / Protein: Negative / Nitrite: Negative   Leuk Esterase: Negative / RBC: >50 /HPF / WBC 0-2   Sq Epi: x / Non Sq Epi: Occasional / Bacteria: Occasional        RADIOLOGY & ADDITIONAL TESTS:    Imaging Personally Reviewed:  [x] YES  [ ] NO    Consultant(s) Notes Reviewed:  [x] YES  [ ] NO      MEDICATIONS  (STANDING):  acyclovir IVPB      acyclovir IVPB 480 milliGRAM(s) IV Intermittent every 8 hours  cefTRIAXone   IVPB 2000 milliGRAM(s) IV Intermittent every 12 hours  chlorhexidine 0.12% Liquid 15 milliLiter(s) Oral Mucosa every 12 hours  chlorhexidine 4% Liquid 1 Application(s) Topical <User Schedule>  dextrose 5% + lactated ringers. 1000 milliLiter(s) (75 mL/Hr) IV Continuous <Continuous>  heparin  Infusion.  Unit(s)/Hr (9 mL/Hr) IV Continuous <Continuous>  hydroxychloroquine 200 milliGRAM(s) Oral daily  lacosamide IVPB 200 milliGRAM(s) IV Intermittent every 12 hours  levETIRAcetam  IVPB 1000 milliGRAM(s) IV Intermittent every 12 hours  methylPREDNISolone sodium succinate Injectable 20 milliGRAM(s) IV Push every 8 hours  midazolam Infusion. 0.084 mG/kG/Hr (4 mL/Hr) IV Continuous <Continuous>  norepinephrine Infusion 0.05 MICROgram(s)/kG/Min (4.47 mL/Hr) IV Continuous <Continuous>  pantoprazole  Injectable 40 milliGRAM(s) IV Push two times a day  propofol Infusion. 50 MICROgram(s)/kG/Min (14.3 mL/Hr) IV Continuous <Continuous>  vancomycin    Solution 125 milliGRAM(s) Oral every 6 hours    MEDICATIONS  (PRN):  heparin   Injectable 4000 Unit(s) IV Push every 6 hours PRN For aPTT less than 40  heparin   Injectable 2000 Unit(s) IV Push every 6 hours PRN For aPTT between 40 - 57      Care Discussed with Consultants/Other Providers [x] YES  [ ] NO    HEALTH ISSUES - PROBLEM Dx:

## 2020-08-17 NOTE — PROCEDURE NOTE - SUPERVISORY STATEMENT
Patient was reintubated with new #7.5 Endotracheal Tube since OSH ETT Cuff Leak with loosing Tidal Volume >50% noted on Ventilator.

## 2020-08-17 NOTE — CHART NOTE - NSCHARTNOTEFT_GEN_A_CORE
EEG preliminary read (not final):    8/17/2020  From 10:42 AM to 4 PM    Near continuous 0.1 to 3 Hz fluctuating posterior temporal (P8 max) lateralized periodic discharges with spike morphology with super-imposed rhythmicity. (LPD +R)   Independent frequent right frontal (FP2 max) sharp wave discharges, rarely occur as very brief 0.5 to 2 Hz fluctuating periodic discharges.     Findings concerning for ictal–interictal continuum (IIC)     Above findings were discussed with the neurology team.     Final report to follow in morning tomorrow after completion of study.    Brain Marquez MD   Neurocritical Care Fellow EEG preliminary read (not final):    8/17/2020  From 10:42 AM to 4 PM    - Near continuous fluctuating 0.5 to 3 Hz right posterior-temporal (P8 max) lateralized periodic discharges with rhythmicity (LPD +R), concerning for ictal-interictal continuum (IIC).  - Frequent right frontal (Fp2 max) sharp wave discharges, rarely occurred as very brief runs of fluctuating 0.5 to 2 Hz right frontal lateralized periodic discharges (LPD).       Above findings were discussed with the neurology team.     Final report to follow in morning tomorrow after completion of study.    Brain Marquez MD   Neurocritical Care Fellow

## 2020-08-17 NOTE — CHART NOTE - NSCHARTNOTEFT_GEN_A_CORE
Spoken with epilepsy attending, Dr. Meier.    Recommended to load patient with fosphenytoin 20mg PE/kg IV x1 then start 6mg PE/kg/day divided into 2 doses.  Load with 950mg PE of fosphenytoin x1, then start 140mg PE bid.    Check levels of Keppra, Vimpat, phenytoin in AM.    Monitor BP and HR closely and if persistently hypotensive and/or bradycardic may need to discontinue fosphenytoin. Closely watch for development of any rash.  Recheck CBC, LFTs, ammonia in AM checking for anemia, thrombocytopenia, LFT elevation.    Neurology to follow closely. Spoken with epilepsy attending, Dr. Meier.    Recommended to load patient with fosphenytoin 20mg PE/kg IV x1 then start 6mg PE/kg/day divided into 2 doses.  Load with 950mg PE of fosphenytoin x1, then start 140mg PE bid.      Monitor BP and HR closely and if persistently hypotensive and/or bradycardic may need to discontinue fosphenytoin. Closely watch for development of any rash.  Recheck CBC, LFTs, ammonia in AM checking for anemia, thrombocytopenia, LFT elevation.  Check levels of Keppra, Vimpat, phenytoin in AM.    Neurology to follow closely.    Spoken to MICU team for the patient.

## 2020-08-17 NOTE — PROGRESS NOTE ADULT - SUBJECTIVE AND OBJECTIVE BOX
Patient is a 60y old  Female who presents with a chief complaint of seizures (16 Aug 2020 21:08)      24 hour events: Pt brought to MICU ON, one <10 sec focal upper extremity seizure witness by nursing staff shortly after arrival. ETT was replaced after several instances of leak with application of positive pressure.  No further sxs observed ON    REVIEW OF SYSTEMS:  Unable to assess due to intubation    OBJECTIVE:  ICU Vital Signs Last 24 Hrs  T(C): 35 (17 Aug 2020 00:00), Max: 38.7 (16 Aug 2020 14:00)  T(F): 95 (17 Aug 2020 00:00), Max: 101.6 (16 Aug 2020 14:00)  HR: 57 (17 Aug 2020 04:30) (56 - 102)  BP: 129/84 (17 Aug 2020 04:30) (79/51 - 167/84)  BP(mean): 103 (17 Aug 2020 04:30) (60 - 114)  ABP: --  ABP(mean): --  RR: 16 (17 Aug 2020 04:30) (16 - 40)  SpO2: 98% (17 Aug 2020 04:30) (94% - 100%)    Mode: AC/ CMV (Assist Control/ Continuous Mandatory Ventilation), RR (machine): 16, TV (machine): 400, FiO2: 30, PEEP: 5, ITime: 1, MAP: 8, PIP: 17    -16 @ 07:01  -  08-17 @ 05:38  --------------------------------------------------------  IN: 1308.3 mL / OUT: 795 mL / NET: 513.3 mL      CAPILLARY BLOOD GLUCOSE      POCT Blood Glucose.: 103 mg/dL (16 Aug 2020 12:45)      PHYSICAL EXAM:  CONSTITUTIONAL: Intubated, sedated  SKIN: Warm dry, normal skin turgor  HEAD: ETT in place, secured  EYES: PERRLA, no scleral icterus  CARD: RRR, no murmurs.  RESP: Upper airway gurgling sounds resonating to lungs bilaterally. No crackles or wheezing audible.  ABD: soft, non-tender, non-distended, no rebound or guarding.  EXT: No pedal edema.  PSYCH: Sedated      LINES:    HOSPITAL MEDICATIONS:  MEDICATIONS  (STANDING):  acyclovir IVPB      acyclovir IVPB 480 milliGRAM(s) IV Intermittent every 8 hours  cefTRIAXone   IVPB 2000 milliGRAM(s) IV Intermittent every 12 hours  chlorhexidine 0.12% Liquid 15 milliLiter(s) Oral Mucosa every 12 hours  chlorhexidine 4% Liquid 1 Application(s) Topical <User Schedule>  dextrose 5% + lactated ringers. 1000 milliLiter(s) (75 mL/Hr) IV Continuous <Continuous>  heparin  Infusion.  Unit(s)/Hr (9 mL/Hr) IV Continuous <Continuous>  hydroxychloroquine 200 milliGRAM(s) Oral daily  lacosamide IVPB 200 milliGRAM(s) IV Intermittent every 12 hours  levETIRAcetam  IVPB 1000 milliGRAM(s) IV Intermittent every 12 hours  methylPREDNISolone sodium succinate Injectable 20 milliGRAM(s) IV Push every 8 hours  metroNIDAZOLE  IVPB 500 milliGRAM(s) IV Intermittent every 8 hours  metroNIDAZOLE  IVPB      midazolam Infusion. 0.084 mG/kG/Hr (4 mL/Hr) IV Continuous <Continuous>  norepinephrine Infusion 0.05 MICROgram(s)/kG/Min (4.47 mL/Hr) IV Continuous <Continuous>  pantoprazole  Injectable 40 milliGRAM(s) IV Push two times a day  propofol Infusion. 50 MICROgram(s)/kG/Min (14.3 mL/Hr) IV Continuous <Continuous>  vancomycin    Solution 125 milliGRAM(s) Oral every 6 hours    MEDICATIONS  (PRN):  heparin   Injectable 4000 Unit(s) IV Push every 6 hours PRN For aPTT less than 40  heparin   Injectable 2000 Unit(s) IV Push every 6 hours PRN For aPTT between 40 - 57      LABS:                        12.0   7.18  )-----------( 306      ( 16 Aug 2020 22:16 )             37.0     Hgb Trend: 12.0<--, 11.8<--, 13.3<--, 12.3<--, 11.7<--  08-16    134<L>  |  102  |  4<L>  ----------------------------<  121<H>  4.3   |  18<L>  |  0.43<L>    Ca    9.0      16 Aug 2020 22:16  Phos  4.0       Mg     2.7         TPro  6.1  /  Alb  3.4  /  TBili  0.3  /  DBili  x   /  AST  35  /  ALT  19  /  AlkPhos  30<L>      Creatinine Trend: 0.43<--, 0.47<--, 0.82<--, 0.44<--, 0.84<--, 0.46<--  PT/INR - ( 16 Aug 2020 22:16 )   PT: 15.6 sec;   INR: 1.33 ratio         PTT - ( 16 Aug 2020 22:16 )  PTT:43.7 sec  Urinalysis Basic - ( 16 Aug 2020 15:09 )    Color: Yellow / Appearance: Slightly Turbid / S.015 / pH: x  Gluc: x / Ketone: Moderate  / Bili: Negative / Urobili: Negative   Blood: x / Protein: Negative / Nitrite: Negative   Leuk Esterase: Negative / RBC: >50 /HPF / WBC 0-2   Sq Epi: x / Non Sq Epi: Occasional / Bacteria: Occasional      Arterial Blood Gas:   @ 22:10  7.40/32/157/19/100/-4.1  ABG lactate: --  Arterial Blood Gas:   @ 14:32  7.43/31/136/22/99/-3.1  ABG lactate: --    CODE STATUS:   [facility] discussion: LUCY

## 2020-08-17 NOTE — CONSULT NOTE ADULT - ASSESSMENT
61 y/o F with a h/o EtOH and opiate abuse s/p recent admissions for alcoholic pancreatitis and alcohol withdrawal, mixed connective tissue disorder (on plaquenil and methylprednisolone), chronic pain s/p lumbar laminectomy, left soleal vein thrombosis (started on apixaban, 7/2020), lung adenocarcinoma (s/p RML resection 2018), recent RLE cellulitis s/p doxycycline, recent CDiff colitis (on PO vancomycin, started 8/3, to finish 8/17), CAD s/p possible MI (ECHO ) admitted on 8/11 with symptomatic 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   transferred from OSH  to MICU for mgmt of status epilepticus , hypoxic respi failure, hypotension on pressors       #status epilepticus  keppra and vimpat  -monitor for seizures on EEG  -Neurology recs appreciated   Rule out Meningitis   - New-onset seizures in setting of fever of 101.6 F,   - Plan for LP AM. Last Eliquis 8/15 5am.    IV ceftriaxone and acyclovir    #Hypoxic Respiratory failure 2/2 seizures  for airway protection  vent mgmt per micu team      #Chronic pancreatitis   chr alcohol abuse, last drink 3wks ago   NGT tube  IV D5 LR   -C/w Creon 12,000 Lipase  -Protonix 40 mg BID  mvi, folate    #mixed connective tissue disorder   - C/w hydroxychloroquine 200 mg   - Solumedrol 20q8hr    #DVT hx-off eliquis , heparin gtt    [facility]  [phone] 59 y/o F with a h/o EtOH and opiate abuse s/p recent admissions for alcoholic pancreatitis and alcohol withdrawal, mixed connective tissue disorder (on plaquenil and methylprednisolone), chronic pain s/p lumbar laminectomy, left soleal vein thrombosis (started on apixaban, 7/2020), lung adenocarcinoma (s/p RML resection 2018), recent RLE cellulitis s/p doxycycline, recent CDiff colitis (on PO vancomycin, started 8/3, to finish 8/17), CAD s/p possible MI (ECHO ) admitted on 8/11 with symptomatic 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   transferred from OSH  to MICU for mgmt of status epilepticus , hypoxic respi failure, hypotension on pressors       #status epilepticus  keppra and vimpat  -monitor for seizures on EEG  -Neurology recs appreciated   Rule out Meningitis   - New-onset seizures in setting of fever of 101.6 F,   - Plan for LP AM. Last Eliquis 8/15 5am.    IV ceftriaxone and acyclovir    #Hypoxic Respiratory failure 2/2 seizures  for airway protection  vent mgmt per micu team      #Chronic pancreatitis   chr alcohol abuse, last drink 3wks ago   NGT tube  IV D5 LR   -C/w Creon 12,000 Lipase  -Protonix 40 mg BID  mvi, folate    #mixed connective tissue disorder   - C/w hydroxychloroquine 200 mg   - Solumedrol 20q8hr    #DVT hx-off eliquis , heparin gtt    #anemia: hb 8.9  baseline 11-12  rpt cbc    [facility]  [phone]

## 2020-08-18 LAB
ALBUMIN SERPL ELPH-MCNC: 3.3 G/DL — SIGNIFICANT CHANGE UP (ref 3.3–5)
ALP SERPL-CCNC: 28 U/L — LOW (ref 40–120)
ALT FLD-CCNC: 20 U/L — SIGNIFICANT CHANGE UP (ref 10–45)
ANION GAP SERPL CALC-SCNC: 15 MMOL/L — SIGNIFICANT CHANGE UP (ref 5–17)
APTT BLD: 100.8 SEC — HIGH (ref 27.5–35.5)
APTT BLD: 50.4 SEC — HIGH (ref 27.5–35.5)
AST SERPL-CCNC: 31 U/L — SIGNIFICANT CHANGE UP (ref 10–40)
BASOPHILS # BLD AUTO: 0 K/UL — SIGNIFICANT CHANGE UP (ref 0–0.2)
BASOPHILS NFR BLD AUTO: 0 % — SIGNIFICANT CHANGE UP (ref 0–2)
BILIRUB SERPL-MCNC: <0.1 MG/DL — LOW (ref 0.2–1.2)
BUN SERPL-MCNC: <4 MG/DL — LOW (ref 7–23)
CALCIUM SERPL-MCNC: 8.8 MG/DL — SIGNIFICANT CHANGE UP (ref 8.4–10.5)
CHLORIDE SERPL-SCNC: 98 MMOL/L — SIGNIFICANT CHANGE UP (ref 96–108)
CO2 SERPL-SCNC: 19 MMOL/L — LOW (ref 22–31)
CREAT SERPL-MCNC: 0.38 MG/DL — LOW (ref 0.5–1.3)
EOSINOPHIL # BLD AUTO: 0.15 K/UL — SIGNIFICANT CHANGE UP (ref 0–0.5)
EOSINOPHIL NFR BLD AUTO: 2 % — SIGNIFICANT CHANGE UP (ref 0–6)
GAS PNL BLDA: SIGNIFICANT CHANGE UP
GLUCOSE BLDC GLUCOMTR-MCNC: 125 MG/DL — HIGH (ref 70–99)
GLUCOSE BLDC GLUCOMTR-MCNC: 145 MG/DL — HIGH (ref 70–99)
GLUCOSE BLDC GLUCOMTR-MCNC: 159 MG/DL — HIGH (ref 70–99)
GLUCOSE BLDC GLUCOMTR-MCNC: 197 MG/DL — HIGH (ref 70–99)
GLUCOSE SERPL-MCNC: 213 MG/DL — HIGH (ref 70–99)
HCT VFR BLD CALC: 36.9 % — SIGNIFICANT CHANGE UP (ref 34.5–45)
HGB BLD-MCNC: 12.2 G/DL — SIGNIFICANT CHANGE UP (ref 11.5–15.5)
IMM GRANULOCYTES NFR BLD AUTO: 0.4 % — SIGNIFICANT CHANGE UP (ref 0–1.5)
INR BLD: 1.03 RATIO — SIGNIFICANT CHANGE UP (ref 0.88–1.16)
LYMPHOCYTES # BLD AUTO: 0.58 K/UL — LOW (ref 1–3.3)
LYMPHOCYTES # BLD AUTO: 7.9 % — LOW (ref 13–44)
MAGNESIUM SERPL-MCNC: 1.9 MG/DL — SIGNIFICANT CHANGE UP (ref 1.6–2.6)
MCHC RBC-ENTMCNC: 33.1 GM/DL — SIGNIFICANT CHANGE UP (ref 32–36)
MCHC RBC-ENTMCNC: 33.7 PG — SIGNIFICANT CHANGE UP (ref 27–34)
MCV RBC AUTO: 101.9 FL — HIGH (ref 80–100)
MONOCYTES # BLD AUTO: 0.55 K/UL — SIGNIFICANT CHANGE UP (ref 0–0.9)
MONOCYTES NFR BLD AUTO: 7.5 % — SIGNIFICANT CHANGE UP (ref 2–14)
NEUTROPHILS # BLD AUTO: 6.01 K/UL — SIGNIFICANT CHANGE UP (ref 1.8–7.4)
NEUTROPHILS NFR BLD AUTO: 82.2 % — HIGH (ref 43–77)
NRBC # BLD: 0 /100 WBCS — SIGNIFICANT CHANGE UP (ref 0–0)
PHENYTOIN FREE SERPL-MCNC: 14.2 UG/ML — SIGNIFICANT CHANGE UP (ref 10–20)
PHOSPHATE SERPL-MCNC: 3.4 MG/DL — SIGNIFICANT CHANGE UP (ref 2.5–4.5)
PLATELET # BLD AUTO: 285 K/UL — SIGNIFICANT CHANGE UP (ref 150–400)
POTASSIUM SERPL-MCNC: 3.6 MMOL/L — SIGNIFICANT CHANGE UP (ref 3.5–5.3)
POTASSIUM SERPL-SCNC: 3.6 MMOL/L — SIGNIFICANT CHANGE UP (ref 3.5–5.3)
PROT SERPL-MCNC: 5.8 G/DL — LOW (ref 6–8.3)
PROTHROM AB SERPL-ACNC: 12.2 SEC — SIGNIFICANT CHANGE UP (ref 10.6–13.6)
RBC # BLD: 3.62 M/UL — LOW (ref 3.8–5.2)
RBC # FLD: 12.9 % — SIGNIFICANT CHANGE UP (ref 10.3–14.5)
SODIUM SERPL-SCNC: 132 MMOL/L — LOW (ref 135–145)
WBC # BLD: 7.32 K/UL — SIGNIFICANT CHANGE UP (ref 3.8–10.5)
WBC # FLD AUTO: 7.32 K/UL — SIGNIFICANT CHANGE UP (ref 3.8–10.5)

## 2020-08-18 PROCEDURE — 99232 SBSQ HOSP IP/OBS MODERATE 35: CPT

## 2020-08-18 PROCEDURE — 93970 EXTREMITY STUDY: CPT | Mod: 26

## 2020-08-18 PROCEDURE — 99291 CRITICAL CARE FIRST HOUR: CPT

## 2020-08-18 PROCEDURE — 95720 EEG PHY/QHP EA INCR W/VEEG: CPT

## 2020-08-18 PROCEDURE — 71045 X-RAY EXAM CHEST 1 VIEW: CPT | Mod: 26

## 2020-08-18 PROCEDURE — 70551 MRI BRAIN STEM W/O DYE: CPT | Mod: 26

## 2020-08-18 RX ORDER — HEPARIN SODIUM 5000 [USP'U]/ML
900 INJECTION INTRAVENOUS; SUBCUTANEOUS
Qty: 25000 | Refills: 0 | Status: DISCONTINUED | OUTPATIENT
Start: 2020-08-18 | End: 2020-08-18

## 2020-08-18 RX ORDER — HEPARIN SODIUM 5000 [USP'U]/ML
9 INJECTION INTRAVENOUS; SUBCUTANEOUS
Qty: 25000 | Refills: 0 | Status: DISCONTINUED | OUTPATIENT
Start: 2020-08-18 | End: 2020-08-18

## 2020-08-18 RX ORDER — PROPOFOL 10 MG/ML
40 INJECTION, EMULSION INTRAVENOUS
Qty: 1000 | Refills: 0 | Status: DISCONTINUED | OUTPATIENT
Start: 2020-08-18 | End: 2020-08-20

## 2020-08-18 RX ORDER — POTASSIUM CHLORIDE 20 MEQ
40 PACKET (EA) ORAL ONCE
Refills: 0 | Status: COMPLETED | OUTPATIENT
Start: 2020-08-18 | End: 2020-08-18

## 2020-08-18 RX ORDER — MAGNESIUM SULFATE 500 MG/ML
1 VIAL (ML) INJECTION ONCE
Refills: 0 | Status: COMPLETED | OUTPATIENT
Start: 2020-08-18 | End: 2020-08-18

## 2020-08-18 RX ORDER — HEPARIN SODIUM 5000 [USP'U]/ML
800 INJECTION INTRAVENOUS; SUBCUTANEOUS
Qty: 25000 | Refills: 0 | Status: DISCONTINUED | OUTPATIENT
Start: 2020-08-18 | End: 2020-08-18

## 2020-08-18 RX ADMIN — Medication 250 MILLIGRAM(S): at 06:00

## 2020-08-18 RX ADMIN — LEVETIRACETAM 400 MILLIGRAM(S): 250 TABLET, FILM COATED ORAL at 23:30

## 2020-08-18 RX ADMIN — LEVETIRACETAM 400 MILLIGRAM(S): 250 TABLET, FILM COATED ORAL at 10:35

## 2020-08-18 RX ADMIN — Medication 125 MILLIGRAM(S): at 11:07

## 2020-08-18 RX ADMIN — CHLORHEXIDINE GLUCONATE 15 MILLILITER(S): 213 SOLUTION TOPICAL at 17:40

## 2020-08-18 RX ADMIN — LACOSAMIDE 140 MILLIGRAM(S): 50 TABLET ORAL at 13:17

## 2020-08-18 RX ADMIN — Medication 20 MILLIGRAM(S): at 02:00

## 2020-08-18 RX ADMIN — Medication 40 MILLIEQUIVALENT(S): at 04:00

## 2020-08-18 RX ADMIN — Medication 259.6 MILLIGRAM(S): at 00:00

## 2020-08-18 RX ADMIN — Medication 125 MILLIGRAM(S): at 06:04

## 2020-08-18 RX ADMIN — Medication 250 MILLIGRAM(S): at 18:14

## 2020-08-18 RX ADMIN — HEPARIN SODIUM 9 UNIT(S)/HR: 5000 INJECTION INTRAVENOUS; SUBCUTANEOUS at 11:54

## 2020-08-18 RX ADMIN — HEPARIN SODIUM 900 UNIT(S)/HR: 5000 INJECTION INTRAVENOUS; SUBCUTANEOUS at 02:37

## 2020-08-18 RX ADMIN — Medication 100 GRAM(S): at 04:00

## 2020-08-18 RX ADMIN — FOSPHENYTOIN 105.6 MILLIGRAM(S) PE: 50 INJECTION INTRAMUSCULAR; INTRAVENOUS at 06:00

## 2020-08-18 RX ADMIN — PANTOPRAZOLE SODIUM 40 MILLIGRAM(S): 20 TABLET, DELAYED RELEASE ORAL at 18:14

## 2020-08-18 RX ADMIN — FOSPHENYTOIN 105.6 MILLIGRAM(S) PE: 50 INJECTION INTRAMUSCULAR; INTRAVENOUS at 17:40

## 2020-08-18 RX ADMIN — HEPARIN SODIUM 8 UNIT(S)/HR: 5000 INJECTION INTRAVENOUS; SUBCUTANEOUS at 09:50

## 2020-08-18 RX ADMIN — LACOSAMIDE 140 MILLIGRAM(S): 50 TABLET ORAL at 01:00

## 2020-08-18 RX ADMIN — CHLORHEXIDINE GLUCONATE 1 APPLICATION(S): 213 SOLUTION TOPICAL at 05:30

## 2020-08-18 RX ADMIN — Medication 259.6 MILLIGRAM(S): at 16:01

## 2020-08-18 RX ADMIN — CEFTRIAXONE 100 MILLIGRAM(S): 500 INJECTION, POWDER, FOR SOLUTION INTRAMUSCULAR; INTRAVENOUS at 00:00

## 2020-08-18 RX ADMIN — CEFTRIAXONE 100 MILLIGRAM(S): 500 INJECTION, POWDER, FOR SOLUTION INTRAMUSCULAR; INTRAVENOUS at 12:14

## 2020-08-18 RX ADMIN — Medication 259.6 MILLIGRAM(S): at 08:27

## 2020-08-18 RX ADMIN — CHLORHEXIDINE GLUCONATE 15 MILLILITER(S): 213 SOLUTION TOPICAL at 06:03

## 2020-08-18 RX ADMIN — MIDAZOLAM HYDROCHLORIDE 4 MG/KG/HR: 1 INJECTION, SOLUTION INTRAMUSCULAR; INTRAVENOUS at 08:29

## 2020-08-18 RX ADMIN — Medication 20 MILLIGRAM(S): at 23:31

## 2020-08-18 RX ADMIN — Medication 200 MILLIGRAM(S): at 11:07

## 2020-08-18 RX ADMIN — Medication 259.6 MILLIGRAM(S): at 23:30

## 2020-08-18 RX ADMIN — PROPOFOL 14.3 MICROGRAM(S)/KG/MIN: 10 INJECTION, EMULSION INTRAVENOUS at 11:07

## 2020-08-18 RX ADMIN — PANTOPRAZOLE SODIUM 40 MILLIGRAM(S): 20 TABLET, DELAYED RELEASE ORAL at 06:00

## 2020-08-18 NOTE — PROGRESS NOTE ADULT - ASSESSMENT
Assessment: 59yo woman w/ h/o chronic pain, lung adenocarcinoma (s/p RML wedge resection 2018), ?mixed connective tissue disease, anxiety, right hip replacement, lumbar laminectomy, b/l oophorectomy, diverticulitis, recent acute pancreatitis and found to have right soleal DVT on eliquis, recent RLE cellulitis and recent c.diff infection initially admitted to [facility] for hyponatremia with ccb seizure witnessed 8/13 now transferred for further seizures requiring intubation and mechanical ventilation with Neurology consult for such.     Impression: seizures 2/2 ddx including alcohol withdrawal (per chart review) vs infectious or inflammatory cause. More suspicious for infectious or inflammatory cause in setting of fevers and lateralized discharges. Last alcoholic drink >3 weeks out.     Recommendations:  [] continue Keppra 1g bid, Vimpat 200mg bid, fosphenytoin 140mg bid, Versed, propofol  [] continue EEG, will follow read  [] closely follow HR, BP, CBC, LFTs. Observe for development of rash  [] MR head w/o contrast to identify possible focus of seizures  [] LP per primary team; please order glucose, protein, cell count, CSF culture & gram stain, CSF PCR, HSV  [] continue with acyclovir, vancomycin, ceftriaxone    Case discussed with Neurology Attending Dr. Ochoa; discussed with MICU team. Assessment: 59yo woman w/ h/o chronic pain, lung adenocarcinoma (s/p RML wedge resection 2018), ?mixed connective tissue disease, anxiety, right hip replacement, lumbar laminectomy, b/l oophorectomy, diverticulitis, recent acute pancreatitis and found to have right soleal DVT on eliquis, recent RLE cellulitis and recent c.diff infection initially admitted to [facility] for hyponatremia with ccb seizure witnessed 8/13 now transferred for further seizures requiring intubation and mechanical ventilation with Neurology consult for such.     Impression: seizures 2/2 ddx including alcohol withdrawal (per chart review) vs infectious or inflammatory cause. More suspicious for infectious or inflammatory cause in setting of fevers and lateralized discharges. Last alcoholic drink >3 weeks out.     Recommendations:  [] continue Keppra 1g bid, Vimpat 200mg bid, fosphenytoin 140mg bid, Versed, propofol  [] continue EEG  [] closely follow HR, BP, CBC, LFTs. Observe for development of rash  [] MR head w/o contrast to identify possible focus of seizures  [] LP per primary team; please order glucose, protein, cell count, CSF culture & gram stain, CSF PCR, HSV  [] continue with acyclovir, vancomycin, ceftriaxone    Case discussed with Neurology Attending Dr. Ochoa; discussed with MICU team.

## 2020-08-18 NOTE — DIETITIAN INITIAL EVALUATION ADULT. - PHYSICAL APPEARANCE
Nutrition Focused Physical Assessment done, RN at bedside. Noted moderate muscle loss in thigh, calf areas, mild muscle loss in  clavicle area, mild fat loss in orbital area

## 2020-08-18 NOTE — EEG REPORT - NS EEG TEXT BOX
[facility]  Comprehensive Epilepsy Center  Report of Continuous Video EEG    [facility]: [address], [place] [address], Phone [phone]  [facility]: [address] [place], [place] [address], [place] [address], Phone [phone]  [place] [facility]: [address], [address], [place] [address] Phone [phone]    [facility]: [address] [place] [address], Phone [phone]  [place] [facility]: [address] [place] [address], Phone [phone]    Patient Name: Bel Olsen    Age: 60 year, : 1960  Patient ID: -, MRN #: MRN [id number], [place]: - [facility]  Referring Physician: -  EEG #: [id number]    Study Time/Date: 10:42:14 AM on 2020  	  End Time/Date: 8 AM on 2020          			   Duration: 21 hr 12 min     Study Information:    EEG Recording Technique:  The patient underwent continuous Video-EEG monitoring, using Telemetry System hardware on the XLTek Digital System. EEG and video data were stored on a computer hard drive with important events saved in digital archive files. The material was reviewed by a physician (electroencephalographer / epileptologist) on a daily basis. Lobo and seizure detection algorithms were utilized and reviewed. An EEG Technician attended to the patient, and was available throughout daytime work hours.  The epilepsy center neurologist was available in person or on call 24-hours per day.    EEG Placement and Labeling of Electrodes:  The EEG was performed utilizing 20 channel referential EEG connections (coronal over temporal over parasagittal montage) using all standard 10-20 electrode placements with EKG, with additional electrodes placed in the inferior temporal region using the modified 10-10 montage electrode placements for elective admissions, or if deemed necessary. Recording was at a sampling rate of 256 samples per second per channel. Time synchronized digital video recording was done simultaneously with EEG recording. A low light infrared camera was used for low light recording.     History:  61 y/o female with h/o ETOH and opiate abuse, mixed connective tissue disorder, lung CA, depression p/w seizure like activity       Pertinent Medication	  Versed	  Propofol	  Lamictal	  Fosphenytoin 	  Vimpat	  Keppra	    Interpretation:    Daily EEG Visual Analysis  Findings:   The background was spontaneously variable and reactive. Background was continuous, until about 19:00, which transitioned to near continuous with intermittent 1-2.5s of diffuse attenuation or suppression. No posterior dominant rhythm.     Background Slowing:  Background predominantly consisted of theta, delta and faster activities.    Focal Slowing:   Near continuous theta/delta slowing in the right frontotemporal region, better appreciated in clinically quiet state.    Sleep Background:  Stage II sleep transients were not recorded.    Other Non-Epileptiform Findings:  Diffuse excess beta activity.    Interictal/Ictal Activity:   - Recording started with near continuous fluctuating 0.5-3 Hz right posterior-temporal (max P8) lateralized periodic discharges with rhythmicity (LPD +R), concerning for ictal-interictal continuum (IIC). IIC slowly resolved after fosphenytoin was loaded around 18:10. Int he last few hours of recording, emergence of a stimulus-induced long run of fluctuating 1-2 Hz right posterior-temporal lateralized periodic discharges (LPD).     - Frequent to abundant right frontal (max Fp2/F4/Fz) sharp wave discharges, rarely occurred as very brief to brief runs of fluctuating 0.5 to 2 Hz right frontal lateralized periodic discharges (LPD).    Activation Procedures:   Hyperventilation was not performed.    Photic stimulation was not performed.     Artifacts:  Intermittent myogenic and movement artifacts were noted.    ECG:  The heart rate on single channel ECG was predominantly between 70-90 BPM.    EEG Summary / Classification:  Abnormal EEG in a sedated patient.  - Recording started with near continuous fluctuating 0.5-3 Hz right posterior-temporal (max P8) lateralized periodic discharges with rhythmicity (LPD +R), concerning for ictal-interictal continuum (IIC). IIC slowly resolved after fosphenytoin was loaded around 18:10. Int he last few hours of recording, emergence of a stimulus-induced long run of fluctuating 1-2 Hz right posterior-temporal lateralized periodic discharges (LPD).   - Frequent to abundant right frontal (max Fp2/F4/Fz) sharp wave discharges, rarely occurred as very brief to brief runs of fluctuating 0.5 to 2 Hz right frontal lateralized periodic discharges (LPD).  - Near continuous theta/delta slowing in the right frontotemporal region, better appreciated in clinically quiet state.  - Moderate to severe generalized slowing.    EEG Impression / Clinical Correlate:  Abnormal EEG study.  1. Right posterior-temporal LPD concerning for IIC; aborted with fosphenytoin load.  2. Epileptogenic focus in the right frontal region.   3. Structural abnormality in the right frontotemporal region.   4. Moderate to severe nonspecific diffuse or multifocal cerebral dysfunction.     Neurology service aware of findings.      Brain Marquez MD  Neurocritical Care fellow     Javier Meier MD  Attending Physician, [facility] [place]

## 2020-08-18 NOTE — CHART NOTE - NSCHARTNOTEFT_GEN_A_CORE
Upon Nutritional Assessment by the Registered Dietitian your patient was determined to meet criteria / has evidence of the following diagnosis/diagnoses:          [ ]  Mild Protein Calorie Malnutrition        [x ]  Moderate Protein Calorie Malnutrition        [ ] Severe Protein Calorie Malnutrition        [ ] Unspecified Protein Calorie Malnutrition        [ ] Underweight / BMI <19        [ ] Morbid Obesity / BMI > 40      Findings as based on:  [x ] Comprehensive nutrition assessment   [x ] Nutrition Focused Physical Exam  [ ] Other:       Nutrition Plan/Recommendations:  see Initial Nutrition assessment        PROVIDER Section:     By signing this assessment you are acknowledging and agree with the diagnosis/diagnoses assigned by the Registered Dietitian    Comments:

## 2020-08-18 NOTE — PROGRESS NOTE ADULT - ATTENDING COMMENTS
1. Acute hypoxemic respiratory failure after seizures. Continue current AC vent settings.  2. Neuro. Statu epilepticus. Continue Versed and propofol.  Continue Keppra and Vimpat. Fosphenytoin added last night for increase seizure activity. Seems to be helping on current EEG. Await reports. Etiology of seizures unclear at this time. CT head negative for acute changes. Plan for LP tomorrow after Eliquis off for > 72 hrs. Continue with Acyclovir , ceftriaxone and add Vancomycin. Obtain MRI today.  3. H/o of polysubstance abuse. Pt currently sedated.  4/ DVT soleal. Was on Eliquis now on IV heparin. ? DVT more extensive. Repeat DVT study.  5. H/o Lung ca with RML lobectomy.  6. H/O  INNA. On steroids and Plaquenil. 1. Acute hypoxemic respiratory failure after seizures. Continue current AC vent settings.  2. Neuro. Statu epilepticus. Continue Versed and propofol.  Continue Keppra and Vimpat. Fosphenytoin added last night for increase seizure activity. Seems to be helping on current EEG. Await reports. Etiology of seizures unclear at this time. CT head negative for acute changes. Plan for LP tomorrow after Eliquis off for > 72 hrs. Continue with Acyclovir , ceftriaxone and add Vancomycin. Obtain MRI today.  3. H/o of polysubstance abuse. Pt currently sedated.  4/ DVT soleal. Was on Eliquis now on IV heparin. ? DVT more extensive. Repeat DVT study.  5. H/o Lung ca with RML lobectomy.  6. H/O  INNA. On steroids and Plaquenil.  7. C diff. Continue PO vancomycin.

## 2020-08-18 NOTE — PROGRESS NOTE ADULT - SUBJECTIVE AND OBJECTIVE BOX
*************************************  NEUROLOGY PROGRESS NOTE  **************************************    JULIO CESAR FAYE  Female  MRN-[id number]    Subjective: Overnight, preliminary read notes R posterior temporal lateralized periodic discharges and frequent R frontal sharp wave discharges. No changes in mental status.     VITAL SIGNS:  Vital Signs Last 24 Hrs  T(C): 36.6 (18 Aug 2020 05:00), Max: 37.1 (17 Aug 2020 21:00)  T(F): 97.9 (18 Aug 2020 05:00), Max: 98.8 (17 Aug 2020 21:00)  HR: 85 (18 Aug 2020 08:14) (69 - 91)  BP: 152/89 (18 Aug 2020 06:00) (115/79 - 167/108)  BP(mean): 115 (18 Aug 2020 06:00) (90 - 132)  RR: 16 (18 Aug 2020 06:00) (16 - 17)  SpO2: 100% (18 Aug 2020 08:14) (100% - 100%)    PHYSICAL EXAMINATION:  General: Remains intubated and sedated.   Neurologic:  - Mental Status:  Intubated and sedated.   - Cranial Nerves II-XII:  Pupils sluggish bilaterally, 2mm bilaterally. No opening to voice or noxious stimuli. Corneal reflexes intact. Dolls eye intact.  - Motor:  No spontaneous movement and no withdrawal to noxious stimuli.   - Reflexes: 1+ patellar reflexes, Babinski downgoing.   - Sensory:  No localization to noxious stimuli  - Coordination:  Unable to test  - Gait:   Deferred    LABS:                          12.2   7.32  )-----------( 285      ( 18 Aug 2020 01:53 )             36.9     08-18    132<L>  |  98  |  <4<L>  ----------------------------<  213<H>  3.6   |  19<L>  |  0.38<L>    Ca    8.8      18 Aug 2020 01:53  Phos  3.4     08-18  Mg     1.9     08-18    TPro  5.8<L>  /  Alb  3.3  /  TBili  <0.1<L>  /  DBili  x   /  AST  31  /  ALT  20  /  AlkPhos  28<L>  08-18    PT/INR - ( 18 Aug 2020 01:53 )   PT: 12.2 sec;   INR: 1.03 ratio         PTT - ( 18 Aug 2020 09:14 )  PTT:50.4 sec      RADIOLOGY & ADDITIONAL STUDIES:      EEG:  - Near continuous fluctuating 0.5 to 3 Hz right posterior-temporal (P8 max) lateralized periodic discharges with rhythmicity (LPD +R), concerning for ictal-interictal continuum (IIC).  - Frequent right frontal (Fp2 max) sharp wave discharges, rarely occurred as very brief runs of fluctuating 0.5 to 2 Hz right frontal lateralized periodic discharges (LPD).     < from: CT Head No Cont (08.15.20 @ 18:28) >  FINDINGS:    There is no CT evidence of acute intracranial hemorrhage, mass effect, midline shift, or acute, large territorial infarct.    The ventricles and sulci are normal in size and configuration. The basal cisterns are patent.    The mastoid air cells and middle ear cavities are grossly clear. There is no significant paranasal sinus mucosal thickening.    The calvarium and skull base are grossly intact. .    IMPRESSION:  No acute intracranial abnormality.    < end of copied text > *************************************  NEUROLOGY PROGRESS NOTE  **************************************    JULIO CESAR FAYE  Female  [id number]    Subjective: Overnight, preliminary read notes R posterior temporal lateralized periodic discharges and frequent R frontal sharp wave discharges. No changes in mental status.     VITAL SIGNS:  Vital Signs Last 24 Hrs  T(C): 36.6 (18 Aug 2020 05:00), Max: 37.1 (17 Aug 2020 21:00)  T(F): 97.9 (18 Aug 2020 05:00), Max: 98.8 (17 Aug 2020 21:00)  HR: 85 (18 Aug 2020 08:14) (69 - 91)  BP: 152/89 (18 Aug 2020 06:00) (115/79 - 167/108)  BP(mean): 115 (18 Aug 2020 06:00) (90 - 132)  RR: 16 (18 Aug 2020 06:00) (16 - 17)  SpO2: 100% (18 Aug 2020 08:14) (100% - 100%)    PHYSICAL EXAMINATION:  General: Remains intubated and sedated.   Neurologic:  - Mental Status:  Intubated and sedated.   - Cranial Nerves II-XII:  Pupils sluggish bilaterally, 2mm bilaterally. No opening to voice or noxious stimuli. Corneal reflexes intact. Dolls eye intact.  - Motor:  No spontaneous movement and no withdrawal to noxious stimuli.   - Reflexes: 1+ patellar reflexes, Babinski downgoing.   - Sensory:  No localization to noxious stimuli  - Coordination:  Unable to test  - Gait:   Deferred    LABS:                          12.2   7.32  )-----------( 285      ( 18 Aug 2020 01:53 )             36.9     08-18    132<L>  |  98  |  <4<L>  ----------------------------<  213<H>  3.6   |  19<L>  |  0.38<L>    Ca    8.8      18 Aug 2020 01:53  Phos  3.4     08-18  Mg     1.9     08-18    TPro  5.8<L>  /  Alb  3.3  /  TBili  <0.1<L>  /  DBili  x   /  AST  31  /  ALT  20  /  AlkPhos  28<L>  08-18    PT/INR - ( 18 Aug 2020 01:53 )   PT: 12.2 sec;   INR: 1.03 ratio         PTT - ( 18 Aug 2020 09:14 )  PTT:50.4 sec      RADIOLOGY & ADDITIONAL STUDIES:      EEG Summary / Classification:  Abnormal EEG in a sedated patient.  - Recording started with near continuous fluctuating 0.5-3 Hz right posterior-temporal (max P8) lateralized periodic discharges with rhythmicity (LPD +R), concerning for ictal-interictal continuum (IIC). IIC slowly resolved after fosphenytoin was loaded around 18:10. Int he last few hours of recording, emergence of a stimulus-induced long run of fluctuating 1-2 Hz right posterior-temporal lateralized periodic discharges (LPD).   - Frequent to abundant right frontal (max Fp2/F4/Fz) sharp wave discharges, rarely occurred as very brief to brief runs of fluctuating 0.5 to 2 Hz right frontal lateralized periodic discharges (LPD).  - Near continuous theta/delta slowing in the right frontotemporal region, better appreciated in clinically quiet state.  - Moderate to severe generalized slowing.    EEG Impression / Clinical Correlate:  Abnormal EEG study.  1. Right posterior-temporal LPD concerning for IIC; aborted with fosphenytoin load.  2. Epileptogenic focus in the right frontal region.   3. Structural abnormality in the right frontotemporal region.   4. Moderate to severe nonspecific diffuse or multifocal cerebral dysfunction.     < from: CT Head No Cont (08.15.20 @ 18:28) >  FINDINGS:    There is no CT evidence of acute intracranial hemorrhage, mass effect, midline shift, or acute, large territorial infarct.    The ventricles and sulci are normal in size and configuration. The basal cisterns are patent.    The mastoid air cells and middle ear cavities are grossly clear. There is no significant paranasal sinus mucosal thickening.    The calvarium and skull base are grossly intact. .    IMPRESSION:  No acute intracranial abnormality.    < end of copied text >

## 2020-08-18 NOTE — DIETITIAN INITIAL EVALUATION ADULT. - ENTERAL
Increase Vital 1.2 at 60 cc/hr x 18 hrs provides 1296 kcals, 81 gm protein, 876cc free water  meets 25 Kcal/Kg, 1.6Gm/kg 8/18 wt 50.4 kg

## 2020-08-18 NOTE — DIETITIAN INITIAL EVALUATION ADULT. - ETIOLOGY
inability to meet estimated needs in setting of decreased po intake w/ ETOH and recent c. diff, pancreatitis

## 2020-08-18 NOTE — PROGRESS NOTE ADULT - SUBJECTIVE AND OBJECTIVE BOX
Patient is a 60y old  Female who presents with a chief complaint of Seizures (17 Aug 2020 15:59)      INTERVAL HPI/OVERNIGHT EVENTS:   No overnight events   Afebrile, hemodynamically stable     ICU Vital Signs Last 24 Hrs  T(C): 36.6 (18 Aug 2020 05:00), Max: 37.6 (17 Aug 2020 09:00)  T(F): 97.9 (18 Aug 2020 05:00), Max: 99.7 (17 Aug 2020 09:00)  HR: 85 (18 Aug 2020 08:14) (69 - 92)  BP: 152/89 (18 Aug 2020 06:00) (111/62 - 167/108)  BP(mean): 115 (18 Aug 2020 06:00) (82 - 132)  ABP: --  ABP(mean): --  RR: 16 (18 Aug 2020 06:00) (16 - 17)  SpO2: 100% (18 Aug 2020 08:14) (98% - 100%)    I&O's Summary    17 Aug 2020 07:01  -  18 Aug 2020 07:00  --------------------------------------------------------  IN: 4323.6 mL / OUT: 1250 mL / NET: 3073.6 mL      Mode: AC/ CMV (Assist Control/ Continuous Mandatory Ventilation)  RR (machine): 16  TV (machine): 400  FiO2: 30  PEEP: 5  ITime: 1  MAP: 9  PIP: 21      LABS:                        12.2   7.32  )-----------( 285      ( 18 Aug 2020 01:53 )             36.9     08-18    132<L>  |  98  |  <4<L>  ----------------------------<  213<H>  3.6   |  19<L>  |  0.38<L>    Ca    8.8      18 Aug 2020 01:53  Phos  3.4     08-18  Mg     1.9     08-18    TPro  5.8<L>  /  Alb  3.3  /  TBili  <0.1<L>  /  DBili  x   /  AST  31  /  ALT  20  /  AlkPhos  28<L>  08-18    PT/INR - ( 18 Aug 2020 01:53 )   PT: 12.2 sec;   INR: 1.03 ratio         PTT - ( 18 Aug 2020 01:53 )  PTT:100.8 sec  Urinalysis Basic - ( 16 Aug 2020 15:09 )    Color: Yellow / Appearance: Slightly Turbid / S.015 / pH: x  Gluc: x / Ketone: Moderate  / Bili: Negative / Urobili: Negative   Blood: x / Protein: Negative / Nitrite: Negative   Leuk Esterase: Negative / RBC: >50 /HPF / WBC 0-2   Sq Epi: x / Non Sq Epi: Occasional / Bacteria: Occasional      CAPILLARY BLOOD GLUCOSE  214 (18 Aug 2020 02:00)  197 (18 Aug 2020 01:00)      POCT Blood Glucose.: 159 mg/dL (18 Aug 2020 05:11)  POCT Blood Glucose.: 197 mg/dL (18 Aug 2020 01:10)  POCT Blood Glucose.: 139 mg/dL (17 Aug 2020 18:53)  POCT Blood Glucose.: 121 mg/dL (17 Aug 2020 11:52)    ABG - ( 18 Aug 2020 01:49 )  pH, Arterial: 7.45  pH, Blood: x     /  pCO2: 31    /  pO2: 165   / HCO3: 21    / Base Excess: -1.8  /  SaO2: 100                 RADIOLOGY & ADDITIONAL TESTS:    Consultant(s) Notes Reviewed:  [x ] YES  [ ] NO    MEDICATIONS  (STANDING):  acyclovir IVPB      acyclovir IVPB 480 milliGRAM(s) IV Intermittent every 8 hours  cefTRIAXone   IVPB 2000 milliGRAM(s) IV Intermittent every 12 hours  chlorhexidine 0.12% Liquid 15 milliLiter(s) Oral Mucosa every 12 hours  chlorhexidine 4% Liquid 1 Application(s) Topical <User Schedule>  fosphenytoin IVPB 140 milliGRAM(s) PE IV Intermittent every 12 hours  heparin  Infusion 800 Unit(s)/Hr (8 mL/Hr) IV Continuous <Continuous>  hydroxychloroquine 200 milliGRAM(s) Oral daily  lacosamide IVPB 200 milliGRAM(s) IV Intermittent every 12 hours  levETIRAcetam  IVPB 1000 milliGRAM(s) IV Intermittent every 12 hours  methylPREDNISolone sodium succinate Injectable 20 milliGRAM(s) IV Push every 8 hours  midazolam Infusion. 0.084 mG/kG/Hr (4 mL/Hr) IV Continuous <Continuous>  norepinephrine Infusion 0.05 MICROgram(s)/kG/Min (4.47 mL/Hr) IV Continuous <Continuous>  pantoprazole  Injectable 40 milliGRAM(s) IV Push two times a day  propofol Infusion. 50 MICROgram(s)/kG/Min (14.3 mL/Hr) IV Continuous <Continuous>  vancomycin    Solution 125 milliGRAM(s) Oral every 6 hours  vancomycin  IVPB      vancomycin  IVPB 750 milliGRAM(s) IV Intermittent every 12 hours    MEDICATIONS  (PRN):      PHYSICAL EXAM:  GENERAL:   HEAD:  Atraumatic, Normocephalic  EYES: EOMI, PERRLA, conjunctiva and sclera clear  NECK: Supple, No JVD, Normal thyroid, no enlarged nodes  NERVOUS SYSTEM:  Alert & Awake.   CHEST/LUNG: B/L good air entry; No rales, rhonchi, or wheezing  HEART: S1S2 normal, no S3, Regular rate and rhythm; No murmurs  ABDOMEN: Soft, Nontender, Nondistended; Bowel sounds present  EXTREMITIES:  2+ Peripheral Pulses, No clubbing, cyanosis, or edema  LYMPH: No lymphadenopathy noted  SKIN: No rashes or lesions    Care Discussed with Consultants/Other Providers [ x] YES  [ ] NO Patient is a 60y old  Female who presents with a chief complaint of Seizures (17 Aug 2020 15:59)      INTERVAL HPI/OVERNIGHT EVENTS:   Propofol increased from 30 to 40. Heparin drip adjusted. Pressors d/c'ed.   Afebrile, hemodynamically stable.     ICU Vital Signs Last 24 Hrs  T(C): 36.6 (18 Aug 2020 05:00), Max: 37.6 (17 Aug 2020 09:00)  T(F): 97.9 (18 Aug 2020 05:00), Max: 99.7 (17 Aug 2020 09:00)  HR: 85 (18 Aug 2020 08:14) (69 - 92)  BP: 152/89 (18 Aug 2020 06:00) (111/62 - 167/108)  BP(mean): 115 (18 Aug 2020 06:00) (82 - 132)  ABP: --  ABP(mean): --  RR: 16 (18 Aug 2020 06:00) (16 - 17)  SpO2: 100% (18 Aug 2020 08:14) (98% - 100%)    I&O's Summary    17 Aug 2020 07:01  -  18 Aug 2020 07:00  --------------------------------------------------------  IN: 4323.6 mL / OUT: 1250 mL / NET: 3073.6 mL      Mode: AC/ CMV (Assist Control/ Continuous Mandatory Ventilation)  RR (machine): 16  TV (machine): 400  FiO2: 30  PEEP: 5  ITime: 1  MAP: 9  PIP: 21      LABS:                        12.2   7.32  )-----------( 285      ( 18 Aug 2020 01:53 )             36.9     08-18    132<L>  |  98  |  <4<L>  ----------------------------<  213<H>  3.6   |  19<L>  |  0.38<L>    Ca    8.8      18 Aug 2020 01:53  Phos  3.4     08-18  Mg     1.9     08-18    TPro  5.8<L>  /  Alb  3.3  /  TBili  <0.1<L>  /  DBili  x   /  AST  31  /  ALT  20  /  AlkPhos  28<L>  08-18    PT/INR - ( 18 Aug 2020 01:53 )   PT: 12.2 sec;   INR: 1.03 ratio         PTT - ( 18 Aug 2020 01:53 )  PTT:100.8 sec  Urinalysis Basic - ( 16 Aug 2020 15:09 )    Color: Yellow / Appearance: Slightly Turbid / S.015 / pH: x  Gluc: x / Ketone: Moderate  / Bili: Negative / Urobili: Negative   Blood: x / Protein: Negative / Nitrite: Negative   Leuk Esterase: Negative / RBC: >50 /HPF / WBC 0-2   Sq Epi: x / Non Sq Epi: Occasional / Bacteria: Occasional      CAPILLARY BLOOD GLUCOSE  214 (18 Aug 2020 02:00)  197 (18 Aug 2020 01:00)      POCT Blood Glucose.: 159 mg/dL (18 Aug 2020 05:11)  POCT Blood Glucose.: 197 mg/dL (18 Aug 2020 01:10)  POCT Blood Glucose.: 139 mg/dL (17 Aug 2020 18:53)  POCT Blood Glucose.: 121 mg/dL (17 Aug 2020 11:52)    ABG - ( 18 Aug 2020 01:49 )  pH, Arterial: 7.45  pH, Blood: x     /  pCO2: 31    /  pO2: 165   / HCO3: 21    / Base Excess: -1.8  /  SaO2: 100                 RADIOLOGY & ADDITIONAL TESTS:    Consultant(s) Notes Reviewed:  [x ] YES  [ ] NO    MEDICATIONS  (STANDING):  acyclovir IVPB      acyclovir IVPB 480 milliGRAM(s) IV Intermittent every 8 hours  cefTRIAXone   IVPB 2000 milliGRAM(s) IV Intermittent every 12 hours  chlorhexidine 0.12% Liquid 15 milliLiter(s) Oral Mucosa every 12 hours  chlorhexidine 4% Liquid 1 Application(s) Topical <User Schedule>  fosphenytoin IVPB 140 milliGRAM(s) PE IV Intermittent every 12 hours  heparin  Infusion 800 Unit(s)/Hr (8 mL/Hr) IV Continuous <Continuous>  hydroxychloroquine 200 milliGRAM(s) Oral daily  lacosamide IVPB 200 milliGRAM(s) IV Intermittent every 12 hours  levETIRAcetam  IVPB 1000 milliGRAM(s) IV Intermittent every 12 hours  methylPREDNISolone sodium succinate Injectable 20 milliGRAM(s) IV Push every 8 hours  midazolam Infusion. 0.084 mG/kG/Hr (4 mL/Hr) IV Continuous <Continuous>  norepinephrine Infusion 0.05 MICROgram(s)/kG/Min (4.47 mL/Hr) IV Continuous <Continuous>  pantoprazole  Injectable 40 milliGRAM(s) IV Push two times a day  propofol Infusion. 50 MICROgram(s)/kG/Min (14.3 mL/Hr) IV Continuous <Continuous>  vancomycin    Solution 125 milliGRAM(s) Oral every 6 hours  vancomycin  IVPB      vancomycin  IVPB 750 milliGRAM(s) IV Intermittent every 12 hours    MEDICATIONS  (PRN):      PHYSICAL EXAM:  GENERAL: Intubated, sedated  HEAD:  Atraumatic, Normocephalic  EYES: PERRL, conjunctiva clear  NECK: Supple, No JVD,   NERVOUS SYSTEM: Sedated.    CHEST/LUNG: Ventilated larissa; No rales, rhonchi, or wheezing  HEART: S1S2 normal, no S3, Regular rate and rhythm; No murmurs  ABDOMEN: Soft, Nontender, Nondistended; Bowel sounds present  EXTREMITIES:  2+ Peripheral Pulses, No clubbing, cyanosis, or edema  LYMPH: No lymphadenopathy noted  SKIN: No rashes or lesions    Care Discussed with Consultants/Other Providers [ x] YES  [ ] NO

## 2020-08-18 NOTE — DIETITIAN INITIAL EVALUATION ADULT. - ENERGY NEEDS
Estimated calorie needs for intubated pt per [employer] ([employer]) 2003  1128cal/day (22kcals/kg)   Ht: 63"   Wt: 111 (8/18)  BMI: 19.7 kg/m2   IBW: 115 (+/-10%)   within IBW  Edema: 1+ generalized       Skin: no pressure injuries documented

## 2020-08-18 NOTE — PROGRESS NOTE ADULT - ASSESSMENT
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   transferred from OSH  to MICU for mgmt of status epilepticus , hypoxic respi failure, hypotension on pressors       #status epilepticus  antiepileptics  -Neurology recs appreciated   mri brain pending  Rule out Meningitis   plan for LP  cont ceftriaxone and acyclovir    #Hypoxic Respiratory failure 2/2 seizures  for airway protection   mgmt per micu team      #Chronic pancreatitis   chr alcohol abuse, last drink 3wks ago   NGT tube  IV D5 LR    Creon 12,000 Lipase  -Protonix 40 mg BID  mvi, folate    #mixed connective tissue disorder   - C/w hydroxychloroquine 200 mg   - Solumedrol 20q8hr    #DVT hx-off eliquis , heparin gtt    #anemia: hb 8.9  baseline 11-12  rpt cbc    [facility]  [phone]

## 2020-08-18 NOTE — DIETITIAN INITIAL EVALUATION ADULT. - PERTINENT MEDS FT
MEDICATIONS  (STANDING):  acyclovir IVPB      acyclovir IVPB 480 milliGRAM(s) IV Intermittent every 8 hours  cefTRIAXone   IVPB 2000 milliGRAM(s) IV Intermittent every 12 hours  chlorhexidine 0.12% Liquid 15 milliLiter(s) Oral Mucosa every 12 hours  chlorhexidine 4% Liquid 1 Application(s) Topical <User Schedule>  fosphenytoin IVPB 140 milliGRAM(s) PE IV Intermittent every 12 hours  heparin  Infusion 800 Unit(s)/Hr (9 mL/Hr) IV Continuous <Continuous>  hydroxychloroquine 200 milliGRAM(s) Oral daily  lacosamide IVPB 200 milliGRAM(s) IV Intermittent every 12 hours  levETIRAcetam  IVPB 1000 milliGRAM(s) IV Intermittent every 12 hours  methylPREDNISolone sodium succinate Injectable 20 milliGRAM(s) IV Push daily  midazolam Infusion. 0.084 mG/kG/Hr (4 mL/Hr) IV Continuous <Continuous>  norepinephrine Infusion 0.05 MICROgram(s)/kG/Min (4.47 mL/Hr) IV Continuous <Continuous>  pantoprazole  Injectable 40 milliGRAM(s) IV Push two times a day  propofol Infusion. 50 MICROgram(s)/kG/Min (14.3 mL/Hr) IV Continuous <Continuous>  vancomycin    Solution 125 milliGRAM(s) Oral every 6 hours  vancomycin  IVPB      vancomycin  IVPB 750 milliGRAM(s) IV Intermittent every 12 hours
yes...
yes...

## 2020-08-18 NOTE — DIETITIAN INITIAL EVALUATION ADULT. - OTHER INFO
Pt seen for: MICU Length Of Stay   Adm dx: seizures    GI issues: no vomiting, no abd distention  Last BM: 8/17 (had recent c.diff)    Food allergies/Intolerances: NKFA    Vit/supplement PTA: none noted     Diet PTA: per previous RD note 7/31 pt had reported "Pt that she "eats when she wants" and will eat 3 meals throughout the day some days and then only have "a can of soup for the whole day" on some days."     Subjective/Objective information: Per previous RD assessment  "Pt reported UBW was 104# 12/2019, and then she was "binge eating" and gained weight and weighed 114  possibly R/T ETOH abuse", wt on 7/28 was 118 lb. Dosing wt this adm 105 lb, wt 8/18 111 lb.    Education:

## 2020-08-18 NOTE — PROGRESS NOTE ADULT - ASSESSMENT
Assessment:  59 y/o F with a h/o EtOH withdrawal, polysubstance abuse, EtoH pancreatitis, mixed connective tissue disorder (plaquenil and methylprednisolone), chronic pain s/p lumbar laminectomy, left soleal vein thrombosis (apixaban) lung adenocarcinoma (s/p RML resection 2018), recent RLE cellulitis s/p doxycycline, recent CDiff colitis, CAD admitted to OSH for symptomatic hyponatremia 2/2 psychogenic polydipsia complicated with new onset generalized seizures, intubated for hypoxic respiratory failure transferred to [facility] MICU for further management      #NEURO   //Status Epilepticus   -OSH s/p keppra and vimpat load  - C/w fosphenytoin   -C/w Versed ggt  - C/w propofol ggt   -C/w keppra 1000 mg BID   -c/w vimpat  -monitor for seizures on EEG  -Neurology recs appreciated     #CV  -off pressors  -hold home clonidine   -Possible MI hx    #RESP   //Hypoxic Respiratory failure 2/2 seizures  -Vent settings: 30/400/16/5   -ABG daily  -ETT replaced ON, confirmed w. CXR    //Hx of adenocarcinoma   -s/p RML wedge resection in 2018      #GI   //Chronic pancreatitis   -NPO   -Start IV D5 LR   -f/u Amylase, lipase levels and TG  -C/w Creon 12,000 Lipase Units TID   -Protonix 40 mg BID   -C/w Folic Acid 1mg  and Multivitamin 1 tablet QD       #ID   //Rule out Meningitis   - New-onset seizures in setting of fever of 101.6 F, no WBC. CTH neg  - Plan for LP AM. Last Eliquis 8/15 5am.   - Start empiric IV ceftriaxone and acyclovir  - Neurology recs appreciated    //Clostridium difficile infection   -dx 8/3  -cw PO vancomycin and start IV flagyl  -hold off C diff testing given it remain positive  -Contact isolation      #Renal   -no active issues  -Zurita    #Rheum   //Hx of mixed connective tissue disorder   - C/w hydroxychloroquine 200 mg   - Solumedrol 20q8hr    #Heme   - Hold Eliquis and start heparin gtt    #PSYCH   //Alcohol withdrawal   -last drink 3 weeks ago, seizures unlikely 2/2 to withdrawal  -Consider f/u for Assessment:  61 y/o F with a h/o EtOH withdrawal, polysubstance abuse, EtoH pancreatitis, mixed connective tissue disorder (plaquenil and methylprednisolone), chronic pain s/p lumbar laminectomy, left soleal vein thrombosis (apixaban) lung adenocarcinoma (s/p RML resection 2018), recent RLE cellulitis s/p doxycycline, recent CDiff colitis, CAD admitted to OSH for symptomatic hyponatremia 2/2 psychogenic polydipsia complicated with new onset generalized seizures, intubated for hypoxic respiratory failure transferred to [facility] MICU for further management      #NEURO   //Status Epilepticus   - C/w fosphenytoin   -C/w Versed ggt  - C/w propofol ggt   -C/w keppra 1000 mg BID   -c/w vimpat  - MRI for 8/18 PM  -monitor for seizures on EEG  -Neurology recs appreciated     #CV  -off pressors  -hold home clonidine   -hx of MI, no stents placed    #RESP   //Hypoxic Respiratory failure 2/2 seizures  -Vent settings: 30/400/16/5     //Hx of adenocarcinoma   -s/p RML wedge resection in 2018      #GI   //Chronic pancreatitis   -NPO with tube feeds  -IV D5 LR   -f/u Amylase, lipase levels and TG  -C/w Creon 12,000 Lipase Units TID   -Protonix 40 mg BID   -C/w Folic Acid 1mg  and Multivitamin 1 tablet QD       #ID   //Rule out Meningitis   - Afebrile, no WBC. CTH neg  - LP 8/18 AM. Last Eliquis 8/15 5am.   - Empiric IV ceftriaxone, vanc acyclovir  - Neurology recs appreciated    //Clostridium difficile infection   -dx 8/3  -cw PO vancomycin and c/w IV flagyl  -d/c vanc if no diarrhea on tube feeds  -hold off C diff testing given it remain positive  -Contact isolation      #Renal   -no active issues  -Zurita    #Rheum   //Hx of mixed connective tissue disorder   - C/w hydroxychloroquine 200 mg   - Solumedrol 20q8hr    #Heme   - Hold Eliquis  - C/w heparin gtt    #PSYCH   //Alcohol withdrawal   -last drink 3 weeks ago, seizures unlikely 2/2 to withdrawal  -Consider f/u for

## 2020-08-18 NOTE — PROGRESS NOTE ADULT - SUBJECTIVE AND OBJECTIVE BOX
Patient is a 60y old  Female who presents with a chief complaint of Seizures (18 Aug 2020 10:55)      INTERVAL HPI/OVERNIGHT EVENTS: noted  pt seen and examined  intubated and sedated  off pressors      Vital Signs Last 24 Hrs  T(C): 37.6 (18 Aug 2020 16:00), Max: 37.6 (18 Aug 2020 16:00)  T(F): 99.7 (18 Aug 2020 16:00), Max: 99.7 (18 Aug 2020 16:00)  HR: 94 (18 Aug 2020 19:00) (69 - 98)  BP: 132/78 (18 Aug 2020 19:00) (115/79 - 167/108)  BP(mean): 101 (18 Aug 2020 19:00) (89 - 132)  RR: 19 (18 Aug 2020 19:00) (16 - 22)  SpO2: 100% (18 Aug 2020 19:00) (100% - 100%)    acyclovir IVPB      acyclovir IVPB 480 milliGRAM(s) IV Intermittent every 8 hours  cefTRIAXone   IVPB 2000 milliGRAM(s) IV Intermittent every 12 hours  chlorhexidine 0.12% Liquid 15 milliLiter(s) Oral Mucosa every 12 hours  chlorhexidine 4% Liquid 1 Application(s) Topical <User Schedule>  fosphenytoin IVPB 140 milliGRAM(s) PE IV Intermittent every 12 hours  hydroxychloroquine 200 milliGRAM(s) Oral daily  lacosamide IVPB 200 milliGRAM(s) IV Intermittent every 12 hours  levETIRAcetam  IVPB 1000 milliGRAM(s) IV Intermittent every 12 hours  methylPREDNISolone sodium succinate Injectable 20 milliGRAM(s) IV Push daily  midazolam Infusion. 0.084 mG/kG/Hr IV Continuous <Continuous>  norepinephrine Infusion 0.05 MICROgram(s)/kG/Min IV Continuous <Continuous>  pantoprazole  Injectable 40 milliGRAM(s) IV Push two times a day  propofol Infusion. 50 MICROgram(s)/kG/Min IV Continuous <Continuous>  vancomycin  IVPB      vancomycin  IVPB 750 milliGRAM(s) IV Intermittent every 12 hours      PHYSICAL EXAM:  GENERAL: intubated and sedated  EYES: conjunctiva and sclera clear  ENMT: Moist mucous membranes  NECK: Supple,   CHEST/LUNG: non labored,   HEART: Regular rate and rhythm; No murmurs, rubs, or gallops  ABDOMEN: Soft, Nontender, Nondistended; Bowel sounds present  EXTREMITIES:  2+ Peripheral Pulses, No clubbing, cyanosis, or edema      Consultant(s) Notes Reviewed:  [x ] YES  [ ] NO  Care Discussed with Consultants/Other Providers [ x] YES  [ ] NO    LABS:                        12.2   7.32  )-----------( 285      ( 18 Aug 2020 01:53 )             36.9     08-18    132<L>  |  98  |  <4<L>  ----------------------------<  213<H>  3.6   |  19<L>  |  0.38<L>    Ca    8.8      18 Aug 2020 01:53  Phos  3.4     08-18  Mg     1.9     08-18    TPro  5.8<L>  /  Alb  3.3  /  TBili  <0.1<L>  /  DBili  x   /  AST  31  /  ALT  20  /  AlkPhos  28<L>  08-18    PT/INR - ( 18 Aug 2020 01:53 )   PT: 12.2 sec;   INR: 1.03 ratio         PTT - ( 18 Aug 2020 09:14 )  PTT:50.4 sec    CAPILLARY BLOOD GLUCOSE  214 (18 Aug 2020 02:00)  197 (18 Aug 2020 01:00)      POCT Blood Glucose.: 145 mg/dL (18 Aug 2020 17:39)  POCT Blood Glucose.: 125 mg/dL (18 Aug 2020 11:26)  POCT Blood Glucose.: 159 mg/dL (18 Aug 2020 05:11)  POCT Blood Glucose.: 197 mg/dL (18 Aug 2020 01:10)      ABG - ( 18 Aug 2020 01:49 )  pH, Arterial: 7.45  pH, Blood: x     /  pCO2: 31    /  pO2: 165   / HCO3: 21    / Base Excess: -1.8  /  SaO2: 100                   Culture - Blood (collected 17 Aug 2020 05:44)  Source: .Blood Blood-Peripheral  Preliminary Report (18 Aug 2020 06:01):    No growth to date.    Culture - Blood (collected 17 Aug 2020 05:30)  Source: .Blood Blood-Peripheral  Preliminary Report (18 Aug 2020 06:01):    No growth to date.    Culture - Blood (collected 16 Aug 2020 18:21)  Source: .Blood Blood  Preliminary Report (17 Aug 2020 19:02):    No growth to date.    Culture - Blood (collected 16 Aug 2020 18:21)  Source: .Blood Blood  Preliminary Report (17 Aug 2020 19:02):    No growth to date.    Culture - Urine (collected 16 Aug 2020 18:11)  Source: .Urine Catheterized  Final Report (17 Aug 2020 19:17):    50,000 - 99,000 CFU/mL Presumptive Candida albicans        RADIOLOGY & ADDITIONAL TESTS:    Imaging Personally Reviewed:  [x ] YES  [ ] NO

## 2020-08-19 ENCOUNTER — RESULT REVIEW (OUTPATIENT)
Age: 60
End: 2020-08-19

## 2020-08-19 LAB
ALBUMIN SERPL ELPH-MCNC: 3.3 G/DL — SIGNIFICANT CHANGE UP (ref 3.3–5)
ALP SERPL-CCNC: 30 U/L — LOW (ref 40–120)
ALT FLD-CCNC: 18 U/L — SIGNIFICANT CHANGE UP (ref 10–45)
ANION GAP SERPL CALC-SCNC: 12 MMOL/L — SIGNIFICANT CHANGE UP (ref 5–17)
APPEARANCE CSF: CLEAR — SIGNIFICANT CHANGE UP
APPEARANCE SPUN FLD: COLORLESS — SIGNIFICANT CHANGE UP
APTT BLD: 23.2 SEC — LOW (ref 27.5–35.5)
AST SERPL-CCNC: 29 U/L — SIGNIFICANT CHANGE UP (ref 10–40)
BILIRUB SERPL-MCNC: 0.1 MG/DL — LOW (ref 0.2–1.2)
BUN SERPL-MCNC: 6 MG/DL — LOW (ref 7–23)
CALCIUM SERPL-MCNC: 8.5 MG/DL — SIGNIFICANT CHANGE UP (ref 8.4–10.5)
CHLORIDE SERPL-SCNC: 97 MMOL/L — SIGNIFICANT CHANGE UP (ref 96–108)
CO2 SERPL-SCNC: 23 MMOL/L — SIGNIFICANT CHANGE UP (ref 22–31)
COLOR CSF: SIGNIFICANT CHANGE UP
CREAT ?TM UR-MCNC: 9 MG/DL — SIGNIFICANT CHANGE UP
CREAT SERPL-MCNC: 0.4 MG/DL — LOW (ref 0.5–1.3)
CRYPTOC AG CSF-ACNC: NEGATIVE — SIGNIFICANT CHANGE UP
CSF PCR RESULT: SIGNIFICANT CHANGE UP
GAS PNL BLDA: SIGNIFICANT CHANGE UP
GLUCOSE BLDC GLUCOMTR-MCNC: 113 MG/DL — HIGH (ref 70–99)
GLUCOSE BLDC GLUCOMTR-MCNC: 118 MG/DL — HIGH (ref 70–99)
GLUCOSE CSF-MCNC: 98 MG/DL — HIGH (ref 40–70)
GLUCOSE SERPL-MCNC: 172 MG/DL — HIGH (ref 70–99)
GRAM STN FLD: SIGNIFICANT CHANGE UP
HCT VFR BLD CALC: 36.8 % — SIGNIFICANT CHANGE UP (ref 34.5–45)
HGB BLD-MCNC: 11.8 G/DL — SIGNIFICANT CHANGE UP (ref 11.5–15.5)
INR BLD: 0.89 RATIO — SIGNIFICANT CHANGE UP (ref 0.88–1.16)
LABORATORY COMMENT REPORT: SIGNIFICANT CHANGE UP
LDH CSF L TO P-CCNC: 25 U/L — SIGNIFICANT CHANGE UP
LDH FLD-CCNC: 25 U/L — SIGNIFICANT CHANGE UP
LEVETIRACETAM SERPL-MCNC: 32.5 MCG/ML — SIGNIFICANT CHANGE UP (ref 12–46)
LYMPHOCYTES # CSF: 33 % — LOW (ref 40–80)
MAGNESIUM SERPL-MCNC: 2 MG/DL — SIGNIFICANT CHANGE UP (ref 1.6–2.6)
MCHC RBC-ENTMCNC: 32.1 GM/DL — SIGNIFICANT CHANGE UP (ref 32–36)
MCHC RBC-ENTMCNC: 33.1 PG — SIGNIFICANT CHANGE UP (ref 27–34)
MCV RBC AUTO: 103.1 FL — HIGH (ref 80–100)
MONOS+MACROS NFR CSF: 33 % — SIGNIFICANT CHANGE UP (ref 15–45)
NEUTROPHILS # CSF: 34 % — HIGH (ref 0–6)
NIGHT BLUE STAIN TISS: SIGNIFICANT CHANGE UP
NRBC # BLD: 0 /100 WBCS — SIGNIFICANT CHANGE UP (ref 0–0)
NRBC NFR CSF: 1 /UL — SIGNIFICANT CHANGE UP (ref 0–5)
OSMOLALITY UR: 201 MOS/KG — LOW (ref 300–900)
PHOSPHATE SERPL-MCNC: 2.3 MG/DL — LOW (ref 2.5–4.5)
PLATELET # BLD AUTO: 274 K/UL — SIGNIFICANT CHANGE UP (ref 150–400)
POTASSIUM SERPL-MCNC: 3.5 MMOL/L — SIGNIFICANT CHANGE UP (ref 3.5–5.3)
POTASSIUM SERPL-SCNC: 3.5 MMOL/L — SIGNIFICANT CHANGE UP (ref 3.5–5.3)
PROT CSF-MCNC: 34 MG/DL — SIGNIFICANT CHANGE UP (ref 15–45)
PROT SERPL-MCNC: 5.8 G/DL — LOW (ref 6–8.3)
PROTHROM AB SERPL-ACNC: 10.7 SEC — SIGNIFICANT CHANGE UP (ref 10.6–13.6)
RBC # BLD: 3.57 M/UL — LOW (ref 3.8–5.2)
RBC # CSF: 7 /UL — HIGH (ref 0–0)
RBC # FLD: 13.2 % — SIGNIFICANT CHANGE UP (ref 10.3–14.5)
SODIUM SERPL-SCNC: 132 MMOL/L — LOW (ref 135–145)
SODIUM UR-SCNC: 74 MMOL/L — SIGNIFICANT CHANGE UP
SOURCE HSV 1/2: SIGNIFICANT CHANGE UP
SPECIMEN SOURCE: SIGNIFICANT CHANGE UP
SPECIMEN SOURCE: SIGNIFICANT CHANGE UP
TUBE TYPE: SIGNIFICANT CHANGE UP
VANCOMYCIN FLD-MCNC: 7.4 UG/ML — SIGNIFICANT CHANGE UP
WBC # BLD: 8.78 K/UL — SIGNIFICANT CHANGE UP (ref 3.8–10.5)
WBC # FLD AUTO: 8.78 K/UL — SIGNIFICANT CHANGE UP (ref 3.8–10.5)

## 2020-08-19 PROCEDURE — 88108 CYTOPATH CONCENTRATE TECH: CPT | Mod: 26

## 2020-08-19 PROCEDURE — 99232 SBSQ HOSP IP/OBS MODERATE 35: CPT

## 2020-08-19 PROCEDURE — 99291 CRITICAL CARE FIRST HOUR: CPT | Mod: 25

## 2020-08-19 PROCEDURE — 62270 DX LMBR SPI PNXR: CPT | Mod: GC

## 2020-08-19 PROCEDURE — 95720 EEG PHY/QHP EA INCR W/VEEG: CPT

## 2020-08-19 RX ORDER — FENTANYL CITRATE 50 UG/ML
0.5 INJECTION INTRAVENOUS
Qty: 2500 | Refills: 0 | Status: DISCONTINUED | OUTPATIENT
Start: 2020-08-19 | End: 2020-08-19

## 2020-08-19 RX ORDER — MIDAZOLAM HYDROCHLORIDE 1 MG/ML
0.02 INJECTION, SOLUTION INTRAMUSCULAR; INTRAVENOUS
Qty: 100 | Refills: 0 | Status: DISCONTINUED | OUTPATIENT
Start: 2020-08-19 | End: 2020-08-20

## 2020-08-19 RX ORDER — VANCOMYCIN HCL 1 G
1000 VIAL (EA) INTRAVENOUS EVERY 12 HOURS
Refills: 0 | Status: DISCONTINUED | OUTPATIENT
Start: 2020-08-19 | End: 2020-08-20

## 2020-08-19 RX ORDER — MIDAZOLAM HYDROCHLORIDE 1 MG/ML
0.03 INJECTION, SOLUTION INTRAMUSCULAR; INTRAVENOUS
Qty: 100 | Refills: 0 | Status: DISCONTINUED | OUTPATIENT
Start: 2020-08-19 | End: 2020-08-19

## 2020-08-19 RX ORDER — FENTANYL CITRATE 50 UG/ML
0.5 INJECTION INTRAVENOUS
Qty: 5000 | Refills: 0 | Status: DISCONTINUED | OUTPATIENT
Start: 2020-08-19 | End: 2020-08-19

## 2020-08-19 RX ORDER — HEPARIN SODIUM 5000 [USP'U]/ML
5000 INJECTION INTRAVENOUS; SUBCUTANEOUS EVERY 8 HOURS
Refills: 0 | Status: DISCONTINUED | OUTPATIENT
Start: 2020-08-19 | End: 2020-08-28

## 2020-08-19 RX ORDER — POTASSIUM PHOSPHATE, MONOBASIC POTASSIUM PHOSPHATE, DIBASIC 236; 224 MG/ML; MG/ML
30 INJECTION, SOLUTION INTRAVENOUS ONCE
Refills: 0 | Status: COMPLETED | OUTPATIENT
Start: 2020-08-19 | End: 2020-08-19

## 2020-08-19 RX ORDER — FENTANYL CITRATE 50 UG/ML
50 INJECTION INTRAVENOUS ONCE
Refills: 0 | Status: DISCONTINUED | OUTPATIENT
Start: 2020-08-19 | End: 2020-08-19

## 2020-08-19 RX ORDER — INSULIN LISPRO 100/ML
VIAL (ML) SUBCUTANEOUS EVERY 6 HOURS
Refills: 0 | Status: DISCONTINUED | OUTPATIENT
Start: 2020-08-19 | End: 2020-08-23

## 2020-08-19 RX ADMIN — MIDAZOLAM HYDROCHLORIDE 4 MG/KG/HR: 1 INJECTION, SOLUTION INTRAMUSCULAR; INTRAVENOUS at 02:32

## 2020-08-19 RX ADMIN — CHLORHEXIDINE GLUCONATE 1 APPLICATION(S): 213 SOLUTION TOPICAL at 05:20

## 2020-08-19 RX ADMIN — PROPOFOL 11.4 MICROGRAM(S)/KG/MIN: 10 INJECTION, EMULSION INTRAVENOUS at 09:02

## 2020-08-19 RX ADMIN — Medication 259.6 MILLIGRAM(S): at 08:16

## 2020-08-19 RX ADMIN — HEPARIN SODIUM 5000 UNIT(S): 5000 INJECTION INTRAVENOUS; SUBCUTANEOUS at 23:18

## 2020-08-19 RX ADMIN — LACOSAMIDE 140 MILLIGRAM(S): 50 TABLET ORAL at 12:42

## 2020-08-19 RX ADMIN — LEVETIRACETAM 400 MILLIGRAM(S): 250 TABLET, FILM COATED ORAL at 23:18

## 2020-08-19 RX ADMIN — Medication 259.6 MILLIGRAM(S): at 23:19

## 2020-08-19 RX ADMIN — Medication 20 MILLIGRAM(S): at 05:19

## 2020-08-19 RX ADMIN — LACOSAMIDE 140 MILLIGRAM(S): 50 TABLET ORAL at 02:32

## 2020-08-19 RX ADMIN — POTASSIUM PHOSPHATE, MONOBASIC POTASSIUM PHOSPHATE, DIBASIC 83.33 MILLIMOLE(S): 236; 224 INJECTION, SOLUTION INTRAVENOUS at 02:33

## 2020-08-19 RX ADMIN — LEVETIRACETAM 400 MILLIGRAM(S): 250 TABLET, FILM COATED ORAL at 09:03

## 2020-08-19 RX ADMIN — FENTANYL CITRATE 50 MICROGRAM(S): 50 INJECTION INTRAVENOUS at 11:15

## 2020-08-19 RX ADMIN — PANTOPRAZOLE SODIUM 40 MILLIGRAM(S): 20 TABLET, DELAYED RELEASE ORAL at 17:12

## 2020-08-19 RX ADMIN — PANTOPRAZOLE SODIUM 40 MILLIGRAM(S): 20 TABLET, DELAYED RELEASE ORAL at 05:19

## 2020-08-19 RX ADMIN — Medication 200 MILLIGRAM(S): at 12:42

## 2020-08-19 RX ADMIN — CEFTRIAXONE 100 MILLIGRAM(S): 500 INJECTION, POWDER, FOR SOLUTION INTRAMUSCULAR; INTRAVENOUS at 12:42

## 2020-08-19 RX ADMIN — PROPOFOL 11.4 MICROGRAM(S)/KG/MIN: 10 INJECTION, EMULSION INTRAVENOUS at 05:20

## 2020-08-19 RX ADMIN — HEPARIN SODIUM 5000 UNIT(S): 5000 INJECTION INTRAVENOUS; SUBCUTANEOUS at 05:20

## 2020-08-19 RX ADMIN — Medication 250 MILLIGRAM(S): at 18:43

## 2020-08-19 RX ADMIN — FOSPHENYTOIN 105.6 MILLIGRAM(S) PE: 50 INJECTION INTRAMUSCULAR; INTRAVENOUS at 05:19

## 2020-08-19 RX ADMIN — CHLORHEXIDINE GLUCONATE 15 MILLILITER(S): 213 SOLUTION TOPICAL at 16:27

## 2020-08-19 RX ADMIN — CEFTRIAXONE 100 MILLIGRAM(S): 500 INJECTION, POWDER, FOR SOLUTION INTRAMUSCULAR; INTRAVENOUS at 02:33

## 2020-08-19 RX ADMIN — FOSPHENYTOIN 105.6 MILLIGRAM(S) PE: 50 INJECTION INTRAMUSCULAR; INTRAVENOUS at 15:59

## 2020-08-19 RX ADMIN — HEPARIN SODIUM 5000 UNIT(S): 5000 INJECTION INTRAVENOUS; SUBCUTANEOUS at 13:16

## 2020-08-19 RX ADMIN — Medication 259.6 MILLIGRAM(S): at 17:12

## 2020-08-19 RX ADMIN — Medication 250 MILLIGRAM(S): at 05:19

## 2020-08-19 RX ADMIN — PROPOFOL 11.4 MICROGRAM(S)/KG/MIN: 10 INJECTION, EMULSION INTRAVENOUS at 16:27

## 2020-08-19 RX ADMIN — CHLORHEXIDINE GLUCONATE 15 MILLILITER(S): 213 SOLUTION TOPICAL at 05:20

## 2020-08-19 NOTE — PROGRESS NOTE ADULT - ATTENDING COMMENTS
1. Acute hypoxemic respiratory failure after seizures. Continue current AC vent settings.  2. Neuro. Statu epilepticus. Continue Versed and propofol.  Continue Keppra and Vimpat. Fosphenytoin added last night for increase seizure activity. EEG without overt seizure like activity.. Etiology of seizures unclear at this time. CT head negative for acute changes. LP performed. Await results. Continue with Acyclovir , ceftriaxone and add Vancomycin. MRI  reveals acute/subacute lacunar infarction within the right medial thalamus along with hypoperfused areas in bilateral frontal , temporal lobes consistent with post ictal events..  3. H/o of polysubstance abuse. Pt currently sedated.? cause of seizures withdrawal  4. H/O DVT soleal. DVT study legs yesterday negative. D/C IV heparin. Change to sq heparin for prophylaxis of DVT.   5. H/o Lung ca with RML lobectomy.  6. H/O  INNA. On steroids and Plaquenil.  7. C diff. finished 14 day course. D/C po vancomycin.

## 2020-08-19 NOTE — PROCEDURE NOTE - NSPROCDETAILS_GEN_ALL_CORE
CSF Obtained/location identified, draped/prepped, sterile technique used, needle inserted/introduced/area cleaned in sterile fashion
patient pre-oxygenated, tube inserted, placement confirmed

## 2020-08-19 NOTE — PROGRESS NOTE ADULT - ASSESSMENT
Assessment: 61yo woman w/ h/o chronic pain, lung adenocarcinoma (s/p RML wedge resection 2018), ?mixed connective tissue disease, anxiety, right hip replacement, lumbar laminectomy, b/l oophorectomy, diverticulitis, recent acute pancreatitis and found to have right soleal DVT on eliquis, recent RLE cellulitis and recent c.diff infection initially admitted to [facility] for hyponatremia with ccb seizure witnessed 8/13 now transferred for further seizures requiring intubation and mechanical ventilation with Neurology consult for such.     Impression: seizures more suspicious for infectious or inflammatory cause in setting of fevers and lateralized discharges. Last alcoholic drink >3 weeks out.     Recommendations:  [] continue Keppra 1g bid, Vimpat 200mg bid, fosphenytoin 140mg bid. Can begin to taper sedation as per MICU team to evaluate mental status.  [] continue EEG  [] closely follow HR, BP, CBC, LFTs. Observe for development of rash  [] LP per primary team; please order CSF cell count; Glucose; Protein: Gram stain; CSF Viral PCR panel; CSF AFB; CSF fungal cx; CSF VDRL titer; Oligoclonal bands; Myelin Basic Protein, Anti-MOG; Cryptococcal antigen. Autoimmune encephelopathy CSF panel, RT quic CSF, 14-3-3.  [] continue with acyclovir, vancomycin, ceftriaxone  [] depending on results of LP will decide whether to stop acyclovir and/or start steroids, will follow results of LP closely    Case discussed with Neurology Attending Dr. Ochoa; discussed with MICU team. Assessment: 59yo woman w/ h/o chronic pain, lung adenocarcinoma (s/p RML wedge resection 2018), ?mixed connective tissue disease, anxiety, right hip replacement, lumbar laminectomy, b/l oophorectomy, diverticulitis, recent acute pancreatitis and found to have right soleal DVT on eliquis, recent RLE cellulitis and recent c.diff infection initially admitted to [facility] for hyponatremia with ccb seizure witnessed 8/13 now transferred for further seizures requiring intubation and mechanical ventilation with Neurology consult for such.     Impression: seizures more suspicious for infectious or inflammatory cause in setting of fevers and lateralized discharges. Based on MRI suspicious for autoimmune encephalitis but must first rule out an infectious cause prior to steroid treatment. Last alcoholic drink >3 weeks out so much less suspicious for withdrawal etiology.    Recommendations:  [] continue Keppra 1g bid, Vimpat 200mg bid, fosphenytoin 140mg bid. Can begin to taper sedation as per MICU team to evaluate mental status.  [] continue EEG  [] closely follow HR, BP, CBC, LFTs. Observe for development of rash  [] LP per primary team; please order CSF cell count; Glucose; Protein: Gram stain; CSF Viral PCR panel; CSF AFB; CSF fungal cx; CSF VDRL titer; Oligoclonal bands; Myelin Basic Protein, Anti-MOG; Cryptococcal antigen. Autoimmune encephelopathy CSF panel, RT quic CSF, 14-3-3.  [] continue with acyclovir, vancomycin, ceftriaxone  [] depending on results of LP will decide whether to stop acyclovir and/or start steroids, will follow results of LP closely    Case discussed with Neurology Attending Dr. Ochoa; discussed with MICU team.

## 2020-08-19 NOTE — EEG REPORT - NS EEG TEXT BOX
[facility]  Comprehensive Epilepsy Center  Report of Continuous Video EEG    [facility]: [address], [place] [address], Phone [phone]  [facility]: [address] [place], [place] [address], [place] [address], Phone [phone]  [place] Office: [address], [address], [place] [address] Phone [phone]    [facility]: [address] [place] [address], Phone [phone]  [place] Office: [address] [place] [address], Phone [phone]    Patient Name: Bel Olsen    Age: 60 year, : 1960  Patient ID: -, MRN #: MRN [id number], Russell: - -  Referring Physician: -  EEG #: [id number]    Study Time/Date: 8:00:52 AM on 2020  	  End Time/Date: 08:00  on 2020          			   Duration: 796.3m    Study Information:    EEG Recording Technique:  The patient underwent continuous Video-EEG monitoring, using Telemetry System hardware on the XLTek Digital System. EEG and video data were stored on a computer hard drive with important events saved in digital archive files. The material was reviewed by a physician (electroencephalographer / epileptologist) on a daily basis. Lobo and seizure detection algorithms were utilized and reviewed. An EEG Technician attended to the patient, and was available throughout daytime work hours.  The epilepsy center neurologist was available in person or on call 24-hours per day.    EEG Placement and Labeling of Electrodes:  The EEG was performed utilizing 20 channel referential EEG connections (coronal over temporal over parasagittal montage) using all standard 10-20 electrode placements with EKG, with additional electrodes placed in the inferior temporal region using the modified 10-10 montage electrode placements for elective admissions, or if deemed necessary. Recording was at a sampling rate of 256 samples per second per channel. Time synchronized digital video recording was done simultaneously with EEG recording. A low light infrared camera was used for low light recording.     History:  59 y/o F p/w new onset seizure.     Pertinent Medication	  Propofol Versed Keppra  Vimpat Fosphenytoin	    Interpretation:    Daily EEG Visual Analysis  Findings:       The background was near continuous with intermittent 1-2 seconds of diffuse attenuation, spontaneously variable and reactive. No posterior dominant rhythm seen.    Background Slowing:  Background predominantly consisted of theta, delta and faster activities.    Focal Slowing:   Near continuous theta/delta slowing in the frontotemporal region better appreciated in clinically quiet state.     Sleep Background:  Stage II sleep transients were not recorded.    Other Non-Epileptiform Findings:  Diffuse excess beta activity.    Interictal Epileptiform Activity:   - Abundant right posterior temporal (P8 max) sharp wave discharges, frequently occurred as brief to intermittent runs of fluctuating 1-2 (rarely up to 3) Hz lateralized periodic discharges with rhythmicity (LPD+R).   - Abundant right frontal (Fp2/F4/Fz max) sharp wave discharges, frequently occurred as brief to intermediate runs of fluctuating 1-2 Hz right frontal lateralized periodic discharges (LPD).    Events:  Clinical events: None recorded.  Seizures: None recorded.    Activation Procedures:   Hyperventilation was not performed.    Photic stimulation was not performed.     Artifacts:  Intermittent myogenic and movement artifacts were noted.    ECG:  The heart rate on single channel ECG was predominantly between 70 - 90 BPM.    EEG Summary / Classification:  Abnormal EEG in a sedated patient.  up to 3) Hz lateralized periodic discharges with rhythmicity (LPD+R).   - Abundant right frontal (Fp2/F4/Fz max) sharp wave discharges, frequently occurred as brief to intermediate runs of fluctuating 1-2 Hz right frontal lateralized periodic discharges (LPD).  - Near continuous theta/delta slowing in the frontotemporal region better appreciated in clinically quiet state  - Moderate to severe generalized slowing.    EEG Impression / Clinical Correlate:  Abnormal EEG study.  1. Potential epileptogenic foci in the right frontal and right posterior temporal regions.   2. Structural or functional abnormality in the right frontotemporal region.  3. Moderate to severe nonspecific diffuse or multifocal cerebral dysfunction.   4. No seizure seen.      Brain Marquez MD  Neurocritical Care Fellow     Javier Meier MD  Attending Physician, [facility] [place]

## 2020-08-19 NOTE — PROGRESS NOTE ADULT - ASSESSMENT
61 y/o F with a h/o EtOH and opiate abuse s/p recent admissions for alcoholic pancreatitis and alcohol withdrawal, mixed connective tissue disorder (on plaquenil and methylprednisolone), chronic pain s/p lumbar laminectomy, left soleal vein thrombosis (started on apixaban, 7/2020), lung adenocarcinoma (s/p RML resection 2018), recent RLE cellulitis s/p doxycycline, recent CDiff colitis (on PO vancomycin, started 8/3, to finish 8/17), CAD s/p possible MI (ECHO ) admitted on 8/11 with symptomatic 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   transferred from OSH  to MICU for mgmt of status epilepticus , hypoxic respi failure, hypotension on pressors       #status epilepticus  antiepileptics  -Neurology recs appreciated   mri brain pending  Rule out Meningitis   plan for LP  cont ceftriaxone and acyclovir    #Hypoxic Respiratory failure 2/2 seizures  for airway protection   mgmt per micu team      #Chronic pancreatitis   chr alcohol abuse, last drink 3wks ago   NGT tube  IV D5 LR    Creon 12,000 Lipase  -Protonix 40 mg BID  mvi, folate    #mixed connective tissue disorder   - C/w hydroxychloroquine 200 mg   - Solumedrol 20q8hr    #DVT hx-off eliquis , heparin gtt    #anemia: hb 8.9  baseline 11-12  rpt cbc    [facility]  [phone] Assessment:  59 y/o F with a h/o EtOH withdrawal, polysubstance abuse, EtoH pancreatitis, mixed connective tissue disorder (plaquenil and methylprednisolone), chronic pain s/p lumbar laminectomy, left soleal vein thrombosis (apixaban) lung adenocarcinoma (s/p RML resection 2018), recent RLE cellulitis s/p doxycycline, recent CDiff colitis, CAD admitted to OSH for symptomatic hyponatremia 2/2 psychogenic polydipsia complicated with new onset generalized seizures, intubated for hypoxic respiratory failure transferred to [facility] MICU for further management      #NEURO   //Status Epilepticus   -C/w fosphenytoin   -C/w Versed ggt  -C/w propofol ggt   -C/w keppra 1000 mg BID   -c/w vimpat  -monitor for seizures on EEG  -Neurology recs appreciated     #CV  -off pressors  -hold home clonidine   -hx of MI, no stents placed    #RESP   //Hypoxic Respiratory failure 2/2 seizures  -Vent settings: 30/400/16/5     //Hx of adenocarcinoma   -s/p RML wedge resection in 2018      #GI   //Chronic pancreatitis   -NPO with tube feeds  -IV D5 LR   -C/w Creon 12,000 Lipase Units TID   -Protonix 40 mg BID   -C/w Folic Acid 1mg  and Multivitamin 1 tablet QD       #ID   //Rule out Meningitis   - LP 8/18 AM. Last Eliquis 8/15 5am.   - Afebrile, no WBC. CTH neg  - Empiric IV ceftriaxone, vanc acyclovir  - Neurology recs appreciated    //Clostridium difficile infection   -dx 8/3  -cw PO vancomycin and c/w IV flagyl  -d/c vanc if no diarrhea on tube feeds  -hold off C diff testing given it remain positive  -Contact isolation      #Renal   -no active issues  -Zurita    #Rheum   //Hx of mixed connective tissue disorder   - C/w hydroxychloroquine 200 mg   - Solumedrol 20q8hr    #Heme   - Hold Eliquis  - C/w heparin gtt    #PSYCH   //Alcohol withdrawal   -last drink 3 weeks ago, seizures unlikely 2/2 to withdrawal Assessment:  61 y/o F with a h/o EtOH withdrawal, polysubstance abuse, EtoH pancreatitis, mixed connective tissue disorder (plaquenil and methylprednisolone), chronic pain s/p lumbar laminectomy, left soleal vein thrombosis (apixaban) lung adenocarcinoma (s/p RML resection 2018), recent RLE cellulitis s/p doxycycline, recent CDiff colitis, CAD admitted to OSH for symptomatic hyponatremia 2/2 psychogenic polydipsia complicated with new onset generalized seizures, intubated for hypoxic respiratory failure transferred to [facility] MICU for further management      #NEURO   //Status Epilepticus   - MRI shows areas of cortical diffusion, subacute/acute lacunar infarct w/o hemorrhagic transformation. Broad ddx including infection, toxic/metabolic, withdrawal, autoimmune or paraneoplastic.   -C/w fosphenytoin   -C/w Versed ggt  -C/w propofol ggt   -C/w keppra 1000 mg BID   -c/w vimpat  -monitor for seizures on EEG  -Neurology recs appreciated     #CV  -off pressors  -hold home clonidine   -hx of MI, no stents placed    #RESP   //Hypoxic Respiratory failure 2/2 seizures  -Vent settings: 30/400/16/5     //Hx of adenocarcinoma   -s/p RML wedge resection in 2018      #GI   //Chronic pancreatitis   -NPO with tube feeds  -IV D5 LR   -C/w Creon 12,000 Lipase Units TID   -Protonix 40 mg BID   -C/w Folic Acid 1mg  and Multivitamin 1 tablet QD       #ID   //Rule out Meningitis   - LP today  - Send CSF cell count; Glucose; Protein: Gram stain; CSF Viral PCR panel; CSF AFB; CSF fungal cx; CSF VDRL titer; Oligoclonal bands; Myelin Basic Protein, Anti-MOG; Cryptococcal antigen. Autoimmune encephelopathy CSF panel, RT quic CSF, 14-3-3   - Afebrile, no WBC. CTH neg.  - Empiric IV ceftriaxone, vanc, acyclovir  - Neurology recs appreciated    //Clostridium difficile infection   -dx 8/3  -cw PO vancomycin and c/w IV flagyl  -d/c vanc if no diarrhea on tube feeds  -hold off C diff testing given it remain positive  -Contact isolation      #Renal   -no active issues  -Zurita    #Rheum   //Hx of mixed connective tissue disorder   - C/w hydroxychloroquine 200 mg   - Solumedrol 20q8hr    #Heme   - Hold Eliquis  - C/w heparin gtt    #PSYCH   //Alcohol withdrawal   -last drink 3 weeks ago, seizures unlikely 2/2 to withdrawal

## 2020-08-19 NOTE — PROGRESS NOTE ADULT - SUBJECTIVE AND OBJECTIVE BOX
*************************************  NEUROLOGY PROGRESS NOTE  **************************************    JULIO CESAR FAYE  Female  MRN-[id number]    Subjective: Overnight, no acute events. LP to be performed today. Remains intubated and sedated.    VITAL SIGNS:  Vital Signs Last 24 Hrs  T(C): 37.5 (19 Aug 2020 08:00), Max: 37.8 (18 Aug 2020 20:00)  T(F): 99.5 (19 Aug 2020 08:00), Max: 100 (18 Aug 2020 20:00)  HR: 82 (19 Aug 2020 11:00) (82 - 100)  BP: 137/88 (19 Aug 2020 11:00) (97/65 - 185/95)  BP(mean): 108 (19 Aug 2020 11:00) (77 - 127)  RR: 16 (19 Aug 2020 11:00) (16 - 24)  SpO2: 100% (19 Aug 2020 11:00) (97% - 100%)    MEDICATIONS  (STANDING):  acyclovir IVPB      acyclovir IVPB 480 milliGRAM(s) IV Intermittent every 8 hours  cefTRIAXone   IVPB 2000 milliGRAM(s) IV Intermittent every 12 hours  chlorhexidine 0.12% Liquid 15 milliLiter(s) Oral Mucosa every 12 hours  chlorhexidine 4% Liquid 1 Application(s) Topical <User Schedule>  fentaNYL   Infusion... 0.5 MICROgram(s)/kG/Hr (1.19 mL/Hr) IV Continuous <Continuous>  fosphenytoin IVPB 140 milliGRAM(s) PE IV Intermittent every 12 hours  heparin   Injectable 5000 Unit(s) SubCutaneous every 8 hours  hydroxychloroquine 200 milliGRAM(s) Oral daily  lacosamide IVPB 200 milliGRAM(s) IV Intermittent every 12 hours  levETIRAcetam  IVPB 1000 milliGRAM(s) IV Intermittent every 12 hours  methylPREDNISolone sodium succinate Injectable 20 milliGRAM(s) IV Push daily  midazolam Infusion. 0.084 mG/kG/Hr (4 mL/Hr) IV Continuous <Continuous>  norepinephrine Infusion 0.05 MICROgram(s)/kG/Min (4.47 mL/Hr) IV Continuous <Continuous>  pantoprazole  Injectable 40 milliGRAM(s) IV Push two times a day  propofol Infusion. 40 MICROgram(s)/kG/Min (11.4 mL/Hr) IV Continuous <Continuous>  vancomycin  IVPB      vancomycin  IVPB 750 milliGRAM(s) IV Intermittent every 12 hours      PHYSICAL EXAMINATION:  General: Remains intubated and sedated.   Neurologic:  - Mental Status:  Intubated and sedated.   - Cranial Nerves II-XII:  Pupils sluggish bilaterally, 2mm bilaterally. No opening to voice or noxious stimuli. Corneal reflexes intact. Dolls eye intact. Gag reflex intact.  - Motor:  No spontaneous movement and no withdrawal to noxious stimuli.   - Reflexes: 1+ patellar reflexes, Babinski downgoing.   - Sensory:  No localization to noxious stimuli  - Coordination:  Unable to test  - Gait:   Deferred    LABS:                                     11.8   8.78  )-----------( 274      ( 19 Aug 2020 00:35 )             36.8     08-19    132<L>  |  97  |  6<L>  ----------------------------<  172<H>  3.5   |  23  |  0.40<L>    Ca    8.5      19 Aug 2020 00:35  Phos  2.3     08-19  Mg     2.0     08-19    TPro  5.8<L>  /  Alb  3.3  /  TBili  0.1<L>  /  DBili  x   /  AST  29  /  ALT  18  /  AlkPhos  30<L>  08-19        RADIOLOGY & ADDITIONAL STUDIES:      EEG Summary / Classification:  Abnormal EEG in a sedated patient.  - Recording started with near continuous fluctuating 0.5-3 Hz right posterior-temporal (max P8) lateralized periodic discharges with rhythmicity (LPD +R), concerning for ictal-interictal continuum (IIC). IIC slowly resolved after fosphenytoin was loaded around 18:10. Int he last few hours of recording, emergence of a stimulus-induced long run of fluctuating 1-2 Hz right posterior-temporal lateralized periodic discharges (LPD).   - Frequent to abundant right frontal (max Fp2/F4/Fz) sharp wave discharges, rarely occurred as very brief to brief runs of fluctuating 0.5 to 2 Hz right frontal lateralized periodic discharges (LPD).  - Near continuous theta/delta slowing in the right frontotemporal region, better appreciated in clinically quiet state.  - Moderate to severe generalized slowing.    EEG Impression / Clinical Correlate:  Abnormal EEG study.  1. Right posterior-temporal LPD concerning for IIC; aborted with fosphenytoin load.  2. Epileptogenic focus in the right frontal region.   3. Structural abnormality in the right frontotemporal region.   4. Moderate to severe nonspecific diffuse or multifocal cerebral dysfunction.     < from: CT Head No Cont (08.15.20 @ 18:28) >  FINDINGS:    There is no CT evidence of acute intracranial hemorrhage, mass effect, midline shift, or acute, large territorial infarct.    The ventricles and sulci are normal in size and configuration. The basal cisterns are patent.    The mastoid air cells and middle ear cavities are grossly clear. There is no significant paranasal sinus mucosal thickening.    The calvarium and skull base are grossly intact. .    IMPRESSION:  No acute intracranial abnormality.    < end of copied text >    < from: MR Head No Cont (08.18.20 @ 22:51) >  IMPRESSION: Areas of cortical restricted diffusion involving the right anterior frontal, bilateral temporal, and left parietal lobes compatible with a post ictal status. The differential diagnosis is broad and includes but is not limited to toxic/ metabolic etiologies, drug withdrawal, autoimmune, or paraneoplastic etiologies. Clinical correlation is required. Recommend imaging follow-up to resolution.    Acute/subacute lacunar infarction within the right medial thalamus with associated cytotoxic edema and without hemorrhagic transformation.    Similar-appearing mild chronic white matter microvascular type changes.    < end of copied text >

## 2020-08-19 NOTE — CHART NOTE - NSCHARTNOTEFT_GEN_A_CORE
Please send the following labs w/ LP – CSF cell count; Glucose; Protein: Gram stain; CSF PCR panel; CSF AFB; CSF fungal cx; CSF VDRL titer; Oligoclonal bands; Myelin Basic Protein; Cryptococcal antigen. Autoimmune encephelopathy CSF panel. Please send the following labs w/ LP – CSF cell count; Glucose; Protein: Gram stain; CSF Viral PCR panel; CSF AFB; CSF fungal cx; CSF VDRL titer; Oligoclonal bands; Myelin Basic Protein, Anti-MOG; Cryptococcal antigen. Autoimmune encephelopathy CSF panel, RT quic CSF, 14-3-3.

## 2020-08-19 NOTE — PROGRESS NOTE ADULT - SUBJECTIVE AND OBJECTIVE BOX
Patient is a 60y old  Female who presents with a chief complaint of Seizures (18 Aug 2020 11:21)      INTERVAL HPI/OVERNIGHT EVENTS:   No overnight events   Afebrile, hemodynamically stable     ICU Vital Signs Last 24 Hrs  T(C): 37.5 (19 Aug 2020 08:00), Max: 37.8 (18 Aug 2020 20:00)  T(F): 99.5 (19 Aug 2020 08:00), Max: 100 (18 Aug 2020 20:00)  HR: 91 (19 Aug 2020 10:00) (83 - 100)  BP: 139/80 (19 Aug 2020 10:00) (97/65 - 185/95)  BP(mean): 104 (19 Aug 2020 10:00) (77 - 127)  ABP: --  ABP(mean): --  RR: 16 (19 Aug 2020 10:00) (16 - 24)  SpO2: 100% (19 Aug 2020 10:00) (97% - 100%)    I&O's Summary    18 Aug 2020 07:01  -  19 Aug 2020 07:00  --------------------------------------------------------  IN: 2815.6 mL / OUT: 2301 mL / NET: 514.6 mL    19 Aug 2020 07:01  -  19 Aug 2020 11:01  --------------------------------------------------------  IN: 411.6 mL / OUT: 940 mL / NET: -528.4 mL      Mode: AC/ CMV (Assist Control/ Continuous Mandatory Ventilation)  RR (machine): 16  TV (machine): 400  FiO2: 30  PEEP: 5  ITime: 1  MAP: 8  PIP: 19      LABS:                        11.8   8.78  )-----------( 274      ( 19 Aug 2020 00:35 )             36.8     08-19    132<L>  |  97  |  6<L>  ----------------------------<  172<H>  3.5   |  23  |  0.40<L>    Ca    8.5      19 Aug 2020 00:35  Phos  2.3     08-19  Mg     2.0     08-19    TPro  5.8<L>  /  Alb  3.3  /  TBili  0.1<L>  /  DBili  x   /  AST  29  /  ALT  18  /  AlkPhos  30<L>  08-19    PT/INR - ( 19 Aug 2020 00:35 )   PT: 10.7 sec;   INR: 0.89 ratio         PTT - ( 19 Aug 2020 00:35 )  PTT:23.2 sec    CAPILLARY BLOOD GLUCOSE      POCT Blood Glucose.: 145 mg/dL (18 Aug 2020 17:39)  POCT Blood Glucose.: 125 mg/dL (18 Aug 2020 11:26)    ABG - ( 19 Aug 2020 00:31 )  pH, Arterial: 7.42  pH, Blood: x     /  pCO2: 39    /  pO2: 198   / HCO3: 25    / Base Excess: .9    /  SaO2: 100                 RADIOLOGY & ADDITIONAL TESTS:    Consultant(s) Notes Reviewed:  [x ] YES  [ ] NO    MEDICATIONS  (STANDING):  acyclovir IVPB      acyclovir IVPB 480 milliGRAM(s) IV Intermittent every 8 hours  cefTRIAXone   IVPB 2000 milliGRAM(s) IV Intermittent every 12 hours  chlorhexidine 0.12% Liquid 15 milliLiter(s) Oral Mucosa every 12 hours  chlorhexidine 4% Liquid 1 Application(s) Topical <User Schedule>  fosphenytoin IVPB 140 milliGRAM(s) PE IV Intermittent every 12 hours  heparin   Injectable 5000 Unit(s) SubCutaneous every 8 hours  hydroxychloroquine 200 milliGRAM(s) Oral daily  lacosamide IVPB 200 milliGRAM(s) IV Intermittent every 12 hours  levETIRAcetam  IVPB 1000 milliGRAM(s) IV Intermittent every 12 hours  methylPREDNISolone sodium succinate Injectable 20 milliGRAM(s) IV Push daily  midazolam Infusion. 0.084 mG/kG/Hr (4 mL/Hr) IV Continuous <Continuous>  norepinephrine Infusion 0.05 MICROgram(s)/kG/Min (4.47 mL/Hr) IV Continuous <Continuous>  pantoprazole  Injectable 40 milliGRAM(s) IV Push two times a day  propofol Infusion. 40 MICROgram(s)/kG/Min (11.4 mL/Hr) IV Continuous <Continuous>  vancomycin  IVPB      vancomycin  IVPB 750 milliGRAM(s) IV Intermittent every 12 hours    MEDICATIONS  (PRN):      PHYSICAL EXAM:  GENERAL:   HEAD:  Atraumatic, Normocephalic  EYES: EOMI, PERRLA, conjunctiva and sclera clear  NECK: Supple, No JVD, Normal thyroid, no enlarged nodes  NERVOUS SYSTEM:  Alert & Awake.   CHEST/LUNG: B/L good air entry; No rales, rhonchi, or wheezing  HEART: S1S2 normal, no S3, Regular rate and rhythm; No murmurs  ABDOMEN: Soft, Nontender, Nondistended; Bowel sounds present  EXTREMITIES:  2+ Peripheral Pulses, No clubbing, cyanosis, or edema  LYMPH: No lymphadenopathy noted  SKIN: No rashes or lesions    Care Discussed with Consultants/Other Providers [ x] YES  [ ] NO Patient is a 60y old  Female who presents with a chief complaint of Seizures (18 Aug 2020 11:21)      INTERVAL HPI/OVERNIGHT EVENTS:   O/N: Went down for MRI head. K repleted.  AM: No acute events, afebrile, hemodynamically stable.      ICU Vital Signs Last 24 Hrs  T(C): 37.5 (19 Aug 2020 08:00), Max: 37.8 (18 Aug 2020 20:00)  T(F): 99.5 (19 Aug 2020 08:00), Max: 100 (18 Aug 2020 20:00)  HR: 91 (19 Aug 2020 10:00) (83 - 100)  BP: 139/80 (19 Aug 2020 10:00) (97/65 - 185/95)  BP(mean): 104 (19 Aug 2020 10:00) (77 - 127)  ABP: --  ABP(mean): --  RR: 16 (19 Aug 2020 10:00) (16 - 24)  SpO2: 100% (19 Aug 2020 10:00) (97% - 100%)    I&O's Summary    18 Aug 2020 07:01  -  19 Aug 2020 07:00  --------------------------------------------------------  IN: 2815.6 mL / OUT: 2301 mL / NET: 514.6 mL    19 Aug 2020 07:01  -  19 Aug 2020 11:01  --------------------------------------------------------  IN: 411.6 mL / OUT: 940 mL / NET: -528.4 mL      Mode: AC/ CMV (Assist Control/ Continuous Mandatory Ventilation)  RR (machine): 16  TV (machine): 400  FiO2: 30  PEEP: 5  ITime: 1  MAP: 8  PIP: 19      LABS:                        11.8   8.78  )-----------( 274      ( 19 Aug 2020 00:35 )             36.8     08-19    132<L>  |  97  |  6<L>  ----------------------------<  172<H>  3.5   |  23  |  0.40<L>    Ca    8.5      19 Aug 2020 00:35  Phos  2.3     08-19  Mg     2.0     08-19    TPro  5.8<L>  /  Alb  3.3  /  TBili  0.1<L>  /  DBili  x   /  AST  29  /  ALT  18  /  AlkPhos  30<L>  08-19    PT/INR - ( 19 Aug 2020 00:35 )   PT: 10.7 sec;   INR: 0.89 ratio         PTT - ( 19 Aug 2020 00:35 )  PTT:23.2 sec    CAPILLARY BLOOD GLUCOSE      POCT Blood Glucose.: 145 mg/dL (18 Aug 2020 17:39)  POCT Blood Glucose.: 125 mg/dL (18 Aug 2020 11:26)    ABG - ( 19 Aug 2020 00:31 )  pH, Arterial: 7.42  pH, Blood: x     /  pCO2: 39    /  pO2: 198   / HCO3: 25    / Base Excess: .9    /  SaO2: 100                 RADIOLOGY & ADDITIONAL TESTS:    Consultant(s) Notes Reviewed:  [x ] YES  [ ] NO    MEDICATIONS  (STANDING):  acyclovir IVPB      acyclovir IVPB 480 milliGRAM(s) IV Intermittent every 8 hours  cefTRIAXone   IVPB 2000 milliGRAM(s) IV Intermittent every 12 hours  chlorhexidine 0.12% Liquid 15 milliLiter(s) Oral Mucosa every 12 hours  chlorhexidine 4% Liquid 1 Application(s) Topical <User Schedule>  fosphenytoin IVPB 140 milliGRAM(s) PE IV Intermittent every 12 hours  heparin   Injectable 5000 Unit(s) SubCutaneous every 8 hours  hydroxychloroquine 200 milliGRAM(s) Oral daily  lacosamide IVPB 200 milliGRAM(s) IV Intermittent every 12 hours  levETIRAcetam  IVPB 1000 milliGRAM(s) IV Intermittent every 12 hours  methylPREDNISolone sodium succinate Injectable 20 milliGRAM(s) IV Push daily  midazolam Infusion. 0.084 mG/kG/Hr (4 mL/Hr) IV Continuous <Continuous>  norepinephrine Infusion 0.05 MICROgram(s)/kG/Min (4.47 mL/Hr) IV Continuous <Continuous>  pantoprazole  Injectable 40 milliGRAM(s) IV Push two times a day  propofol Infusion. 40 MICROgram(s)/kG/Min (11.4 mL/Hr) IV Continuous <Continuous>  vancomycin  IVPB      vancomycin  IVPB 750 milliGRAM(s) IV Intermittent every 12 hours    MEDICATIONS  (PRN):      PHYSICAL EXAM:  GENERAL: Intubated, sedated, grimaces to pain  HEAD:  Atraumatic, Normocephalic  EYES: PERRL, conjunctiva and sclera clear  NECK: Supple, No JVD,  CHEST/LUNG: B/L good air entry; No rales, rhonchi, or wheezing  HEART: S1S2 normal, Regular rate and rhythm; No murmurs  ABDOMEN: Soft, Nontender, Nondistended; Bowel sounds present  EXTREMITIES:  warm and well perfused, No clubbing, cyanosis, or edema    Care Discussed with Consultants/Other Providers [ x] YES  [ ] NO Patient is a 60y old  Female who presents with a chief complaint of Seizures (18 Aug 2020 11:21)      INTERVAL HPI/OVERNIGHT EVENTS:   O/N: Went down for MRI head. K repleted.  AM: No acute events, afebrile, hemodynamically stable.      ICU Vital Signs Last 24 Hrs  T(C): 37.5 (19 Aug 2020 08:00), Max: 37.8 (18 Aug 2020 20:00)  T(F): 99.5 (19 Aug 2020 08:00), Max: 100 (18 Aug 2020 20:00)  HR: 91 (19 Aug 2020 10:00) (83 - 100)  BP: 139/80 (19 Aug 2020 10:00) (97/65 - 185/95)  BP(mean): 104 (19 Aug 2020 10:00) (77 - 127)  ABP: --  ABP(mean): --  RR: 16 (19 Aug 2020 10:00) (16 - 24)  SpO2: 100% (19 Aug 2020 10:00) (97% - 100%)    I&O's Summary    18 Aug 2020 07:01  -  19 Aug 2020 07:00  --------------------------------------------------------  IN: 2815.6 mL / OUT: 2301 mL / NET: 514.6 mL    19 Aug 2020 07:01  -  19 Aug 2020 11:01  --------------------------------------------------------  IN: 411.6 mL / OUT: 940 mL / NET: -528.4 mL      Mode: AC/ CMV (Assist Control/ Continuous Mandatory Ventilation)  RR (machine): 16  TV (machine): 400  FiO2: 30  PEEP: 5  ITime: 1  MAP: 8  PIP: 19      LABS:                        11.8   8.78  )-----------( 274      ( 19 Aug 2020 00:35 )             36.8     08-19    132<L>  |  97  |  6<L>  ----------------------------<  172<H>  3.5   |  23  |  0.40<L>    Ca    8.5      19 Aug 2020 00:35  Phos  2.3     08-19  Mg     2.0     08-19    TPro  5.8<L>  /  Alb  3.3  /  TBili  0.1<L>  /  DBili  x   /  AST  29  /  ALT  18  /  AlkPhos  30<L>  08-19    PT/INR - ( 19 Aug 2020 00:35 )   PT: 10.7 sec;   INR: 0.89 ratio         PTT - ( 19 Aug 2020 00:35 )  PTT:23.2 sec    CAPILLARY BLOOD GLUCOSE      POCT Blood Glucose.: 145 mg/dL (18 Aug 2020 17:39)  POCT Blood Glucose.: 125 mg/dL (18 Aug 2020 11:26)    ABG - ( 19 Aug 2020 00:31 )  pH, Arterial: 7.42  pH, Blood: x     /  pCO2: 39    /  pO2: 198   / HCO3: 25    / Base Excess: .9    /  SaO2: 100                 RADIOLOGY & ADDITIONAL TESTS:    < from: MR Head No Cont (08.18.20 @ 22:51) >  Areas of cortical restricted diffusion involving the right anterior frontal, bilateral temporal, and left parietal lobes compatible with a post ictal status. The differential diagnosis is broad and includes but is not limited to toxic/ metabolic etiologies, drug withdrawal, autoimmune, or paraneoplastic etiologies. Clinical correlation is required. Recommend imaging follow-up to resolution.    Acute/subacute lacunar infarction within the right medial thalamus with associated cytotoxic edema and without hemorrhagic transformation.    Similar-appearing mild chronic white matter microvascular type changes.        Consultant(s) Notes Reviewed:  [x ] YES  [ ] NO    MEDICATIONS  (STANDING):  acyclovir IVPB      acyclovir IVPB 480 milliGRAM(s) IV Intermittent every 8 hours  cefTRIAXone   IVPB 2000 milliGRAM(s) IV Intermittent every 12 hours  chlorhexidine 0.12% Liquid 15 milliLiter(s) Oral Mucosa every 12 hours  chlorhexidine 4% Liquid 1 Application(s) Topical <User Schedule>  fosphenytoin IVPB 140 milliGRAM(s) PE IV Intermittent every 12 hours  heparin   Injectable 5000 Unit(s) SubCutaneous every 8 hours  hydroxychloroquine 200 milliGRAM(s) Oral daily  lacosamide IVPB 200 milliGRAM(s) IV Intermittent every 12 hours  levETIRAcetam  IVPB 1000 milliGRAM(s) IV Intermittent every 12 hours  methylPREDNISolone sodium succinate Injectable 20 milliGRAM(s) IV Push daily  midazolam Infusion. 0.084 mG/kG/Hr (4 mL/Hr) IV Continuous <Continuous>  norepinephrine Infusion 0.05 MICROgram(s)/kG/Min (4.47 mL/Hr) IV Continuous <Continuous>  pantoprazole  Injectable 40 milliGRAM(s) IV Push two times a day  propofol Infusion. 40 MICROgram(s)/kG/Min (11.4 mL/Hr) IV Continuous <Continuous>  vancomycin  IVPB      vancomycin  IVPB 750 milliGRAM(s) IV Intermittent every 12 hours    MEDICATIONS  (PRN):      PHYSICAL EXAM:  GENERAL: Intubated, sedated, grimaces to pain  HEAD:  Atraumatic, Normocephalic  EYES: PERRL, conjunctiva and sclera clear  NECK: Supple, No JVD,  CHEST/LUNG: B/L good air entry; No rales, rhonchi, or wheezing  HEART: S1S2 normal, Regular rate and rhythm; No murmurs  ABDOMEN: Soft, Nontender, Nondistended; Bowel sounds present  EXTREMITIES:  warm and well perfused, No clubbing, cyanosis, or edema    Care Discussed with Consultants/Other Providers [ x] YES  [ ] NO

## 2020-08-20 LAB
ALBUMIN SERPL ELPH-MCNC: 2.7 G/DL — LOW (ref 3.3–5)
ALBUMIN SERPL ELPH-MCNC: 2.9 G/DL — LOW (ref 3.3–5)
ALP SERPL-CCNC: 26 U/L — LOW (ref 40–120)
ALP SERPL-CCNC: 28 U/L — LOW (ref 40–120)
ALT FLD-CCNC: 17 U/L — SIGNIFICANT CHANGE UP (ref 10–45)
ALT FLD-CCNC: 18 U/L — SIGNIFICANT CHANGE UP (ref 10–45)
ANION GAP SERPL CALC-SCNC: 10 MMOL/L — SIGNIFICANT CHANGE UP (ref 5–17)
ANION GAP SERPL CALC-SCNC: 10 MMOL/L — SIGNIFICANT CHANGE UP (ref 5–17)
ANION GAP SERPL CALC-SCNC: 11 MMOL/L — SIGNIFICANT CHANGE UP (ref 5–17)
APTT BLD: 28.4 SEC — SIGNIFICANT CHANGE UP (ref 27.5–35.5)
AST SERPL-CCNC: 22 U/L — SIGNIFICANT CHANGE UP (ref 10–40)
AST SERPL-CCNC: 25 U/L — SIGNIFICANT CHANGE UP (ref 10–40)
BASE EXCESS BLDV CALC-SCNC: 4.5 MMOL/L — HIGH (ref -2–2)
BILIRUB SERPL-MCNC: 0.1 MG/DL — LOW (ref 0.2–1.2)
BILIRUB SERPL-MCNC: <0.1 MG/DL — LOW (ref 0.2–1.2)
BUN SERPL-MCNC: 6 MG/DL — LOW (ref 7–23)
BUN SERPL-MCNC: 6 MG/DL — LOW (ref 7–23)
BUN SERPL-MCNC: 7 MG/DL — SIGNIFICANT CHANGE UP (ref 7–23)
CA-I SERPL-SCNC: 1.15 MMOL/L — SIGNIFICANT CHANGE UP (ref 1.12–1.3)
CALCIUM SERPL-MCNC: 8.5 MG/DL — SIGNIFICANT CHANGE UP (ref 8.4–10.5)
CALCIUM SERPL-MCNC: 9.1 MG/DL — SIGNIFICANT CHANGE UP (ref 8.4–10.5)
CALCIUM SERPL-MCNC: 9.1 MG/DL — SIGNIFICANT CHANGE UP (ref 8.4–10.5)
CHLORIDE BLDV-SCNC: 104 MMOL/L — SIGNIFICANT CHANGE UP (ref 96–108)
CHLORIDE SERPL-SCNC: 100 MMOL/L — SIGNIFICANT CHANGE UP (ref 96–108)
CHLORIDE SERPL-SCNC: 103 MMOL/L — SIGNIFICANT CHANGE UP (ref 96–108)
CHLORIDE SERPL-SCNC: 95 MMOL/L — LOW (ref 96–108)
CHLORIDE UR-SCNC: 127 MMOL/L — SIGNIFICANT CHANGE UP
CHLORIDE UR-SCNC: 74 MMOL/L — SIGNIFICANT CHANGE UP
CO2 BLDV-SCNC: 29 MMOL/L — SIGNIFICANT CHANGE UP (ref 22–30)
CO2 SERPL-SCNC: 24 MMOL/L — SIGNIFICANT CHANGE UP (ref 22–31)
CO2 SERPL-SCNC: 24 MMOL/L — SIGNIFICANT CHANGE UP (ref 22–31)
CO2 SERPL-SCNC: 25 MMOL/L — SIGNIFICANT CHANGE UP (ref 22–31)
CREAT ?TM UR-MCNC: 18 MG/DL — SIGNIFICANT CHANGE UP
CREAT ?TM UR-MCNC: 40 MG/DL — SIGNIFICANT CHANGE UP
CREAT SERPL-MCNC: 0.32 MG/DL — LOW (ref 0.5–1.3)
CREAT SERPL-MCNC: 0.36 MG/DL — LOW (ref 0.5–1.3)
CREAT SERPL-MCNC: 0.41 MG/DL — LOW (ref 0.5–1.3)
GAS PNL BLDA: SIGNIFICANT CHANGE UP
GAS PNL BLDV: 132 MMOL/L — LOW (ref 135–145)
GAS PNL BLDV: SIGNIFICANT CHANGE UP
GLUCOSE BLDC GLUCOMTR-MCNC: 108 MG/DL — HIGH (ref 70–99)
GLUCOSE BLDC GLUCOMTR-MCNC: 113 MG/DL — HIGH (ref 70–99)
GLUCOSE BLDC GLUCOMTR-MCNC: 135 MG/DL — HIGH (ref 70–99)
GLUCOSE BLDV-MCNC: 122 MG/DL — HIGH (ref 70–99)
GLUCOSE SERPL-MCNC: 102 MG/DL — HIGH (ref 70–99)
GLUCOSE SERPL-MCNC: 123 MG/DL — HIGH (ref 70–99)
GLUCOSE SERPL-MCNC: 281 MG/DL — HIGH (ref 70–99)
HCO3 BLDV-SCNC: 28 MMOL/L — SIGNIFICANT CHANGE UP (ref 21–29)
HCT VFR BLD CALC: 33.1 % — LOW (ref 34.5–45)
HCT VFR BLDA CALC: 33 % — LOW (ref 39–50)
HGB BLD CALC-MCNC: 10.5 G/DL — LOW (ref 11.5–15.5)
HGB BLD-MCNC: 10.3 G/DL — LOW (ref 11.5–15.5)
HOROWITZ INDEX BLDV+IHG-RTO: 21 — SIGNIFICANT CHANGE UP
INR BLD: 0.93 RATIO — SIGNIFICANT CHANGE UP (ref 0.88–1.16)
LACTATE BLDV-MCNC: 1.6 MMOL/L — SIGNIFICANT CHANGE UP (ref 0.7–2)
MAGNESIUM SERPL-MCNC: 2 MG/DL — SIGNIFICANT CHANGE UP (ref 1.6–2.6)
MAGNESIUM SERPL-MCNC: 2.2 MG/DL — SIGNIFICANT CHANGE UP (ref 1.6–2.6)
MAGNESIUM SERPL-MCNC: 2.2 MG/DL — SIGNIFICANT CHANGE UP (ref 1.6–2.6)
MCHC RBC-ENTMCNC: 31.1 GM/DL — LOW (ref 32–36)
MCHC RBC-ENTMCNC: 33 PG — SIGNIFICANT CHANGE UP (ref 27–34)
MCV RBC AUTO: 106.1 FL — HIGH (ref 80–100)
NON-GYNECOLOGICAL CYTOLOGY STUDY: SIGNIFICANT CHANGE UP
NRBC # BLD: 0 /100 WBCS — SIGNIFICANT CHANGE UP (ref 0–0)
OSMOLALITY SERPL: 285 MOSMOL/KG — SIGNIFICANT CHANGE UP (ref 280–301)
OSMOLALITY UR: 395 MOS/KG — SIGNIFICANT CHANGE UP (ref 300–900)
OSMOLALITY UR: 443 MOS/KG — SIGNIFICANT CHANGE UP (ref 300–900)
OTHER CELLS CSF MANUAL: 14 ML/DL — LOW (ref 16–22)
PCO2 BLDV: 38 MMHG — SIGNIFICANT CHANGE UP (ref 35–50)
PH BLDV: 7.48 — HIGH (ref 7.35–7.45)
PHOSPHATE SERPL-MCNC: 3.2 MG/DL — SIGNIFICANT CHANGE UP (ref 2.5–4.5)
PHOSPHATE SERPL-MCNC: 3.6 MG/DL — SIGNIFICANT CHANGE UP (ref 2.5–4.5)
PLATELET # BLD AUTO: 244 K/UL — SIGNIFICANT CHANGE UP (ref 150–400)
PO2 BLDV: 74 MMHG — HIGH (ref 25–45)
POTASSIUM BLDV-SCNC: 3.6 MMOL/L — SIGNIFICANT CHANGE UP (ref 3.5–5.3)
POTASSIUM SERPL-MCNC: 3.5 MMOL/L — SIGNIFICANT CHANGE UP (ref 3.5–5.3)
POTASSIUM SERPL-MCNC: 3.8 MMOL/L — SIGNIFICANT CHANGE UP (ref 3.5–5.3)
POTASSIUM SERPL-MCNC: 4.2 MMOL/L — SIGNIFICANT CHANGE UP (ref 3.5–5.3)
POTASSIUM SERPL-SCNC: 3.5 MMOL/L — SIGNIFICANT CHANGE UP (ref 3.5–5.3)
POTASSIUM SERPL-SCNC: 3.8 MMOL/L — SIGNIFICANT CHANGE UP (ref 3.5–5.3)
POTASSIUM SERPL-SCNC: 4.2 MMOL/L — SIGNIFICANT CHANGE UP (ref 3.5–5.3)
PROT SERPL-MCNC: 5.4 G/DL — LOW (ref 6–8.3)
PROT SERPL-MCNC: 5.4 G/DL — LOW (ref 6–8.3)
PROTHROM AB SERPL-ACNC: 11.1 SEC — SIGNIFICANT CHANGE UP (ref 10.6–13.6)
RBC # BLD: 3.12 M/UL — LOW (ref 3.8–5.2)
RBC # FLD: 13.4 % — SIGNIFICANT CHANGE UP (ref 10.3–14.5)
SAO2 % BLDV: 96 % — HIGH (ref 67–88)
SODIUM SERPL-SCNC: 129 MMOL/L — LOW (ref 135–145)
SODIUM SERPL-SCNC: 134 MMOL/L — LOW (ref 135–145)
SODIUM SERPL-SCNC: 139 MMOL/L — SIGNIFICANT CHANGE UP (ref 135–145)
SODIUM UR-SCNC: 131 MMOL/L — SIGNIFICANT CHANGE UP
SODIUM UR-SCNC: 159 MMOL/L — SIGNIFICANT CHANGE UP
WBC # BLD: 6.2 K/UL — SIGNIFICANT CHANGE UP (ref 3.8–10.5)
WBC # FLD AUTO: 6.2 K/UL — SIGNIFICANT CHANGE UP (ref 3.8–10.5)

## 2020-08-20 PROCEDURE — 99223 1ST HOSP IP/OBS HIGH 75: CPT

## 2020-08-20 PROCEDURE — 95720 EEG PHY/QHP EA INCR W/VEEG: CPT

## 2020-08-20 PROCEDURE — 99232 SBSQ HOSP IP/OBS MODERATE 35: CPT

## 2020-08-20 PROCEDURE — 99291 CRITICAL CARE FIRST HOUR: CPT

## 2020-08-20 RX ORDER — FLUDROCORTISONE ACETATE 0.1 MG/1
0.1 TABLET ORAL ONCE
Refills: 0 | Status: COMPLETED | OUTPATIENT
Start: 2020-08-20 | End: 2020-08-20

## 2020-08-20 RX ORDER — SODIUM CHLORIDE 9 MG/ML
1000 INJECTION INTRAMUSCULAR; INTRAVENOUS; SUBCUTANEOUS ONCE
Refills: 0 | Status: COMPLETED | OUTPATIENT
Start: 2020-08-20 | End: 2020-08-20

## 2020-08-20 RX ORDER — VANCOMYCIN HCL 1 G
1000 VIAL (EA) INTRAVENOUS EVERY 12 HOURS
Refills: 0 | Status: DISCONTINUED | OUTPATIENT
Start: 2020-08-20 | End: 2020-08-20

## 2020-08-20 RX ORDER — SODIUM CHLORIDE 5 G/100ML
1000 INJECTION, SOLUTION INTRAVENOUS
Refills: 0 | Status: DISCONTINUED | OUTPATIENT
Start: 2020-08-20 | End: 2020-08-21

## 2020-08-20 RX ORDER — CEFTRIAXONE 500 MG/1
2000 INJECTION, POWDER, FOR SOLUTION INTRAMUSCULAR; INTRAVENOUS EVERY 12 HOURS
Refills: 0 | Status: DISCONTINUED | OUTPATIENT
Start: 2020-08-20 | End: 2020-08-20

## 2020-08-20 RX ORDER — SODIUM CHLORIDE 9 MG/ML
1000 INJECTION, SOLUTION INTRAVENOUS
Refills: 0 | Status: DISCONTINUED | OUTPATIENT
Start: 2020-08-20 | End: 2020-08-20

## 2020-08-20 RX ORDER — DEXMEDETOMIDINE HYDROCHLORIDE IN 0.9% SODIUM CHLORIDE 4 UG/ML
0.2 INJECTION INTRAVENOUS
Qty: 200 | Refills: 0 | Status: DISCONTINUED | OUTPATIENT
Start: 2020-08-20 | End: 2020-08-21

## 2020-08-20 RX ORDER — SODIUM CHLORIDE 9 MG/ML
500 INJECTION, SOLUTION INTRAVENOUS
Refills: 0 | Status: DISCONTINUED | OUTPATIENT
Start: 2020-08-20 | End: 2020-08-20

## 2020-08-20 RX ORDER — ACYCLOVIR SODIUM 500 MG
480 VIAL (EA) INTRAVENOUS EVERY 8 HOURS
Refills: 0 | Status: DISCONTINUED | OUTPATIENT
Start: 2020-08-20 | End: 2020-08-20

## 2020-08-20 RX ADMIN — LEVETIRACETAM 400 MILLIGRAM(S): 250 TABLET, FILM COATED ORAL at 10:43

## 2020-08-20 RX ADMIN — SODIUM CHLORIDE 1000 MILLILITER(S): 9 INJECTION INTRAMUSCULAR; INTRAVENOUS; SUBCUTANEOUS at 06:15

## 2020-08-20 RX ADMIN — Medication 200 MILLIGRAM(S): at 12:27

## 2020-08-20 RX ADMIN — CHLORHEXIDINE GLUCONATE 1 APPLICATION(S): 213 SOLUTION TOPICAL at 05:54

## 2020-08-20 RX ADMIN — LACOSAMIDE 140 MILLIGRAM(S): 50 TABLET ORAL at 12:28

## 2020-08-20 RX ADMIN — SODIUM CHLORIDE 100 MILLILITER(S): 5 INJECTION, SOLUTION INTRAVENOUS at 21:34

## 2020-08-20 RX ADMIN — CEFTRIAXONE 100 MILLIGRAM(S): 500 INJECTION, POWDER, FOR SOLUTION INTRAMUSCULAR; INTRAVENOUS at 05:56

## 2020-08-20 RX ADMIN — CEFTRIAXONE 100 MILLIGRAM(S): 500 INJECTION, POWDER, FOR SOLUTION INTRAMUSCULAR; INTRAVENOUS at 02:00

## 2020-08-20 RX ADMIN — DEXMEDETOMIDINE HYDROCHLORIDE IN 0.9% SODIUM CHLORIDE 2.39 MICROGRAM(S)/KG/HR: 4 INJECTION INTRAVENOUS at 05:56

## 2020-08-20 RX ADMIN — FLUDROCORTISONE ACETATE 0.1 MILLIGRAM(S): 0.1 TABLET ORAL at 21:34

## 2020-08-20 RX ADMIN — FOSPHENYTOIN 105.6 MILLIGRAM(S) PE: 50 INJECTION INTRAMUSCULAR; INTRAVENOUS at 07:00

## 2020-08-20 RX ADMIN — CHLORHEXIDINE GLUCONATE 15 MILLILITER(S): 213 SOLUTION TOPICAL at 05:54

## 2020-08-20 RX ADMIN — HEPARIN SODIUM 5000 UNIT(S): 5000 INJECTION INTRAVENOUS; SUBCUTANEOUS at 05:54

## 2020-08-20 RX ADMIN — FOSPHENYTOIN 105.6 MILLIGRAM(S) PE: 50 INJECTION INTRAMUSCULAR; INTRAVENOUS at 17:45

## 2020-08-20 RX ADMIN — LEVETIRACETAM 400 MILLIGRAM(S): 250 TABLET, FILM COATED ORAL at 21:34

## 2020-08-20 RX ADMIN — SODIUM CHLORIDE 500 MILLILITER(S): 9 INJECTION, SOLUTION INTRAVENOUS at 17:12

## 2020-08-20 RX ADMIN — LACOSAMIDE 140 MILLIGRAM(S): 50 TABLET ORAL at 01:40

## 2020-08-20 RX ADMIN — PANTOPRAZOLE SODIUM 40 MILLIGRAM(S): 20 TABLET, DELAYED RELEASE ORAL at 05:54

## 2020-08-20 RX ADMIN — PANTOPRAZOLE SODIUM 40 MILLIGRAM(S): 20 TABLET, DELAYED RELEASE ORAL at 17:44

## 2020-08-20 RX ADMIN — HEPARIN SODIUM 5000 UNIT(S): 5000 INJECTION INTRAVENOUS; SUBCUTANEOUS at 21:34

## 2020-08-20 RX ADMIN — Medication 20 MILLIGRAM(S): at 05:54

## 2020-08-20 RX ADMIN — Medication 109.6 MILLIGRAM(S): at 05:54

## 2020-08-20 RX ADMIN — HEPARIN SODIUM 5000 UNIT(S): 5000 INJECTION INTRAVENOUS; SUBCUTANEOUS at 16:51

## 2020-08-20 RX ADMIN — OXYCODONE HYDROCHLORIDE 10 MILLIGRAM(S): 5 TABLET ORAL at 22:00

## 2020-08-20 RX ADMIN — Medication 250 MILLIGRAM(S): at 05:57

## 2020-08-20 NOTE — AIRWAY REMOVAL NOTE  ADULT & PEDS - ARTIFICAL AIRWAY REMOVAL COMMENTS
Written order for extubation verified.The patient was identified by full name and birth date compared to the identification band. Present during the procedure was Radha WASHINGTON.

## 2020-08-20 NOTE — EEG REPORT - NS EEG TEXT BOX
[facility]  Comprehensive Epilepsy Center  Report of Continuous Video EEG    [facility]: [address], [place] [address], Phone [phone]  [facility]: [address] [place] [address], Phone [phone]  [place] Office: [address], [address], [place] [address] Phone [phone]    [facility]: [address] [place] [address], Phone [phone]  [place] Office: [address] [place] [address], Phone [phone]    Patient Name: Bel Olsen    Age: 60 year, : 1960  Patient ID: -, MRN #: [id number], Russell: - -  Referring Physician: -  EEG #: [id number]    Study Time/Date: 8:00:56 AM on 2020  	  End Time/Date: 8:00 Am on 2020          			   Duration: 24 hrs     Study Information:    EEG Recording Technique:  The patient underwent continuous Video-EEG monitoring, using Telemetry System hardware on the XLTek Digital System. EEG and video data were stored on a computer hard drive with important events saved in digital archive files. The material was reviewed by a physician (electroencephalographer / epileptologist) on a daily basis. Lobo and seizure detection algorithms were utilized and reviewed. An EEG Technician attended to the patient, and was available throughout daytime work hours.  The epilepsy center neurologist was available in person or on call 24-hours per day.    EEG Placement and Labeling of Electrodes:  The EEG was performed utilizing 20 channel referential EEG connections (coronal over temporal over parasagittal montage) using all standard 10-20 electrode placements with EKG, with additional electrodes placed in the inferior temporal region using the modified 10-10 montage electrode placements for elective admissions, or if deemed necessary. Recording was at a sampling rate of 256 samples per second per channel. Time synchronized digital video recording was done simultaneously with EEG recording. A low light infrared camera was used for low light recording.     History:  -61 y/o F p/w new onset seizure.     Pertinent Medication	  fosphenytoin IVPB 140 milliGRAM(s) PE IV Intermittent every 12 hours  lacosamide IVPB 200 milliGRAM(s) IV Intermittent every 12 hours levETIRAcetam  IVPB 1000 milliGRAM(s) IV Intermittent every 12 hours	    Interpretation:    Daily EEG Visual Analysis  Findings:       Recording started with background that was near continuous with intermittent 1-2 seconds of diffuse attenuation, spontaneously variable and reactive and no PDR seen. At around 04:00, the background transitioned to continuous with posterior dominant rhythm consisted of 9-10 Hz activity, with amplitude to 30 uV, better seen on the left side.    Background Slowing:  Background predominantly consisted of theta, delta and faster activities. Overnight, background slowing improved to excess delta slowing.    Focal Slowing:   Near continuous theta/delta slowing in the frontotemporal region better appreciated in clinically quiet state.     Sleep Background:  Drowsiness was characterized by fragmentation, attenuation, and slowing of the background activity.    Stage II sleep transients were not recorded.    Other Non-Epileptiform Findings:  Diffuse excess beta activity.    Interictal Epileptiform Activity:   - Abundant right posterior temporal (P8 max) sharp wave discharges, frequently occurred as brief to intermittent runs of fluctuating 1-2 (rarely up to 3) Hz lateralized periodic discharges with rhythmicity (LPD+R),  which significant improved in the last few hours of the study.    - Abundant right frontal (Fp2/F4/Fz max) sharp wave discharges, frequently occurred as brief to intermediate runs of fluctuating 1-2 Hz right frontal lateralized periodic discharges (LPD), which significant improved in the last few hours of the study.    - In the last few hours of study, rare very brief runs of static 2.5 Hz right hemispheric bilateral asymmetric lateralized rhythmic delta activity (LRDA).    Events:  Clinical events: None recorded.  Seizures: None recorded.    Activation Procedures:   Hyperventilation was not performed.    Photic stimulation was not performed.     Artifacts:  Intermittent myogenic and movement artifacts were noted.    ECG:  The heart rate on single channel ECG was predominantly between 70 - 90 BPM.    EEG Summary / Classification:  Abnormal EEG in an altered patient.  - Abundant right posterior temporal (P8 max) sharp wave discharges, frequently occurred as brief to intermittent runs of fluctuating 1-2 (rarely up to 3) Hz lateralized periodic discharges with rhythmicity (LPD+R),  which significant improved in the last few hours of the study.    - Abundant right frontal (Fp2/F4/Fz max) sharp wave discharges, frequently occurred as brief to intermediate runs of fluctuating 1-2 Hz right frontal lateralized periodic discharges (LPD), which significant improved in the last few hours of the study.    - In the last few hours of study, rare very brief runs of static 2.5 Hz right hemispheric bilateral asymmetric lateralized rhythmic delta activity (LRDA).  - Near continuous theta/delta slowing in the frontotemporal region better appreciated in clinically quiet state  - Transition of moderate-severe to mild generalized slowing in the last few hours of study.     EEG Impression / Clinical Correlate:  Abnormal EEG study.  1. Potential epileptogenic foci in the right frontal and right posterior temporal regions.   2. Structural or functional abnormality in the right frontotemporal region.  3. Transition of moderate-severe to mild diffuse cerebral dysfunction in the last few hours of the study.  4. No seizure seen.      Last few hours of today's study improved significantly due to significant reduction in the burden of epileptiform discharges and improvement in background.      Brain Marquez MD  Neurocritical Care Fellow     Javier Meier MD  Attending Physician, [facility] [place]

## 2020-08-20 NOTE — CONSULT NOTE ADULT - PROBLEM SELECTOR RECOMMENDATION 9
the combination of high urine output ( ~200-300c/hr), low BP and high urine sodium, along with clinical evidence of hypovolemia, likely signifies cerebral salt wasting. at this time, start 2% saline at 100cc/hr and give 1 dose of Florinef 0.1 mg.   check serum sodium in 4 hours-   If 138 and above- stop hypertonic   If 132-134- continue 2% at 100cc/hr   135-137- decrease rate of 2% to 50cc/hr     please call my team for any questions     tobi white  nephrology attending   Cell# [phone]   Office- [phone]

## 2020-08-20 NOTE — PROGRESS NOTE ADULT - ASSESSMENT
Assessment:  61 y/o F with a h/o EtOH withdrawal, polysubstance abuse, EtoH pancreatitis, mixed connective tissue disorder (plaquenil and methylprednisolone), chronic pain s/p lumbar laminectomy, left soleal vein thrombosis (apixaban) lung adenocarcinoma (s/p RML resection 2018), recent RLE cellulitis s/p doxycycline, recent CDiff colitis, CAD admitted to OSH for symptomatic hyponatremia 2/2 psychogenic polydipsia complicated with new onset generalized seizures, intubated for hypoxic respiratory failure transferred to [facility] MICU for further management      #NEURO   //Status Epilepticus   - MRI shows areas of cortical diffusion, subacute/acute lacunar infarct w/o hemorrhagic transformation. Broad ddx including infection, toxic/metabolic, withdrawal, autoimmune or paraneoplastic.   -C/w fosphenytoin   -C/w Versed ggt  -C/w propofol ggt   -C/w keppra 1000 mg BID   -c/w vimpat  -monitor for seizures on EEG  -Neurology recs appreciated     #CV  -off pressors  -hold home clonidine   -hx of MI, no stents placed    #RESP   //Hypoxic Respiratory failure 2/2 seizures  -Vent settings: 30/400/16/5     //Hx of adenocarcinoma   -s/p RML wedge resection in 2018      #GI   //Chronic pancreatitis   -NPO with tube feeds  -IV D5 LR   -C/w Creon 12,000 Lipase Units TID   -Protonix 40 mg BID   -C/w Folic Acid 1mg  and Multivitamin 1 tablet QD       #ID   //Rule out Meningitis   - LP today  - Send CSF cell count; Glucose; Protein: Gram stain; CSF Viral PCR panel; CSF AFB; CSF fungal cx; CSF VDRL titer; Oligoclonal bands; Myelin Basic Protein, Anti-MOG; Cryptococcal antigen. Autoimmune encephelopathy CSF panel, RT quic CSF, 14-3-3   - Afebrile, no WBC. CTH neg.  - Empiric IV ceftriaxone, vanc, acyclovir  - Neurology recs appreciated    //Clostridium difficile infection   -dx 8/3  -cw PO vancomycin and c/w IV flagyl  -d/c vanc if no diarrhea on tube feeds  -hold off C diff testing given it remain positive  -Contact isolation      #Renal   -no active issues  -Zurita    #Rheum   //Hx of mixed connective tissue disorder   - C/w hydroxychloroquine 200 mg   - Solumedrol 20q8hr    #Heme   - Hold Eliquis  - C/w heparin gtt    #PSYCH   //Alcohol withdrawal   -last drink 3 weeks ago, seizures unlikely 2/2 to withdrawal Assessment:  61 y/o F with a h/o EtOH withdrawal, polysubstance abuse, EtoH pancreatitis, mixed connective tissue disorder (plaquenil and methylprednisolone), chronic pain s/p lumbar laminectomy, left soleal vein thrombosis (apixaban) lung adenocarcinoma (s/p RML resection 2018), recent RLE cellulitis s/p doxycycline, recent CDiff colitis, CAD admitted to OSH for symptomatic hyponatremia 2/2 psychogenic polydipsia complicated with new onset generalized seizures, intubated for hypoxic respiratory failure transferred to [facility] MICU for further management      #NEURO   //Seizure Disorder  -Extubated, no active seizure activity noted  -Propofol and versed ggt d/c'ed  -C/w Low dose precedex ggt  -C/w fosphenytoin    -C/w keppra    -c/w vimpat  -Monitor for seizures on repeat EEG, can consider d/c AEPs if no seizure activity seen   -Neurology recs appreciated     #CV  -off pressors  -holding home clonidine   -hx of MI, no stents placed    #RESP   //Hypoxic Respiratory failure 2/2 seizures  -Extubated, not requiring respiratory support  -Monitor RR, Spo2  - Aspiration risk  - S/S assessment 8/21    //Hx of adenocarcinoma   -s/p RML wedge resection in 2018      #GI   //Chronic pancreatitis   -NPO, hold tube feeds  -IV D5 LR   -C/w Creon 12,000 Lipase Units TID   -Protonix 40 mg BID   -C/w Folic Acid 1mg  and Multivitamin 1 tablet QD       #ID   //Rule out Meningitis   - LP today  - Send CSF cell count; Glucose; Protein: Gram stain; CSF Viral PCR panel; CSF AFB; CSF fungal cx; CSF VDRL titer; Oligoclonal bands; Myelin Basic Protein, Anti-MOG; Cryptococcal antigen. Autoimmune encephelopathy CSF panel, RT quic CSF, 14-3-3   - Afebrile, no WBC. CTH neg.  - Empiric IV ceftriaxone, vanc, acyclovir  - Neurology recs appreciated    //Clostridium difficile infection   -dx 8/3  -cw PO vancomycin and c/w IV flagyl  -d/c vanc if no diarrhea on tube feeds  -hold off C diff testing given it remain positive  -Contact isolation      #Renal   -no active issues  -Zurita    #Rheum   //Hx of mixed connective tissue disorder   - C/w hydroxychloroquine 200 mg   - Solumedrol 20q8hr    #Heme   - Hold Eliquis  - C/w heparin gtt    #PSYCH   //Alcohol withdrawal   -last drink 3 weeks ago, seizures unlikely 2/2 to withdrawal Assessment:  59 y/o F with a h/o EtOH withdrawal, polysubstance abuse, EtoH pancreatitis, mixed connective tissue disorder (plaquenil and methylprednisolone), chronic pain s/p lumbar laminectomy, left soleal vein thrombosis (apixaban) lung adenocarcinoma (s/p RML resection 2018), recent RLE cellulitis s/p doxycycline, recent CDiff colitis, CAD admitted to OSH for symptomatic hyponatremia 2/2 psychogenic polydipsia complicated with new onset generalized seizures, intubated for hypoxic respiratory failure transferred to [facility] MICU for further management      #NEURO   //Seizure Disorder  -Extubated, improved mentation, no active seizure activity noted  -Propofol and versed ggt d/c'ed  -C/w Low dose precedex ggt, taper down as tolerated  -C/w fosphenytoin 140 mg BID  -C/w keppra 1 g BID   -c/w vimpat 200 mg BID  -Monitor for seizures on repeat EEG, can consider taper down AEPs if no seizure activity seen   -Neurology recs appreciated     #CV  -off pressors  -holding home clonidine   -hx of MI, no stents placed    #RESP   //Hypoxic Respiratory failure 2/2 seizures  -Extubated, not requiring respiratory support  -Monitor RR, Spo2  - Aspiration risk  - S/S assessment 8/21    //Hx of adenocarcinoma   -s/p RML wedge resection in 2018      #GI   //Chronic pancreatitis   -NPO, hold tube feeds  - S/S assessment before restarting feeds  -IV D5 L  -C/w Creon 12,000 Lipase Units TID   -Protonix 40 mg BID   -C/w Folic Acid 1mg  and Multivitamin 1 tablet QD       #ID   //R/o Meningitis   - Sent CSF cell count; Glucose; Protein: Gram stain; CSF Viral PCR panel; CSF AFB; CSF fungal cx; CSF VDRL titer; Oligoclonal bands; Myelin Basic Protein, Anti-MOG; Cryptococcal antigen. Autoimmune encephelopathy CSF panel, RT quic CSF, 14-3-3   - Afebrile, no WBC. CTH neg.  - D/C IV ceftriaxone, vanc, acyclovir  - Neurology recs appreciated    //Clostridium difficile infection   -dx 8/3  -cw PO vancomycin and c/w IV flagyl  -d/c vanc if no diarrhea on tube feeds  -hold off C diff testing given it remain positive  -Contact isolation      #Renal   -no active issues  -Zurita    #Rheum   //Hx of mixed connective tissue disorder   - C/w hydroxychloroquine 200 mg   - Solumedrol 20q8hr    #Heme   - Hold Eliquis  - C/w heparin gtt    #PSYCH   //Alcohol withdrawal   -last drink 3 weeks ago, seizures unlikely 2/2 to withdrawal Assessment:  61 y/o F with a h/o EtOH withdrawal, polysubstance abuse, EtoH pancreatitis, mixed connective tissue disorder (plaquenil and methylprednisolone), chronic pain s/p lumbar laminectomy, left soleal vein thrombosis (apixaban) lung adenocarcinoma (s/p RML resection 2018), recent RLE cellulitis s/p doxycycline, recent CDiff colitis, CAD admitted to OSH for symptomatic hyponatremia 2/2 psychogenic polydipsia complicated with new onset generalized seizures, intubated for hypoxic respiratory failure transferred to [facility] MICU for further management      #NEURO   //Seizure Disorder  -Extubated, improved mentation, no active seizure activity noted  -Propofol and versed ggt d/c'ed  -C/w Low dose precedex ggt, taper down as tolerated  -C/w fosphenytoin 140 mg BID  -C/w keppra 1 g BID   -c/w vimpat 200 mg BID  -Monitor for seizures on repeat EEG, can consider taper down AEPs if no seizure activity seen   -Neurology recs appreciated     #CV  -off pressors  -holding home clonidine   -hx of MI, no stents placed    #RESP   //Hypoxic Respiratory failure 2/2 seizures  - Extubated, not requiring respiratory support  - Monitor RR, Spo2  - Aspiration risk, NPO  - S/S assessment 8/21    //Hx of adenocarcinoma   -s/p RML wedge resection in 2018      #GI   //Chronic pancreatitis   -NPO, hold tube feeds  -S/S assessment before restarting feeds  -D/C IV D5 L  -C/w Creon 12,000 Lipase Units TID   -Protonix 40 mg BID   -C/w Folic Acid 1mg  and Multivitamin 1 tablet QD       #ID   //R/o Meningitis   - CSF neg for HSV 1/2, neg gram stain, neg viral PCR  - D/C IV ceftriaxone, vanc, acyclovir  - F/u CSF cell count; Glucose; Protein, CSF AFB; CSF fungal cx; CSF VDRL titer; Oligoclonal bands; Myelin Basic Protein, Anti-MOG; Cryptococcal antigen. Autoimmune encephelopathy CSF panel, RT quic CSF, 14-3-3   - Contact isolation  - Neurology recs appreciated    //Clostridium difficile infection   -completed PO vanc 8/3-8/17, IV flagyl d/c on 8/16    #Renal   -Hyponatremic, urine studies concern for cerebral salt wasting vs SIADH  -repeat BMP and urine studies  -Continue IV NS for now, will continue or d/c depending on urine studies  -Oral fluid restriction  -Zurita    #Rheum   //Hx of mixed connective tissue disorder   - C/w hydroxychloroquine 200 mg   - Solumedrol 20q8hr    #Heme   - Hold Eliquis  - C/w heparin SQ    #PSYCH   //Alcohol withdrawal   -last drink 3 weeks ago, seizures unlikely 2/2 to withdrawal Assessment:  59 y/o F with a h/o EtOH withdrawal, polysubstance abuse, EtoH pancreatitis, mixed connective tissue disorder (plaquenil and methylprednisolone), chronic pain s/p lumbar laminectomy, left soleal vein thrombosis (apixaban) lung adenocarcinoma (s/p RML resection 2018), recent RLE cellulitis s/p doxycycline, recent CDiff colitis, CAD admitted to OSH for symptomatic hyponatremia 2/2 psychogenic polydipsia complicated with new onset generalized seizures, intubated for hypoxic respiratory failure transferred to [facility] MICU for further management      #NEURO   //Seizure Disorder  -Extubated, improved mentation, no active seizure activity noted  -Propofol and versed ggt d/c'ed  -C/w Low dose precedex ggt, taper down as tolerated  -C/w fosphenytoin 140 mg BID  -C/w keppra 1 g BID   -c/w vimpat 200 mg BID  -Monitor for seizures on repeat EEG, can consider taper down AEPs if no seizure activity seen   -Neurology recs appreciated     #CV  -off pressors  -holding home clonidine   -hx of MI, no stents placed    #RESP   //Hypoxic Respiratory failure 2/2 seizures  - Extubated, not requiring respiratory support  - Monitor RR, Spo2  - Aspiration risk, NPO  - S/S assessment     //Hx of adenocarcinoma   -s/p RML wedge resection in 2018      #GI   //Chronic pancreatitis   -NPO, hold tube feeds  -S/S assessment before restarting feeds  -D/C IV D5 L  -C/w Creon 12,000 Lipase Units TID   -Protonix 40 mg BID   -C/w Folic Acid 1mg  and Multivitamin 1 tablet QD       #ID   //R/o Meningitis   - CSF neg for HSV 1/2, neg gram stain, neg viral PCR  - D/C IV ceftriaxone, vanc, acyclovir  - F/u CSF cell count; Glucose; Protein, CSF AFB; CSF fungal cx; CSF VDRL titer; Oligoclonal bands; Myelin Basic Protein, Anti-MOG; Cryptococcal antigen. Autoimmune encephelopathy CSF panel, RT quic CSF, 14-3-3   - Contact isolation  - Neurology recs appreciated    //Clostridium difficile infection   -completed PO vanc 8/3-8/17, IV flagyl d/c on 8/16    #Renal   -Hyponatremic, urine studies concern for cerebral salt wasting vs SIADH  -repeat BMP and urine studies  -Continue IV NS for now, will continue or d/c depending on urine studies  -Oral fluid restriction  -Zurita    #Rheum   //Hx of mixed connective tissue disorder   - C/w hydroxychloroquine 200 mg   - Solumedrol 20q8hr    #Heme   - Hold Eliquis  - C/w heparin SQ    #PSYCH   //Alcohol withdrawal   -last drink 3 weeks ago, seizures unlikely 2/2 to withdrawal

## 2020-08-20 NOTE — PROGRESS NOTE ADULT - SUBJECTIVE AND OBJECTIVE BOX
Patient is a 60y old  Female who presents with a chief complaint of Seizures (19 Aug 2020 11:39)      INTERVAL HPI/OVERNIGHT EVENTS:   No overnight events   Afebrile, hemodynamically stable     ICU Vital Signs Last 24 Hrs  T(C): 37.1 (20 Aug 2020 04:00), Max: 37.6 (19 Aug 2020 16:00)  T(F): 98.8 (20 Aug 2020 04:00), Max: 99.7 (19 Aug 2020 16:00)  HR: 79 (20 Aug 2020 06:10) (79 - 97)  BP: 77/51 (20 Aug 2020 06:10) (77/50 - 144/83)  BP(mean): 60 (20 Aug 2020 06:10) (59 - 108)  ABP: --  ABP(mean): --  RR: 23 (20 Aug 2020 06:10) (16 - 23)  SpO2: 100% (20 Aug 2020 06:10) (99% - 100%)    I&O's Summary    18 Aug 2020 07:01  -  19 Aug 2020 07:00  --------------------------------------------------------  IN: 2815.6 mL / OUT: 2301 mL / NET: 514.6 mL    19 Aug 2020 07:01  -  20 Aug 2020 06:52  --------------------------------------------------------  IN: 2483.7 mL / OUT: 3045 mL / NET: -561.3 mL      Mode: CPAP with PS  FiO2: 30  PEEP: 5  PS: 5  MAP: 8  PIP: 11      LABS:                        10.3   6.20  )-----------( 244      ( 20 Aug 2020 00:27 )             33.1     08-20    129<L>  |  95<L>  |  7   ----------------------------<  281<H>  3.5   |  24  |  0.41<L>    Ca    8.5      20 Aug 2020 00:27  Phos  3.2     08-20  Mg     2.0     08-20    TPro  5.4<L>  /  Alb  2.7<L>  /  TBili  0.1<L>  /  DBili  x   /  AST  25  /  ALT  17  /  AlkPhos  28<L>  08-20    PT/INR - ( 20 Aug 2020 00:27 )   PT: 11.1 sec;   INR: 0.93 ratio         PTT - ( 20 Aug 2020 00:27 )  PTT:28.4 sec    CAPILLARY BLOOD GLUCOSE      POCT Blood Glucose.: 135 mg/dL (20 Aug 2020 06:04)  POCT Blood Glucose.: 118 mg/dL (19 Aug 2020 23:23)  POCT Blood Glucose.: 113 mg/dL (19 Aug 2020 13:49)    ABG - ( 20 Aug 2020 06:28 )  pH, Arterial: 7.45  pH, Blood: x     /  pCO2: 41    /  pO2: 188   / HCO3: 28    / Base Excess: 4.2   /  SaO2: 100                 RADIOLOGY & ADDITIONAL TESTS:    Consultant(s) Notes Reviewed:  [x ] YES  [ ] NO    MEDICATIONS  (STANDING):  acyclovir IVPB 480 milliGRAM(s) IV Intermittent every 8 hours  cefTRIAXone   IVPB 2000 milliGRAM(s) IV Intermittent every 12 hours  chlorhexidine 0.12% Liquid 15 milliLiter(s) Oral Mucosa every 12 hours  chlorhexidine 4% Liquid 1 Application(s) Topical <User Schedule>  dexMEDEtomidine Infusion 0.2 MICROgram(s)/kG/Hr (2.39 mL/Hr) IV Continuous <Continuous>  fosphenytoin IVPB 140 milliGRAM(s) PE IV Intermittent every 12 hours  heparin   Injectable 5000 Unit(s) SubCutaneous every 8 hours  hydroxychloroquine 200 milliGRAM(s) Oral daily  insulin lispro (HumaLOG) corrective regimen sliding scale   SubCutaneous every 6 hours  lacosamide IVPB 200 milliGRAM(s) IV Intermittent every 12 hours  levETIRAcetam  IVPB 1000 milliGRAM(s) IV Intermittent every 12 hours  methylPREDNISolone sodium succinate Injectable 20 milliGRAM(s) IV Push daily  pantoprazole  Injectable 40 milliGRAM(s) IV Push two times a day  propofol Infusion. 40 MICROgram(s)/kG/Min (11.4 mL/Hr) IV Continuous <Continuous>  vancomycin  IVPB 1000 milliGRAM(s) IV Intermittent every 12 hours    MEDICATIONS  (PRN):      PHYSICAL EXAM:  GENERAL:   HEAD:  Atraumatic, Normocephalic  EYES: EOMI, PERRLA, conjunctiva and sclera clear  NECK: Supple, No JVD, Normal thyroid, no enlarged nodes  NERVOUS SYSTEM:  Alert & Awake.   CHEST/LUNG: B/L good air entry; No rales, rhonchi, or wheezing  HEART: S1S2 normal, no S3, Regular rate and rhythm; No murmurs  ABDOMEN: Soft, Nontender, Nondistended; Bowel sounds present  EXTREMITIES:  2+ Peripheral Pulses, No clubbing, cyanosis, or edema  LYMPH: No lymphadenopathy noted  SKIN: No rashes or lesions    Care Discussed with Consultants/Other Providers [ x] YES  [ ] NO Patient is a 60y old  Female who presents with a chief complaint of Seizures (19 Aug 2020 11:39)      INTERVAL HPI/OVERNIGHT EVENTS:   O/N: Weaned off versed ggt, following commands, started on low dose precedex   AM: Extubated, able to follow commands    ICU Vital Signs Last 24 Hrs  T(C): 37.1 (20 Aug 2020 04:00), Max: 37.6 (19 Aug 2020 16:00)  T(F): 98.8 (20 Aug 2020 04:00), Max: 99.7 (19 Aug 2020 16:00)  HR: 79 (20 Aug 2020 06:10) (79 - 97)  BP: 77/51 (20 Aug 2020 06:10) (77/50 - 144/83)  BP(mean): 60 (20 Aug 2020 06:10) (59 - 108)  ABP: --  ABP(mean): --  RR: 23 (20 Aug 2020 06:10) (16 - 23)  SpO2: 100% (20 Aug 2020 06:10) (99% - 100%)    I&O's Summary    18 Aug 2020 07:01  -  19 Aug 2020 07:00  --------------------------------------------------------  IN: 2815.6 mL / OUT: 2301 mL / NET: 514.6 mL    19 Aug 2020 07:01  -  20 Aug 2020 06:52  --------------------------------------------------------  IN: 2483.7 mL / OUT: 3045 mL / NET: -561.3 mL      Mode: CPAP with PS  FiO2: 30  PEEP: 5  PS: 5  MAP: 8  PIP: 11      LABS:                        10.3   6.20  )-----------( 244      ( 20 Aug 2020 00:27 )             33.1     08-20    129<L>  |  95<L>  |  7   ----------------------------<  281<H>  3.5   |  24  |  0.41<L>    Ca    8.5      20 Aug 2020 00:27  Phos  3.2     08-20  Mg     2.0     08-20    TPro  5.4<L>  /  Alb  2.7<L>  /  TBili  0.1<L>  /  DBili  x   /  AST  25  /  ALT  17  /  AlkPhos  28<L>  08-20    PT/INR - ( 20 Aug 2020 00:27 )   PT: 11.1 sec;   INR: 0.93 ratio         PTT - ( 20 Aug 2020 00:27 )  PTT:28.4 sec    CAPILLARY BLOOD GLUCOSE      POCT Blood Glucose.: 135 mg/dL (20 Aug 2020 06:04)  POCT Blood Glucose.: 118 mg/dL (19 Aug 2020 23:23)  POCT Blood Glucose.: 113 mg/dL (19 Aug 2020 13:49)    ABG - ( 20 Aug 2020 06:28 )  pH, Arterial: 7.45  pH, Blood: x     /  pCO2: 41    /  pO2: 188   / HCO3: 28    / Base Excess: 4.2   /  SaO2: 100                 RADIOLOGY & ADDITIONAL TESTS:    Consultant(s) Notes Reviewed:  [x ] YES  [ ] NO    MEDICATIONS  (STANDING):  acyclovir IVPB 480 milliGRAM(s) IV Intermittent every 8 hours  cefTRIAXone   IVPB 2000 milliGRAM(s) IV Intermittent every 12 hours  chlorhexidine 0.12% Liquid 15 milliLiter(s) Oral Mucosa every 12 hours  chlorhexidine 4% Liquid 1 Application(s) Topical <User Schedule>  dexMEDEtomidine Infusion 0.2 MICROgram(s)/kG/Hr (2.39 mL/Hr) IV Continuous <Continuous>  fosphenytoin IVPB 140 milliGRAM(s) PE IV Intermittent every 12 hours  heparin   Injectable 5000 Unit(s) SubCutaneous every 8 hours  hydroxychloroquine 200 milliGRAM(s) Oral daily  insulin lispro (HumaLOG) corrective regimen sliding scale   SubCutaneous every 6 hours  lacosamide IVPB 200 milliGRAM(s) IV Intermittent every 12 hours  levETIRAcetam  IVPB 1000 milliGRAM(s) IV Intermittent every 12 hours  methylPREDNISolone sodium succinate Injectable 20 milliGRAM(s) IV Push daily  pantoprazole  Injectable 40 milliGRAM(s) IV Push two times a day  propofol Infusion. 40 MICROgram(s)/kG/Min (11.4 mL/Hr) IV Continuous <Continuous>  vancomycin  IVPB 1000 milliGRAM(s) IV Intermittent every 12 hours    MEDICATIONS  (PRN):      PHYSICAL EXAM:  GENERAL: Lying in bed, NAD  HEAD:  Atraumatic, Normocephalic  EYES: conjunctiva and sclera clear  NECK: Supple, No JVD,   NERVOUS SYSTEM:  Awake, slow to respond, able to answer questions and follow commands   CHEST/LUNG: B/L good air entry  HEART: S1S2 normal, Regular rate and rhythm; No murmurs  ABDOMEN: Soft, Nontender, Nondistended; Bowel sounds present  EXTREMITIES: Warm and well perfused, No clubbing, cyanosis, or edema  LYMPH: No lymphadenopathy noted  SKIN: Appears thin, chronic reddish-purple discoloration of forearms    Care Discussed with Consultants/Other Providers [ x] YES  [ ] NO Patient is a 60y old  Female who presents with a chief complaint of Seizures (19 Aug 2020 11:39)      INTERVAL HPI/OVERNIGHT EVENTS:   O/N: Weaned off versed ggt, following commands, started on low dose precedex   AM: Extubated, able to follow commands    ICU Vital Signs Last 24 Hrs  T(C): 37.1 (20 Aug 2020 04:00), Max: 37.6 (19 Aug 2020 16:00)  T(F): 98.8 (20 Aug 2020 04:00), Max: 99.7 (19 Aug 2020 16:00)  HR: 79 (20 Aug 2020 06:10) (79 - 97)  BP: 77/51 (20 Aug 2020 06:10) (77/50 - 144/83)  BP(mean): 60 (20 Aug 2020 06:10) (59 - 108)  ABP: --  ABP(mean): --  RR: 23 (20 Aug 2020 06:10) (16 - 23)  SpO2: 100% (20 Aug 2020 06:10) (99% - 100%)    I&O's Summary    18 Aug 2020 07:01  -  19 Aug 2020 07:00  --------------------------------------------------------  IN: 2815.6 mL / OUT: 2301 mL / NET: 514.6 mL    19 Aug 2020 07:01  -  20 Aug 2020 06:52  --------------------------------------------------------  IN: 2483.7 mL / OUT: 3045 mL / NET: -561.3 mL      Mode: CPAP with PS  FiO2: 30  PEEP: 5  PS: 5  MAP: 8  PIP: 11      LABS:                        10.3   6.20  )-----------( 244      ( 20 Aug 2020 00:27 )             33.1     08-20    129<L>  |  95<L>  |  7   ----------------------------<  281<H>  3.5   |  24  |  0.41<L>    Ca    8.5      20 Aug 2020 00:27  Phos  3.2     08-20  Mg     2.0     08-20    TPro  5.4<L>  /  Alb  2.7<L>  /  TBili  0.1<L>  /  DBili  x   /  AST  25  /  ALT  17  /  AlkPhos  28<L>  08-20    PT/INR - ( 20 Aug 2020 00:27 )   PT: 11.1 sec;   INR: 0.93 ratio         PTT - ( 20 Aug 2020 00:27 )  PTT:28.4 sec    Osmolality, Random Urine: 443 mos/kg (08.20.20 @ 06:30)    Chloride, Random Urine: 74: Reference Ranges have NOT been established for random urine analytes due  to variability in fluid intake and concentration. mmol/L (08.20.20 @ 06:29)    Sodium, Random Urine: 131: Reference Ranges have NOT been established for random urine analytes due  to variability in fluid intake and concentration. mmol/L (08.20.20 @ 06:29)      Sodium, Random Urine: 74: Reference Ranges have NOT been established for random urine analytes due  to variability in fluid intake and concentration. mmol/L (08.19.20 @ 13:54)    Osmolality, Random Urine: 201 mos/kg (08.19.20 @ 13:54)                CAPILLARY BLOOD GLUCOSE      POCT Blood Glucose.: 135 mg/dL (20 Aug 2020 06:04)  POCT Blood Glucose.: 118 mg/dL (19 Aug 2020 23:23)  POCT Blood Glucose.: 113 mg/dL (19 Aug 2020 13:49)    ABG - ( 20 Aug 2020 06:28 )  pH, Arterial: 7.45  pH, Blood: x     /  pCO2: 41    /  pO2: 188   / HCO3: 28    / Base Excess: 4.2   /  SaO2: 100                 RADIOLOGY & ADDITIONAL TESTS:    Consultant(s) Notes Reviewed:  [x ] YES  [ ] NO    MEDICATIONS  (STANDING):  acyclovir IVPB 480 milliGRAM(s) IV Intermittent every 8 hours  cefTRIAXone   IVPB 2000 milliGRAM(s) IV Intermittent every 12 hours  chlorhexidine 0.12% Liquid 15 milliLiter(s) Oral Mucosa every 12 hours  chlorhexidine 4% Liquid 1 Application(s) Topical <User Schedule>  dexMEDEtomidine Infusion 0.2 MICROgram(s)/kG/Hr (2.39 mL/Hr) IV Continuous <Continuous>  fosphenytoin IVPB 140 milliGRAM(s) PE IV Intermittent every 12 hours  heparin   Injectable 5000 Unit(s) SubCutaneous every 8 hours  hydroxychloroquine 200 milliGRAM(s) Oral daily  insulin lispro (HumaLOG) corrective regimen sliding scale   SubCutaneous every 6 hours  lacosamide IVPB 200 milliGRAM(s) IV Intermittent every 12 hours  levETIRAcetam  IVPB 1000 milliGRAM(s) IV Intermittent every 12 hours  methylPREDNISolone sodium succinate Injectable 20 milliGRAM(s) IV Push daily  pantoprazole  Injectable 40 milliGRAM(s) IV Push two times a day  propofol Infusion. 40 MICROgram(s)/kG/Min (11.4 mL/Hr) IV Continuous <Continuous>  vancomycin  IVPB 1000 milliGRAM(s) IV Intermittent every 12 hours    MEDICATIONS  (PRN):      PHYSICAL EXAM:  GENERAL: Lying in bed, NAD  HEAD:  Atraumatic, Normocephalic  EYES: conjunctiva and sclera clear  NECK: Supple, No JVD,   NERVOUS SYSTEM:  Awake, slow to respond, able to answer questions and follow commands   CHEST/LUNG: B/L good air entry  HEART: S1S2 normal, Regular rate and rhythm; No murmurs  ABDOMEN: Soft, Nontender, Nondistended; Bowel sounds present  EXTREMITIES: Warm and well perfused, No clubbing, cyanosis, or edema  LYMPH: No lymphadenopathy noted  SKIN: Appears thin, chronic reddish-purple discoloration of forearms    Care Discussed with Consultants/Other Providers [ x] YES  [ ] NO

## 2020-08-20 NOTE — PROGRESS NOTE ADULT - ATTENDING COMMENTS
1. Acute hypoxemic respiratory failure after seizures. Seizures controlled. Pt weaned and extubated this am. Tolerating extubation so far.  2. Neuro. Statu epilepticus resolved. Versed and propofol off.  Continue Keppra and Vimpat and Fosphenytoin  activity. EEG without overt seizure like activity.. Etiology of seizures unclear at this time. CT head negative for acute changes. LP performed.  No evidence of infection. Await all cx and paraneoplastic  results. Stop Acyclovir , ceftriaxone and Vancomycin. MRI  reveals acute/subacute lacunar infarction within the right medial thalamus along with hypoperfused areas in bilateral frontal , temporal lobes consistent with post ictal events..  3. H/o of polysubstance abuse. Pt currently sedated.? cause of seizures withdrawal  4. H/O DVT soleal. DVT study legs yesterday negative. D/C IV heparin. Change to sq heparin for prophylaxis of DVT.   5. H/o Lung ca with RML lobectomy.  6. H/O  INNA. On steroids and Plaquenil.  7. C diff. finished 14 day course. D/C po vancomycin.

## 2020-08-20 NOTE — PROGRESS NOTE ADULT - SUBJECTIVE AND OBJECTIVE BOX
*************************************  NEUROLOGY PROGRESS NOTE  **************************************    JULIO CESAR FAYE  Female  [id number]    Subjective: Extubated this morning. Off propofol and midazolam. Stated that she felt unwell but did not elaborate on any symptoms.     Vital Signs Last 24 Hrs  T(C): 37.2 (20 Aug 2020 08:00), Max: 37.6 (19 Aug 2020 16:00)  T(F): 99 (20 Aug 2020 08:00), Max: 99.7 (19 Aug 2020 16:00)  HR: 87 (20 Aug 2020 10:00) (79 - 97)  BP: 96/61 (20 Aug 2020 10:00) (77/50 - 144/83)  BP(mean): 73 (20 Aug 2020 10:00) (59 - 106)  RR: 22 (20 Aug 2020 10:00) (16 - 23)  SpO2: 95% (20 Aug 2020 10:00) (95% - 100%)    MEDICATIONS  (STANDING):  chlorhexidine 4% Liquid 1 Application(s) Topical <User Schedule>  dexMEDEtomidine Infusion 0.2 MICROgram(s)/kG/Hr (2.39 mL/Hr) IV Continuous <Continuous>  fosphenytoin IVPB 140 milliGRAM(s) PE IV Intermittent every 12 hours  heparin   Injectable 5000 Unit(s) SubCutaneous every 8 hours  hydroxychloroquine 200 milliGRAM(s) Oral daily  insulin lispro (HumaLOG) corrective regimen sliding scale   SubCutaneous every 6 hours  lacosamide IVPB 200 milliGRAM(s) IV Intermittent every 12 hours  levETIRAcetam  IVPB 1000 milliGRAM(s) IV Intermittent every 12 hours  methylPREDNISolone sodium succinate Injectable 20 milliGRAM(s) IV Push daily  pantoprazole  Injectable 40 milliGRAM(s) IV Push two times a day      PHYSICAL EXAMINATION:  General: Extubated. Alert and oriented, no acute distress.  Neurologic:  - Mental Status:  Alert and oriented to self and "hospital". Unsure of date. Follows all commands and crossed commands but sluggish.  - Cranial Nerves II-XII:  Pupils sluggish bilaterally, 3mm bilaterally. Face symmetric, eye closure strong bilaterally, tongue midline. V1-V3 intact.   - Motor:  Moves all limbs spontaneously. Equal strength bilaterally upper and lower extremities 5/5  - Reflexes: 1+ patellar reflexes, Babinski downgoing.   - Sensory:  Equal sensation to light tough bilaterally.  - Gait:   Deferred    LABS:                                     11.8   8.78  )-----------( 274      ( 19 Aug 2020 00:35 )             36.8     08-19    132<L>  |  97  |  6<L>  ----------------------------<  172<H>  3.5   |  23  |  0.40<L>    Ca    8.5      19 Aug 2020 00:35  Phos  2.3     08-19  Mg     2.0     08-19    TPro  5.8<L>  /  Alb  3.3  /  TBili  0.1<L>  /  DBili  x   /  AST  29  /  ALT  18  /  AlkPhos  30<L>  08-19        RADIOLOGY & ADDITIONAL STUDIES:      EEG Summary / Classification:  Abnormal EEG in a sedated patient.  - Recording started with near continuous fluctuating 0.5-3 Hz right posterior-temporal (max P8) lateralized periodic discharges with rhythmicity (LPD +R), concerning for ictal-interictal continuum (IIC). IIC slowly resolved after fosphenytoin was loaded around 18:10. Int he last few hours of recording, emergence of a stimulus-induced long run of fluctuating 1-2 Hz right posterior-temporal lateralized periodic discharges (LPD).   - Frequent to abundant right frontal (max Fp2/F4/Fz) sharp wave discharges, rarely occurred as very brief to brief runs of fluctuating 0.5 to 2 Hz right frontal lateralized periodic discharges (LPD).  - Near continuous theta/delta slowing in the right frontotemporal region, better appreciated in clinically quiet state.  - Moderate to severe generalized slowing.    EEG Impression / Clinical Correlate:  Abnormal EEG study.  1. Right posterior-temporal LPD concerning for IIC; aborted with fosphenytoin load.  2. Epileptogenic focus in the right frontal region.   3. Structural abnormality in the right frontotemporal region.   4. Moderate to severe nonspecific diffuse or multifocal cerebral dysfunction.     < from: CT Head No Cont (08.15.20 @ 18:28) >  FINDINGS:    There is no CT evidence of acute intracranial hemorrhage, mass effect, midline shift, or acute, large territorial infarct.    The ventricles and sulci are normal in size and configuration. The basal cisterns are patent.    The mastoid air cells and middle ear cavities are grossly clear. There is no significant paranasal sinus mucosal thickening.    The calvarium and skull base are grossly intact. .    IMPRESSION:  No acute intracranial abnormality.    < end of copied text >    < from: MR Head No Cont (08.18.20 @ 22:51) >  IMPRESSION: Areas of cortical restricted diffusion involving the right anterior frontal, bilateral temporal, and left parietal lobes compatible with a post ictal status. The differential diagnosis is broad and includes but is not limited to toxic/ metabolic etiologies, drug withdrawal, autoimmune, or paraneoplastic etiologies. Clinical correlation is required. Recommend imaging follow-up to resolution.    Acute/subacute lacunar infarction within the right medial thalamus with associated cytotoxic edema and without hemorrhagic transformation.    Similar-appearing mild chronic white matter microvascular type changes.    < end of copied text >

## 2020-08-20 NOTE — CONSULT NOTE ADULT - SUBJECTIVE AND OBJECTIVE BOX
[facility] Division of Kidney Diseases & Hypertension  INITIAL CONSULT NOTE  --------------------------------------------------------------------------------  HPI:    59 yo woman w/ h/o chronic pain, lung adenocarcinoma (s/p RML wedge resection 2018), ?MCTD, anxiety, soleal DVT on eliquis, recent RLE cellulitis and CDiff initially admitted to [facility] for hyponatremia, course complicated by seizure witnessed 8/13 now transferred for further seizures requiring intubation and mechanical ventilation, suspicion for autoimmune encephalitis, being treated with steroids.    Initial sodium on admission ( 8/11) was 121  8/12- 127            PAST HISTORY  --------------------------------------------------------------------------------  PAST MEDICAL & SURGICAL HISTORY:  Lung cancer  Opiate dependence  Alcohol abuse  Adenocarcinoma of lung  Mixed connective tissue disease  Depression H/O panic attack: with anxiety  S/P Laminectomy  Lumbar 3/10  Chronic pain  Diverticulitis of colon (without mention of hemorrhage)  History of laminectomy  History of lung surgery  History of oophorectomy, unilateral: bilateral  History of hip replacement, total, right: 6/7/2020  S/P lumbar laminectomy  S/P cholecystectomy  History of lung cancer: s/p wedge resection of RML    FAMILY HISTORY:  FHx: rheumatoid arthritis: in mother - with RA associated lung fibrosis  Family history of leukemia: in father    PAST SOCIAL HISTORY:    ALLERGIES & MEDICATIONS  --------------------------------------------------------------------------------  Allergies    penicillin (Other)  penicillin (Swelling)    Intolerances      Standing Inpatient Medications  chlorhexidine 4% Liquid 1 Application(s) Topical <User Schedule>  dexMEDEtomidine Infusion 0.2 MICROgram(s)/kG/Hr IV Continuous <Continuous>  fosphenytoin IVPB 140 milliGRAM(s) PE IV Intermittent every 12 hours  heparin   Injectable 5000 Unit(s) SubCutaneous every 8 hours  hydroxychloroquine 200 milliGRAM(s) Oral daily  insulin lispro (HumaLOG) corrective regimen sliding scale   SubCutaneous every 6 hours  lacosamide IVPB 200 milliGRAM(s) IV Intermittent every 12 hours  levETIRAcetam  IVPB 1000 milliGRAM(s) IV Intermittent every 12 hours  methylPREDNISolone sodium succinate Injectable 20 milliGRAM(s) IV Push daily  pantoprazole  Injectable 40 milliGRAM(s) IV Push two times a day  sodium chloride 2% . 1000 milliLiter(s) IV Continuous <Continuous>    PRN Inpatient Medications  artificial  tears Solution 1 Drop(s) Both EYES three times a day PRN      REVIEW OF SYSTEMS  --------------------------------------------------------------------------------  Gen: No weight changes, fatigue, fevers/chills, weakness  Skin: No rashes  Head/Eyes/Ears/Mouth: No headache; Normal hearing; Normal vision w/o blurriness; No sinus pain/discomfort, sore throat  Respiratory: No dyspnea, cough, wheezing, hemoptysis  CV: No chest pain, PND, orthopnea  GI: No abdominal pain, diarrhea, constipation, nausea, vomiting, melena, hematochezia  : No increased frequency, dysuria, hematuria, nocturia  MSK: No joint pain/swelling; no back pain; no edema  Neuro: No dizziness/lightheadedness, weakness, seizures, numbness, tingling  Heme: No easy bruising or bleeding  Endo: No heat/cold intolerance  Psych: No significant nervousness, anxiety, stress, depression    All other systems were reviewed and are negative, except as noted.    VITALS/PHYSICAL EXAM  --------------------------------------------------------------------------------  T(C): 37.1 (08-20-20 @ 20:00), Max: 37.6 (08-20-20 @ 00:00)  HR: 81 (08-20-20 @ 23:00) (76 - 93)  BP: 105/57 (08-20-20 @ 23:00) (77/50 - 122/63)  RR: 17 (08-20-20 @ 23:00) (13 - 23)  SpO2: 95% (08-20-20 @ 23:00) (94% - 100%)  Wt(kg): --        08-19-20 @ 07:01  -  08-20-20 @ 07:00  --------------------------------------------------------  IN: 3650.4 mL / OUT: 3165 mL / NET: 485.4 mL    08-20-20 @ 07:01  -  08-20-20 @ 23:11  --------------------------------------------------------  IN: 1123.2 mL / OUT: 2555 mL / NET: -1431.8 mL      Physical Exam:  	Gen: NAD, well-appearing  	HEENT: PERRL, supple neck, clear oropharynx  	Pulm: CTA B/L  	CV: RRR, S1S2; no rub  	Back: No spinal or CVA tenderness; no sacral edema  	Abd: +BS, soft, nontender/nondistended  	: No suprapubic tenderness  	UE: Warm, FROM, no clubbing, intact strength; no edema; no asterixis  	LE: Warm, FROM, no clubbing, intact strength; no edema  	Neuro: No focal deficits, intact gait  	Psych: Normal affect and mood  	Skin: Warm, without rashes  	Vascular access:    LABS/STUDIES  --------------------------------------------------------------------------------              10.3   6.20  >-----------<  244      [08-20-20 @ 00:27]              33.1     134  |  100  |  6   ----------------------------<  123      [08-20-20 @ 18:33]  3.8   |  24  |  0.36        Ca     9.1     [08-20-20 @ 18:33]      Mg     2.2     [08-20-20 @ 18:33]      Phos  3.6     [08-20-20 @ 14:56]    TPro  5.4  /  Alb  2.9  /  TBili  <0.1  /  DBili  x   /  AST  22  /  ALT  18  /  AlkPhos  26  [08-20-20 @ 14:56]    PT/INR: PT 11.1 , INR 0.93       [08-20-20 @ 00:27]  PTT: 28.4       [08-20-20 @ 00:27]    Serum Osmolality 285      [08-20-20 @ 06:29]    Creatinine Trend:  SCr 0.36 [08-20 @ 18:33]  SCr 0.32 [08-20 @ 14:56]  SCr 0.41 [08-20 @ 00:27]  SCr 0.40 [08-19 @ 00:35]  SCr 0.38 [08-18 @ 01:53]    Urinalysis - [08-16-20 @ 15:09]      Color Yellow / Appearance Slightly Turbid / SG 1.015 / pH 6.0      Gluc Negative / Ketone Moderate  / Bili Negative / Urobili Negative       Blood Large / Protein Negative / Leuk Est Negative / Nitrite Negative      RBC >50 / WBC 0-2 / Hyaline  / Gran  / Sq Epi  / Non Sq Epi Occasional / Bacteria Occasional    Urine Creatinine 18      [08-20-20 @ 14:57]  Urine Sodium 159      [08-20-20 @ 14:57]  Urine Chloride 127      [08-20-20 @ 14:57]  Urine Osmolality 395      [08-20-20 @ 14:57]    Iron 36, TIBC 216, %sat 17      [08-17-20 @ 14:28]  Ferritin 583      [08-17-20 @ 14:27]  TSH 1.28      [08-15-20 @ 17:26]  Lipid: chol --, , HDL --, LDL --      [08-16-20 @ 22:16] [facility] Division of Kidney Diseases & Hypertension  INITIAL CONSULT NOTE  --------------------------------------------------------------------------------  HPI:    61 yo woman w/ h/o chronic pain, lung adenocarcinoma (s/p RML wedge resection 2018), MCTD ( On Plaquenil and steroids), anxiety, soleal DVT on eliquis, recent RLE cellulitis, CDiff initially admitted to [facility] for ataxia and found to have a sodium of 121 thought to be secondary to excessive water and low solute intake ( mention of psychogenic polydipsia but there was no urine osm measured at that time) - she was free water restricted and started on normal saline with normalization of serum sodium. 8/14- Na was 137.     8/15- Na - 142  8/16- Na- 141   8/16- RRT called for generalized seizure activity with left UE/LE/facial twitching. s/p intubation and given Keppra/Vimpat/Propofol. started treatment for presumptive infectious process.  8/17-8/20- 132-139 - patient being treated for autoimmune encephalitis. Intubated, sedated. Extubated 8/20.     8/20 am - urine output increased to 175-390cc/hr. she became hypotensive ( on less sedation) - BPs early 8/19 were in the 140s/80s>>90s/50s today. Urine electrolytes- Urine Na- 159 urine osm- 395     Currently extubated. on methylprednisolone. not on pressors     PAST HISTORY  --------------------------------------------------------------------------------  PAST MEDICAL & SURGICAL HISTORY:  Lung cancer  Opiate dependence  Alcohol abuse  Adenocarcinoma of lung  Mixed connective tissue disease  Depression H/O panic attack: with anxiety  S/P Laminectomy  Lumbar 3/10  Chronic pain  Diverticulitis of colon (without mention of hemorrhage)  History of laminectomy  History of lung surgery  History of oophorectomy, unilateral: bilateral  History of hip replacement, total, right: 6/7/2020  S/P lumbar laminectomy  S/P cholecystectomy  History of lung cancer: s/p wedge resection of RML    FAMILY HISTORY:  FHx: rheumatoid arthritis: in mother - with RA associated lung fibrosis  Family history of leukemia: in father    PAST SOCIAL HISTORY:    ALLERGIES & MEDICATIONS  --------------------------------------------------------------------------------  Allergies    penicillin (Other)  penicillin (Swelling)    Intolerances    Standing Inpatient Medications  chlorhexidine 4% Liquid 1 Application(s) Topical <User Schedule>  dexMEDEtomidine Infusion 0.2 MICROgram(s)/kG/Hr IV Continuous <Continuous>  fosphenytoin IVPB 140 milliGRAM(s) PE IV Intermittent every 12 hours  heparin   Injectable 5000 Unit(s) SubCutaneous every 8 hours  hydroxychloroquine 200 milliGRAM(s) Oral daily  insulin lispro (HumaLOG) corrective regimen sliding scale   SubCutaneous every 6 hours  lacosamide IVPB 200 milliGRAM(s) IV Intermittent every 12 hours  levETIRAcetam  IVPB 1000 milliGRAM(s) IV Intermittent every 12 hours  methylPREDNISolone sodium succinate Injectable 20 milliGRAM(s) IV Push daily  pantoprazole  Injectable 40 milliGRAM(s) IV Push two times a day  sodium chloride 2% . 1000 milliLiter(s) IV Continuous <Continuous>    PRN Inpatient Medications  artificial  tears Solution 1 Drop(s) Both EYES three times a day PRN      REVIEW OF SYSTEMS- unable to be obtained       VITALS/PHYSICAL EXAM  --------------------------------------------------------------------------------  T(C): 37.1 (08-20-20 @ 20:00), Max: 37.6 (08-20-20 @ 00:00)  HR: 81 (08-20-20 @ 23:00) (76 - 93)  BP: 105/57 (08-20-20 @ 23:00) (77/50 - 122/63)  RR: 17 (08-20-20 @ 23:00) (13 - 23)  SpO2: 95% (08-20-20 @ 23:00) (94% - 100%)  Wt(kg): --      08-19-20 @ 07:01  -  08-20-20 @ 07:00  --------------------------------------------------------  IN: 3650.4 mL / OUT: 3165 mL / NET: 485.4 mL    08-20-20 @ 07:01  -  08-20-20 @ 23:11  --------------------------------------------------------  IN: 1123.2 mL / OUT: 2555 mL / NET: -1431.8 mL      Physical Exam:  	Gen: NAD  	Pulm: CTA B/L  	CV: RRR, S1S2; no rub  	Abd: +BS, soft, nontender/nondistended  	: No suprapubic tenderness  	UE: Warm              LE- No edema  	    LABS/STUDIES  --------------------------------------------------------------------------------              10.3   6.20  >-----------<  244      [08-20-20 @ 00:27]              33.1     134  |  100  |  6   ----------------------------<  123      [08-20-20 @ 18:33]  3.8   |  24  |  0.36        Ca     9.1     [08-20-20 @ 18:33]      Mg     2.2     [08-20-20 @ 18:33]      Phos  3.6     [08-20-20 @ 14:56]    TPro  5.4  /  Alb  2.9  /  TBili  <0.1  /  DBili  x   /  AST  22  /  ALT  18  /  AlkPhos  26  [08-20-20 @ 14:56]    PT/INR: PT 11.1 , INR 0.93       [08-20-20 @ 00:27]  PTT: 28.4       [08-20-20 @ 00:27]    Serum Osmolality 285      [08-20-20 @ 06:29]    Creatinine Trend:  SCr 0.36 [08-20 @ 18:33]  SCr 0.32 [08-20 @ 14:56]  SCr 0.41 [08-20 @ 00:27]  SCr 0.40 [08-19 @ 00:35]  SCr 0.38 [08-18 @ 01:53]    Urinalysis - [08-16-20 @ 15:09]      Color Yellow / Appearance Slightly Turbid / SG 1.015 / pH 6.0      Gluc Negative / Ketone Moderate  / Bili Negative / Urobili Negative       Blood Large / Protein Negative / Leuk Est Negative / Nitrite Negative      RBC >50 / WBC 0-2 / Hyaline  / Gran  / Sq Epi  / Non Sq Epi Occasional / Bacteria Occasional    Urine Creatinine 18      [08-20-20 @ 14:57]  Urine Sodium 159      [08-20-20 @ 14:57]  Urine Chloride 127      [08-20-20 @ 14:57]  Urine Osmolality 395      [08-20-20 @ 14:57]    Iron 36, TIBC 216, %sat 17      [08-17-20 @ 14:28]  Ferritin 583      [08-17-20 @ 14:27]  TSH 1.28      [08-15-20 @ 17:26]  Lipid: chol --, , HDL --, LDL --      [08-16-20 @ 22:16]

## 2020-08-20 NOTE — PROGRESS NOTE ADULT - ASSESSMENT
Assessment: 59 yo woman w/ h/o chronic pain, lung adenocarcinoma (s/p RML wedge resection 2018), ?mixed connective tissue disease, anxiety, right hip replacement, lumbar laminectomy, b/l oophorectomy, diverticulitis, recent acute pancreatitis and found to have right soleal DVT on eliquis, recent RLE cellulitis and recent c.diff infection initially admitted to [facility] for hyponatremia with ccb seizure witnessed 8/13 now transferred for further seizures requiring intubation and mechanical ventilation with Neurology consult for such.     Impression: seizures more suspicious for inflammatory cause in setting of fevers and lateralized discharges. Based on MRI suspicious for autoimmune encephalitis. Infectious cause unlikely based upon LP results. Last alcoholic drink >3 weeks out so much less suspicious for withdrawal etiology.    Recommendations:  [] continue Keppra 1g bid, Vimpat 200mg bid, fosphenytoin 140mg bid  [] continue EEG, based on results will decide whether to taper AEDs  [] closely follow HR, BP, CBC, LFTs. Observe for development of rash  [] LP per primary team;  Glucose elevated; Protein normal: Gram stain with no growth; CSF Viral PCR panel negative; CSF AFB cancelled; CSF fungal cx pending; CSF VDRL titer pending; Oligoclonal bands; Myelin Basic Protein, Anti-MOG; Cryptococcal antigen. Autoimmune encephelopathy CSF panel, RT quic CSF, 14-3-3.  [] stopped acyclovir, vancomycin, ceftriaxone as no sign of infection  [] patient with improvement in mental status and recent extubation, will continue to follow mental status and determine if to start steroids    Case discussed with Neurology Attending Dr. Ochoa; discussed with MICU team. Assessment: 61 yo woman w/ h/o chronic pain, lung adenocarcinoma (s/p RML wedge resection 2018), ?mixed connective tissue disease, anxiety, right hip replacement, lumbar laminectomy, b/l oophorectomy, diverticulitis, recent acute pancreatitis and found to have right soleal DVT on eliquis, recent RLE cellulitis and recent c.diff infection initially admitted to [facility] for hyponatremia with ccb seizure witnessed 8/13 now transferred for further seizures requiring intubation and mechanical ventilation with Neurology consult for such.     Impression: seizures more suspicious for inflammatory cause in setting of fevers and lateralized discharges. Based on MRI suspicious for autoimmune encephalitis. Infectious cause unlikely based upon LP results. Last alcoholic drink >3 weeks out so much less suspicious for withdrawal etiology.    Recommendations:  [] continue Keppra 1g bid, Vimpat 200mg bid, fosphenytoin 140mg bid  [] continue EEG, based on results will decide whether to taper AEDs  [] closely follow HR, BP, CBC, LFTs. Observe for development of rash  [] LP per primary team;  Glucose elevated; Protein normal: Gram stain with no growth; CSF Viral PCR panel negative; CSF AFB cancelled; CSF fungal cx pending; CSF VDRL titer pending; Oligoclonal bands; Myelin Basic Protein, Anti-MOG; Cryptococcal antigen. Autoimmune encephelopathy CSF panel, RT quic CSF, 14-3-3.  [] stopped acyclovir, vancomycin, ceftriaxone as no sign of infection  [] patient with improvement in mental status and recent extubation, will continue to follow mental status and determine if to start steroids or when to taper AEDs    Case discussed with Neurology Attending Dr. Ochoa; discussed with MICU team.

## 2020-08-21 DIAGNOSIS — E87.1 HYPO-OSMOLALITY AND HYPONATREMIA: ICD-10-CM

## 2020-08-21 LAB
ALBUMIN SERPL ELPH-MCNC: 3.1 G/DL — LOW (ref 3.3–5)
ALBUMIN SERPL ELPH-MCNC: 3.2 G/DL — LOW (ref 3.3–5)
ALBUMIN SERPL ELPH-MCNC: 3.3 G/DL — SIGNIFICANT CHANGE UP (ref 3.3–5)
ALBUMIN SERPL ELPH-MCNC: 3.3 G/DL — SIGNIFICANT CHANGE UP (ref 3.3–5)
ALBUMIN SERPL ELPH-MCNC: 3.5 G/DL — SIGNIFICANT CHANGE UP (ref 3.3–5)
ALP SERPL-CCNC: 26 U/L — LOW (ref 40–120)
ALP SERPL-CCNC: 27 U/L — LOW (ref 40–120)
ALP SERPL-CCNC: 30 U/L — LOW (ref 40–120)
ALP SERPL-CCNC: 31 U/L — LOW (ref 40–120)
ALP SERPL-CCNC: 34 U/L — LOW (ref 40–120)
ALT FLD-CCNC: 15 U/L — SIGNIFICANT CHANGE UP (ref 10–45)
ALT FLD-CCNC: 17 U/L — SIGNIFICANT CHANGE UP (ref 10–45)
ALT FLD-CCNC: 20 U/L — SIGNIFICANT CHANGE UP (ref 10–45)
ALT FLD-CCNC: 21 U/L — SIGNIFICANT CHANGE UP (ref 10–45)
ALT FLD-CCNC: 21 U/L — SIGNIFICANT CHANGE UP (ref 10–45)
ANION GAP SERPL CALC-SCNC: 14 MMOL/L — SIGNIFICANT CHANGE UP (ref 5–17)
ANION GAP SERPL CALC-SCNC: 9 MMOL/L — SIGNIFICANT CHANGE UP (ref 5–17)
ANION GAP SERPL CALC-SCNC: 9 MMOL/L — SIGNIFICANT CHANGE UP (ref 5–17)
APTT BLD: 40.7 SEC — HIGH (ref 27.5–35.5)
AST SERPL-CCNC: 24 U/L — SIGNIFICANT CHANGE UP (ref 10–40)
AST SERPL-CCNC: 30 U/L — SIGNIFICANT CHANGE UP (ref 10–40)
AST SERPL-CCNC: 36 U/L — SIGNIFICANT CHANGE UP (ref 10–40)
AST SERPL-CCNC: 37 U/L — SIGNIFICANT CHANGE UP (ref 10–40)
AST SERPL-CCNC: 43 U/L — HIGH (ref 10–40)
BASOPHILS # BLD AUTO: 0.01 K/UL — SIGNIFICANT CHANGE UP (ref 0–0.2)
BASOPHILS NFR BLD AUTO: 0.2 % — SIGNIFICANT CHANGE UP (ref 0–2)
BILIRUB SERPL-MCNC: 0.1 MG/DL — LOW (ref 0.2–1.2)
BILIRUB SERPL-MCNC: 0.2 MG/DL — SIGNIFICANT CHANGE UP (ref 0.2–1.2)
BILIRUB SERPL-MCNC: 0.2 MG/DL — SIGNIFICANT CHANGE UP (ref 0.2–1.2)
BUN SERPL-MCNC: 6 MG/DL — LOW (ref 7–23)
BUN SERPL-MCNC: 9 MG/DL — SIGNIFICANT CHANGE UP (ref 7–23)
CALCIUM SERPL-MCNC: 8.8 MG/DL — SIGNIFICANT CHANGE UP (ref 8.4–10.5)
CALCIUM SERPL-MCNC: 8.9 MG/DL — SIGNIFICANT CHANGE UP (ref 8.4–10.5)
CALCIUM SERPL-MCNC: 9 MG/DL — SIGNIFICANT CHANGE UP (ref 8.4–10.5)
CALCIUM SERPL-MCNC: 9.2 MG/DL — SIGNIFICANT CHANGE UP (ref 8.4–10.5)
CALCIUM SERPL-MCNC: 9.3 MG/DL — SIGNIFICANT CHANGE UP (ref 8.4–10.5)
CHLORIDE SERPL-SCNC: 100 MMOL/L — SIGNIFICANT CHANGE UP (ref 96–108)
CHLORIDE SERPL-SCNC: 101 MMOL/L — SIGNIFICANT CHANGE UP (ref 96–108)
CHLORIDE SERPL-SCNC: 102 MMOL/L — SIGNIFICANT CHANGE UP (ref 96–108)
CHLORIDE SERPL-SCNC: 102 MMOL/L — SIGNIFICANT CHANGE UP (ref 96–108)
CHLORIDE SERPL-SCNC: 104 MMOL/L — SIGNIFICANT CHANGE UP (ref 96–108)
CHLORIDE UR-SCNC: 139 MMOL/L — SIGNIFICANT CHANGE UP
CO2 SERPL-SCNC: 21 MMOL/L — LOW (ref 22–31)
CO2 SERPL-SCNC: 22 MMOL/L — SIGNIFICANT CHANGE UP (ref 22–31)
CO2 SERPL-SCNC: 22 MMOL/L — SIGNIFICANT CHANGE UP (ref 22–31)
CO2 SERPL-SCNC: 24 MMOL/L — SIGNIFICANT CHANGE UP (ref 22–31)
CO2 SERPL-SCNC: 26 MMOL/L — SIGNIFICANT CHANGE UP (ref 22–31)
CREAT ?TM UR-MCNC: 17 MG/DL — SIGNIFICANT CHANGE UP
CREAT SERPL-MCNC: 0.36 MG/DL — LOW (ref 0.5–1.3)
CREAT SERPL-MCNC: 0.36 MG/DL — LOW (ref 0.5–1.3)
CREAT SERPL-MCNC: 0.39 MG/DL — LOW (ref 0.5–1.3)
CREAT SERPL-MCNC: 0.41 MG/DL — LOW (ref 0.5–1.3)
CREAT SERPL-MCNC: 0.49 MG/DL — LOW (ref 0.5–1.3)
CULTURE RESULTS: SIGNIFICANT CHANGE UP
CULTURE RESULTS: SIGNIFICANT CHANGE UP
DESMETHYL LACOSAMIDE: 0.5 UG/ML — SIGNIFICANT CHANGE UP
EOSINOPHIL # BLD AUTO: 0.13 K/UL — SIGNIFICANT CHANGE UP (ref 0–0.5)
EOSINOPHIL NFR BLD AUTO: 2.4 % — SIGNIFICANT CHANGE UP (ref 0–6)
GLUCOSE BLDC GLUCOMTR-MCNC: 105 MG/DL — HIGH (ref 70–99)
GLUCOSE BLDC GLUCOMTR-MCNC: 117 MG/DL — HIGH (ref 70–99)
GLUCOSE BLDC GLUCOMTR-MCNC: 127 MG/DL — HIGH (ref 70–99)
GLUCOSE BLDC GLUCOMTR-MCNC: 128 MG/DL — HIGH (ref 70–99)
GLUCOSE SERPL-MCNC: 103 MG/DL — HIGH (ref 70–99)
GLUCOSE SERPL-MCNC: 109 MG/DL — HIGH (ref 70–99)
GLUCOSE SERPL-MCNC: 120 MG/DL — HIGH (ref 70–99)
GLUCOSE SERPL-MCNC: 121 MG/DL — HIGH (ref 70–99)
GLUCOSE SERPL-MCNC: 136 MG/DL — HIGH (ref 70–99)
HCT VFR BLD CALC: 32.6 % — LOW (ref 34.5–45)
HGB BLD-MCNC: 10.4 G/DL — LOW (ref 11.5–15.5)
IMM GRANULOCYTES NFR BLD AUTO: 1.1 % — SIGNIFICANT CHANGE UP (ref 0–1.5)
INR BLD: 0.96 RATIO — SIGNIFICANT CHANGE UP (ref 0.88–1.16)
LACOSAMIDE (VIMPAT) RESULT: 15.2 UG/ML — HIGH (ref 1–10)
LYMPHOCYTES # BLD AUTO: 1.71 K/UL — SIGNIFICANT CHANGE UP (ref 1–3.3)
LYMPHOCYTES # BLD AUTO: 31.3 % — SIGNIFICANT CHANGE UP (ref 13–44)
MAGNESIUM SERPL-MCNC: 2.1 MG/DL — SIGNIFICANT CHANGE UP (ref 1.6–2.6)
MAGNESIUM SERPL-MCNC: 2.2 MG/DL — SIGNIFICANT CHANGE UP (ref 1.6–2.6)
MCHC RBC-ENTMCNC: 31.9 GM/DL — LOW (ref 32–36)
MCHC RBC-ENTMCNC: 33.7 PG — SIGNIFICANT CHANGE UP (ref 27–34)
MCV RBC AUTO: 105.5 FL — HIGH (ref 80–100)
MONOCYTES # BLD AUTO: 0.5 K/UL — SIGNIFICANT CHANGE UP (ref 0–0.9)
MONOCYTES NFR BLD AUTO: 9.2 % — SIGNIFICANT CHANGE UP (ref 2–14)
NEUTROPHILS # BLD AUTO: 3.05 K/UL — SIGNIFICANT CHANGE UP (ref 1.8–7.4)
NEUTROPHILS NFR BLD AUTO: 55.8 % — SIGNIFICANT CHANGE UP (ref 43–77)
NRBC # BLD: 0 /100 WBCS — SIGNIFICANT CHANGE UP (ref 0–0)
OSMOLALITY UR: 396 MOS/KG — SIGNIFICANT CHANGE UP (ref 300–900)
PHENYTOIN FREE SERPL-MCNC: 6.9 UG/ML — LOW (ref 10–20)
PHOSPHATE SERPL-MCNC: 3.4 MG/DL — SIGNIFICANT CHANGE UP (ref 2.5–4.5)
PHOSPHATE SERPL-MCNC: 3.5 MG/DL — SIGNIFICANT CHANGE UP (ref 2.5–4.5)
PHOSPHATE SERPL-MCNC: 3.7 MG/DL — SIGNIFICANT CHANGE UP (ref 2.5–4.5)
PHOSPHATE SERPL-MCNC: 3.7 MG/DL — SIGNIFICANT CHANGE UP (ref 2.5–4.5)
PHOSPHATE SERPL-MCNC: 4.2 MG/DL — SIGNIFICANT CHANGE UP (ref 2.5–4.5)
PLATELET # BLD AUTO: 221 K/UL — SIGNIFICANT CHANGE UP (ref 150–400)
POTASSIUM SERPL-MCNC: 3.7 MMOL/L — SIGNIFICANT CHANGE UP (ref 3.5–5.3)
POTASSIUM SERPL-MCNC: 3.8 MMOL/L — SIGNIFICANT CHANGE UP (ref 3.5–5.3)
POTASSIUM SERPL-MCNC: 4.3 MMOL/L — SIGNIFICANT CHANGE UP (ref 3.5–5.3)
POTASSIUM SERPL-MCNC: 4.4 MMOL/L — SIGNIFICANT CHANGE UP (ref 3.5–5.3)
POTASSIUM SERPL-MCNC: 4.5 MMOL/L — SIGNIFICANT CHANGE UP (ref 3.5–5.3)
POTASSIUM SERPL-SCNC: 3.7 MMOL/L — SIGNIFICANT CHANGE UP (ref 3.5–5.3)
POTASSIUM SERPL-SCNC: 3.8 MMOL/L — SIGNIFICANT CHANGE UP (ref 3.5–5.3)
POTASSIUM SERPL-SCNC: 4.3 MMOL/L — SIGNIFICANT CHANGE UP (ref 3.5–5.3)
POTASSIUM SERPL-SCNC: 4.4 MMOL/L — SIGNIFICANT CHANGE UP (ref 3.5–5.3)
POTASSIUM SERPL-SCNC: 4.5 MMOL/L — SIGNIFICANT CHANGE UP (ref 3.5–5.3)
POTASSIUM UR-SCNC: 21 MMOL/L — SIGNIFICANT CHANGE UP
PROT SERPL-MCNC: 5.4 G/DL — LOW (ref 6–8.3)
PROT SERPL-MCNC: 6 G/DL — SIGNIFICANT CHANGE UP (ref 6–8.3)
PROT SERPL-MCNC: 6.1 G/DL — SIGNIFICANT CHANGE UP (ref 6–8.3)
PROT SERPL-MCNC: 6.3 G/DL — SIGNIFICANT CHANGE UP (ref 6–8.3)
PROT SERPL-MCNC: 6.5 G/DL — SIGNIFICANT CHANGE UP (ref 6–8.3)
PROTHROM AB SERPL-ACNC: 11.4 SEC — SIGNIFICANT CHANGE UP (ref 10.6–13.6)
RBC # BLD: 3.09 M/UL — LOW (ref 3.8–5.2)
RBC # FLD: 13.3 % — SIGNIFICANT CHANGE UP (ref 10.3–14.5)
SODIUM SERPL-SCNC: 135 MMOL/L — SIGNIFICANT CHANGE UP (ref 135–145)
SODIUM SERPL-SCNC: 136 MMOL/L — SIGNIFICANT CHANGE UP (ref 135–145)
SODIUM SERPL-SCNC: 136 MMOL/L — SIGNIFICANT CHANGE UP (ref 135–145)
SODIUM SERPL-SCNC: 138 MMOL/L — SIGNIFICANT CHANGE UP (ref 135–145)
SODIUM SERPL-SCNC: 139 MMOL/L — SIGNIFICANT CHANGE UP (ref 135–145)
SODIUM UR-SCNC: 163 MMOL/L — SIGNIFICANT CHANGE UP
SPECIMEN SOURCE: SIGNIFICANT CHANGE UP
SPECIMEN SOURCE: SIGNIFICANT CHANGE UP
URATE SERPL-MCNC: 2.1 MG/DL — LOW (ref 2.5–7)
URATE UR-MCNC: 13 MG/DL — SIGNIFICANT CHANGE UP
WBC # BLD: 5.46 K/UL — SIGNIFICANT CHANGE UP (ref 3.8–10.5)
WBC # FLD AUTO: 5.46 K/UL — SIGNIFICANT CHANGE UP (ref 3.8–10.5)

## 2020-08-21 PROCEDURE — 99232 SBSQ HOSP IP/OBS MODERATE 35: CPT

## 2020-08-21 PROCEDURE — 99291 CRITICAL CARE FIRST HOUR: CPT

## 2020-08-21 PROCEDURE — 95720 EEG PHY/QHP EA INCR W/VEEG: CPT

## 2020-08-21 RX ORDER — HYDRALAZINE HCL 50 MG
10 TABLET ORAL ONCE
Refills: 0 | Status: COMPLETED | OUTPATIENT
Start: 2020-08-21 | End: 2020-08-22

## 2020-08-21 RX ORDER — FLUDROCORTISONE ACETATE 0.1 MG/1
0.1 TABLET ORAL EVERY 12 HOURS
Refills: 0 | Status: DISCONTINUED | OUTPATIENT
Start: 2020-08-21 | End: 2020-08-28

## 2020-08-21 RX ORDER — SODIUM CHLORIDE 5 G/100ML
1000 INJECTION, SOLUTION INTRAVENOUS
Refills: 0 | Status: DISCONTINUED | OUTPATIENT
Start: 2020-08-21 | End: 2020-08-23

## 2020-08-21 RX ORDER — FLUDROCORTISONE ACETATE 0.1 MG/1
0.1 TABLET ORAL EVERY 12 HOURS
Refills: 0 | Status: DISCONTINUED | OUTPATIENT
Start: 2020-08-21 | End: 2020-08-21

## 2020-08-21 RX ADMIN — LACOSAMIDE 140 MILLIGRAM(S): 50 TABLET ORAL at 12:14

## 2020-08-21 RX ADMIN — DEXMEDETOMIDINE HYDROCHLORIDE IN 0.9% SODIUM CHLORIDE 2.39 MICROGRAM(S)/KG/HR: 4 INJECTION INTRAVENOUS at 06:00

## 2020-08-21 RX ADMIN — PANTOPRAZOLE SODIUM 40 MILLIGRAM(S): 20 TABLET, DELAYED RELEASE ORAL at 05:49

## 2020-08-21 RX ADMIN — CHLORHEXIDINE GLUCONATE 1 APPLICATION(S): 213 SOLUTION TOPICAL at 05:42

## 2020-08-21 RX ADMIN — LEVETIRACETAM 400 MILLIGRAM(S): 250 TABLET, FILM COATED ORAL at 09:56

## 2020-08-21 RX ADMIN — FOSPHENYTOIN 105.6 MILLIGRAM(S) PE: 50 INJECTION INTRAMUSCULAR; INTRAVENOUS at 05:49

## 2020-08-21 RX ADMIN — LACOSAMIDE 140 MILLIGRAM(S): 50 TABLET ORAL at 01:10

## 2020-08-21 RX ADMIN — LEVETIRACETAM 400 MILLIGRAM(S): 250 TABLET, FILM COATED ORAL at 21:40

## 2020-08-21 RX ADMIN — FLUDROCORTISONE ACETATE 0.1 MILLIGRAM(S): 0.1 TABLET ORAL at 17:36

## 2020-08-21 RX ADMIN — HEPARIN SODIUM 5000 UNIT(S): 5000 INJECTION INTRAVENOUS; SUBCUTANEOUS at 21:40

## 2020-08-21 RX ADMIN — Medication 20 MILLIGRAM(S): at 05:49

## 2020-08-21 RX ADMIN — FOSPHENYTOIN 105.6 MILLIGRAM(S) PE: 50 INJECTION INTRAMUSCULAR; INTRAVENOUS at 17:36

## 2020-08-21 RX ADMIN — Medication 200 MILLIGRAM(S): at 12:14

## 2020-08-21 RX ADMIN — HEPARIN SODIUM 5000 UNIT(S): 5000 INJECTION INTRAVENOUS; SUBCUTANEOUS at 05:49

## 2020-08-21 RX ADMIN — SODIUM CHLORIDE 50 MILLILITER(S): 5 INJECTION, SOLUTION INTRAVENOUS at 12:15

## 2020-08-21 RX ADMIN — HEPARIN SODIUM 5000 UNIT(S): 5000 INJECTION INTRAVENOUS; SUBCUTANEOUS at 13:16

## 2020-08-21 NOTE — PROGRESS NOTE ADULT - ATTENDING COMMENTS
1. Acute hypoxemic respiratory failure after seizures. Seizures controlled. Pt weaned and extubated this am. Tolerating extubation so far.  2. Neuro. Statu epilepticus resolved. Versed and propofol off.  Continue Keppra and Vimpat and Fosphenytoin  activity. EEG without overt seizure like activity.. Etiology of seizures unclear at this time. CT head negative for acute changes. LP performed.  No evidence of infection. Await all cx and paraneoplastic  results. Stop Acyclovir , ceftriaxone and Vancomycin. MRI  reveals acute/subacute lacunar infarction within the right medial thalamus along with hypoperfused areas in bilateral frontal , temporal lobes consistent with post ictal events. Possible seizures from withdrawal of benzodiazepines or other medication. Pt with known h/o polysubstance abuse. ??autoimmune encephalitis.  3. H/o of polysubstance abuse. Pt currently sedated.? cause of seizures withdrawal  4. H/O DVT soleal. DVT study legs yesterday negative. D/C IV heparin. Change to sq heparin for prophylaxis of DVT.   5. H/o Lung ca with RML lobectomy.  6. H/O  INNA. On steroids and Plaquenil.  7. C diff. finished 14 day course. D/C po vancomycin.  8. Hyponatremia fro cerebral salt wasting syndrome. UO slowing down a little . Hyponatremia with Urine NA 115and greater. Continue 2% saline and Florinef.

## 2020-08-21 NOTE — PHYSICAL THERAPY INITIAL EVALUATION ADULT - BALANCE TRAINING, PT EVAL
GOAL: Pt will demonstrate improved static/dynamic standing balance by 1 grade in order to improve stability, decrease fall risk and increase independence with ADLs within 4 weeks.

## 2020-08-21 NOTE — PHYSICAL THERAPY INITIAL EVALUATION ADULT - GENERAL OBSERVATIONS, REHAB EVAL
Pt rec'd semi-supine in bed in NAD, VSS, +IV, +ICU monitoring, +quiana, friend at b/s, agreeable to PT session

## 2020-08-21 NOTE — OCCUPATIONAL THERAPY INITIAL EVALUATION ADULT - FINE MOTOR COORDINATION TRAINING, OT EVAL
Goal: Pt will require MIN A to manipulate buttons/zippers/fasteners on shirts/pants in 4 weeks to increase independence for ADL performance

## 2020-08-21 NOTE — PROGRESS NOTE ADULT - ASSESSMENT
Assessment:  61 y/o F with a h/o EtOH withdrawal, polysubstance abuse, EtoH pancreatitis, mixed connective tissue disorder (plaquenil and methylprednisolone), chronic pain s/p lumbar laminectomy, left soleal vein thrombosis (apixaban) lung adenocarcinoma (s/p RML resection 2018), recent RLE cellulitis s/p doxycycline, recent CDiff colitis, CAD admitted to OSH for symptomatic hyponatremia 2/2 psychogenic polydipsia complicated with new onset generalized seizures, intubated for hypoxic respiratory failure transferred to [facility] MICU for further management      #NEURO   //Seizure Disorder  -Extubated, improved mentation, no active seizure activity noted  -Propofol and versed ggt d/c'ed  -C/w Low dose precedex ggt, taper down as tolerated  -C/w fosphenytoin 140 mg BID  -C/w keppra 1 g BID   -c/w vimpat 200 mg BID  -Monitor for seizures on repeat EEG, can consider taper down AEPs if no seizure activity seen   -Neurology recs appreciated     #CV  -off pressors  -holding home clonidine   -hx of MI, no stents placed    #RESP   //Hypoxic Respiratory failure 2/2 seizures  - Extubated, not requiring respiratory support  - Monitor RR, Spo2  - Aspiration risk, NPO  - S/S assessment     //Hx of adenocarcinoma   -s/p RML wedge resection in 2018      #GI   //Chronic pancreatitis   -NPO, hold tube feeds  -S/S assessment before restarting feeds  -D/C IV D5 L  -C/w Creon 12,000 Lipase Units TID   -Protonix 40 mg BID   -C/w Folic Acid 1mg  and Multivitamin 1 tablet QD       #ID   //R/o Meningitis   - CSF neg for HSV 1/2, neg gram stain, neg viral PCR  - D/C IV ceftriaxone, vanc, acyclovir  - F/u CSF cell count; Glucose; Protein, CSF AFB; CSF fungal cx; CSF VDRL titer; Oligoclonal bands; Myelin Basic Protein, Anti-MOG; Cryptococcal antigen. Autoimmune encephelopathy CSF panel, RT quic CSF, 14-3-3   - Contact isolation  - Neurology recs appreciated    //Clostridium difficile infection   -completed PO vanc 8/3-8/17, IV flagyl d/c on 8/16    #Renal   -Hyponatremic, urine studies concern for cerebral salt wasting vs SIADH  -repeat BMP and urine studies  -Continue IV NS for now, will continue or d/c depending on urine studies  -Oral fluid restriction  -Zurita    #Rheum   //Hx of mixed connective tissue disorder   - C/w hydroxychloroquine 200 mg   - Solumedrol 20q8hr    #Heme   - Hold Eliquis  - C/w heparin SQ    #PSYCH   //Alcohol withdrawal   -last drink 3 weeks ago, seizures unlikely 2/2 to withdrawal Assessment:  61 y/o F with a h/o EtOH withdrawal, polysubstance abuse, EtoH pancreatitis, mixed connective tissue disorder (plaquenil and methylprednisolone), chronic pain s/p lumbar laminectomy, left soleal vein thrombosis (apixaban) lung adenocarcinoma (s/p RML resection 2018), recent RLE cellulitis s/p doxycycline, recent CDiff colitis, CAD admitted to OSH for symptomatic hyponatremia 2/2 psychogenic polydipsia complicated with new onset generalized seizures, intubated for hypoxic respiratory failure transferred to [facility] MICU for further management      #NEURO   //Seizure Disorder  -Extubated  -Per EEG, no active seizure activity noted  -Noted for long pauses to answer question, concern for post-ictal vs intermittent seizures   -C/w with AEPs at current dose   -C/w fosphenytoin 140 mg BID  -C/w keppra 1 g BID   -c/w vimpat 200 mg BID  -To D/C precedex ggt, c/w to taper down as tolerated  -Monitor for seizures on repeat EEG, can consider taper down AEPs if no seizure activity seen   -Neurology recs appreciated     #CV  -off pressors  -holding home clonidine   -hx of MI, no stents placed    #RESP   //Hypoxic Respiratory failure 2/2 seizures  - Extubated, not requiring respiratory support  - Monitor RR, Spo2  - Aspiration risk, NPO  - S/S assessment     //Hx of adenocarcinoma   -s/p RML wedge resection in 2018      #GI   //Chronic pancreatitis   -NPO, hold tube feeds  -S/S assessment before restarting feeds  -D/C IV D5 L  -C/w Creon 12,000 Lipase Units TID   -Protonix 40 mg BID   -C/w Folic Acid 1mg  and Multivitamin 1 tablet QD       #ID   //R/o Meningitis   - CSF neg for HSV 1/2, neg gram stain, neg viral PCR  - D/C IV ceftriaxone, vanc, acyclovir  - F/u CSF cell count; Glucose; Protein, CSF AFB; CSF fungal cx; CSF VDRL titer; Oligoclonal bands; Myelin Basic Protein, Anti-MOG; Cryptococcal antigen. Autoimmune encephelopathy CSF panel, RT quic CSF, 14-3-3   - Contact isolation  - Neurology recs appreciated    //Clostridium difficile infection   -completed PO vanc 8/3-8/17, IV flagyl d/c on 8/16    #Renal   -Hyponatremic, urine studies concern for cerebral salt wasting vs SIADH  -repeat BMP and urine studies  -Continue IV NS for now, will continue or d/c depending on urine studies  -Oral fluid restriction  -Zurita    #Rheum   //Hx of mixed connective tissue disorder   - C/w hydroxychloroquine 200 mg   - Solumedrol 20q8hr    #Heme   - Hold Eliquis  - C/w heparin SQ    #PSYCH   //Alcohol withdrawal   -last drink 3 weeks ago, seizures unlikely 2/2 to withdrawal Assessment:  61 y/o F with a h/o EtOH withdrawal, polysubstance abuse, EtoH pancreatitis, mixed connective tissue disorder (plaquenil and methylprednisolone), chronic pain s/p lumbar laminectomy, left soleal vein thrombosis (apixaban) lung adenocarcinoma (s/p RML resection 2018), recent RLE cellulitis s/p doxycycline, recent CDiff colitis, CAD admitted to OSH for symptomatic hyponatremia 2/2 psychogenic polydipsia complicated with new onset generalized seizures, intubated for hypoxic respiratory failure transferred to [facility]U for further management      #NEURO   //Seizure Disorder  -Extubated  -Per EEG, no active seizure activity noted  -Noted for long pauses to answer questions, concern for post-ictal confusion vs intermittent seizures   -C/w with AEPs at current dose     -C/w fosphenytoin 140 mg BID    -C/w keppra 1 g BID     -C/w vimpat 200 mg BID  -F/u with fosphenytoin, keppra and vimpat serum levels   -To D/C precedex ggt, c/w to taper down as tolerated  - Neuro checks  -Neurology recs appreciated     #CV  -off pressors  -C/w to hold home clonidine   -hx of MI, no stents placed    #RESP   //Hypoxic Respiratory failure 2/2 seizures, resolved  - Extubated, not requiring respiratory support  - Monitor RR, Spo2  - Aspiration precautions    //Hx of adenocarcinoma   -s/p RML wedge resection in 2018      #GI   //Chronic pancreatitis   -Passed bedside and official S/S assessment  -Start dysphagia diet  -C/w Creon 12,000 Lipase Units TID   -Protonix 40 mg BID   -C/w Folic Acid 1mg  and Multivitamin 1 tablet QD       #ID   //R/o Meningitis   - Viral and bacterial meningitis r/o: CSF neg for HSV 1/2, neg gram stain, neg viral PCR  - Off contact isolation  - Concern for autoimmune meningitis: f/u CSF cell count; Glucose; Protein, CSF AFB; CSF fungal cx; CSF VDRL titer; Oligoclonal bands; Myelin Basic Protein, Anti-MOG; Cryptococcal antigen. Autoimmune encephelopathy CSF panel, RT quic CSF, 14-3-3   - Neurology recs appreciated    //Clostridium difficile infection   -completed PO vanc 8/3-8/17, IV flagyl d/c on 8/16    #Renal   -Hyponatremic, urine studies concern for cerebral salt wasting  -   -repeat BMP and urine studies  -Continue IV NS for now, will continue or d/c depending on urine studies  -Oral fluid restriction  -Zurita    #Rheum   //Hx of mixed connective tissue disorder   - C/w hydroxychloroquine 200 mg   - Solumedrol 20q8hr    #Heme   - Hold Eliquis  - C/w heparin SQ    #PSYCH   //Alcohol withdrawal   -last drink 3 weeks ago, seizures unlikely 2/2 to withdrawal Assessment:  61 y/o F with a h/o EtOH withdrawal, polysubstance abuse, EtoH pancreatitis, mixed connective tissue disorder (plaquenil and methylprednisolone), chronic pain s/p lumbar laminectomy, left soleal vein thrombosis (apixaban) lung adenocarcinoma (s/p RML resection 2018), recent RLE cellulitis s/p doxycycline, recent CDiff colitis, CAD admitted to OSH for symptomatic hyponatremia 2/2 psychogenic polydipsia complicated with new onset generalized seizures, intubated for hypoxic respiratory failure transferred to [facility]U for further management      #NEURO   //Seizure Disorder  -Extubated  -Per EEG, no active seizure activity noted  -Noted for long pauses to answer questions, concern for post-ictal confusion vs intermittent seizures   -C/w with AEPs at current dose     -C/w fosphenytoin 140 mg BID    -C/w keppra 1 g BID     -C/w vimpat 200 mg BID  -F/u with fosphenytoin, keppra and vimpat serum levels   -To D/C precedex ggt, c/w to taper down as tolerated  -Neuro checks  -Neurology recs appreciated     #CV  -off pressors  -C/w to hold home clonidine   -hx of MI, no stents placed    #RESP   //Hypoxic Respiratory failure 2/2 seizures, resolved  - Extubated, not requiring respiratory support  - Monitor RR, Spo2  - Aspiration precautions    //Hx of adenocarcinoma   -s/p RML wedge resection in 2018      #GI   //Chronic pancreatitis   -Passed bedside and official S/S assessment  -Start dysphagia diet  -C/w Creon 12,000 Lipase Units TID   -Protonix 40 mg BID   -C/w Folic Acid 1mg  and Multivitamin 1 tablet QD       #ID   //R/o Meningitis   - Viral and bacterial meningitis r/o: CSF neg for HSV 1/2, neg gram stain, neg viral PCR  - Off contact isolation  - Concern for autoimmune meningitis: f/u CSF cell count; Glucose; Protein, CSF AFB; CSF fungal cx; CSF VDRL titer; Oligoclonal bands; Myelin Basic Protein, Anti-MOG; Cryptococcal antigen. Autoimmune encephelopathy CSF panel, RT quic CSF, 14-3-3   - Neurology recs appreciated    //Clostridium difficile infection   -completed PO vanc 8/3-8/17, IV flagyl d/c on 8/16    #Renal   -Hyponatremic, urine studies concern for cerebral salt wasting  -Titrate NS 2% at rate of  cc/hr based on q4 CMP  -f/u repeat BMP and urine studies  -Strict Is/Os  - C/w Zurita    #Rheum   //Hx of mixed connective tissue disorder   - C/w hydroxychloroquine 200 mg   - Solumedrol 20q8hr    #Heme   - Hold home Eliquis  - C/w heparin SQ    #PSYCH   //Alcohol withdrawal   -last drink 3 weeks ago, seizures unlikely 2/2 to withdrawal

## 2020-08-21 NOTE — SWALLOW BEDSIDE ASSESSMENT ADULT - SLP PERTINENT HISTORY OF CURRENT PROBLEM
59 y/o F with a h/o EtOH withdrawal, polysubstance abuse,recent admissions for EtoH pancreatitis & withdrawal, mixed connective tissue disorder (plaquenil and methylprednisolone), chronic pain s/p lumbar laminectomy, left soleal vein thrombosis (apixaban, 7/202) lung adenocarcinoma (s/p RML resection 2018), recent RLE cellulitis s/p doxycycline, recent CDiff colitis,  CAD s/p possible MI (ECHO) admitted to OSH on 8/11 for symptomatic hyponatremia 2/2 psychogenic polydipsia complicated with new onset generalized seizures, episode of status epilepticus on 8/16, intubated for hypoxic respiratory failure, Found to have new fever of 101.6 F and hypotensive post-intubation, started on pressors. Transferred to [facility] MICU for further management. CT head unremarkable for acute pathology. 59 y/o F with a h/o EtOH withdrawal, polysubstance abuse, recent admissions for EtoH pancreatitis & withdrawal, mixed connective tissue disorder (plaquenil and methylprednisolone), chronic pain s/p lumbar laminectomy, left soleal vein thrombosis (apixaban, 7/202) lung adenocarcinoma (s/p RML resection 2018), recent RLE cellulitis s/p doxycycline, recent CDiff colitis,  CAD s/p possible MI (ECHO) admitted to OSH on 8/11 for symptomatic hyponatremia 2/2 psychogenic polydipsia complicated with new onset generalized seizures, episode of status epilepticus on 8/16, intubated for hypoxic respiratory failure, Found to have new fever of 101.6 F and hypotensive post-intubation, started on pressors. Transferred to [facility] MICU for further management. CT head unremarkable for acute pathology.

## 2020-08-21 NOTE — PROGRESS NOTE ADULT - ASSESSMENT
Assessment: 59 yo woman w/ h/o chronic pain, lung adenocarcinoma (s/p RML wedge resection 2018), ?mixed connective tissue disease, anxiety, right hip replacement, lumbar laminectomy, b/l oophorectomy, diverticulitis, recent acute pancreatitis and found to have right soleal DVT on eliquis, recent RLE cellulitis and recent c.diff infection initially admitted to [facility] for hyponatremia with ccb seizure witnessed 8/13 now transferred for further seizures requiring intubation and mechanical ventilation with Neurology consult for such.     Impression: seizures more suspicious for inflammatory cause in setting of fevers and lateralized discharges. Based on MRI suspicious for autoimmune encephalitis. Infectious cause unlikely based upon LP results. Last alcoholic drink >3 weeks out so much less suspicious for withdrawal etiology.    Recommendations:  [] continue Keppra 1g bid, Vimpat 200mg bid, fosphenytoin 140mg bid  [] obtain AED levels  [] patient with improvement in mental status and recent extubation, will continue to follow mental status and determine if to start steroids or when to taper AEDs  [] continue EEG, based on results will decide whether to taper AEDs  [] closely follow HR, BP, CBC, LFTs. Observe for development of rash  [] LP per primary team;  Glucose elevated; Protein normal: Gram stain with no growth; CSF Viral PCR panel negative; CSF AFB cancelled; CSF fungal cx pending; CSF VDRL titer pending; Oligoclonal bands; Myelin Basic Protein, Anti-MOG; Cryptococcal antigen negative. Autoimmune encephelopathy CSF panel, RT quic CSF, 14-3-3.  [] stopped acyclovir, vancomycin, ceftriaxone as no sign of infection    Case discussed with Neurology Attending Dr. Ochoa; discussed with MICU team. Assessment: 61 yo woman w/ h/o chronic pain, lung adenocarcinoma (s/p RML wedge resection 2018), ?mixed connective tissue disease, anxiety, right hip replacement, lumbar laminectomy, b/l oophorectomy, diverticulitis, recent acute pancreatitis and found to have right soleal DVT on eliquis, recent RLE cellulitis and recent c.diff infection initially admitted to [facility] for hyponatremia with ccb seizure witnessed 8/13 now transferred for further seizures requiring intubation and mechanical ventilation with Neurology consult for such.     Impression: seizures more suspicious for inflammatory cause in setting of fevers and lateralized discharges. Based on MRI suspicious for autoimmune encephalitis. Infectious cause unlikely based upon LP results. Last alcoholic drink >3 weeks out so much less suspicious for withdrawal etiology.     Recommendations:  [] continue Keppra 1g bid, Vimpat 200mg bid, fosphenytoin 140mg bid  [] obtain AED levels  [] patient with improvement in mental status and recent extubation, but still fluctuations, will continue to follow mental status and determine if to start steroids or when to taper AEDs  [] continue EEG, based on results will decide whether to taper AEDs  [] closely follow HR, BP, CBC, LFTs. Observe for development of rash  [] LP per primary team;  Glucose elevated; Protein normal: Gram stain with no growth; CSF Viral PCR panel negative; CSF AFB cancelled; CSF fungal cx pending; CSF VDRL titer pending; Oligoclonal bands; Myelin Basic Protein, Anti-MOG; Cryptococcal antigen negative. Autoimmune encephelopathy CSF panel, RT quic CSF, 14-3-3.  [] stopped acyclovir, vancomycin, ceftriaxone as no sign of infection    Case discussed with Neurology Attending Dr. Ochoa; discussed with MICU team.

## 2020-08-21 NOTE — PHYSICAL THERAPY INITIAL EVALUATION ADULT - TRANSFER SAFETY CONCERNS NOTED: SIT/STAND, REHAB EVAL
losing balance/decreased sequencing ability/decreased step length/decreased weight-shifting ability/decreased balance during turns

## 2020-08-21 NOTE — PROGRESS NOTE ADULT - SUBJECTIVE AND OBJECTIVE BOX
Patient is a 60y old  Female who presents with a chief complaint of Seizures (20 Aug 2020 23:08)      INTERVAL HPI/OVERNIGHT EVENTS:   No overnight events   Afebrile, hemodynamically stable     ICU Vital Signs Last 24 Hrs  T(C): 36.9 (21 Aug 2020 05:00), Max: 37.2 (20 Aug 2020 08:00)  T(F): 98.5 (21 Aug 2020 05:00), Max: 99 (20 Aug 2020 08:00)  HR: 83 (21 Aug 2020 07:00) (73 - 87)  BP: 115/65 (21 Aug 2020 07:00) (78/48 - 121/73)  BP(mean): 85 (21 Aug 2020 07:00) (58 - 93)  ABP: --  ABP(mean): --  RR: 22 (21 Aug 2020 07:00) (13 - 23)  SpO2: 95% (21 Aug 2020 07:00) (94% - 100%)    I&O's Summary    20 Aug 2020 07:01  -  21 Aug 2020 07:00  --------------------------------------------------------  IN: 1381.6 mL / OUT: 3115 mL / NET: -1733.4 mL          LABS:                        10.4   5.46  )-----------( 221      ( 21 Aug 2020 00:21 )             32.6     08-21    139  |  104  |  6<L>  ----------------------------<  109<H>  3.8   |  26  |  0.39<L>    Ca    9.0      21 Aug 2020 00:21  Phos  3.7     08-21  Mg     2.2     08-21    TPro  5.4<L>  /  Alb  3.1<L>  /  TBili  0.1<L>  /  DBili  x   /  AST  24  /  ALT  15  /  AlkPhos  26<L>  08-21    PT/INR - ( 21 Aug 2020 00:21 )   PT: 11.4 sec;   INR: 0.96 ratio         PTT - ( 21 Aug 2020 00:21 )  PTT:40.7 sec    CAPILLARY BLOOD GLUCOSE      POCT Blood Glucose.: 128 mg/dL (21 Aug 2020 06:10)  POCT Blood Glucose.: 105 mg/dL (20 Aug 2020 23:49)  POCT Blood Glucose.: 113 mg/dL (20 Aug 2020 18:27)  POCT Blood Glucose.: 108 mg/dL (20 Aug 2020 12:36)    ABG - ( 20 Aug 2020 06:28 )  pH, Arterial: 7.45  pH, Blood: x     /  pCO2: 41    /  pO2: 188   / HCO3: 28    / Base Excess: 4.2   /  SaO2: 100                 RADIOLOGY & ADDITIONAL TESTS:    Consultant(s) Notes Reviewed:  [x ] YES  [ ] NO    MEDICATIONS  (STANDING):  chlorhexidine 4% Liquid 1 Application(s) Topical <User Schedule>  dexMEDEtomidine Infusion 0.2 MICROgram(s)/kG/Hr (2.39 mL/Hr) IV Continuous <Continuous>  fosphenytoin IVPB 140 milliGRAM(s) PE IV Intermittent every 12 hours  heparin   Injectable 5000 Unit(s) SubCutaneous every 8 hours  hydroxychloroquine 200 milliGRAM(s) Oral daily  insulin lispro (HumaLOG) corrective regimen sliding scale   SubCutaneous every 6 hours  lacosamide IVPB 200 milliGRAM(s) IV Intermittent every 12 hours  levETIRAcetam  IVPB 1000 milliGRAM(s) IV Intermittent every 12 hours  methylPREDNISolone sodium succinate Injectable 20 milliGRAM(s) IV Push daily  pantoprazole  Injectable 40 milliGRAM(s) IV Push two times a day    MEDICATIONS  (PRN):  artificial  tears Solution 1 Drop(s) Both EYES three times a day PRN Eye irritation      PHYSICAL EXAM:  GENERAL:   HEAD:  Atraumatic, Normocephalic  EYES: EOMI, PERRLA, conjunctiva and sclera clear  NECK: Supple, No JVD, Normal thyroid, no enlarged nodes  NERVOUS SYSTEM:  Alert & Awake.   CHEST/LUNG: B/L good air entry; No rales, rhonchi, or wheezing  HEART: S1S2 normal, no S3, Regular rate and rhythm; No murmurs  ABDOMEN: Soft, Nontender, Nondistended; Bowel sounds present  EXTREMITIES:  2+ Peripheral Pulses, No clubbing, cyanosis, or edema  LYMPH: No lymphadenopathy noted  SKIN: No rashes or lesions    Care Discussed with Consultants/Other Providers [ x] YES  [ ] NO Patient is a 60y old  Female who presents with a chief complaint of Seizures (20 Aug 2020 23:08)      INTERVAL HPI/OVERNIGHT EVENTS:   Hyponatremia, increased urination with decreased rate per hour.   Afebrile, hemodynamically stable     ICU Vital Signs Last 24 Hrs  T(C): 36.9 (21 Aug 2020 05:00), Max: 37.2 (20 Aug 2020 08:00)  T(F): 98.5 (21 Aug 2020 05:00), Max: 99 (20 Aug 2020 08:00)  HR: 83 (21 Aug 2020 07:00) (73 - 87)  BP: 115/65 (21 Aug 2020 07:00) (78/48 - 121/73)  BP(mean): 85 (21 Aug 2020 07:00) (58 - 93)  ABP: --  ABP(mean): --  RR: 22 (21 Aug 2020 07:00) (13 - 23)  SpO2: 95% (21 Aug 2020 07:00) (94% - 100%)    I&O's Summary    20 Aug 2020 07:01  -  21 Aug 2020 07:00  --------------------------------------------------------  IN: 1381.6 mL / OUT: 3115 mL / NET: -1733.4 mL          LABS:                        10.4   5.46  )-----------( 221      ( 21 Aug 2020 00:21 )             32.6     08-21    139  |  104  |  6<L>  ----------------------------<  109<H>  3.8   |  26  |  0.39<L>    Ca    9.0      21 Aug 2020 00:21  Phos  3.7     08-21  Mg     2.2     08-21    TPro  5.4<L>  /  Alb  3.1<L>  /  TBili  0.1<L>  /  DBili  x   /  AST  24  /  ALT  15  /  AlkPhos  26<L>  08-21    PT/INR - ( 21 Aug 2020 00:21 )   PT: 11.4 sec;   INR: 0.96 ratio         PTT - ( 21 Aug 2020 00:21 )  PTT:40.7 sec    CAPILLARY BLOOD GLUCOSE      POCT Blood Glucose.: 128 mg/dL (21 Aug 2020 06:10)  POCT Blood Glucose.: 105 mg/dL (20 Aug 2020 23:49)  POCT Blood Glucose.: 113 mg/dL (20 Aug 2020 18:27)  POCT Blood Glucose.: 108 mg/dL (20 Aug 2020 12:36)    ABG - ( 20 Aug 2020 06:28 )  pH, Arterial: 7.45  pH, Blood: x     /  pCO2: 41    /  pO2: 188   / HCO3: 28    / Base Excess: 4.2   /  SaO2: 100                 RADIOLOGY & ADDITIONAL TESTS:    Consultant(s) Notes Reviewed:  [x ] YES  [ ] NO    MEDICATIONS  (STANDING):  chlorhexidine 4% Liquid 1 Application(s) Topical <User Schedule>  dexMEDEtomidine Infusion 0.2 MICROgram(s)/kG/Hr (2.39 mL/Hr) IV Continuous <Continuous>  fosphenytoin IVPB 140 milliGRAM(s) PE IV Intermittent every 12 hours  heparin   Injectable 5000 Unit(s) SubCutaneous every 8 hours  hydroxychloroquine 200 milliGRAM(s) Oral daily  insulin lispro (HumaLOG) corrective regimen sliding scale   SubCutaneous every 6 hours  lacosamide IVPB 200 milliGRAM(s) IV Intermittent every 12 hours  levETIRAcetam  IVPB 1000 milliGRAM(s) IV Intermittent every 12 hours  methylPREDNISolone sodium succinate Injectable 20 milliGRAM(s) IV Push daily  pantoprazole  Injectable 40 milliGRAM(s) IV Push two times a day    MEDICATIONS  (PRN):  artificial  tears Solution 1 Drop(s) Both EYES three times a day PRN Eye irritation      PHYSICAL EXAM:  GENERAL:   HEAD:  Atraumatic, Normocephalic  EYES: EOMI, PERRLA, conjunctiva and sclera clear  NECK: Supple, No JVD, Normal thyroid, no enlarged nodes  NERVOUS SYSTEM:  Alert & Awake.   CHEST/LUNG: B/L good air entry; No rales, rhonchi, or wheezing  HEART: S1S2 normal, no S3, Regular rate and rhythm; No murmurs  ABDOMEN: Soft, Nontender, Nondistended; Bowel sounds present  EXTREMITIES:  2+ Peripheral Pulses, No clubbing, cyanosis, or edema  LYMPH: No lymphadenopathy noted  SKIN: No rashes or lesions    Care Discussed with Consultants/Other Providers [ x] YES  [ ] NO

## 2020-08-21 NOTE — PHYSICAL THERAPY INITIAL EVALUATION ADULT - IMPAIRED TRANSFERS: SIT/STAND, REHAB EVAL
impaired postural control/impaired balance/cognition/abnormal muscle tone/impaired motor control/decreased ROM/impaired coordination/decreased flexibility/decreased strength

## 2020-08-21 NOTE — PHYSICAL THERAPY INITIAL EVALUATION ADULT - PERTINENT HX OF CURRENT PROBLEM, REHAB EVAL
61 y/o F with a h/o EtOH and opiate abuse admitted on 8/11 with symptomatic 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 She was treated on the medicine service with an appropriate rate of correction in serum Na

## 2020-08-21 NOTE — OCCUPATIONAL THERAPY INITIAL EVALUATION ADULT - PLANNED THERAPY INTERVENTIONS, OT EVAL
fine motor coordination training/ADL retraining/balance training/cognitive, visual perceptual/bed mobility training/transfer training

## 2020-08-21 NOTE — CHART NOTE - NSCHARTNOTEFT_GEN_A_CORE
Nutrition Follow Up Note   Patient seen for: nutrition follow up on  ICU     Source: medical record, communication with team, pt confused, unable to participate in interview.     Chart reviewed, events noted. Adm dx: seizures. Extubated yesterday. Swallow eval ordered 8/20.   Diet Order: Diet, NPO with Tube Feed:   Tube Feeding Modality: Nasogastric  Vital AF (VITALAFRTH)  Total Volume for 24 Hours (mL): 1080  Intermittent  Starting Tube Feed Rate {mL per Hour}: 10  Increase Tube Feed Rate by (mL): 10    Every 4 hours  Until Goal Tube Feed Rate (mL per Hour): 60  Tube Feeding Hours ON: 18  Tube Feeding OFF (Hours): 6  Tube Feed Start Time: 10:00 (08-18-20 @ 11:32)    CURRENT EN ORDER PROVIDES: 1296 kcals, 81 gm protein, 876cc free water     Nutrition Events:   tube feeds on hold, last EN intake 8/20 1080cc    GI: no N/V  had loose BM this am    Anthropometric Measurements:   Height (cm): 160 (08-16-20 @ 14:00)  Weight (kg): 47.7 (08-16-20 @ 20:51)  BMI (kg/m2): 18.6 (08-16-20 @ 20:51)  no recent wt      Medications: MEDICATIONS  (STANDING):  chlorhexidine 4% Liquid 1 Application(s) Topical <User Schedule>  dexMEDEtomidine Infusion 0.2 MICROgram(s)/kG/Hr (2.39 mL/Hr) IV Continuous <Continuous>  fosphenytoin IVPB 140 milliGRAM(s) PE IV Intermittent every 12 hours  heparin   Injectable 5000 Unit(s) SubCutaneous every 8 hours  hydroxychloroquine 200 milliGRAM(s) Oral daily  insulin lispro (HumaLOG) corrective regimen sliding scale   SubCutaneous every 6 hours  lacosamide IVPB 200 milliGRAM(s) IV Intermittent every 12 hours  levETIRAcetam  IVPB 1000 milliGRAM(s) IV Intermittent every 12 hours  methylPREDNISolone sodium succinate Injectable 20 milliGRAM(s) IV Push daily  pantoprazole  Injectable 40 milliGRAM(s) IV Push two times a day    MEDICATIONS  (PRN):  artificial  tears Solution 1 Drop(s) Both EYES three times a day PRN Eye irritation    Labs: 08-21 @ 00:21: Sodium 139, Potassium 3.8, Calcium 9.0, Magnesium 2.2, Phosphorus 3.7, BUN 6<L>, Creatinine 0.39<L>, Glucose 109<H>, Alk Phos 26<L>, ALT/SGPT 15, AST/SGOT 24, Albumin 3.1<L>, Prealbumin --, Total Bilirubin 0.1<L>, Hemoglobin 10.4<L>, Hematocrit 32.6<L>, Ferritin --, C-Reactive Protein --, Creatine Kinase <<27>  08-20 @ 18:33: Sodium 134<L>, Potassium 3.8, Calcium 9.1, Magnesium 2.2, Phosphorus --, BUN 6<L>, Creatinine 0.36<L>, Glucose 123<H>, Alk Phos --, ALT/SGPT --, AST/SGOT --, Albumin --, Prealbumin --, Total Bilirubin --, Hemoglobin --, Hematocrit --, Ferritin --, C-Reactive Protein --, Creatine Kinase <<27>  08-20 @ 14:56: Sodium 139, Potassium 4.2, Calcium 9.1, Magnesium 2.2, Phosphorus 3.6, BUN 6<L>, Creatinine 0.32<L>, Glucose 102<H>, Alk Phos 26<L>, ALT/SGPT 18, AST/SGOT 22, Albumin 2.9<L>, Prealbumin --, Total Bilirubin <0.1<L>, Hemoglobin --, Hematocrit --, Ferritin --, C-Reactive Protein --, Creatine Kinase <<27>      Triglycerides, Serum: 289 mg/dL (08-16-20 @ 22:16)  Triglycerides, Serum: 52 mg/dL (07-29-20 @ 08:16)    POCT Blood Glucose.: 128 mg/dL (08-21-20 @ 06:10)  POCT Blood Glucose.: 105 mg/dL (08-20-20 @ 23:49)  POCT Blood Glucose.: 113 mg/dL (08-20-20 @ 18:27)  POCT Blood Glucose.: 108 mg/dL (08-20-20 @ 12:36)    Skin: no pressure injuries documented   Edema: 2+generalized    Estimated Needs: based on dosing wt 50.4 kg, reassessed pt now extubated  Energy: 7781-2146 kcals (30-35 kcals/kg)  Protein:  60-70 gm (1.2-1.4 gm/kg    Previous Nutrition Diagnosis: moderate malnutrition  Nutrition Diagnosis is: care plan ongoing, pt currently NPO pending swallow eval    New Nutrition Diagnosis:  none    Recommended Interventions:   1. If cleared for po, recommend no diet restrictions, texture per ST recommendation. Add Ensure Enlive 2 times/day  2. If EN resumed, recommend Jevity 1.2 at 75 cc/hr x 18 hrs provides 1620 kcals, 75 gm protein, 1089cc free water  meets 32 Kcal/Kg, 1.5Gm/kg dosing wt 50.4 kg    Monitoring and Evaluation:   Continue to monitor nutrition provision and tolerance, weights, labs, skin integrity.   RD remains available upon request and will follow up per protocol.

## 2020-08-21 NOTE — PROGRESS NOTE ADULT - SUBJECTIVE AND OBJECTIVE BOX
*************************************  NEUROLOGY PROGRESS NOTE  **************************************    JULIO CESAR FAYE  Female  MRN-[id number]    Subjective: Off sedation. No acute events overnight. States she does not feel well but does not elaborate. Intermittently does not answer questions and has left gaze deviation.     Vital Signs Last 24 Hrs  T(C): 36.7 (21 Aug 2020 08:00), Max: 37.2 (20 Aug 2020 15:00)  T(F): 98.1 (21 Aug 2020 08:00), Max: 99 (20 Aug 2020 15:00)  HR: 88 (21 Aug 2020 11:00) (73 - 88)  BP: 132/73 (21 Aug 2020 11:00) (84/53 - 143/81)  BP(mean): 97 (21 Aug 2020 11:00) (63 - 107)  RR: 21 (21 Aug 2020 11:00) (13 - 24)  SpO2: 97% (21 Aug 2020 11:00) (94% - 98%)    MEDICATIONS  (STANDING):  chlorhexidine 4% Liquid 1 Application(s) Topical <User Schedule>  dexMEDEtomidine Infusion 0.2 MICROgram(s)/kG/Hr (2.39 mL/Hr) IV Continuous <Continuous>  fosphenytoin IVPB 140 milliGRAM(s) PE IV Intermittent every 12 hours  heparin   Injectable 5000 Unit(s) SubCutaneous every 8 hours  hydroxychloroquine 200 milliGRAM(s) Oral daily  insulin lispro (HumaLOG) corrective regimen sliding scale   SubCutaneous every 6 hours  lacosamide IVPB 200 milliGRAM(s) IV Intermittent every 12 hours  levETIRAcetam  IVPB 1000 milliGRAM(s) IV Intermittent every 12 hours  methylPREDNISolone sodium succinate Injectable 20 milliGRAM(s) IV Push daily  sodium chloride 2% . 1000 milliLiter(s) (50 mL/Hr) IV Continuous <Continuous>    MEDICATIONS  (PRN):  artificial  tears Solution 1 Drop(s) Both EYES three times a day PRN Eye irritation    PHYSICAL EXAMINATION:  General: Awake and alert, oriented to hospital. Not oriented to why she is here. Only some responses to words. Intermittently answers questions or follows commands.   Neurologic:  - Mental Status:  Follows most commands but sluggish.  - Cranial Nerves II-XII:  Pupils sluggish bilaterally, 3mm bilaterally. Face symmetric, eye closure strong bilaterally, tongue midline. V1-V3 intact.   - Motor:  Moves all limbs spontaneously and withdraws to noxious stimuli. Occasional rhythmic shaking of the R arm.  - Reflexes: 1+ patellar reflexes, Babinski downgoing.   - Sensory:  Equal sensation to light tough bilaterally.  - Gait:   Deferred    LABS:                          10.4   5.46  )-----------( 221      ( 21 Aug 2020 00:21 )             32.6     08-21    135  |  102  |  6<L>  ----------------------------<  136<H>  4.5   |  24  |  0.36<L>    Ca    8.8      21 Aug 2020 08:09  Phos  3.5     08-21  Mg     2.1     08-21    TPro  6.1  /  Alb  3.2<L>  /  TBili  0.2  /  DBili  x   /  AST  37  /  ALT  17  /  AlkPhos  27<L>  08-21      RADIOLOGY & ADDITIONAL STUDIES:          < from: CT Head No Cont (08.15.20 @ 18:28) >  FINDINGS:    There is no CT evidence of acute intracranial hemorrhage, mass effect, midline shift, or acute, large territorial infarct.    The ventricles and sulci are normal in size and configuration. The basal cisterns are patent.    The mastoid air cells and middle ear cavities are grossly clear. There is no significant paranasal sinus mucosal thickening.    The calvarium and skull base are grossly intact. .    IMPRESSION:  No acute intracranial abnormality.    < end of copied text >    < from: MR Head No Cont (08.18.20 @ 22:51) >  IMPRESSION: Areas of cortical restricted diffusion involving the right anterior frontal, bilateral temporal, and left parietal lobes compatible with a post ictal status. The differential diagnosis is broad and includes but is not limited to toxic/ metabolic etiologies, drug withdrawal, autoimmune, or paraneoplastic etiologies. Clinical correlation is required. Recommend imaging follow-up to resolution.    Acute/subacute lacunar infarction within the right medial thalamus with associated cytotoxic edema and without hemorrhagic transformation.    Similar-appearing mild chronic white matter microvascular type changes.    < end of copied text >      EEG Summary / Classification:  Abnormal EEG in awake, drowsy and asleep states.  - Frequent right frontal (Fp2/F4/Fz max) sharp wave discharges, occasionally occurred as very brief to brief runs of fluctuating 1-2 Hz right frontal lateralized periodic discharges (LPD).   - Occasional to frequent right posterior-temporal (P8 max) sharp wave discharges, rarely occurred as very brief to brief runs of fluctuating 1-2 Hz lateralized periodic discharges with rhythmicity (LPD+R).   - Rare very brief runs of static 2.5 Hz right frontotemporal (Fp2/F4/F8/T8) lateralized rhythmic delta activity (LRDA).  - Near continuous theta/delta slowing in the frontotemporal region better appreciated in clinically quiet state  - Mild generalized slowing in the last few hours of study.     EEG Impression / Clinical Correlate:  Abnormal EEG study.  1. Potential epileptogenic foci in the right frontal and right posterior temporal regions.   2. Structural or functional abnormality in the right frontotemporal region.  3. Mild diffuse cerebral dysfunction.  4. No seizure seen.

## 2020-08-21 NOTE — OCCUPATIONAL THERAPY INITIAL EVALUATION ADULT - ADDITIONAL COMMENTS
MR HEAD: Areas of cortical restricted diffusion involving the right anterior frontal, bilateral temporal, and left parietal lobes compatible with a post ictal status. The differential diagnosis is broad and includes but is not limited to toxic/ metabolic etiologies, drug withdrawal, autoimmune, or paraneoplastic etiologies. Clinical correlation is required. Recommend imaging follow-up to resolution.

## 2020-08-21 NOTE — PHYSICAL THERAPY INITIAL EVALUATION ADULT - IMPAIRMENTS FOUND, PT EVAL
arousal, attention, and cognition/cognitive impairment/aerobic capacity/endurance/gait, locomotion, and balance/muscle strength

## 2020-08-21 NOTE — SWALLOW BEDSIDE ASSESSMENT ADULT - COMMENTS
Course: Hypoxic Respiratory failure 2/2 seizures; Chronic pancreatitis - NPO, holding tube feeds, -S/S assessment before restarting feeds;  R/o Meningitis - CSF neg for HSV 1/2, neg gram stain, neg viral PCR, other labs pending; Clostridium difficile infection -completed PO vanc 8/3-8/17, IV flagyl d/c on 8/16. Renal -Hyponatremic, urine studies concern for cerebral salt wasting vs SIADH - on Oral fluid restriction; Alcohol withdrawal -last drink 3 weeks ago, seizures unlikely 2/2 to withdrawal   8/20: extubated and off pressors. improved mentation; Aspiration risk, NPO - S/S assessment ordered. Course: Hypoxic Respiratory failure 2/2 seizures; Chronic pancreatitis - NPO, holding tube feeds, -S/S assessment before restarting feeds;  R/o Meningitis - CSF neg for HSV 1/2, neg gram stain, neg viral PCR, other labs pending; Clostridium difficile infection -completed PO vanc 8/3-8/17, IV flagyl d/c on 8/16. Renal -Hyponatremic, urine studies concern for cerebral salt wasting vs SIADH - on Oral fluid restriction; Alcohol withdrawal -last drink 3 weeks ago, seizures unlikely 2/2 to withdrawal   8/20: extubated and off pressors. improved mentation; Aspiration risk, NPO - S/S assessment ordered.    Per Neuro: Impression: seizures more suspicious for inflammatory cause in setting of fevers and lateralized discharges. Based on MRI suspicious for autoimmune encephalitis. Infectious cause unlikely based upon LP results. Last alcoholic drink >3 weeks out so much less suspicious for withdrawal etiology.    MRI 8/18: Areas of cortical restricted diffusion involving the right anterior frontal, bilateral temporal, and left parietal lobes compatible with a post ictal status. Ddx is broad and includes but is not limited to toxic/ metabolic etiologies, drug withdrawal, autoimmune, or paraneoplastic etiologies. Clinical correlation is required. Recommend imaging follow-up to resolution. Acute/subacute lacunar infarction within the right medial thalamus with associated cytotoxic edema and without hemorrhagic transformation. Mild chronic white matter microvascular type changes.

## 2020-08-21 NOTE — OCCUPATIONAL THERAPY INITIAL EVALUATION ADULT - IMPAIRED TRANSFERS: SIT/STAND, REHAB EVAL
impaired balance/cognition/impaired motor control/impaired postural control/impaired coordination/decreased strength

## 2020-08-21 NOTE — SWALLOW BEDSIDE ASSESSMENT ADULT - SLP GENERAL OBSERVATIONS
Pt seen at bedside, awake and alert, oriented to self and grossly to hospital with field of 3 yes/no choices, minimally verbally responsive with flat affect, intermittently following directions for the purposes of the exam. Question behavioral vs. lingering sedation effects vs. cognitive-linguistic deficits. Pt with large gauge [place] Sump pump NGT in place on SLP arrival, which appeared to interfere with Pt's swallow presentation. Per d/w MICU team, ok given to pull NGT for completion of swallow exam. NGT pulled and discarded.

## 2020-08-21 NOTE — PROGRESS NOTE ADULT - SUBJECTIVE AND OBJECTIVE BOX
[facility] Division of Kidney Diseases & Hypertension  FOLLOW UP NOTE  --------------------------------------------------------------------------------  Chief Complaint:    24 hour events/subjective:    urine output decreased to 50-60cc/hr at 1 am and then increased to 150cc/hr   received 1 dose of Florinef 0.1 mg at 10 pm and was started on 2% saline which was stopped after the sodium value of 139   I was unable to elicit a history from her.     PAST HISTORY  --------------------------------------------------------------------------------  No significant changes to PMH, PSH, FHx, SHx, unless otherwise noted    ALLERGIES & MEDICATIONS  --------------------------------------------------------------------------------  Allergies    penicillin (Other)  penicillin (Swelling)    Intolerances      Standing Inpatient Medications  chlorhexidine 4% Liquid 1 Application(s) Topical <User Schedule>  dexMEDEtomidine Infusion 0.2 MICROgram(s)/kG/Hr IV Continuous <Continuous>  fosphenytoin IVPB 140 milliGRAM(s) PE IV Intermittent every 12 hours  heparin   Injectable 5000 Unit(s) SubCutaneous every 8 hours  hydroxychloroquine 200 milliGRAM(s) Oral daily  insulin lispro (HumaLOG) corrective regimen sliding scale   SubCutaneous every 6 hours  lacosamide IVPB 200 milliGRAM(s) IV Intermittent every 12 hours  levETIRAcetam  IVPB 1000 milliGRAM(s) IV Intermittent every 12 hours  methylPREDNISolone sodium succinate Injectable 20 milliGRAM(s) IV Push daily  sodium chloride 2% . 1000 milliLiter(s) IV Continuous <Continuous>    PRN Inpatient Medications  artificial  tears Solution 1 Drop(s) Both EYES three times a day PRN      REVIEW OF SYSTEMS- unable to be obtained     VITALS/PHYSICAL EXAM  --------------------------------------------------------------------------------  T(C): 36.9 (08-21-20 @ 12:00), Max: 37.2 (08-20-20 @ 15:00)  HR: 89 (08-21-20 @ 12:00) (73 - 89)  BP: 157/77 (08-21-20 @ 12:00) (84/53 - 157/77)  RR: 19 (08-21-20 @ 12:00) (13 - 24)  SpO2: 96% (08-21-20 @ 12:00) (94% - 98%)  Wt(kg): --    08-20-20 @ 07:01  -  08-21-20 @ 07:00  --------------------------------------------------------  IN: 1381.6 mL / OUT: 3115 mL / NET: -1733.4 mL    08-21-20 @ 07:01  -  08-21-20 @ 12:30  --------------------------------------------------------  IN: 262 mL / OUT: 675 mL / NET: -413 mL      Physical Exam:  	Gen: NAD  	HEENT: supple neck, clear oropharynx  	Pulm: CTA B/L  	CV: RRR, S1S2; no rub  	Abd: soft, nontender/nondistended  	UE: Warm  	LE: Warm, no edema  		  LABS/STUDIES  --------------------------------------------------------------------------------              10.4   5.46  >-----------<  221      [08-21-20 @ 00:21]              32.6     135  |  102  |  6   ----------------------------<  136      [08-21-20 @ 08:09]  4.5   |  24  |  0.36        Ca     8.8     [08-21-20 @ 08:09]      Mg     2.1     [08-21-20 @ 08:09]      Phos  3.5     [08-21-20 @ 08:09]    TPro  6.1  /  Alb  3.2  /  TBili  0.2  /  DBili  x   /  AST  37  /  ALT  17  /  AlkPhos  27  [08-21-20 @ 08:09]    PT/INR: PT 11.4 , INR 0.96       [08-21-20 @ 00:21]  PTT: 40.7       [08-21-20 @ 00:21]    Serum Osmolality 285      [08-20-20 @ 06:29]    Creatinine Trend:  SCr 0.36 [08-21 @ 08:09]  SCr 0.39 [08-21 @ 00:21]  SCr 0.36 [08-20 @ 18:33]  SCr 0.32 [08-20 @ 14:56]  SCr 0.41 [08-20 @ 00:27]    Urinalysis - [08-16-20 @ 15:09]      Color Yellow / Appearance Slightly Turbid / SG 1.015 / pH 6.0      Gluc Negative / Ketone Moderate  / Bili Negative / Urobili Negative       Blood Large / Protein Negative / Leuk Est Negative / Nitrite Negative      RBC >50 / WBC 0-2 / Hyaline  / Gran  / Sq Epi  / Non Sq Epi Occasional / Bacteria Occasional    Urine Creatinine 18      [08-20-20 @ 14:57]  Urine Sodium 159      [08-20-20 @ 14:57]  Urine Chloride 127      [08-20-20 @ 14:57]  Urine Osmolality 395      [08-20-20 @ 14:57]    Iron 36, TIBC 216, %sat 17      [08-17-20 @ 14:28]  Ferritin 583      [08-17-20 @ 14:27]  TSH 1.28      [08-15-20 @ 17:26]  Lipid: chol --, , HDL --, LDL --      [08-16-20 @ 22:16]

## 2020-08-21 NOTE — PHYSICAL THERAPY INITIAL EVALUATION ADULT - ADDITIONAL COMMENTS
As per pt's friend at b/s, pt lives alone in a PH 3 steps to enter. 1 flight to basement. Was ambulating (I) with use of R/W and was independent with all ADLS PTA

## 2020-08-21 NOTE — OCCUPATIONAL THERAPY INITIAL EVALUATION ADULT - PERTINENT HX OF CURRENT PROBLEM, REHAB EVAL
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

## 2020-08-21 NOTE — OCCUPATIONAL THERAPY INITIAL EVALUATION ADULT - PRECAUTIONS/LIMITATIONS, REHAB EVAL
complicated with new onset generalized seizures, intubated for hypoxic respiratory failure transferred to [facility] MICU for further management. Per neuro impression: seizures more suspicious for inflammatory cause in setting of fevers and lateralized discharges. Based on MRI suspicious for autoimmune encephalitis. Infectious cause unlikely based upon LP results. Last alcoholic drink >3 weeks out so much less suspicious for withdrawal etiology.

## 2020-08-21 NOTE — EEG REPORT - NS EEG TEXT BOX
[facility]  Comprehensive Epilepsy Center  Report of Continuous Video EEG    [facility]: [address], [place] [address], Phone [phone]  [facility]: [address] [place], [place] [address] [address], Phone [phone]  [place] Office: [address], [address], [place] [address] Phone [phone]    [facility]: [address] [place] [address], Phone [phone]  [place] Office: [address] [place] [address], Phone [phone]    Patient Name: Bel Olsen    Age: 60 year, : 1960  Patient ID: -, MRN #: [id number], Russell: - -  Referring Physician: -  EEG #: -    Study Time/Date: 8:01:07 AM on 2020  	  End Time/Date: 8:00 Am on 2020          			   Duration: 23 hr 49 min     Study Information:    EEG Recording Technique:  The patient underwent continuous Video-EEG monitoring, using Telemetry System hardware on the XLTek Digital System. EEG and video data were stored on a computer hard drive with important events saved in digital archive files. The material was reviewed by a physician (electroencephalographer / epileptologist) on a daily basis. Lobo and seizure detection algorithms were utilized and reviewed. An EEG Technician attended to the patient, and was available throughout daytime work hours.  The epilepsy center neurologist was available in person or on call 24-hours per day.    EEG Placement and Labeling of Electrodes:  The EEG was performed utilizing 20 channel referential EEG connections (coronal over temporal over parasagittal montage) using all standard 10-20 electrode placements with EKG, with additional electrodes placed in the inferior temporal region using the modified 10-10 montage electrode placements for elective admissions, or if deemed necessary. Recording was at a sampling rate of 256 samples per second per channel. Time synchronized digital video recording was done simultaneously with EEG recording. A low light infrared camera was used for low light recording.     History:  59 y/o F p/w new onset seizure.     Pertinent Medication	  fosphenytoin IVPB 140 milliGRAM(s) PE IV Intermittent every 12 hours lacosamide IVPB 200 milliGRAM(s) IV Intermittent every 12 hours levETIRAcetam  IVPB 1000 milliGRAM(s) IV Intermittent every 12 hours	    Interpretation:    Daily EEG Visual Analysis  Findings:       The background is continuous, spontaneously variable and reactive. During wakefulness posterior dominant rhythm consisted of 9-10 Hz activity, with amplitude to 30 uV, better seen on the left side.    Background Slowing:  Excess delta/theta slowing.    Focal Slowing:   Near continuous theta/delta slowing in the frontotemporal region better appreciated in clinically quiet state.     Sleep Background:  Drowsiness was characterized by fragmentation, attenuation, and slowing of the background activity.    Sleep was characterized by the presence of asymmetric sleep spindles and K-complexes better seen in the left hemisphere.       Other Non-Epileptiform Findings:  None     Interictal Epileptiform Activity:   - Frequent right frontal (Fp2/F4/Fz max) sharp wave discharges, occasionally occurred as very brief to brief runs of fluctuating 1-2 Hz right frontal lateralized periodic discharges (LPD).   - Occasional to frequent right posterior-temporal (P8 max) sharp wave discharges, rarely occurred as very brief to brief runs of fluctuating 1-2 Hz lateralized periodic discharges with rhythmicity (LPD+R).   - Rare very brief runs of static 2.5 Hz right frontotemporal (Fp2/F4/F8/T8) lateralized rhythmic delta activity (LRDA).    Events:  Two push-button events, one at 23:50 for left gaze deviation and the other at 07:50 for repetitive right hand tapping without abnormal EEG correlate.       Activation Procedures:   Hyperventilation was not performed.    Photic stimulation was not performed.     Artifacts:  Intermittent myogenic and movement artifacts were noted.    ECG:  The heart rate on single channel ECG was predominantly between 70 - 90 BPM.    EEG Summary / Classification:  Abnormal EEG in awake, drowsy and asleep states.  - Frequent right frontal (Fp2/F4/Fz max) sharp wave discharges, occasionally occurred as very brief to brief runs of fluctuating 1-2 Hz right frontal lateralized periodic discharges (LPD).   - Occasional to frequent right posterior-temporal (P8 max) sharp wave discharges, rarely occurred as very brief to brief runs of fluctuating 1-2 Hz lateralized periodic discharges with rhythmicity (LPD+R).   - Rare very brief runs of static 2.5 Hz right frontotemporal (Fp2/F4/F8/T8) lateralized rhythmic delta activity (LRDA).  - Near continuous theta/delta slowing in the frontotemporal region better appreciated in clinically quiet state  - Mild generalized slowing in the last few hours of study.     EEG Impression / Clinical Correlate:  Abnormal EEG study.  1. Potential epileptogenic foci in the right frontal and right posterior temporal regions.   2. Structural or functional abnormality in the right frontotemporal region.  3. Mild diffuse cerebral dysfunction.  4. No seizure seen.      Brain Marquez MD  Neurocritical Care Fellow     Javier Meier MD  Attending Physician, [facility] Epilepsy [place] [facility]  Comprehensive Epilepsy Center  Report of Continuous Video EEG    [facility]: [address], [place] [address], Phone [phone]  [facility]: [address] [place], [place] [address], [place] [address], Phone [phone]  [place] Office: [address] [facility] [address], [address], [place] [address] Phone [phone]    [facility]: [address] [place] [address], Phone [phone]  [place] Office: [address] [place] [address], Phone [phone]    Patient Name: Bel Olsen    Age: 60 year, : 1960  Patient ID: -, MRN #: MRN [id number], Russell: - -  Referring Physician: -  EEG #: -    Study Time/Date: 8:01:07 AM on 2020  	  End Time/Date: 8:00 Am on 2020          			   Duration: 23 hr 49 min     Study Information:    EEG Recording Technique:  The patient underwent continuous Video-EEG monitoring, using Telemetry System hardware on the XLTek Digital System. EEG and video data were stored on a computer hard drive with important events saved in digital archive files. The material was reviewed by a physician (electroencephalographer / epileptologist) on a daily basis. Lobo and seizure detection algorithms were utilized and reviewed. An EEG Technician attended to the patient, and was available throughout daytime work hours.  The epilepsy center neurologist was available in person or on call 24-hours per day.    EEG Placement and Labeling of Electrodes:  The EEG was performed utilizing 20 channel referential EEG connections (coronal over temporal over parasagittal montage) using all standard 10-20 electrode placements with EKG, with additional electrodes placed in the inferior temporal region using the modified 10-10 montage electrode placements for elective admissions, or if deemed necessary. Recording was at a sampling rate of 256 samples per second per channel. Time synchronized digital video recording was done simultaneously with EEG recording. A low light infrared camera was used for low light recording.     History:  61 y/o F p/w new onset seizure.     Pertinent Medication	  fosphenytoin IVPB 140 milliGRAM(s) PE IV Intermittent every 12 hours lacosamide IVPB 200 milliGRAM(s) IV Intermittent every 12 hours levETIRAcetam  IVPB 1000 milliGRAM(s) IV Intermittent every 12 hours	    Interpretation:    Daily EEG Visual Analysis  Findings:       The background is continuous, spontaneously variable and reactive. During wakefulness posterior dominant rhythm consisted of 9-10 Hz activity, with amplitude to 30 uV, better seen on the left side.    Background Slowing:  Excess delta/theta slowing.    Focal Slowing:   Near continuous theta/delta slowing in the frontotemporal region better appreciated in clinically quiet state.     Sleep Background:  Drowsiness was characterized by fragmentation, attenuation, and slowing of the background activity.    Sleep was characterized by the presence of asymmetric sleep spindles and K-complexes better seen in the left hemisphere.       Other Non-Epileptiform Findings:  None     Interictal Epileptiform Activity:   - Frequent right frontal (Fp2/F4/Fz max) sharp wave discharges, occasionally occurred as very brief to brief runs of fluctuating 1-2 Hz right frontal lateralized periodic discharges (LPD).   - Occasional to frequent right posterior-temporal (P8 max) sharp wave discharges, rarely occurred as very brief to brief runs of fluctuating 1-2 Hz lateralized periodic discharges with rhythmicity (LPD+R).   - Rare very brief runs of static 2.5 Hz right frontotemporal (Fp2/F4/F8/T8) lateralized rhythmic delta activity (LRDA).    Events:  Two push-button events, one at 23:50 for left gaze deviation and the other at 07:50 for repetitive right hand tapping without abnormal EEG correlate.       Activation Procedures:   Hyperventilation was not performed.    Photic stimulation was not performed.     Artifacts:  Intermittent myogenic and movement artifacts were noted.    ECG:  The heart rate on single channel ECG was predominantly between 70 - 90 BPM.    EEG Summary / Classification:  Abnormal EEG in awake, drowsy and asleep states.  - Two push-button events, one at 23:50 for left gaze deviation and the other at 07:50 for repetitive right hand tapping without abnormal EEG correlate.   - Frequent right frontal (Fp2/F4/Fz max) sharp wave discharges, occasionally occurred as very brief to brief runs of fluctuating 1-2 Hz right frontal lateralized periodic discharges (LPD).   - Occasional to frequent right posterior-temporal (P8 max) sharp wave discharges, rarely occurred as very brief to brief runs of fluctuating 1-2 Hz lateralized periodic discharges with rhythmicity (LPD+R).   - Rare very brief runs of static 2.5 Hz right frontotemporal (Fp2/F4/F8/T8) lateralized rhythmic delta activity (LRDA).  - Near continuous theta/delta slowing in the frontotemporal region better appreciated in clinically quiet state  - Mild generalized slowing in the last few hours of study.     EEG Impression / Clinical Correlate:  Abnormal EEG study.  1. Two push-button events, one for left gaze deviation and the other for repetitive right hand tapping, were without abnormal EEG correlate.   2. Potential epileptogenic foci in the right frontal and right posterior temporal regions.   3. Structural or functional abnormality in the right frontotemporal region.  4. Mild diffuse cerebral dysfunction.  5. No seizure seen.      Brain Marquez MD  Neurocritical Care Fellow     Javier Meier MD  Attending Physician, [facility] Epilepsy [place]

## 2020-08-21 NOTE — PHYSICAL THERAPY INITIAL EVALUATION ADULT - PRECAUTIONS/LIMITATIONS, REHAB EVAL
fall precautions/seizure precautions/Noted to have focal seizure activity of LUE on 8/13 which broke with IV lorazepam. On 8/16, RRT called because patient found to be unresponsive with generalized seizure activity with left UE/LE/facial twitching. Seizure activity would break transiently with IV lorazepam but recurred repetitively. After 8mg lorazepam she began to lose airway protection abilities and developed hypoxemia (SpO2 70s). She was emergent intubated and Transferred to [facility] for EEG study. Noted to have focal seizure activity of LUE on 8/13 which broke with IV lorazepam. On 8/16, RRT called because patient found to be unresponsive with generalized seizure activity with left UE/LE/facial twitching. Seizure activity would break transiently with IV lorazepam but recurred repetitively. After 8mg lorazepam she began to lose airway protection abilities and developed hypoxemia (SpO2 70s). She was emergent intubated and Transferred to [facility] for EEG study. LP performed on 8/19 CSF studies sent, pt extubated 8/20. VA duplex 8/18:  Resolved left soleal vein DVT. No evidence of deep venous thrombosis in either lower extremity. MR HEad 8/18: Areas of cortical restricted diffusion involving the right anterior frontal, bilateral temporal, and left parietal lobes compatible with a post ictal status. The differential diagnosis is broad and includes but is not limited to toxic/ metabolic etiologies, drug withdrawal, autoimmune, or paraneoplastic etiologies. Clinical correlation is required. Recommend imaging follow-up to resolution. Acute/subacute lacunar infarction within the right medial thalamus with associated cytotoxic edema and without hemorrhagic transformation. Similar-appearing mild chronic white matter microvascular type changes

## 2020-08-21 NOTE — OCCUPATIONAL THERAPY INITIAL EVALUATION ADULT - IMPAIRMENTS CONTRIBUTING IMPAIRED BED MOBILITY, REHAB EVAL
cognition/impaired coordination/impaired balance/impaired postural control/impaired motor control/decreased strength

## 2020-08-21 NOTE — SWALLOW BEDSIDE ASSESSMENT ADULT - PHARYNGEAL PHASE
intermittent audible swallow; palpable hyolaryngeal elevation/excursion; no overt s/s laryngeal penetration/aspiration Wet vocal quality post oral intake/Multiple swallows/audible swallows; palpable hyolaryngeal elevation/excursion palpable hyolaryngeal elevation/excursion; no overt s/s laryngeal penetration/aspiration

## 2020-08-21 NOTE — PHYSICAL THERAPY INITIAL EVALUATION ADULT - IMPAIRMENTS CONTRIBUTING IMPAIRED BED MOBILITY, REHAB EVAL
impaired motor control/impaired coordination/abnormal muscle tone/impaired postural control/decreased strength/cognition/impaired balance/decreased ROM

## 2020-08-21 NOTE — PHYSICAL THERAPY INITIAL EVALUATION ADULT - ACTIVE RANGE OF MOTION EXAMINATION, REHAB EVAL
Right LE Active ROM was WFL (within functional limits)/Left LE Active ROM was WFL (within functional limits)/Mildly increased tone noted to B/L LEs and UEs

## 2020-08-21 NOTE — OCCUPATIONAL THERAPY INITIAL EVALUATION ADULT - LIVES WITH, PROFILE
Per pt's friend at bedside, pt lives in a house alone +4 steps to enter, +first floor set-up, +pt used a RW PTA and was IND with all ADL/IADL, +has not driven in a few months

## 2020-08-21 NOTE — SWALLOW BEDSIDE ASSESSMENT ADULT - ASR SWALLOW ASPIRATION MONITOR
Monitor for s/s aspiration/laryngeal penetration. If noted:  D/C p.o. intake, provide non-oral nutrition/hydration/meds, and contact this service @ [phone]/pneumonia/throat clearing/upper respiratory infection/gurgly voice/change of breathing pattern/cough/fever

## 2020-08-21 NOTE — PHYSICAL THERAPY INITIAL EVALUATION ADULT - BALANCE DISTURBANCE, IDENTIFIED IMPAIRMENT CONTRIBUTE, REHAB EVAL
impaired motor control/impaired postural control/decreased strength/impaired coordination/decreased ROM/abnormal muscle tone

## 2020-08-21 NOTE — OCCUPATIONAL THERAPY INITIAL EVALUATION ADULT - ADL RETRAINING, OT EVAL
GOAL: Patient will require MIN A with UB dressing within 4 weeks GOAL: Pt will perform LB dressing with MIN A in 4 weeks GOAL: Patient will perform grooming while seated with MIN A  in 4 weeks

## 2020-08-22 LAB
ALBUMIN SERPL ELPH-MCNC: 3.4 G/DL — SIGNIFICANT CHANGE UP (ref 3.3–5)
ALP SERPL-CCNC: 32 U/L — LOW (ref 40–120)
ALT FLD-CCNC: 21 U/L — SIGNIFICANT CHANGE UP (ref 10–45)
ANION GAP SERPL CALC-SCNC: 14 MMOL/L — SIGNIFICANT CHANGE UP (ref 5–17)
ANION GAP SERPL CALC-SCNC: 14 MMOL/L — SIGNIFICANT CHANGE UP (ref 5–17)
ANION GAP SERPL CALC-SCNC: 15 MMOL/L — SIGNIFICANT CHANGE UP (ref 5–17)
ANION GAP SERPL CALC-SCNC: 17 MMOL/L — SIGNIFICANT CHANGE UP (ref 5–17)
APTT BLD: 30 SEC — SIGNIFICANT CHANGE UP (ref 27.5–35.5)
AST SERPL-CCNC: 31 U/L — SIGNIFICANT CHANGE UP (ref 10–40)
BASOPHILS # BLD AUTO: 0.03 K/UL — SIGNIFICANT CHANGE UP (ref 0–0.2)
BASOPHILS NFR BLD AUTO: 0.4 % — SIGNIFICANT CHANGE UP (ref 0–2)
BILIRUB SERPL-MCNC: 0.1 MG/DL — LOW (ref 0.2–1.2)
BUN SERPL-MCNC: 6 MG/DL — LOW (ref 7–23)
BUN SERPL-MCNC: 8 MG/DL — SIGNIFICANT CHANGE UP (ref 7–23)
CALCIUM SERPL-MCNC: 9.1 MG/DL — SIGNIFICANT CHANGE UP (ref 8.4–10.5)
CALCIUM SERPL-MCNC: 9.3 MG/DL — SIGNIFICANT CHANGE UP (ref 8.4–10.5)
CALCIUM SERPL-MCNC: 9.5 MG/DL — SIGNIFICANT CHANGE UP (ref 8.4–10.5)
CALCIUM SERPL-MCNC: 9.8 MG/DL — SIGNIFICANT CHANGE UP (ref 8.4–10.5)
CHLORIDE SERPL-SCNC: 100 MMOL/L — SIGNIFICANT CHANGE UP (ref 96–108)
CHLORIDE SERPL-SCNC: 101 MMOL/L — SIGNIFICANT CHANGE UP (ref 96–108)
CHLORIDE SERPL-SCNC: 102 MMOL/L — SIGNIFICANT CHANGE UP (ref 96–108)
CHLORIDE SERPL-SCNC: 102 MMOL/L — SIGNIFICANT CHANGE UP (ref 96–108)
CO2 SERPL-SCNC: 21 MMOL/L — LOW (ref 22–31)
CREAT SERPL-MCNC: 0.31 MG/DL — LOW (ref 0.5–1.3)
CREAT SERPL-MCNC: 0.32 MG/DL — LOW (ref 0.5–1.3)
CREAT SERPL-MCNC: 0.39 MG/DL — LOW (ref 0.5–1.3)
CREAT SERPL-MCNC: 0.46 MG/DL — LOW (ref 0.5–1.3)
CULTURE RESULTS: NO GROWTH — SIGNIFICANT CHANGE UP
CULTURE RESULTS: SIGNIFICANT CHANGE UP
CULTURE RESULTS: SIGNIFICANT CHANGE UP
EOSINOPHIL # BLD AUTO: 0.1 K/UL — SIGNIFICANT CHANGE UP (ref 0–0.5)
EOSINOPHIL NFR BLD AUTO: 1.4 % — SIGNIFICANT CHANGE UP (ref 0–6)
GLUCOSE BLDC GLUCOMTR-MCNC: 109 MG/DL — HIGH (ref 70–99)
GLUCOSE BLDC GLUCOMTR-MCNC: 117 MG/DL — HIGH (ref 70–99)
GLUCOSE BLDC GLUCOMTR-MCNC: 123 MG/DL — HIGH (ref 70–99)
GLUCOSE BLDC GLUCOMTR-MCNC: 125 MG/DL — HIGH (ref 70–99)
GLUCOSE BLDC GLUCOMTR-MCNC: 97 MG/DL — SIGNIFICANT CHANGE UP (ref 70–99)
GLUCOSE SERPL-MCNC: 118 MG/DL — HIGH (ref 70–99)
GLUCOSE SERPL-MCNC: 132 MG/DL — HIGH (ref 70–99)
GLUCOSE SERPL-MCNC: 133 MG/DL — HIGH (ref 70–99)
GLUCOSE SERPL-MCNC: 142 MG/DL — HIGH (ref 70–99)
HCT VFR BLD CALC: 35.6 % — SIGNIFICANT CHANGE UP (ref 34.5–45)
HGB BLD-MCNC: 11.3 G/DL — LOW (ref 11.5–15.5)
IMM GRANULOCYTES NFR BLD AUTO: 0.8 % — SIGNIFICANT CHANGE UP (ref 0–1.5)
INR BLD: 0.96 RATIO — SIGNIFICANT CHANGE UP (ref 0.88–1.16)
LYMPHOCYTES # BLD AUTO: 1.65 K/UL — SIGNIFICANT CHANGE UP (ref 1–3.3)
LYMPHOCYTES # BLD AUTO: 22.3 % — SIGNIFICANT CHANGE UP (ref 13–44)
MAGNESIUM SERPL-MCNC: 2 MG/DL — SIGNIFICANT CHANGE UP (ref 1.6–2.6)
MAGNESIUM SERPL-MCNC: 2.1 MG/DL — SIGNIFICANT CHANGE UP (ref 1.6–2.6)
MAGNESIUM SERPL-MCNC: 2.2 MG/DL — SIGNIFICANT CHANGE UP (ref 1.6–2.6)
MCHC RBC-ENTMCNC: 31.7 GM/DL — LOW (ref 32–36)
MCHC RBC-ENTMCNC: 33.5 PG — SIGNIFICANT CHANGE UP (ref 27–34)
MCV RBC AUTO: 105.6 FL — HIGH (ref 80–100)
MONOCYTES # BLD AUTO: 0.74 K/UL — SIGNIFICANT CHANGE UP (ref 0–0.9)
MONOCYTES NFR BLD AUTO: 10 % — SIGNIFICANT CHANGE UP (ref 2–14)
N-METHYL-DA RECEPTOR AB IGG BY CBA-IFA, SERUM W/RFLX TITER: SIGNIFICANT CHANGE UP
NEUTROPHILS # BLD AUTO: 4.81 K/UL — SIGNIFICANT CHANGE UP (ref 1.8–7.4)
NEUTROPHILS NFR BLD AUTO: 65.1 % — SIGNIFICANT CHANGE UP (ref 43–77)
NRBC # BLD: 0 /100 WBCS — SIGNIFICANT CHANGE UP (ref 0–0)
PHOSPHATE SERPL-MCNC: 2.9 MG/DL — SIGNIFICANT CHANGE UP (ref 2.5–4.5)
PHOSPHATE SERPL-MCNC: 3 MG/DL — SIGNIFICANT CHANGE UP (ref 2.5–4.5)
PHOSPHATE SERPL-MCNC: 3.6 MG/DL — SIGNIFICANT CHANGE UP (ref 2.5–4.5)
PLATELET # BLD AUTO: 277 K/UL — SIGNIFICANT CHANGE UP (ref 150–400)
POTASSIUM SERPL-MCNC: 3.5 MMOL/L — SIGNIFICANT CHANGE UP (ref 3.5–5.3)
POTASSIUM SERPL-MCNC: 3.7 MMOL/L — SIGNIFICANT CHANGE UP (ref 3.5–5.3)
POTASSIUM SERPL-MCNC: 3.7 MMOL/L — SIGNIFICANT CHANGE UP (ref 3.5–5.3)
POTASSIUM SERPL-MCNC: 3.9 MMOL/L — SIGNIFICANT CHANGE UP (ref 3.5–5.3)
POTASSIUM SERPL-SCNC: 3.5 MMOL/L — SIGNIFICANT CHANGE UP (ref 3.5–5.3)
POTASSIUM SERPL-SCNC: 3.7 MMOL/L — SIGNIFICANT CHANGE UP (ref 3.5–5.3)
POTASSIUM SERPL-SCNC: 3.7 MMOL/L — SIGNIFICANT CHANGE UP (ref 3.5–5.3)
POTASSIUM SERPL-SCNC: 3.9 MMOL/L — SIGNIFICANT CHANGE UP (ref 3.5–5.3)
PROT SERPL-MCNC: 6.2 G/DL — SIGNIFICANT CHANGE UP (ref 6–8.3)
PROTHROM AB SERPL-ACNC: 11.4 SEC — SIGNIFICANT CHANGE UP (ref 10.6–13.6)
RBC # BLD: 3.37 M/UL — LOW (ref 3.8–5.2)
RBC # FLD: 13.2 % — SIGNIFICANT CHANGE UP (ref 10.3–14.5)
SODIUM SERPL-SCNC: 136 MMOL/L — SIGNIFICANT CHANGE UP (ref 135–145)
SODIUM SERPL-SCNC: 137 MMOL/L — SIGNIFICANT CHANGE UP (ref 135–145)
SODIUM SERPL-SCNC: 138 MMOL/L — SIGNIFICANT CHANGE UP (ref 135–145)
SODIUM SERPL-SCNC: 138 MMOL/L — SIGNIFICANT CHANGE UP (ref 135–145)
SPECIMEN SOURCE: SIGNIFICANT CHANGE UP
VDRL CSF-TITR: NEGATIVE — SIGNIFICANT CHANGE UP
WBC # BLD: 7.39 K/UL — SIGNIFICANT CHANGE UP (ref 3.8–10.5)
WBC # FLD AUTO: 7.39 K/UL — SIGNIFICANT CHANGE UP (ref 3.8–10.5)

## 2020-08-22 PROCEDURE — 99232 SBSQ HOSP IP/OBS MODERATE 35: CPT

## 2020-08-22 PROCEDURE — 99232 SBSQ HOSP IP/OBS MODERATE 35: CPT | Mod: GC

## 2020-08-22 PROCEDURE — 95718 EEG PHYS/QHP 2-12 HR W/VEEG: CPT

## 2020-08-22 PROCEDURE — 99291 CRITICAL CARE FIRST HOUR: CPT

## 2020-08-22 PROCEDURE — 70553 MRI BRAIN STEM W/O & W/DYE: CPT | Mod: 26

## 2020-08-22 RX ORDER — FOSPHENYTOIN 50 MG/ML
140 INJECTION INTRAMUSCULAR; INTRAVENOUS EVERY 12 HOURS
Refills: 0 | Status: DISCONTINUED | OUTPATIENT
Start: 2020-08-22 | End: 2020-08-23

## 2020-08-22 RX ORDER — LACOSAMIDE 50 MG/1
200 TABLET ORAL EVERY 12 HOURS
Refills: 0 | Status: DISCONTINUED | OUTPATIENT
Start: 2020-08-22 | End: 2020-08-23

## 2020-08-22 RX ORDER — HYDRALAZINE HCL 50 MG
10 TABLET ORAL ONCE
Refills: 0 | Status: COMPLETED | OUTPATIENT
Start: 2020-08-22 | End: 2020-08-22

## 2020-08-22 RX ADMIN — LEVETIRACETAM 400 MILLIGRAM(S): 250 TABLET, FILM COATED ORAL at 10:10

## 2020-08-22 RX ADMIN — LACOSAMIDE 140 MILLIGRAM(S): 50 TABLET ORAL at 00:14

## 2020-08-22 RX ADMIN — SODIUM CHLORIDE 50 MILLILITER(S): 5 INJECTION, SOLUTION INTRAVENOUS at 18:34

## 2020-08-22 RX ADMIN — HEPARIN SODIUM 5000 UNIT(S): 5000 INJECTION INTRAVENOUS; SUBCUTANEOUS at 21:46

## 2020-08-22 RX ADMIN — LACOSAMIDE 140 MILLIGRAM(S): 50 TABLET ORAL at 23:17

## 2020-08-22 RX ADMIN — SODIUM CHLORIDE 50 MILLILITER(S): 5 INJECTION, SOLUTION INTRAVENOUS at 05:48

## 2020-08-22 RX ADMIN — LACOSAMIDE 140 MILLIGRAM(S): 50 TABLET ORAL at 11:50

## 2020-08-22 RX ADMIN — FLUDROCORTISONE ACETATE 0.1 MILLIGRAM(S): 0.1 TABLET ORAL at 05:48

## 2020-08-22 RX ADMIN — FOSPHENYTOIN 105.6 MILLIGRAM(S) PE: 50 INJECTION INTRAMUSCULAR; INTRAVENOUS at 05:47

## 2020-08-22 RX ADMIN — LEVETIRACETAM 400 MILLIGRAM(S): 250 TABLET, FILM COATED ORAL at 21:46

## 2020-08-22 RX ADMIN — Medication 10 MILLIGRAM(S): at 04:06

## 2020-08-22 RX ADMIN — Medication 1 DROP(S): at 22:24

## 2020-08-22 RX ADMIN — Medication 20 MILLIGRAM(S): at 05:47

## 2020-08-22 RX ADMIN — FLUDROCORTISONE ACETATE 0.1 MILLIGRAM(S): 0.1 TABLET ORAL at 18:34

## 2020-08-22 RX ADMIN — Medication 10 MILLIGRAM(S): at 00:14

## 2020-08-22 RX ADMIN — HEPARIN SODIUM 5000 UNIT(S): 5000 INJECTION INTRAVENOUS; SUBCUTANEOUS at 05:48

## 2020-08-22 RX ADMIN — CHLORHEXIDINE GLUCONATE 1 APPLICATION(S): 213 SOLUTION TOPICAL at 05:48

## 2020-08-22 RX ADMIN — Medication 200 MILLIGRAM(S): at 11:43

## 2020-08-22 RX ADMIN — Medication 1 MILLIGRAM(S): at 15:59

## 2020-08-22 RX ADMIN — FOSPHENYTOIN 105.6 MILLIGRAM(S) PE: 50 INJECTION INTRAMUSCULAR; INTRAVENOUS at 18:34

## 2020-08-22 NOTE — PROGRESS NOTE ADULT - ATTENDING COMMENTS
Status epilepticus after hyponatremia. While the MRI raises concern for autoimmune encephalitis (versus changes from status epilepticus), her exam has improved after seizures are stopped and extubation. In my judgement, not yet a clear role for high dose steroids, especially with normal CSF and clinical improvement. Will need to follow up paranoplastic and encephalitis panels. Status epilepticus after hyponatremia. While the MRI raises concern for autoimmune encephalitis (versus changes from status epilepticus), her exam has improved after seizures are stopped and extubation. In my judgement, not yet a clear role for high dose steroids, especially with normal CSF and clinical improvement. Will need to follow up paranoplastic and encephalitis panels.  Would also consider low dose SSRI to help with alertness.

## 2020-08-22 NOTE — PROGRESS NOTE ADULT - ATTENDING COMMENTS
I have seen this patient with the fellow and agree with their assessment and plan. In addition,    # Hyponatremia - likely secondary to cerebral salt wasting. Her serum sodium has stayed stable at 136-138. Continue fludrocortisone.     # Hypokalemia - replete to keep potassium ~4.       The rest of the recommendations as per fellow's note.    Janessa Gould MD  Attending Nephrologist  [phone]

## 2020-08-22 NOTE — EEG REPORT - NS EEG TEXT BOX
[facility]  Comprehensive Epilepsy Center  Report of Continuous Video EEG    [facility]: [address], [place] [address], Phone [phone]  [facility]: [address] [place], [place] [address] [address], Phone [phone]  [place] Office: [address] [facility] [facility], [address], [place] [address] Phone [phone]    [facility]: [address] [place] [address], Phone [phone]  [place] Office: [address] [place] [address], Phone [phone]    Patient Name: Bel Olsen    Age: 60 year, : 1960  Patient ID: -, MRN #: MRN [id number], Russell: - -  Referring Physician: -  EEG #: -    Study Time/Date: 08:00 on 2020  	  End Time/Date: 11:25 on 2020          			   Duration: 3hr 25m    Study Information:    EEG Recording Technique:  The patient underwent continuous Video-EEG monitoring, using Telemetry System hardware on the XLTek Digital System. EEG and video data were stored on a computer hard drive with important events saved in digital archive files. The material was reviewed by a physician (electroencephalographer / epileptologist) on a daily basis. Lobo and seizure detection algorithms were utilized and reviewed. An EEG Technician attended to the patient, and was available throughout daytime work hours.  The epilepsy center neurologist was available in person or on call 24-hours per day.    EEG Placement and Labeling of Electrodes:  The EEG was performed utilizing 20 channel referential EEG connections (coronal over temporal over parasagittal montage) using all standard 10-20 electrode placements with EKG, with additional electrodes placed in the inferior temporal region using the modified 10-10 montage electrode placements for elective admissions, or if deemed necessary. Recording was at a sampling rate of 256 samples per second per channel. Time synchronized digital video recording was done simultaneously with EEG recording. A low light infrared camera was used for low light recording.     History:  61 y/o F p/w new onset seizure.     Pertinent Medication	  fosphenytoin IVPB 140 milliGRAM(s) PE IV Intermittent every 12 hours lacosamide IVPB 200 milliGRAM(s) IV Intermittent every 12 hours levETIRAcetam  IVPB 1000 milliGRAM(s) IV Intermittent every 12 hours	    Interpretation:    Daily EEG Visual Analysis  Findings:       The background is continuous, spontaneously variable and reactive. During wakefulness, fragments of posterior dominant rhythm consisted of 6-7 Hz activity, with amplitude to 30 uV.    Background Slowing:  Background predominantly consisted of theta, delta and faster activities.     Focal Slowing:   Near continuous theta/delta slowing in the bilateral frontotemporal (max Fp1/F3/Fp2/F4/F7/T7/F8/T8) regions.     Sleep Background:  Drowsiness was characterized by fragmentation, attenuation, and slowing of the background activity.    Stage II sleep transients were not recorded.     Other Non-Epileptiform Findings:  None     Interictal Epileptiform Activity:   Frequent to abundant, poorly organized, brief to intermediate runs of fluctuating <0.5-1.5 Hz right frontal (Fp2/F4/Fz max) lateralized periodic discharges with rhythmicity (LPD+R).    Events:  Clinical events: None recorded.  Seizures: None recorded.     Activation Procedures:   Hyperventilation was not performed.    Photic stimulation was not performed.     Artifacts:  Intermittent myogenic and movement artifacts were noted.    ECG:  The heart rate on single channel ECG was predominantly between 70 - 90 BPM.    EEG Summary / Classification:  Abnormal EEG in awake, drowsy states.  - Frequent to abundant, poorly organized, brief to intermediate runs of fluctuating <0.5-1.5 Hz right frontal (Fp2/F4/Fz max) lateralized periodic discharges with rhythmicity (LPD+R).  - Near continuous theta/delta slowing in the bilateral frontotemporal (max Fp1/F3/Fp2/F4/F7/T7/F8/T8) regions.   - Mild to moderate generalized slowing.     EEG Impression / Clinical Correlate:  Abnormal EEG study.  1. Potential epileptogenic focus in the right frontal region.   2. Structural or functional abnormality in bilateral frontotemporal regions.  3. Mild to moderate diffuse cerebral dysfunction.  4. No seizure seen.        Javier Meier MD  Attending Physician, [facility] Epilepsy [place]

## 2020-08-22 NOTE — PROGRESS NOTE ADULT - ASSESSMENT
Assessment:  59 y/o F with a h/o EtOH withdrawal, polysubstance abuse, EtoH pancreatitis, mixed connective tissue disorder (plaquenil and methylprednisolone), chronic pain s/p lumbar laminectomy, left soleal vein thrombosis (apixaban) lung adenocarcinoma (s/p RML resection 2018), recent RLE cellulitis s/p doxycycline, recent CDiff colitis, CAD admitted to OSH for symptomatic hyponatremia 2/2 psychogenic polydipsia complicated with new onset generalized seizures, intubated for hypoxic respiratory failure transferred to [facility] for further management      #NEURO   //Seizure Disorder  -Extubated  -Per EEG, no active seizure activity noted  -D/C EEG  - Noted for increased speech latency, often fails to answer questions. A/O x1. Concern for post-ictal confusion vs intermittent seizures   -C/w with AEPs at current dose     -C/w fosphenytoin 140 mg BID    -C/w Keppra 1 g BID     -C/w Vimpat 200 mg BID  -F/u with fosphenytoin, keppra and vimpat serum levels   -Neuro checks  -Neurology recs appreciated     #CV  -off pressors  -C/w to hold home clonidine   -hx of MI, no stents placed    #RESP   //Hypoxic Respiratory failure 2/2 seizures, resolved  - Extubated, not requiring respiratory support  - Monitor RR, Spo2  - Aspiration precautions, dysphagia diet    //Hx of adenocarcinoma   -s/p RML wedge resection in 2018      #GI   //Chronic pancreatitis   -Passed bedside and official S/S assessment  -Dysphagia diet    #ID   //R/o Meningitis   - Viral and bacterial meningitis r/o: CSF neg for HSV 1/2, neg gram stain, neg viral PCR  - Off contact isolation  - Concern for autoimmune meningitis: f/u CSF cell count; Glucose; Protein, CSF AFB; CSF fungal cx; CSF VDRL titer; Oligoclonal bands; Myelin Basic Protein, Anti-MOG; Cryptococcal antigen. Autoimmune encephelopathy CSF panel, RT quic CSF, 14-3-3   - MRI w/ contrast today  - Neurology recs appreciated    //Clostridium difficile infection   -completed PO vanc 8/3-8/17, IV flagyl d/c on 8/16    #Renal   - Hyponatremic, urine studies concern for cerebral salt wasting  - Titrate NS 2% at rate of  cc/hr based on q4 CMP  - f/u repeat BMP and urine studies  - Strict Is/Os  - C/w [place]    #Rheum   //Hx of mixed connective tissue disorder   - C/w hydroxychloroquine 200 mg   - Solumedrol 20q8hr    #Heme   - Hold home Eliquis  - C/w heparin SQ    #PSYCH   //Alcohol withdrawal   -last drink 3 weeks ago, seizures unlikely 2/2 to withdrawal

## 2020-08-22 NOTE — PROGRESS NOTE ADULT - ATTENDING COMMENTS
1. Acute hypoxemic respiratory failure after seizures. Seizures controlled. Pt weaned and extubated this am. Tolerating extubation so far.  2. Neuro. Statu epilepticus resolved. Versed and propofol off.  Continue Keppra and Vimpat and Fosphenytoin  . EEG without overt seizure like activity.. Etiology of seizures unclear at this time. CT head negative for acute changes. LP performed.  No evidence of infection. Await all cx and paraneoplastic  results. Stop Acyclovir , ceftriaxone and Vancomycin. MRI  reveals acute/subacute lacunar infarction within the right medial thalamus along with hypoperfused areas in bilateral frontal , temporal lobes consistent with post ictal events. Possible seizures from withdrawal of benzodiazepines or other medication. Pt with known h/o polysubstance abuse. ??autoimmune encephalitis. For MRI with contrast today.  3. H/o of polysubstance abuse. Pt currently sedated.? cause of seizures withdrawal  4. H/O DVT soleal. DVT study legs yesterday negative. D/C IV heparin. Change to sq heparin for prophylaxis of DVT.   5. H/o Lung ca with RML lobectomy.  6. H/O  INNA. On steroids and Plaquenil.  7. C diff. finished 14 day course. D/C po vancomycin.  8. Hyponatremia fro cerebral salt wasting syndrome. UO slowing down a little . Hyponatremia with Urine NA 115and greater. Continue 2% saline and Florinef. Follow serum na and UO.

## 2020-08-22 NOTE — PROGRESS NOTE ADULT - SUBJECTIVE AND OBJECTIVE BOX
[facility] DIVISION OF KIDNEY DISEASES AND HYPERTENSION -- FOLLOW UP NOTE  --------------------------------------------------------------------------------  If any questions, please feel free to contact me  NS pager: [phone], [facility]: [phone]  Jackson Zarate M.D.  Nephrology Fellow    (After 5 pm or on weekends please page the on-call fellow)  --------------------------------------------------------------------------------    Chief Complaint:  Patient is a 60y old  Female who presents with a chief complaint of Seizures (22 Aug 2020 09:57)    24 hour events/subjective:  Patient seen and examined at bedside, sNa has remained stable   started on Florinef - BP controlled. UOP: 3.3L  no acute events overnight.       PAST HISTORY  --------------------------------------------------------------------------------  No significant changes to PMH, PSH, FHx, SHx, unless otherwise noted    ALLERGIES & MEDICATIONS  --------------------------------------------------------------------------------  Allergies    penicillin (Other)  penicillin (Swelling)    Intolerances      Standing Inpatient Medications  chlorhexidine 4% Liquid 1 Application(s) Topical <User Schedule>  fludroCORTISONE 0.1 milliGRAM(s) Oral every 12 hours  fosphenytoin IVPB 140 milliGRAM(s) PE IV Intermittent every 12 hours  heparin   Injectable 5000 Unit(s) SubCutaneous every 8 hours  hydroxychloroquine 200 milliGRAM(s) Oral daily  insulin lispro (HumaLOG) corrective regimen sliding scale   SubCutaneous every 6 hours  lacosamide IVPB 200 milliGRAM(s) IV Intermittent every 12 hours  levETIRAcetam  IVPB 1000 milliGRAM(s) IV Intermittent every 12 hours  methylPREDNISolone sodium succinate Injectable 20 milliGRAM(s) IV Push daily  sodium chloride 2% . 1000 milliLiter(s) IV Continuous <Continuous>    PRN Inpatient Medications  artificial  tears Solution 1 Drop(s) Both EYES three times a day PRN      REVIEW OF SYSTEMS  --------------------------------------------------------------------------------  unable to obtain     VITALS/PHYSICAL EXAM  --------------------------------------------------------------------------------  T(C): 37 (08-22-20 @ 08:00), Max: 37.7 (08-21-20 @ 20:00)  HR: 97 (08-22-20 @ 11:00) (87 - 103)  BP: 153/87 (08-22-20 @ 11:00) (133/66 - 174/94)  RR: 24 (08-22-20 @ 11:00) (19 - 32)  SpO2: 98% (08-22-20 @ 11:00) (88% - 98%)  Wt(kg): --        08-21-20 @ 07:01  -  08-22-20 @ 07:00  --------------------------------------------------------  IN: 1755 mL / OUT: 3320 mL / NET: -1565 mL    08-22-20 @ 07:01  -  08-22-20 @ 12:46  --------------------------------------------------------  IN: 440 mL / OUT: 675 mL / NET: -235 mL        Physical Exam:  	Gen: NAD  	HEENT: supple neck, clear oropharynx  	Pulm: CTA B/L  	CV: RRR, S1S2; no rub  	Abd: soft, nontender/nondistended  	UE: Warm  	LE: Warm, no edema  		      LABS/STUDIES  --------------------------------------------------------------------------------              11.3   7.39  >-----------<  277      [08-22-20 @ 00:24]              35.6     138  |  100  |  6   ----------------------------<  142      [08-22-20 @ 06:20]  3.5   |  21  |  0.32        Ca     9.3     [08-22-20 @ 06:20]      Mg     2.0     [08-22-20 @ 06:20]      Phos  2.9     [08-22-20 @ 06:20]    TPro  6.2  /  Alb  3.4  /  TBili  0.1  /  DBili  x   /  AST  31  /  ALT  21  /  AlkPhos  32  [08-22-20 @ 00:24]    PT/INR: PT 11.4 , INR 0.96       [08-22-20 @ 00:24]  PTT: 30.0       [08-22-20 @ 00:24]    Uric acid 2.1      [08-21-20 @ 12:40]    Creatinine Trend:  SCr 0.32 [08-22 @ 06:20]  SCr 0.39 [08-22 @ 00:24]  SCr 0.49 [08-21 @ 20:31]  SCr 0.36 [08-21 @ 16:03]  SCr 0.41 [08-21 @ 12:40]    Urinalysis - [08-16-20 @ 15:09]      Color Yellow / Appearance Slightly Turbid / SG 1.015 / pH 6.0      Gluc Negative / Ketone Moderate  / Bili Negative / Urobili Negative       Blood Large / Protein Negative / Leuk Est Negative / Nitrite Negative      RBC >50 / WBC 0-2 / Hyaline  / Gran  / Sq Epi  / Non Sq Epi Occasional / Bacteria Occasional    Urine Creatinine 17      [08-21-20 @ 12:41]  Urine Sodium 163      [08-21-20 @ 12:41]  Urine Potassium 21      [08-21-20 @ 12:41]  Urine Chloride 139      [08-21-20 @ 12:41]  Urine Osmolality 396      [08-21-20 @ 12:40]    Iron 36, TIBC 216, %sat 17      [08-17-20 @ 14:28]  Ferritin 583      [08-17-20 @ 14:27]  TSH 1.28      [08-15-20 @ 17:26]  Lipid: chol --, , HDL --, LDL --      [08-16-20 @ 22:16]    HBsAg Nonreact      [07-29-20 @ 08:16]  HCV 0.07, Nonreact      [07-29-20 @ 08:16] [facility] DIVISION OF KIDNEY DISEASES AND HYPERTENSION -- FOLLOW UP NOTE  --------------------------------------------------------------------------------  If any questions, please feel free to contact me  NS pager: [phone], [facility]: [phone]  Jackson Zarate M.D.  Nephrology Fellow    (After 5 pm or on weekends please page the on-call fellow)  --------------------------------------------------------------------------------    Chief Complaint:  Patient is a 60y old  Female who presents with a chief complaint of Seizures (22 Aug 2020 09:57)    24 hour events/subjective:  Patient seen and examined at bedside, sNa has remained stable   started on Florinef - BP controlled. UOP: 3.3L  no acute events overnight.       PAST HISTORY  --------------------------------------------------------------------------------  No significant changes to PMH, PSH, FHx, SHx, unless otherwise noted    ALLERGIES & MEDICATIONS  --------------------------------------------------------------------------------  Allergies    penicillin (Other)  penicillin (Swelling)    Intolerances      Standing Inpatient Medications  chlorhexidine 4% Liquid 1 Application(s) Topical <User Schedule>  fludroCORTISONE 0.1 milliGRAM(s) Oral every 12 hours  fosphenytoin IVPB 140 milliGRAM(s) PE IV Intermittent every 12 hours  heparin   Injectable 5000 Unit(s) SubCutaneous every 8 hours  hydroxychloroquine 200 milliGRAM(s) Oral daily  insulin lispro (HumaLOG) corrective regimen sliding scale   SubCutaneous every 6 hours  lacosamide IVPB 200 milliGRAM(s) IV Intermittent every 12 hours  levETIRAcetam  IVPB 1000 milliGRAM(s) IV Intermittent every 12 hours  methylPREDNISolone sodium succinate Injectable 20 milliGRAM(s) IV Push daily  sodium chloride 2% . 1000 milliLiter(s) IV Continuous <Continuous>    PRN Inpatient Medications  artificial  tears Solution 1 Drop(s) Both EYES three times a day PRN      REVIEW OF SYSTEMS  --------------------------------------------------------------------------------  unable to obtain     VITALS/PHYSICAL EXAM  --------------------------------------------------------------------------------  T(C): 37 (08-22-20 @ 08:00), Max: 37.7 (08-21-20 @ 20:00)  HR: 97 (08-22-20 @ 11:00) (87 - 103)  BP: 153/87 (08-22-20 @ 11:00) (133/66 - 174/94)  RR: 24 (08-22-20 @ 11:00) (19 - 32)  SpO2: 98% (08-22-20 @ 11:00) (88% - 98%)  Wt(kg): --        08-21-20 @ 07:01  -  08-22-20 @ 07:00  --------------------------------------------------------  IN: 1755 mL / OUT: 3320 mL / NET: -1565 mL    08-22-20 @ 07:01  -  08-22-20 @ 12:46  --------------------------------------------------------  IN: 440 mL / OUT: 675 mL / NET: -235 mL        Physical Exam:  	Gen: NAD  	HEENT: supple neck, clear oropharynx  	Pulm: CTA B/L  	CV: RRR, S1S2; no rub  	Abd: soft, nontender/nondistended               : no suprapubic tenderness.   	UE: Warm  	LE: Warm, no edema              Neuro: Opening eyes to call. Answering questions, but delayed resonse. Moving limbs spontaneously.               PsychL unable to fully assess due to clinical status.              Skin: no rash noted on the skin.                 		      LABS/STUDIES  --------------------------------------------------------------------------------              11.3   7.39  >-----------<  277      [08-22-20 @ 00:24]              35.6     138  |  100  |  6   ----------------------------<  142      [08-22-20 @ 06:20]  3.5   |  21  |  0.32        Ca     9.3     [08-22-20 @ 06:20]      Mg     2.0     [08-22-20 @ 06:20]      Phos  2.9     [08-22-20 @ 06:20]    TPro  6.2  /  Alb  3.4  /  TBili  0.1  /  DBili  x   /  AST  31  /  ALT  21  /  AlkPhos  32  [08-22-20 @ 00:24]    PT/INR: PT 11.4 , INR 0.96       [08-22-20 @ 00:24]  PTT: 30.0       [08-22-20 @ 00:24]    Uric acid 2.1      [08-21-20 @ 12:40]    Creatinine Trend:  SCr 0.32 [08-22 @ 06:20]  SCr 0.39 [08-22 @ 00:24]  SCr 0.49 [08-21 @ 20:31]  SCr 0.36 [08-21 @ 16:03]  SCr 0.41 [08-21 @ 12:40]    Urinalysis - [08-16-20 @ 15:09]      Color Yellow / Appearance Slightly Turbid / SG 1.015 / pH 6.0      Gluc Negative / Ketone Moderate  / Bili Negative / Urobili Negative       Blood Large / Protein Negative / Leuk Est Negative / Nitrite Negative      RBC >50 / WBC 0-2 / Hyaline  / Gran  / Sq Epi  / Non Sq Epi Occasional / Bacteria Occasional    Urine Creatinine 17      [08-21-20 @ 12:41]  Urine Sodium 163      [08-21-20 @ 12:41]  Urine Potassium 21      [08-21-20 @ 12:41]  Urine Chloride 139      [08-21-20 @ 12:41]  Urine Osmolality 396      [08-21-20 @ 12:40]    Iron 36, TIBC 216, %sat 17      [08-17-20 @ 14:28]  Ferritin 583      [08-17-20 @ 14:27]  TSH 1.28      [08-15-20 @ 17:26]  Lipid: chol --, , HDL --, LDL --      [08-16-20 @ 22:16]    HBsAg Nonreact      [07-29-20 @ 08:16]  HCV 0.07, Nonreact      [07-29-20 @ 08:16]

## 2020-08-22 NOTE — PROGRESS NOTE ADULT - SUBJECTIVE AND OBJECTIVE BOX
*************************************  NEUROLOGY PROGRESS NOTE  **************************************    JULIO CESAR FAYE  Female  MRN-[id number]    Subjective: Off sedation. No acute events overnight. States she does not feel well but does not elaborate. Intermittently does not answer questions and has left gaze deviation.     ICU Vital Signs Last 24 Hrs  T(C): 37 (22 Aug 2020 08:00), Max: 37.7 (21 Aug 2020 20:00)  T(F): 98.6 (22 Aug 2020 08:00), Max: 99.8 (21 Aug 2020 20:00)  HR: 100 (22 Aug 2020 09:00) (87 - 103)  BP: 157/79 (22 Aug 2020 09:00) (132/73 - 174/94)  BP(mean): 110 (22 Aug 2020 09:00) (93 - 128)  ABP: --  ABP(mean): --  RR: 28 (22 Aug 2020 09:00) (19 - 32)  SpO2: 97% (22 Aug 2020 09:00) (88% - 98%)    MEDICATIONS  (STANDING):  chlorhexidine 4% Liquid 1 Application(s) Topical <User Schedule>  fludroCORTISONE 0.1 milliGRAM(s) Oral every 12 hours  fosphenytoin IVPB 140 milliGRAM(s) PE IV Intermittent every 12 hours  heparin   Injectable 5000 Unit(s) SubCutaneous every 8 hours  hydroxychloroquine 200 milliGRAM(s) Oral daily  insulin lispro (HumaLOG) corrective regimen sliding scale   SubCutaneous every 6 hours  lacosamide IVPB 200 milliGRAM(s) IV Intermittent every 12 hours  levETIRAcetam  IVPB 1000 milliGRAM(s) IV Intermittent every 12 hours  methylPREDNISolone sodium succinate Injectable 20 milliGRAM(s) IV Push daily  sodium chloride 2% . 1000 milliLiter(s) (50 mL/Hr) IV Continuous <Continuous>        PHYSICAL EXAMINATION:  General: Awake and alert, slow to respond, needing frequent prompting, oriented to [facility] and year, and her birthday. Not oriented to why she is here ("I had seizures?" when I explained). Follows commands more briskly than yesterday   Neurologic:  - Cranial Nerves II-XII:  Pupils reactive bilaterally, 3mm bilaterally. Face symmetric, eye closure strong bilaterally, tongue midline. V1-V3 intact.   - Motor:  At least 4+/5 throughout. No rhythmic shaking of the R arm today  - Reflexes: 1+ patellar reflexes, Babinski downgoing.   - Sensory:  Equal sensation to light tough bilaterally.  - Gait:   Deferred    LABS:                           11.3   7.39  )-----------( 277      ( 22 Aug 2020 00:24 )             35.6     08-22    138  |  100  |  6<L>  ----------------------------<  142<H>  3.5   |  21<L>  |  0.32<L>    Ca    9.3      22 Aug 2020 06:20  Phos  2.9     08-22  Mg     2.0     08-22    TPro  6.2  /  Alb  3.4  /  TBili  0.1<L>  /  DBili  x   /  AST  31  /  ALT  21  /  AlkPhos  32<L>  08-22    Phenytoin Level, Serum (08.21.20 @ 12:40)    Phenytoin Level, Serum: 6.9 ug/mL

## 2020-08-22 NOTE — PROGRESS NOTE ADULT - ASSESSMENT
Assessment: 59 yo woman w/ h/o chronic pain, lung adenocarcinoma (s/p RML wedge resection 2018), ?mixed connective tissue disease, anxiety, right hip replacement, lumbar laminectomy, b/l oophorectomy, diverticulitis, recent acute pancreatitis and found to have right soleal DVT on eliquis, recent RLE cellulitis and recent c.diff infection initially admitted to [facility] for hyponatremia with ccb seizure witnessed 8/13 now transferred for further seizures requiring intubation and mechanical ventilation with Neurology consult for such.     Impression: seizures more suspicious for inflammatory cause in setting of fevers and lateralized discharges. Based on MRI suspicious for autoimmune encephalitis versus status epilepticus. Infectious cause unlikely based upon LP results. She is improving (though with some fluctuations)    Recommendations:  [] continue Keppra 1g bid, Vimpat 200mg bid, fosphenytoin 140mg bid, follow levels  [] patient with improvement in mental status and recent extubation, but still fluctuations, will continue to follow mental status and determine if to start steroids or when to taper AEDs  [] continue EEG, based on results will decide whether to taper AEDs  [] LP per primary team;  Glucose elevated; Protein normal: Gram stain with no growth; CSF Viral PCR panel negative; CSF AFB cancelled; CSF fungal cx pending; CSF VDRL titer pending; Oligoclonal bands; Myelin Basic Protein, Anti-MOG; Cryptococcal antigen negative. Autoimmune encephelopathy CSF panel, RT quic CSF, 14-3-3.  [] stopped acyclovir, vancomycin, ceftriaxone as no sign of infection    Case discussed with Neurology Attending Dr. Ochoa; discussed with MICU team.

## 2020-08-23 LAB
ALBUMIN SERPL ELPH-MCNC: 3.3 G/DL — SIGNIFICANT CHANGE UP (ref 3.3–5)
ALBUMIN SERPL ELPH-MCNC: 3.5 G/DL — SIGNIFICANT CHANGE UP (ref 3.3–5)
ALBUMIN SERPL ELPH-MCNC: 4 G/DL — SIGNIFICANT CHANGE UP (ref 3.3–5)
ALP SERPL-CCNC: 32 U/L — LOW (ref 40–120)
ALP SERPL-CCNC: 36 U/L — LOW (ref 40–120)
ALP SERPL-CCNC: 38 U/L — LOW (ref 40–120)
ALT FLD-CCNC: 25 U/L — SIGNIFICANT CHANGE UP (ref 10–45)
ALT FLD-CCNC: 28 U/L — SIGNIFICANT CHANGE UP (ref 10–45)
ALT FLD-CCNC: 33 U/L — SIGNIFICANT CHANGE UP (ref 10–45)
ANION GAP SERPL CALC-SCNC: 12 MMOL/L — SIGNIFICANT CHANGE UP (ref 5–17)
ANION GAP SERPL CALC-SCNC: 14 MMOL/L — SIGNIFICANT CHANGE UP (ref 5–17)
APTT BLD: 28.3 SEC — SIGNIFICANT CHANGE UP (ref 27.5–35.5)
AST SERPL-CCNC: 38 U/L — SIGNIFICANT CHANGE UP (ref 10–40)
AST SERPL-CCNC: 45 U/L — HIGH (ref 10–40)
AST SERPL-CCNC: 47 U/L — HIGH (ref 10–40)
BASOPHILS # BLD AUTO: 0.02 K/UL — SIGNIFICANT CHANGE UP (ref 0–0.2)
BASOPHILS NFR BLD AUTO: 0.3 % — SIGNIFICANT CHANGE UP (ref 0–2)
BILIRUB SERPL-MCNC: 0.1 MG/DL — LOW (ref 0.2–1.2)
BILIRUB SERPL-MCNC: 0.2 MG/DL — SIGNIFICANT CHANGE UP (ref 0.2–1.2)
BILIRUB SERPL-MCNC: 0.2 MG/DL — SIGNIFICANT CHANGE UP (ref 0.2–1.2)
BUN SERPL-MCNC: 4 MG/DL — LOW (ref 7–23)
BUN SERPL-MCNC: 4 MG/DL — LOW (ref 7–23)
BUN SERPL-MCNC: 5 MG/DL — LOW (ref 7–23)
BUN SERPL-MCNC: 6 MG/DL — LOW (ref 7–23)
CALCIUM SERPL-MCNC: 9.1 MG/DL — SIGNIFICANT CHANGE UP (ref 8.4–10.5)
CALCIUM SERPL-MCNC: 9.2 MG/DL — SIGNIFICANT CHANGE UP (ref 8.4–10.5)
CALCIUM SERPL-MCNC: 9.3 MG/DL — SIGNIFICANT CHANGE UP (ref 8.4–10.5)
CALCIUM SERPL-MCNC: 9.5 MG/DL — SIGNIFICANT CHANGE UP (ref 8.4–10.5)
CHLORIDE SERPL-SCNC: 101 MMOL/L — SIGNIFICANT CHANGE UP (ref 96–108)
CHLORIDE SERPL-SCNC: 104 MMOL/L — SIGNIFICANT CHANGE UP (ref 96–108)
CO2 SERPL-SCNC: 21 MMOL/L — LOW (ref 22–31)
CO2 SERPL-SCNC: 22 MMOL/L — SIGNIFICANT CHANGE UP (ref 22–31)
CREAT SERPL-MCNC: 0.32 MG/DL — LOW (ref 0.5–1.3)
CREAT SERPL-MCNC: 0.38 MG/DL — LOW (ref 0.5–1.3)
CREAT SERPL-MCNC: 0.44 MG/DL — LOW (ref 0.5–1.3)
CREAT SERPL-MCNC: 0.46 MG/DL — LOW (ref 0.5–1.3)
EOSINOPHIL # BLD AUTO: 0.13 K/UL — SIGNIFICANT CHANGE UP (ref 0–0.5)
EOSINOPHIL NFR BLD AUTO: 1.9 % — SIGNIFICANT CHANGE UP (ref 0–6)
GLUCOSE BLDC GLUCOMTR-MCNC: 117 MG/DL — HIGH (ref 70–99)
GLUCOSE BLDC GLUCOMTR-MCNC: 122 MG/DL — HIGH (ref 70–99)
GLUCOSE BLDC GLUCOMTR-MCNC: 135 MG/DL — HIGH (ref 70–99)
GLUCOSE BLDC GLUCOMTR-MCNC: 98 MG/DL — SIGNIFICANT CHANGE UP (ref 70–99)
GLUCOSE SERPL-MCNC: 105 MG/DL — HIGH (ref 70–99)
GLUCOSE SERPL-MCNC: 116 MG/DL — HIGH (ref 70–99)
GLUCOSE SERPL-MCNC: 127 MG/DL — HIGH (ref 70–99)
GLUCOSE SERPL-MCNC: 157 MG/DL — HIGH (ref 70–99)
HCT VFR BLD CALC: 34.6 % — SIGNIFICANT CHANGE UP (ref 34.5–45)
HGB BLD-MCNC: 11.1 G/DL — LOW (ref 11.5–15.5)
IMM GRANULOCYTES NFR BLD AUTO: 1 % — SIGNIFICANT CHANGE UP (ref 0–1.5)
INR BLD: 1 RATIO — SIGNIFICANT CHANGE UP (ref 0.88–1.16)
LYMPHOCYTES # BLD AUTO: 1.19 K/UL — SIGNIFICANT CHANGE UP (ref 1–3.3)
LYMPHOCYTES # BLD AUTO: 17.2 % — SIGNIFICANT CHANGE UP (ref 13–44)
MAGNESIUM SERPL-MCNC: 2.1 MG/DL — SIGNIFICANT CHANGE UP (ref 1.6–2.6)
MAGNESIUM SERPL-MCNC: 2.3 MG/DL — SIGNIFICANT CHANGE UP (ref 1.6–2.6)
MCHC RBC-ENTMCNC: 32.1 GM/DL — SIGNIFICANT CHANGE UP (ref 32–36)
MCHC RBC-ENTMCNC: 33.1 PG — SIGNIFICANT CHANGE UP (ref 27–34)
MCV RBC AUTO: 103.3 FL — HIGH (ref 80–100)
MONOCYTES # BLD AUTO: 0.72 K/UL — SIGNIFICANT CHANGE UP (ref 0–0.9)
MONOCYTES NFR BLD AUTO: 10.4 % — SIGNIFICANT CHANGE UP (ref 2–14)
NEUTROPHILS # BLD AUTO: 4.8 K/UL — SIGNIFICANT CHANGE UP (ref 1.8–7.4)
NEUTROPHILS NFR BLD AUTO: 69.2 % — SIGNIFICANT CHANGE UP (ref 43–77)
NRBC # BLD: 0 /100 WBCS — SIGNIFICANT CHANGE UP (ref 0–0)
PHOSPHATE SERPL-MCNC: 2.8 MG/DL — SIGNIFICANT CHANGE UP (ref 2.5–4.5)
PHOSPHATE SERPL-MCNC: 3.1 MG/DL — SIGNIFICANT CHANGE UP (ref 2.5–4.5)
PHOSPHATE SERPL-MCNC: 3.5 MG/DL — SIGNIFICANT CHANGE UP (ref 2.5–4.5)
PHOSPHATE SERPL-MCNC: 3.6 MG/DL — SIGNIFICANT CHANGE UP (ref 2.5–4.5)
PLATELET # BLD AUTO: 330 K/UL — SIGNIFICANT CHANGE UP (ref 150–400)
POTASSIUM SERPL-MCNC: 3.3 MMOL/L — LOW (ref 3.5–5.3)
POTASSIUM SERPL-MCNC: 3.4 MMOL/L — LOW (ref 3.5–5.3)
POTASSIUM SERPL-MCNC: 3.9 MMOL/L — SIGNIFICANT CHANGE UP (ref 3.5–5.3)
POTASSIUM SERPL-MCNC: 3.9 MMOL/L — SIGNIFICANT CHANGE UP (ref 3.5–5.3)
POTASSIUM SERPL-SCNC: 3.3 MMOL/L — LOW (ref 3.5–5.3)
POTASSIUM SERPL-SCNC: 3.4 MMOL/L — LOW (ref 3.5–5.3)
POTASSIUM SERPL-SCNC: 3.9 MMOL/L — SIGNIFICANT CHANGE UP (ref 3.5–5.3)
POTASSIUM SERPL-SCNC: 3.9 MMOL/L — SIGNIFICANT CHANGE UP (ref 3.5–5.3)
PROT SERPL-MCNC: 6 G/DL — SIGNIFICANT CHANGE UP (ref 6–8.3)
PROT SERPL-MCNC: 6.2 G/DL — SIGNIFICANT CHANGE UP (ref 6–8.3)
PROT SERPL-MCNC: 6.3 G/DL — SIGNIFICANT CHANGE UP (ref 6–8.3)
PROTHROM AB SERPL-ACNC: 11.9 SEC — SIGNIFICANT CHANGE UP (ref 10.6–13.6)
RBC # BLD: 3.35 M/UL — LOW (ref 3.8–5.2)
RBC # FLD: 13.6 % — SIGNIFICANT CHANGE UP (ref 10.3–14.5)
SARS-COV-2 RNA SPEC QL NAA+PROBE: SIGNIFICANT CHANGE UP
SODIUM SERPL-SCNC: 136 MMOL/L — SIGNIFICANT CHANGE UP (ref 135–145)
SODIUM SERPL-SCNC: 138 MMOL/L — SIGNIFICANT CHANGE UP (ref 135–145)
SODIUM SERPL-SCNC: 140 MMOL/L — SIGNIFICANT CHANGE UP (ref 135–145)
SODIUM SERPL-SCNC: 140 MMOL/L — SIGNIFICANT CHANGE UP (ref 135–145)
WBC # BLD: 6.93 K/UL — SIGNIFICANT CHANGE UP (ref 3.8–10.5)
WBC # FLD AUTO: 6.93 K/UL — SIGNIFICANT CHANGE UP (ref 3.8–10.5)

## 2020-08-23 PROCEDURE — 99232 SBSQ HOSP IP/OBS MODERATE 35: CPT

## 2020-08-23 PROCEDURE — 99291 CRITICAL CARE FIRST HOUR: CPT

## 2020-08-23 RX ORDER — OXYCODONE HYDROCHLORIDE 5 MG/1
10 TABLET ORAL EVERY 8 HOURS
Refills: 0 | Status: DISCONTINUED | OUTPATIENT
Start: 2020-08-23 | End: 2020-08-23

## 2020-08-23 RX ORDER — OXYCODONE HYDROCHLORIDE 5 MG/1
10 TABLET ORAL EVERY 8 HOURS
Refills: 0 | Status: DISCONTINUED | OUTPATIENT
Start: 2020-08-23 | End: 2020-08-28

## 2020-08-23 RX ORDER — LACOSAMIDE 50 MG/1
200 TABLET ORAL
Refills: 0 | Status: DISCONTINUED | OUTPATIENT
Start: 2020-08-23 | End: 2020-08-28

## 2020-08-23 RX ORDER — LEVETIRACETAM 250 MG/1
1000 TABLET, FILM COATED ORAL
Refills: 0 | Status: DISCONTINUED | OUTPATIENT
Start: 2020-08-23 | End: 2020-08-28

## 2020-08-23 RX ORDER — OXYCODONE HYDROCHLORIDE 5 MG/1
5 TABLET ORAL ONCE
Refills: 0 | Status: DISCONTINUED | OUTPATIENT
Start: 2020-08-23 | End: 2020-08-23

## 2020-08-23 RX ORDER — ALPRAZOLAM 0.25 MG
0.25 TABLET ORAL
Refills: 0 | Status: DISCONTINUED | OUTPATIENT
Start: 2020-08-23 | End: 2020-08-28

## 2020-08-23 RX ORDER — LIPASE/PROTEASE/AMYLASE 16-48-48K
1 CAPSULE,DELAYED RELEASE (ENTERIC COATED) ORAL
Refills: 0 | Status: DISCONTINUED | OUTPATIENT
Start: 2020-08-23 | End: 2020-08-28

## 2020-08-23 RX ORDER — VANCOMYCIN HCL 1 G
1 VIAL (EA) INTRAVENOUS
Qty: 0 | Refills: 0 | DISCHARGE

## 2020-08-23 RX ORDER — POTASSIUM CHLORIDE 20 MEQ
10 PACKET (EA) ORAL ONCE
Refills: 0 | Status: COMPLETED | OUTPATIENT
Start: 2020-08-23 | End: 2020-08-23

## 2020-08-23 RX ORDER — POTASSIUM CHLORIDE 20 MEQ
10 PACKET (EA) ORAL
Refills: 0 | Status: DISCONTINUED | OUTPATIENT
Start: 2020-08-23 | End: 2020-08-23

## 2020-08-23 RX ORDER — POTASSIUM CHLORIDE 20 MEQ
20 PACKET (EA) ORAL ONCE
Refills: 0 | Status: COMPLETED | OUTPATIENT
Start: 2020-08-23 | End: 2020-08-24

## 2020-08-23 RX ORDER — ACETAMINOPHEN 500 MG
650 TABLET ORAL ONCE
Refills: 0 | Status: COMPLETED | OUTPATIENT
Start: 2020-08-23 | End: 2020-08-25

## 2020-08-23 RX ADMIN — FOSPHENYTOIN 105.6 MILLIGRAM(S) PE: 50 INJECTION INTRAMUSCULAR; INTRAVENOUS at 05:05

## 2020-08-23 RX ADMIN — HEPARIN SODIUM 5000 UNIT(S): 5000 INJECTION INTRAVENOUS; SUBCUTANEOUS at 13:32

## 2020-08-23 RX ADMIN — FLUDROCORTISONE ACETATE 0.1 MILLIGRAM(S): 0.1 TABLET ORAL at 17:29

## 2020-08-23 RX ADMIN — OXYCODONE HYDROCHLORIDE 10 MILLIGRAM(S): 5 TABLET ORAL at 18:20

## 2020-08-23 RX ADMIN — Medication 20 MILLIGRAM(S): at 05:06

## 2020-08-23 RX ADMIN — HEPARIN SODIUM 5000 UNIT(S): 5000 INJECTION INTRAVENOUS; SUBCUTANEOUS at 05:06

## 2020-08-23 RX ADMIN — Medication 0.25 MILLIGRAM(S): at 10:46

## 2020-08-23 RX ADMIN — OXYCODONE HYDROCHLORIDE 5 MILLIGRAM(S): 5 TABLET ORAL at 09:06

## 2020-08-23 RX ADMIN — FLUDROCORTISONE ACETATE 0.1 MILLIGRAM(S): 0.1 TABLET ORAL at 05:06

## 2020-08-23 RX ADMIN — OXYCODONE HYDROCHLORIDE 10 MILLIGRAM(S): 5 TABLET ORAL at 21:37

## 2020-08-23 RX ADMIN — Medication 10 MILLIEQUIVALENT(S): at 05:05

## 2020-08-23 RX ADMIN — LACOSAMIDE 200 MILLIGRAM(S): 50 TABLET ORAL at 18:46

## 2020-08-23 RX ADMIN — HEPARIN SODIUM 5000 UNIT(S): 5000 INJECTION INTRAVENOUS; SUBCUTANEOUS at 21:07

## 2020-08-23 RX ADMIN — SODIUM CHLORIDE 50 MILLILITER(S): 5 INJECTION, SOLUTION INTRAVENOUS at 11:53

## 2020-08-23 RX ADMIN — LEVETIRACETAM 1000 MILLIGRAM(S): 250 TABLET, FILM COATED ORAL at 18:47

## 2020-08-23 RX ADMIN — OXYCODONE HYDROCHLORIDE 10 MILLIGRAM(S): 5 TABLET ORAL at 21:07

## 2020-08-23 RX ADMIN — OXYCODONE HYDROCHLORIDE 5 MILLIGRAM(S): 5 TABLET ORAL at 10:21

## 2020-08-23 RX ADMIN — Medication 200 MILLIGRAM(S): at 12:51

## 2020-08-23 RX ADMIN — OXYCODONE HYDROCHLORIDE 10 MILLIGRAM(S): 5 TABLET ORAL at 16:39

## 2020-08-23 RX ADMIN — CHLORHEXIDINE GLUCONATE 1 APPLICATION(S): 213 SOLUTION TOPICAL at 05:34

## 2020-08-23 RX ADMIN — Medication 1 CAPSULE(S): at 13:25

## 2020-08-23 RX ADMIN — LEVETIRACETAM 400 MILLIGRAM(S): 250 TABLET, FILM COATED ORAL at 10:46

## 2020-08-23 RX ADMIN — Medication 0.25 MILLIGRAM(S): at 19:34

## 2020-08-23 RX ADMIN — Medication 130 MILLIGRAM(S): at 17:29

## 2020-08-23 RX ADMIN — Medication 1 CAPSULE(S): at 17:29

## 2020-08-23 RX ADMIN — OXYCODONE HYDROCHLORIDE 5 MILLIGRAM(S): 5 TABLET ORAL at 02:30

## 2020-08-23 RX ADMIN — OXYCODONE HYDROCHLORIDE 5 MILLIGRAM(S): 5 TABLET ORAL at 03:00

## 2020-08-23 RX ADMIN — SODIUM CHLORIDE 50 MILLILITER(S): 5 INJECTION, SOLUTION INTRAVENOUS at 05:34

## 2020-08-23 RX ADMIN — LACOSAMIDE 140 MILLIGRAM(S): 50 TABLET ORAL at 11:53

## 2020-08-23 NOTE — CHART NOTE - NSCHARTNOTEFT_GEN_A_CORE
MICU Transfer Note  ---------------------------    Transfer from: MICU  Transfer to:  (  ) Medicine    (  ) Telemetry    (  ) RCU    (  ) Palliative    (  ) Stroke Unit    (  ) _______________  Accepting Physician:      MICU COURSE  61 y/o F with a h/o EtOH and opiate abuse s/p recent admissions for alcoholic pancreatitis and alcohol withdrawal, mixed connective tissue disorder (on plaquenil and methylprednisolone), chronic pain s/p lumbar laminectomy, left soleal vein thrombosis (started on apixaban, 7/2020), lung adenocarcinoma (s/p RML resection 2018), recent RLE cellulitis s/p doxycycline, recent CDiff colitis (on PO vancomycin, started 8/3, to finish 8/17), CAD s/p possible MI (ECHO ) admitted on 8/11 to OSH with symptomatic 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 RRT called because patient found to be unresponsive with generalized seizure activity with left UE/LE/facial twitching. Seizure activity would break transiently with IV lorazepam but recurred repetitively. Developed hypoxic respiratory failure. She was emergent intubated and given keppra load 1000mg, vimpat load 200 mg and propofol 10 mcg/kg/min infusion, which successfully suppressed her seizures. Found to have new fever of 101.6 F and hypotensive post-intubation. Started on levophed drip.      Transferred to [facility] for EEG study.         OBJECTIVE --  Vital Signs Last 24 Hrs  T(C): 36.8 (23 Aug 2020 20:00), Max: 37.2 (23 Aug 2020 16:00)  T(F): 98.3 (23 Aug 2020 20:00), Max: 99 (23 Aug 2020 16:00)  HR: 99 (23 Aug 2020 19:00) (86 - 99)  BP: 145/84 (23 Aug 2020 19:00) (126/69 - 181/85)  BP(mean): 109 (23 Aug 2020 19:00) (89 - 122)  RR: 29 (23 Aug 2020 19:00) (20 - 44)  SpO2: 96% (23 Aug 2020 18:00) (94% - 99%)    I&O's Summary    22 Aug 2020 07:01  -  23 Aug 2020 07:00  --------------------------------------------------------  IN: 1592 mL / OUT: 2370 mL / NET: -778 mL    23 Aug 2020 07:01  -  23 Aug 2020 20:15  --------------------------------------------------------  IN: 720 mL / OUT: 1100 mL / NET: -380 mL        MEDICATIONS  (STANDING):  chlorhexidine 4% Liquid 1 Application(s) Topical <User Schedule>  fludroCORTISONE 0.1 milliGRAM(s) Oral every 12 hours  heparin   Injectable 5000 Unit(s) SubCutaneous every 8 hours  hydroxychloroquine 200 milliGRAM(s) Oral daily  insulin lispro (HumaLOG) corrective regimen sliding scale   SubCutaneous every 6 hours  lacosamide 200 milliGRAM(s) Oral two times a day  levETIRAcetam 1000 milliGRAM(s) Oral two times a day  methylPREDNISolone sodium succinate Injectable 20 milliGRAM(s) IV Push daily  oxyCODONE  ER Tablet 10 milliGRAM(s) Oral every 8 hours  pancrelipase  (CREON 12,000 Lipase Units) 1 Capsule(s) Oral three times a day with meals  phenytoin   Capsule 130 milliGRAM(s) Oral two times a day    MEDICATIONS  (PRN):  acetaminophen   Tablet .. 650 milliGRAM(s) Oral once PRN Moderate Pain (4 - 6), Severe Pain (7 - 10)  ALPRAZolam 0.25 milliGRAM(s) Oral two times a day PRN anxiety  artificial  tears Solution 1 Drop(s) Both EYES three times a day PRN Eye irritation        LABS                                            11.1                  Neurophils% (auto):   69.2   (08-23 @ 02:13):    6.93 )-----------(330          Lymphocytes% (auto):  17.2                                          34.6                   Eosinphils% (auto):   1.9      Manual%: Neutrophils x    ; Lymphocytes x    ; Eosinophils x    ; Bands%: x    ; Blasts x                                    140    |  104    |  4                   Calcium: 9.5   / iCa: x      (08-23 @ 15:54)    ----------------------------<  105       Magnesium: 2.3                              3.9     |  22     |  0.46             Phosphorous: 3.6      TPro  6.2    /  Alb  4.0    /  TBili  0.2    /  DBili  x      /  AST  45     /  ALT  28     /  AlkPhos  38     23 Aug 2020 08:51    ( 08-23 @ 02:13 )   PT: 11.9 sec;   INR: 1.00 ratio  aPTT: 28.3 sec          ASSESSMENT & PLAN:         For Follow-Up:  [ ]   [ ] MICU Transfer Note  ---------------------------    Transfer from: MICU  Transfer to:  (  ) Medicine    (  ) Telemetry    (  ) RCU    (  ) Palliative    (  ) Stroke Unit    (  ) _______________  Accepting Physician:      MICU COURSE  59 y/o F with a h/o EtOH and opiate abuse s/p recent admissions for alcoholic pancreatitis and alcohol withdrawal, mixed connective tissue disorder (on plaquenil and methylprednisolone), chronic pain s/p lumbar laminectomy, left soleal vein thrombosis (started on apixaban, 7/2020), lung adenocarcinoma (s/p RML resection 2018), recent RLE cellulitis s/p doxycycline, recent CDiff colitis (on PO vancomycin, started 8/3, to finish 8/17), CAD s/p possible MI (ECHO ) admitted on 8/11 to OSH with symptomatic 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 RRT called because patient found to be unresponsive with generalized seizure activity with left UE/LE/facial twitching. Seizure activity would break transiently with IV lorazepam but recurred repetitively. Urgently intubated for hypoxic respiratory failure. Given keppra load 1000mg, vimpat load 200 mg and propofol 10 mcg/kg/min infusion, which successfully suppressed her seizures. Started on levophed drip for hypotension following intubation. Transferred to [facility] MICU for continuous EEG study.              OBJECTIVE --  Vital Signs Last 24 Hrs  T(C): 36.8 (23 Aug 2020 20:00), Max: 37.2 (23 Aug 2020 16:00)  T(F): 98.3 (23 Aug 2020 20:00), Max: 99 (23 Aug 2020 16:00)  HR: 99 (23 Aug 2020 19:00) (86 - 99)  BP: 145/84 (23 Aug 2020 19:00) (126/69 - 181/85)  BP(mean): 109 (23 Aug 2020 19:00) (89 - 122)  RR: 29 (23 Aug 2020 19:00) (20 - 44)  SpO2: 96% (23 Aug 2020 18:00) (94% - 99%)    I&O's Summary    22 Aug 2020 07:01  -  23 Aug 2020 07:00  --------------------------------------------------------  IN: 1592 mL / OUT: 2370 mL / NET: -778 mL    23 Aug 2020 07:01  -  23 Aug 2020 20:15  --------------------------------------------------------  IN: 720 mL / OUT: 1100 mL / NET: -380 mL        MEDICATIONS  (STANDING):  chlorhexidine 4% Liquid 1 Application(s) Topical <User Schedule>  fludroCORTISONE 0.1 milliGRAM(s) Oral every 12 hours  heparin   Injectable 5000 Unit(s) SubCutaneous every 8 hours  hydroxychloroquine 200 milliGRAM(s) Oral daily  insulin lispro (HumaLOG) corrective regimen sliding scale   SubCutaneous every 6 hours  lacosamide 200 milliGRAM(s) Oral two times a day  levETIRAcetam 1000 milliGRAM(s) Oral two times a day  methylPREDNISolone sodium succinate Injectable 20 milliGRAM(s) IV Push daily  oxyCODONE  ER Tablet 10 milliGRAM(s) Oral every 8 hours  pancrelipase  (CREON 12,000 Lipase Units) 1 Capsule(s) Oral three times a day with meals  phenytoin   Capsule 130 milliGRAM(s) Oral two times a day    MEDICATIONS  (PRN):  acetaminophen   Tablet .. 650 milliGRAM(s) Oral once PRN Moderate Pain (4 - 6), Severe Pain (7 - 10)  ALPRAZolam 0.25 milliGRAM(s) Oral two times a day PRN anxiety  artificial  tears Solution 1 Drop(s) Both EYES three times a day PRN Eye irritation        LABS                                            11.1                  Neurophils% (auto):   69.2   (08-23 @ 02:13):    6.93 )-----------(330          Lymphocytes% (auto):  17.2                                          34.6                   Eosinphils% (auto):   1.9      Manual%: Neutrophils x    ; Lymphocytes x    ; Eosinophils x    ; Bands%: x    ; Blasts x                                    140    |  104    |  4                   Calcium: 9.5   / iCa: x      (08-23 @ 15:54)    ----------------------------<  105       Magnesium: 2.3                              3.9     |  22     |  0.46             Phosphorous: 3.6      TPro  6.2    /  Alb  4.0    /  TBili  0.2    /  DBili  x      /  AST  45     /  ALT  28     /  AlkPhos  38     23 Aug 2020 08:51    ( 08-23 @ 02:13 )   PT: 11.9 sec;   INR: 1.00 ratio  aPTT: 28.3 sec          ASSESSMENT & PLAN:         For Follow-Up:  [ ]   [ ] MICU Transfer Note  ---------------------------    Transfer from: MICU  Transfer to:  (  ) Medicine    (  ) Telemetry    (  ) RCU    (  ) Palliative    (  ) Stroke Unit    (  ) _______________  Accepting Physician:      MICU COURSE  61 y/o F with a h/o EtOH and opiate abuse s/p recent admissions for alcoholic pancreatitis and alcohol withdrawal, mixed connective tissue disorder (on plaquenil and methylprednisolone), chronic pain s/p lumbar laminectomy, left soleal vein thrombosis (started on apixaban, 7/2020), lung adenocarcinoma (s/p RML resection 2018), recent RLE cellulitis s/p doxycycline, recent CDiff colitis (on PO vancomycin, started 8/3, to finish 8/17), CAD s/p possible MI (ECHO ) admitted on 8/11 to OSH with symptomatic 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 RRT called because patient found to be unresponsive with generalized seizure activity with left UE/LE/facial twitching. Seizure activity would break transiently with IV lorazepam but recurred repetitively. Urgently intubated for hypoxic respiratory failure. Given keppra load 1000mg, vimpat load 200 mg and propofol 10 mcg/kg/min infusion, which successfully suppressed her seizures. Started on levophed drip for hypotension following intubation. Transferred to [facility] MICU for continuous EEG study.              OBJECTIVE --  Vital Signs Last 24 Hrs  T(C): 36.8 (23 Aug 2020 20:00), Max: 37.2 (23 Aug 2020 16:00)  T(F): 98.3 (23 Aug 2020 20:00), Max: 99 (23 Aug 2020 16:00)  HR: 99 (23 Aug 2020 19:00) (86 - 99)  BP: 145/84 (23 Aug 2020 19:00) (126/69 - 181/85)  BP(mean): 109 (23 Aug 2020 19:00) (89 - 122)  RR: 29 (23 Aug 2020 19:00) (20 - 44)  SpO2: 96% (23 Aug 2020 18:00) (94% - 99%)    I&O's Summary    22 Aug 2020 07:01  -  23 Aug 2020 07:00  --------------------------------------------------------  IN: 1592 mL / OUT: 2370 mL / NET: -778 mL    23 Aug 2020 07:01  -  23 Aug 2020 20:15  --------------------------------------------------------  IN: 720 mL / OUT: 1100 mL / NET: -380 mL        MEDICATIONS  (STANDING):  chlorhexidine 4% Liquid 1 Application(s) Topical <User Schedule>  fludroCORTISONE 0.1 milliGRAM(s) Oral every 12 hours  heparin   Injectable 5000 Unit(s) SubCutaneous every 8 hours  hydroxychloroquine 200 milliGRAM(s) Oral daily  insulin lispro (HumaLOG) corrective regimen sliding scale   SubCutaneous every 6 hours  lacosamide 200 milliGRAM(s) Oral two times a day  levETIRAcetam 1000 milliGRAM(s) Oral two times a day  methylPREDNISolone sodium succinate Injectable 20 milliGRAM(s) IV Push daily  oxyCODONE  ER Tablet 10 milliGRAM(s) Oral every 8 hours  pancrelipase  (CREON 12,000 Lipase Units) 1 Capsule(s) Oral three times a day with meals  phenytoin   Capsule 130 milliGRAM(s) Oral two times a day    MEDICATIONS  (PRN):  acetaminophen   Tablet .. 650 milliGRAM(s) Oral once PRN Moderate Pain (4 - 6), Severe Pain (7 - 10)  ALPRAZolam 0.25 milliGRAM(s) Oral two times a day PRN anxiety  artificial  tears Solution 1 Drop(s) Both EYES three times a day PRN Eye irritation        LABS                                            11.1                  Neurophils% (auto):   69.2   (08-23 @ 02:13):    6.93 )-----------(330          Lymphocytes% (auto):  17.2                                          34.6                   Eosinphils% (auto):   1.9      Manual%: Neutrophils x    ; Lymphocytes x    ; Eosinophils x    ; Bands%: x    ; Blasts x                                    140    |  104    |  4                   Calcium: 9.5   / iCa: x      (08-23 @ 15:54)    ----------------------------<  105       Magnesium: 2.3                              3.9     |  22     |  0.46             Phosphorous: 3.6      TPro  6.2    /  Alb  4.0    /  TBili  0.2    /  DBili  x      /  AST  45     /  ALT  28     /  AlkPhos  38     23 Aug 2020 08:51    ( 08-23 @ 02:13 )   PT: 11.9 sec;   INR: 1.00 ratio  aPTT: 28.3 sec          ASSESSMENT & PLAN:   Assessment:  61 y/o F with a h/o EtOH withdrawal, polysubstance abuse, EtoH pancreatitis, mixed connective tissue disorder (plaquenil and methylprednisolone), chronic pain s/p lumbar laminectomy, left soleal vein thrombosis (apixaban) lung adenocarcinoma (s/p RML resection 2018), recent RLE cellulitis s/p doxycycline, recent CDiff colitis, CAD admitted to OSH for symptomatic hyponatremia 2/2 psychogenic polydipsia complicated with new onset generalized seizures, intubated for hypoxic respiratory failure transferred to [facility] MICU for further management      #NEURO   //Seizure Disorder  -Extubated  -Per EEG, no active seizure activity noted. EEG d/c'ed.  -Mentation greatly improved; AOx4, no speech latency  - Change AEPs from IV to PO, c/w current dose     -C/w fosphenytoin 130 mg BID (was on 140 mg BID IV)    -C/w Keppra 1 g BID     -C/w Vimpat 200 mg BID  -F/u with fosphenytoin, keppra and vimpat serum levels   -Neuro checks  -Neurology recs appreciated     #CV  -off pressors  -Hypertensive likely 2/2 to floronef and medrol, continue to monitor  -C/w to hold home clonidine   -hx of MI, no stents placed    #RESP   //Hypoxic Respiratory failure 2/2 seizures, resolved  - Extubated, not requiring respiratory support  - Monitor RR, Spo2    //Hx of adenocarcinoma   -s/p RML wedge resection in 2018      #GI   //Chronic pancreatitis   -Passed bedside and official S/S assessment  -Advance to regular diet  -C/w Creon 13428 TID    #ID   //R/o Meningitis   - Viral and bacterial meningitis r/o: CSF neg for HSV 1/2, neg gram stain, neg viral PCR, off antibiotics, off contact isolation  - F/u remaining CSF studies  - F/u MRI w/ contrast today  - Neurology recs appreciated    //Clostridium difficile infection   -completed PO vanc 8/3-8/17, IV flagyl d/c on 8/16    #Renal   - Hyponatremic, urine studies concern for cerebral salt wasting  - Titrate NS 2% at rate of  cc/hr based on q4 CMP  - BMP q6  - Strict Is/Os  - C/w Zurita    #Rheum   //Hx of mixed connective tissue disorder   - C/w hydroxychloroquine 200 mg   - Solumedrol 20 mg QD, wean down to home dose of 4 mg QD    #Heme   - D/C Eliquis after venous duplex showed resolved L soleal DVT  - C/w heparin SQ    #PSYCH   //Alcohol withdrawal   -last drink 3 weeks ago, seizures unlikely 2/2 to withdrawal    #GOC  - DNR/DNI  - Health care proxy [place]        For Follow-Up:  [ ]   [ ] MICU Transfer Note  ---------------------------    Transfer from: MICU  Transfer to:  (  ) Medicine    (  ) Telemetry    (  ) RCU    (  ) Palliative    (  ) Stroke Unit    (  ) _______________  Accepting Physician:      MICU COURSE  59 y/o F with a h/o EtOH and opiate abuse s/p recent admissions for alcoholic pancreatitis and alcohol withdrawal, mixed connective tissue disorder (on plaquenil and methylprednisolone), chronic pain s/p lumbar laminectomy, left soleal vein thrombosis (started on apixaban, 7/2020), lung adenocarcinoma (s/p RML resection 2018), recent RLE cellulitis s/p doxycycline, recent CDiff colitis (on PO vancomycin, started 8/3, to finish 8/17), CAD s/p possible MI (ECHO ) admitted on 8/11 to OSH with symptomatic 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 RRT called because patient found to be unresponsive with generalized seizure activity with left UE/LE/facial twitching. Seizure activity would break transiently with IV lorazepam but recurred repetitively. Urgently intubated for hypoxic respiratory failure. Given keppra load 1000mg, vimpat load 200 mg and propofol 10 mcg/kg/min infusion, which successfully suppressed her seizures. Started on levophed drip for hypotension following intubation. Transferred to [facility] MICU for continuous EEG study.      At MICU, pt had one <10 sec focal upper extremity seizure witnessed by nursing staff. She was seen by neuro and continued on keppra 1g and vimpat. Her ETT was replaced upon arrival due to air leakage and vEEG was started.         OBJECTIVE --  Vital Signs Last 24 Hrs  T(C): 36.8 (23 Aug 2020 20:00), Max: 37.2 (23 Aug 2020 16:00)  T(F): 98.3 (23 Aug 2020 20:00), Max: 99 (23 Aug 2020 16:00)  HR: 99 (23 Aug 2020 19:00) (86 - 99)  BP: 145/84 (23 Aug 2020 19:00) (126/69 - 181/85)  BP(mean): 109 (23 Aug 2020 19:00) (89 - 122)  RR: 29 (23 Aug 2020 19:00) (20 - 44)  SpO2: 96% (23 Aug 2020 18:00) (94% - 99%)    I&O's Summary    22 Aug 2020 07:01  -  23 Aug 2020 07:00  --------------------------------------------------------  IN: 1592 mL / OUT: 2370 mL / NET: -778 mL    23 Aug 2020 07:01  -  23 Aug 2020 20:15  --------------------------------------------------------  IN: 720 mL / OUT: 1100 mL / NET: -380 mL        MEDICATIONS  (STANDING):  chlorhexidine 4% Liquid 1 Application(s) Topical <User Schedule>  fludroCORTISONE 0.1 milliGRAM(s) Oral every 12 hours  heparin   Injectable 5000 Unit(s) SubCutaneous every 8 hours  hydroxychloroquine 200 milliGRAM(s) Oral daily  insulin lispro (HumaLOG) corrective regimen sliding scale   SubCutaneous every 6 hours  lacosamide 200 milliGRAM(s) Oral two times a day  levETIRAcetam 1000 milliGRAM(s) Oral two times a day  methylPREDNISolone sodium succinate Injectable 20 milliGRAM(s) IV Push daily  oxyCODONE  ER Tablet 10 milliGRAM(s) Oral every 8 hours  pancrelipase  (CREON 12,000 Lipase Units) 1 Capsule(s) Oral three times a day with meals  phenytoin   Capsule 130 milliGRAM(s) Oral two times a day    MEDICATIONS  (PRN):  acetaminophen   Tablet .. 650 milliGRAM(s) Oral once PRN Moderate Pain (4 - 6), Severe Pain (7 - 10)  ALPRAZolam 0.25 milliGRAM(s) Oral two times a day PRN anxiety  artificial  tears Solution 1 Drop(s) Both EYES three times a day PRN Eye irritation        LABS                                            11.1                  Neurophils% (auto):   69.2   (08-23 @ 02:13):    6.93 )-----------(330          Lymphocytes% (auto):  17.2                                          34.6                   Eosinphils% (auto):   1.9      Manual%: Neutrophils x    ; Lymphocytes x    ; Eosinophils x    ; Bands%: x    ; Blasts x                                    140    |  104    |  4                   Calcium: 9.5   / iCa: x      (08-23 @ 15:54)    ----------------------------<  105       Magnesium: 2.3                              3.9     |  22     |  0.46             Phosphorous: 3.6      TPro  6.2    /  Alb  4.0    /  TBili  0.2    /  DBili  x      /  AST  45     /  ALT  28     /  AlkPhos  38     23 Aug 2020 08:51    ( 08-23 @ 02:13 )   PT: 11.9 sec;   INR: 1.00 ratio  aPTT: 28.3 sec          ASSESSMENT & PLAN:   Assessment:  59 y/o F with a h/o EtOH withdrawal, polysubstance abuse, EtoH pancreatitis, mixed connective tissue disorder (plaquenil and methylprednisolone), chronic pain s/p lumbar laminectomy, left soleal vein thrombosis (apixaban) lung adenocarcinoma (s/p RML resection 2018), recent RLE cellulitis s/p doxycycline, recent CDiff colitis, CAD admitted to OSH for symptomatic hyponatremia 2/2 psychogenic polydipsia complicated with new onset generalized seizures, intubated for hypoxic respiratory failure transferred to [facility] MICU for further management      #NEURO   //Seizure Disorder  -Extubated  -Per EEG, no active seizure activity noted. EEG d/c'ed.  -Mentation greatly improved; AOx4, no speech latency  - Change AEPs from IV to PO, c/w current dose     -C/w fosphenytoin 130 mg BID (was on 140 mg BID IV)    -C/w Keppra 1 g BID     -C/w Vimpat 200 mg BID  -F/u with fosphenytoin, keppra and vimpat serum levels   -Neuro checks  -Neurology recs appreciated     #CV  -off pressors  -Hypertensive likely 2/2 to floronef and medrol, continue to monitor  -C/w to hold home clonidine   -hx of MI, no stents placed    #RESP   //Hypoxic Respiratory failure 2/2 seizures, resolved  - Extubated, not requiring respiratory support  - Monitor RR, Spo2    //Hx of adenocarcinoma   -s/p RML wedge resection in 2018      #GI   //Chronic pancreatitis   -Passed bedside and official S/S assessment  -Advance to regular diet  -C/w Creon 44688 TID    #ID   //R/o Meningitis   - Viral and bacterial meningitis r/o: CSF neg for HSV 1/2, neg gram stain, neg viral PCR, off antibiotics, off contact isolation  - F/u remaining CSF studies  - F/u MRI w/ contrast today  - Neurology recs appreciated    //Clostridium difficile infection   -completed PO vanc 8/3-8/17, IV flagyl d/c on 8/16    #Renal   - Hyponatremic, urine studies concern for cerebral salt wasting  - Titrate NS 2% at rate of  cc/hr based on q4 CMP  - BMP q6  - Strict Is/Os  - C/w Zurita    #Rheum   //Hx of mixed connective tissue disorder   - C/w hydroxychloroquine 200 mg   - Solumedrol 20 mg QD, wean down to home dose of 4 mg QD    #Heme   - D/C Eliquis after venous duplex showed resolved L soleal DVT  - C/w heparin SQ    #PSYCH   //Alcohol withdrawal   -last drink 3 weeks ago, seizures unlikely 2/2 to withdrawal    #GOC  - DNR/DNI  - Health care proxy [place]        For Follow-Up:  [ ]   [ ] MICU Transfer Note  ---------------------------    Transfer from: MICU  Transfer to:  (  ) Medicine    (  ) Telemetry    (  ) RCU    (  ) Palliative    (  ) Stroke Unit    (  ) _______________  Accepting Physician:      MICU COURSE  59 y/o F with a h/o EtOH and opiate abuse s/p recent admissions for alcoholic pancreatitis and alcohol withdrawal, mixed connective tissue disorder (on plaquenil and methylprednisolone), chronic pain s/p lumbar laminectomy, left soleal vein thrombosis (started on apixaban, 7/2020), lung adenocarcinoma (s/p RML resection 2018), recent RLE cellulitis s/p doxycycline, recent CDiff colitis (on PO vancomycin, started 8/3, to finish 8/17), CAD s/p possible MI (ECHO ) admitted on 8/11 to OSH with symptomatic 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 RRT called because patient found to be unresponsive with generalized seizure activity with left UE/LE/facial twitching. Seizure activity would break transiently with IV lorazepam but recurred repetitively. Urgently intubated for hypoxic respiratory failure. Given keppra load 1000mg, vimpat load 200 mg and propofol 10 mcg/kg/min infusion, which successfully suppressed her seizures. Started on levophed drip for hypotension following intubation. Transferred to [facility] MICU for continuous EEG study.      At MICU, she was seen by neuro and continued on keppra 1g and vimpat. Fosphenytoin was added for increased seizure activity and vEEG was started. Pt was weaned off levophed as her MAP stabilized above 60. CT Head was obtained which was negative for acute changes and an LP was performed. MRI was performed which revealed acute/subacute lacunar infarction within the right medial thalamus along with hypoperfused areas in bilateral frontal, temporal lobes consistent w/ post-ictal events. EEG revealed structural abnormality in the right frontotemporal region and moderate to severe nonspecific diffuse or multifocal cerebral dysfunction.         OBJECTIVE --  Vital Signs Last 24 Hrs  T(C): 36.8 (23 Aug 2020 20:00), Max: 37.2 (23 Aug 2020 16:00)  T(F): 98.3 (23 Aug 2020 20:00), Max: 99 (23 Aug 2020 16:00)  HR: 99 (23 Aug 2020 19:00) (86 - 99)  BP: 145/84 (23 Aug 2020 19:00) (126/69 - 181/85)  BP(mean): 109 (23 Aug 2020 19:00) (89 - 122)  RR: 29 (23 Aug 2020 19:00) (20 - 44)  SpO2: 96% (23 Aug 2020 18:00) (94% - 99%)    I&O's Summary    22 Aug 2020 07:01  -  23 Aug 2020 07:00  --------------------------------------------------------  IN: 1592 mL / OUT: 2370 mL / NET: -778 mL    23 Aug 2020 07:01  -  23 Aug 2020 20:15  --------------------------------------------------------  IN: 720 mL / OUT: 1100 mL / NET: -380 mL        MEDICATIONS  (STANDING):  chlorhexidine 4% Liquid 1 Application(s) Topical <User Schedule>  fludroCORTISONE 0.1 milliGRAM(s) Oral every 12 hours  heparin   Injectable 5000 Unit(s) SubCutaneous every 8 hours  hydroxychloroquine 200 milliGRAM(s) Oral daily  insulin lispro (HumaLOG) corrective regimen sliding scale   SubCutaneous every 6 hours  lacosamide 200 milliGRAM(s) Oral two times a day  levETIRAcetam 1000 milliGRAM(s) Oral two times a day  methylPREDNISolone sodium succinate Injectable 20 milliGRAM(s) IV Push daily  oxyCODONE  ER Tablet 10 milliGRAM(s) Oral every 8 hours  pancrelipase  (CREON 12,000 Lipase Units) 1 Capsule(s) Oral three times a day with meals  phenytoin   Capsule 130 milliGRAM(s) Oral two times a day    MEDICATIONS  (PRN):  acetaminophen   Tablet .. 650 milliGRAM(s) Oral once PRN Moderate Pain (4 - 6), Severe Pain (7 - 10)  ALPRAZolam 0.25 milliGRAM(s) Oral two times a day PRN anxiety  artificial  tears Solution 1 Drop(s) Both EYES three times a day PRN Eye irritation        LABS                                            11.1                  Neurophils% (auto):   69.2   (08-23 @ 02:13):    6.93 )-----------(330          Lymphocytes% (auto):  17.2                                          34.6                   Eosinphils% (auto):   1.9      Manual%: Neutrophils x    ; Lymphocytes x    ; Eosinophils x    ; Bands%: x    ; Blasts x                                    140    |  104    |  4                   Calcium: 9.5   / iCa: x      (08-23 @ 15:54)    ----------------------------<  105       Magnesium: 2.3                              3.9     |  22     |  0.46             Phosphorous: 3.6      TPro  6.2    /  Alb  4.0    /  TBili  0.2    /  DBili  x      /  AST  45     /  ALT  28     /  AlkPhos  38     23 Aug 2020 08:51    ( 08-23 @ 02:13 )   PT: 11.9 sec;   INR: 1.00 ratio  aPTT: 28.3 sec          ASSESSMENT & PLAN:   Assessment:  59 y/o F with a h/o EtOH withdrawal, polysubstance abuse, EtoH pancreatitis, mixed connective tissue disorder (plaquenil and methylprednisolone), chronic pain s/p lumbar laminectomy, left soleal vein thrombosis (apixaban) lung adenocarcinoma (s/p RML resection 2018), recent RLE cellulitis s/p doxycycline, recent CDiff colitis, CAD admitted to OSH for symptomatic hyponatremia 2/2 psychogenic polydipsia complicated with new onset generalized seizures, intubated for hypoxic respiratory failure transferred to [facility] MICU for further management      #NEURO   //Seizure Disorder  -Extubated  -Per EEG, no active seizure activity noted. EEG d/c'ed.  -Mentation greatly improved; AOx4, no speech latency  - Change AEPs from IV to PO, c/w current dose     -C/w fosphenytoin 130 mg BID (was on 140 mg BID IV)    -C/w Keppra 1 g BID     -C/w Vimpat 200 mg BID  -F/u with fosphenytoin, keppra and vimpat serum levels   -Neuro checks  -Neurology recs appreciated     #CV  -off pressors  -Hypertensive likely 2/2 to floronef and medrol, continue to monitor  -C/w to hold home clonidine   -hx of MI, no stents placed    #RESP   //Hypoxic Respiratory failure 2/2 seizures, resolved  - Extubated, not requiring respiratory support  - Monitor RR, Spo2    //Hx of adenocarcinoma   -s/p RML wedge resection in 2018      #GI   //Chronic pancreatitis   -Passed bedside and official S/S assessment  -Advance to regular diet  -C/w Creon [id number] TID    #ID   //R/o Meningitis   - Viral and bacterial meningitis r/o: CSF neg for HSV 1/2, neg gram stain, neg viral PCR, off antibiotics, off contact isolation  - F/u remaining CSF studies  - F/u MRI w/ contrast today  - Neurology recs appreciated    //Clostridium difficile infection   -completed PO vanc 8/3-8/17, IV flagyl d/c on 8/16    #Renal   - Hyponatremic, urine studies concern for cerebral salt wasting  - Titrate NS 2% at rate of  cc/hr based on q4 CMP  - BMP q6  - Strict Is/Os  - C/w Zurita    #Rheum   //Hx of mixed connective tissue disorder   - C/w hydroxychloroquine 200 mg   - Solumedrol 20 mg QD, wean down to home dose of 4 mg QD    #Heme   - D/C Eliquis after venous duplex showed resolved L soleal DVT  - C/w heparin SQ    #PSYCH   //Alcohol withdrawal   -last drink 3 weeks ago, seizures unlikely 2/2 to withdrawal    #GOC  - DNR/DNI  - Health care proxy Anita [place]        For Follow-Up:  [ ]   [ ] MICU Transfer Note  ---------------------------    Transfer from: MICU  Transfer to:  (  ) Medicine    (  ) Telemetry    (  ) RCU    (  ) Palliative    (  ) Stroke Unit    (  ) _______________  Accepting Physician:      MICU COURSE  59 y/o F with a h/o EtOH and opiate abuse s/p recent admissions for alcoholic pancreatitis and alcohol withdrawal, mixed connective tissue disorder (on plaquenil and methylprednisolone), chronic pain s/p lumbar laminectomy, left soleal vein thrombosis (started on apixaban, 7/2020), lung adenocarcinoma (s/p RML resection 2018), recent RLE cellulitis s/p doxycycline, recent CDiff colitis (on PO vancomycin, started 8/3, to finish 8/17), CAD s/p possible MI (ECHO ) admitted on 8/11 to OSH with symptomatic 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 RRT called because patient found to be unresponsive with generalized seizure activity with left UE/LE/facial twitching. Seizure activity would break transiently with IV lorazepam but recurred repetitively. Urgently intubated for hypoxic respiratory failure. Given keppra load 1000mg, vimpat load 200 mg and propofol 10 mcg/kg/min infusion, which successfully suppressed her seizures. Started on levophed drip for hypotension following intubation. Transferred to [facility] MICU for continuous EEG study.      At MICU, she was seen by neuro and continued on keppra 1g, propofol, and vimpat. Fosphenytoin was added for increased seizure activity and vEEG was started. Vancomycin was added to antibiotics on top of ceftriaxone and acyclovir given a fever spike when she was at OSH. Pt was weaned off levophed as her MAP stabilized above 60. CT Head was obtained which was negative for acute changes and an LP was performed. MRI was performed which revealed acute/subacute lacunar infarction within the right medial thalamus along with hypoperfused areas in bilateral frontal, temporal lobes consistent w/ post-ictal events. EEG revealed structural abnormality in the right frontotemporal region and moderate to severe nonspecific diffuse or multifocal cerebral dysfunction. She was extubated on August 20 and off propofol and midazolam, seen with improved mental status. Per neuro, seizures were more suspicious for inflammatory cause in setting of fevers and lateralized discharges, suspicious for autoimmune encephalitis based on MRI. Infectious cause was unlikely based on LP results. She started having hyponatremia post-extubation, likely signifying cerebral salt wasting due to high urine output, low BP, and high urine sodium and hypovolemia. She was started on 2% saline drip and given 1 dose of Florinef. Once sodium was stabilized, 2% saline was stopped. Antibiotics were stopped as she showed no signs of infection.       OBJECTIVE --  Vital Signs Last 24 Hrs  T(C): 36.8 (23 Aug 2020 20:00), Max: 37.2 (23 Aug 2020 16:00)  T(F): 98.3 (23 Aug 2020 20:00), Max: 99 (23 Aug 2020 16:00)  HR: 99 (23 Aug 2020 19:00) (86 - 99)  BP: 145/84 (23 Aug 2020 19:00) (126/69 - 181/85)  BP(mean): 109 (23 Aug 2020 19:00) (89 - 122)  RR: 29 (23 Aug 2020 19:00) (20 - 44)  SpO2: 96% (23 Aug 2020 18:00) (94% - 99%)    I&O's Summary    22 Aug 2020 07:01  -  23 Aug 2020 07:00  --------------------------------------------------------  IN: 1592 mL / OUT: 2370 mL / NET: -778 mL    23 Aug 2020 07:01  -  23 Aug 2020 20:15  --------------------------------------------------------  IN: 720 mL / OUT: 1100 mL / NET: -380 mL        MEDICATIONS  (STANDING):  chlorhexidine 4% Liquid 1 Application(s) Topical <User Schedule>  fludroCORTISONE 0.1 milliGRAM(s) Oral every 12 hours  heparin   Injectable 5000 Unit(s) SubCutaneous every 8 hours  hydroxychloroquine 200 milliGRAM(s) Oral daily  insulin lispro (HumaLOG) corrective regimen sliding scale   SubCutaneous every 6 hours  lacosamide 200 milliGRAM(s) Oral two times a day  levETIRAcetam 1000 milliGRAM(s) Oral two times a day  methylPREDNISolone sodium succinate Injectable 20 milliGRAM(s) IV Push daily  oxyCODONE  ER Tablet 10 milliGRAM(s) Oral every 8 hours  pancrelipase  (CREON 12,000 Lipase Units) 1 Capsule(s) Oral three times a day with meals  phenytoin   Capsule 130 milliGRAM(s) Oral two times a day    MEDICATIONS  (PRN):  acetaminophen   Tablet .. 650 milliGRAM(s) Oral once PRN Moderate Pain (4 - 6), Severe Pain (7 - 10)  ALPRAZolam 0.25 milliGRAM(s) Oral two times a day PRN anxiety  artificial  tears Solution 1 Drop(s) Both EYES three times a day PRN Eye irritation        LABS                                            11.1                  Neurophils% (auto):   69.2   (08-23 @ 02:13):    6.93 )-----------(330          Lymphocytes% (auto):  17.2                                          34.6                   Eosinphils% (auto):   1.9      Manual%: Neutrophils x    ; Lymphocytes x    ; Eosinophils x    ; Bands%: x    ; Blasts x                                    140    |  104    |  4                   Calcium: 9.5   / iCa: x      (08-23 @ 15:54)    ----------------------------<  105       Magnesium: 2.3                              3.9     |  22     |  0.46             Phosphorous: 3.6      TPro  6.2    /  Alb  4.0    /  TBili  0.2    /  DBili  x      /  AST  45     /  ALT  28     /  AlkPhos  38     23 Aug 2020 08:51    ( 08-23 @ 02:13 )   PT: 11.9 sec;   INR: 1.00 ratio  aPTT: 28.3 sec          ASSESSMENT & PLAN:   Assessment:  59 y/o F with a h/o EtOH withdrawal, polysubstance abuse, EtoH pancreatitis, mixed connective tissue disorder (plaquenil and methylprednisolone), chronic pain s/p lumbar laminectomy, left soleal vein thrombosis (apixaban) lung adenocarcinoma (s/p RML resection 2018), recent RLE cellulitis s/p doxycycline, recent CDiff colitis, CAD admitted to OSH for symptomatic hyponatremia 2/2 psychogenic polydipsia complicated with new onset generalized seizures, intubated for hypoxic respiratory failure transferred to [facility] MICU for further management      #NEURO   //Seizure Disorder  -Extubated  -Per EEG, no active seizure activity noted. EEG d/c'ed.  -Mentation greatly improved; AOx4, no speech latency  - Change AEPs from IV to PO, c/w current dose     -C/w fosphenytoin 130 mg BID (was on 140 mg BID IV)    -C/w Keppra 1 g BID     -C/w Vimpat 200 mg BID  -F/u with fosphenytoin, keppra and vimpat serum levels   -Neuro checks  -Neurology recs appreciated     #CV  -off pressors  -Hypertensive likely 2/2 to floronef and medrol, continue to monitor  -C/w to hold home clonidine   -hx of MI, no stents placed    #RESP   //Hypoxic Respiratory failure 2/2 seizures, resolved  - Extubated, not requiring respiratory support  - Monitor RR, Spo2    //Hx of adenocarcinoma   -s/p RML wedge resection in 2018      #GI   //Chronic pancreatitis   -Passed bedside and official S/S assessment  -Advance to regular diet  -C/w Creon 73259 TID    #ID   //R/o Meningitis   - Viral and bacterial meningitis r/o: CSF neg for HSV 1/2, neg gram stain, neg viral PCR, off antibiotics, off contact isolation  - F/u remaining CSF studies  - F/u MRI w/ contrast today  - Neurology recs appreciated    //Clostridium difficile infection   -completed PO vanc 8/3-8/17, IV flagyl d/c on 8/16    #Renal   - Hyponatremic, urine studies concern for cerebral salt wasting  - Titrate NS 2% at rate of  cc/hr based on q4 CMP  - BMP q6  - Strict Is/Os  - C/w Zurita    #Rheum   //Hx of mixed connective tissue disorder   - C/w hydroxychloroquine 200 mg   - Solumedrol 20 mg QD, wean down to home dose of 4 mg QD    #Heme   - D/C Eliquis after venous duplex showed resolved L soleal DVT  - C/w heparin SQ    #PSYCH   //Alcohol withdrawal   -last drink 3 weeks ago, seizures unlikely 2/2 to withdrawal    #GOC  - DNR/DNI  - Health care proxy Anita [place]        For Follow-Up:  [ ]   [ ] MICU Transfer Note  ---------------------------    Transfer from: MICU  Transfer to:  (  ) Medicine    (  ) Telemetry    (  ) RCU    (  ) Palliative    (  ) Stroke Unit    (  ) _______________  Accepting Physician:      MICU COURSE  61 y/o F with a h/o EtOH and opiate abuse s/p recent admissions for alcoholic pancreatitis and alcohol withdrawal, mixed connective tissue disorder (on plaquenil and methylprednisolone), chronic pain s/p lumbar laminectomy, left soleal vein thrombosis (started on apixaban, 7/2020), lung adenocarcinoma (s/p RML resection 2018), recent RLE cellulitis s/p doxycycline, recent CDiff colitis (on PO vancomycin, started 8/3, to finish 8/17), CAD s/p possible MI (ECHO ) admitted on 8/11 to OSH with symptomatic 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 RRT called because patient found to be unresponsive with generalized seizure activity with left UE/LE/facial twitching. Seizure activity would break transiently with IV lorazepam but recurred repetitively. Urgently intubated for hypoxic respiratory failure. Given keppra load 1000mg, vimpat load 200 mg and propofol 10 mcg/kg/min infusion, which successfully suppressed her seizures. Started on levophed drip for hypotension following intubation. Transferred to [facility] MICU for continuous EEG study.      At MICU, she was seen by neuro and continued on keppra 1g, propofol, and vimpat. Fosphenytoin was added for increased seizure activity and vEEG was started. Vancomycin was added to antibiotics on top of ceftriaxone and acyclovir given a fever spike when she was at OSH. Pt was weaned off levophed as her MAP stabilized above 60. CT Head was obtained which was negative for acute changes and an LP was performed. MRI was performed which revealed acute/subacute lacunar infarction within the right medial thalamus along with hypoperfused areas in bilateral frontal, temporal lobes consistent w/ post-ictal events. EEG revealed structural abnormality in the right frontotemporal region and moderate to severe nonspecific diffuse or multifocal cerebral dysfunction. She was extubated on August 20 and off propofol and midazolam, seen with improved mental status. Per neuro, seizures were more suspicious for inflammatory cause in setting of fevers and lateralized discharges, suspicious for autoimmune encephalitis based on MRI. Infectious cause was unlikely based on LP results. She started having hyponatremia post-extubation, likely signifying cerebral salt wasting due to high urine output, low BP, and high urine sodium and hypovolemia. She was started on 2% saline drip and given 1 dose of Florinef. Once sodium was stabilized, 2% saline was stopped. Antibiotics were stopped as she showed no signs of infection. MRI w/wo contrast was performed whi      OBJECTIVE --  Vital Signs Last 24 Hrs  T(C): 36.8 (23 Aug 2020 20:00), Max: 37.2 (23 Aug 2020 16:00)  T(F): 98.3 (23 Aug 2020 20:00), Max: 99 (23 Aug 2020 16:00)  HR: 99 (23 Aug 2020 19:00) (86 - 99)  BP: 145/84 (23 Aug 2020 19:00) (126/69 - 181/85)  BP(mean): 109 (23 Aug 2020 19:00) (89 - 122)  RR: 29 (23 Aug 2020 19:00) (20 - 44)  SpO2: 96% (23 Aug 2020 18:00) (94% - 99%)    I&O's Summary    22 Aug 2020 07:01  -  23 Aug 2020 07:00  --------------------------------------------------------  IN: 1592 mL / OUT: 2370 mL / NET: -778 mL    23 Aug 2020 07:01  -  23 Aug 2020 20:15  --------------------------------------------------------  IN: 720 mL / OUT: 1100 mL / NET: -380 mL        MEDICATIONS  (STANDING):  chlorhexidine 4% Liquid 1 Application(s) Topical <User Schedule>  fludroCORTISONE 0.1 milliGRAM(s) Oral every 12 hours  heparin   Injectable 5000 Unit(s) SubCutaneous every 8 hours  hydroxychloroquine 200 milliGRAM(s) Oral daily  insulin lispro (HumaLOG) corrective regimen sliding scale   SubCutaneous every 6 hours  lacosamide 200 milliGRAM(s) Oral two times a day  levETIRAcetam 1000 milliGRAM(s) Oral two times a day  methylPREDNISolone sodium succinate Injectable 20 milliGRAM(s) IV Push daily  oxyCODONE  ER Tablet 10 milliGRAM(s) Oral every 8 hours  pancrelipase  (CREON 12,000 Lipase Units) 1 Capsule(s) Oral three times a day with meals  phenytoin   Capsule 130 milliGRAM(s) Oral two times a day    MEDICATIONS  (PRN):  acetaminophen   Tablet .. 650 milliGRAM(s) Oral once PRN Moderate Pain (4 - 6), Severe Pain (7 - 10)  ALPRAZolam 0.25 milliGRAM(s) Oral two times a day PRN anxiety  artificial  tears Solution 1 Drop(s) Both EYES three times a day PRN Eye irritation        LABS                                            11.1                  Neurophils% (auto):   69.2   (08-23 @ 02:13):    6.93 )-----------(330          Lymphocytes% (auto):  17.2                                          34.6                   Eosinphils% (auto):   1.9      Manual%: Neutrophils x    ; Lymphocytes x    ; Eosinophils x    ; Bands%: x    ; Blasts x                                    140    |  104    |  4                   Calcium: 9.5   / iCa: x      (08-23 @ 15:54)    ----------------------------<  105       Magnesium: 2.3                              3.9     |  22     |  0.46             Phosphorous: 3.6      TPro  6.2    /  Alb  4.0    /  TBili  0.2    /  DBili  x      /  AST  45     /  ALT  28     /  AlkPhos  38     23 Aug 2020 08:51    ( 08-23 @ 02:13 )   PT: 11.9 sec;   INR: 1.00 ratio  aPTT: 28.3 sec          ASSESSMENT & PLAN:   Assessment:  61 y/o F with a h/o EtOH withdrawal, polysubstance abuse, EtoH pancreatitis, mixed connective tissue disorder (plaquenil and methylprednisolone), chronic pain s/p lumbar laminectomy, left soleal vein thrombosis (apixaban) lung adenocarcinoma (s/p RML resection 2018), recent RLE cellulitis s/p doxycycline, recent CDiff colitis, CAD admitted to OSH for symptomatic hyponatremia 2/2 psychogenic polydipsia complicated with new onset generalized seizures, intubated for hypoxic respiratory failure transferred to [facility] MICU for further management      #NEURO   //Seizure Disorder  -Extubated  -Per EEG, no active seizure activity noted. EEG d/c'ed.  -Mentation greatly improved; AOx4, no speech latency  - Change AEPs from IV to PO, c/w current dose     -C/w fosphenytoin 130 mg BID (was on 140 mg BID IV)    -C/w Keppra 1 g BID     -C/w Vimpat 200 mg BID  -F/u with fosphenytoin, keppra and vimpat serum levels   -Neuro checks  -Neurology recs appreciated     #CV  -off pressors  -Hypertensive likely 2/2 to floronef and medrol, continue to monitor  -C/w to hold home clonidine   -hx of MI, no stents placed    #RESP   //Hypoxic Respiratory failure 2/2 seizures, resolved  - Extubated, not requiring respiratory support  - Monitor RR, Spo2    //Hx of adenocarcinoma   -s/p RML wedge resection in 2018      #GI   //Chronic pancreatitis   -Passed bedside and official S/S assessment  -Advance to regular diet  -C/w Creon [id number] TID    #ID   //R/o Meningitis   - Viral and bacterial meningitis r/o: CSF neg for HSV 1/2, neg gram stain, neg viral PCR, off antibiotics, off contact isolation  - F/u remaining CSF studies  - F/u MRI w/ contrast today  - Neurology recs appreciated    //Clostridium difficile infection   -completed PO vanc 8/3-8/17, IV flagyl d/c on 8/16    #Renal   - Hyponatremic, urine studies concern for cerebral salt wasting  - Titrate NS 2% at rate of  cc/hr based on q4 CMP  - BMP q6  - Strict Is/Os  - C/w Zurita    #Rheum   //Hx of mixed connective tissue disorder   - C/w hydroxychloroquine 200 mg   - Solumedrol 20 mg QD, wean down to home dose of 4 mg QD    #Heme   - D/C Eliquis after venous duplex showed resolved L soleal DVT  - C/w heparin SQ    #PSYCH   //Alcohol withdrawal   -last drink 3 weeks ago, seizures unlikely 2/2 to withdrawal    #GOC  - DNR/DNI  - Health care proxy [place]        For Follow-Up:  [ ]   [ ] MICU Transfer Note  ---------------------------    Transfer from: MICU  Transfer to:  (  ) Medicine    (  ) Telemetry    (  ) RCU    (  ) Palliative    (  ) Stroke Unit    (  ) _______________  Accepting Physician: Dr. Marin      MICU COURSE  61 y/o F with a h/o EtOH and opiate abuse s/p recent admissions for alcoholic pancreatitis and alcohol withdrawal, mixed connective tissue disorder (on plaquenil and methylprednisolone), chronic pain s/p lumbar laminectomy, left soleal vein thrombosis (started on apixaban, 7/2020), lung adenocarcinoma (s/p RML resection 2018), recent RLE cellulitis s/p doxycycline, recent CDiff colitis (on PO vancomycin, started 8/3, to finish 8/17), CAD s/p possible MI (ECHO ) admitted on 8/11 to OSH with symptomatic 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 RRT called because patient found to be unresponsive with generalized seizure activity with left UE/LE/facial twitching. Seizure activity would break transiently with IV lorazepam but recurred repetitively. Urgently intubated for hypoxic respiratory failure. Given keppra load 1000mg, vimpat load 200 mg and propofol 10 mcg/kg/min infusion, which successfully suppressed her seizures. Started on levophed drip for hypotension following intubation. Transferred to [facility] MICU for continuous EEG study.      At MICU, she was seen by neuro and continued on keppra 1g, propofol, and vimpat. Fosphenytoin was added for increased seizure activity and vEEG was started. Vancomycin was added to antibiotics on top of ceftriaxone and acyclovir given a fever spike when she was at OSH. Pt was weaned off levophed as her MAP stabilized above 60. CT Head was obtained which was negative for acute changes and an LP was performed. MRI was performed which revealed acute/subacute lacunar infarction within the right medial thalamus along with hypoperfused areas in bilateral frontal, temporal lobes consistent w/ post-ictal events. EEG revealed structural abnormality in the right frontotemporal region and moderate to severe nonspecific diffuse or multifocal cerebral dysfunction. She was extubated on August 20 and off propofol and midazolam, seen with improved mental status. Per neuro, seizures were more suspicious for inflammatory cause in setting of fevers and lateralized discharges, suspicious for autoimmune encephalitis based on MRI. Infectious cause was unlikely based on LP results. She started having hyponatremia post-extubation, likely signifying cerebral salt wasting due to high urine output, low BP, and high urine sodium and hypovolemia. She was started on 2% saline drip and given 1 dose of Florinef. Once sodium was stabilized, 2% saline was stopped. Antibiotics were stopped as she showed no signs of infection. MRI w/wo contrast was performed whi      OBJECTIVE --  Vital Signs Last 24 Hrs  T(C): 36.8 (23 Aug 2020 20:00), Max: 37.2 (23 Aug 2020 16:00)  T(F): 98.3 (23 Aug 2020 20:00), Max: 99 (23 Aug 2020 16:00)  HR: 99 (23 Aug 2020 19:00) (86 - 99)  BP: 145/84 (23 Aug 2020 19:00) (126/69 - 181/85)  BP(mean): 109 (23 Aug 2020 19:00) (89 - 122)  RR: 29 (23 Aug 2020 19:00) (20 - 44)  SpO2: 96% (23 Aug 2020 18:00) (94% - 99%)    I&O's Summary    22 Aug 2020 07:01  -  23 Aug 2020 07:00  --------------------------------------------------------  IN: 1592 mL / OUT: 2370 mL / NET: -778 mL    23 Aug 2020 07:01  -  23 Aug 2020 20:15  --------------------------------------------------------  IN: 720 mL / OUT: 1100 mL / NET: -380 mL        MEDICATIONS  (STANDING):  chlorhexidine 4% Liquid 1 Application(s) Topical <User Schedule>  fludroCORTISONE 0.1 milliGRAM(s) Oral every 12 hours  heparin   Injectable 5000 Unit(s) SubCutaneous every 8 hours  hydroxychloroquine 200 milliGRAM(s) Oral daily  insulin lispro (HumaLOG) corrective regimen sliding scale   SubCutaneous every 6 hours  lacosamide 200 milliGRAM(s) Oral two times a day  levETIRAcetam 1000 milliGRAM(s) Oral two times a day  methylPREDNISolone sodium succinate Injectable 20 milliGRAM(s) IV Push daily  oxyCODONE  ER Tablet 10 milliGRAM(s) Oral every 8 hours  pancrelipase  (CREON 12,000 Lipase Units) 1 Capsule(s) Oral three times a day with meals  phenytoin   Capsule 130 milliGRAM(s) Oral two times a day    MEDICATIONS  (PRN):  acetaminophen   Tablet .. 650 milliGRAM(s) Oral once PRN Moderate Pain (4 - 6), Severe Pain (7 - 10)  ALPRAZolam 0.25 milliGRAM(s) Oral two times a day PRN anxiety  artificial  tears Solution 1 Drop(s) Both EYES three times a day PRN Eye irritation        LABS                                            11.1                  Neurophils% (auto):   69.2   (08-23 @ 02:13):    6.93 )-----------(330          Lymphocytes% (auto):  17.2                                          34.6                   Eosinphils% (auto):   1.9      Manual%: Neutrophils x    ; Lymphocytes x    ; Eosinophils x    ; Bands%: x    ; Blasts x                                    140    |  104    |  4                   Calcium: 9.5   / iCa: x      (08-23 @ 15:54)    ----------------------------<  105       Magnesium: 2.3                              3.9     |  22     |  0.46             Phosphorous: 3.6      TPro  6.2    /  Alb  4.0    /  TBili  0.2    /  DBili  x      /  AST  45     /  ALT  28     /  AlkPhos  38     23 Aug 2020 08:51    ( 08-23 @ 02:13 )   PT: 11.9 sec;   INR: 1.00 ratio  aPTT: 28.3 sec          ASSESSMENT & PLAN:   Assessment:  61 y/o F with a h/o EtOH withdrawal, polysubstance abuse, EtoH pancreatitis, mixed connective tissue disorder (plaquenil and methylprednisolone), chronic pain s/p lumbar laminectomy, left soleal vein thrombosis (apixaban) lung adenocarcinoma (s/p RML resection 2018), recent RLE cellulitis s/p doxycycline, recent CDiff colitis, CAD admitted to OSH for symptomatic hyponatremia 2/2 psychogenic polydipsia complicated with new onset generalized seizures, intubated for hypoxic respiratory failure transferred to [facility] MICU for further management      #NEURO   //Seizure Disorder  -Extubated  -Per EEG, no active seizure activity noted. EEG d/c'ed.  -Mentation greatly improved; AOx4, no speech latency  - Change AEPs from IV to PO, c/w current dose     -C/w fosphenytoin 130 mg BID (was on 140 mg BID IV)    -C/w Keppra 1 g BID     -C/w Vimpat 200 mg BID  -F/u with fosphenytoin, keppra and vimpat serum levels   -Neuro checks  -Neurology recs appreciated     #CV  -off pressors  -Hypertensive likely 2/2 to floronef and medrol, continue to monitor  -C/w to hold home clonidine   -hx of MI, no stents placed    #RESP   //Hypoxic Respiratory failure 2/2 seizures, resolved  - Extubated, not requiring respiratory support  - Monitor RR, Spo2    //Hx of adenocarcinoma   -s/p RML wedge resection in 2018      #GI   //Chronic pancreatitis   -Passed bedside and official S/S assessment  -Advance to regular diet  -C/w Creon 79168 TID    #ID   //R/o Meningitis   - Viral and bacterial meningitis r/o: CSF neg for HSV 1/2, neg gram stain, neg viral PCR, off antibiotics, off contact isolation  - F/u remaining CSF studies  - F/u MRI w/ contrast today  - Neurology recs appreciated    //Clostridium difficile infection   -completed PO vanc 8/3-8/17, IV flagyl d/c on 8/16    #Renal   - Hyponatremic, urine studies concern for cerebral salt wasting  - Titrate NS 2% at rate of  cc/hr based on q4 CMP  - BMP q6  - Strict Is/Os  - C/w Zurita    #Rheum   //Hx of mixed connective tissue disorder   - C/w hydroxychloroquine 200 mg   - Solumedrol 20 mg QD, wean down to home dose of 4 mg QD    #Heme   - D/C Eliquis after venous duplex showed resolved L soleal DVT  - C/w heparin SQ    #PSYCH   //Alcohol withdrawal   -last drink 3 weeks ago, seizures unlikely 2/2 to withdrawal    #GOC  - DNR/DNI  - Health care proxy Anita River        For Follow-Up:  [ ]   [ ] MICU Transfer Note  ---------------------------    Transfer from: MICU  Transfer to:  (  ) Medicine    (  ) Telemetry    (  ) RCU    (  ) Palliative    (  ) Stroke Unit    (  ) _______________  Accepting Physician: Dr. Marin      MICU COURSE  59 y/o F with a h/o EtOH and opiate abuse s/p recent admissions for alcoholic pancreatitis and alcohol withdrawal, mixed connective tissue disorder (on plaquenil and methylprednisolone), chronic pain s/p lumbar laminectomy, left soleal vein thrombosis (started on apixaban, 7/2020), lung adenocarcinoma (s/p RML resection 2018), recent RLE cellulitis s/p doxycycline, recent CDiff colitis (on PO vancomycin, started 8/3, to finish 8/17), CAD s/p possible MI (ECHO ) admitted on 8/11 to OSH with symptomatic 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 RRT called because patient found to be unresponsive with generalized seizure activity with left UE/LE/facial twitching. Seizure activity would break transiently with IV lorazepam but recurred repetitively. Urgently intubated for hypoxic respiratory failure. Given keppra load 1000mg, vimpat load 200 mg and propofol 10 mcg/kg/min infusion, which successfully suppressed her seizures. Started on levophed drip for hypotension following intubation. Transferred to [facility] MICU for continuous EEG study.      At MICU, she was seen by neuro and continued on keppra 1g, propofol, and vimpat. Fosphenytoin was added for increased seizure activity and vEEG was started. Vancomycin was added to antibiotics on top of ceftriaxone and acyclovir given a fever spike when she was at OSH. Pt was weaned off levophed as her MAP stabilized above 60. CT Head was obtained which was negative for acute changes and an LP was performed. MRI was performed which revealed acute/subacute lacunar infarction within the right medial thalamus along with hypoperfused areas in bilateral frontal, temporal lobes consistent w/ post-ictal events. EEG revealed structural abnormality in the right frontotemporal region and moderate to severe nonspecific diffuse or multifocal cerebral dysfunction. She was extubated on August 20 and off propofol and midazolam, seen with improved mental status. Per neuro, seizures were more suspicious for inflammatory cause in setting of fevers and lateralized discharges, suspicious for autoimmune encephalitis based on MRI. Infectious cause was unlikely based on LP results. She started having hyponatremia post-extubation, likely signifying cerebral salt wasting due to high urine output, low BP, and high urine sodium and hypovolemia. She was started on 2% saline drip and given 1 dose of Florinef. Once sodium was stabilized, 2% saline was stopped. Antibiotics were stopped as she showed no signs of infection. MRI w/wo contrast was performed which showed  cortical areas of diffusion restriction within the supratentorial and infratentorial compartments as well as involvement of the right basal ganglia and thalamus. There is apparent mild progression when compared with the prior exam favoring post ictal changes or an infectious/inflammatory etiology such as cerebritis.     OBJECTIVE --  Vital Signs Last 24 Hrs  T(C): 36.8 (23 Aug 2020 20:00), Max: 37.2 (23 Aug 2020 16:00)  T(F): 98.3 (23 Aug 2020 20:00), Max: 99 (23 Aug 2020 16:00)  HR: 99 (23 Aug 2020 19:00) (86 - 99)  BP: 145/84 (23 Aug 2020 19:00) (126/69 - 181/85)  BP(mean): 109 (23 Aug 2020 19:00) (89 - 122)  RR: 29 (23 Aug 2020 19:00) (20 - 44)  SpO2: 96% (23 Aug 2020 18:00) (94% - 99%)    I&O's Summary    22 Aug 2020 07:01  -  23 Aug 2020 07:00  --------------------------------------------------------  IN: 1592 mL / OUT: 2370 mL / NET: -778 mL    23 Aug 2020 07:01  -  23 Aug 2020 20:15  --------------------------------------------------------  IN: 720 mL / OUT: 1100 mL / NET: -380 mL        MEDICATIONS  (STANDING):  chlorhexidine 4% Liquid 1 Application(s) Topical <User Schedule>  fludroCORTISONE 0.1 milliGRAM(s) Oral every 12 hours  heparin   Injectable 5000 Unit(s) SubCutaneous every 8 hours  hydroxychloroquine 200 milliGRAM(s) Oral daily  insulin lispro (HumaLOG) corrective regimen sliding scale   SubCutaneous every 6 hours  lacosamide 200 milliGRAM(s) Oral two times a day  levETIRAcetam 1000 milliGRAM(s) Oral two times a day  methylPREDNISolone sodium succinate Injectable 20 milliGRAM(s) IV Push daily  oxyCODONE  ER Tablet 10 milliGRAM(s) Oral every 8 hours  pancrelipase  (CREON 12,000 Lipase Units) 1 Capsule(s) Oral three times a day with meals  phenytoin   Capsule 130 milliGRAM(s) Oral two times a day    MEDICATIONS  (PRN):  acetaminophen   Tablet .. 650 milliGRAM(s) Oral once PRN Moderate Pain (4 - 6), Severe Pain (7 - 10)  ALPRAZolam 0.25 milliGRAM(s) Oral two times a day PRN anxiety  artificial  tears Solution 1 Drop(s) Both EYES three times a day PRN Eye irritation        LABS                                            11.1                  Neurophils% (auto):   69.2   (08-23 @ 02:13):    6.93 )-----------(330          Lymphocytes% (auto):  17.2                                          34.6                   Eosinphils% (auto):   1.9      Manual%: Neutrophils x    ; Lymphocytes x    ; Eosinophils x    ; Bands%: x    ; Blasts x                                    140    |  104    |  4                   Calcium: 9.5   / iCa: x      (08-23 @ 15:54)    ----------------------------<  105       Magnesium: 2.3                              3.9     |  22     |  0.46             Phosphorous: 3.6      TPro  6.2    /  Alb  4.0    /  TBili  0.2    /  DBili  x      /  AST  45     /  ALT  28     /  AlkPhos  38     23 Aug 2020 08:51    ( 08-23 @ 02:13 )   PT: 11.9 sec;   INR: 1.00 ratio  aPTT: 28.3 sec          ASSESSMENT & PLAN:   Assessment:  59 y/o F with a h/o EtOH withdrawal, polysubstance abuse, EtoH pancreatitis, mixed connective tissue disorder (plaquenil and methylprednisolone), chronic pain s/p lumbar laminectomy, left soleal vein thrombosis (apixaban) lung adenocarcinoma (s/p RML resection 2018), recent RLE cellulitis s/p doxycycline, recent CDiff colitis, CAD admitted to OSH for symptomatic hyponatremia 2/2 psychogenic polydipsia complicated with new onset generalized seizures, intubated for hypoxic respiratory failure transferred to [facility] MICU for further management      #NEURO   //Seizure Disorder  -Extubated  -Per EEG, no active seizure activity noted. EEG d/c'ed.  -Mentation greatly improved; AOx4, no speech latency  - Change AEPs from IV to PO, c/w current dose     -C/w fosphenytoin 130 mg BID (was on 140 mg BID IV)    -C/w Keppra 1 g BID     -C/w Vimpat 200 mg BID  -F/u with fosphenytoin, keppra and vimpat serum levels   -Neuro checks  -Neurology recs appreciated, f/u on if high dose steroids needed or not    #CV  -off pressors  -Hypertensive likely 2/2 to floronef and medrol, continue to monitor  -C/w to hold home clonidine   -hx of MI, no stents placed    #RESP   //Hypoxic Respiratory failure 2/2 seizures, resolved  - Extubated, not requiring respiratory support  - Monitor RR, Spo2    //Hx of adenocarcinoma   -s/p RML wedge resection in 2018      #GI   //Chronic pancreatitis   -Passed bedside and official S/S assessment  -Advance to regular diet  -C/w Creon [id number] TID    #ID   //R/o Meningitis   - Viral and bacterial meningitis r/o: CSF neg for HSV 1/2, neg gram stain, neg viral PCR, off antibiotics, off contact isolation  - F/u remaining CSF studies  - F/u MRI w/ contrast today  - Neurology recs appreciated    //Clostridium difficile infection   -completed PO vanc 8/3-8/17, IV flagyl d/c on 8/16    #Renal   - Hyponatremic, urine studies concern for cerebral salt wasting  - Titrate NS 2% at rate of  cc/hr based on q4 CMP  - BMP q6  - Strict Is/Os  - C/w Zurita    #Rheum   //Hx of mixed connective tissue disorder   - C/w hydroxychloroquine 200 mg   - Solumedrol 20 mg QD, wean down to home dose of 4 mg QD    #Heme   - D/C Eliquis after venous duplex showed resolved L soleal DVT  - C/w heparin SQ    #PSYCH   //Alcohol withdrawal   -last drink 3 weeks ago, seizures unlikely 2/2 to withdrawal    #GOC  - DNR/DNI  - Health care proxy Anita River        For Follow-Up:  [ ]   [ ] MICU Transfer Note  ---------------------------    Transfer from: MICU  Transfer to:  (  ) Medicine    (  ) Telemetry    (  ) RCU    (  ) Palliative    (  ) Stroke Unit    (  ) _______________  Accepting Physician: Dr. Marin      MICU COURSE  59 y/o F with a h/o EtOH and opiate abuse s/p recent admissions for alcoholic pancreatitis and alcohol withdrawal, mixed connective tissue disorder (on plaquenil and methylprednisolone), chronic pain s/p lumbar laminectomy, left soleal vein thrombosis (started on apixaban, 7/2020), lung adenocarcinoma (s/p RML resection 2018), recent RLE cellulitis s/p doxycycline, recent CDiff colitis (on PO vancomycin, started 8/3, to finish 8/17), CAD s/p possible MI (ECHO ) admitted on 8/11 to OSH with symptomatic 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 RRT called because patient found to be unresponsive with generalized seizure activity with left UE/LE/facial twitching. Seizure activity would break transiently with IV lorazepam but recurred repetitively. Urgently intubated for hypoxic respiratory failure. Given keppra load 1000mg, vimpat load 200 mg and propofol 10 mcg/kg/min infusion, which successfully suppressed her seizures. Started on levophed drip for hypotension following intubation. Transferred to [facility] MICU for continuous EEG study.      At MICU, she was seen by neuro and continued on keppra 1g, propofol, and vimpat. Fosphenytoin was added for increased seizure activity and vEEG was started. Vancomycin was added to antibiotics on top of ceftriaxone and acyclovir given a fever spike when she was at OSH. Pt was weaned off levophed as her MAP stabilized above 60. CT Head was obtained which was negative for acute changes and an LP was performed. MRI was performed which revealed acute/subacute lacunar infarction within the right medial thalamus along with hypoperfused areas in bilateral frontal, temporal lobes consistent w/ post-ictal events. EEG revealed structural abnormality in the right frontotemporal region and moderate to severe nonspecific diffuse or multifocal cerebral dysfunction. She was extubated on August 20 and off propofol and midazolam, seen with improved mental status. Per neuro, seizures were more suspicious for inflammatory cause in setting of fevers and lateralized discharges, suspicious for autoimmune encephalitis based on MRI. Infectious cause was unlikely based on LP results. She started having hyponatremia post-extubation, likely signifying cerebral salt wasting due to high urine output, low BP, and high urine sodium and hypovolemia. She was started on 2% saline drip and given 1 dose of Florinef. Once sodium was stabilized, 2% saline was stopped. Antibiotics were stopped as she showed no signs of infection. MRI w/wo contrast was performed. She is stable for transfer to floors.     OBJECTIVE --  Vital Signs Last 24 Hrs  T(C): 36.8 (23 Aug 2020 20:00), Max: 37.2 (23 Aug 2020 16:00)  T(F): 98.3 (23 Aug 2020 20:00), Max: 99 (23 Aug 2020 16:00)  HR: 99 (23 Aug 2020 19:00) (86 - 99)  BP: 145/84 (23 Aug 2020 19:00) (126/69 - 181/85)  BP(mean): 109 (23 Aug 2020 19:00) (89 - 122)  RR: 29 (23 Aug 2020 19:00) (20 - 44)  SpO2: 96% (23 Aug 2020 18:00) (94% - 99%)    I&O's Summary    22 Aug 2020 07:01  -  23 Aug 2020 07:00  --------------------------------------------------------  IN: 1592 mL / OUT: 2370 mL / NET: -778 mL    23 Aug 2020 07:01  -  23 Aug 2020 20:15  --------------------------------------------------------  IN: 720 mL / OUT: 1100 mL / NET: -380 mL        MEDICATIONS  (STANDING):  chlorhexidine 4% Liquid 1 Application(s) Topical <User Schedule>  fludroCORTISONE 0.1 milliGRAM(s) Oral every 12 hours  heparin   Injectable 5000 Unit(s) SubCutaneous every 8 hours  hydroxychloroquine 200 milliGRAM(s) Oral daily  insulin lispro (HumaLOG) corrective regimen sliding scale   SubCutaneous every 6 hours  lacosamide 200 milliGRAM(s) Oral two times a day  levETIRAcetam 1000 milliGRAM(s) Oral two times a day  methylPREDNISolone sodium succinate Injectable 20 milliGRAM(s) IV Push daily  oxyCODONE  ER Tablet 10 milliGRAM(s) Oral every 8 hours  pancrelipase  (CREON 12,000 Lipase Units) 1 Capsule(s) Oral three times a day with meals  phenytoin   Capsule 130 milliGRAM(s) Oral two times a day    MEDICATIONS  (PRN):  acetaminophen   Tablet .. 650 milliGRAM(s) Oral once PRN Moderate Pain (4 - 6), Severe Pain (7 - 10)  ALPRAZolam 0.25 milliGRAM(s) Oral two times a day PRN anxiety  artificial  tears Solution 1 Drop(s) Both EYES three times a day PRN Eye irritation        LABS                                            11.1                  Neurophils% (auto):   69.2   (08-23 @ 02:13):    6.93 )-----------(330          Lymphocytes% (auto):  17.2                                          34.6                   Eosinphils% (auto):   1.9      Manual%: Neutrophils x    ; Lymphocytes x    ; Eosinophils x    ; Bands%: x    ; Blasts x                                    140    |  104    |  4                   Calcium: 9.5   / iCa: x      (08-23 @ 15:54)    ----------------------------<  105       Magnesium: 2.3                              3.9     |  22     |  0.46             Phosphorous: 3.6      TPro  6.2    /  Alb  4.0    /  TBili  0.2    /  DBili  x      /  AST  45     /  ALT  28     /  AlkPhos  38     23 Aug 2020 08:51    ( 08-23 @ 02:13 )   PT: 11.9 sec;   INR: 1.00 ratio  aPTT: 28.3 sec          ASSESSMENT & PLAN:   Assessment:  59 y/o F with a h/o EtOH withdrawal, polysubstance abuse, EtoH pancreatitis, mixed connective tissue disorder (plaquenil and methylprednisolone), chronic pain s/p lumbar laminectomy, left soleal vein thrombosis (apixaban) lung adenocarcinoma (s/p RML resection 2018), recent RLE cellulitis s/p doxycycline, recent CDiff colitis, CAD admitted to OSH for symptomatic hyponatremia 2/2 psychogenic polydipsia complicated with new onset generalized seizures, intubated for hypoxic respiratory failure transferred to [facility] MICU for further management      #NEURO   //Seizure Disorder  -Extubated  -Per EEG, no active seizure activity noted. EEG d/c'ed.  -Mentation greatly improved; AOx4, no speech latency  - Change AEPs from IV to PO, c/w current dose     -C/w fosphenytoin 130 mg BID (was on 140 mg BID IV)    -C/w Keppra 1 g BID     -C/w Vimpat 200 mg BID  -F/u with fosphenytoin, keppra and vimpat serum levels   -Neuro checks  -Neurology recs appreciated, f/u on if high dose steroids needed or not    #CV  -off pressors  -Hypertensive likely 2/2 to floronef and medrol, continue to monitor  -C/w to hold home clonidine   -hx of MI, no stents placed    #RESP   //Hypoxic Respiratory failure 2/2 seizures, resolved  - Extubated, not requiring respiratory support  - Monitor RR, Spo2    //Hx of adenocarcinoma   -s/p RML wedge resection in 2018      #GI   //Chronic pancreatitis   -Passed bedside and official S/S assessment  -Advance to regular diet  -C/w Creon [id number] TID    #ID   //R/o Meningitis   - Viral and bacterial meningitis r/o: CSF neg for HSV 1/2, neg gram stain, neg viral PCR, off antibiotics, off contact isolation  - F/u remaining CSF studies  - F/u MRI w/ contrast today  - Neurology recs appreciated    //Clostridium difficile infection   -completed PO vanc 8/3-8/17, IV flagyl d/c on 8/16    #Renal   - Hyponatremic, urine studies concern for cerebral salt wasting  - Titrate NS 2% at rate of  cc/hr based on q4 CMP  - BMP q6  - Strict Is/Os  - C/w Zurita    #Rheum   //Hx of mixed connective tissue disorder   - C/w hydroxychloroquine 200 mg   - Solumedrol 20 mg QD, wean down to home dose of 4 mg QD    #Heme   - D/C Eliquis after venous duplex showed resolved L soleal DVT  - C/w heparin SQ    #PSYCH   //Alcohol withdrawal   -last drink 3 weeks ago, seizures unlikely 2/2 to withdrawal    #GOC  - DNR/DNI  - Health care proxy Anita [place]        For Follow-Up:  [ ] F/u on Neurology recs (decision to taper AEDs and if need to start steroids)  [ ] Monitor BMP 4-6 hrs, restart 2% saline if Na starts dropping (due to cerebral salt wasting) MICU Transfer Note  ---------------------------    Transfer from: MICU  Transfer to:  (  ) Medicine    (  ) Telemetry    (  ) RCU    (  ) Palliative    (  ) Stroke Unit    (  ) _______________  Accepting Physician: Dr. Marin      MICU COURSE  61 y/o F with a h/o EtOH and opiate abuse s/p recent admissions for alcoholic pancreatitis and alcohol withdrawal, mixed connective tissue disorder (on plaquenil and methylprednisolone), chronic pain s/p lumbar laminectomy, left soleal vein thrombosis (started on apixaban, 7/2020), lung adenocarcinoma (s/p RML resection 2018), recent RLE cellulitis s/p doxycycline, recent CDiff colitis (on PO vancomycin, started 8/3, to finish 8/17), CAD s/p possible MI (ECHO ) admitted on 8/11 to OSH with symptomatic 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 RRT called because patient found to be unresponsive with generalized seizure activity with left UE/LE/facial twitching. Seizure activity would break transiently with IV lorazepam but recurred repetitively. Urgently intubated for hypoxic respiratory failure. Given keppra load 1000mg, vimpat load 200 mg and propofol 10 mcg/kg/min infusion, which successfully suppressed her seizures. Started on levophed drip for hypotension following intubation. Transferred to [facility] MICU for continuous EEG study.      At MICU, she was seen by neuro and continued on keppra 1g, propofol, and vimpat. Fosphenytoin was added for increased seizure activity and vEEG was started. Vancomycin was added to antibiotics on top of ceftriaxone and acyclovir given a fever spike when she was at OSH. Pt was weaned off levophed as her MAP stabilized above 60. CT Head was obtained which was negative for acute changes and an LP was performed. MRI was performed which revealed acute/subacute lacunar infarction within the right medial thalamus along with hypoperfused areas in bilateral frontal, temporal lobes consistent w/ post-ictal events. EEG revealed structural abnormality in the right frontotemporal region and moderate to severe nonspecific diffuse or multifocal cerebral dysfunction. She was extubated on August 20 and off propofol and midazolam, seen with improved mental status. Per neuro, seizures were more suspicious for inflammatory cause in setting of fevers and lateralized discharges, suspicious for autoimmune encephalitis based on MRI. Infectious cause was unlikely based on LP results. She started having hyponatremia post-extubation, likely signifying cerebral salt wasting due to high urine output, low BP, and high urine sodium and hypovolemia. She was started on 2% saline drip and given 1 dose of Florinef. Once sodium was stabilized, 2% saline was stopped. Antibiotics were stopped as she showed no signs of infection. MRI w/wo contrast was performed. She is stable for transfer to floors.     OBJECTIVE --  Vital Signs Last 24 Hrs  T(C): 36.8 (23 Aug 2020 20:00), Max: 37.2 (23 Aug 2020 16:00)  T(F): 98.3 (23 Aug 2020 20:00), Max: 99 (23 Aug 2020 16:00)  HR: 99 (23 Aug 2020 19:00) (86 - 99)  BP: 145/84 (23 Aug 2020 19:00) (126/69 - 181/85)  BP(mean): 109 (23 Aug 2020 19:00) (89 - 122)  RR: 29 (23 Aug 2020 19:00) (20 - 44)  SpO2: 96% (23 Aug 2020 18:00) (94% - 99%)    I&O's Summary    22 Aug 2020 07:01  -  23 Aug 2020 07:00  --------------------------------------------------------  IN: 1592 mL / OUT: 2370 mL / NET: -778 mL    23 Aug 2020 07:01  -  23 Aug 2020 20:15  --------------------------------------------------------  IN: 720 mL / OUT: 1100 mL / NET: -380 mL        MEDICATIONS  (STANDING):  chlorhexidine 4% Liquid 1 Application(s) Topical <User Schedule>  fludroCORTISONE 0.1 milliGRAM(s) Oral every 12 hours  heparin   Injectable 5000 Unit(s) SubCutaneous every 8 hours  hydroxychloroquine 200 milliGRAM(s) Oral daily  insulin lispro (HumaLOG) corrective regimen sliding scale   SubCutaneous every 6 hours  lacosamide 200 milliGRAM(s) Oral two times a day  levETIRAcetam 1000 milliGRAM(s) Oral two times a day  methylPREDNISolone sodium succinate Injectable 20 milliGRAM(s) IV Push daily  oxyCODONE  ER Tablet 10 milliGRAM(s) Oral every 8 hours  pancrelipase  (CREON 12,000 Lipase Units) 1 Capsule(s) Oral three times a day with meals  phenytoin   Capsule 130 milliGRAM(s) Oral two times a day    MEDICATIONS  (PRN):  acetaminophen   Tablet .. 650 milliGRAM(s) Oral once PRN Moderate Pain (4 - 6), Severe Pain (7 - 10)  ALPRAZolam 0.25 milliGRAM(s) Oral two times a day PRN anxiety  artificial  tears Solution 1 Drop(s) Both EYES three times a day PRN Eye irritation        LABS                                            11.1                  Neurophils% (auto):   69.2   (08-23 @ 02:13):    6.93 )-----------(330          Lymphocytes% (auto):  17.2                                          34.6                   Eosinphils% (auto):   1.9      Manual%: Neutrophils x    ; Lymphocytes x    ; Eosinophils x    ; Bands%: x    ; Blasts x                                    140    |  104    |  4                   Calcium: 9.5   / iCa: x      (08-23 @ 15:54)    ----------------------------<  105       Magnesium: 2.3                              3.9     |  22     |  0.46             Phosphorous: 3.6      TPro  6.2    /  Alb  4.0    /  TBili  0.2    /  DBili  x      /  AST  45     /  ALT  28     /  AlkPhos  38     23 Aug 2020 08:51    ( 08-23 @ 02:13 )   PT: 11.9 sec;   INR: 1.00 ratio  aPTT: 28.3 sec          ASSESSMENT & PLAN:   Assessment:  61 y/o F with a h/o EtOH withdrawal, polysubstance abuse, EtoH pancreatitis, mixed connective tissue disorder (plaquenil and methylprednisolone), chronic pain s/p lumbar laminectomy, left soleal vein thrombosis (apixaban) lung adenocarcinoma (s/p RML resection 2018), recent RLE cellulitis s/p doxycycline, recent CDiff colitis, CAD admitted to OSH for symptomatic hyponatremia 2/2 psychogenic polydipsia complicated with new onset generalized seizures, intubated for hypoxic respiratory failure transferred to [facility] MICU for further management      #NEURO   //Seizure Disorder  -Extubated  -Per EEG, no active seizure activity noted. EEG d/c'ed.  -Mentation greatly improved; AOx4, no speech latency  - Change AEPs from IV to PO, c/w current dose     -C/w fosphenytoin 130 mg BID (was on 140 mg BID IV)    -C/w Keppra 1 g BID     -C/w Vimpat 200 mg BID  -F/u with fosphenytoin, keppra and vimpat serum levels   -Neuro checks  -Neurology recs appreciated, f/u on if high dose steroids needed or not    #CV  -off pressors  -Hypertensive likely 2/2 to floronef and medrol, continue to monitor  -C/w to hold home clonidine   -hx of MI, no stents placed    #RESP   //Hypoxic Respiratory failure 2/2 seizures, resolved  - Extubated, not requiring respiratory support  - Monitor RR, Spo2    //Hx of adenocarcinoma   -s/p RML wedge resection in 2018      #GI   //Chronic pancreatitis   -Passed bedside and official S/S assessment  -Advance to regular diet  -C/w Creon [id number] TID    #ID   //R/o Meningitis   - Viral and bacterial meningitis r/o: CSF neg for HSV 1/2, neg gram stain, neg viral PCR, off antibiotics, off contact isolation  - F/u remaining CSF studies  - F/u MRI w/ contrast today  - Neurology recs appreciated    //Clostridium difficile infection   -completed PO vanc 8/3-8/17, IV flagyl d/c on 8/16    #Renal   - Hyponatremic, urine studies concern for cerebral salt wasting  - Titrate NS 2% at rate of  cc/hr based on q4 CMP  - BMP q6  - Strict Is/Os  - C/w Zurita    #Rheum   //Hx of mixed connective tissue disorder   - C/w hydroxychloroquine 200 mg   - Solumedrol 20 mg QD, wean down to home dose of 4 mg QD    #Heme   - D/C Eliquis after venous duplex showed resolved L soleal DVT  - C/w heparin SQ    #PSYCH   //Alcohol withdrawal   -last drink 3 weeks ago, seizures unlikely 2/2 to withdrawal    #GOC  - DNR/DNI  - Health care proxy Anita Aleta        For Follow-Up:  [ ] F/u on Neurology recs (decision to taper AEDs and if need to start steroids)  [ ] F/u remaining CSF studies  [ ] Monitor BMP 4-6 hrs, restart 2% saline if Na starts dropping (due to cerebral salt wasting) MICU Transfer Note  ---------------------------    Transfer from: MICU  Transfer to:  ( X ) Medicine    (  ) Telemetry    (  ) RCU    (  ) Palliative    (  ) Stroke Unit    (  ) _______________  Accepting Physician: Dr. Marin      MICU COURSE  61 y/o F with a h/o EtOH and opiate abuse s/p recent admissions for alcoholic pancreatitis and alcohol withdrawal, mixed connective tissue disorder (on plaquenil and methylprednisolone), chronic pain s/p lumbar laminectomy, left soleal vein thrombosis (started on apixaban, 7/2020), lung adenocarcinoma (s/p RML resection 2018), recent RLE cellulitis s/p doxycycline, recent CDiff colitis (on PO vancomycin, started 8/3, to finish 8/17), CAD s/p possible MI (ECHO ) admitted on 8/11 to OSH with symptomatic 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 RRT called because patient found to be unresponsive with generalized seizure activity with left UE/LE/facial twitching. Seizure activity would break transiently with IV lorazepam but recurred repetitively. Urgently intubated for hypoxic respiratory failure. Given keppra load 1000mg, vimpat load 200 mg and propofol 10 mcg/kg/min infusion, which successfully suppressed her seizures. Started on levophed drip for hypotension following intubation. Transferred to [facility] MICU for continuous EEG study.      At MICU, she was seen by neuro and continued on keppra 1g, propofol, and vimpat. Fosphenytoin was added for increased seizure activity and vEEG was started. Vancomycin was added to antibiotics on top of ceftriaxone and acyclovir given a fever spike when she was at OSH. Pt was weaned off levophed as her MAP stabilized above 60. CT Head was obtained which was negative for acute changes and an LP was performed. MRI was performed which revealed acute/subacute lacunar infarction within the right medial thalamus along with hypoperfused areas in bilateral frontal, temporal lobes consistent w/ post-ictal events. EEG revealed structural abnormality in the right frontotemporal region and moderate to severe nonspecific diffuse or multifocal cerebral dysfunction. She was extubated on August 20 and off propofol and midazolam, seen with improved mental status. Per neuro, seizures were more suspicious for inflammatory cause in setting of fevers and lateralized discharges, suspicious for autoimmune encephalitis based on MRI. Infectious cause was unlikely based on LP results. She started having hyponatremia post-extubation, likely signifying cerebral salt wasting due to high urine output, low BP, and high urine sodium and hypovolemia. She was started on 2% saline drip and given 1 dose of Florinef. Once sodium was stabilized, 2% saline was stopped. Antibiotics were stopped as she showed no signs of infection. MRI w/wo contrast was performed. She is stable for transfer to floors.     OBJECTIVE --  Vital Signs Last 24 Hrs  T(C): 36.8 (23 Aug 2020 20:00), Max: 37.2 (23 Aug 2020 16:00)  T(F): 98.3 (23 Aug 2020 20:00), Max: 99 (23 Aug 2020 16:00)  HR: 99 (23 Aug 2020 19:00) (86 - 99)  BP: 145/84 (23 Aug 2020 19:00) (126/69 - 181/85)  BP(mean): 109 (23 Aug 2020 19:00) (89 - 122)  RR: 29 (23 Aug 2020 19:00) (20 - 44)  SpO2: 96% (23 Aug 2020 18:00) (94% - 99%)    I&O's Summary    22 Aug 2020 07:01  -  23 Aug 2020 07:00  --------------------------------------------------------  IN: 1592 mL / OUT: 2370 mL / NET: -778 mL    23 Aug 2020 07:01  -  23 Aug 2020 20:15  --------------------------------------------------------  IN: 720 mL / OUT: 1100 mL / NET: -380 mL        MEDICATIONS  (STANDING):  chlorhexidine 4% Liquid 1 Application(s) Topical <User Schedule>  fludroCORTISONE 0.1 milliGRAM(s) Oral every 12 hours  heparin   Injectable 5000 Unit(s) SubCutaneous every 8 hours  hydroxychloroquine 200 milliGRAM(s) Oral daily  insulin lispro (HumaLOG) corrective regimen sliding scale   SubCutaneous every 6 hours  lacosamide 200 milliGRAM(s) Oral two times a day  levETIRAcetam 1000 milliGRAM(s) Oral two times a day  methylPREDNISolone sodium succinate Injectable 20 milliGRAM(s) IV Push daily  oxyCODONE  ER Tablet 10 milliGRAM(s) Oral every 8 hours  pancrelipase  (CREON 12,000 Lipase Units) 1 Capsule(s) Oral three times a day with meals  phenytoin   Capsule 130 milliGRAM(s) Oral two times a day    MEDICATIONS  (PRN):  acetaminophen   Tablet .. 650 milliGRAM(s) Oral once PRN Moderate Pain (4 - 6), Severe Pain (7 - 10)  ALPRAZolam 0.25 milliGRAM(s) Oral two times a day PRN anxiety  artificial  tears Solution 1 Drop(s) Both EYES three times a day PRN Eye irritation        LABS                                            11.1                  Neurophils% (auto):   69.2   (08-23 @ 02:13):    6.93 )-----------(330          Lymphocytes% (auto):  17.2                                          34.6                   Eosinphils% (auto):   1.9      Manual%: Neutrophils x    ; Lymphocytes x    ; Eosinophils x    ; Bands%: x    ; Blasts x                                    140    |  104    |  4                   Calcium: 9.5   / iCa: x      (08-23 @ 15:54)    ----------------------------<  105       Magnesium: 2.3                              3.9     |  22     |  0.46             Phosphorous: 3.6      TPro  6.2    /  Alb  4.0    /  TBili  0.2    /  DBili  x      /  AST  45     /  ALT  28     /  AlkPhos  38     23 Aug 2020 08:51    ( 08-23 @ 02:13 )   PT: 11.9 sec;   INR: 1.00 ratio  aPTT: 28.3 sec          ASSESSMENT & PLAN:   Assessment:  61 y/o F with a h/o EtOH withdrawal, polysubstance abuse, EtoH pancreatitis, mixed connective tissue disorder (plaquenil and methylprednisolone), chronic pain s/p lumbar laminectomy, left soleal vein thrombosis (apixaban) lung adenocarcinoma (s/p RML resection 2018), recent RLE cellulitis s/p doxycycline, recent CDiff colitis, CAD admitted to OSH for symptomatic hyponatremia 2/2 psychogenic polydipsia complicated with new onset generalized seizures, intubated for hypoxic respiratory failure transferred to [facility] MICU for further management      #NEURO   //Seizure Disorder  -Extubated  -Per EEG, no active seizure activity noted. EEG d/c'ed.  -Mentation greatly improved; AOx4, no speech latency  - Change AEPs from IV to PO, c/w current dose     -C/w fosphenytoin 130 mg BID (was on 140 mg BID IV)    -C/w Keppra 1 g BID     -C/w Vimpat 200 mg BID  -F/u with fosphenytoin, keppra and vimpat serum levels   -Neuro checks  -Neurology recs appreciated, f/u on if high dose steroids needed or not    #CV  -off pressors  -Hypertensive likely 2/2 to floronef and medrol, continue to monitor  -C/w to hold home clonidine   -hx of MI, no stents placed    #RESP   //Hypoxic Respiratory failure 2/2 seizures, resolved  - Extubated, not requiring respiratory support  - Monitor RR, Spo2    //Hx of adenocarcinoma   -s/p RML wedge resection in 2018      #GI   //Chronic pancreatitis   -Passed bedside and official S/S assessment  -Advance to regular diet  -C/w Creon [id number] TID    #ID   //R/o Meningitis   - Viral and bacterial meningitis r/o: CSF neg for HSV 1/2, neg gram stain, neg viral PCR, off antibiotics, off contact isolation  - F/u remaining CSF studies  - F/u MRI w/ contrast today  - Neurology recs appreciated    //Clostridium difficile infection   -completed PO vanc 8/3-8/17, IV flagyl d/c on 8/16    #Renal   - Hyponatremic, urine studies concern for cerebral salt wasting  - Titrate NS 2% at rate of  cc/hr based on q4 CMP  - BMP q6  - Strict Is/Os  - C/w Zurita    #Rheum   //Hx of mixed connective tissue disorder   - C/w hydroxychloroquine 200 mg   - Solumedrol 20 mg QD, wean down to home dose of 4 mg QD    #Heme   - D/C Eliquis after venous duplex showed resolved L soleal DVT  - C/w heparin SQ    #PSYCH   //Alcohol withdrawal   -last drink 3 weeks ago, seizures unlikely 2/2 to withdrawal    #GOC  - DNR/DNI  - Health care proxy Anita River        For Follow-Up:  [ ] F/u on Neurology recs (decision to taper AEDs and if need to start steroids)  [ ] F/u remaining CSF studies, paraneoplastic studies, vimpat, and keppra levels  [ ] Monitor BMP 4-6 hrs, restart 2% saline if Na starts dropping (due to cerebral salt wasting) MICU Transfer Note  ---------------------------    Transfer from: MICU  Transfer to:  ( X ) Medicine    (  ) Telemetry    (  ) RCU    (  ) Palliative    (  ) Stroke Unit    (  ) _______________  Accepting Physician: Dr. Marin      MICU COURSE  61 y/o F with a h/o EtOH and opiate abuse s/p recent admissions for alcoholic pancreatitis and alcohol withdrawal, mixed connective tissue disorder (on plaquenil and methylprednisolone), chronic pain s/p lumbar laminectomy, left soleal vein thrombosis (started on apixaban, 7/2020), lung adenocarcinoma (s/p RML resection 2018), recent RLE cellulitis s/p doxycycline, recent CDiff colitis (on PO vancomycin, started 8/3, to finish 8/17), CAD s/p possible MI (ECHO ) admitted on 8/11 to OSH with symptomatic 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 RRT called because patient found to be unresponsive with generalized seizure activity with left UE/LE/facial twitching. Seizure activity would break transiently with IV lorazepam but recurred repetitively. Urgently intubated for hypoxic respiratory failure. Given keppra load 1000mg, vimpat load 200 mg and propofol 10 mcg/kg/min infusion, which successfully suppressed her seizures. Started on levophed drip for hypotension following intubation. Transferred to [facility] MICU for continuous EEG study.      At MICU, she was seen by neuro and continued on keppra, propofol, and vimpat. Fosphenytoin was added for increased seizure activity and vEEG was started. Vancomycin, ceftriaxone, and acyclovir were started given pt had a fever spike when she was at OSH. Pt was weaned off levophed as her MAP stabilized above 60. CT Head was obtained which was negative for acute changes and an LP was performed. MRI was performed which revealed acute/subacute lacunar infarction within the right medial thalamus along with hypoperfused areas in bilateral frontal, temporal lobes consistent w/ post-ictal events. EEG revealed structural abnormality in the right frontotemporal region and moderate to severe nonspecific diffuse or multifocal cerebral dysfunction. She was extubated on August 20 and off propofol and midazolam, seen with improved mental status. Per neuro, seizures were more suspicious for inflammatory cause in setting of fevers and lateralized discharges, suspicious for autoimmune encephalitis based on MRI. Infectious cause was unlikely based on LP results. She started having hyponatremia post-extubation, likely signifying cerebral salt wasting due to high urine output, low BP, and high urine sodium and hypovolemia. She was started on 2% saline drip and given 1 dose of Florinef. Once sodium was stabilized, 2% saline was stopped. Antibiotics were stopped as she showed no signs of infection. MRI w/wo contrast was performed. She is stable for transfer to floors.     OBJECTIVE --  Vital Signs Last 24 Hrs  T(C): 36.8 (23 Aug 2020 20:00), Max: 37.2 (23 Aug 2020 16:00)  T(F): 98.3 (23 Aug 2020 20:00), Max: 99 (23 Aug 2020 16:00)  HR: 99 (23 Aug 2020 19:00) (86 - 99)  BP: 145/84 (23 Aug 2020 19:00) (126/69 - 181/85)  BP(mean): 109 (23 Aug 2020 19:00) (89 - 122)  RR: 29 (23 Aug 2020 19:00) (20 - 44)  SpO2: 96% (23 Aug 2020 18:00) (94% - 99%)    I&O's Summary    22 Aug 2020 07:01  -  23 Aug 2020 07:00  --------------------------------------------------------  IN: 1592 mL / OUT: 2370 mL / NET: -778 mL    23 Aug 2020 07:01  -  23 Aug 2020 20:15  --------------------------------------------------------  IN: 720 mL / OUT: 1100 mL / NET: -380 mL        MEDICATIONS  (STANDING):  chlorhexidine 4% Liquid 1 Application(s) Topical <User Schedule>  fludroCORTISONE 0.1 milliGRAM(s) Oral every 12 hours  heparin   Injectable 5000 Unit(s) SubCutaneous every 8 hours  hydroxychloroquine 200 milliGRAM(s) Oral daily  insulin lispro (HumaLOG) corrective regimen sliding scale   SubCutaneous every 6 hours  lacosamide 200 milliGRAM(s) Oral two times a day  levETIRAcetam 1000 milliGRAM(s) Oral two times a day  methylPREDNISolone sodium succinate Injectable 20 milliGRAM(s) IV Push daily  oxyCODONE  ER Tablet 10 milliGRAM(s) Oral every 8 hours  pancrelipase  (CREON 12,000 Lipase Units) 1 Capsule(s) Oral three times a day with meals  phenytoin   Capsule 130 milliGRAM(s) Oral two times a day    MEDICATIONS  (PRN):  acetaminophen   Tablet .. 650 milliGRAM(s) Oral once PRN Moderate Pain (4 - 6), Severe Pain (7 - 10)  ALPRAZolam 0.25 milliGRAM(s) Oral two times a day PRN anxiety  artificial  tears Solution 1 Drop(s) Both EYES three times a day PRN Eye irritation        LABS                                            11.1                  Neurophils% (auto):   69.2   (08-23 @ 02:13):    6.93 )-----------(330          Lymphocytes% (auto):  17.2                                          34.6                   Eosinphils% (auto):   1.9      Manual%: Neutrophils x    ; Lymphocytes x    ; Eosinophils x    ; Bands%: x    ; Blasts x                                    140    |  104    |  4                   Calcium: 9.5   / iCa: x      (08-23 @ 15:54)    ----------------------------<  105       Magnesium: 2.3                              3.9     |  22     |  0.46             Phosphorous: 3.6      TPro  6.2    /  Alb  4.0    /  TBili  0.2    /  DBili  x      /  AST  45     /  ALT  28     /  AlkPhos  38     23 Aug 2020 08:51    ( 08-23 @ 02:13 )   PT: 11.9 sec;   INR: 1.00 ratio  aPTT: 28.3 sec          ASSESSMENT & PLAN:   Assessment:  61 y/o F with a h/o EtOH withdrawal, polysubstance abuse, EtoH pancreatitis, mixed connective tissue disorder (plaquenil and methylprednisolone), chronic pain s/p lumbar laminectomy, left soleal vein thrombosis (apixaban) lung adenocarcinoma (s/p RML resection 2018), recent RLE cellulitis s/p doxycycline, recent CDiff colitis, CAD admitted to OSH for symptomatic hyponatremia 2/2 psychogenic polydipsia complicated with new onset generalized seizures, intubated for hypoxic respiratory failure transferred to [facility] MICU for further management      #NEURO   //Seizure Disorder  -Extubated  -Per EEG, no active seizure activity noted. EEG d/c'ed.  -Mentation greatly improved; AOx4, no speech latency  - Change AEPs from IV to PO, c/w current dose     -C/w fosphenytoin 130 mg BID (was on 140 mg BID IV)    -C/w Keppra 1 g BID     -C/w Vimpat 200 mg BID  -F/u with fosphenytoin, keppra and vimpat serum levels   -Neuro checks  -Neurology recs appreciated, f/u on if high dose steroids needed or not    #CV  -off pressors  -Hypertensive likely 2/2 to floronef and medrol, continue to monitor  -C/w to hold home clonidine   -hx of MI, no stents placed    #RESP   //Hypoxic Respiratory failure 2/2 seizures, resolved  - Extubated, not requiring respiratory support  - Monitor RR, Spo2    //Hx of adenocarcinoma   -s/p RML wedge resection in 2018      #GI   //Chronic pancreatitis   -Passed bedside and official S/S assessment  -Advance to regular diet  -C/w Creon [id number] TID    #ID   //R/o Meningitis   - Viral and bacterial meningitis r/o: CSF neg for HSV 1/2, neg gram stain, neg viral PCR, off antibiotics, off contact isolation  - F/u remaining CSF studies  - F/u MRI w/ contrast today  - Neurology recs appreciated    //Clostridium difficile infection   -completed PO vanc 8/3-8/17, IV flagyl d/c on 8/16    #Renal   - Hyponatremic, urine studies concern for cerebral salt wasting  - Titrate NS 2% at rate of  cc/hr based on q4 CMP  - BMP q6  - Strict Is/Os  - C/w Zurita    #Rheum   //Hx of mixed connective tissue disorder   - C/w hydroxychloroquine 200 mg   - Solumedrol 20 mg QD, wean down to home dose of 4 mg QD    #Heme   - D/C Eliquis after venous duplex showed resolved L soleal DVT  - C/w heparin SQ    #PSYCH   //Alcohol withdrawal   -last drink 3 weeks ago, seizures unlikely 2/2 to withdrawal    #GOC  - DNR/DNI  - Health care proxy Anita Aleta        For Follow-Up:  [ ] F/u on Neurology recs (decision to taper AEDs and if need to start steroids)  [ ] F/u remaining CSF studies, paraneoplastic studies, vimpat, and keppra levels  [ ] Monitor BMP 4-6 hrs, restart 2% saline if Na starts dropping (due to cerebral salt wasting)

## 2020-08-23 NOTE — PROGRESS NOTE ADULT - ASSESSMENT
Assessment:  61 y/o F with a h/o EtOH withdrawal, polysubstance abuse, EtoH pancreatitis, mixed connective tissue disorder (plaquenil and methylprednisolone), chronic pain s/p lumbar laminectomy, left soleal vein thrombosis (apixaban) lung adenocarcinoma (s/p RML resection 2018), recent RLE cellulitis s/p doxycycline, recent CDiff colitis, CAD admitted to OSH for symptomatic hyponatremia 2/2 psychogenic polydipsia complicated with new onset generalized seizures, intubated for hypoxic respiratory failure transferred to [facility] MICU for further management      #NEURO   //Seizure Disorder  -Extubated  -Per EEG, no active seizure activity noted  -D/C EEG  - Noted for increased speech latency, often fails to answer questions. A/O x1. Concern for post-ictal confusion vs intermittent seizures   -C/w with AEPs at current dose     -C/w fosphenytoin 140 mg BID    -C/w Keppra 1 g BID     -C/w Vimpat 200 mg BID  -F/u with fosphenytoin, keppra and vimpat serum levels   -Neuro checks  -Neurology recs appreciated     #CV  -off pressors  -C/w to hold home clonidine   -hx of MI, no stents placed    #RESP   //Hypoxic Respiratory failure 2/2 seizures, resolved  - Extubated, not requiring respiratory support  - Monitor RR, Spo2  - Aspiration precautions, dysphagia diet    //Hx of adenocarcinoma   -s/p RML wedge resection in 2018      #GI   //Chronic pancreatitis   -Passed bedside and official S/S assessment  -Dysphagia diet    #ID   //R/o Meningitis   - Viral and bacterial meningitis r/o: CSF neg for HSV 1/2, neg gram stain, neg viral PCR  - Off contact isolation  - Concern for autoimmune meningitis: f/u CSF cell count; Glucose; Protein, CSF AFB; CSF fungal cx; CSF VDRL titer; Oligoclonal bands; Myelin Basic Protein, Anti-MOG; Cryptococcal antigen. Autoimmune encephelopathy CSF panel, RT quic CSF, 14-3-3   - MRI w/ contrast today  - Neurology recs appreciated    //Clostridium difficile infection   -completed PO vanc 8/3-8/17, IV flagyl d/c on 8/16    #Renal   - Hyponatremic, urine studies concern for cerebral salt wasting  - Titrate NS 2% at rate of  cc/hr based on q4 CMP  - f/u repeat BMP and urine studies  - Strict Is/Os  - C/w Zurita    #Rheum   //Hx of mixed connective tissue disorder   - C/w hydroxychloroquine 200 mg   - Solumedrol 20q8hr    #Heme   - Hold home Eliquis  - C/w heparin SQ    #PSYCH   //Alcohol withdrawal   -last drink 3 weeks ago, seizures unlikely 2/2 to withdrawal Assessment:  59 y/o F with a h/o EtOH withdrawal, polysubstance abuse, EtoH pancreatitis, mixed connective tissue disorder (plaquenil and methylprednisolone), chronic pain s/p lumbar laminectomy, left soleal vein thrombosis (apixaban) lung adenocarcinoma (s/p RML resection 2018), recent RLE cellulitis s/p doxycycline, recent CDiff colitis, CAD admitted to OSH for symptomatic hyponatremia 2/2 psychogenic polydipsia complicated with new onset generalized seizures, intubated for hypoxic respiratory failure transferred to [facility] MICU for further management      #NEURO   //Seizure Disorder  -Extubated  -Per EEG, no active seizure activity noted. EEG d/c'ed.  -Mentation greatly improved; AOx4, no speech latency  - Change AEPs from IV to PO, c/w current dose     -C/w fosphenytoin 140 mg BID    -C/w Keppra 1 g BID     -C/w Vimpat 200 mg BID  -F/u with fosphenytoin, keppra and vimpat serum levels   -Neuro checks  -Neurology recs appreciated     #CV  -off pressors  -Hypertensive likely 2/2 to floronef and medrol, continue to monitor  -C/w to hold home clonidine   -hx of MI, no stents placed    #RESP   //Hypoxic Respiratory failure 2/2 seizures, resolved  - Extubated, not requiring respiratory support  - Monitor RR, Spo2    //Hx of adenocarcinoma   -s/p RML wedge resection in 2018      #GI   //Chronic pancreatitis   -Passed bedside and official S/S assessment  -Advance to regular diet  -C/w Creon [id number] TID    #ID   //R/o Meningitis   - Viral and bacterial meningitis r/o: CSF neg for HSV 1/2, neg gram stain, neg viral PCR, off antibiotics, off contact isolation  - F/u remaining CSF studies  - F/u MRI w/ contrast today  - Neurology recs appreciated    //Clostridium difficile infection   -completed PO vanc 8/3-8/17, IV flagyl d/c on 8/16    #Renal   - Hyponatremic, urine studies concern for cerebral salt wasting  - Titrate NS 2% at rate of  cc/hr based on q4 CMP  - BMP q6  - Strict Is/Os  - C/w Zurita    #Rheum   //Hx of mixed connective tissue disorder   - C/w hydroxychloroquine 200 mg   - Solumedrol 20 mg QD, wean down to home dose of 4 mg QD    #Heme   - D/C Eliquis after venous duplex showed resolved L soleal DVT  - C/w heparin SQ    #PSYCH   //Alcohol withdrawal   -last drink 3 weeks ago, seizures unlikely 2/2 to withdrawal    #GOC  - DNR/DNI  - Health care proxy Anita River

## 2020-08-23 NOTE — PROGRESS NOTE ADULT - SUBJECTIVE AND OBJECTIVE BOX
Patient is a 60y old  Female who presents with a chief complaint of Seizures (22 Aug 2020 12:46)      INTERVAL HPI/OVERNIGHT EVENTS:   No overnight events   Afebrile, hemodynamically stable     ICU Vital Signs Last 24 Hrs  T(C): 37.1 (23 Aug 2020 04:00), Max: 37.2 (22 Aug 2020 20:00)  T(F): 98.8 (23 Aug 2020 04:00), Max: 99 (22 Aug 2020 20:00)  HR: 97 (23 Aug 2020 07:00) (90 - 102)  BP: 181/85 (23 Aug 2020 07:00) (130/71 - 181/85)  BP(mean): 122 (23 Aug 2020 07:00) (89 - 122)  ABP: --  ABP(mean): --  RR: 39 (23 Aug 2020 07:00) (19 - 44)  SpO2: 99% (23 Aug 2020 06:00) (94% - 99%)    I&O's Summary    22 Aug 2020 07:01  -  23 Aug 2020 07:00  --------------------------------------------------------  IN: 1592 mL / OUT: 2320 mL / NET: -728 mL          LABS:                        11.1   6.93  )-----------( 330      ( 23 Aug 2020 02:13 )             34.6     08-23    138  |  104  |  5<L>  ----------------------------<  127<H>  3.3<L>   |  22  |  0.38<L>    Ca    9.2      23 Aug 2020 02:13  Phos  2.8     08-23  Mg     2.1     08-23    TPro  6.0  /  Alb  3.3  /  TBili  0.1<L>  /  DBili  x   /  AST  38  /  ALT  25  /  AlkPhos  32<L>  08-23    PT/INR - ( 23 Aug 2020 02:13 )   PT: 11.9 sec;   INR: 1.00 ratio         PTT - ( 23 Aug 2020 02:13 )  PTT:28.3 sec    CAPILLARY BLOOD GLUCOSE      POCT Blood Glucose.: 135 mg/dL (23 Aug 2020 05:36)  POCT Blood Glucose.: 109 mg/dL (22 Aug 2020 22:27)  POCT Blood Glucose.: 97 mg/dL (22 Aug 2020 18:33)  POCT Blood Glucose.: 117 mg/dL (22 Aug 2020 11:39)        RADIOLOGY & ADDITIONAL TESTS:    Consultant(s) Notes Reviewed:  [x ] YES  [ ] NO    MEDICATIONS  (STANDING):  chlorhexidine 4% Liquid 1 Application(s) Topical <User Schedule>  fludroCORTISONE 0.1 milliGRAM(s) Oral every 12 hours  fosphenytoin IVPB 140 milliGRAM(s) PE IV Intermittent every 12 hours  heparin   Injectable 5000 Unit(s) SubCutaneous every 8 hours  hydroxychloroquine 200 milliGRAM(s) Oral daily  insulin lispro (HumaLOG) corrective regimen sliding scale   SubCutaneous every 6 hours  lacosamide IVPB 200 milliGRAM(s) IV Intermittent every 12 hours  levETIRAcetam  IVPB 1000 milliGRAM(s) IV Intermittent every 12 hours  methylPREDNISolone sodium succinate Injectable 20 milliGRAM(s) IV Push daily  sodium chloride 2% . 1000 milliLiter(s) (50 mL/Hr) IV Continuous <Continuous>    MEDICATIONS  (PRN):  acetaminophen   Tablet .. 650 milliGRAM(s) Oral once PRN Moderate Pain (4 - 6), Severe Pain (7 - 10)  artificial  tears Solution 1 Drop(s) Both EYES three times a day PRN Eye irritation      PHYSICAL EXAM:  GENERAL:   HEAD:  Atraumatic, Normocephalic  EYES: EOMI, PERRLA, conjunctiva and sclera clear  NECK: Supple, No JVD, Normal thyroid, no enlarged nodes  NERVOUS SYSTEM:  Alert & Awake.   CHEST/LUNG: B/L good air entry; No rales, rhonchi, or wheezing  HEART: S1S2 normal, no S3, Regular rate and rhythm; No murmurs  ABDOMEN: Soft, Nontender, Nondistended; Bowel sounds present  EXTREMITIES:  2+ Peripheral Pulses, No clubbing, cyanosis, or edema  LYMPH: No lymphadenopathy noted  SKIN: No rashes or lesions    Care Discussed with Consultants/Other Providers [ x] YES  [ ] NO Patient is a 60y old  Female who presents with a chief complaint of Seizures (22 Aug 2020 12:46)      INTERVAL HPI/OVERNIGHT EVENTS:   Mentation improved, AOx4  No overnight events   Afebrile, hemodynamically stable     ICU Vital Signs Last 24 Hrs  T(C): 37.1 (23 Aug 2020 04:00), Max: 37.2 (22 Aug 2020 20:00)  T(F): 98.8 (23 Aug 2020 04:00), Max: 99 (22 Aug 2020 20:00)  HR: 97 (23 Aug 2020 07:00) (90 - 102)  BP: 181/85 (23 Aug 2020 07:00) (130/71 - 181/85)  BP(mean): 122 (23 Aug 2020 07:00) (89 - 122)  ABP: --  ABP(mean): --  RR: 39 (23 Aug 2020 07:00) (19 - 44)  SpO2: 99% (23 Aug 2020 06:00) (94% - 99%)    I&O's Summary    22 Aug 2020 07:01  -  23 Aug 2020 07:00  --------------------------------------------------------  IN: 1592 mL / OUT: 2320 mL / NET: -728 mL          LABS:                        11.1   6.93  )-----------( 330      ( 23 Aug 2020 02:13 )             34.6     08-23    138  |  104  |  5<L>  ----------------------------<  127<H>  3.3<L>   |  22  |  0.38<L>    Ca    9.2      23 Aug 2020 02:13  Phos  2.8     08-23  Mg     2.1     08-23    TPro  6.0  /  Alb  3.3  /  TBili  0.1<L>  /  DBili  x   /  AST  38  /  ALT  25  /  AlkPhos  32<L>  08-23    PT/INR - ( 23 Aug 2020 02:13 )   PT: 11.9 sec;   INR: 1.00 ratio         PTT - ( 23 Aug 2020 02:13 )  PTT:28.3 sec    CAPILLARY BLOOD GLUCOSE      POCT Blood Glucose.: 135 mg/dL (23 Aug 2020 05:36)  POCT Blood Glucose.: 109 mg/dL (22 Aug 2020 22:27)  POCT Blood Glucose.: 97 mg/dL (22 Aug 2020 18:33)  POCT Blood Glucose.: 117 mg/dL (22 Aug 2020 11:39)        RADIOLOGY & ADDITIONAL TESTS:    Consultant(s) Notes Reviewed:  [x ] YES  [ ] NO    MEDICATIONS  (STANDING):  chlorhexidine 4% Liquid 1 Application(s) Topical <User Schedule>  fludroCORTISONE 0.1 milliGRAM(s) Oral every 12 hours  fosphenytoin IVPB 140 milliGRAM(s) PE IV Intermittent every 12 hours  heparin   Injectable 5000 Unit(s) SubCutaneous every 8 hours  hydroxychloroquine 200 milliGRAM(s) Oral daily  insulin lispro (HumaLOG) corrective regimen sliding scale   SubCutaneous every 6 hours  lacosamide IVPB 200 milliGRAM(s) IV Intermittent every 12 hours  levETIRAcetam  IVPB 1000 milliGRAM(s) IV Intermittent every 12 hours  methylPREDNISolone sodium succinate Injectable 20 milliGRAM(s) IV Push daily  sodium chloride 2% . 1000 milliLiter(s) (50 mL/Hr) IV Continuous <Continuous>    MEDICATIONS  (PRN):  acetaminophen   Tablet .. 650 milliGRAM(s) Oral once PRN Moderate Pain (4 - 6), Severe Pain (7 - 10)  artificial  tears Solution 1 Drop(s) Both EYES three times a day PRN Eye irritation      PHYSICAL EXAM:  GENERAL: Well appearing, NAD  HEAD:  Atraumatic, Normocephalic  EYES: EOMI, PERRLA, conjunctiva and sclera clear  NECK: Supple, No JVD  NERVOUS SYSTEM:  AOx4, no speech latency, answers questions appropriately  CHEST/LUNG: B/L good air entry; No rales, rhonchi, or wheezing  HEART: S1S2 normal, no S3, Regular rate and rhythm; No murmurs  ABDOMEN: Soft, Nontender, Nondistended; Bowel sounds present  EXTREMITIES:  2+ Peripheral Pulses, No clubbing, cyanosis, or edema  LYMPH: No lymphadenopathy noted  SKIN: No rashes or lesions    Care Discussed with Consultants/Other Providers [ x] YES  [ ] NO

## 2020-08-23 NOTE — PROGRESS NOTE ADULT - SUBJECTIVE AND OBJECTIVE BOX
*************************************  NEUROLOGY PROGRESS NOTE  **************************************    JULIO CESAR FAYE  Female  MRN-[id number]    Subjective: No acute events overnight. States that she feels well but has back pain. Is more alert and oriented today and answering questions fully and following all commands.     Vital Signs Last 24 Hrs  T(C): 37.1 (23 Aug 2020 04:00), Max: 37.2 (22 Aug 2020 20:00)  T(F): 98.8 (23 Aug 2020 04:00), Max: 99 (22 Aug 2020 20:00)  HR: 97 (23 Aug 2020 07:00) (90 - 102)  BP: 181/85 (23 Aug 2020 07:00) (130/71 - 181/85)  BP(mean): 122 (23 Aug 2020 07:00) (89 - 122)  RR: 39 (23 Aug 2020 07:00) (20 - 44)  SpO2: 99% (23 Aug 2020 06:00) (94% - 99%)    MEDICATIONS  (STANDING):  chlorhexidine 4% Liquid 1 Application(s) Topical <User Schedule>  fludroCORTISONE 0.1 milliGRAM(s) Oral every 12 hours  fosphenytoin IVPB 140 milliGRAM(s) PE IV Intermittent every 12 hours  heparin   Injectable 5000 Unit(s) SubCutaneous every 8 hours  hydroxychloroquine 200 milliGRAM(s) Oral daily  insulin lispro (HumaLOG) corrective regimen sliding scale   SubCutaneous every 6 hours  lacosamide IVPB 200 milliGRAM(s) IV Intermittent every 12 hours  levETIRAcetam  IVPB 1000 milliGRAM(s) IV Intermittent every 12 hours  methylPREDNISolone sodium succinate Injectable 20 milliGRAM(s) IV Push daily  sodium chloride 2% . 1000 milliLiter(s) (50 mL/Hr) IV Continuous <Continuous>    MEDICATIONS  (PRN):  acetaminophen   Tablet .. 650 milliGRAM(s) Oral once PRN Moderate Pain (4 - 6), Severe Pain (7 - 10)  artificial  tears Solution 1 Drop(s) Both EYES three times a day PRN Eye irritation    PHYSICAL EXAMINATION:  General: Awake and alert, oriented to self, location, time and situation. Now understands she has seizures. Follows commands more briskly than yesterday including crossed commands.   Neurologic:  - Cranial Nerves II-XII:  Pupils reactive bilaterally but sluggish, 5mm bilaterally. Face symmetric, eye closure strong bilaterally, tongue midline. V1-V3 intact. Tongue protrudes midline. Palatte rises symmetrically. Shoulder shrug strong bilaterally.   - Motor:  At least 4+/5 throughout. No rhythmic shaking of the R arm  - Reflexes: 1+ patellar reflexes, Babinski downgoing.   - Sensory:  Equal sensation to light tough bilaterally.  - Gait:   Deferred    LABS:                          11.1   6.93  )-----------( 330      ( 23 Aug 2020 02:13 )             34.6     08-23    138  |  104  |  5<L>  ----------------------------<  127<H>  3.3<L>   |  22  |  0.38<L>    Ca    9.2      23 Aug 2020 02:13  Phos  2.8     08-23  Mg     2.1     08-23    TPro  6.0  /  Alb  3.3  /  TBili  0.1<L>  /  DBili  x   /  AST  38  /  ALT  25  /  AlkPhos  32<L>  08-23      Phenytoin Level, Serum (08.21.20 @ 12:40)    Phenytoin Level, Serum: 6.9 ug/mL    IMAGING:    < from: MR Head No Cont (08.18.20 @ 22:51) >  IMPRESSION: Areas of cortical restricted diffusion involving the right anterior frontal, bilateral temporal, and left parietal lobes compatible with a post ictal status. The differential diagnosis is broad and includes but is not limited to toxic/ metabolic etiologies, drug withdrawal, autoimmune, or paraneoplastic etiologies. Clinical correlation is required. Recommend imaging follow-up to resolution.    Acute/subacute lacunar infarction within the right medial thalamus with associated cytotoxic edema and without hemorrhagic transformation.    Similar-appearing mild chronic white matter microvascular type changes.    < end of copied text >    MR brain 8/22  As per resident read, with decreased DWI restriction in the L frontal regions.

## 2020-08-23 NOTE — CHART NOTE - NSCHARTNOTEFT_GEN_A_CORE
Reference #: [id number]    Patient Name: Bel OlsenBirth Date: 1960  Address: [address] [place], [place] [address]Ldf: Female  Rx Written	Rx Dispensed	Drug	Quantity	Days Supply	Prescriber Name	Payment Method	Dispenser  07/22/2020	07/23/2020	fentanyl 25 mcg/hr patch	10	30	Theo Hawk	[facility]  07/08/2020	07/09/2020	alprazolam 0.25 mg tablet	60	30	[place], [facility]  06/23/2020	06/25/2020	fentanyl 25 mcg/hr patch	10	30	Alyse Ochoa PA-C	[facility]  06/18/2020	06/18/2020	oxycodone hcl 5 mg tablet	30	5	Kenn Edward S	Medicare	[facility] [id number]  06/18/2020	06/18/2020	oxycodone hcl 10 mg tablet	150	30	Hawk, [facility]  06/04/2020	06/11/2020	alprazolam 0.25 mg tablet	60	30	Melmarco a [facility]  06/09/2020	06/11/2020	oxycodone hcl 5 mg tablet	28	7	Reyes Treviño	[facility]  05/19/2020	05/27/2020	fentanyl 25 mcg/hr patch	10	30	Kenn [facility]  05/19/2020	05/21/2020	oxycodone hcl 10 mg tablet	150	30	Kenn Edward S	Medicare	[facility] [id number]  05/12/2020	05/12/2020	alprazolam 0.25 mg tablet	60	30	Meledath [facility]  04/29/2020	04/30/2020	fentanyl 25 mcg/hr patch	10	30	Kenn [facility]  04/23/2020	04/23/2020	oxycodone hcl 10 mg tablet	150	30	Kenn Edward S	Medicare	[facility] [id number]  04/14/2020	04/14/2020	alprazolam 0.25 mg tablet	60	30	Meledath, [facility]  04/01/2020	04/02/2020	fentanyl 25 mcg/hr patch	10	30	Hawk, [facility]  03/26/2020	03/26/2020	oxycodone hcl 10 mg tablet	150	30	Theo Hawk	Medicare	[facility] [id number]  03/17/2020	03/17/2020	alprazolam 0.25 mg tablet	60	30	Mel[place]co a [facility]  03/05/2020	03/06/2020	fentanyl 25 mcg/hr patch	10	30	Theo Hawk	[facility]  02/27/2020	02/27/2020	oxycodone hcl 10 mg tablet	150	30	Theo Hawk S	Medicare	[facility] [id number]  02/18/2020	02/19/2020	alprazolam 0.25 mg tablet	60	30	Mel[place]co a [facility]  02/05/2020	02/06/2020	fentanyl 25 mcg/hr patch	10	30	Theo Hawk	[facility]  01/21/2020	01/21/2020	alprazolam 0.25 mg tablet	60	30	[place] a [facility]  01/09/2020	01/16/2020	alprazolam 0.5 mg tablet	4	4	[place]co a [facility]  01/08/2020	01/10/2020	fentanyl 25 mcg/hr patch	10	30	Ernesto Cartwright	[facility]  12/31/2019	01/02/2020	oxycodone hcl 10 mg tablet	150	25	Theo Hawk	[facility]  12/31/2019	01/02/2020	fentanyl 12 mcg/hr patch	10	30	Theo Hawk	[facility]  12/19/2019	12/22/2019	alprazolam 0.25 mg tablet	45	30	Melmarco a Eli	[facility]  12/11/2019	12/11/2019	alprazolam 0.25 mg tablet	14	9	Melmarco a [facility]  12/05/2019	12/05/2019	oxycodone hcl 10 mg tablet	150	30	Theo Hawk	[facility]  12/05/2019	12/05/2019	fentanyl 12 mcg/hr patch	10	30	Theo Hawk	[facility]  11/27/2019	11/27/2019	alprazolam 0.25 mg tablet	28	14	[facility]  11/20/2019	11/25/2019	alprazolam 0.25 mg tablet	12	4	Genia Denis	[facility]  11/05/2019	11/06/2019	fentanyl 25 mcg/hr patch	10	30	Hawk [facility]  11/05/2019	11/06/2019	oxycodone hcl 15 mg tablet	120	30	Hawk [facility]  11/05/2019	11/06/2019	oxycodone hcl 10 mg tablet	4	1	Kenn [facility]  10/15/2019	10/26/2019	alprazolam 0.25 mg tablet	90	30	Ayo [facility]  10/10/2019	10/10/2019	fentanyl 25 mcg/hr patch	10	30	Hawk [facility]  10/10/2019	10/10/2019	oxycodone hcl 20 mg tablet	120	30	Hawk [facility]  10/10/2019	10/10/2019	oxycodone hcl 10 mg tablet	4	1	Kenn ronald [facility]  09/23/2019	09/30/2019	alprazolam 0.25 mg tablet	90	30	Jon Martin MD	[facility]  09/11/2019	09/25/2019	duragesic 25 mcg/hr patch	5	15	Ernesto Cartwright	[facility]  09/11/2019	09/12/2019	duragesic 25 mcg/hr patch	5	15	Sabine Beatty	[facility]  09/11/2019	09/11/2019	oxycodone hcl 20 mg tablet	120	30	Sabine Beatty	[facility]  09/11/2019	09/11/2019	oxycodone hcl 10 mg tablet	4	1	Sabine Beatty	[facility]  09/03/2019	09/04/2019	alprazolam 0.25 mg tablet	90	30	Yessenia Li MD	[facility]    Patient Name: Bel OlsenBirth Date: 1960  Address:  [address] [place], [place] [address]Gps: Female  Rx Written	Rx Dispensed	Drug	Quantity	Days Supply	Prescriber Name	Payment Method	Dispenser  01/30/2020	01/30/2020	oxycodone hcl 10 mg tablet	150	30	Theo Hawk	[facility] #[id number]

## 2020-08-23 NOTE — PROGRESS NOTE ADULT - ATTENDING COMMENTS
1. Acute hypoxemic respiratory failure after seizures. Seizures controlled. Pt weaned and extubated this am. Tolerating extubation so far.  2. Neuro. Statu epilepticus resolved. Versed and propofol off.  Continue Keppra and Vimpat and Fosphenytoin  . EEG without overt seizure like activity.. Etiology of seizures unclear at this time. CT head negative for acute changes. LP performed.  No evidence of infection. Await all cx and paraneoplastic  results. Stop Acyclovir , ceftriaxone and Vancomycin. MRI  reveals acute/subacute lacunar infarction within the right medial thalamus along with hypoperfused areas in bilateral frontal , temporal lobes consistent with post ictal events. Possible seizures from withdrawal of benzodiazepines or other medication. Pt with known h/o polysubstance abuse. ??autoimmune encephalitis. Await results  of  MRI.  3. H/o of polysubstance abuse. Pt currently sedated.? cause of seizures withdrawal  4. H/O DVT soleal. DVT study legs yesterday negative. D/C IV heparin. Change to sq heparin for prophylaxis of DVT.   5. H/o Lung ca with RML lobectomy.  6. H/O  INNA. On steroids and Plaquenil.  7. C diff. finished 14 day course. D/C po vancomycin.  8. Hyponatremia fro cerebral salt wasting syndrome. UO slowing down a little . Hyponatremia with Urine NA 115and greater. Continue 2% saline and Florinef. Follow serum na and UO. Continue 2%saline at 50/cc hr. Still with -150 cc hr with Arianna >100  9. Much more alert. Able to tell us she is on xanax and opiates daily for anxiety and back pain. Will start regular Xanax dose for pt.

## 2020-08-23 NOTE — PROGRESS NOTE ADULT - NSHPATTENDINGPLANDISCUSS_GEN_ALL_CORE
Neuro team and ICU team
Gabriela
ICU team, neuro team
Neuro team and ICU team
neuro team and patient
neuro and ICU team

## 2020-08-23 NOTE — PROGRESS NOTE ADULT - ASSESSMENT
Assessment: 61 yo woman w/ h/o chronic pain, lung adenocarcinoma (s/p RML wedge resection 2018), ?mixed connective tissue disease, anxiety, right hip replacement, lumbar laminectomy, b/l oophorectomy, diverticulitis, recent acute pancreatitis and found to have right soleal DVT on eliquis, recent RLE cellulitis and recent c.diff infection initially admitted to [facility] for hyponatremia with ccb seizure witnessed 8/13 now transferred for further seizures requiring intubation and mechanical ventilation with Neurology consult for such.     Impression: seizures more suspicious for inflammatory cause in setting of fevers and lateralized discharges. Seizure etiology may also be metabolic from hyponatremia which has since improved. Based on MRI suspicious for autoimmune encephalitis versus status epilepticus. Infectious cause unlikely based upon LP results. She is improving (though with some fluctuations)    Recommendations:  [] continue Keppra 1g bid, Vimpat 200mg bid, fosphenytoin 140mg bid, to draw levels tomorrow 8/24  [] patient with great improvement in mental status, will continue to follow mental status and determine if to start steroids or when to taper AEDs. For now no acute need for steroids.   [] continue EEG, based on results will decide whether to taper AEDs  [] LP per primary team;  Glucose elevated; Protein normal: Gram stain with no growth; CSF Viral PCR panel negative; CSF AFB cancelled; CSF fungal cx pending; CSF VDRL titer pending; Oligoclonal bands; Myelin Basic Protein, Anti-MOG; Cryptococcal antigen negative. Autoimmune encephelopathy CSF panel, RT quic CSF, 14-3-3. NMDA negative  [] stopped acyclovir, vancomycin, ceftriaxone as no sign of infection    Case discussed with Neurology Attending Dr. Ochoa; discussed with MICU team. Assessment: 59 yo woman w/ h/o chronic pain, lung adenocarcinoma (s/p RML wedge resection 2018), ?mixed connective tissue disease, anxiety, right hip replacement, lumbar laminectomy, b/l oophorectomy, diverticulitis, recent acute pancreatitis and found to have right soleal DVT on eliquis, recent RLE cellulitis and recent c.diff infection initially admitted to [facility] for hyponatremia with ccb seizure witnessed 8/13 now transferred for further seizures requiring intubation and mechanical ventilation with Neurology consult for such.     Impression: seizures more suspicious for inflammatory cause in setting of fevers and lateralized discharges. Seizure etiology may also be metabolic from hyponatremia which has since improved. Based on MRI suspicious for autoimmune encephalitis versus status epilepticus. Infectious cause unlikely based upon LP results. She is improving (though with some fluctuations)    Recommendations:  [] continue Keppra 1g bid, Vimpat 200mg bid, fosphenytoin 140mg bid, to draw levels tomorrow 8/24, can transition to PO, same doses  [] patient with great improvement in mental status, will continue to follow mental status and determine if to start steroids or when to taper AEDs. For now no acute need for steroids.   [] continue EEG, based on results will decide whether to taper AEDs  [] LP per primary team;  Glucose elevated; Protein normal: Gram stain with no growth; CSF Viral PCR panel negative; CSF AFB cancelled; CSF fungal cx pending; CSF VDRL titer pending; Oligoclonal bands; Myelin Basic Protein, Anti-MOG; Cryptococcal antigen negative. Autoimmune encephelopathy CSF panel, RT quic CSF, 14-3-3. NMDA negative  [] stopped acyclovir, vancomycin, ceftriaxone as no sign of infection    Case discussed with Neurology Attending Dr. Ochoa; discussed with MICU team.

## 2020-08-24 ENCOUNTER — TRANSCRIPTION ENCOUNTER (OUTPATIENT)
Age: 60
End: 2020-08-24

## 2020-08-24 LAB
ALBUMIN SERPL ELPH-MCNC: 3.8 G/DL — SIGNIFICANT CHANGE UP (ref 3.3–5)
ALP SERPL-CCNC: 37 U/L — LOW (ref 40–120)
ALT FLD-CCNC: 34 U/L — SIGNIFICANT CHANGE UP (ref 10–45)
ANION GAP SERPL CALC-SCNC: 16 MMOL/L — SIGNIFICANT CHANGE UP (ref 5–17)
AST SERPL-CCNC: 47 U/L — HIGH (ref 10–40)
BASOPHILS # BLD AUTO: 0.02 K/UL — SIGNIFICANT CHANGE UP (ref 0–0.2)
BASOPHILS NFR BLD AUTO: 0.4 % — SIGNIFICANT CHANGE UP (ref 0–2)
BILIRUB SERPL-MCNC: 0.2 MG/DL — SIGNIFICANT CHANGE UP (ref 0.2–1.2)
BUN SERPL-MCNC: 5 MG/DL — LOW (ref 7–23)
CALCIUM SERPL-MCNC: 9.7 MG/DL — SIGNIFICANT CHANGE UP (ref 8.4–10.5)
CHLORIDE SERPL-SCNC: 96 MMOL/L — SIGNIFICANT CHANGE UP (ref 96–108)
CO2 SERPL-SCNC: 23 MMOL/L — SIGNIFICANT CHANGE UP (ref 22–31)
CREAT ?TM UR-MCNC: 47 MG/DL — SIGNIFICANT CHANGE UP
CREAT SERPL-MCNC: 0.39 MG/DL — LOW (ref 0.5–1.3)
EOSINOPHIL # BLD AUTO: 0.18 K/UL — SIGNIFICANT CHANGE UP (ref 0–0.5)
EOSINOPHIL NFR BLD AUTO: 3.3 % — SIGNIFICANT CHANGE UP (ref 0–6)
GLUCOSE SERPL-MCNC: 142 MG/DL — HIGH (ref 70–99)
HCT VFR BLD CALC: 36.4 % — SIGNIFICANT CHANGE UP (ref 34.5–45)
HGB BLD-MCNC: 11.7 G/DL — SIGNIFICANT CHANGE UP (ref 11.5–15.5)
IMM GRANULOCYTES NFR BLD AUTO: 0.9 % — SIGNIFICANT CHANGE UP (ref 0–1.5)
LEVETIRACETAM SERPL-MCNC: 52.5 MCG/ML — HIGH (ref 12–46)
LYMPHOCYTES # BLD AUTO: 1.4 K/UL — SIGNIFICANT CHANGE UP (ref 1–3.3)
LYMPHOCYTES # BLD AUTO: 25.4 % — SIGNIFICANT CHANGE UP (ref 13–44)
MAGNESIUM SERPL-MCNC: 2.1 MG/DL — SIGNIFICANT CHANGE UP (ref 1.6–2.6)
MCHC RBC-ENTMCNC: 32.1 GM/DL — SIGNIFICANT CHANGE UP (ref 32–36)
MCHC RBC-ENTMCNC: 33.3 PG — SIGNIFICANT CHANGE UP (ref 27–34)
MCV RBC AUTO: 103.7 FL — HIGH (ref 80–100)
MONOCYTES # BLD AUTO: 0.62 K/UL — SIGNIFICANT CHANGE UP (ref 0–0.9)
MONOCYTES NFR BLD AUTO: 11.2 % — SIGNIFICANT CHANGE UP (ref 2–14)
NEUTROPHILS # BLD AUTO: 3.25 K/UL — SIGNIFICANT CHANGE UP (ref 1.8–7.4)
NEUTROPHILS NFR BLD AUTO: 58.8 % — SIGNIFICANT CHANGE UP (ref 43–77)
NRBC # BLD: 0 /100 WBCS — SIGNIFICANT CHANGE UP (ref 0–0)
PHENYTOIN FREE SERPL-MCNC: 1.9 UG/ML — LOW (ref 10–20)
PHOSPHATE SERPL-MCNC: 3.6 MG/DL — SIGNIFICANT CHANGE UP (ref 2.5–4.5)
PLATELET # BLD AUTO: 310 K/UL — SIGNIFICANT CHANGE UP (ref 150–400)
POTASSIUM SERPL-MCNC: 3.4 MMOL/L — LOW (ref 3.5–5.3)
POTASSIUM SERPL-SCNC: 3.4 MMOL/L — LOW (ref 3.5–5.3)
PROT SERPL-MCNC: 6.5 G/DL — SIGNIFICANT CHANGE UP (ref 6–8.3)
RBC # BLD: 3.51 M/UL — LOW (ref 3.8–5.2)
RBC # FLD: 13.7 % — SIGNIFICANT CHANGE UP (ref 10.3–14.5)
SODIUM SERPL-SCNC: 135 MMOL/L — SIGNIFICANT CHANGE UP (ref 135–145)
URATE SERPL-MCNC: 2.5 MG/DL — SIGNIFICANT CHANGE UP (ref 2.5–7)
URATE UR-MCNC: 30 MG/DL — SIGNIFICANT CHANGE UP
WBC # BLD: 5.52 K/UL — SIGNIFICANT CHANGE UP (ref 3.8–10.5)
WBC # FLD AUTO: 5.52 K/UL — SIGNIFICANT CHANGE UP (ref 3.8–10.5)

## 2020-08-24 PROCEDURE — 99222 1ST HOSP IP/OBS MODERATE 55: CPT

## 2020-08-24 PROCEDURE — 99233 SBSQ HOSP IP/OBS HIGH 50: CPT

## 2020-08-24 PROCEDURE — 99232 SBSQ HOSP IP/OBS MODERATE 35: CPT | Mod: GC

## 2020-08-24 RX ORDER — POTASSIUM CHLORIDE 20 MEQ
40 PACKET (EA) ORAL ONCE
Refills: 0 | Status: COMPLETED | OUTPATIENT
Start: 2020-08-24 | End: 2020-08-24

## 2020-08-24 RX ORDER — OXYCODONE HYDROCHLORIDE 5 MG/1
5 TABLET ORAL ONCE
Refills: 0 | Status: DISCONTINUED | OUTPATIENT
Start: 2020-08-24 | End: 2020-08-24

## 2020-08-24 RX ADMIN — OXYCODONE HYDROCHLORIDE 5 MILLIGRAM(S): 5 TABLET ORAL at 02:57

## 2020-08-24 RX ADMIN — Medication 1 CAPSULE(S): at 11:39

## 2020-08-24 RX ADMIN — Medication 0.25 MILLIGRAM(S): at 01:09

## 2020-08-24 RX ADMIN — FLUDROCORTISONE ACETATE 0.1 MILLIGRAM(S): 0.1 TABLET ORAL at 20:27

## 2020-08-24 RX ADMIN — Medication 1 CAPSULE(S): at 08:52

## 2020-08-24 RX ADMIN — OXYCODONE HYDROCHLORIDE 10 MILLIGRAM(S): 5 TABLET ORAL at 07:30

## 2020-08-24 RX ADMIN — OXYCODONE HYDROCHLORIDE 10 MILLIGRAM(S): 5 TABLET ORAL at 11:39

## 2020-08-24 RX ADMIN — FLUDROCORTISONE ACETATE 0.1 MILLIGRAM(S): 0.1 TABLET ORAL at 05:47

## 2020-08-24 RX ADMIN — LEVETIRACETAM 1000 MILLIGRAM(S): 250 TABLET, FILM COATED ORAL at 05:48

## 2020-08-24 RX ADMIN — Medication 200 MILLIGRAM(S): at 11:39

## 2020-08-24 RX ADMIN — Medication 20 MILLIGRAM(S): at 05:47

## 2020-08-24 RX ADMIN — OXYCODONE HYDROCHLORIDE 10 MILLIGRAM(S): 5 TABLET ORAL at 23:30

## 2020-08-24 RX ADMIN — Medication 130 MILLIGRAM(S): at 05:46

## 2020-08-24 RX ADMIN — Medication 1 CAPSULE(S): at 17:36

## 2020-08-24 RX ADMIN — HEPARIN SODIUM 5000 UNIT(S): 5000 INJECTION INTRAVENOUS; SUBCUTANEOUS at 05:47

## 2020-08-24 RX ADMIN — LACOSAMIDE 200 MILLIGRAM(S): 50 TABLET ORAL at 17:36

## 2020-08-24 RX ADMIN — OXYCODONE HYDROCHLORIDE 10 MILLIGRAM(S): 5 TABLET ORAL at 05:45

## 2020-08-24 RX ADMIN — LEVETIRACETAM 1000 MILLIGRAM(S): 250 TABLET, FILM COATED ORAL at 17:36

## 2020-08-24 RX ADMIN — HEPARIN SODIUM 5000 UNIT(S): 5000 INJECTION INTRAVENOUS; SUBCUTANEOUS at 11:39

## 2020-08-24 RX ADMIN — LACOSAMIDE 200 MILLIGRAM(S): 50 TABLET ORAL at 05:46

## 2020-08-24 RX ADMIN — Medication 130 MILLIGRAM(S): at 17:36

## 2020-08-24 RX ADMIN — Medication 0.25 MILLIGRAM(S): at 05:45

## 2020-08-24 RX ADMIN — OXYCODONE HYDROCHLORIDE 10 MILLIGRAM(S): 5 TABLET ORAL at 11:40

## 2020-08-24 RX ADMIN — Medication 20 MILLIEQUIVALENT(S): at 01:09

## 2020-08-24 RX ADMIN — Medication 0.25 MILLIGRAM(S): at 17:37

## 2020-08-24 RX ADMIN — OXYCODONE HYDROCHLORIDE 5 MILLIGRAM(S): 5 TABLET ORAL at 01:50

## 2020-08-24 RX ADMIN — OXYCODONE HYDROCHLORIDE 10 MILLIGRAM(S): 5 TABLET ORAL at 22:52

## 2020-08-24 RX ADMIN — Medication 40 MILLIEQUIVALENT(S): at 17:36

## 2020-08-24 RX ADMIN — HEPARIN SODIUM 5000 UNIT(S): 5000 INJECTION INTRAVENOUS; SUBCUTANEOUS at 22:52

## 2020-08-24 NOTE — DISCHARGE NOTE NURSING/CASE MANAGEMENT/SOCIAL WORK - PATIENT PORTAL LINK FT
You can access the FollowMyHealth Patient Portal offered by [facility] by registering at the following website: [email or web address]. By joining [facility]’s FollowMyHealth portal, you will also be able to view your health information using other applications (apps) compatible with our system.

## 2020-08-24 NOTE — PROGRESS NOTE ADULT - SUBJECTIVE AND OBJECTIVE BOX
Patient is a 60y old  Female who presents with a chief complaint of Seizures (23 Aug 2020 08:40)      SUBJECTIVE / OVERNIGHT EVENTS:    Patient seen and examined.       Vital Signs Last 24 Hrs  T(C): 37.1 (24 Aug 2020 03:50), Max: 37.2 (23 Aug 2020 16:00)  T(F): 98.8 (24 Aug 2020 03:50), Max: 99 (23 Aug 2020 16:00)  HR: 80 (24 Aug 2020 03:50) (80 - 99)  BP: 125/74 (24 Aug 2020 03:50) (125/74 - 153/83)  BP(mean): 103 (23 Aug 2020 23:00) (92 - 111)  RR: 18 (24 Aug 2020 03:50) (18 - 40)  SpO2: 98% (24 Aug 2020 03:50) (96% - 99%)  I&O's Summary    23 Aug 2020 07:01  -  24 Aug 2020 07:00  --------------------------------------------------------  IN: 840 mL / OUT: 1680 mL / NET: -840 mL        PE:  GENERAL: NAD, AAOx3  HEAD:  Atraumatic, Normocephalic  EYES: EOMI, PERRLA, conjunctiva and sclera clear  NECK: Supple, No JVD  CHEST/LUNG: CTABL, No wheeze  HEART: Regular rate and rhythm; + murmur  ABDOMEN: Soft, Nontender, Nondistended; Bowel sounds present  EXTREMITIES:  2+ Peripheral Pulses, No clubbing, cyanosis, or edema  SKIN: No rashes or lesions  NEURO: No focal deficits    LABS:                        11.7   5.52  )-----------( 310      ( 24 Aug 2020 06:39 )             36.4     08-24    135  |  96  |  5<L>  ----------------------------<  142<H>  3.4<L>   |  23  |  0.39<L>    Ca    9.7      24 Aug 2020 06:40  Phos  3.6     08-24  Mg     2.1     08-24    TPro  6.5  /  Alb  3.8  /  TBili  0.2  /  DBili  x   /  AST  47<H>  /  ALT  34  /  AlkPhos  37<L>  08-24    PT/INR - ( 23 Aug 2020 02:13 )   PT: 11.9 sec;   INR: 1.00 ratio         PTT - ( 23 Aug 2020 02:13 )  PTT:28.3 sec  CAPILLARY BLOOD GLUCOSE      POCT Blood Glucose.: 98 mg/dL (23 Aug 2020 17:02)  POCT Blood Glucose.: 117 mg/dL (23 Aug 2020 12:39)            RADIOLOGY & ADDITIONAL TESTS:    Imaging Personally Reviewed:  [x] YES  [ ] NO    Consultant(s) Notes Reviewed:  [x] YES  [ ] NO    MEDICATIONS  (STANDING):  fludroCORTISONE 0.1 milliGRAM(s) Oral every 12 hours  heparin   Injectable 5000 Unit(s) SubCutaneous every 8 hours  hydroxychloroquine 200 milliGRAM(s) Oral daily  lacosamide 200 milliGRAM(s) Oral two times a day  levETIRAcetam 1000 milliGRAM(s) Oral two times a day  methylPREDNISolone sodium succinate Injectable 20 milliGRAM(s) IV Push daily  oxyCODONE  ER Tablet 10 milliGRAM(s) Oral every 8 hours  pancrelipase  (CREON 12,000 Lipase Units) 1 Capsule(s) Oral three times a day with meals  phenytoin   Capsule 130 milliGRAM(s) Oral two times a day    MEDICATIONS  (PRN):  acetaminophen   Tablet .. 650 milliGRAM(s) Oral once PRN Moderate Pain (4 - 6), Severe Pain (7 - 10)  ALPRAZolam 0.25 milliGRAM(s) Oral two times a day PRN anxiety  artificial  tears Solution 1 Drop(s) Both EYES three times a day PRN Eye irritation      Care Discussed with Consultants/Other Providers [x] YES  [ ] NO    HEALTH ISSUES - PROBLEM Dx:  Hyponatremia: Hyponatremia Patient is a 60y old  Female who presents with a chief complaint of Seizures (23 Aug 2020 08:40)      SUBJECTIVE / OVERNIGHT EVENTS:    Patient seen and examined. denies cp sob nvd. feels well.       Vital Signs Last 24 Hrs  T(C): 37.1 (24 Aug 2020 03:50), Max: 37.2 (23 Aug 2020 16:00)  T(F): 98.8 (24 Aug 2020 03:50), Max: 99 (23 Aug 2020 16:00)  HR: 80 (24 Aug 2020 03:50) (80 - 99)  BP: 125/74 (24 Aug 2020 03:50) (125/74 - 153/83)  BP(mean): 103 (23 Aug 2020 23:00) (92 - 111)  RR: 18 (24 Aug 2020 03:50) (18 - 40)  SpO2: 98% (24 Aug 2020 03:50) (96% - 99%)  I&O's Summary    23 Aug 2020 07:01  -  24 Aug 2020 07:00  --------------------------------------------------------  IN: 840 mL / OUT: 1680 mL / NET: -840 mL        PE:  GENERAL: NAD, AAOx3, frail  HEAD:  Atraumatic, Normocephalic  CHEST/LUNG: CTABL, No wheeze  HEART: Regular rate and rhythm; no murmur  ABDOMEN: Soft, Nontender, Nondistended; Bowel sounds present  EXTREMITIES:  2+ Peripheral Pulses, No clubbing, cyanosis, or edema  SKIN: No rashes or lesions  NEURO: No focal deficits    LABS:                        11.7   5.52  )-----------( 310      ( 24 Aug 2020 06:39 )             36.4     08-24    135  |  96  |  5<L>  ----------------------------<  142<H>  3.4<L>   |  23  |  0.39<L>    Ca    9.7      24 Aug 2020 06:40  Phos  3.6     08-24  Mg     2.1     08-24    TPro  6.5  /  Alb  3.8  /  TBili  0.2  /  DBili  x   /  AST  47<H>  /  ALT  34  /  AlkPhos  37<L>  08-24    PT/INR - ( 23 Aug 2020 02:13 )   PT: 11.9 sec;   INR: 1.00 ratio         PTT - ( 23 Aug 2020 02:13 )  PTT:28.3 sec  CAPILLARY BLOOD GLUCOSE      POCT Blood Glucose.: 98 mg/dL (23 Aug 2020 17:02)  POCT Blood Glucose.: 117 mg/dL (23 Aug 2020 12:39)            RADIOLOGY & ADDITIONAL TESTS:    Imaging Personally Reviewed:  [x] YES  [ ] NO    Consultant(s) Notes Reviewed:  [x] YES  [ ] NO    MEDICATIONS  (STANDING):  fludroCORTISONE 0.1 milliGRAM(s) Oral every 12 hours  heparin   Injectable 5000 Unit(s) SubCutaneous every 8 hours  hydroxychloroquine 200 milliGRAM(s) Oral daily  lacosamide 200 milliGRAM(s) Oral two times a day  levETIRAcetam 1000 milliGRAM(s) Oral two times a day  methylPREDNISolone sodium succinate Injectable 20 milliGRAM(s) IV Push daily  oxyCODONE  ER Tablet 10 milliGRAM(s) Oral every 8 hours  pancrelipase  (CREON 12,000 Lipase Units) 1 Capsule(s) Oral three times a day with meals  phenytoin   Capsule 130 milliGRAM(s) Oral two times a day    MEDICATIONS  (PRN):  acetaminophen   Tablet .. 650 milliGRAM(s) Oral once PRN Moderate Pain (4 - 6), Severe Pain (7 - 10)  ALPRAZolam 0.25 milliGRAM(s) Oral two times a day PRN anxiety  artificial  tears Solution 1 Drop(s) Both EYES three times a day PRN Eye irritation      Care Discussed with Consultants/Other Providers [x] YES  [ ] NO    HEALTH ISSUES - PROBLEM Dx:  Hyponatremia: Hyponatremia

## 2020-08-24 NOTE — CONSULT NOTE ADULT - CONSULT REASON
Hyponatremia
medicine continuity of care
seizures
Evaluate Rehabilitation Abnormal EEG study.
Rehabilitation consult

## 2020-08-24 NOTE — CHART NOTE - NSCHARTNOTEFT_GEN_A_CORE
59 yo woman w/ h/o chronic pain, lung adenocarcinoma (s/p RML wedge resection 2018), ?mixed connective tissue disease, anxiety, right hip replacement, lumbar laminectomy, b/l oophorectomy, diverticulitis, recent acute pancreatitis and found to have right soleal DVT on eliquis, recent RLE cellulitis and recent c.diff infection initially admitted to Health system for hyponatremia with ccb seizure witnessed 8/13 now transferred for further seizures requiring intubation and mechanical ventilation. Pt extubated 8/20. Pt transferred to floors 8/23.     Pt seen for initial bedside swallow evaluation on 8/21/20, and presented with evidence of 1) oropharyngeal dysphagia notable for s/s laryngeal penetration/aspiration with thin liquids, 2) mental status that is not yet back at baseline, question cognitive-linguistic deficits vs. lingering sedation effects, 3) dysphonia likely c/w recent extubation. Recommendations were for Dysphagia 2 with nectar thick liquids.    Pt seen for follow-up on this date. Per EMR, pt advanced to a regular diet on 8/23. Pt encountered in bed, awake, A&Ox4. Pt able to follow all commands for the purpose of this evaluation. Vocal quality deemed to be WFL, no dysphonia noted. Pt reports improved mental status from previous assessment, stating she's "getting better every day". Pt presents with overtly functional oropharyngeal swallow for a regular texture diet. Oral prep/transfer is adequate across all trials. No overt signs of laryngeal penetration/aspiration observed. Pt denies any current difficulty swallowing.     Recommendations:  1. Continue regular diet   2. Monitor for s/s aspiration/laryngeal penetration. If noted:  D/C p.o. intake, provide non-oral nutrition/hydration/meds, and contact this service @ [phone]     This service to remain available for reconsult as needed.    Jaclyn Beatty CCC-SLP pgr #[phone]

## 2020-08-24 NOTE — CONSULT NOTE ADULT - SUBJECTIVE AND OBJECTIVE BOX
HPI:  59 y/o F with a h/o EtOH and opiate abuse s/p recent admissions for alcoholic pancreatitis and alcohol withdrawal, mixed connective tissue disorder (on plaquenil and methylprednisolone), chronic pain s/p lumbar laminectomy, left soleal vein thrombosis (started on apixaban, 7/2020), lung adenocarcinoma (s/p RML resection 2018), recent RLE cellulitis s/p doxycycline, recent CDiff colitis (on PO vancomycin, started 8/3, to finish 8/17), CAD s/p possible MI (ECHO ) admitted on 8/11 with symptomatic 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 She was treated on the medicine service with an appropriate rate of correction in serum Na. Noted to have focal seizure activity of LUE on 8/13 which broke with IV lorazepam. Seizure activity believed to be secondary to benzodiazepine withdrawal. EEG at one point showed epileptiform activity and she was started on Keppra although continued to have periodic seizure activity. CT head unremarkable for acute pathology.     On 8/16, RRT called because patient found to be unresponsive with generalized seizure activity with left UE/LE/facial twitching. Seizure activity would break transiently with IV lorazepam but recurred repetitively. After 8mg lorazepam she began to lose airway protection abilities and developed hypoxemia (SpO2 70s). She was emergent intubated and given keppra load 1000mg, vimpat load 200 mg and propofol 10 mcg/kg/min infusion, which successfully suppressed her seizures. Found to have new fever of 101.6 F and hypotensive post-intubation. Started on levophed drip.      Transferred to [facility] for EEG study.     Collateral:   Anita River (Healthcare Proxy): Ms. River lives in [place], and says that over the past several months Ms. Mendoza has had several hospitalizations, but has had difficulty getting a full history from the patient who she says is routinely an unreliable historian. Ms. River believes that Ms. Mendoza has had a previous MI several years ago, and has a history of lung cancer for which she had a lobectomy for in 2019, but is again unsure of the exact medical history. She does not believe Ms. Mendoza has a diagnosed psychiatric history, but knows she has had multiple years of drug and alcohol abuse. (16 Aug 2020 20:53)    Patient was admitted on 8/16, MRI showing acute/subacute lacunar infarcts within the right medial thalamus along with hypoperfused areas in bilateral frontal, temporal lobes consistent w/ post-ictal events, no active seizure activity on EEG, extubated on 8/20,  transferred to medicine floor 8/23, patient seen today, HCP at bedside. Denies weakness or pain.     REVIEW OF SYSTEMS  Constitutional - No fever, No weight loss, No fatigue  HEENT - No eye pain, No visual disturbances, No difficulty hearing, No tinnitus, No vertigo, No neck pain  Respiratory - No cough, No wheezing, No shortness of breath  Cardiovascular - No chest pain, No palpitations  Gastrointestinal - No abdominal pain, No nausea, No vomiting, No diarrhea, No constipation  Genitourinary - No dysuria, No frequency, No hematuria, No incontinence  Neurological - No headaches, No memory loss, No loss of strength, No numbness, No tremors  Skin - No itching, No rashes, No lesions   Endocrine - No temperature intolerance  Musculoskeletal - No joint pain, No joint swelling, No muscle pain  Psychiatric - No depression, No anxiety    VITALS  T(C): 36.8 (08-24-20 @ 12:34), Max: 37.2 (08-23-20 @ 16:00)  HR: 85 (08-24-20 @ 12:34) (80 - 99)  BP: 128/75 (08-24-20 @ 12:34) (125/74 - 153/83)  RR: 18 (08-24-20 @ 12:34) (18 - 36)  SpO2: 95% (08-24-20 @ 12:34) (95% - 98%)  Wt(kg): --    PAST MEDICAL & SURGICAL HISTORY  Lung cancer  Opiate dependence  Alcohol abuse  Adenocarcinoma of lung  Mixed connective tissue disease  Depression H/O panic attack  S/P Laminectomy  Lumbar 3/10  Chronic pain  Diverticulitis of colon (without mention of hemorrhage)  History of laminectomy  History of lung surgery  History of oophorectomy, unilateral  History of hip replacement, total, right  S/P lumbar laminectomy  S/P cholecystectomy  History of lung cancer        SOCIAL HISTORY  Smoking - Denied  EtOH - +  Drugs - +    FUNCTIONAL HISTORY  [place], has some helpful neighbors, 4 steps to enter  has been using a RW, Independent with ADLs PTA    CURRENT FUNCTIONAL STATUS  8/24 PT    Pt seen for follow-up on this date. Per EMR, pt advanced to a regular diet on 8/23. Pt encountered in bed, awake, A&Ox4. Pt able to follow all commands for the purpose of this evaluation. Vocal quality deemed to be WFL, no dysphonia noted. Pt reports improved mental status from previous assessment, stating she's "getting better every day". Pt presents with overtly functional oropharyngeal swallow for a regular texture diet. Oral prep/transfer is adequate across all trials. No overt signs of laryngeal penetration/aspiration observed. Pt denies any current difficulty swallowing.     Recommendations:  1. Continue regular diet     8/21 PT    Bed Mobility: Rolling/Turning:     · Level of [place]	maximum assist (25% patients effort)  · Physical Assist/Nonphysical Assist	1 person assist; nonverbal cues (demo/gestures); verbal cues    Bed Mobility: Sit to Supine:     · Level of [place]	maximum assist (25% patients effort)  · Physical Assist/Nonphysical Assist	2 person assist; nonverbal cues (demo/gestures); verbal cues    Bed Mobility: Supine to Sit:     · Level of [place]	maximum assist (25% patients effort)  · Physical Assist/Nonphysical Assist	2 person assist; nonverbal cues (demo/gestures); verbal cues    Bed Mobility Analysis:     · Bed Mobility Limitations	decreased ability to use legs for bridging/pushing; decreased ability to use arms for pushing/pulling  · Impairments Contributing to Impaired Bed Mobility	impaired balance; decreased strength; impaired postural control; impaired motor control; impaired coordination; abnormal muscle tone; decreased ROM; cognition    Transfer: Sit to Stand:     · Level of [place]	maximum assist (25% patients effort)  · Physical Assist/Nonphysical Assist	2 person assist; nonverbal cues (demo/gestures); verbal cues  · Weight-Bearing Restrictions	full weight-bearing    Transfer: Stand to Sit:     · Level of [place]	maximum assist (25% patients effort)  · Physical Assist/Nonphysical Assist	2 person assist; nonverbal cues (demo/gestures); verbal cues  · Weight-Bearing Restrictions	full weight-bearing    8/21 OT    Bed Mobility: Rolling/Turning:     · Level of [place]	maximum assist (25% patients effort)  · Physical Assist/Nonphysical Assist	2 person assist    Bed Mobility: Scooting/Bridging:     · Level of [place]	maximum assist (25% patients effort)  · Physical Assist/Nonphysical Assist	2 person assist    Bed Mobility: Sit to Supine:     · Level of [place]	maximum assist (25% patients effort)  · Physical Assist/Nonphysical Assist	2 person assist    Bed Mobility: Supine to Sit:     · Level of [place]	maximum assist (25% patients effort)  · Physical Assist/Nonphysical Assist	2 person assist    Bed Mobility Analysis:     · Impairments Contributing to Impaired Bed Mobility	impaired balance; cognition; impaired coordination; impaired motor control; impaired postural control; decreased strength    Transfer: Sit to Stand:     · Level of [place]	maximum assist (25% patients effort)  · Physical Assist/Nonphysical Assist	2 person assist  · Weight-Bearing Restrictions	full weight-bearing        FAMILY HISTORY   FHx: rheumatoid arthritis  Family history of leukemia      RECENT LABS/IMAGING  CBC Full  -  ( 24 Aug 2020 06:39 )  WBC Count : 5.52 K/uL  RBC Count : 3.51 M/uL  Hemoglobin : 11.7 g/dL  Hematocrit : 36.4 %  Platelet Count - Automated : 310 K/uL  Mean Cell Volume : 103.7 fl  Mean Cell Hemoglobin : 33.3 pg  Mean Cell Hemoglobin Concentration : 32.1 gm/dL  Auto Neutrophil # : 3.25 K/uL  Auto Lymphocyte # : 1.40 K/uL  Auto Monocyte # : 0.62 K/uL  Auto Eosinophil # : 0.18 K/uL  Auto Basophil # : 0.02 K/uL  Auto Neutrophil % : 58.8 %  Auto Lymphocyte % : 25.4 %  Auto Monocyte % : 11.2 %  Auto Eosinophil % : 3.3 %  Auto Basophil % : 0.4 %    08-24    135  |  96  |  5<L>  ----------------------------<  142<H>  3.4<L>   |  23  |  0.39<L>    Ca    9.7      24 Aug 2020 06:40  Phos  3.6     08-24  Mg     2.1     08-24    TPro  6.5  /  Alb  3.8  /  TBili  0.2  /  DBili  x   /  AST  47<H>  /  ALT  34  /  AlkPhos  37<L>  08-24    < from: CT Head No Cont (08.15.20 @ 18:28) >    FINDINGS:    There is no CT evidence of acute intracranial hemorrhage, mass effect, midline shift, or acute, large territorial infarct.    The ventricles and sulci are normal in size and configuration. The basal cisterns are patent.    The mastoid air cells and middle ear cavities are grossly clear. There is no significant paranasal sinus mucosal thickening.    The calvarium and skull base are grossly intact. .    IMPRESSION:  No acute intracranial abnormality.      < end of copied text >    < from: MR Head w/wo IV Cont (08.22.20 @ 17:52) >    IMPRESSION:  Cortical areas of diffusion restriction within the supratentorial and infratentorial compartments as well as involvement of the right basal ganglia and thalamus. There is apparent mild progression when compared with the prior exam favoring post ictal changes or an infectious/inflammatory etiology such as cerebritis. See prior report for additional considerations. The possibility of  Creutzfeldt-Derrick disease is not entirely excluded given the pattern of involvement.        < end of copied text >    ALLERGIES  penicillin (Other)  penicillin (Swelling)      MEDICATIONS   acetaminophen   Tablet .. 650 milliGRAM(s) Oral once PRN  ALPRAZolam 0.25 milliGRAM(s) Oral two times a day PRN  artificial  tears Solution 1 Drop(s) Both EYES three times a day PRN  fludroCORTISONE 0.1 milliGRAM(s) Oral every 12 hours  heparin   Injectable 5000 Unit(s) SubCutaneous every 8 hours  hydroxychloroquine 200 milliGRAM(s) Oral daily  lacosamide 200 milliGRAM(s) Oral two times a day  levETIRAcetam 1000 milliGRAM(s) Oral two times a day  methylPREDNISolone sodium succinate Injectable 20 milliGRAM(s) IV Push daily  oxyCODONE  ER Tablet 10 milliGRAM(s) Oral every 8 hours  pancrelipase  (CREON 12,000 Lipase Units) 1 Capsule(s) Oral three times a day with meals  phenytoin   Capsule 130 milliGRAM(s) Oral two times a day  potassium chloride    Tablet ER 40 milliEquivalent(s) Oral once      ----------------------------------------------------------------------------------------  PHYSICAL EXAM  Constitutional - NAD, Comfortable, in bed   HEENT - NCAT, EOMI  Neck - Supple, No limited ROM  Chest - Breathing comfortably  Cardiovascular - S1S2   Abdomen - Soft   Extremities - No C/C/E, No calf tenderness   Neurologic Exam -                    Cognitive - Awake, Alert, AAO to self, place, date, year, situation     Communication - Fluent, No dysarthria     Cranial Nerves - CN 2-12 intact     Motor - 5/5     Sensory - Intact to LT     Reflexes - DTR Intact, No primitive reflexes     Psychiatric - Mood stable, Affect WNL  ----------------------------------------------------------------------------------------  ASSESSMENT/PLAN  60 year old woman h/o chronic pain, lung adenocarcinoma (s/p RML wedge resection 2018), ?mixed connective tissue disease, anxiety, right hip replacement, lumbar laminectomy, b/l oophorectomy, diverticulitis, recent acute pancreatitis and found to have right soleal DVT on eliquis, recent RLE cellulitis and recent c.diff infection with functional deficits after seizures  MRI demonstrates cortical ribboning. EEG shown right frontal LPDs.   on keprra, vimpat, fosphenytoin, now off EEG  hyponatremia on florinef  h/o INNA, on solumedrol, to transition to home dose of prednisone, plaquenil   no signs of infection, off abx  Pain - Tylenol, oxycodone prn  DVT PPX - SCDs, heparin   Rehab - Will continue to follow for ongoing rehab needs and recommendations.    continue bedside PT/OT  patient needs updated PT/OT, last notes from 8/21 show patient requires max assist x2  out of bed to chair  patient's HCP is planning inpatient substance abuse treatment after inpatient rehab

## 2020-08-24 NOTE — PROGRESS NOTE ADULT - SUBJECTIVE AND OBJECTIVE BOX
SUBJECTIVE: patient seen and examined at bedside by Neurology Resident and Attending. transferred from MICU to medicine floor overnight. no overnight complaints. patient denies alcohol withdrawal seizures, denies recent alcohol use, reports she remembers being with her dog at home as the last thing she remembers. she reports she has chronic back pain issues and does not take more oxycodone than prescribed, and reports she does not like her PCP.     MEDICATIONS (HOME):  Home Medications:  alendronate 70 mg oral tablet: 1 tab(s) orally once a week (23 Aug 2020 11:47)  cloNIDine 0.1 mg oral tablet: 1 tab(s) orally 2 times a day (23 Aug 2020 11:47)  fentaNYL 25 mcg/hr transdermal film, extended release: 1 film(s) transdermal every 72 hours (23 Aug 2020 11:47)  gabapentin 600 mg oral tablet: 2 cap(s) orally 2 times a day (23 Aug 2020 11:47)  hydroxychloroquine 200 mg oral tablet: 1 tab(s) orally every other day (23 Aug 2020 11:47)  loperamide 2 mg oral capsule: 1 cap(s) orally every 6 hours, As needed, Diarrhea (23 Aug 2020 11:47)  Medrol 4 mg oral tablet: 1 cap(s) orally once a day (23 Aug 2020 11:47)  Multiple Vitamins oral tablet: 1 tab(s) orally once a day (23 Aug 2020 11:47)  oxyCODONE 5 mg oral tablet: 1 tab(s) orally 5 times a day (23 Aug 2020 11:47)  pancrelipase 12,000 units-38,000 units-60,000 units oral delayed release capsule: 1 cap(s) orally 3 times a day (with meals) (23 Aug 2020 11:47)  pantoprazole 40 mg oral delayed release tablet: 1 tab(s) orally once a day (23 Aug 2020 11:47)  Restasis 0.05% ophthalmic emulsion: 1 drop(s) to each affected eye every 12 hours (23 Aug 2020 11:47)  Xanax 0.25 mg oral tablet: 1 cap(s) orally 2 times a day (23 Aug 2020 11:47)    MEDICATIONS  (STANDING):  fludroCORTISONE 0.1 milliGRAM(s) Oral every 12 hours  heparin   Injectable 5000 Unit(s) SubCutaneous every 8 hours  hydroxychloroquine 200 milliGRAM(s) Oral daily  lacosamide 200 milliGRAM(s) Oral two times a day  levETIRAcetam 1000 milliGRAM(s) Oral two times a day  methylPREDNISolone sodium succinate Injectable 20 milliGRAM(s) IV Push daily  oxyCODONE  ER Tablet 10 milliGRAM(s) Oral every 8 hours  pancrelipase  (CREON 12,000 Lipase Units) 1 Capsule(s) Oral three times a day with meals  phenytoin   Capsule 130 milliGRAM(s) Oral two times a day    MEDICATIONS  (PRN):  acetaminophen   Tablet .. 650 milliGRAM(s) Oral once PRN Moderate Pain (4 - 6), Severe Pain (7 - 10)  ALPRAZolam 0.25 milliGRAM(s) Oral two times a day PRN anxiety  artificial  tears Solution 1 Drop(s) Both EYES three times a day PRN Eye irritation    ALLERGIES/INTOLERANCES:  Allergies  penicillin (Other)  penicillin (Swelling)    VITALS & EXAMINATION:  Vital Signs Last 24 Hrs  T(C): 37.1 (24 Aug 2020 03:50), Max: 37.2 (23 Aug 2020 16:00)  T(F): 98.8 (24 Aug 2020 03:50), Max: 99 (23 Aug 2020 16:00)  HR: 80 (24 Aug 2020 03:50) (80 - 99)  BP: 125/74 (24 Aug 2020 03:50) (125/74 - 153/83)  BP(mean): 103 (23 Aug 2020 23:00) (92 - 111)  RR: 18 (24 Aug 2020 03:50) (18 - 40)  SpO2: 98% (24 Aug 2020 03:50) (96% - 99%)    Neurological (>12):  MS: Awake, alert, oriented to person, place, situation, time. Normal affect. Follows all commands. knows the president. able to point to floor, ceiling, door without hesitation or confusion. can perform serial 7s. clock drawing demonstrates #s drawn outside of clock but able to draw appropriate time when asked.    Language: Speech is clear, fluent with good repetition & comprehension (able to name objects___)    CNs: VFF. EOMI no nystagmus, V1-3 intact to LT/pinprick, well developed masseter muscles b/l. Right nasolabial flattening, full eye closure strength b/l. Hearing grossly normal (rubbing fingers) b/l. Symmetric palate elevation in midline. Gag reflex deferred. Head turning & shoulder shrug intact b/l. Tongue midline, normal movements, no atrophy.  Motor:  At least 4+/5 throughout. No rhythmic shaking of the R arm  Reflexes: 1+ patellar reflexes, Babinski downgoing.   Sensory:  Equal sensation to light tough bilaterally.  Gait:   Deferred    LABORATORY:  CBC                       11.7   5.52  )-----------( 310      ( 24 Aug 2020 06:39 )             36.4     Chem 08-24    135  |  96  |  5<L>  ----------------------------<  142<H>  3.4<L>   |  23  |  0.39<L>    Ca    9.7      24 Aug 2020 06:40  Phos  3.6     08-24  Mg     2.1     08-24    TPro  6.5  /  Alb  3.8  /  TBili  0.2  /  DBili  x   /  AST  47<H>  /  ALT  34  /  AlkPhos  37<L>  08-24    LFTs LIVER FUNCTIONS - ( 24 Aug 2020 06:40 )  Alb: 3.8 g/dL / Pro: 6.5 g/dL / ALK PHOS: 37 U/L / ALT: 34 U/L / AST: 47 U/L / GGT: x           Coagulopathy PT/INR - ( 23 Aug 2020 02:13 )   PT: 11.9 sec;   INR: 1.00 ratio         PTT - ( 23 Aug 2020 02:13 )  PTT:28.3 sec  Lipid Panel 08-16 Chol -- LDL -- HDL -- Trig 289<H>, 07-29 Chol -- LDL -- HDL -- Trig 52    STUDIES & IMAGING:  Studies (EKG, EEG, EMG, etc):     Radiology (XR, CT, MR, U/S, TTE/GROVER):    < from: MR Head w/wo IV Cont (08.22.20 @ 17:52) >  IMPRESSION:  Cortical areas of diffusion restriction within the supratentorial and infratentorial compartments as well as involvement of the right basal ganglia and thalamus. There is apparent mild progression when compared with the prior exam favoring post ictal changes or an infectious/inflammatory etiology such as cerebritis. See prior report for additional considerations. The possibility of  Creutzfeldt-Derrick disease is not entirely excluded given the pattern of involvement.    < end of copied text >    EEG Impression / Clinical Correlate:  Abnormal EEG study.  1. Potential epileptogenic focus in the right frontal region.   2. Structural or functional abnormality in bilateral frontotemporal regions.  3. Mild to moderate diffuse cerebral dysfunction.  4. No seizure seen.

## 2020-08-24 NOTE — PROGRESS NOTE ADULT - ASSESSMENT
Assessment: 61 yo woman w/ h/o chronic pain, lung adenocarcinoma (s/p RML wedge resection 2018), ?mixed connective tissue disease, anxiety, right hip replacement, lumbar laminectomy, b/l oophorectomy, diverticulitis, recent acute pancreatitis and found to have right soleal DVT on eliquis, recent RLE cellulitis and recent c.diff infection initially admitted to [facility] for hyponatremia with ccb seizure witnessed 8/13 now transferred for further seizures requiring intubation and mechanical ventilation with Neurology consult for such. Neurological examination at this time demonstrates significantly improved mental status and neurological examination compared to prior exams with Right nasolabial flattening. MRI demonstrates cortical ribboning. EEG shown right frontal LPDs. Currently off EEG monitoring.     Impression: seizures thought initially 2/2 inflammatory/infectious/autoimmune cause with CSF results so far negative (with pending CSF results still), though at this time with patient's significant improvement seizures may have been 2/2 metabolic disturbances with patient noted to have been initially hyponatremic Na 121 at OSH with treatment for alcohol withdrawal which may have contributed to lowering seizure threshold    Recommendations:  [] continue Keppra 1g bid, Vimpat 200mg bid, fosphenytoin 140mg bid, can transition to PO, same doses  [] Phenytoin level 1.9 8/24 - to address shortly  [] patient with great improvement in mental status, at this time no acute treatment (steroids, PLEX, IVIG) for potential underlying infectious/inflammatory/autoimmune etiology   [] can keep off EEG at this time  [] LP per primary team;  Glucose elevated; Protein normal: Gram stain with no growth; CSF Viral PCR panel negative; CSF AFB cancelled; CSF fungal cx pending; CSF VDRL negative; Oligoclonal bands low; Myelin Basic Protein, Anti-MOG; Cryptococcal antigen negative. Autoimmune encephelopathy CSF panel, RT quic CSF, 14-3-3. NMDA negative  [] stopped acyclovir, vancomycin, ceftriaxone as no sign of infection  [] would recommend patient to get follow up with Epilepsy Dr. Gallegos as outpatient  [address] [place] [address]  [phone]    [facility] d/w Neurology Attending Dr. King

## 2020-08-24 NOTE — PROGRESS NOTE ADULT - SUBJECTIVE AND OBJECTIVE BOX
[facility] Division of Kidney Diseases & Hypertension  FOLLOW UP NOTE  --------------------------------------------------------------------------------  Chief Complaint:    24 hour events/subjective:    feels well   denies any complaints     PAST HISTORY  --------------------------------------------------------------------------------  No significant changes to PMH, PSH, FHx, SHx, unless otherwise noted    ALLERGIES & MEDICATIONS  --------------------------------------------------------------------------------  Allergies    penicillin (Other)  penicillin (Swelling)    Intolerances    Standing Inpatient Medications  fludroCORTISONE 0.1 milliGRAM(s) Oral every 12 hours  heparin   Injectable 5000 Unit(s) SubCutaneous every 8 hours  hydroxychloroquine 200 milliGRAM(s) Oral daily  lacosamide 200 milliGRAM(s) Oral two times a day  levETIRAcetam 1000 milliGRAM(s) Oral two times a day  methylPREDNISolone sodium succinate Injectable 20 milliGRAM(s) IV Push daily  oxyCODONE  ER Tablet 10 milliGRAM(s) Oral every 8 hours  pancrelipase  (CREON 12,000 Lipase Units) 1 Capsule(s) Oral three times a day with meals  phenytoin   Capsule 130 milliGRAM(s) Oral two times a day  potassium chloride    Tablet ER 40 milliEquivalent(s) Oral once    PRN Inpatient Medications  acetaminophen   Tablet .. 650 milliGRAM(s) Oral once PRN  ALPRAZolam 0.25 milliGRAM(s) Oral two times a day PRN  artificial  tears Solution 1 Drop(s) Both EYES three times a day PRN      VITALS/PHYSICAL EXAM  --------------------------------------------------------------------------------  T(C): 36.8 (08-24-20 @ 12:34), Max: 37.2 (08-23-20 @ 16:00)  HR: 85 (08-24-20 @ 12:34) (80 - 99)  BP: 128/75 (08-24-20 @ 12:34) (125/74 - 153/83)  RR: 18 (08-24-20 @ 12:34) (18 - 36)  SpO2: 95% (08-24-20 @ 12:34) (95% - 98%)  Wt(kg): --    08-23-20 @ 07:01  -  08-24-20 @ 07:00  --------------------------------------------------------  IN: 840 mL / OUT: 1680 mL / NET: -840 mL    08-24-20 @ 07:01  -  08-24-20 @ 15:17  --------------------------------------------------------  IN: 480 mL / OUT: 950 mL / NET: -470 mL      Physical Exam:  	Gen: NAD, well-appearing  	HEENT:  supple neck, clear oropharynx  	Pulm: CTA B/L  	CV: RRR, S1S2  	Back: No spinal or CVA tenderness; no sacral edema  	Abd: +BS, soft, nontender/nondistended  	UE: no asterixis  	LE: Warm, no edema  	Neuro: No focal deficits, intact gait  	Psych: Normal affect and mood  	Skin: Warm, without rashes    LABS/STUDIES  --------------------------------------------------------------------------------              11.7   5.52  >-----------<  310      [08-24-20 @ 06:39]              36.4     135  |  96  |  5   ----------------------------<  142      [08-24-20 @ 06:40]  3.4   |  23  |  0.39        Ca     9.7     [08-24-20 @ 06:40]      Mg     2.1     [08-24-20 @ 06:40]      Phos  3.6     [08-24-20 @ 06:40]    TPro  6.5  /  Alb  3.8  /  TBili  0.2  /  DBili  x   /  AST  47  /  ALT  34  /  AlkPhos  37  [08-24-20 @ 06:40]    PT/INR: PT 11.9 , INR 1.00       [08-23-20 @ 02:13]  PTT: 28.3       [08-23-20 @ 02:13]    Uric acid 2.5      [08-24-20 @ 11:55]    Creatinine Trend:  SCr 0.39 [08-24 @ 06:40]  SCr 0.44 [08-23 @ 22:43]  SCr 0.46 [08-23 @ 15:54]  SCr 0.32 [08-23 @ 08:51]  SCr 0.38 [08-23 @ 02:13]      Urine Creatinine 47      [08-24-20 @ 11:55]  Urine Sodium 163      [08-21-20 @ 12:41]  Urine Potassium 21      [08-21-20 @ 12:41]  Urine Chloride 139      [08-21-20 @ 12:41]  Urine Osmolality 396      [08-21-20 @ 12:40]

## 2020-08-24 NOTE — PROGRESS NOTE ADULT - ATTENDING COMMENTS
I performed a history and physical examination of the patient and discussed the management of the patient with the resident. I reviewed the resident's note and agree with the documented findings and plan of care with the following additions/exceptions.    DOS 8/24/20    she was able to relay the details of her hosptial stay, the results that are still pending, the working dx of seizures caused by dec Na.  when asked about substance use like etoh she was not so forthcoming. she denies any e/o seizure in the past related to etoh use or withdrawal  on exam she was alert, fluent, oriented to 8/24/20, knows when she was admitted and why. able to follow 3 step command. could name president. attention inconveration was intact. able to do 2 serial 7s though slow to get 86. able to draw a clock with accurate hand placement for 10 passed 11 though numbers were outside the clock. ffm symmetric. but dec R NLF noted.     low suspicion for autoimmune encephalitis given her improvement.   cont AED mgmt. will need outpatient epilepsy f/u as noted.  rec to discuss alcohol abstinence with her  in setting of unknown etoh use before and going forward rec oral thiamine

## 2020-08-24 NOTE — CONSULT NOTE ADULT - SUBJECTIVE AND OBJECTIVE BOX
Patient is a 60y old  Female who presents with a chief complaint of Seizures (24 Aug 2020 10:31)    Admission HPI:  59 y/o F with a h/o EtOH and opiate abuse s/p recent admissions for alcoholic pancreatitis and alcohol withdrawal, mixed connective tissue disorder (on plaquenil and methylprednisolone), chronic pain s/p lumbar laminectomy, left soleal vein thrombosis (started on apixaban, 7/2020), lung adenocarcinoma (s/p RML resection 2018), recent RLE cellulitis s/p doxycycline, recent CDiff colitis (on PO vancomycin, started 8/3, to finish 8/17), CAD s/p possible MI (ECHO ) admitted on 8/11 with symptomatic 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 She was treated on the medicine service with an appropriate rate of correction in serum Na. Noted to have focal seizure activity of LUE on 8/13 which broke with IV lorazepam. Seizure activity believed to be secondary to benzodiazepine withdrawal. EEG at one point showed epileptiform activity and she was started on Keppra although continued to have periodic seizure activity. CT head unremarkable for acute pathology.     On 8/16, RRT called because patient found to be unresponsive with generalized seizure activity with left UE/LE/facial twitching. Seizure activity would break transiently with IV lorazepam but recurred repetitively. After 8mg lorazepam she began to lose airway protection abilities and developed hypoxemia (SpO2 70s). She was emergent intubated and given keppra load 1000mg, vimpat load 200 mg and propofol 10 mcg/kg/min infusion, which successfully suppressed her seizures. Found to have new fever of 101.6 F and hypotensive post-intubation. Started on levophed drip.      Transferred to [facility] for EEG study.     Collateral:   Anita River (Healthcare Proxy): Ms. River lives in [place], and says that over the past several months Ms. Mendoza has had several hospitalizations, but has had difficulty getting a full history from the patient who she says is routinely an unreliable historian. Ms. River believes that Ms. Mendoza has had a previous MI several years ago, and has a history of lung cancer for which she had a lobectomy for in 2019, but is again unsure of the exact medical history. She does not believe Ms. Mendoza has a diagnosed psychiatric history, but knows she has had multiple years of drug and alcohol abuse. (16 Aug 2020 20:53)    Interval History:  Patient had LP- infectious etiology unlikely.  CT Head on 8/15 which noted No acute intracranial abnormality.  MRI of head 8/22 Cortical areas of diffusion restriction within the supratentorial and infratentorial compartments as well as involvement of the right basal ganglia and thalamus. There is apparent mild progression when compared with the prior exam favoring post ictal changes or an infectious/inflammatory etiology such as cerebritis. See prior report for additional considerations. The possibility of  Creutzfeldt-Derrick disease is not entirely excluded given the pattern of involvement.    Course has been complicated by hyponatremia, resolved status epilepticus, treatment for C.Diff.   Was intubated but now extubated. Most recent EEG (8/22) notes"Abnormal EEG study.  1. Potential epileptogenic focus in the right frontal region.   2. Structural or functional abnormality in bilateral frontotemporal regions.  3. Mild to moderate diffuse cerebral dysfunction.  4. No seizure seen."    REVIEW OF SYSTEMS: No chest pain, shortness of breath, nausea, vomiting or diarhea; other ROS neg     PAST MEDICAL & SURGICAL HISTORY  Lung cancer  Opiate dependence  Alcohol abuse  Adenocarcinoma of lung  Mixed connective tissue disease  Depression H/O panic attack  S/P Laminectomy  Lumbar 3/10  Chronic pain  Diverticulitis of colon (without mention of hemorrhage)  History of laminectomy  History of lung surgery  History of oophorectomy, unilateral  History of hip replacement, total, right  S/P lumbar laminectomy  S/P cholecystectomy  History of lung cancer    FUNCTIONAL HISTORY:   Lives alone- PTA Independent    CURRENT FUNCTIONAL STATUS:  Max A transfers.    FAMILY HISTORY   FHx: rheumatoid arthritis  Family history of leukemia    RECENT LABS/IMAGING  CBC Full  -  ( 24 Aug 2020 06:39 )  WBC Count : 5.52 K/uL  RBC Count : 3.51 M/uL  Hemoglobin : 11.7 g/dL  Hematocrit : 36.4 %  Platelet Count - Automated : 310 K/uL  Mean Cell Volume : 103.7 fl  Mean Cell Hemoglobin : 33.3 pg  Mean Cell Hemoglobin Concentration : 32.1 gm/dL  Auto Neutrophil # : 3.25 K/uL  Auto Lymphocyte # : 1.40 K/uL  Auto Monocyte # : 0.62 K/uL  Auto Eosinophil # : 0.18 K/uL  Auto Basophil # : 0.02 K/uL  Auto Neutrophil % : 58.8 %  Auto Lymphocyte % : 25.4 %  Auto Monocyte % : 11.2 %  Auto Eosinophil % : 3.3 %  Auto Basophil % : 0.4 %    08-24    135  |  96  |  5<L>  ----------------------------<  142<H>  3.4<L>   |  23  |  0.39<L>    Ca    9.7      24 Aug 2020 06:40  Phos  3.6     08-24  Mg     2.1     08-24    TPro  6.5  /  Alb  3.8  /  TBili  0.2  /  DBili  x   /  AST  47<H>  /  ALT  34  /  AlkPhos  37<L>  08-24        VITALS  T(C): 36.8 (08-24-20 @ 12:34), Max: 37.2 (08-23-20 @ 16:00)  HR: 85 (08-24-20 @ 12:34) (80 - 99)  BP: 128/75 (08-24-20 @ 12:34) (125/74 - 153/83)  RR: 18 (08-24-20 @ 12:34) (18 - 36)  SpO2: 95% (08-24-20 @ 12:34) (95% - 98%)  Wt(kg): --    ALLERGIES  penicillin (Other)  penicillin (Swelling)      MEDICATIONS   acetaminophen   Tablet .. 650 milliGRAM(s) Oral once PRN  ALPRAZolam 0.25 milliGRAM(s) Oral two times a day PRN  artificial  tears Solution 1 Drop(s) Both EYES three times a day PRN  fludroCORTISONE 0.1 milliGRAM(s) Oral every 12 hours  heparin   Injectable 5000 Unit(s) SubCutaneous every 8 hours  hydroxychloroquine 200 milliGRAM(s) Oral daily  lacosamide 200 milliGRAM(s) Oral two times a day  levETIRAcetam 1000 milliGRAM(s) Oral two times a day  methylPREDNISolone sodium succinate Injectable 20 milliGRAM(s) IV Push daily  oxyCODONE  ER Tablet 10 milliGRAM(s) Oral every 8 hours  pancrelipase  (CREON 12,000 Lipase Units) 1 Capsule(s) Oral three times a day with meals  phenytoin   Capsule 130 milliGRAM(s) Oral two times a day  potassium chloride    Tablet ER 40 milliEquivalent(s) Oral once      ----------------------------------------------------------------------------------------  PHYSICAL EXAM  Constitutional - NAD, Comfortable  HEENT - NCAT, EOMI  Neck - Supple, No limited ROM  Chest - CTA bilaterally, No wheeze, No rhonchi, No crackles  Cardiovascular - RRR, S1S2, No murmurs  Abdomen - BS+, Soft, NTND  Extremities - No C/C/E, No calf tenderness   Neurologic Exam -                    Cognitive - Awake, Alert, AAO to self, place, date, year, situation     Communication - Fluent, No dysarthria, no aphasia     Cranial Nerves - CN 2-12 intact     Motor - No focal deficits      Sensory - Intact to LT     Reflexes - DTR Intact, No primitive reflexive       Psychiatric - Mood stable, Affect WNL    Impression:    59 yo with functional deficits secondary to diagnosis of     Plan:  PT- ROM, Bed Mob, Transfers, Amb w AD and bracing as needed  OT- ADLs, bracing  SLP- Dysphagia eval and treat  Prec- Falls, Cardiac  DVT Prophylaxis- Heparin SQ  Skin- Turn q2 h  Dispo-

## 2020-08-25 LAB
ANION GAP SERPL CALC-SCNC: 17 MMOL/L — SIGNIFICANT CHANGE UP (ref 5–17)
BUN SERPL-MCNC: 6 MG/DL — LOW (ref 7–23)
CALCIUM SERPL-MCNC: 10.3 MG/DL — SIGNIFICANT CHANGE UP (ref 8.4–10.5)
CHLORIDE SERPL-SCNC: 94 MMOL/L — LOW (ref 96–108)
CO2 SERPL-SCNC: 22 MMOL/L — SIGNIFICANT CHANGE UP (ref 22–31)
CREAT ?TM UR-MCNC: 99 MG/DL — SIGNIFICANT CHANGE UP
CREAT SERPL-MCNC: 0.45 MG/DL — LOW (ref 0.5–1.3)
GLUCOSE SERPL-MCNC: 117 MG/DL — HIGH (ref 70–99)
HCT VFR BLD CALC: 38.5 % — SIGNIFICANT CHANGE UP (ref 34.5–45)
HGB BLD-MCNC: 12.5 G/DL — SIGNIFICANT CHANGE UP (ref 11.5–15.5)
MCHC RBC-ENTMCNC: 32.5 GM/DL — SIGNIFICANT CHANGE UP (ref 32–36)
MCHC RBC-ENTMCNC: 33.4 PG — SIGNIFICANT CHANGE UP (ref 27–34)
MCV RBC AUTO: 102.9 FL — HIGH (ref 80–100)
NRBC # BLD: 0 /100 WBCS — SIGNIFICANT CHANGE UP (ref 0–0)
OSMOLALITY UR: 600 MOSM/KG — SIGNIFICANT CHANGE UP (ref 50–1200)
PLATELET # BLD AUTO: 346 K/UL — SIGNIFICANT CHANGE UP (ref 150–400)
POTASSIUM SERPL-MCNC: 4 MMOL/L — SIGNIFICANT CHANGE UP (ref 3.5–5.3)
POTASSIUM SERPL-SCNC: 4 MMOL/L — SIGNIFICANT CHANGE UP (ref 3.5–5.3)
POTASSIUM UR-SCNC: 30 MMOL/L — SIGNIFICANT CHANGE UP
POTASSIUM UR-SCNC: 61 MMOL/L — SIGNIFICANT CHANGE UP
RBC # BLD: 3.74 M/UL — LOW (ref 3.8–5.2)
RBC # FLD: 13.4 % — SIGNIFICANT CHANGE UP (ref 10.3–14.5)
SODIUM SERPL-SCNC: 133 MMOL/L — LOW (ref 135–145)
SODIUM UR-SCNC: 64 MMOL/L — SIGNIFICANT CHANGE UP
SODIUM UR-SCNC: 93 MMOL/L — SIGNIFICANT CHANGE UP
WBC # BLD: 5.31 K/UL — SIGNIFICANT CHANGE UP (ref 3.8–10.5)
WBC # FLD AUTO: 5.31 K/UL — SIGNIFICANT CHANGE UP (ref 3.8–10.5)

## 2020-08-25 PROCEDURE — 99232 SBSQ HOSP IP/OBS MODERATE 35: CPT | Mod: GC

## 2020-08-25 RX ORDER — GABAPENTIN 400 MG/1
200 CAPSULE ORAL
Refills: 0 | Status: DISCONTINUED | OUTPATIENT
Start: 2020-08-25 | End: 2020-08-28

## 2020-08-25 RX ADMIN — FLUDROCORTISONE ACETATE 0.1 MILLIGRAM(S): 0.1 TABLET ORAL at 05:16

## 2020-08-25 RX ADMIN — GABAPENTIN 200 MILLIGRAM(S): 400 CAPSULE ORAL at 17:46

## 2020-08-25 RX ADMIN — Medication 20 MILLIGRAM(S): at 05:14

## 2020-08-25 RX ADMIN — Medication 130 MILLIGRAM(S): at 05:15

## 2020-08-25 RX ADMIN — HEPARIN SODIUM 5000 UNIT(S): 5000 INJECTION INTRAVENOUS; SUBCUTANEOUS at 05:16

## 2020-08-25 RX ADMIN — Medication 1 CAPSULE(S): at 08:54

## 2020-08-25 RX ADMIN — Medication 1 CAPSULE(S): at 17:46

## 2020-08-25 RX ADMIN — HEPARIN SODIUM 5000 UNIT(S): 5000 INJECTION INTRAVENOUS; SUBCUTANEOUS at 21:31

## 2020-08-25 RX ADMIN — Medication 0.25 MILLIGRAM(S): at 13:03

## 2020-08-25 RX ADMIN — LEVETIRACETAM 1000 MILLIGRAM(S): 250 TABLET, FILM COATED ORAL at 17:46

## 2020-08-25 RX ADMIN — Medication 650 MILLIGRAM(S): at 01:45

## 2020-08-25 RX ADMIN — FLUDROCORTISONE ACETATE 0.1 MILLIGRAM(S): 0.1 TABLET ORAL at 17:45

## 2020-08-25 RX ADMIN — Medication 1 CAPSULE(S): at 13:03

## 2020-08-25 RX ADMIN — Medication 130 MILLIGRAM(S): at 17:45

## 2020-08-25 RX ADMIN — HEPARIN SODIUM 5000 UNIT(S): 5000 INJECTION INTRAVENOUS; SUBCUTANEOUS at 13:03

## 2020-08-25 RX ADMIN — LEVETIRACETAM 1000 MILLIGRAM(S): 250 TABLET, FILM COATED ORAL at 05:15

## 2020-08-25 RX ADMIN — Medication 0.25 MILLIGRAM(S): at 01:04

## 2020-08-25 RX ADMIN — OXYCODONE HYDROCHLORIDE 10 MILLIGRAM(S): 5 TABLET ORAL at 23:51

## 2020-08-25 RX ADMIN — OXYCODONE HYDROCHLORIDE 10 MILLIGRAM(S): 5 TABLET ORAL at 05:14

## 2020-08-25 RX ADMIN — Medication 650 MILLIGRAM(S): at 01:04

## 2020-08-25 RX ADMIN — OXYCODONE HYDROCHLORIDE 10 MILLIGRAM(S): 5 TABLET ORAL at 13:40

## 2020-08-25 RX ADMIN — OXYCODONE HYDROCHLORIDE 10 MILLIGRAM(S): 5 TABLET ORAL at 13:02

## 2020-08-25 RX ADMIN — LACOSAMIDE 200 MILLIGRAM(S): 50 TABLET ORAL at 17:45

## 2020-08-25 RX ADMIN — LACOSAMIDE 200 MILLIGRAM(S): 50 TABLET ORAL at 05:14

## 2020-08-25 RX ADMIN — OXYCODONE HYDROCHLORIDE 10 MILLIGRAM(S): 5 TABLET ORAL at 06:00

## 2020-08-25 RX ADMIN — Medication 200 MILLIGRAM(S): at 11:52

## 2020-08-25 RX ADMIN — OXYCODONE HYDROCHLORIDE 10 MILLIGRAM(S): 5 TABLET ORAL at 21:30

## 2020-08-25 NOTE — PROGRESS NOTE ADULT - SUBJECTIVE AND OBJECTIVE BOX
Patient is a 60y old  Female who presents with a chief complaint of Seizures (25 Aug 2020 09:49)      SUBJECTIVE / OVERNIGHT EVENTS:    Patient seen and examined. denies cp sob. feels well.       Vital Signs Last 24 Hrs  T(C): 36.8 (25 Aug 2020 10:03), Max: 36.8 (24 Aug 2020 12:34)  T(F): 98.2 (25 Aug 2020 10:03), Max: 98.2 (24 Aug 2020 12:34)  HR: 84 (25 Aug 2020 10:03) (79 - 88)  BP: 123/79 (25 Aug 2020 10:03) (116/73 - 128/75)  BP(mean): --  RR: 18 (25 Aug 2020 10:03) (18 - 18)  SpO2: 98% (25 Aug 2020 10:03) (95% - 99%)  I&O's Summary    24 Aug 2020 07:01  -  25 Aug 2020 07:00  --------------------------------------------------------  IN: 840 mL / OUT: 1250 mL / NET: -410 mL        PE:  GENERAL: NAD, AAOx3, frail  HEAD:  Atraumatic, Normocephalic  CHEST/LUNG: CTABL, No wheeze  HEART: Regular rate and rhythm; no murmur  ABDOMEN: Soft, Nontender, Nondistended; Bowel sounds present  EXTREMITIES:  2+ Peripheral Pulses, No clubbing, cyanosis, or edema  SKIN: No rashes or lesions  NEURO: No focal deficits    LABS:                        12.5   5.31  )-----------( 346      ( 25 Aug 2020 07:09 )             38.5     08-25    133<L>  |  94<L>  |  6<L>  ----------------------------<  117<H>  4.0   |  22  |  0.45<L>    Ca    10.3      25 Aug 2020 07:08  Phos  3.6     08-24  Mg     2.1     08-24    TPro  6.5  /  Alb  3.8  /  TBili  0.2  /  DBili  x   /  AST  47<H>  /  ALT  34  /  AlkPhos  37<L>  08-24      CAPILLARY BLOOD GLUCOSE                RADIOLOGY & ADDITIONAL TESTS:    Imaging Personally Reviewed:  [x] YES  [ ] NO    Consultant(s) Notes Reviewed:  [x] YES  [ ] NO    MEDICATIONS  (STANDING):  fludroCORTISONE 0.1 milliGRAM(s) Oral every 12 hours  heparin   Injectable 5000 Unit(s) SubCutaneous every 8 hours  hydroxychloroquine 200 milliGRAM(s) Oral daily  lacosamide 200 milliGRAM(s) Oral two times a day  levETIRAcetam 1000 milliGRAM(s) Oral two times a day  oxyCODONE  ER Tablet 10 milliGRAM(s) Oral every 8 hours  pancrelipase  (CREON 12,000 Lipase Units) 1 Capsule(s) Oral three times a day with meals  phenytoin   Capsule 130 milliGRAM(s) Oral two times a day    MEDICATIONS  (PRN):  ALPRAZolam 0.25 milliGRAM(s) Oral two times a day PRN anxiety  artificial  tears Solution 1 Drop(s) Both EYES three times a day PRN Eye irritation      Care Discussed with Consultants/Other Providers [x] YES  [ ] NO    HEALTH ISSUES - PROBLEM Dx:  Hyponatremia: Hyponatremia

## 2020-08-25 NOTE — PROGRESS NOTE ADULT - SUBJECTIVE AND OBJECTIVE BOX
[facility] Division of Kidney Diseases & Hypertension  FOLLOW UP NOTE  [phone]--------------------------------------------------------------------------------  Chief Complaint:Intractable epilepsy without status epilepticus      24 hour events/subjective: No acute events overnight. Patient currently on Florinef 0.1 mg q12h. Labs, vitals, medications and imaging reviewed. Vital signs stable. UOP 1300 in last 24 hrs.        PAST HISTORY  --------------------------------------------------------------------------------  No significant changes to PMH, PSH, FHx, SHx, unless otherwise noted    ALLERGIES & MEDICATIONS  --------------------------------------------------------------------------------  Allergies    penicillin (Other)  penicillin (Swelling)    Intolerances      Standing Inpatient Medications  fludroCORTISONE 0.1 milliGRAM(s) Oral every 12 hours  heparin   Injectable 5000 Unit(s) SubCutaneous every 8 hours  hydroxychloroquine 200 milliGRAM(s) Oral daily  lacosamide 200 milliGRAM(s) Oral two times a day  levETIRAcetam 1000 milliGRAM(s) Oral two times a day  methylPREDNISolone sodium succinate Injectable 20 milliGRAM(s) IV Push daily  oxyCODONE  ER Tablet 10 milliGRAM(s) Oral every 8 hours  pancrelipase  (CREON 12,000 Lipase Units) 1 Capsule(s) Oral three times a day with meals  phenytoin   Capsule 130 milliGRAM(s) Oral two times a day    PRN Inpatient Medications  ALPRAZolam 0.25 milliGRAM(s) Oral two times a day PRN  artificial  tears Solution 1 Drop(s) Both EYES three times a day PRN      REVIEW OF SYSTEMS  --------------------------------------------------------------------------------  Gen: No  fevers/chills  Skin: No rashes  Head/Eyes/Ears/Mouth: No headache; Normal hearing; Normal vision w/o blurriness  Respiratory: No dyspnea, cough, wheezing, hemoptysis  CV: No chest pain, PND, orthopnea  GI: No abdominal pain, diarrhea, constipation, nausea, vomiting  : No increased frequency, dysuria, hematuria, nocturia  MSK: No joint pain/swelling; no back pain; no edema  Neuro: No dizziness/lightheadedness, weakness, seizures, numbness, tingling  Psych: Non-focal      All other systems were reviewed and are negative, except as noted.    VITALS/PHYSICAL EXAM  --------------------------------------------------------------------------------  T(C): 36.8 (08-25-20 @ 06:18), Max: 36.8 (08-24-20 @ 12:34)  HR: 79 (08-25-20 @ 06:18) (79 - 88)  BP: 116/73 (08-25-20 @ 06:18) (116/73 - 128/75)  RR: 18 (08-25-20 @ 06:18) (18 - 18)  SpO2: 99% (08-25-20 @ 06:18) (95% - 99%)  Wt(kg): --        08-24-20 @ 07:01  -  08-25-20 @ 07:00  --------------------------------------------------------  IN: 840 mL / OUT: 1250 mL / NET: -410 mL      Physical Exam:  	Gen: NAD  	HEENT: supple neck, clear oropharynx  	Pulm: CTA B/L  	CV: RRR, S1S2; no rub  	Abd: soft, nontender/nondistended               : no suprapubic tenderness.   	UE: Warm  	LE: Warm, no edema              Neuro: Opening eyes to call. Answering questions, but delayed response. Moving limbs spontaneously.               PsychL unable to fully assess due to clinical status.              Skin: no rash noted on the skin.     LABS/STUDIES  --------------------------------------------------------------------------------              12.5   5.31  >-----------<  346      [08-25-20 @ 07:09]              38.5     133  |  94  |  6   ----------------------------<  117      [08-25-20 @ 07:08]  4.0   |  22  |  0.45        Ca     10.3     [08-25-20 @ 07:08]      Mg     2.1     [08-24-20 @ 06:40]      Phos  3.6     [08-24-20 @ 06:40]    TPro  6.5  /  Alb  3.8  /  TBili  0.2  /  DBili  x   /  AST  47  /  ALT  34  /  AlkPhos  37  [08-24-20 @ 06:40]        Uric acid 2.5      [08-24-20 @ 11:55]    Creatinine Trend:  SCr 0.45 [08-25 @ 07:08]  SCr 0.39 [08-24 @ 06:40]  SCr 0.44 [08-23 @ 22:43]  SCr 0.46 [08-23 @ 15:54]  SCr 0.32 [08-23 @ 08:51]      Urine Creatinine 47      [08-24-20 @ 11:55]  Urine Sodium 163      [08-21-20 @ 12:41]  Urine Potassium 21      [08-21-20 @ 12:41]  Urine Chloride 139      [08-21-20 @ 12:41]  Urine Osmolality 396      [08-21-20 @ 12:40]

## 2020-08-25 NOTE — PROGRESS NOTE ADULT - ATTENDING COMMENTS
Dr. Galan will take over care tomorrow.  Please contact with any questions or concerns [phone].

## 2020-08-25 NOTE — PROGRESS NOTE ADULT - ATTENDING COMMENTS
Hyponatremic today   eating well   states she may have drank a lot of water     please check urine osm/ Na/K and we will give our recs.   in the meantime, continue florinef. free water restrict.

## 2020-08-25 NOTE — PROGRESS NOTE ADULT - ASSESSMENT
61 y/o F with a h/o EtOH and opiate abuse s/p recent admissions for alcoholic pancreatitis and alcohol withdrawal, mixed connective tissue disorder (on plaquenil and methylprednisolone), chronic pain s/p lumbar laminectomy, left soleal vein thrombosis (started on apixaban, 7/2020), lung adenocarcinoma (s/p RML resection 2018), recent RLE cellulitis s/p doxycycline, recent CDiff colitis (on PO vancomycin, started 8/3, to finish 8/17), CAD s/p possible MI (ECHO ) admitted on 8/11 to OSH with symptomatic 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 RRT called because patient found to be unresponsive with generalized seizure activity with left UE/LE/facial twitching. Urgently intubated for hypoxic respiratory failure. Transferred to [facility] MICU. MRI was performed which revealed acute/subacute lacunar infarction within the right medial thalamus along with hypoperfused areas in bilateral frontal, temporal lobes consistent w/ post-ictal events. EEG revealed structural abnormality in the right frontotemporal region and moderate to severe nonspecific diffuse or multifocal cerebral dysfunction. She was extubated on August 20. improved mental status, transferred out of ICU.    # Acute hypoxemic respiratory failure, sp extubation  resolved    # Status epilepticus, resolved  Continue Keppra and Vimpat and Fosphenytoin, EEG without overt seizure like activity. CT head negative for acute changes. LP performed.  No evidence of infection. 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 Follow serum na and UO    # H/O DVT soleal  DVT study legs yesterday negative. HSQ for prophylaxis of DVT.     # H/O INNA.   On solumedrol, start taper medrol 20mg 2 day, 10mg 2 days, 4mg qd, cont Plaquenil.    # C diff  finished 14 day course. D/C po vancomycin    PT recs acute rehab    PCP Dr. Tim Davis

## 2020-08-25 NOTE — CHART NOTE - NSCHARTNOTEFT_GEN_A_CORE
Spoke with Anita, patient's family, at length about patient's course, answered all questions. Anita believes patient is much more pleasant.     d/w Neurology Attending Dr. King Spoke with Anita (), patient's family, at length about patient's course, answered all questions. Anita believes patient is much more pleasant.     d/w Neurology Attending Dr. King

## 2020-08-26 LAB
ANION GAP SERPL CALC-SCNC: 15 MMOL/L — SIGNIFICANT CHANGE UP (ref 5–17)
BUN SERPL-MCNC: 7 MG/DL — SIGNIFICANT CHANGE UP (ref 7–23)
CALCIUM SERPL-MCNC: 9.7 MG/DL — SIGNIFICANT CHANGE UP (ref 8.4–10.5)
CHLORIDE SERPL-SCNC: 93 MMOL/L — LOW (ref 96–108)
CO2 SERPL-SCNC: 22 MMOL/L — SIGNIFICANT CHANGE UP (ref 22–31)
CREAT SERPL-MCNC: 0.42 MG/DL — LOW (ref 0.5–1.3)
GLUCOSE SERPL-MCNC: 108 MG/DL — HIGH (ref 70–99)
HCT VFR BLD CALC: 34.6 % — SIGNIFICANT CHANGE UP (ref 34.5–45)
HGB BLD-MCNC: 11.3 G/DL — LOW (ref 11.5–15.5)
MAGNESIUM SERPL-MCNC: 2.1 MG/DL — SIGNIFICANT CHANGE UP (ref 1.6–2.6)
MCHC RBC-ENTMCNC: 32.7 GM/DL — SIGNIFICANT CHANGE UP (ref 32–36)
MCHC RBC-ENTMCNC: 33.4 PG — SIGNIFICANT CHANGE UP (ref 27–34)
MCV RBC AUTO: 102.4 FL — HIGH (ref 80–100)
NMDAR IGG TITR CSF IF: SIGNIFICANT CHANGE UP
NRBC # BLD: 0 /100 WBCS — SIGNIFICANT CHANGE UP (ref 0–0)
OLIGOCLONAL BANDS CSF ELPH-IMP: SIGNIFICANT CHANGE UP
OSMOLALITY UR: 475 MOS/KG — SIGNIFICANT CHANGE UP (ref 300–900)
PHOSPHATE SERPL-MCNC: 4.6 MG/DL — HIGH (ref 2.5–4.5)
PLATELET # BLD AUTO: 315 K/UL — SIGNIFICANT CHANGE UP (ref 150–400)
POTASSIUM SERPL-MCNC: 4 MMOL/L — SIGNIFICANT CHANGE UP (ref 3.5–5.3)
POTASSIUM SERPL-SCNC: 4 MMOL/L — SIGNIFICANT CHANGE UP (ref 3.5–5.3)
RBC # BLD: 3.38 M/UL — LOW (ref 3.8–5.2)
RBC # FLD: 13.6 % — SIGNIFICANT CHANGE UP (ref 10.3–14.5)
SODIUM SERPL-SCNC: 130 MMOL/L — LOW (ref 135–145)
WBC # BLD: 6.39 K/UL — SIGNIFICANT CHANGE UP (ref 3.8–10.5)
WBC # FLD AUTO: 6.39 K/UL — SIGNIFICANT CHANGE UP (ref 3.8–10.5)

## 2020-08-26 PROCEDURE — 99232 SBSQ HOSP IP/OBS MODERATE 35: CPT | Mod: GC

## 2020-08-26 RX ORDER — HYDROXYCHLOROQUINE SULFATE 200 MG
200 TABLET ORAL DAILY
Refills: 0 | Status: DISCONTINUED | OUTPATIENT
Start: 2020-08-26 | End: 2020-08-28

## 2020-08-26 RX ORDER — ACETAMINOPHEN 500 MG
650 TABLET ORAL ONCE
Refills: 0 | Status: COMPLETED | OUTPATIENT
Start: 2020-08-26 | End: 2020-08-26

## 2020-08-26 RX ADMIN — Medication 0.25 MILLIGRAM(S): at 01:47

## 2020-08-26 RX ADMIN — LACOSAMIDE 200 MILLIGRAM(S): 50 TABLET ORAL at 05:25

## 2020-08-26 RX ADMIN — Medication 20 MILLIGRAM(S): at 05:20

## 2020-08-26 RX ADMIN — HEPARIN SODIUM 5000 UNIT(S): 5000 INJECTION INTRAVENOUS; SUBCUTANEOUS at 13:54

## 2020-08-26 RX ADMIN — OXYCODONE HYDROCHLORIDE 10 MILLIGRAM(S): 5 TABLET ORAL at 21:37

## 2020-08-26 RX ADMIN — Medication 1 CAPSULE(S): at 17:33

## 2020-08-26 RX ADMIN — HEPARIN SODIUM 5000 UNIT(S): 5000 INJECTION INTRAVENOUS; SUBCUTANEOUS at 21:37

## 2020-08-26 RX ADMIN — LEVETIRACETAM 1000 MILLIGRAM(S): 250 TABLET, FILM COATED ORAL at 05:22

## 2020-08-26 RX ADMIN — Medication 130 MILLIGRAM(S): at 17:33

## 2020-08-26 RX ADMIN — GABAPENTIN 200 MILLIGRAM(S): 400 CAPSULE ORAL at 05:25

## 2020-08-26 RX ADMIN — FLUDROCORTISONE ACETATE 0.1 MILLIGRAM(S): 0.1 TABLET ORAL at 05:21

## 2020-08-26 RX ADMIN — OXYCODONE HYDROCHLORIDE 10 MILLIGRAM(S): 5 TABLET ORAL at 05:23

## 2020-08-26 RX ADMIN — GABAPENTIN 200 MILLIGRAM(S): 400 CAPSULE ORAL at 17:33

## 2020-08-26 RX ADMIN — OXYCODONE HYDROCHLORIDE 10 MILLIGRAM(S): 5 TABLET ORAL at 23:00

## 2020-08-26 RX ADMIN — Medication 130 MILLIGRAM(S): at 05:21

## 2020-08-26 RX ADMIN — LEVETIRACETAM 1000 MILLIGRAM(S): 250 TABLET, FILM COATED ORAL at 17:33

## 2020-08-26 RX ADMIN — Medication 0.25 MILLIGRAM(S): at 12:19

## 2020-08-26 RX ADMIN — HEPARIN SODIUM 5000 UNIT(S): 5000 INJECTION INTRAVENOUS; SUBCUTANEOUS at 05:22

## 2020-08-26 RX ADMIN — Medication 650 MILLIGRAM(S): at 23:16

## 2020-08-26 RX ADMIN — Medication 650 MILLIGRAM(S): at 23:30

## 2020-08-26 RX ADMIN — OXYCODONE HYDROCHLORIDE 10 MILLIGRAM(S): 5 TABLET ORAL at 13:53

## 2020-08-26 RX ADMIN — Medication 1 CAPSULE(S): at 12:19

## 2020-08-26 RX ADMIN — Medication 1 CAPSULE(S): at 09:19

## 2020-08-26 RX ADMIN — FLUDROCORTISONE ACETATE 0.1 MILLIGRAM(S): 0.1 TABLET ORAL at 17:34

## 2020-08-26 RX ADMIN — LACOSAMIDE 200 MILLIGRAM(S): 50 TABLET ORAL at 17:32

## 2020-08-26 NOTE — PROGRESS NOTE ADULT - SUBJECTIVE AND OBJECTIVE BOX
[facility] Division of Kidney Diseases & Hypertension  FOLLOW UP NOTE  [phone]--------------------------------------------------------------------------------  Chief Complaint:Intractable epilepsy without status epilepticus      24 hour events/subjective: No acute events overnight. Patient doing well and out of bed to chair eating and drinking. Recommended to continue with fluid restriction. Sodium noted to be slightly decreased to 130 today. Other labs and vitals stable.        PAST HISTORY  --------------------------------------------------------------------------------  No significant changes to PMH, PSH, FHx, SHx, unless otherwise noted    ALLERGIES & MEDICATIONS  --------------------------------------------------------------------------------  Allergies    penicillin (Other)  penicillin (Swelling)    Intolerances      Standing Inpatient Medications  fludroCORTISONE 0.1 milliGRAM(s) Oral every 12 hours  gabapentin 200 milliGRAM(s) Oral two times a day  heparin   Injectable 5000 Unit(s) SubCutaneous every 8 hours  hydroxychloroquine 200 milliGRAM(s) Oral daily  lacosamide 200 milliGRAM(s) Oral two times a day  levETIRAcetam 1000 milliGRAM(s) Oral two times a day  methylPREDNISolone 20 milliGRAM(s) Oral daily  oxyCODONE  ER Tablet 10 milliGRAM(s) Oral every 8 hours  pancrelipase  (CREON 12,000 Lipase Units) 1 Capsule(s) Oral three times a day with meals  phenytoin   Capsule 130 milliGRAM(s) Oral two times a day    PRN Inpatient Medications  ALPRAZolam 0.25 milliGRAM(s) Oral two times a day PRN  artificial  tears Solution 1 Drop(s) Both EYES three times a day PRN      REVIEW OF SYSTEMS  --------------------------------------------------------------------------------  Gen: No  fevers/chills  Skin: No rashes  Head/Eyes/Ears/Mouth: No headache; Normal hearing; Normal vision w/o blurriness  Respiratory: No dyspnea, cough, wheezing, hemoptysis  CV: No chest pain, PND, orthopnea  GI: No abdominal pain, diarrhea, constipation, nausea, vomiting  : No increased frequency, dysuria, hematuria, nocturia  MSK: No joint pain/swelling; no back pain; no edema  Neuro: No dizziness/lightheadedness, weakness, seizures, numbness, tingling  Psych: Non-focal      All other systems were reviewed and are negative, except as noted.    VITALS/PHYSICAL EXAM  --------------------------------------------------------------------------------  T(C): 36.5 (08-26-20 @ 11:23), Max: 36.7 (08-25-20 @ 21:15)  HR: 82 (08-26-20 @ 11:23) (74 - 83)  BP: 110/73 (08-26-20 @ 11:23) (103/65 - 113/71)  RR: 18 (08-26-20 @ 11:23) (18 - 18)  SpO2: 93% (08-26-20 @ 11:23) (93% - 98%)  Wt(kg): --        08-25-20 @ 07:01  -  08-26-20 @ 07:00  --------------------------------------------------------  IN: 180 mL / OUT: 200 mL / NET: -20 mL    08-26-20 @ 07:01  -  08-26-20 @ 18:13  --------------------------------------------------------  IN: 100 mL / OUT: 0 mL / NET: 100 mL      Physical Exam:  	Gen: NAD  	HEENT: supple neck, clear oropharynx  	Pulm: CTA B/L  	CV: RRR, S1S2; no rub  	Abd: soft, nontender/nondistended               : no suprapubic tenderness.   	UE: Warm  	LE: Warm, no edema              Neuro: Awake, and oriented              Skin: no rash noted on the skin.     LABS/STUDIES  --------------------------------------------------------------------------------              11.3   6.39  >-----------<  315      [08-26-20 @ 06:23]              34.6     130  |  93  |  7   ----------------------------<  108      [08-26-20 @ 06:23]  4.0   |  22  |  0.42        Ca     9.7     [08-26-20 @ 06:23]      Mg     2.1     [08-26-20 @ 06:23]      Phos  4.6     [08-26-20 @ 06:23]            Creatinine Trend:  SCr 0.42 [08-26 @ 06:23]  SCr 0.45 [08-25 @ 07:08]  SCr 0.39 [08-24 @ 06:40]  SCr 0.44 [08-23 @ 22:43]  SCr 0.46 [08-23 @ 15:54]      Urine Creatinine 99      [08-25-20 @ 11:03]  Urine Sodium 64      [08-25-20 @ 22:52]  Urine Potassium 30      [08-25-20 @ 22:52]  Urine Chloride 139      [08-21-20 @ 12:41]  Urine Osmolality 475      [08-26-20 @ 02:59]

## 2020-08-26 NOTE — PROGRESS NOTE ADULT - ASSESSMENT
59 y/o F with a h/o EtOH and opiate abuse s/p recent admissions for alcoholic pancreatitis and alcohol withdrawal, mixed connective tissue disorder (on plaquenil and methylprednisolone), chronic pain s/p lumbar laminectomy, left soleal vein thrombosis (started on apixaban, 7/2020), lung adenocarcinoma (s/p RML resection 2018), recent RLE cellulitis s/p doxycycline, recent CDiff colitis (on PO vancomycin, started 8/3, to finish 8/17), CAD s/p possible MI (ECHO ) admitted on 8/11 to OSH with symptomatic 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 RRT called because patient found to be unresponsive with generalized seizure activity with left UE/LE/facial twitching. Urgently intubated for hypoxic respiratory failure. Transferred to [facility] MICU. MRI was performed which revealed acute/subacute lacunar infarction within the right medial thalamus along with hypoperfused areas in bilateral frontal, temporal lobes consistent w/ post-ictal events. EEG revealed structural abnormality in the right frontotemporal region and moderate to severe nonspecific diffuse or multifocal cerebral dysfunction. She was extubated on August 20. improved mental status, transferred out of ICU.    # Acute hypoxemic respiratory failure, sp extubation  resolved    # Status epilepticus, resolved  Continue Keppra and Vimpat and Fosphenytoin, EEG without overt seizure like activity. CT head negative for acute changes. LP performed.  No evidence of infection. 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 Follow serum na and UO  fluid restriction 1l    # H/O DVT soleal  DVT study legs yesterday negative. HSQ for prophylaxis of DVT.     # H/O INNA.   On solumedrol, start taper medrol 20mg 2 day, 10mg 2 days, 4mg qd, cont Plaquenil.    # C diff  finished 14 day course. D/C po vancomycin    PT recs acute rehab    PCP Dr. Tim Davis

## 2020-08-26 NOTE — PROGRESS NOTE ADULT - SUBJECTIVE AND OBJECTIVE BOX
Patient is a 60y old  Female who presents with a chief complaint of Seizures (26 Aug 2020 18:13)      INTERVAL HPI/OVERNIGHT EVENTS: noted  pt seen and examined  feels well, denies cp/sob      Vital Signs Last 24 Hrs  T(C): 36.3 (26 Aug 2020 21:04), Max: 36.7 (26 Aug 2020 04:28)  T(F): 97.4 (26 Aug 2020 21:04), Max: 98 (26 Aug 2020 04:28)  HR: 84 (26 Aug 2020 21:04) (74 - 84)  BP: 137/87 (26 Aug 2020 21:04) (103/65 - 137/87)  BP(mean): --  RR: 18 (26 Aug 2020 21:04) (18 - 18)  SpO2: 96% (26 Aug 2020 21:04) (93% - 98%)    ALPRAZolam 0.25 milliGRAM(s) Oral two times a day PRN  artificial  tears Solution 1 Drop(s) Both EYES three times a day PRN  fludroCORTISONE 0.1 milliGRAM(s) Oral every 12 hours  gabapentin 200 milliGRAM(s) Oral two times a day  heparin   Injectable 5000 Unit(s) SubCutaneous every 8 hours  hydroxychloroquine 200 milliGRAM(s) Oral daily  lacosamide 200 milliGRAM(s) Oral two times a day  levETIRAcetam 1000 milliGRAM(s) Oral two times a day  methylPREDNISolone 20 milliGRAM(s) Oral daily  oxyCODONE  ER Tablet 10 milliGRAM(s) Oral every 8 hours  pancrelipase  (CREON 12,000 Lipase Units) 1 Capsule(s) Oral three times a day with meals  phenytoin   Capsule 130 milliGRAM(s) Oral two times a day      PHYSICAL EXAM:  GENERAL: NAD,   EYES: conjunctiva and sclera clear  ENMT: Moist mucous membranes  NECK: Supple, No JVD, Normal thyroid  CHEST/LUNG: non labored, cta b/l  HEART: Regular rate and rhythm; No murmurs, rubs, or gallops  ABDOMEN: Soft, Nontender, Nondistended; Bowel sounds present  EXTREMITIES:  2+ Peripheral Pulses, No clubbing, cyanosis, or edema  LYMPH: No lymphadenopathy noted  SKIN: No rashes or lesions    Consultant(s) Notes Reviewed:  [x ] YES  [ ] NO  Care Discussed with Consultants/Other Providers [ x] YES  [ ] NO    LABS:                        11.3   6.39  )-----------( 315      ( 26 Aug 2020 06:23 )             34.6     08-26    130<L>  |  93<L>  |  7   ----------------------------<  108<H>  4.0   |  22  |  0.42<L>    Ca    9.7      26 Aug 2020 06:23  Phos  4.6     08-26  Mg     2.1     08-26          CAPILLARY BLOOD GLUCOSE                  RADIOLOGY & ADDITIONAL TESTS:    Imaging Personally Reviewed:  [x ] YES  [ ] NO

## 2020-08-26 NOTE — CHART NOTE - NSCHARTNOTEFT_GEN_A_CORE
Nutrition Follow Up Note  Patient seen for: malnutrition follow up. Chart reviewed and events noted. Pt is a 59 y/o F with a h/o EtOH and opiate abuse s/p recent admissions for alcoholic pancreatitis and alcohol withdrawal, mixed connective tissue disorder (on plaquenil and methylprednisolone), chronic pain s/p lumbar laminectomy, left soleal vein thrombosis, lung adenocarcinoma (s/p RML resection 2018), recent RLE cellulitis s/p doxycycline, recent CDiff colitis, CAD s/p possible MI. Admitted with symptomatic 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 Hospital course c/b generalized seizure activity, hypoxic respiratory failure, acute/subacute lacunar infarction within the right medial thalamus along with hypoperfused areas in bilateral frontal, temporal lobes consistent w/ post-ictal events. EEG revealed structural abnormality in the right frontotemporal region and moderate to severe nonspecific diffuse or multifocal cerebral dysfunction. She was extubated on August 20. Improved mental status, transferred out of ICU. Now with Hyponatremia from cerebral salt wasting syndrome.    Source:   Medical record and pt    Diet :   Regular:   1000mL Fluid Restriction ([id number])    Patient reports: that appetite has improved with good intake; depending on the food. Pt with no recent N/V or constipation. Pt having loose bowel movements, last BM 8/26. Recommended foods to bind i.e. bananas. Verbally reviewed increased nutrient needs, pt with adequate protein-energy intake; declines nutrition supplements.      PO intake :  75% of meals in-house     Source for PO intake:  Pt and RN flowsheet    Nutrition Events:  -Seen by SLP on 8/24 with recommendation for regular consistency diet.   -Pt extubated on 8/20.    Daily Weight in lbs: 111.3 (08-20) 111.1 (08-18)  Weight stable    Pertinent Medications: MEDICATIONS  (STANDING):  fludroCORTISONE 0.1 milliGRAM(s) Oral every 12 hours  gabapentin 200 milliGRAM(s) Oral two times a day  heparin   Injectable 5000 Unit(s) SubCutaneous every 8 hours  hydroxychloroquine 200 milliGRAM(s) Oral daily  lacosamide 200 milliGRAM(s) Oral two times a day  levETIRAcetam 1000 milliGRAM(s) Oral two times a day  methylPREDNISolone 20 milliGRAM(s) Oral daily  oxyCODONE  ER Tablet 10 milliGRAM(s) Oral every 8 hours  pancrelipase  (CREON 12,000 Lipase Units) 1 Capsule(s) Oral three times a day with meals  phenytoin   Capsule 130 milliGRAM(s) Oral two times a day    MEDICATIONS  (PRN):  ALPRAZolam 0.25 milliGRAM(s) Oral two times a day PRN anxiety  artificial  tears Solution 1 Drop(s) Both EYES three times a day PRN Eye irritation    Pertinent Labs: 08-26 @ 06:23: Na 130<L>, BUN 7, Cr 0.42<L>, <H>, K+ 4.0, Phos 4.6<H>, Mg 2.1    Skin per nursing documentation: No pressure injuries noted.  Edema per nursing documentation: None noted.     Estimated Needs:   [x] no change since previous assessment  Based on dosing wt 50.4 kg  Energy: 8013-8374 kcals (30-35 kcals/kg)  Protein:  60-70 gm (1.2-1.4 gm/kg    Previous Nutrition Diagnosis: Moderate malnutrition  Nutrition Diagnosis is: ongoing and being addressed with encouraged PO intake at meals.     New Nutrition Diagnosis: N/A    Recommend  1) Continue current regular diet order; fluid needs deferred to team.   2) Reinforce increased nutrient needs as able.    Monitoring and Evaluation:   Continue to monitor Nutritional intake, Tolerance to diet prescription, weights, labs, skin integrity    RD remains available upon request and will follow up per protocol  Martha Yoo MS, RD, Pager #[phone]

## 2020-08-27 LAB
ANION GAP SERPL CALC-SCNC: 13 MMOL/L — SIGNIFICANT CHANGE UP (ref 5–17)
BUN SERPL-MCNC: 7 MG/DL — SIGNIFICANT CHANGE UP (ref 7–23)
CALCIUM SERPL-MCNC: 9.5 MG/DL — SIGNIFICANT CHANGE UP (ref 8.4–10.5)
CHLORIDE SERPL-SCNC: 94 MMOL/L — LOW (ref 96–108)
CO2 SERPL-SCNC: 24 MMOL/L — SIGNIFICANT CHANGE UP (ref 22–31)
CREAT SERPL-MCNC: 0.4 MG/DL — LOW (ref 0.5–1.3)
DESMETHYL LACOSAMIDE: 0.9 UG/ML — SIGNIFICANT CHANGE UP
DESMETHYL LACOSAMIDE: 1.5 UG/ML — SIGNIFICANT CHANGE UP
GLUCOSE SERPL-MCNC: 102 MG/DL — HIGH (ref 70–99)
HCT VFR BLD CALC: 32.8 % — LOW (ref 34.5–45)
HGB BLD-MCNC: 11 G/DL — LOW (ref 11.5–15.5)
LACOSAMIDE (VIMPAT) RESULT: 5 UG/ML — SIGNIFICANT CHANGE UP (ref 1–10)
LACOSAMIDE (VIMPAT) RESULT: 5.3 UG/ML — SIGNIFICANT CHANGE UP (ref 1–10)
LEVETIRACETAM SERPL-MCNC: 6.4 MCG/ML — LOW (ref 12–46)
MCHC RBC-ENTMCNC: 33.5 GM/DL — SIGNIFICANT CHANGE UP (ref 32–36)
MCHC RBC-ENTMCNC: 34.2 PG — HIGH (ref 27–34)
MCV RBC AUTO: 101.9 FL — HIGH (ref 80–100)
MOG AB CSF QL CBA IFA: NEGATIVE — SIGNIFICANT CHANGE UP
NRBC # BLD: 0 /100 WBCS — SIGNIFICANT CHANGE UP (ref 0–0)
PARANEOPLASTIC AB PNL SER: SIGNIFICANT CHANGE UP
PLATELET # BLD AUTO: 313 K/UL — SIGNIFICANT CHANGE UP (ref 150–400)
POTASSIUM SERPL-MCNC: 3.9 MMOL/L — SIGNIFICANT CHANGE UP (ref 3.5–5.3)
POTASSIUM SERPL-SCNC: 3.9 MMOL/L — SIGNIFICANT CHANGE UP (ref 3.5–5.3)
RBC # BLD: 3.22 M/UL — LOW (ref 3.8–5.2)
RBC # FLD: 13.3 % — SIGNIFICANT CHANGE UP (ref 10.3–14.5)
SODIUM SERPL-SCNC: 131 MMOL/L — LOW (ref 135–145)
WBC # BLD: 4.03 K/UL — SIGNIFICANT CHANGE UP (ref 3.8–10.5)
WBC # FLD AUTO: 4.03 K/UL — SIGNIFICANT CHANGE UP (ref 3.8–10.5)

## 2020-08-27 PROCEDURE — 99233 SBSQ HOSP IP/OBS HIGH 50: CPT

## 2020-08-27 RX ADMIN — FLUDROCORTISONE ACETATE 0.1 MILLIGRAM(S): 0.1 TABLET ORAL at 17:18

## 2020-08-27 RX ADMIN — OXYCODONE HYDROCHLORIDE 10 MILLIGRAM(S): 5 TABLET ORAL at 21:05

## 2020-08-27 RX ADMIN — OXYCODONE HYDROCHLORIDE 10 MILLIGRAM(S): 5 TABLET ORAL at 14:48

## 2020-08-27 RX ADMIN — Medication 1 CAPSULE(S): at 17:18

## 2020-08-27 RX ADMIN — Medication 1 CAPSULE(S): at 12:04

## 2020-08-27 RX ADMIN — LACOSAMIDE 200 MILLIGRAM(S): 50 TABLET ORAL at 05:27

## 2020-08-27 RX ADMIN — HEPARIN SODIUM 5000 UNIT(S): 5000 INJECTION INTRAVENOUS; SUBCUTANEOUS at 05:28

## 2020-08-27 RX ADMIN — Medication 130 MILLIGRAM(S): at 05:29

## 2020-08-27 RX ADMIN — Medication 20 MILLIGRAM(S): at 05:30

## 2020-08-27 RX ADMIN — HEPARIN SODIUM 5000 UNIT(S): 5000 INJECTION INTRAVENOUS; SUBCUTANEOUS at 13:48

## 2020-08-27 RX ADMIN — Medication 0.25 MILLIGRAM(S): at 12:05

## 2020-08-27 RX ADMIN — OXYCODONE HYDROCHLORIDE 10 MILLIGRAM(S): 5 TABLET ORAL at 20:56

## 2020-08-27 RX ADMIN — Medication 1 CAPSULE(S): at 12:05

## 2020-08-27 RX ADMIN — OXYCODONE HYDROCHLORIDE 10 MILLIGRAM(S): 5 TABLET ORAL at 13:48

## 2020-08-27 RX ADMIN — Medication 200 MILLIGRAM(S): at 12:05

## 2020-08-27 RX ADMIN — GABAPENTIN 200 MILLIGRAM(S): 400 CAPSULE ORAL at 17:17

## 2020-08-27 RX ADMIN — HEPARIN SODIUM 5000 UNIT(S): 5000 INJECTION INTRAVENOUS; SUBCUTANEOUS at 21:06

## 2020-08-27 RX ADMIN — OXYCODONE HYDROCHLORIDE 10 MILLIGRAM(S): 5 TABLET ORAL at 05:27

## 2020-08-27 RX ADMIN — Medication 0.25 MILLIGRAM(S): at 00:47

## 2020-08-27 RX ADMIN — FLUDROCORTISONE ACETATE 0.1 MILLIGRAM(S): 0.1 TABLET ORAL at 05:29

## 2020-08-27 RX ADMIN — LACOSAMIDE 200 MILLIGRAM(S): 50 TABLET ORAL at 17:30

## 2020-08-27 RX ADMIN — LEVETIRACETAM 1000 MILLIGRAM(S): 250 TABLET, FILM COATED ORAL at 05:29

## 2020-08-27 RX ADMIN — Medication 130 MILLIGRAM(S): at 17:18

## 2020-08-27 RX ADMIN — LEVETIRACETAM 1000 MILLIGRAM(S): 250 TABLET, FILM COATED ORAL at 17:17

## 2020-08-27 RX ADMIN — GABAPENTIN 200 MILLIGRAM(S): 400 CAPSULE ORAL at 05:28

## 2020-08-27 NOTE — PROGRESS NOTE ADULT - SUBJECTIVE AND OBJECTIVE BOX
no new complaints, feels well     REVIEW OF SYSTEMS  Constitutional - No fever,  No fatigue  HEENT - No vertigo, No neck pain  Neurological - No headaches, No memory loss, No loss of strength, No numbness, No tremors  Skin - No rashes, No lesions   Musculoskeletal - No joint pain, No joint swelling, No muscle pain  Psychiatric - No depression, No anxiety    FUNCTIONAL PROGRESS  8/25 PT      Bed Mobility  Bed Mobility Training Rolling/Turning: independent;  bed rails  Bed Mobility Training Scooting: minimum assist (75% patient effort);  verbal cues;  nonverbal cues (demo/gestures);  1 person assist  Bed Mobility Training Sit-to-Supine: minimum assist (75% patient effort);  verbal cues;  nonverbal cues (demo/gestures);  1 person assist  Bed Mobility Training Supine-to-Sit: minimum assist (75% patient effort);  verbal cues;  nonverbal cues (demo/gestures);  1 person assist  Bed Mobility Training Limitations: decreased flexibility;  decreased strength;  impaired balance;  impaired sensory feedback    Bed-Chair Transfer Training  Transfer Training Bed-to-Chair Transfer: contact guard;  minimum assist (75% patient effort);  verbal cues;  nonverbal cues (demo/gestures);  1 person assist;  full weight-bearing   PT at pt side    Sit-Stand Transfer Training  Transfer Training Sit-to-Stand Transfer: minimum assist (75% patient effort);  verbal cues;  nonverbal cues (demo/gestures);  1 person assist;  full weight-bearing   rolling walker  Transfer Training Stand-to-Sit Transfer: minimum assist (75% patient effort);  verbal cues;  1 person assist;  nonverbal cues (demo/gestures);  full weight-bearing   rolling walker  Sit-to-Stand Transfer Training Transfer Safety Analysis: decreased balance;  decreased weight-shifting ability;  decreased flexibility;  decreased strength;  impaired balance;  impaired sensory feedback;  rolling walker    Gait Training  Gait Training: contact guard;  minimum assist (75% patient effort);  verbal cues;  nonverbal cues (demo/gestures);  1 person assist;  full weight-bearing   rolling walker;  25 feet;  x2  Gait Analysis: 3-point gait   decreased mirella;  decreased step length;  decreased stride length;  decreased flexibility;  decreased strength;  impaired balance;  impaired sensory feedback;  25 feet;  x2;  rolling walker  Gait Number of Times:: x 2  Type of Rest Type of Rest: standing  Duration of Rest Duration of Rest: 30 sec     Therapeutic Exercise  Therapeutic Exercise Detail: Performed sitting therex: knee extensions, ankle pumps, all x 10reps     8/25 OT      Bed Mobility  Bed Mobility Training Supine-to-Sit: supervsion;  1 person assist;  verbal cues;  bed rails  Bed Mobility Training Limitations: decreased strength;  impaired balance    Bed-Chair Transfer Training  Transfer Training Bed-to-Chair Transfer: contact guard;  1 person assist;  verbal cues;  full weight-bearing   rolling walker  Bed-to-Chair Transfer Training Transfer Safety Analysis: decreased strength;  impaired balance;  impaired coordination;  impaired postural control    Sit-Stand Transfer Training  Transfer Training Sit-to-Stand Transfer: minimum assist (75% patient effort);  1 person assist;  verbal cues;  full weight-bearing   rolling walker  Transfer Training Stand-to-Sit Transfer: minimum assist (75% patient effort);  1 person assist;  verbal cues;  full weight-bearing   rolling walker  Sit-to-Stand Transfer Training Transfer Safety Analysis: decreased eccentric control stand to sit;  decreased strength;  impaired balance;  impaired coordination;  impaired postural control    Eating/Self-Feeding Training  Eating/Self-Feeding Training Assistance: set-up required;  able to coordinate hands to cut food & self-feed while seated     OT Cognitive Treatment  OT Treatment: Cognitive Charges: 3/3 dealyed recall. A&Ox4. Able to follow 3 step directions.     VITALS  T(C): 36.7 (08-27-20 @ 04:04), Max: 36.7 (08-27-20 @ 00:02)  HR: 72 (08-27-20 @ 04:04) (72 - 84)  BP: 100/63 (08-27-20 @ 04:04) (100/63 - 137/87)  RR: 18 (08-27-20 @ 04:04) (18 - 18)  SpO2: 97% (08-27-20 @ 04:04) (96% - 98%)  Wt(kg): --    MEDICATIONS   ALPRAZolam 0.25 milliGRAM(s) two times a day PRN  artificial  tears Solution 1 Drop(s) three times a day PRN  fludroCORTISONE 0.1 milliGRAM(s) every 12 hours  gabapentin 200 milliGRAM(s) two times a day  heparin   Injectable 5000 Unit(s) every 8 hours  hydroxychloroquine 200 milliGRAM(s) daily  lacosamide 200 milliGRAM(s) two times a day  levETIRAcetam 1000 milliGRAM(s) two times a day  oxyCODONE  ER Tablet 10 milliGRAM(s) every 8 hours  pancrelipase  (CREON 12,000 Lipase Units) 1 Capsule(s) three times a day with meals  phenytoin   Capsule 130 milliGRAM(s) two times a day      RECENT LABS - Reviewed                        11.0   4.03  )-----------( 313      ( 27 Aug 2020 06:29 )             32.8     08-27    131<L>  |  94<L>  |  7   ----------------------------<  102<H>  3.9   |  24  |  0.40<L>    Ca    9.5      27 Aug 2020 06:29  Phos  4.6     08-26  Mg     2.1     08-26    < from: MR Head w/wo IV Cont (08.22.20 @ 17:52) >    IMPRESSION:  Cortical areas of diffusion restriction within the supratentorial and infratentorial compartments as well as involvement of the right basal ganglia and thalamus. There is apparent mild progression when compared with the prior exam favoring post ictal changes or an infectious/inflammatory etiology such as cerebritis. See prior report for additional considerations. The possibility of  Creutzfeldt-Derrick disease is not entirely excluded given the pattern of involvement.          < end of copied text >      ----------------------------------------------------------------------------------------  PHYSICAL EXAM  Constitutional - NAD, Comfortable, in chair   HEENT - NCAT, EOMI  Neck - Supple, No limited ROM  Chest - Breathing comfortably  Cardiovascular - S1S2   Abdomen - Soft   Extremities - No C/C/E, No calf tenderness   Neurologic Exam -                    Cognitive - Awake, Alert, AAO to self, place, date, year, situation     Communication - Fluent, No dysarthria     Cranial Nerves - CN 2-12 intact     Motor - 5/5     Sensory - Intact to LT     Reflexes - DTR Intact, No primitive reflexes     Psychiatric - Mood stable, Affect WNL  ----------------------------------------------------------------------------------------  ASSESSMENT/PLAN  60 year old woman h/o chronic pain, lung adenocarcinoma (s/p RML wedge resection 2018), ?mixed connective tissue disease, anxiety, right hip replacement, lumbar laminectomy, b/l oophorectomy, diverticulitis, recent acute pancreatitis and found to have right soleal DVT on eliquis, recent RLE cellulitis and recent c.diff infection with functional deficits after seizures  MRI demonstrates cortical ribboning. EEG shown right frontal LPDs.   Autoimmune encephalitis with hyponatremia  sodium improved, on fluid restriction   on keprra, vimpat, fosphenytoin  h/o INNA, on solumedrol, to transition to home dose of prednisone, plaquenil   no signs of infection, off abx  Pain - Tylenol, oxycodone prn  DVT PPX - SCDs, heparin   Rehab - Will continue to follow for ongoing rehab needs and recommendations.    continue bedside PT/OT  patient requires min assist for transfers, ambulation   out of bed to chair  She would benefit from acute inpatient rehabilitation when medically stable  patient states she will continue treatment for her chronic pain after AR as an outpatient.

## 2020-08-27 NOTE — PROGRESS NOTE ADULT - ASSESSMENT
59 y/o F with a h/o EtOH and opiate abuse s/p recent admissions for alcoholic pancreatitis and alcohol withdrawal, mixed connective tissue disorder (on plaquenil and methylprednisolone), chronic pain s/p lumbar laminectomy, left soleal vein thrombosis (started on apixaban, 7/2020), lung adenocarcinoma (s/p RML resection 2018), recent RLE cellulitis s/p doxycycline, recent CDiff colitis (on PO vancomycin, started 8/3, to finish 8/17), CAD s/p possible MI (ECHO ) admitted on 8/11 to OSH with symptomatic 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 RRT called because patient found to be unresponsive with generalized seizure activity with left UE/LE/facial twitching. Urgently intubated for hypoxic respiratory failure. Transferred to [facility] MICU. MRI was performed which revealed acute/subacute lacunar infarction within the right medial thalamus along with hypoperfused areas in bilateral frontal, temporal lobes consistent w/ post-ictal events. EEG revealed structural abnormality in the right frontotemporal region and moderate to severe nonspecific diffuse or multifocal cerebral dysfunction. She was extubated on August 20. improved mental status, transferred out of ICU.    # Acute hypoxemic respiratory failure, sp extubation  resolved    # Status epilepticus, resolved  Continue Keppra and Vimpat and Fosphenytoin, EEG without overt seizure like activity. CT head negative for acute changes. LP performed.  No evidence of infection. 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 Follow serum na and UO  fluid restriction 1l    # H/O DVT soleal  DVT study legs yesterday negative. HSQ for prophylaxis of DVT.     # H/O INNA.   On solumedrol, start taper medrol 20mg 2 day, 10mg 2 days, 4mg qd, cont Plaquenil.    # C diff  finished 14 day course. D/C po vancomycin    PT recs acute rehab    PCP Dr. Tim Davis

## 2020-08-28 ENCOUNTER — INPATIENT (INPATIENT)
Facility: HOSPITAL | Age: 60
LOS: 10 days | Discharge: HOME HEALTH | DRG: 949 | End: 2020-09-08
Attending: PHYSICAL MEDICINE & REHABILITATION | Admitting: PHYSICAL MEDICINE & REHABILITATION
Payer: MEDICARE

## 2020-08-28 ENCOUNTER — TRANSCRIPTION ENCOUNTER (OUTPATIENT)
Age: 60
End: 2020-08-28

## 2020-08-28 VITALS
OXYGEN SATURATION: 97 % | TEMPERATURE: 98 F | RESPIRATION RATE: 15 BRPM | SYSTOLIC BLOOD PRESSURE: 137 MMHG | DIASTOLIC BLOOD PRESSURE: 73 MMHG | HEART RATE: 75 BPM

## 2020-08-28 VITALS
OXYGEN SATURATION: 97 % | HEART RATE: 74 BPM | RESPIRATION RATE: 18 BRPM | SYSTOLIC BLOOD PRESSURE: 106 MMHG | DIASTOLIC BLOOD PRESSURE: 82 MMHG | TEMPERATURE: 98 F

## 2020-08-28 DIAGNOSIS — Z98.890 OTHER SPECIFIED POSTPROCEDURAL STATES: Chronic | ICD-10-CM

## 2020-08-28 DIAGNOSIS — Z90.49 ACQUIRED ABSENCE OF OTHER SPECIFIED PARTS OF DIGESTIVE TRACT: Chronic | ICD-10-CM

## 2020-08-28 DIAGNOSIS — Z90.721 ACQUIRED ABSENCE OF OVARIES, UNILATERAL: Chronic | ICD-10-CM

## 2020-08-28 DIAGNOSIS — Z85.118 PERSONAL HISTORY OF OTHER MALIGNANT NEOPLASM OF BRONCHUS AND LUNG: Chronic | ICD-10-CM

## 2020-08-28 DIAGNOSIS — I63.9 CEREBRAL INFARCTION, UNSPECIFIED: ICD-10-CM

## 2020-08-28 DIAGNOSIS — Z96.641 PRESENCE OF RIGHT ARTIFICIAL HIP JOINT: Chronic | ICD-10-CM

## 2020-08-28 LAB
ANION GAP SERPL CALC-SCNC: 14 MMOL/L — SIGNIFICANT CHANGE UP (ref 5–17)
BUN SERPL-MCNC: 7 MG/DL — SIGNIFICANT CHANGE UP (ref 7–23)
CALCIUM SERPL-MCNC: 9.5 MG/DL — SIGNIFICANT CHANGE UP (ref 8.4–10.5)
CHLORIDE SERPL-SCNC: 96 MMOL/L — SIGNIFICANT CHANGE UP (ref 96–108)
CO2 SERPL-SCNC: 23 MMOL/L — SIGNIFICANT CHANGE UP (ref 22–31)
CREAT SERPL-MCNC: 0.39 MG/DL — LOW (ref 0.5–1.3)
GLUCOSE SERPL-MCNC: 101 MG/DL — HIGH (ref 70–99)
MBP CSF-MCNC: <2 MCG/L — SIGNIFICANT CHANGE UP (ref 2–4)
POTASSIUM SERPL-MCNC: 3.9 MMOL/L — SIGNIFICANT CHANGE UP (ref 3.5–5.3)
POTASSIUM SERPL-SCNC: 3.9 MMOL/L — SIGNIFICANT CHANGE UP (ref 3.5–5.3)
SODIUM SERPL-SCNC: 133 MMOL/L — LOW (ref 135–145)

## 2020-08-28 PROCEDURE — 85027 COMPLETE CBC AUTOMATED: CPT

## 2020-08-28 PROCEDURE — 97110 THERAPEUTIC EXERCISES: CPT

## 2020-08-28 PROCEDURE — 83540 ASSAY OF IRON: CPT

## 2020-08-28 PROCEDURE — 83873 ASSAY OF CSF PROTEIN: CPT

## 2020-08-28 PROCEDURE — 87205 SMEAR GRAM STAIN: CPT

## 2020-08-28 PROCEDURE — 84550 ASSAY OF BLOOD/URIC ACID: CPT

## 2020-08-28 PROCEDURE — U0003: CPT

## 2020-08-28 PROCEDURE — 87529 HSV DNA AMP PROBE: CPT

## 2020-08-28 PROCEDURE — 83010 ASSAY OF HAPTOGLOBIN QUANT: CPT

## 2020-08-28 PROCEDURE — 80177 DRUG SCRN QUAN LEVETIRACETAM: CPT

## 2020-08-28 PROCEDURE — 87070 CULTURE OTHR SPECIMN AEROBIC: CPT

## 2020-08-28 PROCEDURE — 95711 VEEG 2-12 HR UNMONITORED: CPT

## 2020-08-28 PROCEDURE — 97167 OT EVAL HIGH COMPLEX 60 MIN: CPT

## 2020-08-28 PROCEDURE — 84146 ASSAY OF PROLACTIN: CPT

## 2020-08-28 PROCEDURE — 99233 SBSQ HOSP IP/OBS HIGH 50: CPT | Mod: GC

## 2020-08-28 PROCEDURE — 84466 ASSAY OF TRANSFERRIN: CPT

## 2020-08-28 PROCEDURE — 85045 AUTOMATED RETICULOCYTE COUNT: CPT

## 2020-08-28 PROCEDURE — 83615 LACTATE (LD) (LDH) ENZYME: CPT

## 2020-08-28 PROCEDURE — 99232 SBSQ HOSP IP/OBS MODERATE 35: CPT

## 2020-08-28 PROCEDURE — 84133 ASSAY OF URINE POTASSIUM: CPT

## 2020-08-28 PROCEDURE — 83519 RIA NONANTIBODY: CPT

## 2020-08-28 PROCEDURE — 86403 PARTICLE AGGLUT ANTBDY SCRN: CPT

## 2020-08-28 PROCEDURE — 84300 ASSAY OF URINE SODIUM: CPT

## 2020-08-28 PROCEDURE — 83930 ASSAY OF BLOOD OSMOLALITY: CPT

## 2020-08-28 PROCEDURE — 92507 TX SP LANG VOICE COMM INDIV: CPT

## 2020-08-28 PROCEDURE — 94002 VENT MGMT INPAT INIT DAY: CPT

## 2020-08-28 PROCEDURE — 85730 THROMBOPLASTIN TIME PARTIAL: CPT

## 2020-08-28 PROCEDURE — 82803 BLOOD GASES ANY COMBINATION: CPT

## 2020-08-28 PROCEDURE — 87483 CNS DNA AMP PROBE TYPE 12-25: CPT

## 2020-08-28 PROCEDURE — 80048 BASIC METABOLIC PNL TOTAL CA: CPT

## 2020-08-28 PROCEDURE — 82436 ASSAY OF URINE CHLORIDE: CPT

## 2020-08-28 PROCEDURE — 71045 X-RAY EXAM CHEST 1 VIEW: CPT

## 2020-08-28 PROCEDURE — 85610 PROTHROMBIN TIME: CPT

## 2020-08-28 PROCEDURE — 84157 ASSAY OF PROTEIN OTHER: CPT

## 2020-08-28 PROCEDURE — 80053 COMPREHEN METABOLIC PANEL: CPT

## 2020-08-28 PROCEDURE — A9585: CPT

## 2020-08-28 PROCEDURE — 83735 ASSAY OF MAGNESIUM: CPT

## 2020-08-28 PROCEDURE — 86592 SYPHILIS TEST NON-TREP QUAL: CPT

## 2020-08-28 PROCEDURE — 95714 VEEG EA 12-26 HR UNMNTR: CPT

## 2020-08-28 PROCEDURE — 86255 FLUORESCENT ANTIBODY SCREEN: CPT

## 2020-08-28 PROCEDURE — 82728 ASSAY OF FERRITIN: CPT

## 2020-08-28 PROCEDURE — 83690 ASSAY OF LIPASE: CPT

## 2020-08-28 PROCEDURE — 97116 GAIT TRAINING THERAPY: CPT

## 2020-08-28 PROCEDURE — 97530 THERAPEUTIC ACTIVITIES: CPT

## 2020-08-28 PROCEDURE — 97162 PT EVAL MOD COMPLEX 30 MIN: CPT

## 2020-08-28 PROCEDURE — 84560 ASSAY OF URINE/URIC ACID: CPT

## 2020-08-28 PROCEDURE — 82945 GLUCOSE OTHER FLUID: CPT

## 2020-08-28 PROCEDURE — 83916 OLIGOCLONAL BANDS: CPT

## 2020-08-28 PROCEDURE — 87040 BLOOD CULTURE FOR BACTERIA: CPT

## 2020-08-28 PROCEDURE — 83605 ASSAY OF LACTIC ACID: CPT

## 2020-08-28 PROCEDURE — 87116 MYCOBACTERIA CULTURE: CPT

## 2020-08-28 PROCEDURE — 88108 CYTOPATH CONCENTRATE TECH: CPT

## 2020-08-28 PROCEDURE — 82570 ASSAY OF URINE CREATININE: CPT

## 2020-08-28 PROCEDURE — 82962 GLUCOSE BLOOD TEST: CPT

## 2020-08-28 PROCEDURE — 84100 ASSAY OF PHOSPHORUS: CPT

## 2020-08-28 PROCEDURE — 83935 ASSAY OF URINE OSMOLALITY: CPT

## 2020-08-28 PROCEDURE — 86769 SARS-COV-2 COVID-19 ANTIBODY: CPT

## 2020-08-28 PROCEDURE — C9254: CPT

## 2020-08-28 PROCEDURE — 70553 MRI BRAIN STEM W/O & W/DYE: CPT

## 2020-08-28 PROCEDURE — 84478 ASSAY OF TRIGLYCERIDES: CPT

## 2020-08-28 PROCEDURE — 80185 ASSAY OF PHENYTOIN TOTAL: CPT

## 2020-08-28 PROCEDURE — 83550 IRON BINDING TEST: CPT

## 2020-08-28 PROCEDURE — 84145 PROCALCITONIN (PCT): CPT

## 2020-08-28 PROCEDURE — 86362 MOG-IGG1 ANTB CBA EACH: CPT

## 2020-08-28 PROCEDURE — 82150 ASSAY OF AMYLASE: CPT

## 2020-08-28 PROCEDURE — 95700 EEG CONT REC W/VID EEG TECH: CPT

## 2020-08-28 PROCEDURE — 82947 ASSAY GLUCOSE BLOOD QUANT: CPT

## 2020-08-28 PROCEDURE — 82330 ASSAY OF CALCIUM: CPT

## 2020-08-28 PROCEDURE — 97535 SELF CARE MNGMENT TRAINING: CPT

## 2020-08-28 PROCEDURE — 84132 ASSAY OF SERUM POTASSIUM: CPT

## 2020-08-28 PROCEDURE — 82435 ASSAY OF BLOOD CHLORIDE: CPT

## 2020-08-28 PROCEDURE — 87102 FUNGUS ISOLATION CULTURE: CPT

## 2020-08-28 PROCEDURE — 80235 DRUG ASSAY LACOSAMIDE: CPT

## 2020-08-28 PROCEDURE — 82565 ASSAY OF CREATININE: CPT

## 2020-08-28 PROCEDURE — 70551 MRI BRAIN STEM W/O DYE: CPT

## 2020-08-28 PROCEDURE — 85014 HEMATOCRIT: CPT

## 2020-08-28 PROCEDURE — 89051 BODY FLUID CELL COUNT: CPT

## 2020-08-28 PROCEDURE — 94003 VENT MGMT INPAT SUBQ DAY: CPT

## 2020-08-28 PROCEDURE — 93970 EXTREMITY STUDY: CPT

## 2020-08-28 PROCEDURE — 92610 EVALUATE SWALLOWING FUNCTION: CPT

## 2020-08-28 PROCEDURE — 84295 ASSAY OF SERUM SODIUM: CPT

## 2020-08-28 PROCEDURE — 86341 ISLET CELL ANTIBODY: CPT

## 2020-08-28 RX ORDER — FENTANYL CITRATE 50 UG/ML
1 INJECTION INTRAVENOUS
Qty: 0 | Refills: 0 | DISCHARGE

## 2020-08-28 RX ORDER — OXYCODONE HYDROCHLORIDE 5 MG/1
5 TABLET ORAL ONCE
Refills: 0 | Status: DISCONTINUED | OUTPATIENT
Start: 2020-08-28 | End: 2020-08-28

## 2020-08-28 RX ORDER — LOPERAMIDE HCL 2 MG
2 TABLET ORAL EVERY 6 HOURS
Refills: 0 | Status: DISCONTINUED | OUTPATIENT
Start: 2020-08-28 | End: 2020-09-08

## 2020-08-28 RX ORDER — GABAPENTIN 400 MG/1
200 CAPSULE ORAL
Refills: 0 | Status: DISCONTINUED | OUTPATIENT
Start: 2020-08-28 | End: 2020-08-29

## 2020-08-28 RX ORDER — LACOSAMIDE 50 MG/1
1 TABLET ORAL
Qty: 0 | Refills: 0 | DISCHARGE
Start: 2020-08-28

## 2020-08-28 RX ORDER — HEPARIN SODIUM 5000 [USP'U]/ML
5000 INJECTION INTRAVENOUS; SUBCUTANEOUS
Qty: 0 | Refills: 0 | DISCHARGE
Start: 2020-08-28

## 2020-08-28 RX ORDER — HEPARIN SODIUM 5000 [USP'U]/ML
5000 INJECTION INTRAVENOUS; SUBCUTANEOUS EVERY 8 HOURS
Refills: 0 | Status: DISCONTINUED | OUTPATIENT
Start: 2020-08-28 | End: 2020-09-08

## 2020-08-28 RX ORDER — OXYCODONE HYDROCHLORIDE 5 MG/1
10 TABLET ORAL EVERY 8 HOURS
Refills: 0 | Status: DISCONTINUED | OUTPATIENT
Start: 2020-08-28 | End: 2020-09-02

## 2020-08-28 RX ORDER — ALPRAZOLAM 0.25 MG
0.25 TABLET ORAL
Refills: 0 | Status: DISCONTINUED | OUTPATIENT
Start: 2020-08-28 | End: 2020-09-04

## 2020-08-28 RX ORDER — HYDROXYCHLOROQUINE SULFATE 200 MG
200 TABLET ORAL DAILY
Refills: 0 | Status: DISCONTINUED | OUTPATIENT
Start: 2020-08-28 | End: 2020-08-30

## 2020-08-28 RX ORDER — LIPASE/PROTEASE/AMYLASE 16-48-48K
1 CAPSULE,DELAYED RELEASE (ENTERIC COATED) ORAL
Qty: 0 | Refills: 0 | DISCHARGE
Start: 2020-08-28

## 2020-08-28 RX ORDER — HYDROXYCHLOROQUINE SULFATE 200 MG
1 TABLET ORAL
Qty: 0 | Refills: 0 | DISCHARGE
Start: 2020-08-28

## 2020-08-28 RX ORDER — LEVETIRACETAM 250 MG/1
1000 TABLET, FILM COATED ORAL
Refills: 0 | Status: DISCONTINUED | OUTPATIENT
Start: 2020-08-28 | End: 2020-09-08

## 2020-08-28 RX ORDER — ALPRAZOLAM 0.25 MG
1 TABLET ORAL
Qty: 0 | Refills: 0 | DISCHARGE

## 2020-08-28 RX ORDER — ALPRAZOLAM 0.25 MG
1 TABLET ORAL
Qty: 0 | Refills: 0 | DISCHARGE
Start: 2020-08-28

## 2020-08-28 RX ORDER — OXYCODONE HYDROCHLORIDE 5 MG/1
1 TABLET ORAL
Qty: 0 | Refills: 0 | DISCHARGE

## 2020-08-28 RX ORDER — ACETAMINOPHEN 500 MG
500 TABLET ORAL ONCE
Refills: 0 | Status: COMPLETED | OUTPATIENT
Start: 2020-08-28 | End: 2020-08-28

## 2020-08-28 RX ORDER — LEVETIRACETAM 250 MG/1
1 TABLET, FILM COATED ORAL
Qty: 0 | Refills: 0 | DISCHARGE
Start: 2020-08-28

## 2020-08-28 RX ORDER — PANTOPRAZOLE SODIUM 20 MG/1
40 TABLET, DELAYED RELEASE ORAL
Refills: 0 | Status: DISCONTINUED | OUTPATIENT
Start: 2020-08-28 | End: 2020-09-08

## 2020-08-28 RX ORDER — FLUDROCORTISONE ACETATE 0.1 MG/1
1 TABLET ORAL
Qty: 0 | Refills: 0 | DISCHARGE
Start: 2020-08-28

## 2020-08-28 RX ORDER — FLUDROCORTISONE ACETATE 0.1 MG/1
0.1 TABLET ORAL EVERY 12 HOURS
Refills: 0 | Status: DISCONTINUED | OUTPATIENT
Start: 2020-08-28 | End: 2020-09-08

## 2020-08-28 RX ORDER — GABAPENTIN 400 MG/1
2 CAPSULE ORAL
Qty: 0 | Refills: 0 | DISCHARGE
Start: 2020-08-28

## 2020-08-28 RX ORDER — LACOSAMIDE 50 MG/1
200 TABLET ORAL
Refills: 0 | Status: DISCONTINUED | OUTPATIENT
Start: 2020-08-28 | End: 2020-09-08

## 2020-08-28 RX ORDER — OXYCODONE HYDROCHLORIDE 5 MG/1
1 TABLET ORAL
Qty: 0 | Refills: 0 | DISCHARGE
Start: 2020-08-28

## 2020-08-28 RX ORDER — LIPASE/PROTEASE/AMYLASE 16-48-48K
1 CAPSULE,DELAYED RELEASE (ENTERIC COATED) ORAL
Refills: 0 | Status: DISCONTINUED | OUTPATIENT
Start: 2020-08-28 | End: 2020-09-08

## 2020-08-28 RX ORDER — CYCLOSPORINE 0.5 MG/ML
1 EMULSION OPHTHALMIC
Qty: 0 | Refills: 0 | DISCHARGE

## 2020-08-28 RX ADMIN — FLUDROCORTISONE ACETATE 0.1 MILLIGRAM(S): 0.1 TABLET ORAL at 05:24

## 2020-08-28 RX ADMIN — Medication 10 MILLIGRAM(S): at 05:22

## 2020-08-28 RX ADMIN — HEPARIN SODIUM 5000 UNIT(S): 5000 INJECTION INTRAVENOUS; SUBCUTANEOUS at 21:11

## 2020-08-28 RX ADMIN — Medication 500 MILLIGRAM(S): at 02:43

## 2020-08-28 RX ADMIN — OXYCODONE HYDROCHLORIDE 10 MILLIGRAM(S): 5 TABLET ORAL at 13:32

## 2020-08-28 RX ADMIN — Medication 1 CAPSULE(S): at 09:59

## 2020-08-28 RX ADMIN — HEPARIN SODIUM 5000 UNIT(S): 5000 INJECTION INTRAVENOUS; SUBCUTANEOUS at 13:32

## 2020-08-28 RX ADMIN — LEVETIRACETAM 1000 MILLIGRAM(S): 250 TABLET, FILM COATED ORAL at 21:10

## 2020-08-28 RX ADMIN — LACOSAMIDE 200 MILLIGRAM(S): 50 TABLET ORAL at 21:10

## 2020-08-28 RX ADMIN — Medication 0.25 MILLIGRAM(S): at 13:04

## 2020-08-28 RX ADMIN — OXYCODONE HYDROCHLORIDE 10 MILLIGRAM(S): 5 TABLET ORAL at 00:11

## 2020-08-28 RX ADMIN — Medication 1 CAPSULE(S): at 13:04

## 2020-08-28 RX ADMIN — FLUDROCORTISONE ACETATE 0.1 MILLIGRAM(S): 0.1 TABLET ORAL at 21:11

## 2020-08-28 RX ADMIN — LACOSAMIDE 200 MILLIGRAM(S): 50 TABLET ORAL at 05:24

## 2020-08-28 RX ADMIN — Medication 1 CAPSULE(S): at 21:19

## 2020-08-28 RX ADMIN — OXYCODONE HYDROCHLORIDE 10 MILLIGRAM(S): 5 TABLET ORAL at 21:10

## 2020-08-28 RX ADMIN — Medication 125 MILLIGRAM(S): at 21:11

## 2020-08-28 RX ADMIN — Medication 130 MILLIGRAM(S): at 05:25

## 2020-08-28 RX ADMIN — Medication 0.25 MILLIGRAM(S): at 21:19

## 2020-08-28 RX ADMIN — GABAPENTIN 200 MILLIGRAM(S): 400 CAPSULE ORAL at 21:10

## 2020-08-28 RX ADMIN — HEPARIN SODIUM 5000 UNIT(S): 5000 INJECTION INTRAVENOUS; SUBCUTANEOUS at 05:25

## 2020-08-28 RX ADMIN — OXYCODONE HYDROCHLORIDE 10 MILLIGRAM(S): 5 TABLET ORAL at 14:30

## 2020-08-28 RX ADMIN — OXYCODONE HYDROCHLORIDE 5 MILLIGRAM(S): 5 TABLET ORAL at 02:58

## 2020-08-28 RX ADMIN — OXYCODONE HYDROCHLORIDE 10 MILLIGRAM(S): 5 TABLET ORAL at 05:23

## 2020-08-28 RX ADMIN — Medication 1 TABLET(S): at 21:11

## 2020-08-28 RX ADMIN — GABAPENTIN 200 MILLIGRAM(S): 400 CAPSULE ORAL at 05:25

## 2020-08-28 RX ADMIN — LEVETIRACETAM 1000 MILLIGRAM(S): 250 TABLET, FILM COATED ORAL at 05:24

## 2020-08-28 RX ADMIN — Medication 0.25 MILLIGRAM(S): at 00:10

## 2020-08-28 RX ADMIN — Medication 200 MILLIGRAM(S): at 13:04

## 2020-08-28 RX ADMIN — Medication 200 MILLIGRAM(S): at 00:32

## 2020-08-28 RX ADMIN — OXYCODONE HYDROCHLORIDE 5 MILLIGRAM(S): 5 TABLET ORAL at 05:21

## 2020-08-28 NOTE — DISCHARGE NOTE PROVIDER - HOSPITAL COURSE
60 year old female with  h/o chronic pain, lung adenocarcinoma (s/p RML wedge resection 2018), ?mixed connective tissue disease, anxiety, right hip replacement, lumbar laminectomy, b/l oophorectomy, diverticulitis, recent acute pancreatitis and found to have right soleal DVT on Eliquis, recent RLE cellulitis and recent c.diff infection with functional deficits after seizures    MRI demonstrates cortical ribboning. EEG shown right frontal LPDs.     Autoimmune encephalitis with hyponatremia    sodium improved, on florinef, fluid restriction     on keprra, vimpat, fosphenytoin    h/o INNA, on solumedrol, to transition to home dose of prednisone, plaquenil     no signs of infection, off abx    Pain - Tylenol, oxycodone prn, gabapentin--consider increasing dose    DVT PPX - SCDs, heparin     Rehab - Will continue to follow for ongoing rehab needs and recommendations.      continue bedside PT/OT    patient requires min assist for transfers, contact guard ambulation     out of bed to chair    She would benefit from acute inpatient rehabilitation when medically stable    patient states she will continue treatment for her chronic pain after AR as an outpatient. 59 y/o F with a h/o EtOH and opiate abuse s/p recent admissions for alcoholic pancreatitis and alcohol withdrawal, mixed connective tissue disorder (on plaquenil and methylprednisolone), chronic pain s/p lumbar laminectomy, left soleal vein thrombosis (started on apixaban, 7/2020), lung adenocarcinoma (s/p RML resection 2018), recent RLE cellulitis s/p doxycycline, recent CDiff colitis (on PO vancomycin, started 8/3, to finish 8/17), CAD s/p possible MI (ECHO ) admitted on 8/11 to OSH with symptomatic 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 RRT called because patient found to be unresponsive with generalized seizure activity with left UE/LE/facial twitching. Urgently intubated for hypoxic respiratory failure. Transferred to [facility] MICU. MRI was performed which revealed acute/subacute lacunar infarction within the right medial thalamus along with hypoperfused areas in bilateral frontal, temporal lobes consistent w/ post-ictal events. EEG revealed structural abnormality in the right frontotemporal region and moderate to severe nonspecific diffuse or multifocal cerebral dysfunction. She was extubated on August 20. improved mental status, transferred out of ICU.    Acute hypoxemic respiratory failure, sp extubation    resolved    Status epilepticus, resolved    Continue Keppra and Vimpat and Fosphenytoin, EEG without overt seizure like activity. CT head negative for acute changes. LP performed.  No evidence of infection. 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 Follow serum na and UO    fluid restriction 1l    H/O DVT soleal DVT : US study leg 8/26  negative. HSQ for prophylaxis of DVT.     H/O INNA.     On solumedrol, start taper medrol 20mg 2 day, 10mg 2 days, 4mg qd, cont Plaquenil.    C diff    finished 14 day course. D/CD po vancomycin    PT recs acute rehab.    Pt medically stable for discharge to Acute Rehab per Med Attending Dr Galan. Pt to follow up with Neurology, Nephrology and her     PCP Dr. Tim Davis as instructed.

## 2020-08-28 NOTE — PROGRESS NOTE ADULT - SUBJECTIVE AND OBJECTIVE BOX
patient reports difficulty sleeping due to pain, in legs/feet, chronic  usually takes oxycodone x 5 daily at home, now q 8 hours    REVIEW OF SYSTEMS  Constitutional - No fever,  No fatigue  HEENT - No vertigo, No neck pain  Neurological - No headaches, No memory loss, No loss of strength, No numbness, No tremors  Skin - No rashes, No lesions   Musculoskeletal - No joint pain, No joint swelling, No muscle pain  Psychiatric - No depression, No anxiety    FUNCTIONAL PROGRESS  8/27 PT      Sit-Stand Transfer Training  Transfer Training Sit-to-Stand Transfer: minimum assist (75% patient effort);  verbal cues;  nonverbal cues (demo/gestures);  1 person assist;  full weight-bearing   rolling walker  Transfer Training Stand-to-Sit Transfer: minimum assist (75% patient effort);  verbal cues;  nonverbal cues (demo/gestures);  1 person assist;  full weight-bearing   rolling walker  Sit-to-Stand Transfer Training Transfer Safety Analysis: decreased balance;  decreased weight-shifting ability;  decreased flexibility;  decreased strength;  impaired balance;  rolling walker    Gait Training  Gait Training: contact guard;  verbal cues;  nonverbal cues (demo/gestures);  1 person assist;  supervsion;  full weight-bearing   rolling walker;  40 ft x 2  Gait Analysis: 3-point gait   decreased mirella;  decreased step length;  decreased stride length;  decreased flexibility;  decreased strength;  impaired balance;  impaired sensory feedback;  40 ft x 2;  rolling walker  Gait Number of Times:: x 2  Type of Rest Type of Rest: standing  Duration of Rest Duration of Rest: 20 sec , deeep breathing done     Stair Training  Physical Assist/Nonphysical Assist: verbal cues;  nonverbal cues (demo/gestures);  1 person assist;  min to mod up and min down + handrail step to step pattern  Weight-Bearing Restrictions: weight-bearing as tolerated  Assistive Device: right rail up;  left rail down;  krystina hands holding handrail down and side stepping  Number of Stairs: 3     Therapeutic Exercise  Therapeutic Exercise Detail: Performed sitting therex: knee extensions, ankle pumps, all x 10reps. Pt did 5 trials with incentive spirometer to 1500 ml each.     8/25 OT    Bed Mobility  Bed Mobility Training Supine-to-Sit: supervsion;  1 person assist;  verbal cues;  bed rails  Bed Mobility Training Limitations: decreased strength;  impaired balance    Bed-Chair Transfer Training  Transfer Training Bed-to-Chair Transfer: contact guard;  1 person assist;  verbal cues;  full weight-bearing   rolling walker  Bed-to-Chair Transfer Training Transfer Safety Analysis: decreased strength;  impaired balance;  impaired coordination;  impaired postural control    Sit-Stand Transfer Training  Transfer Training Sit-to-Stand Transfer: minimum assist (75% patient effort);  1 person assist;  verbal cues;  full weight-bearing   rolling walker  Transfer Training Stand-to-Sit Transfer: minimum assist (75% patient effort);  1 person assist;  verbal cues;  full weight-bearing   rolling walker  Sit-to-Stand Transfer Training Transfer Safety Analysis: decreased eccentric control stand to sit;  decreased strength;  impaired balance;  impaired coordination;  impaired postural control    Eating/Self-Feeding Training  Eating/Self-Feeding Training Assistance: set-up required;  able to coordinate hands to cut food & self-feed while seated     OT Cognitive Treatment  OT Treatment: Cognitive Charges: 3/3 dealyed recall. A&Ox4. Able to follow 3 step directions.     VITALS  T(C): 36.8 (08-28-20 @ 13:19), Max: 37.2 (08-28-20 @ 00:57)  HR: 72 (08-28-20 @ 13:19) (68 - 86)  BP: 112/62 (08-28-20 @ 13:19) (106/71 - 133/77)  RR: 17 (08-28-20 @ 13:19) (17 - 19)  SpO2: 97% (08-28-20 @ 13:19) (94% - 98%)  Wt(kg): --    MEDICATIONS   ALPRAZolam 0.25 milliGRAM(s) two times a day PRN  artificial  tears Solution 1 Drop(s) three times a day PRN  fludroCORTISONE 0.1 milliGRAM(s) every 12 hours  gabapentin 200 milliGRAM(s) two times a day  heparin   Injectable 5000 Unit(s) every 8 hours  hydroxychloroquine 200 milliGRAM(s) daily  lacosamide 200 milliGRAM(s) two times a day  levETIRAcetam 1000 milliGRAM(s) two times a day  methylPREDNISolone 10 milliGRAM(s) daily  oxyCODONE  ER Tablet 10 milliGRAM(s) every 8 hours  pancrelipase  (CREON 12,000 Lipase Units) 1 Capsule(s) three times a day with meals  phenytoin   Capsule 130 milliGRAM(s) two times a day      RECENT LABS - Reviewed                        11.0   4.03  )-----------( 313      ( 27 Aug 2020 06:29 )             32.8     08-28    133<L>  |  96  |  7   ----------------------------<  101<H>  3.9   |  23  |  0.39<L>    Ca    9.5      28 Aug 2020 06:14    ------------------------------------------------------------------------------------  PHYSICAL EXAM  Constitutional - NAD, Comfortable, in chair   HEENT - NCAT, EOMI  Neck - Supple, No limited ROM  Chest - Breathing comfortably  Cardiovascular - S1S2   Abdomen - Soft   Extremities - No C/C/E, No calf tenderness   Neurologic Exam -                    Cognitive - Awake, Alert, AAO to self, place, date, year, situation     Communication - Fluent, No dysarthria     Cranial Nerves - CN 2-12 intact     Motor - 5/5     Sensory - Intact to LT     Reflexes - DTR Intact, No primitive reflexes     Psychiatric - Mood stable, Affect WNL  ----------------------------------------------------------------------------------------  ASSESSMENT/PLAN  60 year old woman h/o chronic pain, lung adenocarcinoma (s/p RML wedge resection 2018), ?mixed connective tissue disease, anxiety, right hip replacement, lumbar laminectomy, b/l oophorectomy, diverticulitis, recent acute pancreatitis and found to have right soleal DVT on eliquis, recent RLE cellulitis and recent c.diff infection with functional deficits after seizures  MRI demonstrates cortical ribboning. EEG shown right frontal LPDs.   Autoimmune encephalitis with hyponatremia  sodium improved, on florinef, fluid restriction   on keprra, vimpat, fosphenytoin  h/o INNA, on solumedrol, to transition to home dose of prednisone, plaquenil   no signs of infection, off abx  Pain - Tylenol, oxycodone prn, gabapentin--consider increasing dose  DVT PPX - SCDs, heparin   Rehab - Will continue to follow for ongoing rehab needs and recommendations.    continue bedside PT/OT  patient requires min assist for transfers, contact guard ambulation   out of bed to chair  She would benefit from acute inpatient rehabilitation when medically stable  patient states she will continue treatment for her chronic pain after AR as an outpatient.

## 2020-08-28 NOTE — H&P ADULT - NSHPSOCIALHISTORY_GEN_ALL_CORE
Social:   Tobacco  EtOH  Illicit drug use  Lives alone in a private house with 3STE, able to stay on the 1st floor, has 1 flight of stairs to the basement    Prior Level of Function  Independent with all ADLs with RW. Has a friend who helps her out at home. Has not driven in several months    Current Level of Function  SLP 8/24/20: regular diet, monitor for aspiration/laryngeal penetration    PT 8/27/20:   Transfer: Jacki  Gait: CG, 40' x2 with RW and 1 person assist  Stairs: 3    OT 8/25/20:  Bed mobility: supervision  Bed-chair transfer: CG  Sit-stand transfer: Jacki  Eating: set-up required  Cognitive: delayed recall, able to follow 3 step directions Social:   Tobacco  +EtOH abuse  Illicit drug use (opiate abuse)  Lives alone in a private house with 3STE, able to stay on the 1st floor, has 1 flight of stairs to the basement    Collateral:   Anita River (Healthcare Proxy): Ms. River lives in [place], and says that over the past several months Ms. Mendoza has had several hospitalizations, but has had difficulty getting a full history from the patient who she says is routinely an unreliable historian. Ms. River believes that Ms. Mendoza has had a previous MI several years ago, and has a history of lung cancer for which she had a lobectomy for in 2019, but is again unsure of the exact medical history. She does not believe Ms. Mendoza has a diagnosed psychiatric history, but knows she has had multiple years of drug and alcohol abuse.    Prior Level of Function  Independent with all ADLs with RW. Has a friend who helps her out at home. Has not driven in several months    Current Level of Function  SLP 8/24/20: regular diet, monitor for aspiration/laryngeal penetration    PT 8/27/20:   Transfer: Jacki  Gait: CG, 40' x2 with RW and 1 person assist  Stairs: 3    OT 8/25/20:  Bed mobility: supervision  Bed-chair transfer: CG  Sit-stand transfer: Jacki  Eating: set-up required  Cognitive: delayed recall, able to follow 3 step directions Social:   Tobacco: no current use  +EtOH abuse  Illicit drug use (opiate abuse)  Lives alone in a private house with 3STE, able to stay on the 1st floor, has 1 flight of stairs to the basement    Collateral:   Anita River (Healthcare Proxy): Ms. River lives in [place], and says that over the past several months Ms. Mendoza has had several hospitalizations, but has had difficulty getting a full history from the patient who she says is routinely an unreliable historian. Ms. River believes that Ms. Mendoza has had a previous MI several years ago, and has a history of lung cancer for which she had a lobectomy for in 2019, but is again unsure of the exact medical history. She does not believe Ms. Mendoza has a diagnosed psychiatric history, but knows she has had multiple years of drug and alcohol abuse.    Prior Level of Function  Independent with all ADLs with RW. Has a friend who helps her out at home. Has not driven in several months    Current Level of Function  SLP 8/24/20: regular diet, monitor for aspiration/laryngeal penetration    PT 8/27/20:   Transfer: Jacki  Gait: CG, 40' x2 with RW and 1 person assist  Stairs: 3    OT 8/25/20:  Bed mobility: supervision  Bed-chair transfer: CG  Sit-stand transfer: Jacki  Eating: set-up required  Cognitive: delayed recall, able to follow 3 step directions Social:   Tobacco: no current use  +EtOH abuse  Illicit drug use (opiate abuse)  Lives alone in a private house with 5 AUDRA, able to stay on the 1st floor, has 1 flight of stairs to the basement (laundry, sorin)    Collateral:   Anita River (Healthcare Proxy): Ms. River lives in [place], and says that over the past several months Ms. Mendoza has had several hospitalizations, but has had difficulty getting a full history from the patient who she says is routinely an unreliable historian. Ms. River believes that Ms. Mendoza has had a previous MI several years ago, and has a history of lung cancer for which she had a lobectomy in 2019, but is again unsure of the exact medical history. She does not believe Ms. Mendoza has a diagnosed psychiatric history, but knows she has had multiple years of drug and alcohol abuse.    Prior Level of Function  Independent with all ADLs with RW. Has a friend who helps her out at home. Has not driven in several months    Current Level of Function  SLP 8/24/20: regular diet, monitor for aspiration/laryngeal penetration    PT 8/27/20:   Transfer: Jacki  Gait: CG, 40' x2 with RW and 1 person assist  Stairs: 3    OT 8/25/20:  Bed mobility: supervision  Bed-chair transfer: CG  Sit-stand transfer: Jacki  Eating: set-up required  Cognitive: delayed recall, able to follow 3 step directions

## 2020-08-28 NOTE — H&P ADULT - NSHPREVIEWOFSYSTEMS_GEN_ALL_CORE
REVIEW OF SYSTEMS  Constitutional: No fever, No Chills, No fatigue  HEENT: No eye pain, No visual disturbances, No difficulty hearing, No Dysphagia   Pulm: No cough,  No shortness of breath  Cardio: No chest pain, No palpitations  GI:  No abdominal pain, No nausea, No vomiting, No diarrhea, No constipation, No incontinence, Last Bowel Movement on   : No dysuria, No frequency, No hematuria, No incontinence  Neuro: No headaches, No memory loss, No loss of strength, No numbness, No tremors  Skin: No itching, No rashes, No lesions   Endo: No temperature intolerance  MSK: No joint pain, No joint swelling, No muscle pain, No Neck or back pain  Psych:  No depression, No anxiety REVIEW OF SYSTEMS  Constitutional: No fever, No Chills, No fatigue  HEENT: No eye pain, No visual disturbances, No difficulty hearing, No Dysphagia   Pulm: No cough,  No shortness of breath  Cardio: No chest pain, No palpitations  GI:  No abdominal pain, No nausea, No vomiting, No diarrhea, No constipation  : +araya  Neuro: No headaches, No memory loss, No loss of strength, +numbness in feet, No tremors  Skin: No itching, No rashes, No lesions   Endo: No temperature intolerance  MSK: No joint pain, No joint swelling, No muscle pain, No Neck, +back pain, +burning in feet, calves   Psych:  No depression, No anxiety

## 2020-08-28 NOTE — DISCHARGE NOTE PROVIDER - CARE PROVIDER_API CALL
Tim Davis J  INTERNAL MEDICINE  [address], [address]  [place] [address]  Phone: [phone]  Fax: [phone]  Established Patient  Follow Up Time: 2 weeks Tim Davis  INTERNAL MEDICINE  [address] [facility], [address]  [place] [address]  Phone: [phone]  Fax: [phone]  Established Patient  Follow Up Time: 2 weeks    Zuly Simms)  Internal Medicine; Nephrology  [id number] [address] [place] [address]  Phone: [phone]  Fax: [phone]  Follow Up Time: 2 weeks    Alida King  NEUROLOGY  [address] [facility] [facility] [address]  [place] [address]  Phone: [phone]  Fax: [phone]  Follow Up Time: 2 weeks

## 2020-08-28 NOTE — H&P ADULT - NSHPLABSRESULTS_GEN_ALL_CORE
11.0   4.03  )-----------( 313      ( 27 Aug 2020 06:29 )             32.8     08-28    133<L>  |  96  |  7   ----------------------------<  101<H>  3.9   |  23  |  0.39<L>    Ca    9.5      28 Aug 2020 06:14      IMAGING    CT Head 8/13/20: No acute intracranial abnormality.    CT Head 8/15/20: No acute intracranial abnormality.    TTE 8/15/20:  Left Ventricle: The left ventricle is normal in size and wall thickness. There is suggestion of hypokinesis of the mid anteroseptum and mid inferoseptum. The left ventricular systolic function is not well visualized but appears low normal with an estimated EF of 50-55%.  Right Ventricle: The right ventricle is normal in size and function.  Left Atrium: The left atrium is normal in size.  Right Atrium: The right atrium is normal in size.  Mitral Valve: The mitral valve is structurally normal. Trace mitral regurgitation.  Aortic Valve: Aortic valve sclerosis. No aortic regurgitation.  Tricuspid Valve: The tricuspid valve is structurally normal. Trace tricuspid regurgitation. Estimated pulmonary artery systolic pressure is 30 mmHg.  Pulmonic Valve: The pulmonic valve is not well visualized.  Diastolic Function: Impaired relaxation secondary to age. Normal filling pressures.  Pericardium/Pleura: No pericardial effusion visualized.  Aorta: The aortic root is normal in size.    CXR 8/18/20: No acute cardiopulmonary findings.    US Duplex B/L LE 8/18/20: Resolved left soleal vein DVT.  No evidence of deep venous thrombosis in either lower extremity.    MRI head w/o contrast 8/18/20: Areas of cortical restricted diffusion involving the right anterior frontal, bilateral temporal, and left parietal lobes compatible with a post ictal status. The differential diagnosis is broad and includes but is not limited to toxic/ metabolic etiologies, drug withdrawal, autoimmune, or paraneoplastic etiologies. Clinical correlation is required. Recommend imaging follow-up to resolution.  Acute/subacute lacunar infarction within the right medial thalamus with associated cytotoxic edema and without hemorrhagic transformation.  Similar-appearing mild chronic white matter microvascular type changes.    MRI head w/wo contrast 8/22/20: Cortical areas of diffusion restriction within the supratentorial and infratentorial compartments as well as involvement of the right basal ganglia and thalamus. There is apparent mild progression when compared with the prior exam favoring post ictal changes or an infectious/inflammatory etiology such as cerebritis. See prior report for additional considerations. The possibility of  Creutzfeldt-Derrick disease is not entirely excluded given the pattern of involvement.

## 2020-08-28 NOTE — DISCHARGE NOTE PROVIDER - NSDCCPCAREPLAN_GEN_ALL_CORE_FT
PRINCIPAL DISCHARGE DIAGNOSIS  Diagnosis: Seizures  Assessment and Plan of Treatment: Take your medication as prescribed.   Follow up with your medical doctor for routine blood  work monitoring, and to establish long term treatment goals.        SECONDARY DISCHARGE DIAGNOSES  Diagnosis: Hyponatremia  Assessment and Plan of Treatment: Patient with hyponatremia likely cerebral salt wasting ( high urine output, low BP and high urine Na). Now on Florinef 0.1 mg bid. Sodium stable at 133 now, patient remains hemodynamically stable, and expressed understanding of keep fluid intake low and eat salt/protein in her diet.  Patient should follow-up at [address] [place] [address] with Dr. Zuly Simms 1-2 weeks after discharge. Can continue medications as above with instructions to watch fluid intake (including all liquids, PRINCIPAL DISCHARGE DIAGNOSIS  Diagnosis: Seizures  Assessment and Plan of Treatment: Seizure Precautions.   Take your medication as prescribed.   Follow up with your medical doctor for routine blood  work monitoring, and to establish long term treatment goals.        SECONDARY DISCHARGE DIAGNOSES  Diagnosis: Hyponatremia  Assessment and Plan of Treatment: Patient with hyponatremia likely cerebral salt wasting ( high urine output, low BP and high urine Na). Now on Florinef 0.1 mg bid. Sodium stable at 133 now, patient remains hemodynamically stable, and expressed understanding of keep fluid intake low and eat salt/protein in her diet.  Patient should follow-up at [address] [place] [address] with Dr. Zuly Simms 1-2 weeks after discharge. Can continue medications as above with instructions to watch fluid intake (including all liquids,

## 2020-08-28 NOTE — DISCHARGE NOTE PROVIDER - DISCHARGE DATE
28-Aug-2020 Hydrate with 60-80 ounces of water per day.       Avoid NSAID pain medications such as advil, aleve, motrin, ibuprofen, naprosyn, meloxicam, diclofenac, mobic.     Stop Clonidine.    Start taking labetalol as prescribed.    Please send home blood pressure readings to me via [employer].

## 2020-08-28 NOTE — H&P ADULT - ASSESSMENT
Assessment/Plan:  JULIO CESAR FAYE is a 60y with a history of *** who presented to *** on *** with complaint of *** and found to have ***. Hospital course complicated by ***. Now admitted to [facility] after for initiation of a multidisciplinary rehab program consisting focused on functional mobility, transfers and ADLs (activities of daily living).    Comprehensive Multidisciplinary Rehab Program:  - Start comprehensive rehab program, 3 hours a day, 5 days a week.  - PT 1hr/day: Focused on improving strength, endurance, coordination, balance, functional mobility, and transfers  - OT 1hr/day: Focused on improving strength, fine motor skills, coordination, posture and ADLs.    - Speech Therapy 1hr/day: to diagnose and treat deficits in swallowing, cognition and communication.   - P&O as needed  - Activity Limitations: Decreased social, vocational and leisure activities, decreased self care and ADLs, decreased mobility, decreased ability to manage household and finances.   - Precautions: falls, seizures    #Seizures  - Low suspicion for EtOH withdrawal seizures given >3 weeks abstinence prior to admission and autoimmune encephalitis given her improvement  - Continue Keppra 1g BID, Vimpat 200mg BID, Fosphenytoin 140mg BID    #Hyponatremia  - Likely due to cerebral salt wasting per Dr. Simms, nephrologist  - Continue 1L fluid restriction  - Continue Florinef 0.1mg BID  - F/u with nephrology outpatient in 2 weeks (~9/11/20)    #Mixed Connective Tissue Disorder  - Continue Medrol 4mg PO daily (S/P Medrol taper)  - Transition Medrol to home dose of Prednisone  - Continue Plaquenil 200mg every other day    #Pancreatitis  - Lipase 8/16/20: 13  - Continue Pancrelipase [id number] TID with meals    #Macrocytic Anemia  - Likely 2/2 EtOH abuse  - Iron studies 8/17/20: iron 36, TIBC 216, %iron saturation 17, transferrin 167, ferritin 583  - Vit B12 8/16/20: 753  - Folate 8/16/20: >20    Sleep:   - Maintain quiet hours and low stim environment.  - Melatonin PRN to maximize participation in therapy during the day.   - Monitor sleep logs    Pain Management:  - Tylenol, Oxycodone PRN, Gabapentin  - Avoid sedating medications that may interfere with cognitive recovery    GI/Bowel:  - H/o C. diff (completed treatment 8/3-8/17)  - Loperamide prn for diarrhea  - GI ppx: Pantoprazole 40mg daily    /Bladder:   - At risk for incontinence and retention due to neurologic diagnosis and limited mobility  - Currently patient voids:    [ ] independent     [ ] external collection device (condom cath)    [ ] Indwelling araya catheter     [ ] Intermittent catheterization  - Continue catheter/bladder nursing protocol with bladder scans Q**** hours with straight cath for >***cc.  - Encourage timed voids every 4 hours while awake for independence and to promote continence during therapy.    Skin/Pressure Injury:   - At risk for pressure injury due to neurologic diagnosis and relative immobility.  - Skin assessment on admission admission: ***  - Turn every 2 hours while in bed, air mattress  - Soft heel protectors  - Skin barrier cream as needed  - Nursing to monitor skin Qshift    Diet/Dysphagia:  - Diet Consistency/Modifications: regular  - Supplements: ***  - Dysphagia:   [ ] Aspiration Precautions   [ ] SLP consult for swallow function evaluation and treatment  - Nutrition consult    DVT ppx:  - Heparin  - SCDs  - Last Doppler on 8/18/20 (resolved L soleal vein DVT)  ---------------  Code Status/Emergency Contact:    Outpatient Follow-up (Specialty/Name of physician):  PCP, Dr. Tim Davis, [address] [place] [address], [address], [place], [address], [phone]    Nephrology, Dr. Zuly Simms, [address] [place] [address], [phone]    Neurology/Epilepsy, Dr. Gallegos, [address] [place] [address], [phone]    --------------  Goals: Safe discharge to home  Estimated Length of Stay: 10-14 days  Rehab Potential: Good  Medical Prognosis: Good  Estimated Disposition: Home with Home Care  ---------------    PRESCREEN COMPARISON:  I have reviewed the prescreen information and I have found no relevant changes between the preadmission screening and my post admission evaluation.    RATIONALE FOR INPATIENT ADMISSION: Patient demonstrates the following:  [X] Medically appropriate for rehabilitation admission  [X] Has attainable rehab goals with an appropriate initial discharge plan  [X]Has rehabilitation potential (expected to make a significant improvement within a reasonable period of time)  [X] Requires close medical management by a rehab physician, rehab nursing care, Hospitalist and comprehensive interdisciplinary team (including PT, OT and/or SLP, Prosthetics and Orthotics) Patient is a 59 y/o F with PMH CAD s/p possible MI (ECHO ), lung adenocarcinoma (s/p RML resection 2018), mixed connective tissue disorder (on plaquenil and methylprednisolone), left soleal vein thrombosis (on apixaban, 7/2020), RLE cellulitis (RX doxycycline), CDiff colitis (PO vancomycin 8/3- 8/17), lumbar laminectomy and chronic pain, EtOH (admissions for Etoh withdrawal), alcoholic pancreatitis, opiate abuse, admitted on 8/11/20 to [facility] with symptomatic hyponatremia complicated by generalized seizures, hypoxic respiratory failure, subsequent right medial thalamic infarct and cerebral salt-wasting.     Now admitted to [facility] after for initiation of a multidisciplinary rehab program consisting focused on functional mobility, transfers and ADLs (activities of daily living).    Comprehensive Multidisciplinary Rehab Program:  - Start comprehensive rehab program, 3 hours a day, 5 days a week.  - PT 1hr/day: Focused on improving strength, endurance, coordination, balance, functional mobility, and transfers  - OT 1hr/day: Focused on improving strength, fine motor skills, coordination, posture and ADLs.    - Speech Therapy 1hr/day: to diagnose and treat deficits in swallowing, cognition and communication.   - P&O as needed  - Activity Limitations: Decreased social, vocational and leisure activities, decreased self care and ADLs, decreased mobility, decreased ability to manage household and finances.   - Precautions: falls, seizures    #Seizures  - Low suspicion for EtOH withdrawal seizures given >3 weeks abstinence prior to admission and autoimmune encephalitis given her improvement  - Continue Keppra 1g BID, Vimpat 200mg BID, Fosphenytoin 140mg BID    #Hyponatremia  - Likely due to cerebral salt wasting per Dr. Simms, nephrologist  - Continue 1L fluid restriction  - Continue Florinef 0.1mg BID  - F/u with nephrology outpatient in 2 weeks (~9/11/20)    #Mixed Connective Tissue Disorder  - Continue Medrol 4mg PO daily (S/P Medrol taper)  - Transition Medrol to home dose of Prednisone  - Continue Plaquenil 200mg every other day    #Pancreatitis  - Lipase 8/16/20: 13  - Continue Pancrelipase [id number] TID with meals    #Macrocytic Anemia  - Likely 2/2 EtOH abuse  - Iron studies 8/17/20: iron 36, TIBC 216, %iron saturation 17, transferrin 167, ferritin 583  - Vit B12 8/16/20: 753  - Folate 8/16/20: >20    Sleep:   - Maintain quiet hours and low stim environment.  - Melatonin PRN to maximize participation in therapy during the day.   - Monitor sleep logs    Pain Management:  - Tylenol, Oxycodone PRN, Gabapentin  - Avoid sedating medications that may interfere with cognitive recovery    GI/Bowel:  - H/o C. diff (completed treatment 8/3-8/17)  - Loperamide prn for diarrhea  - GI ppx: Pantoprazole 40mg daily    /Bladder:   - At risk for incontinence and retention due to neurologic diagnosis and limited mobility  - Currently patient voids:    [ ] independent     [ ] external collection device (condom cath)    [ ] Indwelling araya catheter     [ ] Intermittent catheterization  - Continue catheter/bladder nursing protocol with bladder scans Q**** hours with straight cath for >***cc.  - Encourage timed voids every 4 hours while awake for independence and to promote continence during therapy.    Skin/Pressure Injury:   - At risk for pressure injury due to neurologic diagnosis and relative immobility.  - Skin assessment on admission admission: ***  - Turn every 2 hours while in bed, air mattress  - Soft heel protectors  - Skin barrier cream as needed  - Nursing to monitor skin Qshift    Diet/Dysphagia:  - Diet Consistency/Modifications: regular  - Supplements: ***  - Dysphagia:   [ ] Aspiration Precautions   [ ] SLP consult for swallow function evaluation and treatment  - Nutrition consult    DVT ppx:  - Heparin  - SCDs  - Last Doppler on 8/18/20 (resolved L soleal vein DVT)  ---------------  Code Status/Emergency Contact:    Outpatient Follow-up (Specialty/Name of physician):  PCP, Dr. Tim Davis, [address] [facility], [address], [place], [address], [phone]    Nephrology, Dr. Zuly Simms, [address], [place], [phone]    Neurology/Epilepsy, Dr. Gallegos, [address] [facility], [phone]    --------------  Goals: Safe discharge to home  Estimated Length of Stay: 10-14 days  Rehab Potential: Good  Medical Prognosis: Good  Estimated Disposition: Home with Home Care  ---------------    PRESCREEN COMPARISON:  I have reviewed the prescreen information and I have found no relevant changes between the preadmission screening and my post admission evaluation.    RATIONALE FOR INPATIENT ADMISSION: Patient demonstrates the following:  [X] Medically appropriate for rehabilitation admission  [X] Has attainable rehab goals with an appropriate initial discharge plan  [X]Has rehabilitation potential (expected to make a significant improvement within a reasonable period of time)  [X] Requires close medical management by a rehab physician, rehab nursing care, Hospitalist and comprehensive interdisciplinary team (including PT, OT and/or SLP, Prosthetics and Orthotics) Patient is a 61 y/o F with PMH CAD s/p possible MI (ECHO ), lung adenocarcinoma (s/p RML resection 2018), mixed connective tissue disorder (on plaquenil and methylprednisolone), left soleal vein thrombosis (on apixaban, 7/2020), RLE cellulitis (RX doxycycline), CDiff colitis (PO vancomycin 8/3- 8/17), lumbar laminectomy and chronic pain, EtOH (admissions for Etoh withdrawal), alcoholic pancreatitis, opiate abuse, admitted on 8/11/20 to [facility] with symptomatic hyponatremia complicated by generalized seizures, hypoxic respiratory failure, subsequent right medial thalamic infarct and cerebral salt-wasting.     Now admitted to [facility] after for initiation of a multidisciplinary rehab program consisting focused on functional mobility, transfers and ADLs (activities of daily living).    Comprehensive Multidisciplinary Rehab Program:  - Start comprehensive rehab program, 3 hours a day, 5 days a week.  - PT 1 hr/day: Focused on improving strength, endurance, coordination, balance, functional mobility, and transfers  - OT 1hr/day: Focused on improving strength, fine motor skills, coordination, posture and ADLs.    - Speech Therapy 1hr/day: to diagnose and treat deficits in swallowing, cognition and communication.   -neuropsychology and SW consults for mood, substance abuse history and referral  - Activity Limitations: Decreased social, vocational and leisure activities, decreased self care and ADLs, decreased mobility, decreased ability to manage household and finances.   - Precautions: falls, seizures, respiratory, spinal, substance abuse including opiates, long-term steroid    #Seizures  - Low suspicion for EtOH withdrawal seizures given >3 weeks abstinence prior to admission and autoimmune encephalitis given her improvement  - Continue Keppra 1g BID, Vimpat 200mg BID, Fosphenytoin 140mg BID  -neurological checks    #Hyponatremia  - Likely due to cerebral salt wasting per Dr. Simms, nephrologist  - Continue 1L fluid restriction  - Continue Florinef 0.1mg BID  - F/u with nephrology outpatient in 2 weeks (~9/11/20)  -hospitalist follow up, monitor BMP    #Mixed Connective Tissue Disorder  - Continue Medrol 4mg PO daily (S/P Medrol taper)  - Transition Medrol to home dose of Prednisone  - Continue Plaquenil 200mg every other day    #Pancreatitis  - Lipase 8/16/20: 13  - Continue Pancrelipase [id number] TID with meals    #Macrocytic Anemia  - Likely 2/2 EtOH abuse  - Iron studies 8/17/20: iron 36, TIBC 216, %iron saturation 17, transferrin 167, ferritin 583  - Vit B12 8/16/20: 753  - Folate 8/16/20: >20    #Sleep:   - Maintain quiet hours and low stim environment.  - Melatonin PRN to maximize participation in therapy during the day.   - Monitor sleep logs  -avoid benzodiazepines    #Pain Management: in setting of h/o opioid abuse  - Tylenol  - Avoid sedating medications that may interfere with cognitive recovery    #GI/Bowel:  - H/o C. diff (completed treatment 8/3-8/17)  - Loperamide prn for diarrhea  - GI ppx: Pantoprazole 40mg daily    #/Bladder:   - At risk for incontinence and retention due to neurologic diagnosis and limited mobility  - Continue catheter/bladder nursing protocol with bladder scans Q6 hours with straight cath for >350ml  - Encourage timed voids every 4 hours while awake for independence and to promote continence during therapy.  -manage constipation    #Skin/Pressure Injury:   - At risk for pressure injury due to neurologic diagnosis and relative immobility.  - Skin assessment on admission admission: ***  - Turn every 2 hours while in bed, air mattress  - Soft heel protectors  - Skin barrier cream as needed  - Nursing to monitor skin Qshift    Diet/Dysphagia:  - Diet Consistency/Modifications: regular  - Nutrition and SLP consult    DVT ppx:  - Heparin  - SCDs  - Last Doppler on 8/18/20 (resolved L soleal vein DVT)  ---------------  Code Status/Emergency Contact:    Outpatient Follow-up (Specialty/Name of physician):  PCP, Dr. Tim Davis, [address] [place] [address], [address], [place], [address], [phone]    Nephrology, Dr. Zuly Simms, [address] [place], [place], [phone]    Neurology/Epilepsy, Dr. Gallegos, [address] [facility], [phone]    --------------  Goals: Safe discharge to home  Estimated Length of Stay: 10-14 days  Rehab Potential: Good  Medical Prognosis: Good  Estimated Disposition: Home with Home Care  ---------------    PRESCREEN COMPARISON:  I have reviewed the prescreen information and I have found no relevant changes between the preadmission screening and my post admission evaluation.    RATIONALE FOR INPATIENT ADMISSION: Patient demonstrates the following:  [X] Medically appropriate for rehabilitation admission  [X] Has attainable rehab goals with an appropriate initial discharge plan  [X]Has rehabilitation potential (expected to make a significant improvement within a reasonable period of time)  [X] Requires close medical management by a rehab physician, rehab nursing care, Hospitalist and comprehensive interdisciplinary team (including PT, OT and/or SLP, Prosthetics and Orthotics) Patient is a 59 y/o F with PMH CAD s/p possible MI (ECHO ), lung adenocarcinoma (s/p RML resection 2018), mixed connective tissue disorder (on plaquenil and methylprednisolone), left soleal vein thrombosis (on apixaban, 7/2020), RLE cellulitis (RX doxycycline), CDiff colitis (PO vancomycin 8/3- 8/17), lumbar laminectomy and chronic pain, EtOH (admissions for Etoh withdrawal), alcoholic pancreatitis, opiate abuse, admitted on 8/11/20 to [facility] with symptomatic hyponatremia complicated by generalized seizures, hypoxic respiratory failure, subsequent right medial thalamic infarct and cerebral salt-wasting.     Now admitted to [facility] after for initiation of a multidisciplinary rehab program consisting focused on functional mobility, transfers and ADLs (activities of daily living).    Comprehensive Multidisciplinary Rehab Program:  - Start comprehensive rehab program, 3 hours a day, 5 days a week.  - PT 1 hr/day: Focused on improving strength, endurance, coordination, balance, functional mobility, and transfers  - OT 1hr/day: Focused on improving strength, fine motor skills, coordination, posture and ADLs.    - Speech Therapy 1hr/day: to diagnose and treat deficits in swallowing, cognition and communication.   - neuropsychology and SW consults for mood, substance abuse history and referral  - Activity Limitations: Decreased social, vocational and leisure activities, decreased self care and ADLs, decreased mobility, decreased ability to manage household and finances.   - Precautions: falls, seizures, respiratory, spinal, substance abuse including opiates, long-term steroid    #Seizures  - Low suspicion for EtOH withdrawal seizures given >3 weeks abstinence prior to admission and autoimmune encephalitis given her improvement  - Continue Keppra 1g BID, Vimpat 200mg BID, Fosphenytoin 140mg BID  - neurological checks    #Hyponatremia  - Likely due to cerebral salt wasting per Dr. Simms, nephrologist  - Continue 1L fluid restriction  - Continue Florinef 0.1mg BID  - F/u with nephrology outpatient in 2 weeks (~9/11/20)  - hospitalist follow up, monitor BMP    #Mixed Connective Tissue Disorder  - Continue Medrol 4mg PO daily (S/P Medrol taper)  - Transition Medrol to home dose of Prednisone  - Continue Plaquenil 200mg every other day    #Pancreatitis  - Lipase 8/16/20: 13  - Continue Pancrelipase [id number] TID with meals    #Macrocytic Anemia  - Likely 2/2 EtOH abuse  - Iron studies 8/17/20: iron 36, TIBC 216, %iron saturation 17, transferrin 167, ferritin 583  - Vit B12 8/16/20: 753  - Folate 8/16/20: >20    #Sleep:   - Maintain quiet hours and low stim environment.  - Melatonin PRN to maximize participation in therapy during the day.   - Monitor sleep logs  - avoid benzodiazepines, however, on Xanax prn for anxiety    #Pain Management: in setting of h/o opioid abuse  - Tylenol. On Oxycodone ER 10mg Q8hr at [facility]  - Avoid sedating medications that may interfere with cognitive recovery    #GI/Bowel:  - H/o C. diff (completed treatment 8/3-8/17)  - Loperamide prn for diarrhea  - GI ppx: Pantoprazole 40mg daily    #/Bladder:   - At risk for incontinence and retention due to neurologic diagnosis and limited mobility  - Continue catheter/bladder nursing protocol with bladder scans Q6 hours with straight cath for >350ml  - Encourage timed voids every 4 hours while awake for independence and to promote continence during therapy.  - manage constipation    #Skin/Pressure Injury:   - At risk for pressure injury due to neurologic diagnosis and relative immobility.  - Skin assessment on admission admission: ***  - Turn every 2 hours while in bed, air mattress  - Soft heel protectors  - Skin barrier cream as needed  - Nursing to monitor skin Qshift    Diet/Dysphagia:  - Diet Consistency/Modifications: regular  - H/o oropharyngeal dysphagia and dysphonia likely due to extubation  - Nutrition and SLP consult    DVT ppx:  - Heparin  - SCDs  - Last Doppler on 8/18/20 (resolved L soleal vein DVT)  ---------------  Code Status/Emergency Contact:    Outpatient Follow-up (Specialty/Name of physician):  PCP, Dr. Tim Davis, [address] [place] [address], [address], [place], [address], [phone]  Nephrology, Dr. Zuly Simms, [address] [place] [address], [phone]  Epilepsy, Dr. Gallegos, [address] [place] [address], [phone]  Neurology, Dr. Alida King, [address] [facility], [phone]    --------------  Goals: Safe discharge to home  Estimated Length of Stay: 10-14 days  Rehab Potential: Good  Medical Prognosis: Good  Estimated Disposition: Home with Home Care  ---------------    PRESCREEN COMPARISON:  I have reviewed the prescreen information and I have found no relevant changes between the preadmission screening and my post admission evaluation.    RATIONALE FOR INPATIENT ADMISSION: Patient demonstrates the following:  [X] Medically appropriate for rehabilitation admission  [X] Has attainable rehab goals with an appropriate initial discharge plan  [X]Has rehabilitation potential (expected to make a significant improvement within a reasonable period of time)  [X] Requires close medical management by a rehab physician, rehab nursing care, Hospitalist and comprehensive interdisciplinary team (including PT, OT and/or SLP, Prosthetics and Orthotics) Patient is a 59 y/o F with PMH CAD s/p possible MI (ECHO ), lung adenocarcinoma (s/p RML resection 2018), mixed connective tissue disorder (on plaquenil and methylprednisolone), left soleal vein thrombosis (on apixaban, 7/2020), RLE cellulitis (RX doxycycline), CDiff colitis (PO vancomycin 8/3- 8/17), lumbar laminectomy and chronic pain, EtOH (admissions for Etoh withdrawal), alcoholic pancreatitis, opiate abuse, admitted on 8/11/20 to [facility] with symptomatic hyponatremia complicated by generalized seizures, hypoxic respiratory failure, subsequent right medial thalamic infarct and cerebral salt-wasting.     Now admitted to [facility] after for initiation of a multidisciplinary rehab program consisting focused on functional mobility, transfers and ADLs (activities of daily living).    Comprehensive Multidisciplinary Rehab Program:  - Start comprehensive rehab program, 3 hours a day, 5 days a week.  - PT 1 hr/day: Focused on improving strength, endurance, coordination, balance, functional mobility, and transfers  - OT 1hr/day: Focused on improving strength, fine motor skills, coordination, posture and ADLs.    - Speech Therapy 1hr/day: to diagnose and treat deficits in swallowing, cognition and communication.   - neuropsychology and SW consults for mood, substance abuse history and referral  - Activity Limitations: Decreased social, vocational and leisure activities, decreased self care and ADLs, decreased mobility, decreased ability to manage household and finances.   - Precautions: falls, seizures, respiratory, spinal, substance abuse including opiates, long-term steroid    #Seizures  - Low suspicion for EtOH withdrawal seizures given >3 weeks abstinence prior to admission and autoimmune encephalitis given her improvement  - Continue Keppra 1g BID, Vimpat 200mg BID, Fosphenytoin 140mg BID  - neurological checks    #Hyponatremia  - Likely due to cerebral salt wasting per Dr. Simms, nephrologist  - Continue 1L fluid restriction  - Continue Florinef 0.1mg BID  - F/u with nephrology outpatient in 2 weeks (~9/11/20)  - hospitalist follow up, monitor BMP    #Mixed Connective Tissue Disorder  - Continue Medrol 4mg PO daily (S/P Medrol taper)  - Transition Medrol to home dose of Prednisone  - Continue Plaquenil 200mg every other day    #Pancreatitis  - Lipase 8/16/20: 13  - Continue Pancrelipase [id number] TID with meals    #Macrocytic Anemia  - Likely 2/2 EtOH abuse  - Iron studies 8/17/20: iron 36, TIBC 216, %iron saturation 17, transferrin 167, ferritin 583  - Vit B12 8/16/20: 753  - Folate 8/16/20: >20    #Sleep:   - Maintain quiet hours and low stim environment.  - Melatonin PRN to maximize participation in therapy during the day.   - Monitor sleep logs  - avoid benzodiazepines, however, on Xanax prn for anxiety    #Pain Management: in setting of h/o opioid abuse  - Tylenol. On Oxycodone ER 10mg Q8hr at [facility]  - Avoid sedating medications that may interfere with cognitive recovery    #GI/Bowel:  - H/o C. diff (completed treatment 8/3-8/17)  - Loperamide prn for diarrhea  - GI ppx: Pantoprazole 40mg daily    #/Bladder:   - At risk for incontinence and retention due to neurologic diagnosis and limited mobility  - Continue catheter/bladder nursing protocol with bladder scans Q6 hours with straight cath for >350ml  - Encourage timed voids every 4 hours while awake for independence and to promote continence during therapy.  - manage constipation    #Skin/Pressure Injury:   - At risk for pressure injury due to neurologic diagnosis and relative immobility.  - Skin assessment on admission admission: ***  - Turn every 2 hours while in bed, air mattress  - Soft heel protectors  - Skin barrier cream as needed  - Nursing to monitor skin Qshift    Diet/Dysphagia:  - Diet Consistency/Modifications: regular  - H/o oropharyngeal dysphagia and dysphonia likely due to extubation  - Nutrition and SLP consult    DVT ppx:  - Heparin  - SCDs  - Last Doppler on 8/18/20 (resolved L soleal vein DVT)  ---------------  Code Status/Emergency Contact:  Anita River (cousin) [facility] - [phone]    Outpatient Follow-up (Specialty/Name of physician):  PCP, Dr. Tim Davis, [address] [place] [address], [address], [place], [address], [phone]  Nephrology, Dr. Zuly Simms, [address] [place] [address], [phone]  Epilepsy, Dr. Gallegos, [address] [place] [address], [phone]  Neurology, Dr. Alida King, [address] [facility], [phone]    --------------  Goals: Safe discharge to home  Estimated Length of Stay: 10-14 days  Rehab Potential: Good  Medical Prognosis: Good  Estimated Disposition: Home with Home Care  ---------------    PRESCREEN COMPARISON:  I have reviewed the prescreen information and I have found no relevant changes between the preadmission screening and my post admission evaluation.    RATIONALE FOR INPATIENT ADMISSION: Patient demonstrates the following:  [X] Medically appropriate for rehabilitation admission  [X] Has attainable rehab goals with an appropriate initial discharge plan  [X]Has rehabilitation potential (expected to make a significant improvement within a reasonable period of time)  [X] Requires close medical management by a rehab physician, rehab nursing care, Hospitalist and comprehensive interdisciplinary team (including PT, OT and/or SLP, Prosthetics and Orthotics) Patient is a 61 y/o F with PMH CAD s/p possible MI (ECHO ), lung adenocarcinoma (s/p RML resection 2018), mixed connective tissue disorder (on plaquenil and methylprednisolone), left soleal vein thrombosis (on apixaban, 7/2020), RLE cellulitis (RX doxycycline), CDiff colitis (PO vancomycin 8/3- 8/17), lumbar laminectomy and chronic pain, EtOH (admissions for Etoh withdrawal), alcoholic pancreatitis, opiate abuse, admitted on 8/11/20 to [facility] with symptomatic hyponatremia complicated by generalized seizures, hypoxic respiratory failure, subsequent right medial thalamic infarct and cerebral salt-wasting.     Now admitted to [facility] after for initiation of a multidisciplinary rehab program consisting focused on functional mobility, transfers and ADLs (activities of daily living).    Comprehensive Multidisciplinary Rehab Program:  - Start comprehensive rehab program, 3 hours a day, 5 days a week.  - PT 1 hr/day: Focused on improving strength, endurance, coordination, balance, functional mobility, and transfers  - OT 1hr/day: Focused on improving strength, fine motor skills, coordination, posture and ADLs.    - Speech Therapy 1hr/day: to diagnose and treat deficits in swallowing, cognition and communication.   - neuropsychology and SW consults for mood, substance abuse history and referral  - Activity Limitations: Decreased social, vocational and leisure activities, decreased self care and ADLs, decreased mobility, decreased ability to manage household and finances.   - Precautions: falls, seizures, respiratory, spinal, substance abuse including opiates, long-term steroid    #Seizures  - Low suspicion for EtOH withdrawal seizures given >3 weeks abstinence prior to admission and autoimmune encephalitis given her improvement  - Continue Keppra 1g BID, Vimpat 200mg BID, Fosphenytoin 140mg BID  - neurological checks    #Hyponatremia  - Likely due to cerebral salt wasting per Dr. Simms, nephrologist  - Continue 1L fluid restriction  - Continue Florinef 0.1mg BID  - F/u with nephrology outpatient in 2 weeks (~9/11/20)  - hospitalist follow up, monitor BMP    #Mixed Connective Tissue Disorder  - Continue Medrol 4mg PO daily (S/P Medrol taper)  - Transition Medrol to home dose of Prednisone  - Continue Plaquenil 200mg every other day    #Pancreatitis  - Lipase 8/16/20: 13  - Continue Pancrelipase [id number] TID with meals    #Macrocytic Anemia  - Likely 2/2 EtOH abuse  - Iron studies 8/17/20: iron 36, TIBC 216, %iron saturation 17, transferrin 167, ferritin 583  - Vit B12 8/16/20: 753  - Folate 8/16/20: >20    #Sleep:   - Maintain quiet hours and low stim environment.  - Melatonin PRN to maximize participation in therapy during the day.   - Monitor sleep logs  - avoid benzodiazepines, however, on Xanax prn for anxiety    #Pain Management: in setting of h/o opioid abuse  - Tylenol. On Oxycodone ER 10mg Q8hr at [facility], will continue for now but wean as tolerated  - Avoid sedating medications that may interfere with cognitive recovery  - consider increasing gabapentin dose (pt states she takes 1200mg BID-TID at home)    #GI/Bowel:  - H/o C. diff (completed treatment 8/3-8/17)  - Loperamide prn for diarrhea  - GI ppx: Pantoprazole 40mg daily    #/Bladder:   - At risk for incontinence and retention due to neurologic diagnosis and limited mobility  - Zurita at admission; TOV when more mobile  - manage constipation    #Skin/Pressure Injury:   - At risk for pressure injury due to neurologic diagnosis and relative immobility.  - Skin assessment on admission admission: scattered excoriations/abrasions and ecchymoses   - Turn every 2 hours while in bed, air mattress  - Soft heel protectors  - Skin barrier cream as needed  - Nursing to monitor skin Qshift    Diet/Dysphagia:  - Diet Consistency/Modifications: regular  - H/o oropharyngeal dysphagia and dysphonia likely due to extubation  - Nutrition and SLP consult    DVT ppx:  - Heparin  - SCDs  - Last Doppler on 8/18/20 (resolved L soleal vein DVT)  ---------------  Code Status/Emergency Contact:  Anita River (cousin) HCP - [phone]    Outpatient Follow-up (Specialty/Name of physician):  PCP, Dr. Tim Davis, [address] [facility], [address], [place], [address], [phone]  Nephrology, Dr. Zuly Simms, [address] [place], [place], [phone]  Epilepsy, Dr. Gallegos, [address] [facility], [phone]  Neurology, Dr. Alida King, [address] [facility], [phone]    --------------  Goals: Safe discharge to home  Estimated Length of Stay: 10-14 days  Rehab Potential: Good  Medical Prognosis: Good  Estimated Disposition: Home with Home Care  ---------------    PRESCREEN COMPARISON:  I have reviewed the prescreen information and I have found no relevant changes between the preadmission screening and my post admission evaluation.    RATIONALE FOR INPATIENT ADMISSION: Patient demonstrates the following:  [X] Medically appropriate for rehabilitation admission  [X] Has attainable rehab goals with an appropriate initial discharge plan  [X]Has rehabilitation potential (expected to make a significant improvement within a reasonable period of time)  [X] Requires close medical management by a rehab physician, rehab nursing care, Hospitalist and comprehensive interdisciplinary team (including PT, OT and/or SLP, Prosthetics and Orthotics) Patient is a 61 y/o F with PMH CAD s/p possible MI (ECHO ), lung adenocarcinoma (s/p RML resection 2018), mixed connective tissue disorder (on plaquenil and methylprednisolone), left soleal vein thrombosis (on apixaban, 7/2020), RLE cellulitis (RX doxycycline), CDiff colitis (PO vancomycin 8/3- 8/17), lumbar laminectomy and chronic pain, EtOH (admissions for Etoh withdrawal), alcoholic pancreatitis, opiate abuse, admitted on 8/11/20 to [facility] with symptomatic hyponatremia complicated by generalized seizures, hypoxic respiratory failure, subsequent right medial thalamic infarct and cerebral salt-wasting.     Now admitted to [facility] after for initiation of a multidisciplinary rehab program consisting focused on functional mobility, transfers and ADLs (activities of daily living).    Comprehensive Multidisciplinary Rehab Program:  - Start comprehensive rehab program, 3 hours a day, 5 days a week.  - PT 1 hr/day: Focused on improving strength, endurance, coordination, balance, functional mobility, and transfers  - OT 1hr/day: Focused on improving strength, fine motor skills, coordination, posture and ADLs.    - Speech Therapy 1hr/day: to diagnose and treat deficits in swallowing, cognition and communication.   - neuropsychology and SW consults for mood, substance abuse history and referral  - Activity Limitations: Decreased social, vocational and leisure activities, decreased self care and ADLs, decreased mobility, decreased ability to manage household and finances.   - Precautions: falls, seizures, respiratory, spinal, substance abuse including opiates, long-term steroid    #Seizures  - Low suspicion for EtOH withdrawal seizures given >3 weeks abstinence prior to admission and autoimmune encephalitis given her improvement  - Continue Keppra 1g BID, Vimpat 200mg BID, Fosphenytoin 140mg BID  - neurological checks    #Hyponatremia  - Likely due to cerebral salt wasting per Dr. Simms, nephrologist  - Continue 1L fluid restriction  - Continue Florinef 0.1mg BID  - F/u with nephrology outpatient in 2 weeks (~9/11/20)  - hospitalist follow up, monitor BMP    #Mixed Connective Tissue Disorder  - Continue Medrol 4mg PO daily (S/P Medrol taper)  - Transition Medrol to home dose of Prednisone  - Continue Plaquenil 200mg every other day    #Pancreatitis  - Lipase 8/16/20: 13  - Continue Pancrelipase [id number] TID with meals    #Macrocytic Anemia  - Likely 2/2 EtOH abuse  - Iron studies 8/17/20: iron 36, TIBC 216, %iron saturation 17, transferrin 167, ferritin 583  - Vit B12 8/16/20: 753  - Folate 8/16/20: >20    #Sleep:   - Maintain quiet hours and low stim environment.  - Melatonin PRN to maximize participation in therapy during the day.   - Monitor sleep logs  - avoid benzodiazepines, however, on Xanax prn for anxiety    #Pain Management: in setting of h/o opioid abuse  - Tylenol. On Oxycodone ER 10mg Q8hr at [facility], will continue for now but wean as tolerated  - Avoid sedating medications that may interfere with cognitive recovery  - consider increasing gabapentin dose (pt states she takes 1200mg BID-TID at home)    #GI/Bowel:  - H/o C. diff (completed treatment 8/3-8/17)  - Loperamide prn for diarrhea  - GI ppx: Pantoprazole 40mg daily    #/Bladder:   - At risk for incontinence and retention due to neurologic diagnosis and limited mobility  - Zurita at admission; TOV when more mobile  - manage constipation    #Skin/Pressure Injury:   - At risk for pressure injury due to neurologic diagnosis and relative immobility.  - Skin assessment on admission admission: scattered excoriations/abrasions and ecchymoses   - Turn every 2 hours while in bed, air mattress  - Soft heel protectors  - Skin barrier cream as needed  - Nursing to monitor skin Qshift    #Code Status  - pt initially DNR at [place] (MOLST in chart on arrival to [facility]), however discussed wishes with patient and she states she would like to be full code.      Diet/Dysphagia:  - Diet Consistency/Modifications: regular  - H/o oropharyngeal dysphagia and dysphonia likely due to extubation  - Nutrition and SLP consult    DVT ppx:  - Heparin  - SCDs  - Last Doppler on 8/18/20 (resolved L soleal vein DVT)  ---------------  Code Status/Emergency Contact:  Anita River (cousin) HCP - [phone]    Outpatient Follow-up (Specialty/Name of physician):  PCP, Dr. Tim Davis, [address] [place] [address], [address], [place], [address], [phone]  Nephrology, Dr. Zuly Simms, [address] [place], [place], [phone]  Epilepsy, Dr. Gallegos, [address] [place] [address], [phone]  Neurology, Dr. Alida King, [address] [place] [address], [phone]    --------------  Goals: Safe discharge to home  Estimated Length of Stay: 10-14 days  Rehab Potential: Good  Medical Prognosis: Good  Estimated Disposition: Home with Home Care  ---------------    PRESCREEN COMPARISON:  I have reviewed the prescreen information and I have found no relevant changes between the preadmission screening and my post admission evaluation.    RATIONALE FOR INPATIENT ADMISSION: Patient demonstrates the following:  [X] Medically appropriate for rehabilitation admission  [X] Has attainable rehab goals with an appropriate initial discharge plan  [X]Has rehabilitation potential (expected to make a significant improvement within a reasonable period of time)  [X] Requires close medical management by a rehab physician, rehab nursing care, Hospitalist and comprehensive interdisciplinary team (including PT, OT and/or SLP, Prosthetics and Orthotics) Patient is a 59 y/o F with PMH CAD s/p possible MI (ECHO ), lung adenocarcinoma (s/p RML resection 2018), mixed connective tissue disorder (on plaquenil and methylprednisolone), left soleal vein thrombosis (on apixaban, 7/2020), RLE cellulitis (RX doxycycline), CDiff colitis (PO vancomycin 8/3- 8/17), lumbar laminectomy and chronic pain, EtOH (admissions for Etoh withdrawal), alcoholic pancreatitis, opiate abuse, admitted on 8/11/20 to [facility] with symptomatic hyponatremia complicated by generalized seizures, hypoxic respiratory failure, subsequent right medial thalamic infarct and cerebral salt-wasting.     Now admitted to [facility] after for initiation of a multidisciplinary rehab program consisting focused on functional mobility, transfers and ADLs (activities of daily living).    Comprehensive Multidisciplinary Rehab Program:  - Start comprehensive rehab program, 3 hours a day, 5 days a week.  - PT 1 hr/day: Focused on improving strength, endurance, coordination, balance, functional mobility, and transfers  - OT 1hr/day: Focused on improving strength, fine motor skills, coordination, posture and ADLs.    - Speech Therapy 1hr/day: to diagnose and treat deficits in swallowing, cognition and communication.   - neuropsychology and SW consults for mood, substance abuse history and referral  - Activity Limitations: Decreased social, vocational and leisure activities, decreased self care and ADLs, decreased mobility, decreased ability to manage household and finances.   - Precautions: falls, seizures, respiratory, spinal, substance abuse including opiates, long-term steroid    #Seizures  - Low suspicion for EtOH withdrawal seizures given >3 weeks abstinence prior to admission and autoimmune encephalitis given her improvement  - Continue Keppra 1g BID, Vimpat 200mg BID, Fosphenytoin 140mg BID  - neurological checks    #Hyponatremia  - Likely due to cerebral salt wasting per Dr. Simms, nephrologist  - Continue 1L fluid restriction  - Continue Florinef 0.1mg BID  - F/u with nephrology outpatient in 2 weeks (~9/11/20)  - hospitalist follow up, monitor BMP    #Mixed Connective Tissue Disorder  - Continue Medrol 4mg PO daily (S/P Medrol taper)  - Transition Medrol to home dose of Prednisone  - Continue Plaquenil 200mg every other day    #Pancreatitis  - Lipase 8/16/20: 13  - Continue Pancrelipase [id number] TID with meals    #Macrocytic Anemia  - Likely 2/2 EtOH abuse  - Iron studies 8/17/20: iron 36, TIBC 216, %iron saturation 17, transferrin 167, ferritin 583  - Vit B12 8/16/20: 753  - Folate 8/16/20: >20    #Sleep:   - Maintain quiet hours and low stim environment.  - Melatonin PRN to maximize participation in therapy during the day.   - Monitor sleep logs  - avoid benzodiazepines, however, on Xanax prn for anxiety    #Pain Management: in setting of h/o opioid abuse  - Tylenol. On Oxycodone ER 10mg Q8hr at [facility], will continue for now but wean as tolerated  - Avoid sedating medications that may interfere with cognitive recovery  - consider increasing gabapentin dose (pt states she takes 1200mg BID-TID at home)    #GI/Bowel:  - H/o C. diff (completed treatment 8/3-8/17)  - Loperamide prn for diarrhea  - GI ppx: Pantoprazole 40mg daily    #/Bladder:   - At risk for incontinence and retention due to neurologic diagnosis and limited mobility  - Zurita at admission; TOV when more mobile  - manage constipation    #Skin/Pressure Injury:   - At risk for pressure injury due to neurologic diagnosis and relative immobility.  - Skin assessment on admission admission: scattered excoriations/abrasions and ecchymoses   - Turn every 2 hours while in bed, air mattress  - Soft heel protectors  - Skin barrier cream as needed  - Nursing to monitor skin Qshift    #Osteoporosis  -pt on alendronate 70mg qweek at home  -resume after disharge    #Code Status  - pt initially DNR at [place] (MOLST in chart on arrival to [facility]), however discussed wishes with patient and she states she would like to be full code.      Diet/Dysphagia:  - Diet Consistency/Modifications: regular  - H/o oropharyngeal dysphagia and dysphonia likely due to extubation  - Nutrition and SLP consult    DVT ppx:  - Heparin  - SCDs  - Last Doppler on 8/18/20 (resolved L soleal vein DVT)  ---------------  Code Status/Emergency Contact:  Anita River (cousin) HCP - [phone]    Outpatient Follow-up (Specialty/Name of physician):  PCP, Dr. Tim Davis, [address] [facility], [address], [place], [address], [phone]  Nephrology, Dr. Zuly Simms, [address] [place], [place], [phone]  Epilepsy, Dr. Gallegos, [address] [facility], [phone]  Neurology, Dr. Alida King, [address] [place] [address], [phone]    --------------  Goals: Safe discharge to home  Estimated Length of Stay: 10-14 days  Rehab Potential: Good  Medical Prognosis: Good  Estimated Disposition: Home with Home Care  ---------------    PRESCREEN COMPARISON:  I have reviewed the prescreen information and I have found no relevant changes between the preadmission screening and my post admission evaluation.    RATIONALE FOR INPATIENT ADMISSION: Patient demonstrates the following:  [X] Medically appropriate for rehabilitation admission  [X] Has attainable rehab goals with an appropriate initial discharge plan  [X]Has rehabilitation potential (expected to make a significant improvement within a reasonable period of time)  [X] Requires close medical management by a rehab physician, rehab nursing care, Hospitalist and comprehensive interdisciplinary team (including PT, OT and/or SLP, Prosthetics and Orthotics)

## 2020-08-28 NOTE — PROGRESS NOTE ADULT - REASON FOR ADMISSION
Seizures

## 2020-08-28 NOTE — H&P ADULT - HISTORY OF PRESENT ILLNESS
59 y/o F with a h/o EtOH and opiate abuse s/p recent admissions for alcoholic pancreatitis and alcohol withdrawal, mixed connective tissue disorder (on plaquenil and methylprednisolone), chronic pain s/p lumbar laminectomy, left soleal vein thrombosis (started on apixaban, 7/2020), lung adenocarcinoma (s/p RML resection 2018), recent RLE cellulitis s/p doxycycline, recent CDiff colitis (PO vancomycin 8/3- 8/17), CAD s/p possible MI (ECHO ) admitted on 8/11/20 to [facility] with symptomatic hyponatremia (Na 121) secondary to psychogenic polydipsia. She was treated on the medicine service with an appropriate rate of correction in serum Na. Noted to have focal seizure activity of LUE on 8/13 which broke with IV lorazepam. Seizure activity believed to be secondary to benzodiazepine withdrawal. EEG 8/13 showed intermittent sharp and polyspike wave discharges with generalized slowing with patient at risk for seizures and was initially started on Keppra although continued to have periodic seizure activity. Code stroke called 8/15/20 for right facial droop. CT head unremarkable for acute pathology. 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     On 8/16, RRT called because patient found to be unresponsive with generalized seizure activity with left UE/LE/facial twitching. Seizure activity would break transiently with IV lorazepam but recurred repetitively. After 8mg lorazepam she began to lose airway protection abilities and developed hypoxemia (SpO2 70s). She was emergent intubated and given keppra load 1000mg, vimpat load 200 mg and propofol 10 mcg/kg/min infusion, which successfully suppressed her seizures. Found to have new fever of 101.6 F and hypotensive post-intubation. Started on levophed drip.      Transferred to [facility] for EEG study. EEG 8/18/20 showed epileptic focus in right frontal region. Weaned off pressors 8/18/20. MRI was performed which revealed acute/subacute lacunar infarction within the right medial thalamus along with hypoperfused areas in bilateral frontal, temporal lobes consistent w/ post-ictal events. S/P LP on 8/19/20, negative for CNS infection. EtOH withdrawal less likely etiology given last alcoholic beverage was >3 weeks prior to admission. Extubated 8/20/20. She started having hyponatremia post-extubation, likely signifying cerebral salt wasting due to high urine output, low BP, and high urine sodium and hypovolemia. She was started on 2% saline drip and given 1 dose of Florinef. Once sodium was stabilized, 2% saline was stopped. Antibiotics were stopped as she showed no signs of infection. MRI w/wo contrast was performed. She was downgraded from ICU 8/23/20.     Collateral:   Anita River (Healthcare Proxy): Ms. River lives in [place], and says that over the past several months Ms. Mendoza has had several hospitalizations, but has had difficulty getting a full history from the patient who she says is routinely an unreliable historian. Ms. River believes that Ms. Mendoza has had a previous MI several years ago, and has a history of lung cancer for which she had a lobectomy for in 2019, but is again unsure of the exact medical history. She does not believe Ms. Mendoza has a diagnosed psychiatric history, but knows she has had multiple years of drug and alcohol abuse. Patient is a 59 y/o F with PMH CAD s/p possible MI (ECHO ), lung adenocarcinoma (s/p RML resection 2018), mixed connective tissue disorder (on plaquenil and methylprednisolone), left soleal vein thrombosis (on apixaban, 7/2020), RLE cellulitis (RX doxycycline), CDiff colitis (PO vancomycin 8/3- 8/17), lumbar laminectomy and chronic pain, EtOH (admissions for Etoh withdrawal), alcoholic pancreatitis, opiate abuse, admitted on 8/11/20 to [facility] with symptomatic hyponatremia (Na 121) secondary to psychogenic polydipsia. She was treated with an appropriate rate of correction in serum Na.    Patient was lAter noted to have focal seizure activity of LUE on 8/13 which broke with IV lorazepam; seizure activity believed to be secondary to benzodiazepine withdrawal. EEG 8/13 showed intermittent sharp and polyspike wave discharges with generalized slowing with patient at risk for seizures, and patient started on Keppra although she continued to have periodic seizure activity. A code stroke was called 8/15/20 for right facial droop. CT head unremarkable for acute pathology. 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     On 8/16, RRT called because patient found to be unresponsive with generalized seizure activity and left UE/LE/facial twitching. Seizure activity would break transiently with IV lorazepam but recurred repetitively. After 8mg lorazepam, she began to lose airway protection abilities and developed hypoxemia (SpO2 70s). She was emergently intubated and given keppra load 1000mg, vimpat load 200 mg and propofol 10 mcg/kg/min infusion, which successfully suppressed her seizures. Found to have new fever of 101.6 F and was hypotensive post-intubation, started on levophed drip.      She was transferred to [facility] for EEG 8/18/20, which showed epileptic focus in right frontal region. Weaned off pressors 8/18/20. MRI was performed which revealed acute/subacute lacunar infarction within the right medial thalamus along with hypoperfused areas in bilateral frontal, temporal lobes consistent w/ post-ictal events. LP on 8/19/20 negative for CNS infection. Extubated 8/20/20. She was found to have hyponatremia post-extubation, likely signifying cerebral salt wasting (high urine output, low BP, and high urine sodium and hypovolemia). She was started on 2% saline drip and given 1 dose of Florinef. Once sodium was stabilized, 2% saline was stopped. Antibiotics were stopped as she showed no signs of infection. MRI w/wo contrast was performed. She was downgraded from ICU 8/23/20.     Patient was transferred to [facility] 8/28/20.

## 2020-08-28 NOTE — H&P ADULT - REASON FOR ADMISSION
hyponatremia, generalized seizures and hypoxic respiratory failure, right thalamic CVA hyponatremia, generalized seizures and hypoxic respiratory failure, right thalamic CVA  01.1 hyponatremia, generalized seizures and hypoxic respiratory failure, right thalamic CVA  01.4

## 2020-08-28 NOTE — PATIENT PROFILE ADULT - FUNCTIONAL SCREEN CURRENT LEVEL: COMMUNICATION, MLM
TODAY'S VISIT:  You were seen today for back pain, shoulder pain, arm pain.  This is from your known thoracic and cervical spine issues.  Will increase your  gabapentin, add Medrol and tramadol for pain control.  Please keep your appointment with neurosurgery as scheduled, can follow-up with our neurosurgeons as well if you would like a second opinion.        FOLLOW-UP:  Please make an appointment to follow up with:  - Neurosurgery Clinic (phone: [phone]) as soon as possible.   - Have your provider review the results from today's visit with you again to make sure no further follow-up or additional testing is needed based on those results.   -Your sodium is a little low today please have this rechecked by your primary care provider to make sure this normalizes in the next week or so    PRESCRIPTIONS / MEDICATIONS:  -Increase your gabapentin to 300 mg (3 tablets) 3 times a day.  -Take the Medrol Dosepak as prescribed  -Take Tylenol 1000 mg every 6 hours  -Once your Medrol Dosepak has finished you can take 600 mg of ibuprofen every 6 hours  -Take an over-the-counter Protonix while taking Medrol (and later the ibuprofen)    RETURN TO THE EMERGENCY DEPARTMENT  Return to the Emergency Department immediately for any new or worsening symptoms or any concerns.  Return if you develop new weakness in your arm.      
0 = understands/communicates without difficulty

## 2020-08-28 NOTE — PROGRESS NOTE ADULT - PROBLEM SELECTOR PLAN 1
Patient with hyponatremia likely cerebral salt wasting ( high urine output, low BP and high urine Na) - in the setting of autoimmune encephalitis. Now on Florinef 0.1 mg bid, sodium slightly downtrended to 130 today in the setting of increased free water intake. Spoke with patient about keeping total intake to ~1L a day of total fluids. She stated she understood and states her appetite is good again.
Patient with hyponatremia likely cerebral salt wasting ( high urine output, low BP and high urine Na) - in the setting of autoimmune encephalitis - UOP: 3.3 in the past 24 hrs - started on Florinef. sNa 136 > 138 this am-    - continue to monitor BMP - 4-6hrs   - c/w Florinef 0.1 mg BID  - If Na is dropping again, restart 2% and inform nephrology on call.   - avoid overcorrection of sNa
Patient with hyponatremia likely cerebral salt wasting ( high urine output, low BP and high urine Na) - in the setting of autoimmune encephalitis. Now on Florinef 0.1 mg bid with normalization of the serum sodium. urine output slowing down now and she is eating well. no need for saline infusion.
Patient with hyponatremia likely cerebral salt wasting ( high urine output, low BP and high urine Na) - in the setting of autoimmune encephalitis. Now on Florinef 0.1 mg bid, sodium slightly downtrended to 133 today. urine output slowing down now and she is eating well. Will obtain urine lytes today and would repeat serum Na at 2 PM along with lytes to see if patient will require isotonic fluids.
Patient with hyponatremia likely cerebral salt wasting ( high urine output, low BP and high urine Na). Now on Florinef 0.1 mg bid. Sodium stable at 133 now, patient remains hemodynamically stable, and expressed understanding of keep fluid intake low and eat salt/protein in her diet.    Patient should follow-up at [address] [place] [address] with Dr. Zuly Simms 1-2 weeks after discharge. Can continue medications as above with instructions to watch fluid intake (including all liquids, alcohol, juices, etc)    Nephrology to sign-off. Please call back with any questions or concerns regarding the care of this patient.
the combination of high urine output ( ~200-300c/hr), low BP and high urine sodium, along with clinical evidence of hypovolemia, likely signifies cerebral salt wasting. Received 2% saline at 100cc/hr + Florinef 0.1 mg last night. repeat Na at midnight was 139 and 2% saline was stopped. urine output dropped to 50-60cc/hr and then rising up again to 150cc/hr this am.     - check sodium now ( 12 pm)  - check urine Na, Cl, K, osm   - Give another dose of Florinef now and start Florinef 0.1 mg BID  - IF Na is dropping again, will need to restart 2%- please let us know of the results.   - Check uric acid and fractional excretion of Uric acid

## 2020-08-28 NOTE — PROGRESS NOTE ADULT - ATTENDING COMMENTS
59 yo woman w/ h/o chronic pain, lung adenocarcinoma (s/p RML wedge resection 2018), MCTD ( On Plaquenil and steroids), anxiety, soleal DVT on eliquis, recent RLE cellulitis, CDiff initially admitted to [facility] for ataxia and found to have a sodium of 121 thought to be secondary to excessive water and low solute intake ( mention of psychogenic polydipsia but there was no urine osm measured at that time) - she was free water restricted and started on normal saline with normalization of serum sodium. 8/14- Na was 137.     8/15- Na - 142  8/16- Na- 141   8/16- RRT called for generalized seizure activity with left UE/LE/facial twitching. s/p intubation and given Keppra/Vimpat/Propofol. started treatment for presumptive infectious process.  8/17-8/20- 132-139 - patient being treated for autoimmune encephalitis. Intubated, sedated. Extubated 8/20.     8/20 am - urine output increased to 175-390cc/hr. she became hypotensive ( on less sedation) - BPs early 8/19 were in the 140s/80s>>90s/50s today. Urine electrolytes- Urine Na- 159 urine osm- 395. Given the above clinical picture, she was suspected to have salt wasting ( likely cerebral) and was started on 2% saline and Florinef with improvement in her sodium level.     On the floor, she was drinking water excessively again and had a drop in her sodium. she is currently on 1 L water restriction with improvement in her sodium level.     - Please discharge her on 1 L water restriction. she can stay on Florinef 0.1 mg BID   - Please have her make an appointment with me in 2 weeks ( [address] [place] [address]- Phone - [phone])     we will sign off. please call us with any questions     tobi white  nephrology attending   Cell# [phone]   Office- [phone]

## 2020-08-28 NOTE — DISCHARGE NOTE PROVIDER - PROVIDER TOKENS
PROVIDER:[TOKEN:[[id number]],FOLLOWUP:[2 weeks],ESTABLISHEDPATIENT:[T]] PROVIDER:[TOKEN:[[id number]],FOLLOWUP:[2 weeks],ESTABLISHEDPATIENT:[T]],PROVIDER:[TOKEN:[[id number]],FOLLOWUP:[2 weeks]],PROVIDER:[TOKEN:[[id number]],FOLLOWUP:[2 weeks]]

## 2020-08-28 NOTE — PROGRESS NOTE ADULT - PROVIDER SPECIALTY LIST ADULT
Internal Medicine
MICU
Nephrology
Neurology
Rehab Medicine
Rehab Medicine
Internal Medicine
Nephrology

## 2020-08-28 NOTE — H&P ADULT - ATTENDING COMMENTS
Patient seen and examined and cased discussed with resident. Agree with recommendations.   61 y/o F with PMH CAD, lung adenocarcinoma (s/p RML resection 2018), mixed connective tissue disorder (on plaquenil and methylprednisolone), left soleal vein thrombosis (on apixaban, 7/2020), RLE cellulitis (RX doxycycline), CDiff colitis (PO vancomycin 8/3- 8/17), lumbar laminectomy and chronic pain, EtOH (admissions for Etoh withdrawal), alcoholic pancreatitis, opiate abuse, admitted on 8/11/20 to [facility] with symptomatic hyponatremia complicated by generalized seizures, hypoxic respiratory failure, subsequent right medial thalamic infarct and cerebral salt-wasting.    admitted to acute inpatient rehabilitation, for functional mobility, transfers and ADLs  Keppra 1g BID, Vimpat 200mg BID, Fosphenytoin 140mg BID  Florinef 0.1mg BID for hypo Na  medrol taper/plaquenil  heparin for dvt prophylaxis

## 2020-08-28 NOTE — DISCHARGE NOTE PROVIDER - CARE PROVIDERS DIRECT ADDRESSES
,DirectAddress_Unknown ,DirectAddress_Unknown,[email or web address]

## 2020-08-28 NOTE — H&P ADULT - NSHPPHYSICALEXAM_GEN_ALL_CORE
Vital Signs  T(C): 36.8 (08-28-20 @ 13:19), Max: 37.2 (08-28-20 @ 00:57)  HR: 72 (08-28-20 @ 13:19) (68 - 86)  BP: 112/62 (08-28-20 @ 13:19) (106/71 - 114/62)  RR: 17 (08-28-20 @ 13:19) (17 - 19)  SpO2: 97% (08-28-20 @ 13:19) (94% - 98%)    Gen - NAD, Comfortable  HEENT - NCAT, EOMI, MMM  Neck - Supple, No limited ROM  Pulm - CTAB, No wheeze, No rhonchi, No crackles  Cardiovascular - RRR, S1S2, No m/r/g  Abdomen - Soft, NT/ND, +BS  Extremities - No C/C/E, No calf tenderness  Neuro-     Cognitive - AAOx3     Communication - Fluent, No dysarthria     Attention: Intact      Judgement: Good evidence of judgement     Memory: Recall 3 objects immediate and 3 min later         Cranial Nerves - CN 2-12 intact     Motor -                    LEFT    UE - ShAB 5/5, EF 5/5, EE 5/5, WE 5/5,  5/5                    RIGHT UE - ShAB 5/5, EF 5/5, EE 5/5, WE 5/5,  5/5                    LEFT    LE - HF 5/5, KE 5/5, DF 5/5, PF 5/5                    RIGHT LE - HF 5/5, KE 5/5, DF 5/5, PF 5/5        Sensory - Intact to LT     Reflexes - DTR Intact, No primitive reflex     Coordination - FTN intact     Tone - normal  Psychiatric - Mood stable, Affect WNL  Skin: Intact  Wounds: None Present Vital Signs  T(C): 36.8 (08-28-20 @ 13:19), Max: 37.2 (08-28-20 @ 00:57)  HR: 72 (08-28-20 @ 13:19) (68 - 86)  BP: 112/62 (08-28-20 @ 13:19) (106/71 - 114/62)  RR: 17 (08-28-20 @ 13:19) (17 - 19)  SpO2: 97% (08-28-20 @ 13:19) (94% - 98%)    Gen - NAD, Comfortable  HEENT - NCAT, EOMI, MMM  Neck - Supple, No limited ROM  Pulm - CTAB, No wheeze, No rhonchi, No crackles  Cardiovascular - RRR, S1S2, No m/r/g  Abdomen - Soft, NT/ND, +BS  Extremities - No C/C/E, No calf tenderness  Neuro-     Cognitive - AAOx3     Communication - Fluent, No dysarthria     Attention: Intact; able to recite days of week backwards     Judgement: Good evidence of judgement     Memory: Recall 3 objects immediate and 3 min later         Cranial Nerves - CN 2-12 intact     Motor -                    LEFT    UE - ShAB 5/5, EF 5/5, EE 5/5, WE 5/5,  5/5                    RIGHT UE - ShAB 5/5, EF 5/5, EE 5/5, WE 5/5,  5/5                    LEFT    LE - HF 5/5, KE 5/5, DF 5/5, PF 5/5                    RIGHT LE - HF 5/5, KE 5/5, DF 5/5, PF 5/5        Sensory - Intact to LT     Reflexes - DTR Intact, No primitive reflex     Coordination - FTN intact     Tone - normal  Psychiatric - Mood stable, Affect WNL  Skin: scattered ecchymoses and abrasions in various stages of healing. no active bleeding or erythema, warmth, or discharge from any wounds

## 2020-08-28 NOTE — DISCHARGE NOTE PROVIDER - NSDCMRMEDTOKEN_GEN_ALL_CORE_FT
alendronate 70 mg oral tablet: 1 tab(s) orally once a week  cloNIDine 0.1 mg oral tablet: 1 tab(s) orally 2 times a day  fentaNYL 25 mcg/hr transdermal film, extended release: 1 film(s) transdermal every 72 hours  gabapentin 600 mg oral tablet: 2 cap(s) orally 2 times a day  hydroxychloroquine 200 mg oral tablet: 1 tab(s) orally every other day  loperamide 2 mg oral capsule: 1 cap(s) orally every 6 hours, As needed, Diarrhea  Medrol 4 mg oral tablet: 1 cap(s) orally once a day  Multiple Vitamins oral tablet: 1 tab(s) orally once a day  oxyCODONE 5 mg oral tablet: 1 tab(s) orally 5 times a day  pancrelipase 12,000 units-38,000 units-60,000 units oral delayed release capsule: 1 cap(s) orally 3 times a day (with meals)  pantoprazole 40 mg oral delayed release tablet: 1 tab(s) orally once a day  Restasis 0.05% ophthalmic emulsion: 1 drop(s) to each affected eye every 12 hours  Xanax 0.25 mg oral tablet: 1 cap(s) orally 2 times a day alendronate 70 mg oral tablet: 1 tab(s) orally once a week  ALPRAZolam 0.25 mg oral tablet: 1 tab(s) orally 2 times a day, As needed, anxiety  fludrocortisone 0.1 mg oral tablet: 1 tab(s) orally every 12 hours  gabapentin 100 mg oral capsule: 2 cap(s) orally 2 times a day  heparin: 5000 unit(s) subcutaneous every 8 hours  hydroxychloroquine 200 mg oral tablet: 1 tab(s) orally once a day  lacosamide 200 mg oral tablet: 1 tab(s) orally 2 times a day  levETIRAcetam 1000 mg oral tablet: 1 tab(s) orally 2 times a day  loperamide 2 mg oral capsule: 1 cap(s) orally every 6 hours, As needed, Diarrhea  methylPREDNISolone 2 mg oral tablet: 5 tab(s) orally once a day.  TAKE ON 8/29.   THEN START 4 MG ON 8/30.  methylPREDNISolone 4 mg oral tablet: 1 tab(s) orally once a day.  START 4 MG ON 8/30.  Multiple Vitamins oral tablet: 1 tab(s) orally once a day  ocular lubricant ophthalmic solution: 1 drop(s) to each affected eye 3 times a day, As needed, Eye irritation  oxyCODONE 10 mg oral tablet, extended release: 1 tab(s) orally every 8 hours  pancrelipase 12,000 units-38,000 units-60,000 units oral delayed release capsule: 1 cap(s) orally 3 times a day (with meals)  pancrelipase 12,000 units-38,000 units-60,000 units oral delayed release capsule: 1 cap(s) orally 3 times a day (with meals)  pantoprazole 40 mg oral delayed release tablet: 1 tab(s) orally once a day  phenytoin: 130 milligram(s) orally 2 times a day  Restasis 0.05% ophthalmic emulsion: 1 drop(s) to each affected eye every 12 hours alendronate 70 mg oral tablet: 1 tab(s) orally once a week  ALPRAZolam 0.25 mg oral tablet: 1 tab(s) orally 2 times a day, As needed, anxiety  fludrocortisone 0.1 mg oral tablet: 1 tab(s) orally every 12 hours  gabapentin 100 mg oral capsule: 2 cap(s) orally 2 times a day  heparin: 5000 unit(s) subcutaneous every 8 hours  hydroxychloroquine 200 mg oral tablet: 1 tab(s) orally once a day  lacosamide 200 mg oral tablet: 1 tab(s) orally 2 times a day  levETIRAcetam 1000 mg oral tablet: 1 tab(s) orally 2 times a day  loperamide 2 mg oral capsule: 1 cap(s) orally every 6 hours, As needed, Diarrhea  methylPREDNISolone 2 mg oral tablet: 5 tab(s) orally once a day.  TAKE ON 8/29.   THEN START 4 MG ON 8/30.  methylPREDNISolone 4 mg oral tablet: 1 tab(s) orally once a day.  START 4 MG ON 8/30.  Multiple Vitamins oral tablet: 1 tab(s) orally once a day  ocular lubricant ophthalmic solution: 1 drop(s) to each affected eye 3 times a day, As needed, Eye irritation  oxyCODONE 10 mg oral tablet, extended release: 1 tab(s) orally every 8 hours  pancrelipase 12,000 units-38,000 units-60,000 units oral delayed release capsule: 1 cap(s) orally 3 times a day (with meals)  pancrelipase 12,000 units-38,000 units-60,000 units oral delayed release capsule: 1 cap(s) orally 3 times a day (with meals)  pantoprazole 40 mg oral delayed release tablet: 1 tab(s) orally once a day  phenytoin: 130 milligram(s) orally 2 times a day

## 2020-08-28 NOTE — PROGRESS NOTE ADULT - SUBJECTIVE AND OBJECTIVE BOX
Health system Division of Kidney Diseases & Hypertension  FOLLOW UP NOTE  [phone]--------------------------------------------------------------------------------  Chief Complaint:Intractable epilepsy without status epilepticus      24 hour events/subjective: Sodium noted to be 133 today. Patient remains hemodynamically stable.      PAST HISTORY  --------------------------------------------------------------------------------  No significant changes to PMH, PSH, FHx, SHx, unless otherwise noted    ALLERGIES & MEDICATIONS  --------------------------------------------------------------------------------  Allergies    penicillin (Other)  penicillin (Swelling)    Intolerances      Standing Inpatient Medications  fludroCORTISONE 0.1 milliGRAM(s) Oral every 12 hours  gabapentin 200 milliGRAM(s) Oral two times a day  heparin   Injectable 5000 Unit(s) SubCutaneous every 8 hours  hydroxychloroquine 200 milliGRAM(s) Oral daily  lacosamide 200 milliGRAM(s) Oral two times a day  levETIRAcetam 1000 milliGRAM(s) Oral two times a day  methylPREDNISolone 10 milliGRAM(s) Oral daily  oxyCODONE  ER Tablet 10 milliGRAM(s) Oral every 8 hours  pancrelipase  (CREON 12,000 Lipase Units) 1 Capsule(s) Oral three times a day with meals  phenytoin   Capsule 130 milliGRAM(s) Oral two times a day    PRN Inpatient Medications  ALPRAZolam 0.25 milliGRAM(s) Oral two times a day PRN  artificial  tears Solution 1 Drop(s) Both EYES three times a day PRN      REVIEW OF SYSTEMS  --------------------------------------------------------------------------------  Gen: No  fevers/chills  Skin: No rashes  Head/Eyes/Ears/Mouth: No headache; Normal hearing; Normal vision w/o blurriness  Respiratory: No dyspnea, cough, wheezing, hemoptysis  CV: No chest pain, PND, orthopnea  GI: No abdominal pain, diarrhea, constipation, nausea, vomiting  : No increased frequency, dysuria, hematuria, nocturia  MSK: No joint pain/swelling; no back pain; no edema  Neuro: No dizziness/lightheadedness, weakness, seizures, numbness, tingling  Psych: Non-focal      All other systems were reviewed and are negative, except as noted.    VITALS/PHYSICAL EXAM  --------------------------------------------------------------------------------  T(C): 37 (08-28-20 @ 04:03), Max: 37.2 (08-28-20 @ 00:57)  HR: 68 (08-28-20 @ 04:03) (68 - 86)  BP: 111/70 (08-28-20 @ 04:03) (106/71 - 133/77)  RR: 18 (08-28-20 @ 04:03) (18 - 19)  SpO2: 98% (08-28-20 @ 04:03) (93% - 98%)  Wt(kg): --        08-27-20 @ 07:01  -  08-28-20 @ 07:00  --------------------------------------------------------  IN: 1080 mL / OUT: 1000 mL / NET: 80 mL      Physical Exam:  	Gen: NAD  	HEENT: supple neck, clear oropharynx  	Pulm: CTA B/L  	CV: RRR, S1S2; no rub  	Abd: soft, nontender/nondistended               : no suprapubic tenderness.   	UE: Warm  	LE: Warm, no edema              Neuro: Awake, and oriented              Skin: no rash noted on the skin.    LABS/STUDIES  --------------------------------------------------------------------------------              11.0   4.03  >-----------<  313      [08-27-20 @ 06:29]              32.8     133  |  96  |  7   ----------------------------<  101      [08-28-20 @ 06:14]  3.9   |  23  |  0.39        Ca     9.5     [08-28-20 @ 06:14]            Creatinine Trend:  SCr 0.39 [08-28 @ 06:14]  SCr 0.40 [08-27 @ 06:29]  SCr 0.42 [08-26 @ 06:23]  SCr 0.45 [08-25 @ 07:08]  SCr 0.39 [08-24 @ 06:40]      Urine Creatinine 99      [08-25-20 @ 11:03]  Urine Sodium 64      [08-25-20 @ 22:52]  Urine Potassium 30      [08-25-20 @ 22:52]  Urine Chloride 139      [08-21-20 @ 12:41]  Urine Osmolality 475      [08-26-20 @ 02:59]

## 2020-08-29 LAB
ALBUMIN SERPL ELPH-MCNC: 3.3 G/DL — SIGNIFICANT CHANGE UP (ref 3.3–5)
ALP SERPL-CCNC: 39 U/L — LOW (ref 40–120)
ALT FLD-CCNC: 53 U/L — HIGH (ref 10–45)
ANION GAP SERPL CALC-SCNC: 9 MMOL/L — SIGNIFICANT CHANGE UP (ref 5–17)
AST SERPL-CCNC: 44 U/L — HIGH (ref 10–40)
BASOPHILS # BLD AUTO: 0.04 K/UL — SIGNIFICANT CHANGE UP (ref 0–0.2)
BASOPHILS NFR BLD AUTO: 0.7 % — SIGNIFICANT CHANGE UP (ref 0–2)
BILIRUB SERPL-MCNC: 0.1 MG/DL — LOW (ref 0.2–1.2)
BUN SERPL-MCNC: 6 MG/DL — LOW (ref 7–23)
CALCIUM SERPL-MCNC: 9 MG/DL — SIGNIFICANT CHANGE UP (ref 8.4–10.5)
CHLORIDE SERPL-SCNC: 99 MMOL/L — SIGNIFICANT CHANGE UP (ref 96–108)
CO2 SERPL-SCNC: 27 MMOL/L — SIGNIFICANT CHANGE UP (ref 22–31)
CREAT SERPL-MCNC: 0.62 MG/DL — SIGNIFICANT CHANGE UP (ref 0.5–1.3)
EOSINOPHIL # BLD AUTO: 0.14 K/UL — SIGNIFICANT CHANGE UP (ref 0–0.5)
EOSINOPHIL NFR BLD AUTO: 2.6 % — SIGNIFICANT CHANGE UP (ref 0–6)
GLUCOSE SERPL-MCNC: 103 MG/DL — HIGH (ref 70–99)
HCT VFR BLD CALC: 35.7 % — SIGNIFICANT CHANGE UP (ref 34.5–45)
HGB BLD-MCNC: 11.5 G/DL — SIGNIFICANT CHANGE UP (ref 11.5–15.5)
IMM GRANULOCYTES NFR BLD AUTO: 0.7 % — SIGNIFICANT CHANGE UP (ref 0–1.5)
LYMPHOCYTES # BLD AUTO: 1.72 K/UL — SIGNIFICANT CHANGE UP (ref 1–3.3)
LYMPHOCYTES # BLD AUTO: 32.1 % — SIGNIFICANT CHANGE UP (ref 13–44)
MAGNESIUM SERPL-MCNC: 2.1 MG/DL — SIGNIFICANT CHANGE UP (ref 1.6–2.6)
MCHC RBC-ENTMCNC: 32.2 GM/DL — SIGNIFICANT CHANGE UP (ref 32–36)
MCHC RBC-ENTMCNC: 33.7 PG — SIGNIFICANT CHANGE UP (ref 27–34)
MCV RBC AUTO: 104.7 FL — HIGH (ref 80–100)
MONOCYTES # BLD AUTO: 0.48 K/UL — SIGNIFICANT CHANGE UP (ref 0–0.9)
MONOCYTES NFR BLD AUTO: 9 % — SIGNIFICANT CHANGE UP (ref 2–14)
NEUTROPHILS # BLD AUTO: 2.94 K/UL — SIGNIFICANT CHANGE UP (ref 1.8–7.4)
NEUTROPHILS NFR BLD AUTO: 54.9 % — SIGNIFICANT CHANGE UP (ref 43–77)
NRBC # BLD: 0 /100 WBCS — SIGNIFICANT CHANGE UP (ref 0–0)
PHOSPHATE SERPL-MCNC: 5.1 MG/DL — HIGH (ref 2.5–4.5)
PLATELET # BLD AUTO: 343 K/UL — SIGNIFICANT CHANGE UP (ref 150–400)
POTASSIUM SERPL-MCNC: 4 MMOL/L — SIGNIFICANT CHANGE UP (ref 3.5–5.3)
POTASSIUM SERPL-SCNC: 4 MMOL/L — SIGNIFICANT CHANGE UP (ref 3.5–5.3)
PROT SERPL-MCNC: 6.8 G/DL — SIGNIFICANT CHANGE UP (ref 6–8.3)
RBC # BLD: 3.41 M/UL — LOW (ref 3.8–5.2)
RBC # FLD: 13.4 % — SIGNIFICANT CHANGE UP (ref 10.3–14.5)
SODIUM SERPL-SCNC: 135 MMOL/L — SIGNIFICANT CHANGE UP (ref 135–145)
WBC # BLD: 5.36 K/UL — SIGNIFICANT CHANGE UP (ref 3.8–10.5)
WBC # FLD AUTO: 5.36 K/UL — SIGNIFICANT CHANGE UP (ref 3.8–10.5)

## 2020-08-29 PROCEDURE — 99223 1ST HOSP IP/OBS HIGH 75: CPT

## 2020-08-29 RX ORDER — GABAPENTIN 400 MG/1
200 CAPSULE ORAL THREE TIMES A DAY
Refills: 0 | Status: DISCONTINUED | OUTPATIENT
Start: 2020-08-29 | End: 2020-08-30

## 2020-08-29 RX ORDER — ATORVASTATIN CALCIUM 80 MG/1
20 TABLET, FILM COATED ORAL AT BEDTIME
Refills: 0 | Status: DISCONTINUED | OUTPATIENT
Start: 2020-08-29 | End: 2020-09-08

## 2020-08-29 RX ORDER — ACETAMINOPHEN 500 MG
325 TABLET ORAL ONCE
Refills: 0 | Status: COMPLETED | OUTPATIENT
Start: 2020-08-29 | End: 2020-08-29

## 2020-08-29 RX ORDER — THIAMINE MONONITRATE (VIT B1) 100 MG
100 TABLET ORAL DAILY
Refills: 0 | Status: DISCONTINUED | OUTPATIENT
Start: 2020-08-29 | End: 2020-09-08

## 2020-08-29 RX ORDER — ASPIRIN/CALCIUM CARB/MAGNESIUM 324 MG
81 TABLET ORAL DAILY
Refills: 0 | Status: DISCONTINUED | OUTPATIENT
Start: 2020-08-29 | End: 2020-09-08

## 2020-08-29 RX ADMIN — LEVETIRACETAM 1000 MILLIGRAM(S): 250 TABLET, FILM COATED ORAL at 17:09

## 2020-08-29 RX ADMIN — GABAPENTIN 200 MILLIGRAM(S): 400 CAPSULE ORAL at 05:16

## 2020-08-29 RX ADMIN — PANTOPRAZOLE SODIUM 40 MILLIGRAM(S): 20 TABLET, DELAYED RELEASE ORAL at 05:15

## 2020-08-29 RX ADMIN — Medication 1 CAPSULE(S): at 12:20

## 2020-08-29 RX ADMIN — OXYCODONE HYDROCHLORIDE 10 MILLIGRAM(S): 5 TABLET ORAL at 05:15

## 2020-08-29 RX ADMIN — Medication 0.25 MILLIGRAM(S): at 21:43

## 2020-08-29 RX ADMIN — Medication 125 MILLIGRAM(S): at 05:15

## 2020-08-29 RX ADMIN — GABAPENTIN 200 MILLIGRAM(S): 400 CAPSULE ORAL at 21:43

## 2020-08-29 RX ADMIN — OXYCODONE HYDROCHLORIDE 10 MILLIGRAM(S): 5 TABLET ORAL at 00:25

## 2020-08-29 RX ADMIN — ATORVASTATIN CALCIUM 20 MILLIGRAM(S): 80 TABLET, FILM COATED ORAL at 21:43

## 2020-08-29 RX ADMIN — LACOSAMIDE 200 MILLIGRAM(S): 50 TABLET ORAL at 05:16

## 2020-08-29 RX ADMIN — Medication 325 MILLIGRAM(S): at 14:19

## 2020-08-29 RX ADMIN — FLUDROCORTISONE ACETATE 0.1 MILLIGRAM(S): 0.1 TABLET ORAL at 17:09

## 2020-08-29 RX ADMIN — LACOSAMIDE 200 MILLIGRAM(S): 50 TABLET ORAL at 17:09

## 2020-08-29 RX ADMIN — Medication 0.25 MILLIGRAM(S): at 14:20

## 2020-08-29 RX ADMIN — Medication 325 MILLIGRAM(S): at 15:15

## 2020-08-29 RX ADMIN — OXYCODONE HYDROCHLORIDE 10 MILLIGRAM(S): 5 TABLET ORAL at 21:43

## 2020-08-29 RX ADMIN — Medication 100 MILLIGRAM(S): at 12:20

## 2020-08-29 RX ADMIN — OXYCODONE HYDROCHLORIDE 10 MILLIGRAM(S): 5 TABLET ORAL at 14:15

## 2020-08-29 RX ADMIN — OXYCODONE HYDROCHLORIDE 10 MILLIGRAM(S): 5 TABLET ORAL at 06:15

## 2020-08-29 RX ADMIN — Medication 200 MILLIGRAM(S): at 12:20

## 2020-08-29 RX ADMIN — Medication 1 CAPSULE(S): at 08:28

## 2020-08-29 RX ADMIN — Medication 1 TABLET(S): at 12:20

## 2020-08-29 RX ADMIN — Medication 125 MILLIGRAM(S): at 17:10

## 2020-08-29 RX ADMIN — HEPARIN SODIUM 5000 UNIT(S): 5000 INJECTION INTRAVENOUS; SUBCUTANEOUS at 05:17

## 2020-08-29 RX ADMIN — Medication 10 MILLIGRAM(S): at 05:17

## 2020-08-29 RX ADMIN — LEVETIRACETAM 1000 MILLIGRAM(S): 250 TABLET, FILM COATED ORAL at 05:16

## 2020-08-29 RX ADMIN — HEPARIN SODIUM 5000 UNIT(S): 5000 INJECTION INTRAVENOUS; SUBCUTANEOUS at 14:23

## 2020-08-29 RX ADMIN — Medication 1 CAPSULE(S): at 17:09

## 2020-08-29 RX ADMIN — HEPARIN SODIUM 5000 UNIT(S): 5000 INJECTION INTRAVENOUS; SUBCUTANEOUS at 21:43

## 2020-08-29 RX ADMIN — Medication 81 MILLIGRAM(S): at 12:20

## 2020-08-29 RX ADMIN — OXYCODONE HYDROCHLORIDE 10 MILLIGRAM(S): 5 TABLET ORAL at 15:15

## 2020-08-29 NOTE — DIETITIAN INITIAL EVALUATION ADULT. - PHYSICAL APPEARANCE
Temporalis, Orbital Fat Pads, Buccal Fat Pads, Clavicle & Gastrocnemius(Calf) Wasting Observed  (Per Nutrition Focused Physical Exam)

## 2020-08-29 NOTE — DIETITIAN INITIAL EVALUATION ADULT. - ENERGY NEEDS
Height: 63Inches - Per Pt  Weight: 111.3lb 8/20 - Per Previous RD Note  Body Mass Index (BMI): 19.7kg/m2   Ideal Body Weight Range: 115lb (+/-10%)   Percent Ideal Body Weight ~98%

## 2020-08-29 NOTE — DIETITIAN INITIAL EVALUATION ADULT. - OTHER INFO
60yr Old Female - Dx: CVA/Hyponatermia - Initial Assessment - Diet - Regular Diet w/ Thin Liquids  & 1,000ml/day Fluid Restriction (Recommend Initiate Ensure Pudding 4oz PO TID) , Denies Food Allergy/Intolerance, Tolerates Diet Well, No Chewing/Swallowing Complications (Per Patient), Consumes 100% of 1st Meal (as Per Observation), No Pressure Ulcers (as Per Nursing Flow Sheets), No Edema Noted (as Per Nursing Flow Sheets), No Recent Nausea/Vomiting/Constipation & Some Diarrhea(as Per Patient)

## 2020-08-29 NOTE — DIETITIAN INITIAL EVALUATION ADULT. - NS FNS WEIGHT USED FOR CALC
other (specify)/Weight from Previous RD Note Used - States Weight Trending Downward over Last 6 Months (Decreased 7lb)

## 2020-08-29 NOTE — CONSULT NOTE ADULT - ASSESSMENT
59yo female with hx of CAD s/p possible MI (ECHO ), lung adenocarcinoma (s/p RML resection 2018), mixed connective tissue disorder (on plaquenil and methylprednisolone), left soleal vein thrombosis (on apixaban, 7/2020), RLE cellulitis (RX doxycycline), CDiff colitis (PO vancomycin 8/3- 8/17), lumbar laminectomy and chronic pain, EtOH (admissions for Etoh withdrawal), alcoholic pancreatitis, opiate abuse, presented to [facility] for acute rehab from [facility] for seizure/CVA    #Debility  -Comprehensive Multidisciplinary Rehab Program  -Start comprehensive rehab program, 3 hours a day, 5 days a week.    #Seizures  -Continue Keppra 1g BID, Vimpat 200mg BID, Fosphenytoin 140mg BID  -neurological checks  -Follow up with Epilepsy Dr. Gallegos as outpatient    #CVA  -MRI revealed acute/subacute lacunar infarction within the right medial thalamus along with hypoperfused areas in bilateral frontal, temporal lobes consistent w/ post-ictal events  -Start ASA 81mg daily  -Follow up with Lipid panel  -Start Atorvastatin 20mg daily    #Hyponatremia  -Secondary to cerebral salt wasting  -Continue 1L fluid restriction  -Continue Florinef 0.1mg BID  -F/u with nephrology outpatient in 2 weeks (~9/11/20)    #Mixed Connective Tissue Disorder  -Continue Medrol 4mg PO daily (S/P Medrol taper)  -Transition Medrol to home dose of Prednisone  -Continue Plaquenil 200mg every other day    #Pancreatitis  -Likely chronic pancreatitis, Etoh induced vs mixed connective tissue disorder  -Continue Pancrelipase [id number] TID with meals    #Macrocytic Anemia  -Likely 2/2 EtOH abuse  -Continue multivitamin + Thiamine    #Urinary retention  -Due to neurologic diagnosis and limited mobility  -Discontinue Zurita and bladder scan Q4hrs    #Venous stasis dermatitis  -RLE skin changes not cellulitis at this time  -Encourage leg elevation   -Monitor for signs/symptoms of recurrent cellulitis  -Monitor off abx

## 2020-08-29 NOTE — DIETITIAN INITIAL EVALUATION ADULT. - DIET TYPE
1000ml/Recommend Initiate Ensure Pudding 4oz PO TID(Provides 510kcal & 12grams of Protein)/regular on Regular Diet w/ Thin Liquids & 1,000ml/day Fluid Restriction   Recommend Initiate Ensure Pudding 4oz PO TID(Provides 510kcal & 12grams of Protein)  Education Provided on Need for Supplementation   Patient Does Not Follow Diet @Home, Consumes 2-3Meals a Day & Does Take Vitamin/Supplements @Home (MVI)

## 2020-08-29 NOTE — CONSULT NOTE ADULT - SUBJECTIVE AND OBJECTIVE BOX
HPI:  61yo female with hx of CAD s/p possible MI (ECHO ), lung adenocarcinoma (s/p RML resection 2018), mixed connective tissue disorder (on plaquenil and methylprednisolone), left soleal vein thrombosis (on apixaban, 7/2020), RLE cellulitis (RX doxycycline), CDiff colitis (PO vancomycin 8/3- 8/17), lumbar laminectomy and chronic pain, EtOH (admissions for Etoh withdrawal), alcoholic pancreatitis, opiate abuse, presented to [facility] for acute rehab from [facility] for seizure/CVA. Pt was admitted on 8/11/20 to [facility] with symptomatic hyponatremia (Na 121) secondary to psychogenic polydipsia. She was treated with an appropriate rate of correction in serum Na. Hospital course was complicated by seizure activity which was contributed to benzo withdrawal. Seizure was controlled/broken by intermittent Ativan. Course was further complicated by unresponsiveness with seizure activity associated with neurological deficits. Pt was intubated for airway protection and was transferred to [facility] for further management. MRI revealed acute/subacute lacunar infarction within the right medial thalamus along with hypoperfused areas in bilateral frontal, temporal lobes consistent w/ post-ictal events. She was extubated, noted to be hyponatremic again which was secondary to cerebral salt wasting. She was transferred out of ICU and was considered stable for acute rehab.    Pt seen and examined at bedside. No acute events overnight  Pt denies cp, palpitations, sob, abd pain, N/V, fever, chills.    Home Medications:  alendronate 70 mg oral tablet: 1 tab(s) orally once a week (23 Aug 2020 11:47)  ALPRAZolam 0.25 mg oral tablet: 1 tab(s) orally 2 times a day, As needed, anxiety (28 Aug 2020 15:58)  fludrocortisone 0.1 mg oral tablet: 1 tab(s) orally every 12 hours (28 Aug 2020 15:58)  gabapentin 100 mg oral capsule: 2 cap(s) orally 2 times a day (28 Aug 2020 15:58)  heparin: 5000 unit(s) subcutaneous every 8 hours (28 Aug 2020 15:58)  hydroxychloroquine 200 mg oral tablet: 1 tab(s) orally once a day (28 Aug 2020 15:58)  lacosamide 200 mg oral tablet: 1 tab(s) orally 2 times a day (28 Aug 2020 15:58)  levETIRAcetam 1000 mg oral tablet: 1 tab(s) orally 2 times a day (28 Aug 2020 15:58)  loperamide 2 mg oral capsule: 1 cap(s) orally every 6 hours, As needed, Diarrhea (23 Aug 2020 11:47)  methylPREDNISolone 2 mg oral tablet: 5 tab(s) orally once a day.  TAKE ON 8/29.   THEN START 4 MG ON 8/30. (28 Aug 2020 15:58)  methylPREDNISolone 4 mg oral tablet: 1 tab(s) orally once a day.  START 4 MG ON 8/30. (28 Aug 2020 15:58)  Multiple Vitamins oral tablet: 1 tab(s) orally once a day (23 Aug 2020 11:47)  ocular lubricant ophthalmic solution: 1 drop(s) to each affected eye 3 times a day, As needed, Eye irritation (28 Aug 2020 15:58)  oxyCODONE 10 mg oral tablet, extended release: 1 tab(s) orally every 8 hours (28 Aug 2020 15:58)  pancrelipase 12,000 units-38,000 units-60,000 units oral delayed release capsule: 1 cap(s) orally 3 times a day (with meals) (23 Aug 2020 11:47)  pancrelipase 12,000 units-38,000 units-60,000 units oral delayed release capsule: 1 cap(s) orally 3 times a day (with meals) (28 Aug 2020 15:58)  pantoprazole 40 mg oral delayed release tablet: 1 tab(s) orally once a day (23 Aug 2020 11:47)  phenytoin: 130 milligram(s) orally 2 times a day (28 Aug 2020 15:58)      PAST MEDICAL & SURGICAL HISTORY:  Lung cancer  Opiate dependence  Alcohol abuse  Adenocarcinoma of lung  Mixed connective tissue disease  Depression H/O panic attack: with anxiety  S/P Laminectomy  Lumbar 3/10  Chronic pain  Diverticulitis of colon (without mention of hemorrhage)  History of laminectomy  History of lung surgery  History of oophorectomy, unilateral: bilateral  History of hip replacement, total, right: 6/7/2020  S/P lumbar laminectomy  S/P cholecystectomy  History of lung cancer: s/p wedge resection of RML      Review of Systems:   CONSTITUTIONAL: No fever, weight loss, or fatigue  EYES: No eye pain, visual disturbances, or discharge  ENMT:  No difficulty hearing, tinnitus, vertigo; No sinus or throat pain  NECK: No pain or stiffness  BREASTS: No pain, masses, or nipple discharge  RESPIRATORY: No cough, wheezing, chills or hemoptysis; No shortness of breath  CARDIOVASCULAR: No chest pain, palpitations, dizziness, or leg swelling  GASTROINTESTINAL: No abdominal or epigastric pain. No nausea, vomiting, or hematemesis; No diarrhea or constipation. No melena or hematochezia.  GENITOURINARY: No dysuria, frequency, hematuria, or incontinence  NEUROLOGICAL: No headaches, memory loss, loss of strength, numbness, or tremors  SKIN: No itching, burning, rashes, or lesions   LYMPH NODES: No enlarged glands  ENDOCRINE: No heat or cold intolerance; No hair loss  MUSCULOSKELETAL: No joint pain or swelling; No muscle, back, or extremity pain  PSYCHIATRIC: No depression, anxiety, mood swings, or difficulty sleeping  HEME/LYMPH: No easy bruising, or bleeding gums  ALLERY AND IMMUNOLOGIC: No hives or eczema    Allergies    penicillin (Other)  penicillin (Swelling)    Intolerances        Social History:   Former smoker; quit once diagnosed with Lung cancer  EtOH abuse hx; states quit a week before going to the hospital  Lives alone    FAMILY HISTORY:  FHx: rheumatoid arthritis: in mother - with RA associated lung fibrosis  Family history of leukemia: in father    MEDICATIONS  (STANDING):  fludroCORTISONE 0.1 milliGRAM(s) Oral every 12 hours  gabapentin 200 milliGRAM(s) Oral two times a day  heparin   Injectable 5000 Unit(s) SubCutaneous every 8 hours  hydroxychloroquine 200 milliGRAM(s) Oral daily  lacosamide 200 milliGRAM(s) Oral two times a day  levETIRAcetam 1000 milliGRAM(s) Oral two times a day  multivitamin 1 Tablet(s) Oral daily  oxyCODONE  ER Tablet 10 milliGRAM(s) Oral every 8 hours  pancrelipase  (CREON 12,000 Lipase Units) 1 Capsule(s) Oral three times a day with meals  pantoprazole    Tablet 40 milliGRAM(s) Oral before breakfast  phenytoin   Chewable 125 milliGRAM(s) Chew two times a day    MEDICATIONS  (PRN):  ALPRAZolam 0.25 milliGRAM(s) Oral two times a day PRN anxiety  artificial  tears Solution 1 Drop(s) Both EYES three times a day PRN Eye irritation  loperamide 2 milliGRAM(s) Oral every 6 hours PRN Diarrhea      Vital Signs Last 24 Hrs  T(C): 36.8 (28 Aug 2020 21:31), Max: 36.8 (28 Aug 2020 13:19)  T(F): 98.2 (28 Aug 2020 21:31), Max: 98.2 (28 Aug 2020 13:19)  HR: 75 (28 Aug 2020 21:31) (72 - 75)  BP: 137/73 (28 Aug 2020 21:31) (106/82 - 137/73)  BP(mean): --  RR: 15 (28 Aug 2020 21:31) (15 - 18)  SpO2: 97% (28 Aug 2020 21:31) (97% - 97%)  CAPILLARY BLOOD GLUCOSE            PHYSICAL EXAM:  GENERAL: NAD, Thin female  HEAD:  Atraumatic, Normocephalic  EYES: EOMI, PERRLA, conjunctiva and sclera clear  NECK: Supple, No JVD  CHEST/LUNG: Clear to auscultation bilaterally; No wheeze  HEART: Regular rate and rhythm; No murmurs, rubs, or gallops  ABDOMEN: Soft, Nontender, Nondistended; Bowel sounds present  EXTREMITIES:  2+ Peripheral Pulses, No clubbing, cyanosis, or edema  PSYCH: AAOx3  NEUROLOGY: non-focal  SKIN: RLE blanching erythema of foot.     LABS:                        11.5   5.36  )-----------( 343      ( 29 Aug 2020 06:00 )             35.7     08-29    135  |  99  |  6<L>  ----------------------------<  103<H>  4.0   |  27  |  0.62    Ca    9.0      29 Aug 2020 06:00  Phos  5.1     08-29  Mg     2.1     08-29    TPro  6.8  /  Alb  3.3  /  TBili  0.1<L>  /  DBili  x   /  AST  44<H>  /  ALT  53<H>  /  AlkPhos  39<L>  08-29                RADIOLOGY & ADDITIONAL TESTS:    Imaging Personally Reviewed:    Consultant(s) Notes Reviewed:      Care Discussed with Consultants/Other Providers:

## 2020-08-29 NOTE — DIETITIAN INITIAL EVALUATION ADULT. - ADD RECOMMEND
1) Monitor Weights, Intake, Tolerance, Skin & Labwork   2) Ensure Pudding 4oz PO TID 3) Education Provided on Need for Supplementation 4) Continue Nutrition Plan of Care

## 2020-08-29 NOTE — CHART NOTE - NSCHARTNOTEFT_GEN_A_CORE
Upon Nutritional Assessment by the Registered Dietitian Your Patient was Determined to Meet criteria/has Evidence of the Following Diagnosis:          [X]  Acute Severe Protein-Calorie Malnutrition          Findings as based on:  [X] Comprehensive Nutrition Assessment   [X] Nutrition Focused Physical Exam - Temporalis, Orbital Fat Pads, Buccal Fat Pads, Clavicle & Gastrocnemius(Calf) Wasting/Depletion Observed  [X] Other: Poor PO Intake 5+ Days     Nutrition Plan/Recommendations:    1) Ensure Pudding 4oz PO TID(Provides 510kcal & 12grams of Protein)  2) Education Provided on Need for Supplementation     PROVIDER Section:   By Signing This Assessment You Are Acknowledging & Agree with The Diagnosis Assigned by the Registered Dietitian    Leoncio Cook RDN

## 2020-08-30 LAB
CHOLEST SERPL-MCNC: 242 MG/DL — HIGH (ref 10–199)
HDLC SERPL-MCNC: 95 MG/DL — SIGNIFICANT CHANGE UP
LIPID PNL WITH DIRECT LDL SERPL: 124 MG/DL — HIGH
TOTAL CHOLESTEROL/HDL RATIO MEASUREMENT: 2.5 RATIO — LOW (ref 3.3–7.1)
TRIGL SERPL-MCNC: 112 MG/DL — SIGNIFICANT CHANGE UP (ref 10–149)

## 2020-08-30 PROCEDURE — 99232 SBSQ HOSP IP/OBS MODERATE 35: CPT

## 2020-08-30 RX ORDER — OXYCODONE HYDROCHLORIDE 5 MG/1
10 TABLET ORAL ONCE
Refills: 0 | Status: DISCONTINUED | OUTPATIENT
Start: 2020-08-30 | End: 2020-08-30

## 2020-08-30 RX ORDER — GABAPENTIN 400 MG/1
300 CAPSULE ORAL THREE TIMES A DAY
Refills: 0 | Status: DISCONTINUED | OUTPATIENT
Start: 2020-08-30 | End: 2020-09-02

## 2020-08-30 RX ORDER — ACETAMINOPHEN 500 MG
650 TABLET ORAL EVERY 6 HOURS
Refills: 0 | Status: DISCONTINUED | OUTPATIENT
Start: 2020-08-30 | End: 2020-09-08

## 2020-08-30 RX ORDER — HYDROXYCHLOROQUINE SULFATE 200 MG
200 TABLET ORAL
Refills: 0 | Status: DISCONTINUED | OUTPATIENT
Start: 2020-08-31 | End: 2020-09-08

## 2020-08-30 RX ORDER — LANOLIN ALCOHOL/MO/W.PET/CERES
3 CREAM (GRAM) TOPICAL AT BEDTIME
Refills: 0 | Status: DISCONTINUED | OUTPATIENT
Start: 2020-08-30 | End: 2020-09-06

## 2020-08-30 RX ADMIN — Medication 1 TABLET(S): at 12:12

## 2020-08-30 RX ADMIN — Medication 81 MILLIGRAM(S): at 12:12

## 2020-08-30 RX ADMIN — ATORVASTATIN CALCIUM 20 MILLIGRAM(S): 80 TABLET, FILM COATED ORAL at 21:36

## 2020-08-30 RX ADMIN — Medication 4 MILLIGRAM(S): at 06:07

## 2020-08-30 RX ADMIN — Medication 125 MILLIGRAM(S): at 06:07

## 2020-08-30 RX ADMIN — HEPARIN SODIUM 5000 UNIT(S): 5000 INJECTION INTRAVENOUS; SUBCUTANEOUS at 21:36

## 2020-08-30 RX ADMIN — PANTOPRAZOLE SODIUM 40 MILLIGRAM(S): 20 TABLET, DELAYED RELEASE ORAL at 06:06

## 2020-08-30 RX ADMIN — OXYCODONE HYDROCHLORIDE 10 MILLIGRAM(S): 5 TABLET ORAL at 03:55

## 2020-08-30 RX ADMIN — LEVETIRACETAM 1000 MILLIGRAM(S): 250 TABLET, FILM COATED ORAL at 06:07

## 2020-08-30 RX ADMIN — LACOSAMIDE 200 MILLIGRAM(S): 50 TABLET ORAL at 06:06

## 2020-08-30 RX ADMIN — LACOSAMIDE 200 MILLIGRAM(S): 50 TABLET ORAL at 17:24

## 2020-08-30 RX ADMIN — OXYCODONE HYDROCHLORIDE 10 MILLIGRAM(S): 5 TABLET ORAL at 06:06

## 2020-08-30 RX ADMIN — OXYCODONE HYDROCHLORIDE 10 MILLIGRAM(S): 5 TABLET ORAL at 14:03

## 2020-08-30 RX ADMIN — OXYCODONE HYDROCHLORIDE 10 MILLIGRAM(S): 5 TABLET ORAL at 22:35

## 2020-08-30 RX ADMIN — Medication 125 MILLIGRAM(S): at 17:24

## 2020-08-30 RX ADMIN — Medication 0.25 MILLIGRAM(S): at 06:06

## 2020-08-30 RX ADMIN — OXYCODONE HYDROCHLORIDE 10 MILLIGRAM(S): 5 TABLET ORAL at 21:36

## 2020-08-30 RX ADMIN — GABAPENTIN 300 MILLIGRAM(S): 400 CAPSULE ORAL at 14:00

## 2020-08-30 RX ADMIN — Medication 0.25 MILLIGRAM(S): at 17:27

## 2020-08-30 RX ADMIN — Medication 100 MILLIGRAM(S): at 12:12

## 2020-08-30 RX ADMIN — HEPARIN SODIUM 5000 UNIT(S): 5000 INJECTION INTRAVENOUS; SUBCUTANEOUS at 14:00

## 2020-08-30 RX ADMIN — Medication 1 CAPSULE(S): at 17:24

## 2020-08-30 RX ADMIN — LEVETIRACETAM 1000 MILLIGRAM(S): 250 TABLET, FILM COATED ORAL at 17:24

## 2020-08-30 RX ADMIN — FLUDROCORTISONE ACETATE 0.1 MILLIGRAM(S): 0.1 TABLET ORAL at 17:24

## 2020-08-30 RX ADMIN — FLUDROCORTISONE ACETATE 0.1 MILLIGRAM(S): 0.1 TABLET ORAL at 06:08

## 2020-08-30 RX ADMIN — HEPARIN SODIUM 5000 UNIT(S): 5000 INJECTION INTRAVENOUS; SUBCUTANEOUS at 06:07

## 2020-08-30 RX ADMIN — Medication 200 MILLIGRAM(S): at 12:14

## 2020-08-30 RX ADMIN — GABAPENTIN 300 MILLIGRAM(S): 400 CAPSULE ORAL at 21:36

## 2020-08-30 RX ADMIN — Medication 1 CAPSULE(S): at 08:07

## 2020-08-30 RX ADMIN — OXYCODONE HYDROCHLORIDE 10 MILLIGRAM(S): 5 TABLET ORAL at 06:39

## 2020-08-30 RX ADMIN — GABAPENTIN 200 MILLIGRAM(S): 400 CAPSULE ORAL at 06:08

## 2020-08-30 RX ADMIN — Medication 1 CAPSULE(S): at 12:12

## 2020-08-30 RX ADMIN — OXYCODONE HYDROCHLORIDE 10 MILLIGRAM(S): 5 TABLET ORAL at 04:39

## 2020-08-30 NOTE — PROVIDER CONTACT NOTE (OTHER) - BACKGROUND
Pt admitted dx of hyponatremia, generalized seizures and hypoxic respiratory failure, right thalamic CVA.

## 2020-08-30 NOTE — PROGRESS NOTE ADULT - SUBJECTIVE AND OBJECTIVE BOX
Preet Quezada M.D. Pager Number [phone]    Patient is a 60y old  Female who presents with a chief complaint of hyponatremia, generalized seizures and hypoxic respiratory failure, right thalamic CVA  01.1 (29 Aug 2020 09:34)      SUBJECTIVE / OVERNIGHT EVENTS:  Pt seen and examined at bedside. No acute events overnight.  Pt denies cp, palpitations, sob, abd pain, N/V, fever, chills.    ROS:  All other review of systems negative    Allergies    penicillin (Other)  penicillin (Swelling)    Intolerances        MEDICATIONS  (STANDING):  aspirin  chewable 81 milliGRAM(s) Oral daily  atorvastatin 20 milliGRAM(s) Oral at bedtime  fludroCORTISONE 0.1 milliGRAM(s) Oral every 12 hours  gabapentin 200 milliGRAM(s) Oral three times a day  heparin   Injectable 5000 Unit(s) SubCutaneous every 8 hours  hydroxychloroquine 200 milliGRAM(s) Oral daily  lacosamide 200 milliGRAM(s) Oral two times a day  levETIRAcetam 1000 milliGRAM(s) Oral two times a day  methylPREDNISolone 4 milliGRAM(s) Oral daily  multivitamin 1 Tablet(s) Oral daily  oxyCODONE  ER Tablet 10 milliGRAM(s) Oral every 8 hours  pancrelipase  (CREON 12,000 Lipase Units) 1 Capsule(s) Oral three times a day with meals  pantoprazole    Tablet 40 milliGRAM(s) Oral before breakfast  phenytoin   Chewable 125 milliGRAM(s) Chew two times a day  thiamine 100 milliGRAM(s) Oral daily    MEDICATIONS  (PRN):  ALPRAZolam 0.25 milliGRAM(s) Oral two times a day PRN anxiety  artificial  tears Solution 1 Drop(s) Both EYES three times a day PRN Eye irritation  loperamide 2 milliGRAM(s) Oral every 6 hours PRN Diarrhea      Vital Signs Last 24 Hrs  T(C): 36.7 (29 Aug 2020 21:40), Max: 36.8 (29 Aug 2020 18:50)  T(F): 98 (29 Aug 2020 21:40), Max: 98.3 (29 Aug 2020 18:50)  HR: 72 (29 Aug 2020 21:40) (72 - 80)  BP: 101/67 (29 Aug 2020 21:40) (96/58 - 101/67)  BP(mean): --  RR: 15 (29 Aug 2020 21:40) (15 - 15)  SpO2: 97% (29 Aug 2020 21:40) (96% - 97%)  CAPILLARY BLOOD GLUCOSE        I&O's Summary    29 Aug 2020 07:01  -  30 Aug 2020 07:00  --------------------------------------------------------  IN: 0 mL / OUT: 302 mL / NET: -302 mL        PHYSICAL EXAM:  GENERAL: NAD, Thin female  CHEST/LUNG: Clear to auscultation bilaterally; No wheeze  HEART: Regular rate and rhythm; No murmurs, rubs, or gallops  ABDOMEN: Soft, Nontender, Nondistended; Bowel sounds present  EXTREMITIES:  2+ Peripheral Pulses, No clubbing, cyanosis, or edema  PSYCH: AAOx3  NEUROLOGY: non-focal    LABS:                        11.5   5.36  )-----------( 343      ( 29 Aug 2020 06:00 )             35.7     08-29    135  |  99  |  6<L>  ----------------------------<  103<H>  4.0   |  27  |  0.62    Ca    9.0      29 Aug 2020 06:00  Phos  5.1     08-29  Mg     2.1     08-29    TPro  6.8  /  Alb  3.3  /  TBili  0.1<L>  /  DBili  x   /  AST  44<H>  /  ALT  53<H>  /  AlkPhos  39<L>  08-29              RADIOLOGY & ADDITIONAL TESTS:  Results Reviewed:   Imaging Personally Reviewed:  Electrocardiogram Personally Reviewed:    COORDINATION OF CARE:  Care Discussed with Consultants/Other Providers [Y/N]:  Prior or Outpatient Records Reviewed [Y/N]:

## 2020-08-30 NOTE — PROGRESS NOTE ADULT - SUBJECTIVE AND OBJECTIVE BOX
patient reports pain in right foot last night, burning, relived with pain meds, elevation on pillow       REVIEW OF SYSTEMS  Constitutional - No fever,  No fatigue  Neurological - No headaches, No loss of strength  Musculoskeletal - No joint pain, No joint swelling, No muscle pain    VITALS  T(C): 36.7 (08-29-20 @ 21:40), Max: 36.8 (08-29-20 @ 18:50)  HR: 72 (08-29-20 @ 21:40) (72 - 80)  BP: 101/67 (08-29-20 @ 21:40) (96/58 - 101/67)  RR: 15 (08-29-20 @ 21:40) (15 - 15)  SpO2: 97% (08-29-20 @ 21:40) (96% - 97%)  Wt(kg): --       MEDICATIONS   ALPRAZolam 0.25 milliGRAM(s) two times a day PRN  artificial  tears Solution 1 Drop(s) three times a day PRN  aspirin  chewable 81 milliGRAM(s) daily  atorvastatin 20 milliGRAM(s) at bedtime  fludroCORTISONE 0.1 milliGRAM(s) every 12 hours  gabapentin 200 milliGRAM(s) three times a day  heparin   Injectable 5000 Unit(s) every 8 hours  hydroxychloroquine 200 milliGRAM(s) daily  lacosamide 200 milliGRAM(s) two times a day  levETIRAcetam 1000 milliGRAM(s) two times a day  loperamide 2 milliGRAM(s) every 6 hours PRN  methylPREDNISolone 4 milliGRAM(s) daily  multivitamin 1 Tablet(s) daily  oxyCODONE  ER Tablet 10 milliGRAM(s) every 8 hours  pancrelipase  (CREON 12,000 Lipase Units) 1 Capsule(s) three times a day with meals  pantoprazole    Tablet 40 milliGRAM(s) before breakfast  phenytoin   Chewable 125 milliGRAM(s) two times a day  thiamine 100 milliGRAM(s) daily      RECENT LABS/IMAGING                        11.5   5.36  )-----------( 343      ( 29 Aug 2020 06:00 )             35.7     08-29    135  |  99  |  6<L>  ----------------------------<  103<H>  4.0   |  27  |  0.62    Ca    9.0      29 Aug 2020 06:00  Phos  5.1     08-29  Mg     2.1     08-29    TPro  6.8  /  Alb  3.3  /  TBili  0.1<L>  /  DBili  x   /  AST  44<H>  /  ALT  53<H>  /  AlkPhos  39<L>  08-29      Physical Exam  	Gen - NAD, Comfortable, sitting at side of bed, eating breakfast   	Neck - Supple  	Pulm - breathing comfortably   	Cardiovascular - well perfused   	Abdomen - Soft  	Extremities - No edema  	Neuro-  	   Cognitive - AAOx3  	   Communication - Fluent, No dysarthria  	    Motor -5/5  	   Sensory - Intact to LT  	   Reflexes - DTR Intact, No primitive reflex  	   Coordination - FTN intact                mood normal     Assessment/plan  61 y/o F with PMH CAD, lung adenocarcinoma (s/p RML resection 2018), mixed connective tissue disorder (on plaquenil and methylprednisolone), left soleal vein thrombosis (on apixaban, 7/2020), RLE cellulitis (RX doxycycline), CDiff colitis (PO vancomycin 8/3- 8/17), lumbar laminectomy and chronic pain, EtOH (admissions for Etoh withdrawal), alcoholic pancreatitis, opiate abuse, admitted on 8/11/20 to [facility] with symptomatic hyponatremia complicated by generalized seizures, hypoxic respiratory failure, subsequent right medial thalamic infarct and cerebral salt-wasting.    admitted to acute inpatient rehabilitation, for functional mobility, transfers and ADLs  Keppra 1g BID, Vimpat 200mg BID, Fosphenytoin 140mg BID  Florinef 0.1mg BID for hypo Na  medrol taper/plaquenil  heparin for dvt prophylaxis  Pain: oxycodone 10 mg q8 hours, gabapentin  add melatonin for sleep, soft heel protectors   continue full program

## 2020-08-30 NOTE — CHART NOTE - NSCHARTNOTEFT_GEN_A_CORE
called to bedside by patient's bedside RN as she is c/o pain in her lower back  -on arrival found patient lying in bed, calm, but c/o 7/10 pain which woke her from her sleep.    NeuroA&Ox3  CV: + S1/S2, RRR. no mrg  RESP: CTA b/l  GI: Soft non tender, active BS  MSK: Lower back pain, 7/10    Will give an extra dose of oxycodone IR 10 mg and reassess efficacy of pharmacological treatment. called to bedside by patient's bedside RN as she is c/o pain in her lower back  -on arrival found patient lying in bed, calm, but c/o 7/10 pain which woke her from her sleep.    NeuroA&Ox3  CV: + S1/S2, RRR. no mrg  RESP: CTA b/l  GI: Soft non tender, active BS  MSK: Lower back pain, 7/10, breakthrough    Breakthrough pain; pt with chronic pain syndrome    Will give an extra dose of oxycodone IR 10 mg and reassess efficacy of pharmacological treatment.

## 2020-08-30 NOTE — PROGRESS NOTE ADULT - REASON FOR ADMISSION
hyponatremia, generalized seizures and hypoxic respiratory failure, right thalamic CVA  01.1 hyponatremia, generalized seizures and hypoxic respiratory failure, right thalamic CVA  01.4

## 2020-08-30 NOTE — PROGRESS NOTE ADULT - ASSESSMENT
59yo female with hx of CAD s/p possible MI (ECHO ), lung adenocarcinoma (s/p RML resection 2018), mixed connective tissue disorder (on plaquenil and methylprednisolone), left soleal vein thrombosis (on apixaban, 7/2020), RLE cellulitis (RX doxycycline), CDiff colitis (PO vancomycin 8/3- 8/17), lumbar laminectomy and chronic pain, EtOH (admissions for Etoh withdrawal), alcoholic pancreatitis, opiate abuse, presented to [facility] for acute rehab from [facility] for seizure/CVA    #Debility  -Comprehensive Multidisciplinary Rehab Program  -Continue comprehensive rehab program, 3 hours a day, 5 days a week.    #Seizures  -Continue Keppra 1g BID, Vimpat 200mg BID, Fosphenytoin 140mg BID  -neurological checks  -Follow up with Epilepsy Dr. Gallegos as outpatient    #CVA  -MRI revealed acute/subacute lacunar infarction within the right medial thalamus along with hypoperfused areas in bilateral frontal, temporal lobes consistent w/ post-ictal events  -Continue ASA 81mg daily  -Follow up with Lipid panel  -Continue Atorvastatin 20mg daily    #Hyponatremia  -Secondary to cerebral salt wasting  -Continue 1L fluid restriction  -Continue Florinef 0.1mg BID  -F/u with nephrology outpatient in 2 weeks (~9/11/20)    #Mixed Connective Tissue Disorder  -Continue Medrol 4mg PO daily (S/P Medrol taper)  -Transition Medrol to home dose of Prednisone  -Continue Plaquenil 200mg every other day    #Pancreatitis  -Likely chronic pancreatitis, Etoh induced vs mixed connective tissue disorder  -Continue Pancrelipase [id number] TID with meals    #Macrocytic Anemia  -Likely 2/2 EtOH abuse  -Continue multivitamin + Thiamine    #Urinary retention  -Resolved  -Due to neurologic diagnosis and limited mobility  -Voiding by herself    #Venous stasis dermatitis  -Resolves with leg elevation  -RLE skin changes not cellulitis at this time  -Encourage leg elevation   -Monitor for signs/symptoms of recurrent cellulitis  -Monitor off abx

## 2020-08-31 LAB
ANION GAP SERPL CALC-SCNC: 9 MMOL/L — SIGNIFICANT CHANGE UP (ref 5–17)
BUN SERPL-MCNC: 9 MG/DL — SIGNIFICANT CHANGE UP (ref 7–23)
CALCIUM SERPL-MCNC: 9.3 MG/DL — SIGNIFICANT CHANGE UP (ref 8.4–10.5)
CHLORIDE SERPL-SCNC: 99 MMOL/L — SIGNIFICANT CHANGE UP (ref 96–108)
CO2 SERPL-SCNC: 27 MMOL/L — SIGNIFICANT CHANGE UP (ref 22–31)
CREAT SERPL-MCNC: 0.51 MG/DL — SIGNIFICANT CHANGE UP (ref 0.5–1.3)
GLUCOSE SERPL-MCNC: 93 MG/DL — SIGNIFICANT CHANGE UP (ref 70–99)
HCT VFR BLD CALC: 37.8 % — SIGNIFICANT CHANGE UP (ref 34.5–45)
HGB BLD-MCNC: 12.5 G/DL — SIGNIFICANT CHANGE UP (ref 11.5–15.5)
MCHC RBC-ENTMCNC: 33.1 GM/DL — SIGNIFICANT CHANGE UP (ref 32–36)
MCHC RBC-ENTMCNC: 34.9 PG — HIGH (ref 27–34)
MCV RBC AUTO: 105.6 FL — HIGH (ref 80–100)
NRBC # BLD: 0 /100 WBCS — SIGNIFICANT CHANGE UP (ref 0–0)
PARANEOPLASTIC AB PNL SER: SIGNIFICANT CHANGE UP
PLATELET # BLD AUTO: 332 K/UL — SIGNIFICANT CHANGE UP (ref 150–400)
POTASSIUM SERPL-MCNC: 4.4 MMOL/L — SIGNIFICANT CHANGE UP (ref 3.5–5.3)
POTASSIUM SERPL-SCNC: 4.4 MMOL/L — SIGNIFICANT CHANGE UP (ref 3.5–5.3)
RBC # BLD: 3.58 M/UL — LOW (ref 3.8–5.2)
RBC # FLD: 13.2 % — SIGNIFICANT CHANGE UP (ref 10.3–14.5)
SODIUM SERPL-SCNC: 135 MMOL/L — SIGNIFICANT CHANGE UP (ref 135–145)
WBC # BLD: 4.93 K/UL — SIGNIFICANT CHANGE UP (ref 3.8–10.5)
WBC # FLD AUTO: 4.93 K/UL — SIGNIFICANT CHANGE UP (ref 3.8–10.5)

## 2020-08-31 PROCEDURE — 99232 SBSQ HOSP IP/OBS MODERATE 35: CPT

## 2020-08-31 PROCEDURE — 99232 SBSQ HOSP IP/OBS MODERATE 35: CPT | Mod: GC

## 2020-08-31 RX ADMIN — FLUDROCORTISONE ACETATE 0.1 MILLIGRAM(S): 0.1 TABLET ORAL at 05:25

## 2020-08-31 RX ADMIN — OXYCODONE HYDROCHLORIDE 10 MILLIGRAM(S): 5 TABLET ORAL at 20:17

## 2020-08-31 RX ADMIN — OXYCODONE HYDROCHLORIDE 10 MILLIGRAM(S): 5 TABLET ORAL at 05:23

## 2020-08-31 RX ADMIN — LEVETIRACETAM 1000 MILLIGRAM(S): 250 TABLET, FILM COATED ORAL at 05:25

## 2020-08-31 RX ADMIN — FLUDROCORTISONE ACETATE 0.1 MILLIGRAM(S): 0.1 TABLET ORAL at 17:29

## 2020-08-31 RX ADMIN — Medication 0.25 MILLIGRAM(S): at 17:51

## 2020-08-31 RX ADMIN — Medication 1 TABLET(S): at 12:29

## 2020-08-31 RX ADMIN — Medication 1 CAPSULE(S): at 12:29

## 2020-08-31 RX ADMIN — Medication 1 DROP(S): at 17:24

## 2020-08-31 RX ADMIN — Medication 125 MILLIGRAM(S): at 05:24

## 2020-08-31 RX ADMIN — Medication 4 MILLIGRAM(S): at 05:25

## 2020-08-31 RX ADMIN — HEPARIN SODIUM 5000 UNIT(S): 5000 INJECTION INTRAVENOUS; SUBCUTANEOUS at 05:24

## 2020-08-31 RX ADMIN — Medication 81 MILLIGRAM(S): at 12:30

## 2020-08-31 RX ADMIN — HEPARIN SODIUM 5000 UNIT(S): 5000 INJECTION INTRAVENOUS; SUBCUTANEOUS at 13:28

## 2020-08-31 RX ADMIN — LACOSAMIDE 200 MILLIGRAM(S): 50 TABLET ORAL at 17:21

## 2020-08-31 RX ADMIN — Medication 200 MILLIGRAM(S): at 09:50

## 2020-08-31 RX ADMIN — GABAPENTIN 300 MILLIGRAM(S): 400 CAPSULE ORAL at 05:25

## 2020-08-31 RX ADMIN — Medication 650 MILLIGRAM(S): at 01:19

## 2020-08-31 RX ADMIN — Medication 100 MILLIGRAM(S): at 13:28

## 2020-08-31 RX ADMIN — OXYCODONE HYDROCHLORIDE 10 MILLIGRAM(S): 5 TABLET ORAL at 06:20

## 2020-08-31 RX ADMIN — OXYCODONE HYDROCHLORIDE 10 MILLIGRAM(S): 5 TABLET ORAL at 13:23

## 2020-08-31 RX ADMIN — GABAPENTIN 300 MILLIGRAM(S): 400 CAPSULE ORAL at 20:18

## 2020-08-31 RX ADMIN — Medication 125 MILLIGRAM(S): at 17:22

## 2020-08-31 RX ADMIN — ATORVASTATIN CALCIUM 20 MILLIGRAM(S): 80 TABLET, FILM COATED ORAL at 20:18

## 2020-08-31 RX ADMIN — LACOSAMIDE 200 MILLIGRAM(S): 50 TABLET ORAL at 05:27

## 2020-08-31 RX ADMIN — Medication 1 CAPSULE(S): at 09:50

## 2020-08-31 RX ADMIN — Medication 1 CAPSULE(S): at 17:21

## 2020-08-31 RX ADMIN — OXYCODONE HYDROCHLORIDE 10 MILLIGRAM(S): 5 TABLET ORAL at 14:00

## 2020-08-31 RX ADMIN — Medication 650 MILLIGRAM(S): at 02:18

## 2020-08-31 RX ADMIN — HEPARIN SODIUM 5000 UNIT(S): 5000 INJECTION INTRAVENOUS; SUBCUTANEOUS at 20:18

## 2020-08-31 RX ADMIN — LEVETIRACETAM 1000 MILLIGRAM(S): 250 TABLET, FILM COATED ORAL at 17:21

## 2020-08-31 RX ADMIN — Medication 0.25 MILLIGRAM(S): at 05:23

## 2020-08-31 RX ADMIN — OXYCODONE HYDROCHLORIDE 10 MILLIGRAM(S): 5 TABLET ORAL at 21:20

## 2020-08-31 RX ADMIN — GABAPENTIN 300 MILLIGRAM(S): 400 CAPSULE ORAL at 13:28

## 2020-08-31 RX ADMIN — PANTOPRAZOLE SODIUM 40 MILLIGRAM(S): 20 TABLET, DELAYED RELEASE ORAL at 05:28

## 2020-08-31 NOTE — PROGRESS NOTE ADULT - SUBJECTIVE AND OBJECTIVE BOX
Patient is a 60y old  Female who presents with a chief complaint of hyponatremia, generalized seizures and hypoxic respiratory failure, right thalamic CVA  01.4 (30 Aug 2020 10:30)      Patient seen and examined at bedside.  Patient reports right leg pain, worse at night.      ALLERGIES:  penicillin (Other)  penicillin (Swelling)    MEDICATIONS:  acetaminophen   Tablet .. 650 milliGRAM(s) Oral every 6 hours PRN  artificial tears (preservative free) Ophthalmic Solution 1 Drop(s) Both EYES two times a day  aspirin  chewable 81 milliGRAM(s) Oral daily  atorvastatin 20 milliGRAM(s) Oral at bedtime  gabapentin 300 milliGRAM(s) Oral three times a day  hydroxychloroquine 200 milliGRAM(s) Oral <User Schedule>  melatonin 3 milliGRAM(s) Oral at bedtime PRN  phenytoin   Chewable 125 milliGRAM(s) Chew two times a day  thiamine 100 milliGRAM(s) Oral daily    Vital Signs Last 24 Hrs  T(F): 98.3 (30 Aug 2020 18:14), Max: 98.3 (30 Aug 2020 18:14)  HR: 78 (30 Aug 2020 18:14) (78 - 78)  BP: 101/64 (30 Aug 2020 18:14) (101/64 - 101/64)  RR: 15 (30 Aug 2020 18:14) (15 - 15)  SpO2: 99% (30 Aug 2020 18:14) (99% - 99%)  I&O's Summary    30 Aug 2020 07:01  -  31 Aug 2020 07:00  --------------------------------------------------------  IN: 600 mL / OUT: 0 mL / NET: 600 mL    31 Aug 2020 07:01  -  31 Aug 2020 15:30  --------------------------------------------------------  IN: 360 mL / OUT: 0 mL / NET: 360 mL        PHYSICAL EXAM:  General: NAD, A/O x 3  ENT: MMM  Neck: Supple, No JVD  Lungs: Clear to auscultation bilaterally  Cardio: RRR, S1/S2, No murmurs  Abdomen: Soft, Nontender, Nondistended; Bowel sounds present  Extremities: No cyanosis, +2 edema right leg with +2 pedial pulse, erythema is dependent o    LABS:                        12.5   4.93  )-----------( 332      ( 31 Aug 2020 06:22 )             37.8     08-31    135  |  99  |  9   ----------------------------<  93  4.4   |  27  |  0.51    Ca    9.3      31 Aug 2020 06:22  Phos  5.1     08-29  Mg     2.1     08-29    TPro  6.8  /  Alb  3.3  /  TBili  0.1  /  DBili  x   /  AST  44  /  ALT  53  /  AlkPhos  39  08-29    eGFR if Non African American: 105 mL/min/1.73M2 (08-31-20 @ 06:22)  eGFR if African American: 121 mL/min/1.73M2 (08-31-20 @ 06:22)            08-30 Chol 242 mg/dL  mg/dL HDL 95 mg/dL Trig 112 mg/dL                        RADIOLOGY & ADDITIONAL TESTS:    Care Discussed with Consultants/Other Providers: Patient is a 60y old  Female who presents with a chief complaint of hyponatremia, generalized seizures and hypoxic respiratory failure, right thalamic CVA  01.4 (30 Aug 2020 10:30)      Patient seen and examined at bedside.  Patient reports right leg pain, worse at night.      ALLERGIES:  penicillin (Other)  penicillin (Swelling)    MEDICATIONS:  acetaminophen   Tablet .. 650 milliGRAM(s) Oral every 6 hours PRN  artificial tears (preservative free) Ophthalmic Solution 1 Drop(s) Both EYES two times a day  aspirin  chewable 81 milliGRAM(s) Oral daily  atorvastatin 20 milliGRAM(s) Oral at bedtime  gabapentin 300 milliGRAM(s) Oral three times a day  hydroxychloroquine 200 milliGRAM(s) Oral <User Schedule>  melatonin 3 milliGRAM(s) Oral at bedtime PRN  phenytoin   Chewable 125 milliGRAM(s) Chew two times a day  thiamine 100 milliGRAM(s) Oral daily    Vital Signs Last 24 Hrs  T(F): 98.3 (30 Aug 2020 18:14), Max: 98.3 (30 Aug 2020 18:14)  HR: 78 (30 Aug 2020 18:14) (78 - 78)  BP: 101/64 (30 Aug 2020 18:14) (101/64 - 101/64)  RR: 15 (30 Aug 2020 18:14) (15 - 15)  SpO2: 99% (30 Aug 2020 18:14) (99% - 99%)  I&O's Summary    30 Aug 2020 07:01  -  31 Aug 2020 07:00  --------------------------------------------------------  IN: 600 mL / OUT: 0 mL / NET: 600 mL    31 Aug 2020 07:01  -  31 Aug 2020 15:30  --------------------------------------------------------  IN: 360 mL / OUT: 0 mL / NET: 360 mL        PHYSICAL EXAM:  General: NAD, A/O x 3  ENT: MMM  Neck: Supple, No JVD  Lungs: Clear to auscultation bilaterally  Cardio: RRR, S1/S2, No murmurs  Abdomen: Soft, Nontender, Nondistended; Bowel sounds present  Extremities: No cyanosis, +2 edema right leg with +2 pedial pulse, erythema is dependent on the leg being low    LABS:                        12.5   4.93  )-----------( 332      ( 31 Aug 2020 06:22 )             37.8     08-31    135  |  99  |  9   ----------------------------<  93  4.4   |  27  |  0.51    Ca    9.3      31 Aug 2020 06:22  Phos  5.1     08-29  Mg     2.1     08-29    TPro  6.8  /  Alb  3.3  /  TBili  0.1  /  DBili  x   /  AST  44  /  ALT  53  /  AlkPhos  39  08-29    eGFR if Non African American: 105 mL/min/1.73M2 (08-31-20 @ 06:22)  eGFR if African American: 121 mL/min/1.73M2 (08-31-20 @ 06:22)            08-30 Chol 242 mg/dL  mg/dL HDL 95 mg/dL Trig 112 mg/dL                        RADIOLOGY & ADDITIONAL TESTS:  < from: [facility] Duplex Lower Ext Vein Scan, Bilat (08.18.20 @ 15:49) >    IMPRESSION:    Resolved left soleal vein DVT.    No evidence of deep venous thrombosis in either lower extremity.    < end of copied text >    Care Discussed with Consultants/Other Providers:

## 2020-08-31 NOTE — PROGRESS NOTE ADULT - SUBJECTIVE AND OBJECTIVE BOX
Patient is a 60y old  Female who presents with a chief complaint of hyponatremia, generalized seizures and hypoxic respiratory failure, right thalamic CVA  01.4 (31 Aug 2020 15:30)      HPI:  Patient is a 59 y/o F with PMH CAD s/p possible MI (ECHO ), lung adenocarcinoma (s/p RML resection ), mixed connective tissue disorder (on plaquenil and methylprednisolone), left soleal vein thrombosis (on apixaban, 2020), RLE cellulitis (RX doxycycline), CDiff colitis (PO vancomycin 8/3- ), lumbar laminectomy and chronic pain, EtOH (admissions for Etoh withdrawal), alcoholic pancreatitis, opiate abuse, admitted on 20 to [facility] with symptomatic hyponatremia (Na 121) secondary to psychogenic polydipsia. She was treated with an appropriate rate of correction in serum Na.    Patient was lAter noted to have focal seizure activity of LUE on  which broke with IV lorazepam; seizure activity believed to be secondary to benzodiazepine withdrawal. EEG  showed intermittent sharp and polyspike wave discharges with generalized slowing with patient at risk for seizures, and patient started on Keppra although she continued to have periodic seizure activity. A code stroke was called 8/15/20 for right facial droop. CT head unremarkable for acute pathology. 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     On , RRT called because patient found to be unresponsive with generalized seizure activity and left UE/LE/facial twitching. Seizure activity would break transiently with IV lorazepam but recurred repetitively. After 8mg lorazepam, she began to lose airway protection abilities and developed hypoxemia (SpO2 70s). She was emergently intubated and given keppra load 1000mg, vimpat load 200 mg and propofol 10 mcg/kg/min infusion, which successfully suppressed her seizures. Found to have new fever of 101.6 F and was hypotensive post-intubation, started on levophed drip.      She was transferred to [facility] for EEG 20, which showed epileptic focus in right frontal region. Weaned off pressors 20. MRI was performed which revealed acute/subacute lacunar infarction within the right medial thalamus along with hypoperfused areas in bilateral frontal, temporal lobes consistent w/ post-ictal events. LP on 20 negative for CNS infection. Extubated 20. She was found to have hyponatremia post-extubation, likely signifying cerebral salt wasting (high urine output, low BP, and high urine sodium and hypovolemia). She was started on 2% saline drip and given 1 dose of Florinef. Once sodium was stabilized, 2% saline was stopped. Antibiotics were stopped as she showed no signs of infection. MRI w/wo contrast was performed. She was downgraded from ICU 20.     Patient was transferred to [facility] 20. (28 Aug 2020 15:02)    SUBJECTIVE/INTERVAL EVENTS  Patient seen and assessed at therapy. No acute events overnight. This AM, patient felt well, but c/o pain in her right foot (stable from admission). She felt good after walking with therapy.     Patient has been voiding well with RN.      PAST MEDICAL & SURGICAL HISTORY:  Lung cancer  Opiate dependence  Alcohol abuse  Adenocarcinoma of lung  Mixed connective tissue disease  Depression H/O panic attack: with anxiety  S/P Laminectomy  Lumbar 3/10  Chronic pain  Diverticulitis of colon (without mention of hemorrhage)  History of laminectomy  History of lung surgery  History of oophorectomy, unilateral: bilateral  History of hip replacement, total, right: 2020  S/P lumbar laminectomy  S/P cholecystectomy  History of lung cancer: s/p wedge resection of RML      Allergies    penicillin (Other)  penicillin (Swelling)    Intolerances          VITALS  60y  Vital Signs Last 24 Hrs  T(C): 36.8 (30 Aug 2020 18:14), Max: 36.8 (30 Aug 2020 18:14)  T(F): 98.3 (30 Aug 2020 18:14), Max: 98.3 (30 Aug 2020 18:14)  HR: 78 (30 Aug 2020 18:14) (78 - 78)  BP: 101/64 (30 Aug 2020 18:14) (101/64 - 101/64)  BP(mean): --  RR: 15 (30 Aug 2020 18:14) (15 - 15)  SpO2: 99% (30 Aug 2020 18:14) (99% - 99%)  Daily     Daily Weight in k.4 (31 Aug 2020 04:00)    PHYSICAL EXAM  Gen - NAD, Comfortable, sitting at side of bed, eating breakfast   Neck - Supple  Pulm - breathing comfortably   Cardiovascular - well perfused   Abdomen - Soft  Extremities - No edema  Musculoskeletal - generalized muscle weakness 5-/5      RECENT LABS:                          12.5   4.93  )-----------( 332      ( 31 Aug 2020 06:22 )             37.8         135  |  99  |  9   ----------------------------<  93  4.4   |  27  |  0.51    Ca    9.3      31 Aug 2020 06:22        MEDICATIONS  (STANDING):  artificial tears (preservative free) Ophthalmic Solution 1 Drop(s) Both EYES two times a day  aspirin  chewable 81 milliGRAM(s) Oral daily  atorvastatin 20 milliGRAM(s) Oral at bedtime  fludroCORTISONE 0.1 milliGRAM(s) Oral every 12 hours  gabapentin 300 milliGRAM(s) Oral three times a day  heparin   Injectable 5000 Unit(s) SubCutaneous every 8 hours  hydroxychloroquine 200 milliGRAM(s) Oral <User Schedule>  lacosamide 200 milliGRAM(s) Oral two times a day  levETIRAcetam 1000 milliGRAM(s) Oral two times a day  methylPREDNISolone 4 milliGRAM(s) Oral daily  multivitamin 1 Tablet(s) Oral daily  oxyCODONE  ER Tablet 10 milliGRAM(s) Oral every 8 hours  pancrelipase  (CREON 12,000 Lipase Units) 1 Capsule(s) Oral three times a day with meals  pantoprazole    Tablet 40 milliGRAM(s) Oral before breakfast  phenytoin   Chewable 125 milliGRAM(s) Chew two times a day  thiamine 100 milliGRAM(s) Oral daily    MEDICATIONS  (PRN):  acetaminophen   Tablet .. 650 milliGRAM(s) Oral every 6 hours PRN Temp greater or equal to 38C (100.4F), Mild Pain (1 - 3)  ALPRAZolam 0.25 milliGRAM(s) Oral two times a day PRN anxiety  artificial  tears Solution 1 Drop(s) Both EYES three times a day PRN Eye irritation  loperamide 2 milliGRAM(s) Oral every 6 hours PRN Diarrhea  melatonin 3 milliGRAM(s) Oral at bedtime PRN Insomnia

## 2020-08-31 NOTE — PROGRESS NOTE ADULT - ASSESSMENT
59yo female with hx of CAD s/p possible MI (ECHO ), lung adenocarcinoma (s/p RML resection 2018), mixed connective tissue disorder (on plaquenil and methylprednisolone), left soleal vein thrombosis (on apixaban, 7/2020), RLE cellulitis (RX doxycycline), CDiff colitis (PO vancomycin 8/3- 8/17), lumbar laminectomy and chronic pain, EtOH (admissions for Etoh withdrawal), alcoholic pancreatitis, opiate abuse, presented to [facility] for acute rehab from [facility] for seizure/CVA    #Debility  -Comprehensive Multidisciplinary Rehab Program  -Continue comprehensive rehab program, 3 hours a day, 5 days a week.    #Seizures  -Continue Keppra 1g BID, Vimpat 200mg BID, Fosphenytoin 140mg BID  -neurological checks  -Follow up with Epilepsy Dr. Gallegos as outpatient    #CVA  -MRI revealed acute/subacute lacunar infarction within the right medial thalamus along with hypoperfused areas in bilateral frontal, temporal lobes consistent w/ post-ictal events  -Continue ASA 81mg daily  -Follow up with Lipid panel  -Continue Atorvastatin 20mg daily    #Hyponatremia  -Secondary to cerebral salt wasting  -Continue 1L fluid restriction  -Continue Florinef 0.1mg BID  -F/u with nephrology outpatient in 2 weeks (~9/11/20)    #Mixed Connective Tissue Disorder  -Continue Medrol 4mg PO daily (S/P Medrol taper)  -Transition Medrol to home dose of Prednisone  -Continue Plaquenil 200mg every other day    #Pancreatitis  -Likely chronic pancreatitis, Etoh induced vs mixed connective tissue disorder  -Continue Pancrelipase [id number] TID with meals    #Macrocytic Anemia  -Likely 2/2 EtOH abuse  -Continue multivitamin + Thiamine    #Urinary retention  -Resolved  -Due to neurologic diagnosis and limited mobility  -Voiding by herself    #Venous stasis dermatitis  -Resolves with leg elevation  -RLE skin changes not cellulitis at this time  -Encourage leg elevation   -started compression therapy 8/31, monitor for results

## 2020-08-31 NOTE — PROGRESS NOTE ADULT - ASSESSMENT
Patient is a 61 y/o F with PMH CAD s/p possible MI (ECHO ), lung adenocarcinoma (s/p RML resection 2018), mixed connective tissue disorder (on plaquenil and methylprednisolone), left soleal vein thrombosis (on apixaban, 7/2020), RLE cellulitis (RX doxycycline), CDiff colitis (PO vancomycin 8/3- 8/17), lumbar laminectomy and chronic pain, EtOH (admissions for Etoh withdrawal), alcoholic pancreatitis, opiate abuse, admitted on 8/11/20 to [facility] with symptomatic hyponatremia complicated by generalized seizures, hypoxic respiratory failure, subsequent right medial thalamic infarct and cerebral salt-wasting.     Now admitted to [facility] after for initiation of a multidisciplinary rehab program consisting focused on functional mobility, transfers and ADLs (activities of daily living).    Comprehensive Multidisciplinary Rehab Program:  - Start comprehensive rehab program, 3 hours a day, 5 days a week.  - PT 2 hr/day: Focused on improving strength, endurance, coordination, balance, functional mobility, and transfers  - OT 1 hr/day: Focused on improving strength, fine motor skills, coordination, posture and ADLs.    - neuropsychology and SW consults for mood, substance abuse history and referral  - Activity Limitations: Decreased social, vocational and leisure activities, decreased self care and ADLs, decreased mobility, decreased ability to manage household and finances.   - Precautions: falls, seizures, respiratory, spinal, substance abuse including opiates, long-term steroid    #Seizures  - Low suspicion for EtOH withdrawal seizures given >3 weeks abstinence prior to admission and autoimmune encephalitis given her improvement  - Continue Keppra 1g BID, Vimpat 200mg BID, Fosphenytoin 140mg BID  - neurological checks    #Hyponatremia  - Likely due to cerebral salt wasting per Dr. Simms, nephrologist  - Continue 1L fluid restriction  - Continue Florinef 0.1mg BID  - F/u with nephrology outpatient in 2 weeks (~9/11/20)  - hospitalist follow up, monitor BMP    #Mixed Connective Tissue Disorder  - Continue Medrol 4mg PO daily (S/P Medrol taper)  - Transition Medrol to home dose of Prednisone  - Continue Plaquenil 200mg every other day    #Pancreatitis  - Lipase 8/16/20: 13  - Continue Pancrelipase [id number] TID with meals    #Macrocytic Anemia  - Likely 2/2 EtOH abuse  - Iron studies 8/17/20: iron 36, TIBC 216, %iron saturation 17, transferrin 167, ferritin 583  - Vit B12 8/16/20: 753  - Folate 8/16/20: >20    #Venous Stasis Dermatitis  - Completed antibiotics for RLE cellulitis on previous admission  - Start compression therapy 8/31 for RLE and leg elevation.    #Dry Eyes  - Patient on saline tear gtts at home  - Start Artificial tears OU BID    #Sleep:   - Maintain quiet hours and low stim environment.  - Melatonin PRN to maximize participation in therapy during the day.   - Monitor sleep logs  - avoid benzodiazepines, however, on Xanax prn for anxiety    #Pain Management: in setting of h/o opioid abuse  - Tylenol. On Oxycodone ER 10mg Q8hr at [facility], will continue for now but wean as tolerated  - Avoid sedating medications that may interfere with cognitive recovery  - consider increasing gabapentin dose (pt states she takes 1200mg BID-TID at home)    #GI/Bowel:  - H/o C. diff (completed treatment 8/3-8/17)  - Loperamide prn for diarrhea  - GI ppx: Pantoprazole 40mg daily    #/Bladder:   - At risk for incontinence and retention due to neurologic diagnosis and limited mobility  - Zurita removed, now voiding well per RN. D/C bladder scans.  - manage constipation    #Skin/Pressure Injury:   - At risk for pressure injury due to neurologic diagnosis and relative immobility.  - Skin assessment on admission admission: scattered excoriations/abrasions and ecchymoses   - Turn every 2 hours while in bed, air mattress  - Soft heel protectors  - Skin barrier cream as needed  - Nursing to monitor skin Qshift    #Osteoporosis  -pt on alendronate 70mg qweek at home  -resume after disharge    #Code Status  - pt initially DNR at [facility] (MOLST in chart on arrival to [facility]), however discussed wishes with patient and she states she would like to be full code.      Diet/Dysphagia:  - Diet Consistency/Modifications: regular  - H/o oropharyngeal dysphagia and dysphonia likely due to extubation. Patient does not need further SLP therapy.  - Nutrition consult    DVT ppx:  - Heparin  - SCDs  - Last Doppler on 8/18/20 (resolved L soleal vein DVT)  ---------------  Code Status/Emergency Contact:  Anita River (cousin) [facility] - [phone]    Outpatient Follow-up (Specialty/Name of physician):  PCP, Dr. Tim Davis, [address], [address], [place], [place], [address], [phone]  Nephrology, Dr. Zuly Simms, [address], [place], [phone]  Epilepsy, Dr. Gallegos, [address] [place] [address], [phone]  Neurology, Dr. Alida King, [address] [place] [address], [phone]    --------------  Goals: Safe discharge to home  Estimated Length of Stay: 10-14 days  Rehab Potential: Good  Medical Prognosis: Good  Estimated Disposition: Home with Home Care  ---------------    PRESCREEN COMPARISON:  I have reviewed the prescreen information and I have found no relevant changes between the preadmission screening and my post admission evaluation.    RATIONALE FOR INPATIENT ADMISSION: Patient demonstrates the following:  [X] Medically appropriate for rehabilitation admission  [X] Has attainable rehab goals with an appropriate initial discharge plan  [X]Has rehabilitation potential (expected to make a significant improvement within a reasonable period of time)  [X] Requires close medical management by a rehab physician, rehab nursing care, Hospitalist and comprehensive interdisciplinary team (including PT, OT and/or SLP, Prosthetics and Orthotics)

## 2020-09-01 LAB
AMPHIPHYSIN AB TITR CSF: SIGNIFICANT CHANGE UP
CV2 IGG TITR CSF: SIGNIFICANT CHANGE UP
GLIAL NUC TYPE 1 AB TITR CSF: SIGNIFICANT CHANGE UP
HU1 AB TITR CSF IF: SIGNIFICANT CHANGE UP
HU2 AB TITR CSF IF: SIGNIFICANT CHANGE UP
HU3 AB TITR CSF: SIGNIFICANT CHANGE UP
PARANEOPLASTIC INTERPRETATION, CSF: SIGNIFICANT CHANGE UP
PCA-TR AB TITR CSF: SIGNIFICANT CHANGE UP
PURKINJE CELL CYTOPLASMIC AB TYPE 2: SIGNIFICANT CHANGE UP
PURKINJE CELLS AB TITR CSF IF: SIGNIFICANT CHANGE UP
REFLEX ADDED: SIGNIFICANT CHANGE UP

## 2020-09-01 PROCEDURE — 99232 SBSQ HOSP IP/OBS MODERATE 35: CPT

## 2020-09-01 RX ADMIN — PANTOPRAZOLE SODIUM 40 MILLIGRAM(S): 20 TABLET, DELAYED RELEASE ORAL at 05:17

## 2020-09-01 RX ADMIN — Medication 1 TABLET(S): at 11:34

## 2020-09-01 RX ADMIN — Medication 1 CAPSULE(S): at 17:44

## 2020-09-01 RX ADMIN — GABAPENTIN 300 MILLIGRAM(S): 400 CAPSULE ORAL at 21:03

## 2020-09-01 RX ADMIN — Medication 1 CAPSULE(S): at 11:34

## 2020-09-01 RX ADMIN — Medication 4 MILLIGRAM(S): at 05:17

## 2020-09-01 RX ADMIN — HEPARIN SODIUM 5000 UNIT(S): 5000 INJECTION INTRAVENOUS; SUBCUTANEOUS at 13:03

## 2020-09-01 RX ADMIN — OXYCODONE HYDROCHLORIDE 10 MILLIGRAM(S): 5 TABLET ORAL at 13:40

## 2020-09-01 RX ADMIN — HEPARIN SODIUM 5000 UNIT(S): 5000 INJECTION INTRAVENOUS; SUBCUTANEOUS at 21:02

## 2020-09-01 RX ADMIN — FLUDROCORTISONE ACETATE 0.1 MILLIGRAM(S): 0.1 TABLET ORAL at 17:45

## 2020-09-01 RX ADMIN — FLUDROCORTISONE ACETATE 0.1 MILLIGRAM(S): 0.1 TABLET ORAL at 05:16

## 2020-09-01 RX ADMIN — LEVETIRACETAM 1000 MILLIGRAM(S): 250 TABLET, FILM COATED ORAL at 17:45

## 2020-09-01 RX ADMIN — Medication 130 MILLIGRAM(S): at 18:15

## 2020-09-01 RX ADMIN — OXYCODONE HYDROCHLORIDE 10 MILLIGRAM(S): 5 TABLET ORAL at 22:03

## 2020-09-01 RX ADMIN — OXYCODONE HYDROCHLORIDE 10 MILLIGRAM(S): 5 TABLET ORAL at 21:03

## 2020-09-01 RX ADMIN — ATORVASTATIN CALCIUM 20 MILLIGRAM(S): 80 TABLET, FILM COATED ORAL at 21:03

## 2020-09-01 RX ADMIN — Medication 1 CAPSULE(S): at 08:25

## 2020-09-01 RX ADMIN — OXYCODONE HYDROCHLORIDE 10 MILLIGRAM(S): 5 TABLET ORAL at 06:11

## 2020-09-01 RX ADMIN — OXYCODONE HYDROCHLORIDE 10 MILLIGRAM(S): 5 TABLET ORAL at 05:16

## 2020-09-01 RX ADMIN — LACOSAMIDE 200 MILLIGRAM(S): 50 TABLET ORAL at 17:44

## 2020-09-01 RX ADMIN — OXYCODONE HYDROCHLORIDE 10 MILLIGRAM(S): 5 TABLET ORAL at 13:03

## 2020-09-01 RX ADMIN — LACOSAMIDE 200 MILLIGRAM(S): 50 TABLET ORAL at 05:16

## 2020-09-01 RX ADMIN — GABAPENTIN 300 MILLIGRAM(S): 400 CAPSULE ORAL at 05:16

## 2020-09-01 RX ADMIN — GABAPENTIN 300 MILLIGRAM(S): 400 CAPSULE ORAL at 13:03

## 2020-09-01 RX ADMIN — Medication 81 MILLIGRAM(S): at 11:34

## 2020-09-01 RX ADMIN — HEPARIN SODIUM 5000 UNIT(S): 5000 INJECTION INTRAVENOUS; SUBCUTANEOUS at 05:17

## 2020-09-01 RX ADMIN — LEVETIRACETAM 1000 MILLIGRAM(S): 250 TABLET, FILM COATED ORAL at 05:16

## 2020-09-01 RX ADMIN — Medication 0.25 MILLIGRAM(S): at 05:16

## 2020-09-01 RX ADMIN — Medication 0.25 MILLIGRAM(S): at 17:44

## 2020-09-01 RX ADMIN — Medication 100 MILLIGRAM(S): at 11:34

## 2020-09-01 RX ADMIN — Medication 125 MILLIGRAM(S): at 05:16

## 2020-09-01 NOTE — PHARMACOTHERAPY INTERVENTION NOTE - COMMENTS
Pt was taking phenytoin 130mg ER capsules twice a day as per [facility] neurology. Contacted MD to adjust order.

## 2020-09-01 NOTE — PROGRESS NOTE ADULT - SUBJECTIVE AND OBJECTIVE BOX
Patient is a 60y old  Female who presents with a chief complaint of hyponatremia, generalized seizures and hypoxic respiratory failure, right thalamic CVA  01.4 (31 Aug 2020 15:46)      Patient seen and examined at bedside.  No acute events overnight.    ALLERGIES:  penicillin (Other)  penicillin (Swelling)    MEDICATIONS  (STANDING):  aspirin  chewable 81 milliGRAM(s) Oral daily  atorvastatin 20 milliGRAM(s) Oral at bedtime  fludroCORTISONE 0.1 milliGRAM(s) Oral every 12 hours  gabapentin 300 milliGRAM(s) Oral three times a day  heparin   Injectable 5000 Unit(s) SubCutaneous every 8 hours  hydroxychloroquine 200 milliGRAM(s) Oral <User Schedule>  lacosamide 200 milliGRAM(s) Oral two times a day  levETIRAcetam 1000 milliGRAM(s) Oral two times a day  methylPREDNISolone 4 milliGRAM(s) Oral daily  multivitamin 1 Tablet(s) Oral daily  oxyCODONE  ER Tablet 10 milliGRAM(s) Oral every 8 hours  pancrelipase  (CREON 12,000 Lipase Units) 1 Capsule(s) Oral three times a day with meals  pantoprazole    Tablet 40 milliGRAM(s) Oral before breakfast  phenytoin   Chewable 125 milliGRAM(s) Chew two times a day  thiamine 100 milliGRAM(s) Oral daily    MEDICATIONS  (PRN):  acetaminophen   Tablet .. 650 milliGRAM(s) Oral every 6 hours PRN Temp greater or equal to 38C (100.4F), Mild Pain (1 - 3)  ALPRAZolam 0.25 milliGRAM(s) Oral two times a day PRN anxiety  artificial  tears Solution 1 Drop(s) Both EYES three times a day PRN Eye irritation  artificial tears (preservative free) Ophthalmic Solution 1 Drop(s) Both EYES every 4 hours PRN Dry Eyes  loperamide 2 milliGRAM(s) Oral every 6 hours PRN Diarrhea  melatonin 3 milliGRAM(s) Oral at bedtime PRN Insomnia    Vital Signs Last 24 Hrs  T(F): 98.2 (01 Sep 2020 08:10), Max: 98.2 (31 Aug 2020 18:06)  HR: 78 (01 Sep 2020 08:10) (68 - 78)  BP: 99/65 (01 Sep 2020 08:10) (98/64 - 109/67)  RR: 15 (01 Sep 2020 08:10) (15 - 16)  SpO2: 95% (01 Sep 2020 08:10) (95% - 98%)  I&O's Summary    31 Aug 2020 07:01  -  01 Sep 2020 07:00  --------------------------------------------------------  IN: 660 mL / OUT: 0 mL / NET: 660 mL        PHYSICAL EXAM:  General: NAD, A/O x 3  ENT: MMM  Neck: Supple, No JVD  Lungs: Clear to auscultation bilaterally  Cardio: RRR, S1/S2, No murmurs  Abdomen: Soft, Nontender, Nondistended; Bowel sounds present  Extremities: No calf tenderness, No pitting edema    LABS:                        12.5   4.93  )-----------( 332      ( 31 Aug 2020 06:22 )             37.8       08-31    135  |  99  |  9   ----------------------------<  93  4.4   |  27  |  0.51    Ca    9.3      31 Aug 2020 06:22       eGFR if Non African American: 105 mL/min/1.73M2 (08-31-20 @ 06:22)  eGFR if African American: 121 mL/min/1.73M2 (08-31-20 @ 06:22)             08-30 Chol 242 mg/dL  mg/dL HDL 95 mg/dL Trig 112 mg/dL                        RADIOLOGY & ADDITIONAL TESTS:    Care Discussed with Consultants/Other Providers:

## 2020-09-01 NOTE — PROGRESS NOTE ADULT - ASSESSMENT
59yo female with hx of CAD s/p possible MI (ECHO ), lung adenocarcinoma (s/p RML resection 2018), mixed connective tissue disorder (on plaquenil and methylprednisolone), left soleal vein thrombosis (on apixaban, 7/2020), RLE cellulitis (RX doxycycline), CDiff colitis (PO vancomycin 8/3- 8/17), lumbar laminectomy and chronic pain, EtOH (admissions for Etoh withdrawal), alcoholic pancreatitis, opiate abuse, presented to [facility] for acute rehab from [facility] for seizure/CVA    #Debility  -Comprehensive Multidisciplinary Rehab Program  -Continue comprehensive rehab program, 3 hours a day, 5 days a week.    #Seizures  -Continue Keppra 1g BID, Vimpat 200mg BID, Fosphenytoin 140mg BID  -neurological checks  -Follow up with Epilepsy Dr. Gallegos as outpatient    #CVA  -MRI revealed acute/subacute lacunar infarction within the right medial thalamus along with hypoperfused areas in bilateral frontal, temporal lobes consistent w/ post-ictal events  -Continue ASA 81mg daily  -Follow up with Lipid panel  -Continue Atorvastatin 20mg daily    #Hyponatremia  -Secondary to cerebral salt wasting  -Continue 1L fluid restriction  -Continue Florinef 0.1mg BID  -F/u with nephrology outpatient in 2 weeks (~9/11/20)    #Mixed Connective Tissue Disorder  -Continue Medrol 4mg PO daily (S/P Medrol taper)  -Transition Medrol to home dose of Prednisone  -Continue Plaquenil 200mg every other day    #Pancreatic insufficiency likely 2/2 Chronic Pancreatitis  -Likely chronic pancreatitis, Etoh induced vs mixed connective tissue disorder  -Continue Pancrelipase [id number] TID with meals    #Macrocytic Anemia  -Likely 2/2 EtOH abuse  -Continue multivitamin + Thiamine    #Urinary retention  -Resolved  -Due to neurologic diagnosis and limited mobility  -Voiding by herself    #Venous stasis dermatitis  -Resolves with leg elevation  -RLE skin changes not cellulitis at this time  -Encourage leg elevation   -started compression therapy 8/31, monitor for results     #Secondary hypercoagulable state due to immobility  - hep subq

## 2020-09-01 NOTE — PROGRESS NOTE ADULT - SUBJECTIVE AND OBJECTIVE BOX
Patient is a 60y old  Female who presents with a chief complaint of hyponatremia, generalized seizures and hypoxic respiratory failure, right thalamic CVA  01.4 (01 Sep 2020 08:23)      HPI:  Patient is a 61 y/o F with PMH CAD s/p possible MI (ECHO ), lung adenocarcinoma (s/p RML resection ), mixed connective tissue disorder (on plaquenil and methylprednisolone), left soleal vein thrombosis (on apixaban, 2020), RLE cellulitis (RX doxycycline), CDiff colitis (PO vancomycin 8/3- ), lumbar laminectomy and chronic pain, EtOH (admissions for Etoh withdrawal), alcoholic pancreatitis, opiate abuse, admitted on 20 to [facility] with symptomatic hyponatremia (Na 121) secondary to psychogenic polydipsia. She was treated with an appropriate rate of correction in serum Na.    Patient was lAter noted to have focal seizure activity of LUE on  which broke with IV lorazepam; seizure activity believed to be secondary to benzodiazepine withdrawal. EEG  showed intermittent sharp and polyspike wave discharges with generalized slowing with patient at risk for seizures, and patient started on Keppra although she continued to have periodic seizure activity. A code stroke was called 8/15/20 for right facial droop. CT head unremarkable for acute pathology. 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     On , RRT called because patient found to be unresponsive with generalized seizure activity and left UE/LE/facial twitching. Seizure activity would break transiently with IV lorazepam but recurred repetitively. After 8mg lorazepam, she began to lose airway protection abilities and developed hypoxemia (SpO2 70s). She was emergently intubated and given keppra load 1000mg, vimpat load 200 mg and propofol 10 mcg/kg/min infusion, which successfully suppressed her seizures. Found to have new fever of 101.6 F and was hypotensive post-intubation, started on levophed drip.      She was transferred to [facility] for EEG 20, which showed epileptic focus in right frontal region. Weaned off pressors 20. MRI was performed which revealed acute/subacute lacunar infarction within the right medial thalamus along with hypoperfused areas in bilateral frontal, temporal lobes consistent w/ post-ictal events. LP on 20 negative for CNS infection. Extubated 20. She was found to have hyponatremia post-extubation, likely signifying cerebral salt wasting (high urine output, low BP, and high urine sodium and hypovolemia). She was started on 2% saline drip and given 1 dose of Florinef. Once sodium was stabilized, 2% saline was stopped. Antibiotics were stopped as she showed no signs of infection. MRI w/wo contrast was performed. She was downgraded from ICU 20.     Patient was transferred to [facility] 20. (28 Aug 2020 15:02)      PAST MEDICAL & SURGICAL HISTORY:  Lung cancer  Opiate dependence  Alcohol abuse  Adenocarcinoma of lung  Mixed connective tissue disease  Depression H/O panic attack: with anxiety  S/P Laminectomy  Lumbar 3/10  Chronic pain  Diverticulitis of colon (without mention of hemorrhage)  History of laminectomy  History of lung surgery  History of oophorectomy, unilateral: bilateral  History of hip replacement, total, right: 2020  S/P lumbar laminectomy  S/P cholecystectomy  History of lung cancer: s/p wedge resection of RML      MEDICATIONS  (STANDING):  aspirin  chewable 81 milliGRAM(s) Oral daily  atorvastatin 20 milliGRAM(s) Oral at bedtime  fludroCORTISONE 0.1 milliGRAM(s) Oral every 12 hours  gabapentin 300 milliGRAM(s) Oral three times a day  heparin   Injectable 5000 Unit(s) SubCutaneous every 8 hours  hydroxychloroquine 200 milliGRAM(s) Oral <User Schedule>  lacosamide 200 milliGRAM(s) Oral two times a day  levETIRAcetam 1000 milliGRAM(s) Oral two times a day  methylPREDNISolone 4 milliGRAM(s) Oral daily  multivitamin 1 Tablet(s) Oral daily  oxyCODONE  ER Tablet 10 milliGRAM(s) Oral every 8 hours  pancrelipase  (CREON 12,000 Lipase Units) 1 Capsule(s) Oral three times a day with meals  pantoprazole    Tablet 40 milliGRAM(s) Oral before breakfast  phenytoin   Chewable 125 milliGRAM(s) Chew two times a day  thiamine 100 milliGRAM(s) Oral daily    MEDICATIONS  (PRN):  acetaminophen   Tablet .. 650 milliGRAM(s) Oral every 6 hours PRN Temp greater or equal to 38C (100.4F), Mild Pain (1 - 3)  ALPRAZolam 0.25 milliGRAM(s) Oral two times a day PRN anxiety  artificial  tears Solution 1 Drop(s) Both EYES three times a day PRN Eye irritation  artificial tears (preservative free) Ophthalmic Solution 1 Drop(s) Both EYES every 4 hours PRN Dry Eyes  loperamide 2 milliGRAM(s) Oral every 6 hours PRN Diarrhea  melatonin 3 milliGRAM(s) Oral at bedtime PRN Insomnia      Allergies    penicillin (Other)  penicillin (Swelling)    Intolerances          VITALS  60y  Vital Signs Last 24 Hrs  T(C): 36.8 (01 Sep 2020 08:10), Max: 36.8 (31 Aug 2020 18:06)  T(F): 98.2 (01 Sep 2020 08:10), Max: 98.2 (31 Aug 2020 18:06)  HR: 78 (01 Sep 2020 08:10) (68 - 78)  BP: 99/65 (01 Sep 2020 08:10) (98/64 - 109/67)  BP(mean): --  RR: 15 (01 Sep 2020 08:10) (15 - 16)  SpO2: 95% (01 Sep 2020 08:10) (95% - 98%)  Daily     Daily Weight in k.6 (01 Sep 2020 02:00)        RECENT LABS:                          12.5   4.93  )-----------( 332      ( 31 Aug 2020 06:22 )             37.8     -    135  |  99  |  9   ----------------------------<  93  4.4   |  27  |  0.51    Ca    9.3      31 Aug 2020 06:22                CAPILLARY BLOOD GLUCOSE

## 2020-09-01 NOTE — PROGRESS NOTE ADULT - COMMENTS
Patient concerned about pain management. She states that she was on fentanyl and oxycodone 5 mg 5 x day prior to admission to the hospital due to chronic pain, and now post op the time between medication doses has increased. She is also concerned as she states that she received a letter from her pain management doctor at [place], Theo Hawk MD, that the office would no longer dispense opioids and she should be placed on a controlled detox program (email from Dr Hawk's office was shown to us this morning). She feels that her cousin went behind her back to contact the pain management office without her consent when she was hospitalized (cousin is health care proxy)    otherwise, denies B/B issues. Anxiety controlled on current dose xanax

## 2020-09-01 NOTE — PROGRESS NOTE ADULT - ASSESSMENT
Patient is a 59 y/o F with PMH CAD s/p possible MI (ECHO ), lung adenocarcinoma (s/p RML resection 2018), mixed connective tissue disorder (on plaquenil and methylprednisolone), left soleal vein thrombosis (on apixaban, 7/2020), RLE cellulitis (RX doxycycline), CDiff colitis (PO vancomycin 8/3- 8/17), lumbar laminectomy and chronic pain, EtOH (admissions for Etoh withdrawal), alcoholic pancreatitis, opiate abuse, admitted on 8/11/20 to [facility] with symptomatic hyponatremia complicated by generalized seizures, hypoxic respiratory failure, subsequent right medial thalamic infarct and cerebral salt-wasting.       #Comprehensive Multidisciplinary Rehab Program:  - continue comprehensive rehab program, 3 hours a day, 5 days a week, Pt and OT  - neuropsychology and SW consults for mood, substance abuse history and referral  - Precautions: falls, seizures, respiratory, spinal, substance abuse including opiates, long-term steroid    #Seizures  - Low suspicion for EtOH withdrawal seizures given >3 weeks abstinence prior to admission and autoimmune encephalitis given her improvement  - Continue Keppra 1g BID, Vimpat 200mg BID, Fosphenytoin 140mg BID  - neurological checks    #Hyponatremia  - Likely due to cerebral salt wasting per Dr. Simms, nephrologist  - Continue 1L fluid restriction  - Continue Florinef 0.1mg BID  - F/u with nephrology outpatient in 2 weeks (~9/11/20)  -Na 135 8/31    #Mixed Connective Tissue Disorder  - Continue Medrol 4mg PO daily (S/P Medrol taper)  - Transition Medrol to home dose of Prednisone  - Continue Plaquenil 200mg every other day    #Pancreatitis  - Lipase 8/16/20: 13  - Continue Pancrelipase [id number] TID with meals    #Macrocytic Anemia: Likely 2/2 EtOH abuse  - Iron studies 8/17/20: iron 36, TIBC 216, %iron saturation 17, transferrin 167, ferritin 583  - Vit B12 8/16/20: 753  - Folate 8/16/20: >20  -Hgb 12.5 8/31    #Venous Stasis Dermatitis  - Completed antibiotics for RLE cellulitis on previous admission  -  ACE wrap, improved 9/1    #Dry Eyes  - Artificial tears OU BID    #Sleep:   - Melatonin PRN to maximize participation in therapy during the day.   -on Xanax prn for anxiety    #Pain Management: in setting of h/o opioid abuse  - Tylenol. On Oxycodone ER 10mg Q8hr at [facility],. Will contact Dr Hawk's office as patient reports being on chronic pain management for last 3 years, however later states 10m, inconsistent historian. Will try to determine appropriate weaning schedule    #GI/Bowel:  - H/o C. diff (completed treatment 8/3-8/17)  - Loperamide prn for diarrhea  - GI ppx: Pantoprazole 40mg daily    #Skin/Pressure Injury:   - At risk for pressure injury due to neurologic diagnosis and relative immobility.  - Skin assessment on admission admission: scattered excoriations/abrasions and ecchymoses   - Turn every 2 hours while in bed, air mattress  - Soft heel protectors  - Skin barrier cream as needed  - Nursing to monitor skin Qshift    #Osteoporosis  -pt on alendronate 70mg qweek at home  -resume after disharge    #Code Status  - pt initially DNR at [place] (MOLST in chart on arrival to [facility]), however discussed wishes with patient and she states she would like to be full code.      #Diet:  - regular    #DVT ppx:  - Heparin  - Last Doppler on 8/18/20 (resolved L soleal vein DVT)      #Labs  CBC BMp 9/3  pain management follow up for weaning schedule  ---------------  Code Status/Emergency Contact:  Anita River (cousin) [facility] - [phone]    Outpatient Follow-up (Specialty/Name of physician):  PCP, Dr. Tim Davis, [address] [place] [address], [address], [place], [address], [phone]  Nephrology, Dr. Zuly Simms, [address] [place] [address], [phone]  Epilepsy, Dr. Gallegos, [address] [place] [address], [phone]  Neurology, Dr. Alida King, [address] [place] [address], [phone] Patient is a 59 y/o F with PMH CAD s/p possible MI (ECHO ), lung adenocarcinoma (s/p RML resection 2018), mixed connective tissue disorder (on plaquenil and methylprednisolone), left soleal vein thrombosis (on apixaban, 7/2020), RLE cellulitis (RX doxycycline), CDiff colitis (PO vancomycin 8/3- 8/17), lumbar laminectomy and chronic pain, EtOH (admissions for Etoh withdrawal), alcoholic pancreatitis, opiate abuse, admitted on 8/11/20 to [facility] with symptomatic hyponatremia complicated by generalized seizures, hypoxic respiratory failure, subsequent right medial thalamic infarct and cerebral salt-wasting.       #Comprehensive Multidisciplinary Rehab Program:  - continue comprehensive rehab program, 3 hours a day, 5 days a week, Pt and OT  - neuropsychology and SW consults for mood, substance abuse history and referral  - Precautions: falls, seizures, respiratory, spinal, substance abuse including opiates, long-term steroid    #Seizures  - Low suspicion for EtOH withdrawal seizures given >3 weeks abstinence prior to admission and autoimmune encephalitis given her improvement  - Continue Keppra 1g BID, Vimpat 200mg BID, Fosphenytoin 140mg BID  - neurological checks    #Hyponatremia  - Likely due to cerebral salt wasting per Dr. Simms, nephrologist  - Continue 1L fluid restriction  - Continue Florinef 0.1mg BID  - F/u with nephrology outpatient in 2 weeks (~9/11/20)  -Na 135 8/31    #Mixed Connective Tissue Disorder  - Continue Medrol 4mg PO daily (S/P Medrol taper)  - Transition Medrol to home dose of Prednisone  - Continue Plaquenil 200mg every other day    #Pancreatitis  - Lipase 8/16/20: 13  - Continue Pancrelipase [id number] TID with meals    #Macrocytic Anemia: Likely 2/2 EtOH abuse  - Iron studies 8/17/20: iron 36, TIBC 216, %iron saturation 17, transferrin 167, ferritin 583  - Vit B12 8/16/20: 753  - Folate 8/16/20: >20  -Hgb 12.5 8/31    #Venous Stasis Dermatitis  - Completed antibiotics for RLE cellulitis on previous admission  -  ACE wrap, improved 9/1    #Dry Eyes  - Artificial tears OU BID    #Sleep:   - Melatonin PRN to maximize participation in therapy during the day.   -on Xanax prn for anxiety    #Pain Management: in setting of h/o opioid abuse  - Tylenol. On Oxycodone ER 10mg Q8hr at [facility],. Will contact Dr Hawk's office as patient reports being on chronic pain management for last 3 years, however later states 10m, inconsistent historian. Will try to determine appropriate weaning schedule    #GI/Bowel:  - H/o C. diff (completed treatment 8/3-8/17)  - Loperamide prn for diarrhea  - GI ppx: Pantoprazole 40mg daily    #Skin/Pressure Injury:   - At risk for pressure injury due to neurologic diagnosis and relative immobility.  - Skin assessment on admission admission: scattered excoriations/abrasions and ecchymoses   - Turn every 2 hours while in bed, air mattress  - Soft heel protectors  - Skin barrier cream as needed  - Nursing to monitor skin Qshift    #Osteoporosis  -pt on alendronate 70mg qweek at home  -resume after disharge    #Code Status  - pt initially DNR at [place] (MOLST in chart on arrival to [facility]), however discussed wishes with patient and she states she would like to be full code.      #Diet:  - regular    #DVT ppx:  - Heparin  - Last Doppler on 8/18/20 (resolved L soleal vein DVT)      #Labs  CBC BMp 9/3  pain management follow up for weaning schedule    REHABILITATION DIAGNOSIS:  Cerebral infarction      COMORBIDITIES/COMPLICATING CONDITIONS IMPACTING REHABILITATION:  HEALTH ISSUES - PROBLEM Dx:  pain management  opioid weaning    PAST MEDICAL & SURGICAL HISTORY:  Lung cancer  Opiate dependence  Alcohol abuse  Adenocarcinoma of lung  Mixed connective tissue disease  Depression H/O panic attack: with anxiety  S/P Laminectomy  Lumbar 3/10  Chronic pain  Diverticulitis of colon (without mention of hemorrhage)  History of laminectomy  History of lung surgery  History of oophorectomy, unilateral: bilateral  History of hip replacement, total, right: 6/7/2020  S/P lumbar laminectomy  S/P cholecystectomy  History of lung cancer: s/p wedge resection of RML      Based upon consideration of the patient's impairments, functional status, complicating conditions and any other contributing factors and after information garnered from the assessments of all therapy disciplines involved in treating the patient and other pertinent clinicians:    INTERDISCIPLINARY REHABILITATION INTERVENTIONS:    [  x ] Transfer Training  [ x  ] Bed Mobility  [ x  ] Therapeutic Exercise  [ x  ] Balance/Coordination Exercises  [  x ] Locomotion training  [x   ] Stairs    [ x  ] Functional transfers  [ x  ] Activities of daily living  [   ] Visual Perceptual training    [  x ] Bowel/Bladder program  [ x  ] Pain Management  [   ] Skin/Wound Care    [   ] Speech/Communication Exercise  [   ] Swallowing Exercises    [   ] Cognitive Exercises  [   ] Cognitive-linguistic Treatment  [  x ] Behavioral Program    [x   ] Patient/Family Counseling  [   ] Family Training  [   ] Community Re-entry  [   ] Orthotic Evaluation/Training  [   ] Prosthetic Eval/Training    MEDICAL PROGNOSIS:  good-    REHAB POTENTIAL:  good-  EXPECTED DAILY THERAPIES:    [  x ] PT 1 hours daily 5 x week  [  x ] OT 1 hours daily 5 x week  [  x ] ST  1 hour daily 5 x week    EXPECTED INTENSITY OF PROGRAM:  3 hours daily    EXPECTED FREQUENCY OF PROGRAM:  5 x week    ESTIMATED LOS:  10 days    ESTIMATED DISPOSITION:  home with outpatient PT, pain management followup    INTERDISCIPLINARY FUNCTIONAL OUTCOMES/GOALS:         Gait/Mobility: mod indepnedent with SAC       Transfers: mod independent with SAC       ADLs: mod independent       Functional Transfers: mod independent       Medication Management: supervision       Communication: independent       Cognitive: independent       Nutrition: regular consistency diet       Bladder: continent       Bowel: continent      ---------------  Code Status/Emergency Contact:  Anita River (cousin) [facility] - [phone]    Outpatient Follow-up (Specialty/Name of physician):  PCP, Dr. Tim Davis, [address] [place] [address], [address], [place], [address], [phone]  Nephrology, Dr. Zuly Simms, [address] [place] [address], [phone]  Epilepsy, Dr. Gallegos, [address] [place] [address], [phone]  Neurology, Dr. Alida King, [address] [place] [address], [phone]

## 2020-09-02 PROCEDURE — 99233 SBSQ HOSP IP/OBS HIGH 50: CPT

## 2020-09-02 PROCEDURE — 96116 NUBHVL XM PHYS/QHP 1ST HR: CPT

## 2020-09-02 PROCEDURE — 99232 SBSQ HOSP IP/OBS MODERATE 35: CPT

## 2020-09-02 RX ORDER — OXYCODONE HYDROCHLORIDE 5 MG/1
10 TABLET ORAL ONCE
Refills: 0 | Status: DISCONTINUED | OUTPATIENT
Start: 2020-09-02 | End: 2020-09-02

## 2020-09-02 RX ORDER — OXYCODONE HYDROCHLORIDE 5 MG/1
5 TABLET ORAL EVERY 6 HOURS
Refills: 0 | Status: DISCONTINUED | OUTPATIENT
Start: 2020-09-02 | End: 2020-09-08

## 2020-09-02 RX ORDER — GABAPENTIN 400 MG/1
600 CAPSULE ORAL THREE TIMES A DAY
Refills: 0 | Status: DISCONTINUED | OUTPATIENT
Start: 2020-09-02 | End: 2020-09-05

## 2020-09-02 RX ADMIN — Medication 1 CAPSULE(S): at 12:28

## 2020-09-02 RX ADMIN — OXYCODONE HYDROCHLORIDE 10 MILLIGRAM(S): 5 TABLET ORAL at 13:42

## 2020-09-02 RX ADMIN — Medication 100 MILLIGRAM(S): at 12:28

## 2020-09-02 RX ADMIN — GABAPENTIN 300 MILLIGRAM(S): 400 CAPSULE ORAL at 05:25

## 2020-09-02 RX ADMIN — Medication 200 MILLIGRAM(S): at 08:46

## 2020-09-02 RX ADMIN — OXYCODONE HYDROCHLORIDE 5 MILLIGRAM(S): 5 TABLET ORAL at 18:00

## 2020-09-02 RX ADMIN — FLUDROCORTISONE ACETATE 0.1 MILLIGRAM(S): 0.1 TABLET ORAL at 17:18

## 2020-09-02 RX ADMIN — HEPARIN SODIUM 5000 UNIT(S): 5000 INJECTION INTRAVENOUS; SUBCUTANEOUS at 13:37

## 2020-09-02 RX ADMIN — LACOSAMIDE 200 MILLIGRAM(S): 50 TABLET ORAL at 05:24

## 2020-09-02 RX ADMIN — HEPARIN SODIUM 5000 UNIT(S): 5000 INJECTION INTRAVENOUS; SUBCUTANEOUS at 22:22

## 2020-09-02 RX ADMIN — Medication 1 CAPSULE(S): at 08:22

## 2020-09-02 RX ADMIN — Medication 0.25 MILLIGRAM(S): at 05:21

## 2020-09-02 RX ADMIN — Medication 1 TABLET(S): at 12:27

## 2020-09-02 RX ADMIN — GABAPENTIN 600 MILLIGRAM(S): 400 CAPSULE ORAL at 13:37

## 2020-09-02 RX ADMIN — GABAPENTIN 600 MILLIGRAM(S): 400 CAPSULE ORAL at 22:22

## 2020-09-02 RX ADMIN — OXYCODONE HYDROCHLORIDE 5 MILLIGRAM(S): 5 TABLET ORAL at 23:01

## 2020-09-02 RX ADMIN — OXYCODONE HYDROCHLORIDE 10 MILLIGRAM(S): 5 TABLET ORAL at 00:53

## 2020-09-02 RX ADMIN — OXYCODONE HYDROCHLORIDE 10 MILLIGRAM(S): 5 TABLET ORAL at 05:21

## 2020-09-02 RX ADMIN — OXYCODONE HYDROCHLORIDE 10 MILLIGRAM(S): 5 TABLET ORAL at 01:45

## 2020-09-02 RX ADMIN — OXYCODONE HYDROCHLORIDE 10 MILLIGRAM(S): 5 TABLET ORAL at 14:30

## 2020-09-02 RX ADMIN — OXYCODONE HYDROCHLORIDE 5 MILLIGRAM(S): 5 TABLET ORAL at 17:18

## 2020-09-02 RX ADMIN — ATORVASTATIN CALCIUM 20 MILLIGRAM(S): 80 TABLET, FILM COATED ORAL at 22:22

## 2020-09-02 RX ADMIN — OXYCODONE HYDROCHLORIDE 10 MILLIGRAM(S): 5 TABLET ORAL at 06:20

## 2020-09-02 RX ADMIN — Medication 1 CAPSULE(S): at 17:17

## 2020-09-02 RX ADMIN — Medication 130 MILLIGRAM(S): at 17:18

## 2020-09-02 RX ADMIN — LEVETIRACETAM 1000 MILLIGRAM(S): 250 TABLET, FILM COATED ORAL at 05:24

## 2020-09-02 RX ADMIN — LACOSAMIDE 200 MILLIGRAM(S): 50 TABLET ORAL at 17:17

## 2020-09-02 RX ADMIN — LEVETIRACETAM 1000 MILLIGRAM(S): 250 TABLET, FILM COATED ORAL at 17:18

## 2020-09-02 RX ADMIN — FLUDROCORTISONE ACETATE 0.1 MILLIGRAM(S): 0.1 TABLET ORAL at 05:24

## 2020-09-02 RX ADMIN — Medication 130 MILLIGRAM(S): at 05:23

## 2020-09-02 RX ADMIN — Medication 81 MILLIGRAM(S): at 12:28

## 2020-09-02 RX ADMIN — Medication 0.25 MILLIGRAM(S): at 17:17

## 2020-09-02 RX ADMIN — Medication 4 MILLIGRAM(S): at 05:23

## 2020-09-02 RX ADMIN — PANTOPRAZOLE SODIUM 40 MILLIGRAM(S): 20 TABLET, DELAYED RELEASE ORAL at 05:23

## 2020-09-02 RX ADMIN — HEPARIN SODIUM 5000 UNIT(S): 5000 INJECTION INTRAVENOUS; SUBCUTANEOUS at 05:23

## 2020-09-02 NOTE — PROGRESS NOTE ADULT - COMMENTS
Patient very focused on dosing of pain and anxiety medications. Dr Hawk's office Patient very focused on dosing of pain and anxiety medications. Dr Hawk's recommendations for weaning and importance of detox schedule discussed, patient agreeable. Patient also informed that Dr Hawk is amenable to following her for her pain management in the community, but no plans for opioids post detox

## 2020-09-02 NOTE — CHART NOTE - NSCHARTNOTEFT_GEN_A_CORE
Patient is a 60y old  Female who presents with a chief complaint of hyponatremia, generalized seizures and hypoxic respiratory failure, right thalamic CVA  01.4 (01 Sep 2020 11:09)      BRIEF HOSPITAL COURSE: 61yo female with hx of CAD s/p possible MI (ECHO ), lung adenocarcinoma (s/p RML resection 2018), mixed connective tissue disorder (on plaquenil and methylprednisolone), left soleal vein thrombosis (on apixaban, 7/2020), RLE cellulitis (RX doxycycline), CDiff colitis (PO vancomycin 8/3- 8/17), lumbar laminectomy and chronic pain, EtOH (admissions for Etoh withdrawal), alcoholic pancreatitis, opiate abuse, presented to [facility] for acute rehab from [facility] for seizure/CVA    Events last 24 hours: Called to bedside by RN as patient complaining of chronic lower back pain. Pt receives Oxycodone 10mg ER Q8h, not due for it for several more hours. Has received Oxycodone 10mg IR one time dose for breakthrough pain in the last couple of days. Pt examined at bedside, complaining of chronic lower back pain. Pt denies chest pain, difficulty breathing/SOB, abdominal pain. Pt requesting something to help her pain.     PAST MEDICAL & SURGICAL HISTORY:  Lung cancer  Opiate dependence  Alcohol abuse  Adenocarcinoma of lung  Mixed connective tissue disease  Depression H/O panic attack: with anxiety  S/P Laminectomy  Lumbar 3/10  Chronic pain  Diverticulitis of colon (without mention of hemorrhage)  History of laminectomy  History of lung surgery  History of oophorectomy, unilateral: bilateral  History of hip replacement, total, right: 6/7/2020  S/P lumbar laminectomy  S/P cholecystectomy  History of lung cancer: s/p wedge resection of RML      Review of Systems:  CONSTITUTIONAL: No fever, chills, or fatigue  EYES: No eye pain, visual disturbances, or discharge  ENMT:  No difficulty hearing, tinnitus, vertigo; No sinus or throat pain  NECK: No pain or stiffness  RESPIRATORY: No cough, wheezing, chills or hemoptysis; No shortness of breath  CARDIOVASCULAR: No chest pain, palpitations, dizziness, or leg swelling  GASTROINTESTINAL: No abdominal or epigastric pain. No nausea, vomiting, or hematemesis; No diarrhea or constipation. No melena or hematochezia.  GENITOURINARY: No dysuria, frequency, hematuria, or incontinence  NEUROLOGICAL: No headaches, memory loss, loss of strength, numbness, or tremors  SKIN: No itching, burning, rashes, or lesions   MUSCULOSKELETAL: +lower back pain; No joint pain or swelling; No muscle or extremity pain  PSYCHIATRIC: No depression, anxiety, mood swings, or difficulty sleeping      Medications:  hydroxychloroquine 200 milliGRAM(s) Oral <User Schedule>  acetaminophen   Tablet .. 650 milliGRAM(s) Oral every 6 hours PRN  ALPRAZolam 0.25 milliGRAM(s) Oral two times a day PRN  gabapentin 300 milliGRAM(s) Oral three times a day  lacosamide 200 milliGRAM(s) Oral two times a day  levETIRAcetam 1000 milliGRAM(s) Oral two times a day  melatonin 3 milliGRAM(s) Oral at bedtime PRN  oxyCODONE  ER Tablet 10 milliGRAM(s) Oral every 8 hours  phenytoin   Capsule 130 milliGRAM(s) Oral two times a day  aspirin  chewable 81 milliGRAM(s) Oral daily  heparin   Injectable 5000 Unit(s) SubCutaneous every 8 hours  loperamide 2 milliGRAM(s) Oral every 6 hours PRN  pancrelipase  (CREON 12,000 Lipase Units) 1 Capsule(s) Oral three times a day with meals  pantoprazole    Tablet 40 milliGRAM(s) Oral before breakfast  atorvastatin 20 milliGRAM(s) Oral at bedtime  fludroCORTISONE 0.1 milliGRAM(s) Oral every 12 hours  methylPREDNISolone 4 milliGRAM(s) Oral daily  multivitamin 1 Tablet(s) Oral daily  thiamine 100 milliGRAM(s) Oral daily  artificial tears (preservative free) Ophthalmic Solution 1 Drop(s) Both EYES every 4 hours PRN      ICU Vital Signs Last 24 Hrs  T(C): 36.7 (01 Sep 2020 20:23), Max: 36.8 (01 Sep 2020 08:10)  T(F): 98 (01 Sep 2020 20:23), Max: 98.2 (01 Sep 2020 08:10)  HR: 79 (01 Sep 2020 20:23) (78 - 79)  BP: 98/63 (01 Sep 2020 20:23) (98/63 - 99/65)  BP(mean): --  ABP: --  ABP(mean): --  RR: 16 (01 Sep 2020 20:23) (15 - 16)  SpO2: 95% (01 Sep 2020 20:23) (95% - 95%)      I&O's Detail    31 Aug 2020 07:01  -  01 Sep 2020 07:00  --------------------------------------------------------  IN:    Oral Fluid: 660 mL  Total IN: 660 mL    OUT:  Total OUT: 0 mL    Total NET: 660 mL      01 Sep 2020 07:01  -  02 Sep 2020 01:14  --------------------------------------------------------  IN:    Oral Fluid: 420 mL  Total IN: 420 mL    OUT:  Total OUT: 0 mL    Total NET: 420 mL            LABS:                        12.5   4.93  )-----------( 332      ( 31 Aug 2020 06:22 )             37.8     08-31    135  |  99  |  9   ----------------------------<  93  4.4   |  27  |  0.51    Ca    9.3      31 Aug 2020 06:22            CAPILLARY BLOOD GLUCOSE      CULTURES:      Physical Examination:    PULM: B/L BS.     CVS: s1/s2, RRR.    ABD: Soft, nondistended, nontender, normoactive bowel sounds.    NEURO: A&Ox3      RADIOLOGY:   < from: MR Head w/wo IV Cont (08.22.20 @ 17:52) >  EXAM:  MR BRAIN [facility]                        PROCEDURE DATE:  08/22/2020    INTERPRETATION:  INDICATIONS:  Altered mental status and seizures with possible autoimmune encephalitis  TECHNIQUE:  Multiplanar imaging was performed using T1 weighted, T2 weighted and FLAIR sequences.  Diffusion weighted and susceptibility sensitive images were also obtained.  Following intravenous gadolinium, multiplanar T1 weighted images were performed. 5 cc Gadavist were administered. 2.5 cc were discarded. The study was performed with seizure protocol including coronal FLAIR, T2-weighted and volumetric T1-weighted images.  COMPARISON EXAMINATION:  Brain MR 8/18/2020  FINDINGS:  VENTRICLES AND SULCI:  Normal.  INTRA-AXIAL:  Again noted is a ribbonlike area of cortical diffusion restriction involving the anterior right frontal lobe. Diffusion restriction is seen more superiorly within the right frontal lobe as well as within the parieto-occipital regions which has progressed mildly from the prior exam. Mild diffusion restriction is seen within the right caudate head and a focal area is seen within the right thalamus. Cortical diffusion restriction is seen within the cerebellum bilaterally left greater than right. These areas demonstrate little in the way of T2 hyperintensity or gyral swelling. No abnormal enhancement is seen.  Small patchy areas of white matter T2 hyperintensity are compatible with mild microvascular-type changes. No hippocampal abnormality is seen.  EXTRA-AXIAL:  No mass or collection.  VISUALIZED SINUSES:  Normal.  VISUALIZED MASTOIDS:  Clear.  CALVARIUM:  Normal.  CAROTID FLOW VOIDS:  Present.  MISCELLANEOUS:  None.  IMPRESSION:  Cortical areas of diffusion restriction within the supratentorial and infratentorial compartments as well as involvement of the right basal ganglia and thalamus. There is apparent mild progression when compared with the prior exam favoring post ictal changes or an infectious/inflammatory etiology such as cerebritis. See prior report for additional considerations. The possibility of  Creutzfeldt-Derrick disease is not entirely excluded given the pattern of involvement.  NELLIE CORCORAN M.D., ATTENDING RADIOLOGIST  This document has been electronically signed. Aug 23 2020 10:41AM    < from: Xray Chest 1 View- PORTABLE-Urgent (08.18.20 @ 23:17) >  EXAM:  XR CHEST PORTABLE URGENT 1V                        PROCEDURE DATE:  08/18/2020    INTERPRETATION:  EXAMINATION: XR CHEST URGENT  CLINICAL INDICATION: Interval placement of endotracheal tube  TECHNIQUE: Single frontal, portable view of the chest was obtained.  COMPARISON: Multiple prior chest x-rays, with the most recent dated 8/17/2020.  FINDINGS:  There is an endotracheal tube, with its tip above the level of the nimesh.  There is an enteric tube, coursing below the diaphragm, with its tip non-visualized below the level of the film.  The size of the cardiomediastinal silhouette is normal.  There are no focal pulmonary consolidations.  There is no pneumothorax or pleural effusion.  IMPRESSION:  No acute cardiopulmonary findings.  PENNY WILSON M.D., RESIDENT RADIOLOGIST  This document has been electronically signed.  MARY ARELLANO M.D., ATTENDING RADIOLOGIST  This document has been electronically signed. Aug 19 2020 10:09AM      < from: [facility] Duplex Lower Ext Vein Scan, Bilat (08.18.20 @ 15:49) >  EXAM:  DUPLEX SCAN EXT VEINS LOWER BI                        PROCEDURE DATE:  08/18/2020    INTERPRETATION:  CLINICAL INFORMATION: A prior examination, dated 7/28/2020, showed left soleal vein DVT, follow-up examination.  TECHNIQUE: Duplex sonography of the BILATERAL LOWER extremity veins with color and spectral Doppler, with and without compression.  FINDINGS: There is edema within the superficial soft tissues of both lower extremities.  There is normal compressibility of thebilateral common femoral, femoral and popliteal veins.  Doppler examination shows normal spontaneous and phasic flow.  The right and left posterior tibial and peroneal veins are patent and free of thrombus.  On this examination the left soleal vein is compressible. Left soleal vein DVT is no longer identified.  IMPRESSION:  Resolved left soleal vein DVT.  No evidence of deep venous thrombosis in either lower extremity.  PETROS SHAY M.D., RADIOLOGY RESIDENT  This document has been electronically signed.  CONNER LINK M.D., ATTENDING RADIOLOGIST  This document has been electronically signed. Aug 18 2020  4:55PM      61yo female with hx of CAD s/p possible MI (ECHO ), lung adenocarcinoma (s/p RML resection 2018), mixed connective tissue disorder (on plaquenil and methylprednisolone), left soleal vein thrombosis (on apixaban, 7/2020), RLE cellulitis (RX doxycycline), CDiff colitis (PO vancomycin 8/3- 8/17), lumbar laminectomy and chronic pain, EtOH (admissions for Etoh withdrawal), alcoholic pancreatitis, opiate abuse, presented to [facility] for acute rehab from [facility] for seizure/CVA  Assessment:  1. Lower back pain  Plan:  2. Patient's chart reviewed and examined at bedside. Pt receives Oxycodone 10mg ER Q8h, not due for it for several more hours. Has received Oxycodone 10mg IR one time dose for breakthrough pain in the last couple of days. Will give one time Oxycodone 10mg IR for breakthrough chronic lower back pain. Will monitor/reassess and determine if any additional management needed at this time.       TIME SPENT: 33 minutes   Evaluating/treating patient, reviewing data/labs/imaging, discussing case with multidisciplinary team, discussing plan/goals of care with patient/family. Non-inclusive of procedure time.

## 2020-09-02 NOTE — PROGRESS NOTE ADULT - ASSESSMENT
59yo female with hx of CAD s/p possible MI (ECHO ), lung adenocarcinoma (s/p RML resection 2018), mixed connective tissue disorder (on plaquenil and methylprednisolone), left soleal vein thrombosis (on apixaban, 7/2020), RLE cellulitis (RX doxycycline), CDiff colitis (PO vancomycin 8/3- 8/17), lumbar laminectomy and chronic pain, EtOH (admissions for Etoh withdrawal), alcoholic pancreatitis, opiate abuse, presented to [facility] for acute rehab from [facility] for seizure/CVA    #Debility  -Comprehensive Multidisciplinary Rehab Program  -Continue comprehensive rehab program, 3 hours a day, 5 days a week.    #Seizures  -Continue Keppra 1g BID, Vimpat 200mg BID, Fosphenytoin 140mg BID  -neurological checks  -Follow up with Epilepsy Dr. Gallegos as outpatient    #CVA  -MRI revealed acute/subacute lacunar infarction within the right medial thalamus along with hypoperfused areas in bilateral frontal, temporal lobes consistent w/ post-ictal events  -Continue ASA 81mg daily  -Follow up with Lipid panel  -Continue Atorvastatin 20mg daily    #Hyponatremia  -Secondary to cerebral salt wasting  -Continue 1L fluid restriction  -Continue Florinef 0.1mg BID  -F/u with nephrology outpatient in 2 weeks (~9/11/20)    #Mixed Connective Tissue Disorder  -Continue Medrol 4mg PO daily (S/P Medrol taper)  -Transition Medrol to home dose of Prednisone  -Continue Plaquenil 200mg every other day    #Pancreatic insufficiency likely 2/2 Chronic Pancreatitis  -Likely chronic pancreatitis, Etoh induced vs mixed connective tissue disorder  -Continue Pancrelipase [id number] TID with meals    #Macrocytic Anemia  -Likely 2/2 EtOH abuse  -Continue multivitamin + Thiamine    #Urinary retention  -Resolved  -Due to neurologic diagnosis and limited mobility  -Voiding by herself    #Venous stasis dermatitis  -Resolves with leg elevation  -RLE skin changes not cellulitis at this time  -Encourage leg elevation   -started compression therapy 8/31, monitor for results     #Secondary hypercoagulable state due to immobility  - hep subq

## 2020-09-02 NOTE — CONSULT NOTE ADULT - ASSESSMENT
Assessment:          FIM scores: Social Interaction ; Problem Solving ; Memory .     Plan: Individual supportive psychotherapy to monitor cognition, affect/mood, and behavior. Cognitive remediation during speech therapy sessions. Participation in recreation/art therapy in order to have pleasure and mastery experiences and regain/reestablish a sense of routine. 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 She evidenced difficulties in retrieval of verbal information, visuospatial skills (visuomotor integration) and aspects of executive functions (initiation), consistent with some involvement of her fronto temporal regions. Her insight and judgment are mildly impaired. Her affect is angry and depressed, and she reports a number of depression and anxiety symptoms as reported above, consistent with a hx of anxiety and possibly depression. She also expresses significant preoccupation and feels overwhelmed at having her pain medications weaned off as per her pain management doctor. FIM scores: Social Interaction 4; Problem Solving 6; Memory 5.     Plan: Individual supportive psychotherapy to monitor cognition, affect/mood, and behavior. Pt will benefit from consultation with psychiatry and consideration for antidepressant medication to address mood symptoms. She might also benefit from referral to a substance abuse program with emphasis on pain medication abuse. Participation in recreation/art therapy in order to have pleasure and mastery experiences and regain/reestablish a sense of routine.

## 2020-09-02 NOTE — PROGRESS NOTE ADULT - ASSESSMENT
Patient is a 59 y/o F with PMH CAD s/p possible MI (ECHO ), lung adenocarcinoma (s/p RML resection 2018), mixed connective tissue disorder (on plaquenil and methylprednisolone), left soleal vein thrombosis (on apixaban, 7/2020), RLE cellulitis (RX doxycycline), CDiff colitis (PO vancomycin 8/3- 8/17), lumbar laminectomy and chronic pain, EtOH (admissions for Etoh withdrawal), alcoholic pancreatitis, opiate abuse, admitted on 8/11/20 to [facility] with symptomatic hyponatremia complicated by generalized seizures, hypoxic respiratory failure, subsequent right medial thalamic infarct and cerebral salt-wasting.       #Comprehensive Multidisciplinary Rehab Program:  - continue comprehensive rehab program, 3 hours a day, 5 days a week, Pt and OT  - neuropsychology and SW consults for mood, substance abuse history and referral  - Precautions: falls, seizures, respiratory, spinal, substance abuse including opiates, long-term steroid    #Seizures  - Low suspicion for EtOH withdrawal seizures given >3 weeks abstinence prior to admission and autoimmune encephalitis given her improvement  - Continue Keppra 1g BID, Vimpat 200mg BID, Fosphenytoin 140mg BID  - neurological checks    #Hyponatremia  - Likely due to cerebral salt wasting per Dr. Simms, nephrologist  - Continue 1L fluid restriction  - Continue Florinef 0.1mg BID  - F/u with nephrology outpatient in 2 weeks (~9/11/20)  -Na 135 8/31    #Mixed Connective Tissue Disorder  - Continue Medrol 4mg PO daily (S/P Medrol taper)  - Transition Medrol to home dose of Prednisone  - Continue Plaquenil 200mg every other day    #Pancreatitis  - Lipase 8/16/20: 13  - Continue Pancrelipase [id number] TID with meals    #Macrocytic Anemia: Likely 2/2 EtOH abuse  - Iron studies 8/17/20: iron 36, TIBC 216, %iron saturation 17, transferrin 167, ferritin 583  - Vit B12 8/16/20: 753  - Folate 8/16/20: >20  -Hgb 12.5 8/31    #Venous Stasis Dermatitis  - Completed antibiotics for RLE cellulitis on previous admission  -  ACE wrap, improved 9/1    #Dry Eyes  - Artificial tears OU BID    #Sleep:   - Melatonin PRN to maximize participation in therapy during the day.   -on Xanax prn for anxiety    #Pain Management: in setting of h/o opioid abuse  - Tylenol. Was on Oxycodone ER 10mg Q8hr at [facility],.  - Contacted pain specialist Dr Hawk; patient had been detoxed and was off all opioids prior to hospitalization. Recommend tapering schedule: oxycodone IR 5 q6 standing x 72 hours, then 5 mg q8 x 72 hours, then 5 mg q12 x 72 hours. He can follow the patient on discharge but does not plan on placing her back on any opioid. Reviewed with patient  -[facility]  accessed 9/2 #[id number]. Patient does not have any controlled substances filled in last 12 months listed  -DC oxycodone ER. Start oxycodone 5 q6 today, reviewed with atpeint    #GI/Bowel:  - H/o C. diff (completed treatment 8/3-8/17)  - Loperamide prn for diarrhea  - GI ppx: Pantoprazole 40mg daily    #Skin/Pressure Injury:   - At risk for pressure injury due to neurologic diagnosis and relative immobility.  - Skin assessment on admission admission: scattered excoriations/abrasions and ecchymoses   - Turn every 2 hours while in bed, air mattress  - Soft heel protectors  - Skin barrier cream as needed  - Nursing to monitor skin Qshift    #Osteoporosis  -pt on alendronate 70mg qweek at home  -resume after disharge    #Code Status  - pt initially DNR at [place] (MOLST in chart on arrival to [facility]), however discussed wishes with patient and she states she would like to be full code.      #Diet:  - regular    #DVT ppx:  - Heparin  - Last Doppler on 8/18/20 (resolved L soleal vein DVT)      #Labs  CBC BMp 9/3  pain management follow up for weaning schedule    REHABILITATION DIAGNOSIS:  Cerebral infarction      COMORBIDITIES/COMPLICATING CONDITIONS IMPACTING REHABILITATION:  HEALTH ISSUES - PROBLEM Dx:  pain management  opioid weaning    PAST MEDICAL & SURGICAL HISTORY:  Lung cancer  Opiate dependence  Alcohol abuse  Adenocarcinoma of lung  Mixed connective tissue disease  Depression H/O panic attack: with anxiety  S/P Laminectomy  Lumbar 3/10  Chronic pain  Diverticulitis of colon (without mention of hemorrhage)  History of laminectomy  History of lung surgery  History of oophorectomy, unilateral: bilateral  History of hip replacement, total, right: 6/7/2020  S/P lumbar laminectomy  S/P cholecystectomy  History of lung cancer: s/p wedge resection of RML      Based upon consideration of the patient's impairments, functional status, complicating conditions and any other contributing factors and after information garnered from the assessments of all therapy disciplines involved in treating the patient and other pertinent clinicians:    INTERDISCIPLINARY REHABILITATION INTERVENTIONS:    [  x ] Transfer Training  [ x  ] Bed Mobility  [ x  ] Therapeutic Exercise  [ x  ] Balance/Coordination Exercises  [  x ] Locomotion training  [x   ] Stairs    [ x  ] Functional transfers  [ x  ] Activities of daily living  [   ] Visual Perceptual training    [  x ] Bowel/Bladder program  [ x  ] Pain Management  [   ] Skin/Wound Care    [   ] Speech/Communication Exercise  [   ] Swallowing Exercises    [   ] Cognitive Exercises  [   ] Cognitive-linguistic Treatment  [  x ] Behavioral Program    [x   ] Patient/Family Counseling  [   ] Family Training  [   ] Community Re-entry  [   ] Orthotic Evaluation/Training  [   ] Prosthetic Eval/Training    MEDICAL PROGNOSIS:  good-    REHAB POTENTIAL:  good-  EXPECTED DAILY THERAPIES:    [  x ] PT 1 hours daily 5 x week  [  x ] OT 1 hours daily 5 x week  [  x ] ST  1 hour daily 5 x week    EXPECTED INTENSITY OF PROGRAM:  3 hours daily    EXPECTED FREQUENCY OF PROGRAM:  5 x week    ESTIMATED LOS:  10 days    ESTIMATED DISPOSITION:  home with outpatient PT, pain management followup    INTERDISCIPLINARY FUNCTIONAL OUTCOMES/GOALS:         Gait/Mobility: mod indepnedent with SAC       Transfers: mod independent with SAC       ADLs: mod independent       Functional Transfers: mod independent       Medication Management: supervision       Communication: independent       Cognitive: independent       Nutrition: regular consistency diet       Bladder: continent       Bowel: continent      ---------------  Code Status/Emergency Contact:  Anita River (cousin) [facility] - [phone]    Outpatient Follow-up (Specialty/Name of physician):  PCP, Dr. Tim Davis, [address] [place] [address], [address], [place], [address], [phone]  Nephrology, Dr. Zuly Simms, [address] [place], [place], [phone]  Epilepsy, Dr. Gallegos, [address] [place] [address], [phone]  Neurology, Dr. Alida King, [address] [place] [address], [phone] Patient is a 59 y/o F with PMH CAD s/p possible MI (ECHO ), lung adenocarcinoma (s/p RML resection 2018), mixed connective tissue disorder (on plaquenil and methylprednisolone), left soleal vein thrombosis (on apixaban, 7/2020), RLE cellulitis (RX doxycycline), CDiff colitis (PO vancomycin 8/3- 8/17), lumbar laminectomy and chronic pain, EtOH (admissions for Etoh withdrawal), alcoholic pancreatitis, opiate abuse, admitted on 8/11/20 to [facility] with symptomatic hyponatremia complicated by generalized seizures, hypoxic respiratory failure, subsequent right medial thalamic infarct and cerebral salt-wasting.     #Comprehensive Multidisciplinary Rehab Program:  - continue comprehensive rehab program, 3 hours a day, 5 days a week, Pt and OT  - neuropsychology and SW consults for mood, substance abuse history and referral. May need detox center referral on discharge  - Precautions: falls, seizures, respiratory, spinal, substance abuse including opiates, long-term steroid    #Seizures  - Low suspicion for EtOH withdrawal seizures given >3 weeks abstinence prior to admission and autoimmune encephalitis given her improvement  - Continue Keppra 1g BID, Vimpat 200mg BID, Fosphenytoin 140mg BID  - dilantin level in AM 9/3    #Hyponatremia likely due to cerebral salt wasting   - Continue 1L fluid restriction  - Continue Florinef 0.1mg BID  - F/u with nephrology outpatient in 2 weeks (~9/11/20)  -Na 135 8/31  -BMp 9/3    #Mixed Connective Tissue Disorder  - Continue Medrol 4mg PO daily (S/P Medrol taper). Follow with hospitalist re: transition Medrol to home dose of Prednisone in preparation for dc  - Continue Plaquenil 200mg every other day    #Pancreatitis  - Lipase 8/16/20: 13  - Continue Pancrelipase [id number] TID with meals    #Macrocytic Anemia: Likely 2/2 EtOH abuse  - Iron studies 8/17/20: iron 36, TIBC 216, %iron saturation 17, transferrin 167, ferritin 583  - Vit B12 8/16/20: 753  - Folate 8/16/20: >20  -Hgb 12.5 8/31  -CBC in AM 9/3    #Venous Stasis Dermatitis  - Completed antibiotics for RLE cellulitis on previous admission  -  ACE wrap PRN    #Dry Eyes  - Artificial tears OU BID    #Anxiety:  -on Xanax prn for anxiety  -no xanax found in  despite patient's claims she was on at home. Will request psychiatry consult  -psychology for supportive counseling    #Pain Management: in setting of h/o opioid abuse  - Tylenol. Was on Oxycodone ER 10mg Q8hr at [facility],.  -increase gabapentin 300 q8 to 600 q8 9/2  - Contacted pain specialist Dr Hawk; patient had been detoxed and was off all opioids prior to hospitalization. Recommend tapering schedule: oxycodone IR 5 q6 standing x 72 hours, then 5 mg q8 x 72 hours, then 5 mg q12 x 72 hours. He can follow the patient on discharge but does not plan on placing her back on any opioid. Reviewed with patient  -may need to consider detox center  -[facility]  accessed 9/2 #[id number]. Patient does not have any controlled substances filled in last 12 months listed  -DC oxycodone ER. Start oxycodone 5 q6 today as part of detox schedule, reviewed with patient    #GI/Bowel:  - H/o C. diff (completed treatment 8/3-8/17)  - Loperamide prn for diarrhea  - GI ppx: Pantoprazole 40mg daily    #Osteoporosis  -pt on alendronate 70mg qweek at home  -resume after disharge    #Code Status  - pt initially DNR at [place] (MOLST in chart on arrival to [facility]), however discussed wishes with patient and she states she would like to be full code.      #Diet:  - regular    #DVT ppx:  - Heparin  - Last Doppler on 8/18/20 (resolved L soleal vein DVT)    #Case discussed in IDT rounds 9/2:  supervision/CG transfers, min assist shower transfers, supervision bed mobility, ambulates 60 feet RW and 30 feet SAC with supervision  -goals: mod independent bed mobility, transfers, ambulation and bADLs  -target: dc home 9/5/20 with outpatient PT unless patient placed in detox program    #Labs  CBC BMp 9/3  pain management/detox   psychology  psychiatry        ---------------  Code Status/Emergency Contact:  Anita River (cousin) [facility] - [phone]    Outpatient Follow-up (Specialty/Name of physician):  PCP, Dr. Tim Davis, [address] [facility], [address], [place], [address], [phone]  Nephrology, Dr. Zuly Simms, [address] [place] [address], [phone]  Epilepsy, Dr. Gallegos, [address] [facility], [phone]  Neurology, Dr. Alida King, [address] [place] [address], [phone] Patient is a 61 y/o F with PMH CAD s/p possible MI (ECHO ), lung adenocarcinoma (s/p RML resection 2018), mixed connective tissue disorder (on plaquenil and methylprednisolone), left soleal vein thrombosis (on apixaban, 7/2020), RLE cellulitis (RX doxycycline), CDiff colitis (PO vancomycin 8/3- 8/17), lumbar laminectomy and chronic pain, EtOH (admissions for Etoh withdrawal), alcoholic pancreatitis, opiate abuse, admitted on 8/11/20 to [facility] with symptomatic hyponatremia complicated by generalized seizures, hypoxic respiratory failure, subsequent right medial thalamic infarct and cerebral salt-wasting.     #Comprehensive Multidisciplinary Rehab Program:  - continue comprehensive rehab program, 3 hours a day, 5 days a week, Pt and OT  - neuropsychology and SW consults for mood, substance abuse history and referral. May need detox center referral on discharge  - Precautions: falls, seizures, respiratory, spinal, substance abuse including opiates, long-term steroid    #Seizures  - Low suspicion for EtOH withdrawal seizures given >3 weeks abstinence prior to admission and autoimmune encephalitis given her improvement  - Continue Keppra 1g BID, Vimpat 200mg BID, Fosphenytoin 140mg BID  - dilantin level in AM 9/3    #Hyponatremia likely due to cerebral salt wasting   - Continue 1L fluid restriction  - Continue Florinef 0.1mg BID  - F/u with nephrology outpatient in 2 weeks (~9/11/20)  -Na 135 8/31  -BMp 9/3    #Mixed Connective Tissue Disorder  - Continue Medrol 4mg PO daily (S/P Medrol taper). Follow with hospitalist re: transition Medrol to home dose of Prednisone in preparation for dc  - Continue Plaquenil 200mg every other day    #Pancreatitis  - Lipase 8/16/20: 13  - Continue Pancrelipase [id number] TID with meals    #Macrocytic Anemia: Likely 2/2 EtOH abuse  - Iron studies 8/17/20: iron 36, TIBC 216, %iron saturation 17, transferrin 167, ferritin 583  - Vit B12 8/16/20: 753  - Folate 8/16/20: >20  -Hgb 12.5 8/31  -CBC in AM 9/3    #Venous Stasis Dermatitis  - Completed antibiotics for RLE cellulitis on previous admission  -  ACE wrap PRN    #Dry Eyes  - Artificial tears OU BID    #Anxiety:  -on Xanax prn for anxiety  -no xanax found in  despite patient's claims she was on at home. Will request psychiatry consult  -psychology for supportive counseling    #Pain Management: in setting of h/o opioid abuse  - Tylenol. Was on Oxycodone ER 10mg Q8hr at [facility],.  -increase gabapentin 300 q8 to 600 q8 9/2  - Contacted pain specialist Dr Hawk; patient had been detoxed and was off all opioids prior to hospitalization. Recommend tapering schedule: oxycodone IR 5 q6 standing x 72 hours, then 5 mg q8 x 72 hours, then 5 mg q12 x 72 hours. He can follow the patient on discharge but does not plan on placing her back on any opioid. Reviewed with patient  -may need to consider detox center  -[facility]  accessed 9/2 #[id number]. Patient does not have any controlled substances filled in last 12 months listed  -DC oxycodone ER. Start oxycodone 5 q6 today as part of detox schedule, reviewed with patient    #GI/Bowel:  - H/o C. diff (completed treatment 8/3-8/17)  - Loperamide prn for diarrhea  - GI ppx: Pantoprazole 40mg daily    #Osteoporosis  -pt on alendronate 70mg qweek at home  -resume after disharge    #Code Status  - pt initially DNR at [place] (MOLST in chart on arrival to [facility]), however discussed wishes with patient and she states she would like to be full code.      #Diet:  - regular    #DVT ppx:  - Heparin  - Last Doppler on 8/18/20 (resolved L soleal vein DVT)    #Case discussed in IDT rounds 9/2:  supervision/CG transfers, min assist shower transfers, supervision bed mobility, ambulates 60 feet RW and 30 feet SAC with supervision  -goals: mod independent bed mobility, transfers, ambulation and bADLs  -target: dc home 9/5/20 with outpatient PT unless patient placed in detox program    #Labs  CBC BMp, dilantin level 9/3  pain management/detox   psychology  psychiatry        ---------------  Code Status/Emergency Contact:  Anita River (cousin) [facility] - [phone]    Outpatient Follow-up (Specialty/Name of physician):  PCP, Dr. Tim Davis, [address] [facility], [address], [place], [address], [phone]  Nephrology, Dr. Zuly Simms, [address] [place] [address], [phone]  Epilepsy, Dr. Gallegos, [address] [facility], [phone]  Neurology, Dr. Alida King, [address] [place] [address], [phone]

## 2020-09-02 NOTE — CONSULT NOTE ADULT - SUBJECTIVE AND OBJECTIVE BOX
Pt is a 61 y/o F with PMH CAD s/p possible MI (ECHO ), lung adenocarcinoma (s/p RML resection 2018), mixed connective tissue disorder (on plaquenil and methylprednisolone), left soleal vein thrombosis (on apixaban, 7/2020), RLE cellulitis (RX doxycycline), CDiff colitis (PO vancomycin 8/3- 8/17), lumbar laminectomy and chronic pain, EtOH (admissions for Etoh withdrawal), alcoholic pancreatitis, opiate abuse, admitted on 8/11/20 to [facility] with symptomatic hyponatremia (Na 121) secondary to psychogenic polydipsia. She was treated with an appropriate rate of correction in serum Na. Patient was later noted to have focal seizure activity of LUE on 8/13 which broke with IV lorazepam; seizure activity believed to be secondary to benzodiazepine withdrawal. EEG 8/13 showed intermittent sharp and polyspike wave discharges with generalized slowing with patient at risk for seizures, and patient started on Keppra although she continued to have periodic seizure activity. A code stroke was called 8/15/20 for right facial droop. CT head unremarkable for acute pathology. 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 On 8/16, RRT called because patient found to be unresponsive with generalized seizure activity and left UE/LE/facial twitching. Seizure activity would break transiently with IV lorazepam but recurred repetitively. After 8mg lorazepam, she began to lose airway protection abilities and developed hypoxemia (SpO2 70s). She was emergently intubated and given keppra load 1000mg, vimpat load 200 mg and propofol 10 mcg/kg/min infusion, which successfully suppressed her seizures. Found to have new fever of 101.6 F and was hypotensive post-intubation, started on levophed drip. She was transferred to [facility] for EEG 8/18/20, which showed epileptic focus in right frontal region. Weaned off pressors 8/18/20. MRI was performed which revealed acute/subacute lacunar infarction within the right medial thalamus along with hypoperfused areas in bilateral frontal, temporal lobes consistent w/ post-ictal events. LP on 8/19/20 negative for CNS infection. Extubated 8/20/20. She was found to have hyponatremia post-extubation, likely signifying cerebral salt wasting (high urine output, low BP, and high urine sodium and hypovolemia). She was started on 2% saline drip and given 1 dose of Florinef. Once sodium was stabilized, 2% saline was stopped. Antibiotics were stopped as she showed no signs of infection. MRI w/wo contrast was performed. She was downgraded from ICU 8/23/20. Patient was transferred to [facility] 8/28/20. PMHx:   . Current meds: Please see list in Pt’s chart. Social Hx:      Findings: Pt was seen for an initial assessment of his/her cognitive and emotional functioning. MoCA was administered; Pt’s results (/30) were in the range. His/Her scores in mood measures suggested levels of anxiety and depression (GAD7 = /21; PHQ9 = /27). Pt was alert,  Ox3, and cooperative.     Attn/Conc: Simple auditory attention - . Sustained auditory attention - . Concentration - . Working memory for calculations – ( /5 serial 7s). Language: Pt’s speech was of  . Naming - . Sentence repetition - . Phonemic fluency - .Memory: Encoding of 5 words was (/5); delayed verbal recall – (/5, improving to /5 with cueing). LTM was for US presidents (/4, improving to /4 with cueing). Visual memory –. Visuospatial: Visuomotor integration – for copy of a 3D figure, was noted. Executive Functions: Set-shifting - . Organizational skills - . Abstract reasoning - . Initiation - . Self-awareness - . Verbal problem solving – .      Emotional functioning: Affect - . Mood - ; Pt reported experiencing . On mood measures s/he additionally reported .  Thought processes were.  No abnormal thought contents were observed.  Pt denied any AH/VH. Pt also denied SI/HI/I/P. Insight - WFL. Judgment - fair. Pt is a 61 y/o right-handed female with PMHx of CAD s/p possible MI (ECHO ), lung adenocarcinoma (s/p RML resection 2018), mixed connective tissue disorder (on plaquenil and methylprednisolone), left soleal vein thrombosis (on apixaban, 7/2020), RLE cellulitis (RX doxycycline), CDiff colitis (PO vancomycin 8/3- 8/17), lumbar laminectomy and chronic pain, EtOH (admissions for Etoh withdrawal), alcoholic pancreatitis, opiate abuse, admitted on 8/11/20 to [facility] with symptomatic hyponatremia (Na 121) secondary to psychogenic polydipsia. She was treated with an appropriate rate of correction in serum Na. Pt was later noted to have focal seizure activity of LUE on 8/13 which broke with IV lorazepam; seizure activity believed to be secondary to benzodiazepine withdrawal. EEG 8/13 showed intermittent sharp and polyspike wave discharges with generalized slowing with patient at risk for seizures, and Pt started on Keppra although she continued to have periodic seizure activity. A code stroke was called 8/15/20 for right facial droop. CT head unremarkable for acute pathology. 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 On 8/16, RRT called because Pt was found to be unresponsive with generalized seizure activity and left UE/LE/facial twitching. Seizure activity would break transiently with IV lorazepam but recurred repetitively. After 8mg lorazepam, she began to lose airway protection abilities and developed hypoxemia (SpO2 70s). She was emergently intubated and given keppra load 1000mg, vimpat load 200 mg and propofol 10 mcg/kg/min infusion, which successfully suppressed her seizures. Found to have new fever of 101.6 F and was hypotensive post-intubation, started on levophed drip. She was transferred to [facility] for EEG 8/18/20, which showed epileptic focus in right frontal region. Weaned off pressors 8/18/20. MRI was performed which revealed acute/subacute lacunar infarction within the right medial thalamus along with hypoperfused areas in bilateral frontal, temporal lobes consistent w/ post-ictal events. LP on 8/19/20 negative for CNS infection. Extubated 8/20/20. She was found to have hyponatremia post-extubation, likely signifying cerebral salt wasting (high urine output, low BP, and high urine sodium and hypovolemia). She was started on 2% saline drip and given 1 dose of Florinef. Once sodium was stabilized, 2% saline was stopped. Antibiotics were stopped as she showed no signs of infection. MRI w/wo contrast was performed. She was downgraded from ICU 8/23/20. Pt was transferred to [facility] 8/28/20. PMHx: As noted above. Current meds: Xanax 0.25 mg BID PRN anxiety. Please see list in Pt’s chart. Social Hx:  Pt is single and lives alone. She has college level education and is disabled since 2012 due to back problems. Pt has hx of anxiety, as well as extensive hx of alcohol and opiod medication substance abuse; she drinks vodka on a daily basis. Pt quit smoking cigarettes in 2018, and vapes since then. She is not Orthodoxy. Pt enjoys walking her dog.    Findings: Pt was seen for an initial assessment of her cognitive and emotional functioning. MoCA was administered; Pt’s results (25/30) were in the Mild Impairment range. Her scores in mood measures suggested moderate levels of anxiety and mild of depression (GAD7 = 15/21; PHQ9 = 9/27). Pt was alert, closely Ox3 (disoriented to the exact date), and cooperative.   Attn/Conc: Simple auditory attention - intact. Sustained auditory attention - intact. Concentration - intact. Working memory for calculations – intact (5/5 serial 7s). Language: Pt’s speech was of normal volume, pitch and pace. Naming - intact. Sentence repetition - intact. Phonemic fluency - mildly impaired. Memory: Encoding of 5 words was intact (5/5); delayed verbal recall – mildly impaired (3/5, improving to 5/5 with cueing). LTM was intact for US presidents (4/4). Visual memory – intact. Visuospatial: Visuomotor integration – impaired for copy of a 3D figure, sloppiness and distortion were noted. Executive Functions: Set-shifting - intact. Organizational skills - intact. Abstract reasoning - intact. Initiation - mildly impaired. Verbal problem solving – closely intact.  Emotional functioning: Affect - angry/depresssed. Mood - dysphoric ("bad"); Pt reported experiencing hopelessness/helplessness, poor sleep, and low energy. On mood measures she additionally reported very frequent worry, inability to control her worries, difficulty relaxing, poor sleep and low self-esteem; frequent anxiety and irritability; and occasional restlessness, catastrophization, depressed mood, fatigue/low energy and poor appetite. Thought processes were goal-directed. No abnormal thought contents were observed  except for preoccupation with access to pain medication.  Pt denied any AH/VH. Pt also denied SI/HI/I/P; however, she stated that she would be unable to cope with reduction in pain medication while at home and might resume and intensify use of alcohol. Insight - fair. Judgment - fair.

## 2020-09-02 NOTE — PROGRESS NOTE ADULT - SUBJECTIVE AND OBJECTIVE BOX
Patient is a 60y old  Female who presents with a chief complaint of hyponatremia, generalized seizures and hypoxic respiratory failure, right thalamic CVA  01.4 (02 Sep 2020 10:03)      HPI:  Patient is a 59 y/o F with PMH CAD s/p possible MI (ECHO ), lung adenocarcinoma (s/p RML resection 2018), mixed connective tissue disorder (on plaquenil and methylprednisolone), left soleal vein thrombosis (on apixaban, 7/2020), RLE cellulitis (RX doxycycline), CDiff colitis (PO vancomycin 8/3- 8/17), lumbar laminectomy and chronic pain, EtOH (admissions for Etoh withdrawal), alcoholic pancreatitis, opiate abuse, admitted on 8/11/20 to [facility] with symptomatic hyponatremia (Na 121) secondary to psychogenic polydipsia. She was treated with an appropriate rate of correction in serum Na.    Patient was lAter noted to have focal seizure activity of LUE on 8/13 which broke with IV lorazepam; seizure activity believed to be secondary to benzodiazepine withdrawal. EEG 8/13 showed intermittent sharp and polyspike wave discharges with generalized slowing with patient at risk for seizures, and patient started on Keppra although she continued to have periodic seizure activity. A code stroke was called 8/15/20 for right facial droop. CT head unremarkable for acute pathology. 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     On 8/16, RRT called because patient found to be unresponsive with generalized seizure activity and left UE/LE/facial twitching. Seizure activity would break transiently with IV lorazepam but recurred repetitively. After 8mg lorazepam, she began to lose airway protection abilities and developed hypoxemia (SpO2 70s). She was emergently intubated and given keppra load 1000mg, vimpat load 200 mg and propofol 10 mcg/kg/min infusion, which successfully suppressed her seizures. Found to have new fever of 101.6 F and was hypotensive post-intubation, started on levophed drip.      She was transferred to [facility] for EEG 8/18/20, which showed epileptic focus in right frontal region. Weaned off pressors 8/18/20. MRI was performed which revealed acute/subacute lacunar infarction within the right medial thalamus along with hypoperfused areas in bilateral frontal, temporal lobes consistent w/ post-ictal events. LP on 8/19/20 negative for CNS infection. Extubated 8/20/20. She was found to have hyponatremia post-extubation, likely signifying cerebral salt wasting (high urine output, low BP, and high urine sodium and hypovolemia). She was started on 2% saline drip and given 1 dose of Florinef. Once sodium was stabilized, 2% saline was stopped. Antibiotics were stopped as she showed no signs of infection. MRI w/wo contrast was performed. She was downgraded from ICU 8/23/20.     Patient was transferred to [facility] 8/28/20. (28 Aug 2020 15:02)      PAST MEDICAL & SURGICAL HISTORY:  Lung cancer  Opiate dependence  Alcohol abuse  Adenocarcinoma of lung  Mixed connective tissue disease  Depression H/O panic attack: with anxiety  S/P Laminectomy  Lumbar 3/10  Chronic pain  Diverticulitis of colon (without mention of hemorrhage)  History of laminectomy  History of lung surgery  History of oophorectomy, unilateral: bilateral  History of hip replacement, total, right: 6/7/2020  S/P lumbar laminectomy  S/P cholecystectomy  History of lung cancer: s/p wedge resection of RML      MEDICATIONS  (STANDING):  aspirin  chewable 81 milliGRAM(s) Oral daily  atorvastatin 20 milliGRAM(s) Oral at bedtime  fludroCORTISONE 0.1 milliGRAM(s) Oral every 12 hours  gabapentin 600 milliGRAM(s) Oral three times a day  heparin   Injectable 5000 Unit(s) SubCutaneous every 8 hours  hydroxychloroquine 200 milliGRAM(s) Oral <User Schedule>  lacosamide 200 milliGRAM(s) Oral two times a day  levETIRAcetam 1000 milliGRAM(s) Oral two times a day  methylPREDNISolone 4 milliGRAM(s) Oral daily  multivitamin 1 Tablet(s) Oral daily  oxyCODONE    IR 5 milliGRAM(s) Oral every 6 hours  pancrelipase  (CREON 12,000 Lipase Units) 1 Capsule(s) Oral three times a day with meals  pantoprazole    Tablet 40 milliGRAM(s) Oral before breakfast  phenytoin   Capsule 130 milliGRAM(s) Oral two times a day  thiamine 100 milliGRAM(s) Oral daily    MEDICATIONS  (PRN):  acetaminophen   Tablet .. 650 milliGRAM(s) Oral every 6 hours PRN Temp greater or equal to 38C (100.4F), Mild Pain (1 - 3)  ALPRAZolam 0.25 milliGRAM(s) Oral two times a day PRN anxiety  artificial tears (preservative free) Ophthalmic Solution 1 Drop(s) Both EYES every 4 hours PRN Dry Eyes  loperamide 2 milliGRAM(s) Oral every 6 hours PRN Diarrhea  melatonin 3 milliGRAM(s) Oral at bedtime PRN Insomnia      Allergies    penicillin (Other)  penicillin (Swelling)    Intolerances          VITALS  60y  Vital Signs Last 24 Hrs  T(C): 36.7 (01 Sep 2020 20:23), Max: 36.7 (01 Sep 2020 20:23)  T(F): 98 (01 Sep 2020 20:23), Max: 98 (01 Sep 2020 20:23)  HR: 79 (01 Sep 2020 20:23) (79 - 79)  BP: 98/63 (01 Sep 2020 20:23) (98/63 - 98/63)  BP(mean): --  RR: 16 (01 Sep 2020 20:23) (16 - 16)  SpO2: 95% (01 Sep 2020 20:23) (95% - 95%)  Daily     Daily         RECENT LABS:                      CAPILLARY BLOOD GLUCOSE

## 2020-09-02 NOTE — PROGRESS NOTE ADULT - SUBJECTIVE AND OBJECTIVE BOX
Patient is a 60y old  Female who presents with a chief complaint of hyponatremia, generalized seizures and hypoxic respiratory failure, right thalamic CVA  01.4 (01 Sep 2020 11:09)      Patient seen and examined at bedside.    ALLERGIES:  penicillin (Other)  penicillin (Swelling)    MEDICATIONS  (STANDING):  aspirin  chewable 81 milliGRAM(s) Oral daily  atorvastatin 20 milliGRAM(s) Oral at bedtime  fludroCORTISONE 0.1 milliGRAM(s) Oral every 12 hours  gabapentin 300 milliGRAM(s) Oral three times a day  heparin   Injectable 5000 Unit(s) SubCutaneous every 8 hours  hydroxychloroquine 200 milliGRAM(s) Oral <User Schedule>  lacosamide 200 milliGRAM(s) Oral two times a day  levETIRAcetam 1000 milliGRAM(s) Oral two times a day  methylPREDNISolone 4 milliGRAM(s) Oral daily  multivitamin 1 Tablet(s) Oral daily  oxyCODONE  ER Tablet 10 milliGRAM(s) Oral every 8 hours  pancrelipase  (CREON 12,000 Lipase Units) 1 Capsule(s) Oral three times a day with meals  pantoprazole    Tablet 40 milliGRAM(s) Oral before breakfast  phenytoin   Capsule 130 milliGRAM(s) Oral two times a day  thiamine 100 milliGRAM(s) Oral daily    MEDICATIONS  (PRN):  acetaminophen   Tablet .. 650 milliGRAM(s) Oral every 6 hours PRN Temp greater or equal to 38C (100.4F), Mild Pain (1 - 3)  ALPRAZolam 0.25 milliGRAM(s) Oral two times a day PRN anxiety  artificial tears (preservative free) Ophthalmic Solution 1 Drop(s) Both EYES every 4 hours PRN Dry Eyes  loperamide 2 milliGRAM(s) Oral every 6 hours PRN Diarrhea  melatonin 3 milliGRAM(s) Oral at bedtime PRN Insomnia    Vital Signs Last 24 Hrs  T(F): 98 (01 Sep 2020 20:23), Max: 98 (01 Sep 2020 20:23)  HR: 79 (01 Sep 2020 20:23) (79 - 79)  BP: 98/63 (01 Sep 2020 20:23) (98/63 - 98/63)  RR: 16 (01 Sep 2020 20:23) (16 - 16)  SpO2: 95% (01 Sep 2020 20:23) (95% - 95%)  I&O's Summary    01 Sep 2020 07:01  -  02 Sep 2020 07:00  --------------------------------------------------------  IN: 420 mL / OUT: 0 mL / NET: 420 mL    02 Sep 2020 07:01  -  02 Sep 2020 10:04  --------------------------------------------------------  IN: 120 mL / OUT: 0 mL / NET: 120 mL        PHYSICAL EXAM:  General: NAD, A/O x 3  ENT: MMM  Neck: Supple, No JVD  Lungs: Clear to auscultation bilaterally  Cardio: RRR, S1/S2, No murmurs  Abdomen: Soft, Nontender, Nondistended; Bowel sounds present  Extremities: No calf tenderness, No pitting edema    LABS:                        12.5   4.93  )-----------( 332      ( 31 Aug 2020 06:22 )             37.8       08-31    135  |  99  |  9   ----------------------------<  93  4.4   |  27  |  0.51    Ca    9.3      31 Aug 2020 06:22       eGFR if Non African American: 105 mL/min/1.73M2 (08-31-20 @ 06:22)  eGFR if African American: 121 mL/min/1.73M2 (08-31-20 @ 06:22)             08-30 Chol 242 mg/dL  mg/dL HDL 95 mg/dL Trig 112 mg/dL                        RADIOLOGY & ADDITIONAL TESTS:    Care Discussed with Consultants/Other Providers:

## 2020-09-03 ENCOUNTER — TRANSCRIPTION ENCOUNTER (OUTPATIENT)
Age: 60
End: 2020-09-03

## 2020-09-03 DIAGNOSIS — F10.11 ALCOHOL ABUSE, IN REMISSION: ICD-10-CM

## 2020-09-03 DIAGNOSIS — F11.20 OPIOID DEPENDENCE, UNCOMPLICATED: ICD-10-CM

## 2020-09-03 DIAGNOSIS — F41.9 ANXIETY DISORDER, UNSPECIFIED: ICD-10-CM

## 2020-09-03 LAB
ANION GAP SERPL CALC-SCNC: 12 MMOL/L — SIGNIFICANT CHANGE UP (ref 5–17)
BUN SERPL-MCNC: 9 MG/DL — SIGNIFICANT CHANGE UP (ref 7–23)
CALCIUM SERPL-MCNC: 9.1 MG/DL — SIGNIFICANT CHANGE UP (ref 8.4–10.5)
CHLORIDE SERPL-SCNC: 99 MMOL/L — SIGNIFICANT CHANGE UP (ref 96–108)
CO2 SERPL-SCNC: 25 MMOL/L — SIGNIFICANT CHANGE UP (ref 22–31)
CREAT SERPL-MCNC: 0.57 MG/DL — SIGNIFICANT CHANGE UP (ref 0.5–1.3)
GLUCOSE SERPL-MCNC: 99 MG/DL — SIGNIFICANT CHANGE UP (ref 70–99)
HCT VFR BLD CALC: 35.2 % — SIGNIFICANT CHANGE UP (ref 34.5–45)
HGB BLD-MCNC: 10.9 G/DL — LOW (ref 11.5–15.5)
MCHC RBC-ENTMCNC: 31 GM/DL — LOW (ref 32–36)
MCHC RBC-ENTMCNC: 33.7 PG — SIGNIFICANT CHANGE UP (ref 27–34)
MCV RBC AUTO: 109 FL — HIGH (ref 80–100)
MISCELLANEOUS TEST NAME: SIGNIFICANT CHANGE UP
MISCELLANEOUS, NORMALS: SIGNIFICANT CHANGE UP
MISCELLANEOUS, RESULT: SIGNIFICANT CHANGE UP
MISCELLANEOUS, RESULT: SIGNIFICANT CHANGE UP
NRBC # BLD: 0 /100 WBCS — SIGNIFICANT CHANGE UP (ref 0–0)
OB PNL STL: NEGATIVE — SIGNIFICANT CHANGE UP
PHENYTOIN FREE SERPL-MCNC: 7.8 UG/ML — LOW (ref 10–20)
PLATELET # BLD AUTO: 272 K/UL — SIGNIFICANT CHANGE UP (ref 150–400)
POTASSIUM SERPL-MCNC: 4.4 MMOL/L — SIGNIFICANT CHANGE UP (ref 3.5–5.3)
POTASSIUM SERPL-SCNC: 4.4 MMOL/L — SIGNIFICANT CHANGE UP (ref 3.5–5.3)
RBC # BLD: 3.23 M/UL — LOW (ref 3.8–5.2)
RBC # FLD: 13.3 % — SIGNIFICANT CHANGE UP (ref 10.3–14.5)
SODIUM SERPL-SCNC: 136 MMOL/L — SIGNIFICANT CHANGE UP (ref 135–145)
WBC # BLD: 4.84 K/UL — SIGNIFICANT CHANGE UP (ref 3.8–10.5)
WBC # FLD AUTO: 4.84 K/UL — SIGNIFICANT CHANGE UP (ref 3.8–10.5)

## 2020-09-03 PROCEDURE — 90792 PSYCH DIAG EVAL W/MED SRVCS: CPT

## 2020-09-03 PROCEDURE — 90832 PSYTX W PT 30 MINUTES: CPT

## 2020-09-03 PROCEDURE — 99232 SBSQ HOSP IP/OBS MODERATE 35: CPT

## 2020-09-03 PROCEDURE — 99233 SBSQ HOSP IP/OBS HIGH 50: CPT

## 2020-09-03 RX ADMIN — HEPARIN SODIUM 5000 UNIT(S): 5000 INJECTION INTRAVENOUS; SUBCUTANEOUS at 22:44

## 2020-09-03 RX ADMIN — OXYCODONE HYDROCHLORIDE 5 MILLIGRAM(S): 5 TABLET ORAL at 23:09

## 2020-09-03 RX ADMIN — Medication 130 MILLIGRAM(S): at 05:09

## 2020-09-03 RX ADMIN — OXYCODONE HYDROCHLORIDE 5 MILLIGRAM(S): 5 TABLET ORAL at 18:15

## 2020-09-03 RX ADMIN — OXYCODONE HYDROCHLORIDE 5 MILLIGRAM(S): 5 TABLET ORAL at 06:01

## 2020-09-03 RX ADMIN — OXYCODONE HYDROCHLORIDE 5 MILLIGRAM(S): 5 TABLET ORAL at 00:01

## 2020-09-03 RX ADMIN — PANTOPRAZOLE SODIUM 40 MILLIGRAM(S): 20 TABLET, DELAYED RELEASE ORAL at 05:09

## 2020-09-03 RX ADMIN — ATORVASTATIN CALCIUM 20 MILLIGRAM(S): 80 TABLET, FILM COATED ORAL at 22:43

## 2020-09-03 RX ADMIN — LACOSAMIDE 200 MILLIGRAM(S): 50 TABLET ORAL at 05:09

## 2020-09-03 RX ADMIN — OXYCODONE HYDROCHLORIDE 5 MILLIGRAM(S): 5 TABLET ORAL at 17:33

## 2020-09-03 RX ADMIN — Medication 1 CAPSULE(S): at 11:23

## 2020-09-03 RX ADMIN — OXYCODONE HYDROCHLORIDE 5 MILLIGRAM(S): 5 TABLET ORAL at 23:30

## 2020-09-03 RX ADMIN — GABAPENTIN 600 MILLIGRAM(S): 400 CAPSULE ORAL at 22:44

## 2020-09-03 RX ADMIN — Medication 81 MILLIGRAM(S): at 11:22

## 2020-09-03 RX ADMIN — OXYCODONE HYDROCHLORIDE 5 MILLIGRAM(S): 5 TABLET ORAL at 11:21

## 2020-09-03 RX ADMIN — FLUDROCORTISONE ACETATE 0.1 MILLIGRAM(S): 0.1 TABLET ORAL at 17:35

## 2020-09-03 RX ADMIN — Medication 1 CAPSULE(S): at 17:35

## 2020-09-03 RX ADMIN — GABAPENTIN 600 MILLIGRAM(S): 400 CAPSULE ORAL at 05:09

## 2020-09-03 RX ADMIN — OXYCODONE HYDROCHLORIDE 5 MILLIGRAM(S): 5 TABLET ORAL at 05:01

## 2020-09-03 RX ADMIN — HEPARIN SODIUM 5000 UNIT(S): 5000 INJECTION INTRAVENOUS; SUBCUTANEOUS at 05:10

## 2020-09-03 RX ADMIN — Medication 0.25 MILLIGRAM(S): at 16:29

## 2020-09-03 RX ADMIN — GABAPENTIN 600 MILLIGRAM(S): 400 CAPSULE ORAL at 13:04

## 2020-09-03 RX ADMIN — Medication 100 MILLIGRAM(S): at 11:22

## 2020-09-03 RX ADMIN — HEPARIN SODIUM 5000 UNIT(S): 5000 INJECTION INTRAVENOUS; SUBCUTANEOUS at 13:04

## 2020-09-03 RX ADMIN — Medication 0.25 MILLIGRAM(S): at 04:27

## 2020-09-03 RX ADMIN — OXYCODONE HYDROCHLORIDE 5 MILLIGRAM(S): 5 TABLET ORAL at 12:00

## 2020-09-03 RX ADMIN — Medication 4 MILLIGRAM(S): at 05:09

## 2020-09-03 RX ADMIN — Medication 1 TABLET(S): at 11:22

## 2020-09-03 RX ADMIN — LEVETIRACETAM 1000 MILLIGRAM(S): 250 TABLET, FILM COATED ORAL at 17:34

## 2020-09-03 RX ADMIN — Medication 1 CAPSULE(S): at 08:26

## 2020-09-03 RX ADMIN — Medication 130 MILLIGRAM(S): at 17:34

## 2020-09-03 RX ADMIN — LACOSAMIDE 200 MILLIGRAM(S): 50 TABLET ORAL at 17:35

## 2020-09-03 RX ADMIN — FLUDROCORTISONE ACETATE 0.1 MILLIGRAM(S): 0.1 TABLET ORAL at 05:09

## 2020-09-03 RX ADMIN — LEVETIRACETAM 1000 MILLIGRAM(S): 250 TABLET, FILM COATED ORAL at 05:09

## 2020-09-03 NOTE — PROVIDER CONTACT NOTE (OTHER) - ACTION/TREATMENT ORDERED:
Oxycodone 10 mg IR Once.
give xanax and follow taper schedule
Psychiatry and psychology consult ordered. Pt placed on constant observation. Will continue to monitor closely.

## 2020-09-03 NOTE — DISCHARGE NOTE PROVIDER - CARE PROVIDERS DIRECT ADDRESSES
[email or web address]

## 2020-09-03 NOTE — BEHAVIORAL HEALTH ASSESSMENT NOTE - OTHER PAST PSYCHIATRIC HISTORY (INCLUDE DETAILS REGARDING ONSET, COURSE OF ILLNESS, INPATIENT/OUTPATIENT TREATMENT)
Pt sees Dr. Rollins at the [facility]. [address] [facility].   Had been at pt there since March of 2019.

## 2020-09-03 NOTE — PROGRESS NOTE ADULT - SUBJECTIVE AND OBJECTIVE BOX
Pt was seen for 20 min. Pt c/o pain, anxiety. Pt reported doing better today. She reported talking to her attending psychiatrist and the psychiatrist earlier today. Pt acknowledged still feeling very anxious in anticipation of pain and as a result of pain, but denied feeling suicidal, and reported reasons for which she wants to be alive including being with her brother and his family. Pt reported having a good PT session today and feeling more hopeful regarding her improvement. However, Pt remains convinced that she needs pain medication at the level she was having before in order to cope with pain. She is unsure about returning with her previous pain management doctor. Also, she was unsure about the amount of pain medication she might still have available at her pharmacy. Discussed the role of emotions on pain regulation, and how nonpharmacological tx might help, including use of meditation or relaxation skills. Pt reported using them sparingly in the past, and the need to use these skills regularly in order to get benefit was pointed to her. Agreed to provide her with handouts on relaxation and role of emotions on pain. Pt becomes anxious when anticipating return to home and potential decreased availability of pain medication. However, she appears to be future oriented and aware of having to go to different medical and tx appointments. Support and encouragement were provided. Pt was seen for 20 min. Pt c/o pain, anxiety. Pt reported doing better today. She reported talking to her attending psychiatrist and the psychiatrist earlier today. Pt acknowledged still feeling very anxious in anticipation of pain and as a result of pain, but denied feeling suicidal, and reported reasons for which she wants to be alive including being with her brother and his family. Per chart notes she externalized earlier today plans to OD once she returns home. Pt reported having a good PT session today and feeling more hopeful regarding her improvement. However, Pt remains convinced that she needs pain medication at the level she was having before in order to cope with pain. She is unsure about returning with her previous pain management doctor. Also, she was unsure about the amount of pain medication she might still have available at her pharmacy. Discussed the role of emotions on pain regulation, and how nonpharmacological tx might help, including use of meditation or relaxation skills. Pt reported using them sparingly in the past, and the need to use these skills regularly in order to get benefit was pointed to her. Agreed to provide her with handouts on relaxation and role of emotions on pain. Pt becomes anxious when anticipating return to home and potential decreased availability of pain medication. She appears to be future oriented and aware of having to go to different medical and tx appointments.  Support and encouragement were provided.

## 2020-09-03 NOTE — PROVIDER CONTACT NOTE (OTHER) - SITUATION
Pt stated to roommate (B.B.) that she is has plans to leave hospital and overdose on drugs because she "cannot take pain". Pt reported to PCA Socorro Ochoa that she will overdose once she leaves.

## 2020-09-03 NOTE — DISCHARGE NOTE PROVIDER - PROVIDER TOKENS
PROVIDER:[TOKEN:[[id number]]],PROVIDER:[TOKEN:[[id number]]],PROVIDER:[TOKEN:[[id number]]],PROVIDER:[TOKEN:[[id number]]]

## 2020-09-03 NOTE — BEHAVIORAL HEALTH ASSESSMENT NOTE - DESCRIPTION (FIRST USE, LAST USE, QUANTITY, FREQUENCY, DURATION)
pt with multiple visits to ED for alcohol use. as above, per chart opiate and benzo use for many years. Benzo use started at age 59, pt states she started drinking after her mother's death.

## 2020-09-03 NOTE — BEHAVIORAL HEALTH ASSESSMENT NOTE - SUICIDE PROTECTIVE FACTORS
Identifies reasons for living/Positive therapeutic relationships/Beloved pets/Responsibility to family and others

## 2020-09-03 NOTE — DISCHARGE NOTE PROVIDER - CARE PROVIDER_API CALL
Zuly Simms)  Internal Medicine; Nephrology  [address] [place] [address]  Phone: [phone]  Fax: [phone]  Follow Up Time:     Alida King  NEUROLOGY  [address] [address]  Phone: [phone]  Fax: [phone]  Follow Up Time:     Renée Arevalo  Physical Medicine and Rehabilitation  [address] [place] [address]  Phone: [phone]  Fax: [phone]  Follow Up Time:     Drake Gallegos  CLINICAL NEUROPHYSIOLOGY  [address] [address]  Phone: [phone]  Fax: [phone]  Follow Up Time:

## 2020-09-03 NOTE — BEHAVIORAL HEALTH ASSESSMENT NOTE - PAST PSYCHOTROPIC MEDICATION
per chart review, Cymbalta, Seroquel, Wellbutrin, Zoloft, Lexapro, as per pt these " did not work" or as per pt made her "angrier".

## 2020-09-03 NOTE — BEHAVIORAL HEALTH ASSESSMENT NOTE - NSBHCHARTREVIEWINVESTIGATE_PSY_A_CORE FT
< from: 12 Lead ECG (08.16.20 @ 08:45) >    Ventricular Rate 106 BPM    Atrial Rate 106 BPM    P-R Interval 112 ms    QRS Duration 66 ms    Q-T Interval 326 ms    QTC Calculation(Bezet) 433 ms    P Axis 54 degrees    R Axis -36 degrees    T Axis -43 degrees    Diagnosis Line Sinus tachycardia  Left axis deviation  Inferior infarct (cited on or before 15-AUG-2020)    Confirmed by KRISTY DICKERSON ([id number]) on 8/16/2020 3:55:51 PM    < end of copied text >

## 2020-09-03 NOTE — BEHAVIORAL HEALTH ASSESSMENT NOTE - OTHER
has the sense that her  mother is a presence in her home, moves things , flickers on and off the lights. concerns about opiate w/d. [employer] chart and I STOP ambulates with cane.

## 2020-09-03 NOTE — BEHAVIORAL HEALTH ASSESSMENT NOTE - DETAILS
states she made suicidal statements as part of poor frustration and concerns she would go into opiate w/d. as above brother with Walker Dandy Father Leukemia, mother pulmonary fibrosis, and RA see HPI

## 2020-09-03 NOTE — BEHAVIORAL HEALTH ASSESSMENT NOTE - AXIS III
see HPI  History of mixed connective tissue disease, breast mass, lung cancer, hip surgery and back surgery, chronic pain, seizures.

## 2020-09-03 NOTE — BEHAVIORAL HEALTH ASSESSMENT NOTE - NSBHCONSULTMEDS_PSY_A_CORE FT
MEDICATIONS  (STANDING):  aspirin  chewable 81 milliGRAM(s) Oral daily  atorvastatin 20 milliGRAM(s) Oral at bedtime  fludroCORTISONE 0.1 milliGRAM(s) Oral every 12 hours  gabapentin 600 milliGRAM(s) Oral three times a day  heparin   Injectable 5000 Unit(s) SubCutaneous every 8 hours  hydroxychloroquine 200 milliGRAM(s) Oral <User Schedule>  lacosamide 200 milliGRAM(s) Oral two times a day  levETIRAcetam 1000 milliGRAM(s) Oral two times a day  multivitamin 1 Tablet(s) Oral daily  oxyCODONE    IR 5 milliGRAM(s) Oral every 6 hours  pancrelipase  (CREON 12,000 Lipase Units) 1 Capsule(s) Oral three times a day with meals  pantoprazole    Tablet 40 milliGRAM(s) Oral before breakfast  phenytoin   Capsule 130 milliGRAM(s) Oral two times a day  thiamine 100 milliGRAM(s) Oral daily

## 2020-09-03 NOTE — PROGRESS NOTE ADULT - ASSESSMENT
Pt alert, oriented, anxious affect and mood, calmer than yesterday and more future oriented, she denies SI/HI/I/P. Pt has limited coping skills, needs to work on developing tools for affective regulation. Plan: Continue tx. Pt alert, oriented, anxious affect and mood, calmer than yesterday and more future oriented, she denies SI/HI/I/P. Pt has limited coping skills, needs to work on developing tools for affective regulation. Plan: Continue tx. Continue 1:1 supervision.

## 2020-09-03 NOTE — BEHAVIORAL HEALTH ASSESSMENT NOTE - NSBHCONSULTOBSREASON_PSY_A_CORE FT
if no events overnight would d/c constant observation, writer would want monitored her response to current medication regimen and to revisit issues in the am.

## 2020-09-03 NOTE — DISCHARGE NOTE PROVIDER - NSDCFUSCHEDAPPT_GEN_ALL_CORE_FT
JULIO CESAR FAYE ; 09/08/2020 ; PIERRE Temple  [facility] JULIO CESAR FAYE ; 09/08/2020 ; PIERRE Temple  [facility] JULIO CESAR FAYE ; 09/09/2020 ; PIERRE Temple  [facility] JULIO CESAR FAYE ; 09/09/2020 ; PIERRE Temple  [facility] JULIO CESAR FAYE ; 09/09/2020 ; PIERRE Temple  [facility] JULIO CESAR FAYE ; 09/09/2020 ; PIERRE Temple  [facility]

## 2020-09-03 NOTE — PROGRESS NOTE ADULT - ASSESSMENT
Patient is a 61 y/o F with PMH CAD s/p possible MI (ECHO ), lung adenocarcinoma (s/p RML resection 2018), mixed connective tissue disorder (on plaquenil and methylprednisolone), left soleal vein thrombosis (on apixaban, 7/2020), RLE cellulitis (RX doxycycline), CDiff colitis (PO vancomycin 8/3- 8/17), lumbar laminectomy and chronic pain, EtOH (admissions for Etoh withdrawal), alcoholic pancreatitis, opiate abuse, admitted on 8/11/20 to [facility] with symptomatic hyponatremia complicated by generalized seizures, hypoxic respiratory failure, subsequent right medial thalamic infarct and cerebral salt-wasting.     #Comprehensive Multidisciplinary Rehab Program:  - continue comprehensive rehab program, 3 hours a day, 5 days a week, Pt and OT  - neuropsychology, psychiatry, and SW consults for mood, substance abuse history and referral. May need detox center referral on discharge, however patient refused today 9/3/20. Neuropsychology recommended psychiatry. Spoke to psychiatry today regarding consult and new suicidal ideation with plan 9/3/20.     - Precautions: falls, seizures, respiratory, spinal, substance abuse including opiates, long-term steroid    #Seizures  - Low suspicion for EtOH withdrawal seizures given >3 weeks abstinence prior to admission and autoimmune encephalitis given her improvement  - Continue Keppra 1g BID, Vimpat 200mg BID, Fosphenytoin 140mg BID  - dilantin level in AM 9/3: 7.8    #Hyponatremia likely due to cerebral salt wasting   - Continue 1L fluid restriction  - Continue Florinef 0.1mg BID  - F/u with nephrology outpatient in 2 weeks (~9/11/20)  - Na 135 8/31  - BMP 9/7    #Mixed Connective Tissue Disorder  - Continue Medrol 4mg PO daily (S/P Medrol taper). Medrol 3.2mg is equivalent to Prednisone 4mg per pharmacist. Medrol to be switched back to home dose Prednisone 4mg daily.  - Continue Plaquenil 200mg every other day    #Pancreatitis  - Lipase 8/16/20: 13  - Continue Pancrelipase [id number] TID with meals    #Macrocytic Anemia: Likely 2/2 EtOH abuse  - Iron studies 8/17/20: iron 36, TIBC 216, %iron saturation 17, transferrin 167, ferritin 583  - Vit B12 8/16/20: 753  - Folate 8/16/20: >20  - Hgb 12.5 8/31  - Hgb 9/3 10.9, but no signs of active bleed. Monitor for now.    #Venous Stasis Dermatitis  - Completed antibiotics for RLE cellulitis on previous admission  -  ACE wrap PRN    #Dry Eyes  - Artificial tears OU BID    #Anxiety:  -on Xanax prn for anxiety  -no xanax found in  despite patient's claims she was on at home. Will request psychiatry consult  -psychology for supportive counseling    #Pain Management: in setting of h/o opioid abuse  - Tylenol. Was on Oxycodone ER 10mg Q8hr at [facility],.  -increase gabapentin 300 q8 to 600 q8 9/2  - Contacted pain specialist Dr Hawk; patient had been detoxed and was off all opioids prior to hospitalization. Recommend tapering schedule: oxycodone IR 5 q6 standing x 72 hours, then 5 mg q8 x 72 hours, then 5 mg q12 x 72 hours. He can follow the patient on discharge but does not plan on placing her back on any opioid. Reviewed with patient  -may need to consider detox center although patient refused 9/3/20  -[facility]  accessed 9/2 #[id number]. Patient does not have any controlled substances filled in last 12 months listed  -DC oxycodone ER. Start oxycodone 5 q6 today as part of detox schedule, reviewed with patient  -9/3: patient refused detox and does not want to taper off of opioids. Confirmed with [facility] in [place] ([phone]), patient was prescribed Oxycodone 5mg (30 pills), Oxycodone 10mg Q4hr prn, Fentanyl patch 0.25mcg/hr Q72hr on 7/16/20 with Fentanyl Rx filled 7/22/20, all prescribed by Dr. Theo Hawk. Patient was prescribed Oxycodone 5mg Q4hr prn and 10mg Q4hr prn (150 pills, maximum of 5 pills per day) at [facility] in [place] ([phone]) between Feb and June 2020. Oxycodone was last picked up at [facility] in 5/21/20. [facility] Rx also prescribed by Dr. Edward Hawk. Patient also filled a Narcan spray Rx April 2020.   -Psychiatry informed of suicidal ideation with plan 9/3/20, to be evaluated. Patient placed on 1:1.     #GI/Bowel:  - H/o C. diff (completed treatment 8/3-8/17)  - Loperamide prn for diarrhea  - GI ppx: Pantoprazole 40mg daily    #Osteoporosis  -pt on alendronate 70mg qweek at home  -resume after disharge    #Code Status  - pt initially DNR at [place] (MOLST in chart on arrival to [facility]), however discussed wishes with patient and she states she would like to be full code.      #Diet:  - regular    #DVT ppx:  - Heparin  - Last Doppler on 8/18/20 (resolved L soleal vein DVT)    #Case discussed in IDT rounds 9/2:  supervision/CG transfers, min assist shower transfers, supervision bed mobility, ambulates 60 feet RW and 30 feet SAC with supervision  -goals: mod independent bed mobility, transfers, ambulation and bADLs  -target: dc home 9/5/20 with outpatient PT unless patient placed in detox program    #Labs  CBC BMP 9/7  pain management/detox   psychology  psychiatry        ---------------  Code Status/Emergency Contact:  Anita River (cousin) HCP - [phone]. Do not give any health care related information to HCP per patient wishes 9/3/20.      Outpatient Follow-up (Specialty/Name of physician):  PCP, Dr. Tim Davis, [address] [place] [address], [address], [place], [address], [phone]  Nephrology, Dr. Zuly Simms, [address] [place] [address], [phone]  Epilepsy, Dr. Gallegos, [address] [place] [address], [phone]  Neurology, Dr. Alida King, [address] [place] [address], [phone] Patient is a 59 y/o F with PMH CAD s/p possible MI (ECHO ), lung adenocarcinoma (s/p RML resection 2018), mixed connective tissue disorder (on plaquenil and methylprednisolone), left soleal vein thrombosis (on apixaban, 7/2020), RLE cellulitis (RX doxycycline), CDiff colitis (PO vancomycin 8/3- 8/17), lumbar laminectomy and chronic pain, EtOH (admissions for Etoh withdrawal), alcoholic pancreatitis, opiate abuse, admitted on 8/11/20 to [facility] with symptomatic hyponatremia complicated by generalized seizures, hypoxic respiratory failure, subsequent right medial thalamic infarct and cerebral salt-wasting.     #Comprehensive Multidisciplinary Rehab Program:  - continue comprehensive rehab program, 3 hours a day, 5 days a week, Pt and OT  - neuropsychology, psychiatry, and SW consults for mood, substance abuse history and referral. Patient refuses referral to detox center 9/3/20  -Neuropsychology contuls 9/2 reviewed:  mild cognitive deficits with difficulties in retrieval of verbal information, visuospatial skills (visuomotor integration) and aspects of executive functions (initiation), consistent with some involvement of her fronto temporal regions. Her insight and judgment are mildly impaired. Her affect is angry and depressed, and she reports a number of depression and anxiety symptoms as reported above, consistent with a hx of anxiety and possibly depression. She also expresses significant preoccupation and feels overwhelmed at having her pain medications weaned off as per her pain management doctor. FIM scores: Social Interaction 4; Problem Solving 6; Memory 5.  -Spoke to psychiatry today regarding consult and new suicidal ideation with plan 9/3/20. Also request evaluation for capacity to make decisions re: dc planning. Patient currently refusing HCP to be apprised of medical information    - Precautions: falls, seizures, respiratory, spinal, substance abuse including opiates, long-term steroid    #Seizures  - Low suspicion for EtOH withdrawal seizures given >3 weeks abstinence prior to admission and autoimmune encephalitis given her improvement  - Continue Keppra 1g BID, Vimpat 200mg BID, Fosphenytoin 140mg BID  - dilantin level in AM 9/3: 7.8    #Hyponatremia likely due to cerebral salt wasting   - Continue 1L fluid restriction  - Continue Florinef 0.1mg BID  - F/u with nephrology outpatient in 2 weeks (~9/11/20)  - Na 135 8/31-->136 9/3  - BMP 9/7    #Mixed Connective Tissue Disorder  - Continue Medrol 4mg PO daily (S/P Medrol taper). Medrol 3.2mg is equivalent to Prednisone 4mg per pharmacist. Medrol to be switched back to home dose Prednisone 4mg daily.  - Continue Plaquenil 200mg every other day    #Pancreatitis  - Lipase 8/16/20: 13  - Continue Pancrelipase [id number] TID with meals    #Macrocytic Anemia: Likely 2/2 EtOH abuse  - Iron studies 8/17/20: iron 36, TIBC 216, %iron saturation 17, transferrin 167, ferritin 583  - Vit B12 8/16/20: 753  - Folate 8/16/20: >20  - Hgb 12.5 8/31  - Hgb 9/3 10.9, but no signs of active bleed. Monitor for now. Repeat in AM 9/4, order stool guaiac    #Venous Stasis Dermatitis  - Completed antibiotics for RLE cellulitis on previous admission  -  ACE wrap PRN    #Dry Eyes  - Artificial tears OU BID    #Anxiety:  -on Xanax prn for anxiety  -no xanax found in  despite patient's claims she was on at home. Will request psychiatry consult  -psychology for supportive counseling    #Pain Management: in setting of h/o opioid abuse  - Tylenol. Was on Oxycodone ER 10mg Q8hr at [facility],.  -increase gabapentin 300 q8 to 600 q8 9/2  - Contacted pain specialist Dr Hawk; patient had been detoxed and was off all opioids prior to hospitalization. Recommend tapering schedule: oxycodone IR 5 q6 standing x 72 hours, then 5 mg q8 x 72 hours, then 5 mg q12 x 72 hours. He can follow the patient on discharge but does not plan on placing her back on any opioid. Reviewed with patient  -patient refused referral to detox program 9/3  -[facility]  accessed 9/2 #[id number]. Patient does not have any controlled substances filled in last 12 months listed. checked again 9/3: #[id number] no results found.  -DC oxycodone ER. Start oxycodone 5 q6 9/2 in effort to detox.-9/3: patient refused detox and does not want to taper off of opioids.   -Confirmed with [facility] in [place] ([phone]), patient was prescribed Oxycodone 5mg (30 pills), Oxycodone 10mg Q4hr prn, Fentanyl patch 0.25mcg/hr Q72hr on 7/16/20 with Fentanyl Rx filled 7/22/20, all prescribed by Dr. Theo Hawk.   -Patient was prescribed Oxycodone 5mg Q4hr prn and 10mg Q4hr prn (150 pills, maximum of 5 pills per day) at [facility] in [place] ([phone]) between Feb and June 2020. Oxycodone was last picked up at [facility] in 5/21/20. All [facility] Rx also prescribed by Dr. Theo Hawk. Patient also filled a Narcan spray Rx April 2020.       #GI/Bowel:  - H/o C. diff (completed treatment 8/3-8/17)  - Loperamide prn for diarrhea  - GI ppx: Pantoprazole 40mg daily    #Osteoporosis  -pt on alendronate 70mg qweek at home  -resume after disharge    #Code Status  - pt initially DNR at [place] (MOLST in chart on arrival to [facility]), however discussed wishes with patient and she states she would like to be full code.      #Diet:  - regular    #DVT ppx:  - Heparin  - Last Doppler on 8/18/20 (resolved L soleal vein DVT)    #Case discussed in IDT rounds 9/2:  supervision/CG transfers, min assist shower transfers, supervision bed mobility, ambulates 60 feet RW and 30 feet SAC with supervision  -goals: mod independent bed mobility, transfers, ambulation and bADLs  -target: dc home 9/5/20 with outpatient PT unless patient placed in detox program    #Labs  CBC BMP 9/7  pain management/detox   psychiatry        ---------------  Code Status/Emergency Contact:  Anita River (cousin) HCP - [phone]. Do not give any health care related information to HCP per patient wishes 9/3/20 unless found not to have capacity to make decisions re: dissmeniation health care information.      Outpatient Follow-up (Specialty/Name of physician):  PCP, Dr. Tim Davis, [facility] [address], [address], [place], [address], [phone]  Nephrology, Dr. Zuly Simms, [address] [place] [address], [phone]  Epilepsy, Dr. Gallegos, [address] [place] [address], [phone]  Neurology, Dr. Alida King, [address] [place] [address], [phone]

## 2020-09-03 NOTE — BEHAVIORAL HEALTH ASSESSMENT NOTE - NSBHCHARTREVIEWVS_PSY_A_CORE FT
Vital Signs Last 24 Hrs  T(C): 36.9 (03 Sep 2020 08:27), Max: 36.9 (03 Sep 2020 08:27)  T(F): 98.5 (03 Sep 2020 08:27), Max: 98.5 (03 Sep 2020 08:27)  HR: 76 (03 Sep 2020 08:27) (67 - 76)  BP: 96/53 (03 Sep 2020 08:27) (92/58 - 100/64)  BP(mean): --  RR: 14 (03 Sep 2020 08:27) (14 - 17)  SpO2: 96% (03 Sep 2020 08:27) (95% - 98%)

## 2020-09-03 NOTE — DISCHARGE NOTE PROVIDER - HOSPITAL COURSE
Patient is a 61 y/o F with PMH CAD s/p possible MI (ECHO ), lung adenocarcinoma (s/p RML resection 2018), mixed connective tissue disorder (on plaquenil and methylprednisolone), left soleal vein thrombosis (on apixaban, 7/2020), RLE cellulitis (RX doxycycline), CDiff colitis (PO vancomycin 8/3- 8/17), lumbar laminectomy and chronic pain, EtOH (admissions for Etoh withdrawal), alcoholic pancreatitis, opiate abuse, admitted on 8/11/20 to [facility] with symptomatic hyponatremia (Na 121) secondary to psychogenic polydipsia. She was treated with an appropriate rate of correction in serum Na.        Patient was lAter noted to have focal seizure activity of LUE on 8/13 which broke with IV lorazepam; seizure activity believed to be secondary to benzodiazepine withdrawal. EEG 8/13 showed intermittent sharp and polyspike wave discharges with generalized slowing with patient at risk for seizures, and patient started on Keppra although she continued to have periodic seizure activity. A code stroke was called 8/15/20 for right facial droop. CT head unremarkable for acute pathology. 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         On 8/16, RRT called because patient found to be unresponsive with generalized seizure activity and left UE/LE/facial twitching. Seizure activity would break transiently with IV lorazepam but recurred repetitively. After 8mg lorazepam, she began to lose airway protection abilities and developed hypoxemia (SpO2 70s). She was emergently intubated and given keppra load 1000mg, vimpat load 200 mg and propofol 10 mcg/kg/min infusion, which successfully suppressed her seizures. Found to have new fever of 101.6 F and was hypotensive post-intubation, started on levophed drip.          She was transferred to [facility] for EEG 8/18/20, which showed epileptic focus in right frontal region. Weaned off pressors 8/18/20. MRI was performed which revealed acute/subacute lacunar infarction within the right medial thalamus along with hypoperfused areas in bilateral frontal, temporal lobes consistent w/ post-ictal events. LP on 8/19/20 negative for CNS infection. Extubated 8/20/20. She was found to have hyponatremia post-extubation, likely signifying cerebral salt wasting (high urine output, low BP, and high urine sodium and hypovolemia). She was started on 2% saline drip and given 1 dose of Florinef. Once sodium was stabilized, 2% saline was stopped. Antibiotics were stopped as she showed no signs of infection. MRI w/wo contrast was performed. She was downgraded from ICU 8/23/20.         Patient was evaluated by PM&R and therapy for gait/ADL impairments and recommended acute rehabilitation. Patient was medically optimized for discharge to [facility] on 8/28/20. Admitted with gait instability, ADL, and functional impairments.         Rehab course significant for pain. Per patient, she was on a fentanyl patch and Oxycodone prior to admission. Her pain management doctor is Dr. Theo Hawk who sent her a letter stating he will no longer prescribe her opioids. Dr. Hawk was called and per Dr. Hawk, she had gone through a opioid detox program and was off opioids a few weeks ago and was unaware of current hospitalization but he was aware of her recent         All other medical co-morbidities were stable. Patient tolerated course of inpatient PT/OT/SLP rehab with significant improvements and met rehab goals prior to discharge. Patient was medically cleared on ___ for discharge on ___. Patient is a 61 y/o F with PMH CAD s/p possible MI (ECHO ), lung adenocarcinoma (s/p RML resection 2018), mixed connective tissue disorder (on plaquenil and methylprednisolone), left soleal vein thrombosis (on apixaban, 7/2020), RLE cellulitis (RX doxycycline), CDiff colitis (PO vancomycin 8/3- 8/17), lumbar laminectomy and chronic pain, EtOH (admissions for Etoh withdrawal), alcoholic pancreatitis, opiate abuse, admitted on 8/11/20 to [facility] with symptomatic hyponatremia (Na 121) secondary to psychogenic polydipsia. She was treated with an appropriate rate of correction in serum Na.        Patient was later noted to have focal seizure activity of LUE on 8/13 which broke with IV lorazepam; seizure activity believed to be secondary to benzodiazepine withdrawal. EEG 8/13 showed intermittent sharp and polyspike wave discharges with generalized slowing with patient at risk for seizures, and patient started on Keppra although she continued to have periodic seizure activity. A code stroke was called 8/15/20 for right facial droop. CT head unremarkable for acute pathology. 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         On 8/16, RRT called because patient found to be unresponsive with generalized seizure activity and left UE/LE/facial twitching. Seizure activity would break transiently with IV lorazepam but recurred repetitively. After 8mg lorazepam, she began to lose airway protection abilities and developed hypoxemia (SpO2 70s). She was emergently intubated and given keppra load 1000mg, vimpat load 200 mg and propofol 10 mcg/kg/min infusion, which successfully suppressed her seizures. Found to have new fever of 101.6 F and was hypotensive post-intubation, started on levophed drip.          She was transferred to [facility] for EEG 8/18/20, which showed epileptic focus in right frontal region. Weaned off pressors 8/18/20. MRI was performed which revealed acute/subacute lacunar infarction within the right medial thalamus along with hypoperfused areas in bilateral frontal, temporal lobes consistent w/ post-ictal events. LP on 8/19/20 negative for CNS infection. Extubated 8/20/20. She was found to have hyponatremia post-extubation, likely signifying cerebral salt wasting (high urine output, low BP, and high urine sodium and hypovolemia). She was started on 2% saline drip and given 1 dose of Florinef. Once sodium was stabilized, 2% saline was stopped. Antibiotics were stopped as she showed no signs of infection. MRI w/wo contrast was performed. She was downgraded from ICU 8/23/20.         Patient was evaluated by PM&R and therapy for gait/ADL impairments and recommended acute rehabilitation. Patient was medically optimized for discharge to [facility] on 8/28/20. Admitted with gait instability, ADL, and functional impairments.         Rehab course significant for pain. Per patient, she was on a fentanyl patch and Oxycodone prior to admission. Her pain management doctor is Dr. Theo Hawk who sent her a letter stating he will no longer prescribe her opioids. Dr. Hawk was called and per Dr. Hawk, she had gone through a opioid detox program and was off opioids a few weeks ago and was unaware of current hospitalization but he was aware of her recent hip surgery. Confirmed her pain meds with her pharmacy and with [facility]. Patient to continue Oxycodone 5mg Q6hr without further taper.        All other medical co-morbidities were stable. Patient tolerated course of inpatient PT/OT/SLP rehab with significant improvements and met rehab goals prior to discharge. Patient was medically cleared on ___ for discharge to home with PCP and pain management follow up. Patient is a 59 y/o F with PMH CAD s/p possible MI (ECHO ), lung adenocarcinoma (s/p RML resection 2018), mixed connective tissue disorder (on plaquenil and methylprednisolone), left soleal vein thrombosis (on apixaban, 7/2020), RLE cellulitis (RX doxycycline), CDiff colitis (PO vancomycin 8/3- 8/17), lumbar laminectomy and chronic pain, EtOH (admissions for Etoh withdrawal), alcoholic pancreatitis, opiate abuse, admitted on 8/11/20 to [facility] with symptomatic hyponatremia (Na 121) secondary to psychogenic polydipsia. She was treated with an appropriate rate of correction in serum Na.        Patient was later noted to have focal seizure activity of LUE on 8/13 which broke with IV lorazepam; seizure activity believed to be secondary to benzodiazepine withdrawal. EEG 8/13 showed intermittent sharp and polyspike wave discharges with generalized slowing with patient at risk for seizures, and patient started on Keppra although she continued to have periodic seizure activity. A code stroke was called 8/15/20 for right facial droop. CT head unremarkable for acute pathology. 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         On 8/16, RRT called because patient found to be unresponsive with generalized seizure activity and left UE/LE/facial twitching. Seizure activity would break transiently with IV lorazepam but recurred repetitively. After 8mg lorazepam, she began to lose airway protection abilities and developed hypoxemia (SpO2 70s). She was emergently intubated and given keppra load 1000mg, vimpat load 200 mg and propofol 10 mcg/kg/min infusion, which successfully suppressed her seizures. Found to have new fever of 101.6 F and was hypotensive post-intubation, started on levophed drip.          She was transferred to [facility] for EEG 8/18/20, which showed epileptic focus in right frontal region. Weaned off pressors 8/18/20. MRI was performed which revealed acute/subacute lacunar infarction within the right medial thalamus along with hypoperfused areas in bilateral frontal, temporal lobes consistent w/ post-ictal events. LP on 8/19/20 negative for CNS infection. Extubated 8/20/20. She was found to have hyponatremia post-extubation, likely signifying cerebral salt wasting (high urine output, low BP, and high urine sodium and hypovolemia). She was started on 2% saline drip and given 1 dose of Florinef. Once sodium was stabilized, 2% saline was stopped. Antibiotics were stopped as she showed no signs of infection. MRI w/wo contrast was performed. She was downgraded from ICU 8/23/20.         Patient was evaluated by PM&R and therapy for gait/ADL impairments and recommended acute rehabilitation. Patient was medically optimized for discharge to [facility] on 8/28/20. Admitted with gait instability, ADL, and functional impairments.         Rehab course significant for pain. Per patient, she was on a fentanyl patch and Oxycodone prior to admission. Her pain management doctor is Dr. Theo Hawk who sent her a letter stating he will no longer prescribe her opioids. Dr. Hawk was called and per Dr. Hawk, she had gone through a opioid detox program and was off opioids a few weeks ago and was unaware of current hospitalization but he was aware of her recent hip surgery. Confirmed her pain meds with her pharmacy and with [facility]. Patient to continue Oxycodone 5mg Q6hr without further taper. Patient made an appt with her pain management specialist, Dr. Hawk for 9/10/20, but because he had expressed that he would not prescribe any more opioids, a pain management appt was also made for 9/11/20 with another doctor in the event she can no longer see Dr. Hawk. She is unable to continue the opioid taper and will require outpatient f/u. She has 1 weeks' worth of Xanax and 63 pills of Oxycodone at [facility] (on previous admission) that she will be picking up upon discharge. Xanax has been prescribed by her private doctor for 09/2020 that she will be able to fill. No opioids or Xanax were prescribed by rehab upon discharge.         All other medical co-morbidities were stable. Patient tolerated course of inpatient PT/OT/SLP rehab with significant improvements and met rehab goals prior to discharge. She is ambulating with [facility] with supervision. Patient was medically cleared on 9/8/20 for discharge to home with PCP and pain management follow up. Patient is a 61 y/o F with PMH CAD s/p possible MI (ECHO ), lung adenocarcinoma (s/p RML resection 2018), mixed connective tissue disorder (on plaquenil and methylprednisolone), left soleal vein thrombosis (on apixaban, 7/2020), RLE cellulitis (RX doxycycline), CDiff colitis (PO vancomycin 8/3- 8/17), lumbar laminectomy and chronic pain, EtOH (admissions for Etoh withdrawal), alcoholic pancreatitis, opiate abuse, admitted on 8/11/20 to [facility] with symptomatic hyponatremia (Na 121) secondary to psychogenic polydipsia. She was treated with an appropriate rate of correction in serum Na.        Patient was later noted to have focal seizure activity of LUE on 8/13 which broke with IV lorazepam; seizure activity believed to be secondary to benzodiazepine withdrawal. EEG 8/13 showed intermittent sharp and polyspike wave discharges with generalized slowing with patient at risk for seizures, and patient started on Keppra although she continued to have periodic seizure activity. A code stroke was called 8/15/20 for right facial droop. CT head unremarkable for acute pathology. 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         On 8/16, RRT called because patient found to be unresponsive with generalized seizure activity and left UE/LE/facial twitching. Seizure activity would break transiently with IV lorazepam but recurred repetitively. After 8mg lorazepam, she began to lose airway protection abilities and developed hypoxemia (SpO2 70s). She was emergently intubated and given keppra load 1000mg, vimpat load 200 mg and propofol 10 mcg/kg/min infusion, which successfully suppressed her seizures. Found to have new fever of 101.6 F and was hypotensive post-intubation, started on levophed drip.          She was transferred to [facility] for EEG 8/18/20, which showed epileptic focus in right frontal region. Weaned off pressors 8/18/20. MRI was performed which revealed acute/subacute lacunar infarction within the right medial thalamus along with hypoperfused areas in bilateral frontal, temporal lobes consistent w/ post-ictal events. LP on 8/19/20 negative for CNS infection. Extubated 8/20/20. She was found to have hyponatremia post-extubation, likely signifying cerebral salt wasting (high urine output, low BP, and high urine sodium and hypovolemia). She was started on 2% saline drip and given 1 dose of Florinef. Once sodium was stabilized, 2% saline was stopped. Antibiotics were stopped as she showed no signs of infection. MRI w/wo contrast was performed. She was downgraded from ICU 8/23/20.         Patient was evaluated by PM&R and therapy for gait/ADL impairments and recommended acute rehabilitation. Patient was medically optimized for discharge to [facility] on 8/28/20. Admitted with gait instability, ADL, and functional impairments.                         Rehab course significant for pain. Per patient, she was on a fentanyl patch and Oxycodone prior to admission. Her pain management doctor is Dr. Theo Hawk who sent her a letter stating he will no longer prescribe her opioids. Dr. Hawk was called and per Dr. Hawk, she had gone through a opioid detox program and was off opioids a few weeks ago and was unaware of current hospitalization but he was aware of her recent hip surgery. Confirmed her pain meds with her pharmacy and with [facility]. Patient to continue Oxycodone 5mg Q6hr without further taper. Patient made an appt with her pain management specialist, Dr. Hawk for 9/10/20, but because he had expressed that he would not prescribe any more opioids, a pain management appt was also made for 9/11/20 with another doctor in the event she can no longer see Dr. Hawk. She is unable to continue the opioid taper and will require outpatient f/u. She has 1 weeks' worth of Xanax and 63 pills of Oxycodone at [facility] (on previous admission) that she will be picking up upon discharge. Xanax has been prescribed by her private doctor for 09/2020 that she will be able to fill. No opioids or Xanax were prescribed by rehab upon discharge.         All other medical co-morbidities were stable. Patient tolerated course of inpatient PT/OT/SLP rehab with significant improvements and met rehab goals prior to discharge. She is ambulating with [facility] with supervision. Patient was medically cleared on 9/8/20 for discharge to home with PCP and pain management follow up. Patient is a 61 y/o F with PMH CAD s/p possible MI (ECHO ), lung adenocarcinoma (s/p RML resection 2018), mixed connective tissue disorder (on plaquenil and methylprednisolone), left soleal vein thrombosis (on apixaban, 7/2020), RLE cellulitis (RX doxycycline), CDiff colitis (PO vancomycin 8/3- 8/17), lumbar laminectomy and chronic pain, EtOH (admissions for Etoh withdrawal), alcoholic pancreatitis, opiate abuse, admitted on 8/11/20 to [facility] with symptomatic hyponatremia (Na 121) secondary to psychogenic polydipsia. She was treated with an appropriate rate of correction in serum Na.        Patient was later noted to have focal seizure activity of LUE on 8/13 which broke with IV lorazepam; seizure activity believed to be secondary to benzodiazepine withdrawal. EEG 8/13 showed intermittent sharp and polyspike wave discharges with generalized slowing with patient at risk for seizures, and patient started on Keppra although she continued to have periodic seizure activity. A code stroke was called 8/15/20 for right facial droop. CT head unremarkable for acute pathology. 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         On 8/16, RRT called because patient found to be unresponsive with generalized seizure activity and left UE/LE/facial twitching. Seizure activity would break transiently with IV lorazepam but recurred repetitively. After 8mg lorazepam, she began to lose airway protection abilities and developed hypoxemia (SpO2 70s). She was emergently intubated and given keppra load 1000mg, vimpat load 200 mg and propofol 10 mcg/kg/min infusion, which successfully suppressed her seizures. Found to have new fever of 101.6 F and was hypotensive post-intubation, started on levophed drip.          She was transferred to [facility] for EEG 8/18/20, which showed epileptic focus in right frontal region. Weaned off pressors 8/18/20. MRI was performed which revealed acute/subacute lacunar infarction within the right medial thalamus along with hypoperfused areas in bilateral frontal, temporal lobes consistent w/ post-ictal events. LP on 8/19/20 negative for CNS infection. Extubated 8/20/20. She was found to have hyponatremia post-extubation, likely signifying cerebral salt wasting (high urine output, low BP, and high urine sodium and hypovolemia). She was started on 2% saline drip and given 1 dose of Florinef. Once sodium was stabilized, 2% saline was stopped. Antibiotics were stopped as she showed no signs of infection. MRI w/wo contrast was performed. She was downgraded from ICU 8/23/20.         Patient was evaluated by PM&R and therapy for gait/ADL impairments and recommended acute rehabilitation. Patient was medically optimized for discharge to [facility] on 8/28/20. Admitted with gait instability, ADL, and functional impairments.                         Rehab course significant for pain. Per patient, she was on a fentanyl patch and Oxycodone prior to admission. Her pain management doctor is Dr. Theo Hawk who sent her a letter stating he will no longer prescribe her opioids. Dr. Hawk was called and per Dr. Hawk, she had gone through a opioid detox program and was off opioids a few weeks ago and was unaware of current hospitalization but he was aware of her recent hip surgery. Confirmed her pain meds with her pharmacy and with [facility]. Patient to continue Oxycodone 5mg Q6hr without further taper. Patient made an appt with her pain management specialist, Dr. Hawk for 9/10/20, but because he had expressed that he would not prescribe any more opioids, a pain management appt was also made for 9/11/20 with another doctor in the event she can no longer see Dr. Hawk. She is unable to continue the opioid taper and will require outpatient f/u. She has 1 weeks' worth of Xanax and 63 pills of Oxycodone at [facility] (on previous admission) that she will be picking up upon discharge. Xanax has been prescribed by her private doctor for 09/2020 that she will be able to fill. No opioids or Xanax were prescribed by rehab upon discharge.         All other medical co-morbidities were stable. Patient tolerated course of inpatient PT/OT/SLP rehab with significant improvements and met rehab goals prior to discharge. She is ambulating with [facility] with supervision. Patient was medically cleared on 9/8/20 for discharge to home with PCP on 9/8/20 and pain management follow up on 9/11/20. Patient is a 61 y/o F with PMH CAD s/p possible MI (ECHO ), lung adenocarcinoma (s/p RML resection 2018), mixed connective tissue disorder (on plaquenil and methylprednisolone), left soleal vein thrombosis (on apixaban, 7/2020), RLE cellulitis (RX doxycycline), CDiff colitis (PO vancomycin 8/3- 8/17), lumbar laminectomy and chronic pain, EtOH (admissions for Etoh withdrawal), alcoholic pancreatitis, opiate abuse, admitted on 8/11/20 to [facility] with symptomatic hyponatremia (Na 121) secondary to psychogenic polydipsia. She was treated with an appropriate rate of correction in serum Na.        Patient was later noted to have focal seizure activity of LUE on 8/13 which broke with IV lorazepam; seizure activity believed to be secondary to benzodiazepine withdrawal. EEG 8/13 showed intermittent sharp and polyspike wave discharges with generalized slowing with patient at risk for seizures, and patient started on Keppra although she continued to have periodic seizure activity. A code stroke was called 8/15/20 for right facial droop. CT head unremarkable for acute pathology. 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         On 8/16, RRT called because patient found to be unresponsive with generalized seizure activity and left UE/LE/facial twitching. Seizure activity would break transiently with IV lorazepam but recurred repetitively. After 8mg lorazepam, she began to lose airway protection abilities and developed hypoxemia (SpO2 70s). She was emergently intubated and given keppra load 1000mg, vimpat load 200 mg and propofol 10 mcg/kg/min infusion, which successfully suppressed her seizures. Found to have new fever of 101.6 F and was hypotensive post-intubation, started on levophed drip.          She was transferred to [facility] for EEG 8/18/20, which showed epileptic focus in right frontal region. Weaned off pressors 8/18/20. MRI was performed which revealed acute/subacute lacunar infarction within the right medial thalamus along with hypoperfused areas in bilateral frontal, temporal lobes consistent w/ post-ictal events. LP on 8/19/20 negative for CNS infection. Extubated 8/20/20. She was found to have hyponatremia post-extubation, likely signifying cerebral salt wasting (high urine output, low BP, and high urine sodium and hypovolemia). She was started on 2% saline drip and given 1 dose of Florinef. Once sodium was stabilized, 2% saline was stopped. Antibiotics were stopped as she showed no signs of infection. MRI w/wo contrast was performed. She was downgraded from ICU 8/23/20.         Patient was evaluated by PM&R and therapy for gait/ADL impairments and recommended acute rehabilitation. Patient was medically optimized for discharge to [facility] on 8/28/20. Admitted with gait instability, ADL, and functional impairments.         Rehab course significant for pain. Per patient, she was on a fentanyl patch and Oxycodone prior to admission. Her pain management doctor is Dr. Theo Hawk who sent her a letter stating he will no longer prescribe her opioids. Dr. Hawk was called and per Dr. Hawk, she had gone through a opioid detox program and was off opioids a few weeks ago and was unaware of current hospitalization but he was aware of her recent hip surgery. Confirmed her pain meds with her pharmacy and with [facility]. Patient to continue Oxycodone 5mg Q6hr without further taper. Patient made an appt with her pain management specialist, Dr. Hawk for 9/10/20, but because he had expressed that he would not prescribe any more opioids, a pain management appt was also made for 9/11/20 with another doctor in the event she can no longer see Dr. Hawk. She is unable to continue the opioid taper and will require outpatient f/u. She has 1 weeks' worth of Xanax and 63 pills of Oxycodone at [facility] (on previous admission) that she will be picking up upon discharge. Xanax has been prescribed by her private doctor for 09/2020 that she will be able to fill. No opioids or Xanax were prescribed by rehab upon discharge. Patient was found to have UTI, discharged with Macrobid for 5 days.        All other medical co-morbidities were stable. Patient tolerated course of inpatient PT/OT/SLP rehab with significant improvements and met rehab goals prior to discharge. She is ambulating with [facility] with supervision. Patient was medically cleared on 9/8/20 for discharge to home with PCP on 9/8/20 and pain management follow up on 9/11/20. Patient is a 61 y/o F with PMH CAD s/p possible MI (ECHO ), lung adenocarcinoma (s/p RML resection 2018), mixed connective tissue disorder (on plaquenil and methylprednisolone), left soleal vein thrombosis (on apixaban, 7/2020), RLE cellulitis (RX doxycycline), CDiff colitis (PO vancomycin 8/3- 8/17), lumbar laminectomy and chronic pain, EtOH (admissions for Etoh withdrawal), alcoholic pancreatitis, opiate abuse, admitted on 8/11/20 to [facility] with symptomatic hyponatremia (Na 121) secondary to psychogenic polydipsia. She was treated with an appropriate rate of correction in serum Na.        Patient was later noted to have focal seizure activity of LUE on 8/13 which broke with IV lorazepam; seizure activity believed to be secondary to benzodiazepine withdrawal. EEG 8/13 showed intermittent sharp and polyspike wave discharges with generalized slowing with patient at risk for seizures, and patient started on Keppra although she continued to have periodic seizure activity. A code stroke was called 8/15/20 for right facial droop. CT head unremarkable for acute pathology. 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         On 8/16, RRT called because patient found to be unresponsive with generalized seizure activity and left UE/LE/facial twitching. Seizure activity would break transiently with IV lorazepam but recurred repetitively. After 8mg lorazepam, she began to lose airway protection abilities and developed hypoxemia (SpO2 70s). She was emergently intubated and given keppra load 1000mg, vimpat load 200 mg and propofol 10 mcg/kg/min infusion, which successfully suppressed her seizures. Found to have new fever of 101.6 F and was hypotensive post-intubation, started on levophed drip.          She was transferred to [facility] for EEG 8/18/20, which showed epileptic focus in right frontal region. Weaned off pressors 8/18/20. MRI was performed which revealed acute/subacute lacunar infarction within the right medial thalamus along with hypoperfused areas in bilateral frontal, temporal lobes consistent w/ post-ictal events. LP on 8/19/20 negative for CNS infection. Extubated 8/20/20. She was found to have hyponatremia post-extubation, likely signifying cerebral salt wasting (high urine output, low BP, and high urine sodium and hypovolemia). She was started on 2% saline drip and given 1 dose of Florinef. Once sodium was stabilized, 2% saline was stopped. Antibiotics were stopped as she showed no signs of infection. MRI w/wo contrast was performed. She was downgraded from ICU 8/23/20.         Patient was evaluated by PM&R and therapy for gait/ADL impairments and recommended acute rehabilitation. Patient was medically optimized for discharge to [facility] on 8/28/20. Admitted with gait instability, ADL, and functional impairments. Psychiatry and psychology was consulted. Psychiatry saw patient for suicidal ideation but upon evaluation, patient did not have an acute suicidal ideation and it was likely due to severe pain. Patient recommended to go to dual diagnosis program for anxiety and pain/opioid use. Patient currently does not want to go to detox but will consider it for the future.        Rehab course significant for pain. Per patient, she was on a fentanyl patch and Oxycodone prior to admission. Her pain management doctor is Dr. Theo Hawk who sent her a letter stating he will no longer prescribe her opioids. Dr. Hawk was called and per Dr. Hawk, she had gone through a opioid detox program and was off opioids a few weeks ago and was unaware of current hospitalization but he was aware of her recent hip surgery. Confirmed her pain meds with her pharmacy and with [facility]. Patient to continue Oxycodone 5mg Q6hr without further taper. Patient made an appt with her pain management specialist, Dr. Hawk for 9/10/20, but because he had expressed that he would not prescribe any more opioids, a pain management appt was also made for 9/11/20 with another doctor in the event she can no longer see Dr. Hawk. She is unable to continue the opioid taper and will require outpatient f/u. She has 1 weeks' worth of Xanax and 63 pills of Oxycodone at [facility] (on previous admission) that she will be picking up upon discharge. Xanax has been prescribed by her private doctor for 09/2020 that she will be able to fill. No opioids or Xanax were prescribed by rehab upon discharge. Patient was found to have UTI, discharged with Macrobid for 5 days.        All other medical co-morbidities were stable. Patient tolerated course of inpatient PT/OT/SLP rehab with significant improvements and met rehab goals prior to discharge. She is ambulating with [facility] with supervision. Patient was medically cleared on 9/8/20 for discharge to home with PCP on 9/8/20 and pain management follow up on 9/11/20.

## 2020-09-03 NOTE — PROVIDER CONTACT NOTE (OTHER) - BACKGROUND
Admitted s/p CVA and seizures. Pt has history of drug dependency and alcoholism. Pt currently weaning off oxycodone.

## 2020-09-03 NOTE — PROVIDER CONTACT NOTE (OTHER) - ASSESSMENT
verbally abusive to RN. verbalized "I've never been clean in my life, whatever is in my chart is a load of crap. I can't take this anymore. I'll sign AMA"   walked up and down the hallways with walker "can't sit in this pain"
Comments made overnight to roommate and this morning to PCA. AOx4, currently agitated but remaining in bed. Pt states she is frustrated that she cannot have more pain medication. When asked about her comments about suicide, the pt stated that she would not follow through.
Pt AOx4. Pt awake, difficulty sleeping due to pain. Pt c/o back & hip pain with a pain rate of 7/10.

## 2020-09-03 NOTE — DISCHARGE NOTE PROVIDER - NSDCMRMEDTOKEN_GEN_ALL_CORE_FT
alendronate 70 mg oral tablet: 1 tab(s) orally once a week  ALPRAZolam 0.25 mg oral tablet: 1 tab(s) orally 2 times a day, As needed, anxiety  fludrocortisone 0.1 mg oral tablet: 1 tab(s) orally every 12 hours  gabapentin 100 mg oral capsule: 2 cap(s) orally 2 times a day  heparin: 5000 unit(s) subcutaneous every 8 hours  hydroxychloroquine 200 mg oral tablet: 1 tab(s) orally once a day  lacosamide 200 mg oral tablet: 1 tab(s) orally 2 times a day  levETIRAcetam 1000 mg oral tablet: 1 tab(s) orally 2 times a day  loperamide 2 mg oral capsule: 1 cap(s) orally every 6 hours, As needed, Diarrhea  methylPREDNISolone 2 mg oral tablet: 5 tab(s) orally once a day.  TAKE ON 8/29.   THEN START 4 MG ON 8/30.  methylPREDNISolone 4 mg oral tablet: 1 tab(s) orally once a day.  START 4 MG ON 8/30.  Multiple Vitamins oral tablet: 1 tab(s) orally once a day  ocular lubricant ophthalmic solution: 1 drop(s) to each affected eye 3 times a day, As needed, Eye irritation  oxyCODONE 10 mg oral tablet, extended release: 1 tab(s) orally every 8 hours  pancrelipase 12,000 units-38,000 units-60,000 units oral delayed release capsule: 1 cap(s) orally 3 times a day (with meals)  pancrelipase 12,000 units-38,000 units-60,000 units oral delayed release capsule: 1 cap(s) orally 3 times a day (with meals)  pantoprazole 40 mg oral delayed release tablet: 1 tab(s) orally once a day  phenytoin: 130 milligram(s) orally 2 times a day alendronate 70 mg oral tablet: 1 tab(s) orally once a week  ALPRAZolam 0.25 mg oral tablet: 1 tab(s) orally 2 times a day, As needed, anxiety  aspirin 81 mg oral tablet, chewable: 1 tab(s) orally once a day  fludrocortisone 0.1 mg oral tablet: 1 tab(s) orally every 12 hours   gabapentin 400 mg oral capsule: 2 cap(s) orally 3 times a day  hydroxychloroquine 200 mg oral tablet: 1 tab(s) orally once a day  lacosamide 200 mg oral tablet: 1 tab(s) orally 2 times a day  levETIRAcetam 1000 mg oral tablet: 1 tab(s) orally 2 times a day  Lipitor 20 mg oral tablet: 1 tab(s) orally once a day (at bedtime)  loperamide 2 mg oral capsule: 1 cap(s) orally every 6 hours, As needed, Diarrhea  methylPREDNISolone 4 mg oral tablet: 1 tab(s) orally once a day  Multiple Vitamins oral tablet: 1 tab(s) orally once a day  nitrofurantoin macrocrystals-monohydrate 100 mg oral capsule: 1 cap(s) orally 2 times a day (with meals)  ocular lubricant ophthalmic solution: 1 drop(s) to each affected eye 3 times a day, As needed, Eye irritation  oxyCODONE 5 mg oral tablet: 1 tab(s) orally every 6 hours  pancrelipase 12,000 units-38,000 units-60,000 units oral delayed release capsule: 1 cap(s) orally 3 times a day (with meals)  pantoprazole 40 mg oral delayed release tablet: 1 tab(s) orally once a day  phenytoin: 130 milligram(s) orally 2 times a day  saccharomyces boulardii lyo 250 mg oral capsule: 1 cap(s) orally 2 times a day  thiamine 100 mg oral tablet: 1 tab(s) orally once a day alendronate 70 mg oral tablet: 1 tab(s) orally once a week  ALPRAZolam 0.25 mg oral tablet: 1 tab(s) orally 2 times a day, As needed, anxiety  aspirin 81 mg oral tablet, chewable: 1 tab(s) orally once a day  fludrocortisone 0.1 mg oral tablet: 1 tab(s) orally every 12 hours   gabapentin 400 mg oral capsule: 2 cap(s) orally 3 times a day  Lipitor 20 mg oral tablet: 1 tab(s) orally once a day (at bedtime)  loperamide 2 mg oral capsule: 1 cap(s) orally every 6 hours, As needed, Diarrhea  methylPREDNISolone 4 mg oral tablet: 1 tab(s) orally once a day  Multiple Vitamins oral tablet: 1 tab(s) orally once a day  nitrofurantoin macrocrystals-monohydrate 100 mg oral capsule: 1 cap(s) orally 2 times a day (with meals)  ocular lubricant ophthalmic solution: 1 drop(s) to each affected eye 3 times a day, As needed, Eye irritation  oxyCODONE 5 mg oral tablet: 1 tab(s) orally every 6 hours  pancrelipase 12,000 units-38,000 units-60,000 units oral delayed release capsule: 1 cap(s) orally 3 times a day (with meals)  pantoprazole 40 mg oral delayed release tablet: 1 tab(s) orally once a day  phenytoin: 130 milligram(s) orally 2 times a day  saccharomyces boulardii lyo 250 mg oral capsule: 1 cap(s) orally 2 times a day alendronate 70 mg oral tablet: 1 tab(s) orally once a week  ALPRAZolam 0.25 mg oral tablet: 1 tab(s) orally 2 times a day, As needed, anxiety  aspirin 81 mg oral tablet, chewable: 1 tab(s) orally once a day  fludrocortisone 0.1 mg oral tablet: 1 tab(s) orally every 12 hours   gabapentin 400 mg oral capsule: 2 cap(s) orally 3 times a day  hydroxychloroquine 200 mg oral tablet: 1 tab(s) orally once a day   levETIRAcetam 1000 mg oral tablet: 1 tab(s) orally 2 times a day  Lipitor 20 mg oral tablet: 1 tab(s) orally once a day (at bedtime)  loperamide 2 mg oral capsule: 1 cap(s) orally every 6 hours, As needed, Diarrhea  methylPREDNISolone 4 mg oral tablet: 1 tab(s) orally once a day  Multiple Vitamins oral tablet: 1 tab(s) orally once a day  nitrofurantoin macrocrystals-monohydrate 100 mg oral capsule: 1 cap(s) orally 2 times a day (with meals)  ocular lubricant ophthalmic solution: 1 drop(s) to each affected eye 3 times a day, As needed, Eye irritation  oxyCODONE 5 mg oral tablet: 1 tab(s) orally every 6 hours  pancrelipase 12,000 units-38,000 units-60,000 units oral delayed release capsule: 1 cap(s) orally 3 times a day (with meals)  pantoprazole 40 mg oral delayed release tablet: 1 tab(s) orally once a day  phenytoin 100 mg oral capsule, extended release: 130 cap(s) orally 2 times a day   saccharomyces boulardii lyo 250 mg oral capsule: 1 cap(s) orally 2 times a day  thiamine 100 mg oral tablet: 1 tab(s) orally once a day alendronate 70 mg oral tablet: 1 tab(s) orally once a week  ALPRAZolam 0.25 mg oral tablet: 1 tab(s) orally 2 times a day, As needed, anxiety  aspirin 81 mg oral tablet, chewable: 1 tab(s) orally once a day  fludrocortisone 0.1 mg oral tablet: 1 tab(s) orally every 12 hours   gabapentin 400 mg oral capsule: 2 cap(s) orally 3 times a day  hydroxychloroquine 200 mg oral tablet: 1 tab(s) orally once a day   lacosamide 200 mg oral tablet: 1 tab(s) orally 2 times a day MDD:400mg  levETIRAcetam 1000 mg oral tablet: 1 tab(s) orally 2 times a day  Lipitor 20 mg oral tablet: 1 tab(s) orally once a day (at bedtime)  loperamide 2 mg oral capsule: 1 cap(s) orally every 6 hours, As needed, Diarrhea  methylPREDNISolone 4 mg oral tablet: 1 tab(s) orally once a day  Multiple Vitamins oral tablet: 1 tab(s) orally once a day  nitrofurantoin macrocrystals-monohydrate 100 mg oral capsule: 1 cap(s) orally 2 times a day (with meals)  ocular lubricant ophthalmic solution: 1 drop(s) to each affected eye 3 times a day, As needed, Eye irritation  oxyCODONE 5 mg oral tablet: 1 tab(s) orally every 6 hours  pancrelipase 12,000 units-38,000 units-60,000 units oral delayed release capsule: 1 cap(s) orally 3 times a day (with meals)  pantoprazole 40 mg oral delayed release tablet: 1 tab(s) orally once a day  phenytoin 100 mg oral capsule, extended release: 130 cap(s) orally 2 times a day   saccharomyces boulardii lyo 250 mg oral capsule: 1 cap(s) orally 2 times a day  thiamine 100 mg oral tablet: 1 tab(s) orally once a day alendronate 70 mg oral tablet: 1 tab(s) orally once a week  ALPRAZolam 0.25 mg oral tablet: 1 tab(s) orally 2 times a day, As needed, anxiety  aspirin 81 mg oral tablet, chewable: 1 tab(s) orally once a day  fludrocortisone 0.1 mg oral tablet: 1 tab(s) orally every 12 hours   gabapentin 400 mg oral capsule: 2 cap(s) orally 3 times a day  hydroxychloroquine 200 mg oral tablet: 1 tab(s) orally once a day   lacosamide 200 mg oral tablet: 1 tab(s) orally 2 times a day MDD:400mg  levETIRAcetam 1000 mg oral tablet: 1 tab(s) orally 2 times a day  Lipitor 20 mg oral tablet: 1 tab(s) orally once a day (at bedtime)  loperamide 2 mg oral capsule: 1 cap(s) orally every 6 hours, As needed, Diarrhea  methylPREDNISolone 4 mg oral tablet: 1 tab(s) orally once a day  Multiple Vitamins oral tablet: 1 tab(s) orally once a day  nitrofurantoin macrocrystals-monohydrate 100 mg oral capsule: 1 cap(s) orally 2 times a day (with meals)  ocular lubricant ophthalmic solution: 1 drop(s) to each affected eye 3 times a day, As needed, Eye irritation  oxyCODONE 5 mg oral tablet: 1 tab(s) orally every 6 hours  pancrelipase 12,000 units-38,000 units-60,000 units oral delayed release capsule: 1 cap(s) orally 3 times a day (with meals)  pantoprazole 40 mg oral delayed release tablet: 1 tab(s) orally once a day  phenytoin 25 mg/mL oral suspension: 125 milliliter(s) orally 2 times a day   saccharomyces boulardii lyo 250 mg oral capsule: 1 cap(s) orally 2 times a day  thiamine 100 mg oral tablet: 1 tab(s) orally once a day

## 2020-09-03 NOTE — BEHAVIORAL HEALTH ASSESSMENT NOTE - NSBHCHARTREVIEWIMAGING_PSY_A_CORE FT
< from: MR Head w/wo IV Cont (08.22.20 @ 17:52) >    PROCEDURE DATE:  08/22/2020      INTERPRETATION:  INDICATIONS:  Altered mental status and seizures with possible autoimmune encephalitis    TECHNIQUE:  Multiplanar imaging was performed using T1 weighted, T2 weighted and FLAIR sequences.  Diffusion weighted and susceptibility sensitive images were also obtained.  Following intravenous gadolinium, multiplanar T1 weighted images were performed. 5 cc Gadavist were administered. 2.5 cc were discarded. The study was performed with seizure protocol including coronal FLAIR, T2-weighted and volumetric T1-weighted images.    COMPARISON EXAMINATION:  Brain MR 8/18/2020    FINDINGS:  VENTRICLES AND SULCI:  Normal.  INTRA-AXIAL:  Again noted is a ribbonlike area of cortical diffusion restriction involving the anterior right frontal lobe. Diffusion restriction is seen more superiorly within the right frontal lobe as well as within the parieto-occipital regions which has progressed mildly from the prior exam. Mild diffusion restriction is seen within the right caudate head and a focal area is seen within the right thalamus. Cortical diffusion restriction is seen within the cerebellum bilaterally left greater than right. These areas demonstrate little in the way of T2 hyperintensity or gyral swelling. No abnormal enhancement is seen.    Small patchy areas of white matter T2 hyperintensity are compatible with mild microvascular-type changes. No hippocampal abnormality is seen.  EXTRA-AXIAL:  No mass or collection.  VISUALIZED SINUSES:  Normal.  VISUALIZED MASTOIDS:  Clear.  CALVARIUM:  Normal.  CAROTID FLOW VOIDS:  Present.  MISCELLANEOUS:  None.    IMPRESSION:  Cortical areas of diffusion restriction within the supratentorial and infratentorial compartments as well as involvement of the right basal ganglia and thalamus. There is apparent mild progression when compared with the prior exam favoring post ictal changes or an infectious/inflammatory etiology such as cerebritis. See prior report for additional considerations. The possibility of  Creutzfeldt-Derrick disease is not entirely excluded given the pattern of involvement.        NELLIE CORCORAN M.D., ATTENDING RADIOLOGIST  This document has been electronically signed. Aug 23 2020 10:41AM            < end of copied text >

## 2020-09-03 NOTE — BEHAVIORAL HEALTH ASSESSMENT NOTE - HPI (INCLUDE ILLNESS QUALITY, SEVERITY, DURATION, TIMING, CONTEXT, MODIFYING FACTORS, ASSOCIATED SIGNS AND SYMPTOMS)
HPI:  Patient is a 61 y/o F with PMH CAD s/p possible MI (ECHO ), lung adenocarcinoma (s/p RML resection 2018), mixed connective tissue disorder (on plaquenil and methylprednisolone), left soleal vein thrombosis (on apixaban, 7/2020), RLE cellulitis (RX doxycycline), CDiff colitis (PO vancomycin 8/3- 8/17), lumbar laminectomy and chronic pain, EtOH (admissions for Etoh withdrawal), alcoholic pancreatitis, opiate abuse, admitted on 8/11/20 to [facility] with symptomatic hyponatremia (Na 121) secondary to psychogenic polydipsia. She was treated with an appropriate rate of correction in serum Na.    Patient was later noted to have focal seizure activity of LUE on 8/13 which broke with IV lorazepam; seizure activity believed to be secondary to benzodiazepine withdrawal. EEG 8/13 showed intermittent sharp and polyspike wave discharges with generalized slowing with patient at risk for seizures, and patient started on Keppra although she continued to have periodic seizure activity. A code stroke was called 8/15/20 for right facial droop. CT head unremarkable for acute pathology. 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     On 8/16, RRT called because patient found to be unresponsive with generalized seizure activity and left UE/LE/facial twitching. Seizure activity would break transiently with IV lorazepam but recurred repetitively. After 8mg lorazepam, she began to lose airway protection abilities and developed hypoxemia (SpO2 70s). She was emergently intubated and given keppra load 1000mg, vimpat load 200 mg and propofol 10 mcg/kg/min infusion, which successfully suppressed her seizures. Found to have new fever of 101.6 F and was hypotensive post-intubation, started on levophed drip.      She was transferred to [facility] for EEG 8/18/20, which showed epileptic focus in right frontal region. Weaned off pressors 8/18/20. MRI was performed which revealed acute/subacute lacunar infarction within the right medial thalamus along with hypoperfused areas in bilateral frontal, temporal lobes consistent w/ post-ictal events. LP on 8/19/20 negative for CNS infection. Extubated 8/20/20. She was found to have hyponatremia post-extubation, likely signifying cerebral salt wasting (high urine output, low BP, and high urine sodium and hypovolemia). She was started on 2% saline drip and given 1 dose of Florinef. Once sodium was stabilized, 2% saline was stopped. Antibiotics were stopped as she showed no signs of infection. MRI w/wo contrast was performed. She was downgraded from ICU 8/23/20.     Patient was transferred to [facility] 8/28/20. (28 Aug 2020 15:02)    Pt seen and evaluated today, psychiatry asked to see patient due to her voicing suicidal thoughts both last night and this morning within ear shot of her room mate and to nursing staff last pm.  Pt placed on 1:1. Pt reports she is aware of why she has a 1:1, and states she was just frustrated and angry regarding her pain medications. She feels she cannot wait the 6 hours they are dosed apart and states she had been taking them up to every 4 hours ( narcotics) as opposed to every 6. Pt had said she would go home and overdose. [facility] reviewed and pt has gotten her pain medications essentially from the same MD- Dr. Theo Hawk. Pt alleges that her health care proxy called her MD "behind her back" and he cut off her supply.     Per  - [facility] pt has gotten (10) Fentanyl patches on 7/23/20, which is a 30 day supply. Has gotten Xanax 0.25 mg bid, prescriber Dr. Rollins, 7/9/20.  Last prescriptions for Oxycodone HCL 5 mg # 30 on June 18, 20 and Oxycodone 10 mg # 150 also on June 18th, 20.   Pt does not appear to be " doctor shopping" and goes to either [facility] or [facility] for her prescriptions. This appears consistent. Pt states she does not run short, over use, misuse or divert her medications. She did endorse alcohol use and states she quit roughly 1 month ago. She denied being suicidal today, and states she is upset in that she is not ready or willing to detox, but did state she did want to attend a dual diagnosis treatment program as an out patient. Writer discussed Suboxone with her, and as per notes in [employer] her outpatient prescriber has as well.   Pt states she has tried other psychiatric medications that did not work for her. Denied any changes in sleep, energy, appetite, or concentration. States she would never hurt herself as she is guardian to a brother with developmental illness ( Walker Dandy) and she has a dog.

## 2020-09-03 NOTE — BEHAVIORAL HEALTH ASSESSMENT NOTE - SUMMARY
Pt is a 60 year old female with at least psychological dependence on benzo's and opiate for pain management and mood disorder. Pt has seemed to keep to dosage of Xanax consistently.   However there is some question regarding her opiate usage. Pt was given pain medication in consultation with her outpatient prescriber as per team, pt became agitated with q 6h dosing rather than q 4 hours and d/c of Oxycodone 10 mg ER.   Pt does not want psychotropic medications as adjunct, has been on Gabapentin.   Pt made suicidal statements in this context.   Pt worried about not having narcotics at all upon d/c and unclear at this time if Dr. Hawk will continue to prescribe.

## 2020-09-03 NOTE — PROGRESS NOTE ADULT - SUBJECTIVE AND OBJECTIVE BOX
Patient is a 60y old  Female who presents with a chief complaint of hyponatremia, generalized seizures and hypoxic respiratory failure, right thalamic CVA  01.4 (03 Sep 2020 08:59)      HPI:  Patient is a 61 y/o F with PMH CAD s/p possible MI (ECHO ), lung adenocarcinoma (s/p RML resection 2018), mixed connective tissue disorder (on plaquenil and methylprednisolone), left soleal vein thrombosis (on apixaban, 7/2020), RLE cellulitis (RX doxycycline), CDiff colitis (PO vancomycin 8/3- 8/17), lumbar laminectomy and chronic pain, EtOH (admissions for Etoh withdrawal), alcoholic pancreatitis, opiate abuse, admitted on 8/11/20 to [facility] with symptomatic hyponatremia (Na 121) secondary to psychogenic polydipsia. She was treated with an appropriate rate of correction in serum Na.    Patient was lAter noted to have focal seizure activity of LUE on 8/13 which broke with IV lorazepam; seizure activity believed to be secondary to benzodiazepine withdrawal. EEG 8/13 showed intermittent sharp and polyspike wave discharges with generalized slowing with patient at risk for seizures, and patient started on Keppra although she continued to have periodic seizure activity. A code stroke was called 8/15/20 for right facial droop. CT head unremarkable for acute pathology. 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     On 8/16, RRT called because patient found to be unresponsive with generalized seizure activity and left UE/LE/facial twitching. Seizure activity would break transiently with IV lorazepam but recurred repetitively. After 8mg lorazepam, she began to lose airway protection abilities and developed hypoxemia (SpO2 70s). She was emergently intubated and given keppra load 1000mg, vimpat load 200 mg and propofol 10 mcg/kg/min infusion, which successfully suppressed her seizures. Found to have new fever of 101.6 F and was hypotensive post-intubation, started on levophed drip.      She was transferred to [facility] for EEG 8/18/20, which showed epileptic focus in right frontal region. Weaned off pressors 8/18/20. MRI was performed which revealed acute/subacute lacunar infarction within the right medial thalamus along with hypoperfused areas in bilateral frontal, temporal lobes consistent w/ post-ictal events. LP on 8/19/20 negative for CNS infection. Extubated 8/20/20. She was found to have hyponatremia post-extubation, likely signifying cerebral salt wasting (high urine output, low BP, and high urine sodium and hypovolemia). She was started on 2% saline drip and given 1 dose of Florinef. Once sodium was stabilized, 2% saline was stopped. Antibiotics were stopped as she showed no signs of infection. MRI w/wo contrast was performed. She was downgraded from ICU 8/23/20.     Patient was transferred to [facility] 8/28/20. (28 Aug 2020 15:02)    SUBJECTIVE/INTERVAL EVENTS:  Patient seen and assessed in a private room this AM. Patient c/o pain that is not managed with current Oxycodone taper. Per patient, she never went to detox and has never been off Oxycodone since starting it in 2010. She has also been on Xanax since 2008. She has been weaned from Oxycodone 120mg total daily to Oxycodone 80mg total daily. Per patient, she last saw Dr. Hawk was 05/2020. She does not want to go to a detox program currently and wants to continue her most recently prescribed Oxycodone upon discharge home due to uncontrolled pain. Patient maintains that her cousin spoke to Dr. Hawk behind her back when she was hospitalized and told him not to prescribe any more opioids.     This AM, RN informed provider that patient had said to her roommate and to PCA that she has a lot of pills at home and that she would end it all by taking them once she gets home. When later asked by RN, patient stated she did not mean what she said and was just in a lot of pain earlier. RN also noted, patient will no longer allow any medical staff to give her cousin (HCP) any updates or medication information.     Patient is doing well with therapy.      PAST MEDICAL & SURGICAL HISTORY:  Lung cancer  Opiate dependence  Alcohol abuse  Adenocarcinoma of lung  Mixed connective tissue disease  Depression H/O panic attack: with anxiety  S/P Laminectomy  Lumbar 3/10  Chronic pain  Diverticulitis of colon (without mention of hemorrhage)  History of laminectomy  History of lung surgery  History of oophorectomy, unilateral: bilateral  History of hip replacement, total, right: 6/7/2020  S/P lumbar laminectomy  S/P cholecystectomy  History of lung cancer: s/p wedge resection of RML      Allergies    penicillin (Other)  penicillin (Swelling)    Intolerances          VITALS  60y  Vital Signs Last 24 Hrs  T(C): 36.9 (03 Sep 2020 08:27), Max: 37.1 (02 Sep 2020 18:06)  T(F): 98.5 (03 Sep 2020 08:27), Max: 98.8 (02 Sep 2020 18:06)  HR: 76 (03 Sep 2020 08:27) (67 - 80)  BP: 96/53 (03 Sep 2020 08:27) (92/58 - 100/64)  BP(mean): --  RR: 14 (03 Sep 2020 08:27) (14 - 17)  SpO2: 96% (03 Sep 2020 08:27) (95% - 98%)  Daily       PHYSICAL EXAM:  General: NAD, A/O x 3  ENT: MMM  Neck: Supple, No JVD  Lungs: Clear to auscultation bilaterally  Cardio: RRR, S1/S2, No murmurs  Abdomen: Soft, Nontender, Nondistended; Bowel sounds present  Extremities: No calf tenderness, No pitting edema      RECENT LABS:                          10.9   4.84  )-----------( 272      ( 03 Sep 2020 06:13 )             35.2     09-03    136  |  99  |  9   ----------------------------<  99  4.4   |  25  |  0.57    Ca    9.1      03 Sep 2020 06:13        MEDICATIONS  (STANDING):  aspirin  chewable 81 milliGRAM(s) Oral daily  atorvastatin 20 milliGRAM(s) Oral at bedtime  fludroCORTISONE 0.1 milliGRAM(s) Oral every 12 hours  gabapentin 600 milliGRAM(s) Oral three times a day  heparin   Injectable 5000 Unit(s) SubCutaneous every 8 hours  hydroxychloroquine 200 milliGRAM(s) Oral <User Schedule>  lacosamide 200 milliGRAM(s) Oral two times a day  levETIRAcetam 1000 milliGRAM(s) Oral two times a day  methylPREDNISolone 4 milliGRAM(s) Oral daily  multivitamin 1 Tablet(s) Oral daily  oxyCODONE    IR 5 milliGRAM(s) Oral every 6 hours  pancrelipase  (CREON 12,000 Lipase Units) 1 Capsule(s) Oral three times a day with meals  pantoprazole    Tablet 40 milliGRAM(s) Oral before breakfast  phenytoin   Capsule 130 milliGRAM(s) Oral two times a day  thiamine 100 milliGRAM(s) Oral daily    MEDICATIONS  (PRN):  acetaminophen   Tablet .. 650 milliGRAM(s) Oral every 6 hours PRN Temp greater or equal to 38C (100.4F), Mild Pain (1 - 3)  ALPRAZolam 0.25 milliGRAM(s) Oral two times a day PRN anxiety  artificial tears (preservative free) Ophthalmic Solution 1 Drop(s) Both EYES every 4 hours PRN Dry Eyes  loperamide 2 milliGRAM(s) Oral every 6 hours PRN Diarrhea  melatonin 3 milliGRAM(s) Oral at bedtime PRN Insomnia Patient is a 60y old  Female who presents with a chief complaint of hyponatremia, generalized seizures and hypoxic respiratory failure, right thalamic CVA  01.4 (03 Sep 2020 08:59)      HPI:  Patient is a 61 y/o F with PMH CAD s/p possible MI (ECHO ), lung adenocarcinoma (s/p RML resection 2018), mixed connective tissue disorder (on plaquenil and methylprednisolone), left soleal vein thrombosis (on apixaban, 7/2020), RLE cellulitis (RX doxycycline), CDiff colitis (PO vancomycin 8/3- 8/17), lumbar laminectomy and chronic pain, EtOH (admissions for Etoh withdrawal), alcoholic pancreatitis, opiate abuse, admitted on 8/11/20 to [facility] with symptomatic hyponatremia (Na 121) secondary to psychogenic polydipsia. She was treated with an appropriate rate of correction in serum Na.    Patient was lAter noted to have focal seizure activity of LUE on 8/13 which broke with IV lorazepam; seizure activity believed to be secondary to benzodiazepine withdrawal. EEG 8/13 showed intermittent sharp and polyspike wave discharges with generalized slowing with patient at risk for seizures, and patient started on Keppra although she continued to have periodic seizure activity. A code stroke was called 8/15/20 for right facial droop. CT head unremarkable for acute pathology. 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     On 8/16, RRT called because patient found to be unresponsive with generalized seizure activity and left UE/LE/facial twitching. Seizure activity would break transiently with IV lorazepam but recurred repetitively. After 8mg lorazepam, she began to lose airway protection abilities and developed hypoxemia (SpO2 70s). She was emergently intubated and given keppra load 1000mg, vimpat load 200 mg and propofol 10 mcg/kg/min infusion, which successfully suppressed her seizures. Found to have new fever of 101.6 F and was hypotensive post-intubation, started on levophed drip.      She was transferred to [facility] for EEG 8/18/20, which showed epileptic focus in right frontal region. Weaned off pressors 8/18/20. MRI was performed which revealed acute/subacute lacunar infarction within the right medial thalamus along with hypoperfused areas in bilateral frontal, temporal lobes consistent w/ post-ictal events. LP on 8/19/20 negative for CNS infection. Extubated 8/20/20. She was found to have hyponatremia post-extubation, likely signifying cerebral salt wasting (high urine output, low BP, and high urine sodium and hypovolemia). She was started on 2% saline drip and given 1 dose of Florinef. Once sodium was stabilized, 2% saline was stopped. Antibiotics were stopped as she showed no signs of infection. MRI w/wo contrast was performed. She was downgraded from ICU 8/23/20.     Patient was transferred to [facility] 8/28/20. (28 Aug 2020 15:02)        PAST MEDICAL & SURGICAL HISTORY:  Lung cancer  Opiate dependence  Alcohol abuse  Adenocarcinoma of lung  Mixed connective tissue disease  Depression H/O panic attack: with anxiety  S/P Laminectomy  Lumbar 3/10  Chronic pain  Diverticulitis of colon (without mention of hemorrhage)  History of laminectomy  History of lung surgery  History of oophorectomy, unilateral: bilateral  History of hip replacement, total, right: 6/7/2020  S/P lumbar laminectomy  S/P cholecystectomy  History of lung cancer: s/p wedge resection of RML      Allergies    penicillin (Other)  penicillin (Swelling)    Intolerances      SUBJECTIVE/INTERVAL EVENTS:  Patient seen and assessed in a private room this AM. Patient c/o pain that is not managed with current Oxycodone taper. Per patient, she never went to detox and has never been off Oxycodone since starting it in 2010. She has also reports being on Xanax since 2008. She has been weaned from Oxycodone 120mg total daily to Oxycodone 80mg total daily. Per patient, she last saw Dr. Hawk was 05/2020. She does not want to go to a detox program currently and wants to continue her most recently prescribed Oxycodone upon discharge home due to uncontrolled pain. Patient maintains that her cousin spoke to Dr. Hawk behind her back when she was hospitalized and told him not to prescribe any more opioids. She also states that she has medications left at home.    This AM, RN informed provider that patient had said to her roommate and to PCA that she has a lot of pills at home and that she would end it all by taking them once she gets home. Her roommates also report hearing something similar. When later asked by RN, patient stated she did not mean what she said and was just in a lot of pain earlier. RN also noted, patient will no longer allow any medical staff to give her cousin (HCP) any updates or medication information. Patient was placed on 1:1 and psychiatry consult and psychology consults requested    Patient is doing well with therapy and is at [facility]-mod independent level bADLs and ambulaton with RW.    VITALS  60y  Vital Signs Last 24 Hrs  T(C): 36.9 (03 Sep 2020 08:27), Max: 37.1 (02 Sep 2020 18:06)  T(F): 98.5 (03 Sep 2020 08:27), Max: 98.8 (02 Sep 2020 18:06)  HR: 76 (03 Sep 2020 08:27) (67 - 80)  BP: 96/53 (03 Sep 2020 08:27) (92/58 - 100/64)  BP(mean): --  RR: 14 (03 Sep 2020 08:27) (14 - 17)  SpO2: 96% (03 Sep 2020 08:27) (95% - 98%)  Daily       PHYSICAL EXAM:  General: NAD, A/O x 3. Anxious, no tearfulness. compliant with questions. Some information inconsistent, especially with regards to pain medication history  Does not look distressed from pain, but very anxious about ability to manage pain at home    Lungs: Clear to auscultation bilaterally  Cardio: RRR, S1/S2, No murmurs  Abdomen: Soft, Nontender, Nondistended; Bowel sounds present  Extremities: No calf tenderness, No pitting edema      RECENT LABS:                          10.9   4.84  )-----------( 272      ( 03 Sep 2020 06:13 )             35.2     09-03    136  |  99  |  9   ----------------------------<  99  4.4   |  25  |  0.57    Ca    9.1      03 Sep 2020 06:13        MEDICATIONS  (STANDING):  aspirin  chewable 81 milliGRAM(s) Oral daily  atorvastatin 20 milliGRAM(s) Oral at bedtime  fludroCORTISONE 0.1 milliGRAM(s) Oral every 12 hours  gabapentin 600 milliGRAM(s) Oral three times a day  heparin   Injectable 5000 Unit(s) SubCutaneous every 8 hours  hydroxychloroquine 200 milliGRAM(s) Oral <User Schedule>  lacosamide 200 milliGRAM(s) Oral two times a day  levETIRAcetam 1000 milliGRAM(s) Oral two times a day  methylPREDNISolone 4 milliGRAM(s) Oral daily  multivitamin 1 Tablet(s) Oral daily  oxyCODONE    IR 5 milliGRAM(s) Oral every 6 hours  pancrelipase  (CREON 12,000 Lipase Units) 1 Capsule(s) Oral three times a day with meals  pantoprazole    Tablet 40 milliGRAM(s) Oral before breakfast  phenytoin   Capsule 130 milliGRAM(s) Oral two times a day  thiamine 100 milliGRAM(s) Oral daily    MEDICATIONS  (PRN):  acetaminophen   Tablet .. 650 milliGRAM(s) Oral every 6 hours PRN Temp greater or equal to 38C (100.4F), Mild Pain (1 - 3)  ALPRAZolam 0.25 milliGRAM(s) Oral two times a day PRN anxiety  artificial tears (preservative free) Ophthalmic Solution 1 Drop(s) Both EYES every 4 hours PRN Dry Eyes  loperamide 2 milliGRAM(s) Oral every 6 hours PRN Diarrhea  melatonin 3 milliGRAM(s) Oral at bedtime PRN Insomnia

## 2020-09-03 NOTE — PROGRESS NOTE ADULT - SUBJECTIVE AND OBJECTIVE BOX
Patient is a 60y old  Female who presents with a chief complaint of hyponatremia, generalized seizures and hypoxic respiratory failure, right thalamic CVA  01.4 (03 Sep 2020 08:35)      Patient seen and examined at bedside.    ALLERGIES:  penicillin (Other)  penicillin (Swelling)    MEDICATIONS  (STANDING):  aspirin  chewable 81 milliGRAM(s) Oral daily  atorvastatin 20 milliGRAM(s) Oral at bedtime  fludroCORTISONE 0.1 milliGRAM(s) Oral every 12 hours  gabapentin 600 milliGRAM(s) Oral three times a day  heparin   Injectable 5000 Unit(s) SubCutaneous every 8 hours  hydroxychloroquine 200 milliGRAM(s) Oral <User Schedule>  lacosamide 200 milliGRAM(s) Oral two times a day  levETIRAcetam 1000 milliGRAM(s) Oral two times a day  methylPREDNISolone 4 milliGRAM(s) Oral daily  multivitamin 1 Tablet(s) Oral daily  oxyCODONE    IR 5 milliGRAM(s) Oral every 6 hours  pancrelipase  (CREON 12,000 Lipase Units) 1 Capsule(s) Oral three times a day with meals  pantoprazole    Tablet 40 milliGRAM(s) Oral before breakfast  phenytoin   Capsule 130 milliGRAM(s) Oral two times a day  thiamine 100 milliGRAM(s) Oral daily    MEDICATIONS  (PRN):  acetaminophen   Tablet .. 650 milliGRAM(s) Oral every 6 hours PRN Temp greater or equal to 38C (100.4F), Mild Pain (1 - 3)  ALPRAZolam 0.25 milliGRAM(s) Oral two times a day PRN anxiety  artificial tears (preservative free) Ophthalmic Solution 1 Drop(s) Both EYES every 4 hours PRN Dry Eyes  loperamide 2 milliGRAM(s) Oral every 6 hours PRN Diarrhea  melatonin 3 milliGRAM(s) Oral at bedtime PRN Insomnia    Vital Signs Last 24 Hrs  T(F): 98.5 (03 Sep 2020 08:27), Max: 98.8 (02 Sep 2020 18:06)  HR: 76 (03 Sep 2020 08:27) (67 - 80)  BP: 96/53 (03 Sep 2020 08:27) (92/58 - 100/64)  RR: 14 (03 Sep 2020 08:27) (14 - 17)  SpO2: 96% (03 Sep 2020 08:27) (95% - 98%)  I&O's Summary    02 Sep 2020 07:01  -  03 Sep 2020 07:00  --------------------------------------------------------  IN: 240 mL / OUT: 0 mL / NET: 240 mL        PHYSICAL EXAM:  General: NAD, A/O x 3  ENT: MMM  Neck: Supple, No JVD  Lungs: Clear to auscultation bilaterally  Cardio: RRR, S1/S2, No murmurs  Abdomen: Soft, Nontender, Nondistended; Bowel sounds present  Extremities: No calf tenderness, No pitting edema    LABS:                        10.9   4.84  )-----------( 272      ( 03 Sep 2020 06:13 )             35.2       09-03    136  |  99  |  9   ----------------------------<  99  4.4   |  25  |  0.57    Ca    9.1      03 Sep 2020 06:13       eGFR if Non African American: 101 mL/min/1.73M2 (09-03-20 @ 06:13)  eGFR if African American: 117 mL/min/1.73M2 (09-03-20 @ 06:13)             08-30 Chol 242 mg/dL  mg/dL HDL 95 mg/dL Trig 112 mg/dL                        RADIOLOGY & ADDITIONAL TESTS:    Care Discussed with Consultants/Other Providers:

## 2020-09-03 NOTE — BEHAVIORAL HEALTH ASSESSMENT NOTE - NSBHCONSULTPRIMARYDISCUSSYES_PSY_A_CORE FT
as above.   Clinically pt appears credible in terms of voicing suicidal thoughts as an expression of frustration rather than specific intent to overdose on her medications.   Has been receiving pain meds as far back as [facility] can go- September of 2019 per [employer] since 2012.

## 2020-09-03 NOTE — BEHAVIORAL HEALTH ASSESSMENT NOTE - ADDITIONAL DETAILS / COMMENTS
In response to these beliefs about her mother , she states " I'm not nuts".   Has also divulged these to her prescriber at the [facility], no medication changes, but appears to have decreased Xanax from tid, to bid dosing.

## 2020-09-03 NOTE — BEHAVIORAL HEALTH ASSESSMENT NOTE - NS ED BHA SUICIDALITY PRESENT CURRENT INTENT DETAILS COLLATERAL FT
Pt read texts from her HCP that suggest she doctor shops and takes medications from more than one provider ( [facility] does not appear to prove this)

## 2020-09-03 NOTE — BEHAVIORAL HEALTH ASSESSMENT NOTE - NSBHCHARTREVIEWLAB_PSY_A_CORE FT
09-03    136  |  99  |  9   ----------------------------<  99  4.4   |  25  |  0.57    Ca    9.1      03 Sep 2020 06:13                          10.9   4.84  )-----------( 272      ( 03 Sep 2020 06:13 )             35.2

## 2020-09-03 NOTE — BEHAVIORAL HEALTH ASSESSMENT NOTE - CONSEQUENCES
? if seizures are related to benzo use, etoh use, hyponatremia, or actual seizure focus in the brain. ? overuse- physiological and psychological dependence.

## 2020-09-03 NOTE — DISCHARGE NOTE PROVIDER - NSDCCPCAREPLAN_GEN_ALL_CORE_FT
PRINCIPAL DISCHARGE DIAGNOSIS  Diagnosis: Hyponatremia  Assessment and Plan of Treatment: You were found to have low sodium levels. You were started on Florinef 0.1mg twice a day and were on a fluid restriction diet (1 liter of fluid allowed per day). Please continue those and follow up with a nephrologist in clinic. Also follow up with your primary care physician upon discharge.      SECONDARY DISCHARGE DIAGNOSES  Diagnosis: Chronic generalized pain  Assessment and Plan of Treatment: You are currently on Oxycodone 5mg four times a day. You currently have enough pain medications until your appointments. You have an appointment to see a pain management specialist on 9/11/20, especially in the event that Dr. Hawk is unable to see you. Please follow up with your primary care physician and pain management specialist for your pain medications.    Diagnosis: Acute UTI  Assessment and Plan of Treatment: You were found to have UTI on your urinalysis. Please continue taking Macrobid for 5 days total.    Diagnosis: Pancreatitis  Assessment and Plan of Treatment: Pancreatitis was seen on CT imaging of your abdomen. Continue Pancrelipase three times a day with meals. Follow up with your primary care physician.    Diagnosis: H/O mixed connective tissue disease  Assessment and Plan of Treatment: You have a history of mixed connective tissue disease. Continue Medrol 4mg daily and Plaquenil. Follow up with your rheumatologist or primary care physician.    Diagnosis: Seizure  Assessment and Plan of Treatment: You had an episode of seizures during your hospitalization. Continue Keppra 1g twice a day, Vimpat 200mg twice a day, and Fosphenytoin 130mg twice a day for seizures. Please follow up with a neurologist upon discharge to follow up on your seizures. PRINCIPAL DISCHARGE DIAGNOSIS  Diagnosis: Hyponatremia  Assessment and Plan of Treatment: You were found to have low sodium levels. You were started on Florinef 0.1mg twice a day and were on a fluid restriction diet (1 liter of fluid allowed per day). Please continue those and follow up with a nephrologist in clinic. Also follow up with your primary care physician upon discharge.      SECONDARY DISCHARGE DIAGNOSES  Diagnosis: Chronic generalized pain  Assessment and Plan of Treatment: You are currently on Oxycodone 5mg four times a day. You currently have enough pain medications until your appointments. You have an appointment to see a pain management specialist on 9/11/20, especially in the event that Dr. Hawk is unable to see you. Please follow up with your primary care physician and pain management specialist for your pain medications. Follow up with your psychologist/psychiatrist.    Diagnosis: Acute UTI  Assessment and Plan of Treatment: You were found to have UTI on your urinalysis. Please continue taking Macrobid for 5 days total.    Diagnosis: Pancreatitis  Assessment and Plan of Treatment: Pancreatitis was seen on CT imaging of your abdomen. Continue Pancrelipase three times a day with meals. Follow up with your primary care physician.    Diagnosis: H/O mixed connective tissue disease  Assessment and Plan of Treatment: You have a history of mixed connective tissue disease. Continue Medrol 4mg daily and Plaquenil. Follow up with your rheumatologist or primary care physician.    Diagnosis: Seizure  Assessment and Plan of Treatment: You had an episode of seizures during your hospitalization. Continue Keppra 1g twice a day, Vimpat 200mg twice a day, and Fosphenytoin 130mg twice a day for seizures. Please follow up with a neurologist upon discharge to follow up on your seizures.

## 2020-09-03 NOTE — CHART NOTE - NSCHARTNOTEFT_GEN_A_CORE
Nutrition Follow Up Note  Hospital Course (Per Electronic Medical Record):   Source: Medical Record [X] Patient [X] Nursing Staff [X]     Diet: Regular, 1000cc FR with Ensure pudding BID    Patient tolerating current diet , no complaints noted. Patient with good po %. NA+ WDL , may consider liberalize fluid restriction.    Current Weight: (9/1) 118.1/53.6kg, , patient with ? 6lb weight gain since admission.    Pertinent Medications: MEDICATIONS  (STANDING):  aspirin  chewable 81 milliGRAM(s) Oral daily  atorvastatin 20 milliGRAM(s) Oral at bedtime  fludroCORTISONE 0.1 milliGRAM(s) Oral every 12 hours  gabapentin 600 milliGRAM(s) Oral three times a day  heparin   Injectable 5000 Unit(s) SubCutaneous every 8 hours  hydroxychloroquine 200 milliGRAM(s) Oral <User Schedule>  lacosamide 200 milliGRAM(s) Oral two times a day  levETIRAcetam 1000 milliGRAM(s) Oral two times a day  multivitamin 1 Tablet(s) Oral daily  oxyCODONE    IR 5 milliGRAM(s) Oral every 6 hours  pancrelipase  (CREON 12,000 Lipase Units) 1 Capsule(s) Oral three times a day with meals  pantoprazole    Tablet 40 milliGRAM(s) Oral before breakfast  phenytoin   Capsule 130 milliGRAM(s) Oral two times a day  thiamine 100 milliGRAM(s) Oral daily    MEDICATIONS  (PRN):  acetaminophen   Tablet .. 650 milliGRAM(s) Oral every 6 hours PRN Temp greater or equal to 38C (100.4F), Mild Pain (1 - 3)  ALPRAZolam 0.25 milliGRAM(s) Oral two times a day PRN anxiety  artificial tears (preservative free) Ophthalmic Solution 1 Drop(s) Both EYES every 4 hours PRN Dry Eyes  loperamide 2 milliGRAM(s) Oral every 6 hours PRN Diarrhea  melatonin 3 milliGRAM(s) Oral at bedtime PRN Insomnia      Pertinent Labs:  09-03 Na136 mmol/L Glu 99 mg/dL K+ 4.4 mmol/L Cr  0.57 mg/dL BUN 9 mg/dL 08-29 Phos 5.1 mg/dL<H> 08-29 Alb 3.3 g/dL 08-30 Chol 242 mg/dL<H>  mg/dL<H> HDL 95 mg/dL Trig 112 mg/dL        Skin: intact    Edema: none    Last BM: on (9/1)     Estimated Needs:   [X] No Change since Previous Assessment  [X] Recalculated:     Previous Nutrition Diagnosis: Severe Malnutrition    Nutrition Diagnosis is [X] Ongoing         New Nutrition Diagnosis: [X] Not Applicable    Interventions:   1. Recommend continue current diet       Monitoring & Evaluation: will monitor:  [X] Weights   [X] PO Intake   [X] Follow Up (Per Protocol)  [X] Tolerance to Diet Prescription       RD to follow as per Nutrition protocol  Mer Hampton RDN

## 2020-09-03 NOTE — PROVIDER CONTACT NOTE (OTHER) - RECOMMENDATIONS
xanax 0.25 mg tab now. wait til pain med is due
Place on 1:1-Pt risk to self. Environment checked-cords removed. Attempted to remove purse, pt demanded that it stay on the bedside table. PCA at bedside.

## 2020-09-03 NOTE — BEHAVIORAL HEALTH ASSESSMENT NOTE - NSBHCONSULTWORKUPDETAILS_PSY_A_CORE FT
would consider asking for neurology consult if not already done given MRI findings, pt on Keppra and Vimpat.

## 2020-09-04 LAB
HCT VFR BLD CALC: 33.7 % — LOW (ref 34.5–45)
HGB BLD-MCNC: 11 G/DL — LOW (ref 11.5–15.5)
MCHC RBC-ENTMCNC: 32.6 GM/DL — SIGNIFICANT CHANGE UP (ref 32–36)
MCHC RBC-ENTMCNC: 34.1 PG — HIGH (ref 27–34)
MCV RBC AUTO: 104.3 FL — HIGH (ref 80–100)
NRBC # BLD: 0 /100 WBCS — SIGNIFICANT CHANGE UP (ref 0–0)
PLATELET # BLD AUTO: 286 K/UL — SIGNIFICANT CHANGE UP (ref 150–400)
RBC # BLD: 3.23 M/UL — LOW (ref 3.8–5.2)
RBC # FLD: 13.1 % — SIGNIFICANT CHANGE UP (ref 10.3–14.5)
WBC # BLD: 4.66 K/UL — SIGNIFICANT CHANGE UP (ref 3.8–10.5)
WBC # FLD AUTO: 4.66 K/UL — SIGNIFICANT CHANGE UP (ref 3.8–10.5)

## 2020-09-04 PROCEDURE — 99233 SBSQ HOSP IP/OBS HIGH 50: CPT

## 2020-09-04 PROCEDURE — 99232 SBSQ HOSP IP/OBS MODERATE 35: CPT

## 2020-09-04 RX ORDER — ALPRAZOLAM 0.25 MG
0.25 TABLET ORAL
Refills: 0 | Status: DISCONTINUED | OUTPATIENT
Start: 2020-09-04 | End: 2020-09-08

## 2020-09-04 RX ADMIN — OXYCODONE HYDROCHLORIDE 5 MILLIGRAM(S): 5 TABLET ORAL at 05:02

## 2020-09-04 RX ADMIN — HEPARIN SODIUM 5000 UNIT(S): 5000 INJECTION INTRAVENOUS; SUBCUTANEOUS at 16:07

## 2020-09-04 RX ADMIN — OXYCODONE HYDROCHLORIDE 5 MILLIGRAM(S): 5 TABLET ORAL at 12:00

## 2020-09-04 RX ADMIN — GABAPENTIN 600 MILLIGRAM(S): 400 CAPSULE ORAL at 16:07

## 2020-09-04 RX ADMIN — Medication 1 CAPSULE(S): at 12:30

## 2020-09-04 RX ADMIN — Medication 4 MILLIGRAM(S): at 05:05

## 2020-09-04 RX ADMIN — HEPARIN SODIUM 5000 UNIT(S): 5000 INJECTION INTRAVENOUS; SUBCUTANEOUS at 05:03

## 2020-09-04 RX ADMIN — OXYCODONE HYDROCHLORIDE 5 MILLIGRAM(S): 5 TABLET ORAL at 17:12

## 2020-09-04 RX ADMIN — GABAPENTIN 600 MILLIGRAM(S): 400 CAPSULE ORAL at 05:05

## 2020-09-04 RX ADMIN — OXYCODONE HYDROCHLORIDE 5 MILLIGRAM(S): 5 TABLET ORAL at 23:45

## 2020-09-04 RX ADMIN — Medication 81 MILLIGRAM(S): at 11:19

## 2020-09-04 RX ADMIN — Medication 1 CAPSULE(S): at 17:13

## 2020-09-04 RX ADMIN — LEVETIRACETAM 1000 MILLIGRAM(S): 250 TABLET, FILM COATED ORAL at 05:04

## 2020-09-04 RX ADMIN — Medication 130 MILLIGRAM(S): at 05:05

## 2020-09-04 RX ADMIN — Medication 0.25 MILLIGRAM(S): at 05:02

## 2020-09-04 RX ADMIN — Medication 130 MILLIGRAM(S): at 17:13

## 2020-09-04 RX ADMIN — PANTOPRAZOLE SODIUM 40 MILLIGRAM(S): 20 TABLET, DELAYED RELEASE ORAL at 05:06

## 2020-09-04 RX ADMIN — LACOSAMIDE 200 MILLIGRAM(S): 50 TABLET ORAL at 17:16

## 2020-09-04 RX ADMIN — GABAPENTIN 600 MILLIGRAM(S): 400 CAPSULE ORAL at 22:36

## 2020-09-04 RX ADMIN — LEVETIRACETAM 1000 MILLIGRAM(S): 250 TABLET, FILM COATED ORAL at 17:13

## 2020-09-04 RX ADMIN — FLUDROCORTISONE ACETATE 0.1 MILLIGRAM(S): 0.1 TABLET ORAL at 17:13

## 2020-09-04 RX ADMIN — OXYCODONE HYDROCHLORIDE 5 MILLIGRAM(S): 5 TABLET ORAL at 18:00

## 2020-09-04 RX ADMIN — FLUDROCORTISONE ACETATE 0.1 MILLIGRAM(S): 0.1 TABLET ORAL at 05:05

## 2020-09-04 RX ADMIN — Medication 1 TABLET(S): at 11:19

## 2020-09-04 RX ADMIN — ATORVASTATIN CALCIUM 20 MILLIGRAM(S): 80 TABLET, FILM COATED ORAL at 22:36

## 2020-09-04 RX ADMIN — LACOSAMIDE 200 MILLIGRAM(S): 50 TABLET ORAL at 05:05

## 2020-09-04 RX ADMIN — Medication 1 CAPSULE(S): at 08:59

## 2020-09-04 RX ADMIN — HEPARIN SODIUM 5000 UNIT(S): 5000 INJECTION INTRAVENOUS; SUBCUTANEOUS at 22:36

## 2020-09-04 RX ADMIN — Medication 200 MILLIGRAM(S): at 08:59

## 2020-09-04 RX ADMIN — OXYCODONE HYDROCHLORIDE 5 MILLIGRAM(S): 5 TABLET ORAL at 23:04

## 2020-09-04 RX ADMIN — OXYCODONE HYDROCHLORIDE 5 MILLIGRAM(S): 5 TABLET ORAL at 11:19

## 2020-09-04 RX ADMIN — Medication 0.25 MILLIGRAM(S): at 16:06

## 2020-09-04 RX ADMIN — OXYCODONE HYDROCHLORIDE 5 MILLIGRAM(S): 5 TABLET ORAL at 05:40

## 2020-09-04 RX ADMIN — Medication 100 MILLIGRAM(S): at 11:19

## 2020-09-04 NOTE — PROGRESS NOTE ADULT - SUBJECTIVE AND OBJECTIVE BOX
Patient is a 60y old  Female who presents with a chief complaint of hyponatremia, generalized seizures and hypoxic respiratory failure, right thalamic CVA  01.4 (03 Sep 2020 16:00)      Patient seen and examined at bedside.  No acute events overnight.    ALLERGIES:  penicillin (Other)  penicillin (Swelling)    MEDICATIONS  (STANDING):  aspirin  chewable 81 milliGRAM(s) Oral daily  atorvastatin 20 milliGRAM(s) Oral at bedtime  fludroCORTISONE 0.1 milliGRAM(s) Oral every 12 hours  gabapentin 600 milliGRAM(s) Oral three times a day  heparin   Injectable 5000 Unit(s) SubCutaneous every 8 hours  hydroxychloroquine 200 milliGRAM(s) Oral <User Schedule>  lacosamide 200 milliGRAM(s) Oral two times a day  levETIRAcetam 1000 milliGRAM(s) Oral two times a day  multivitamin 1 Tablet(s) Oral daily  oxyCODONE    IR 5 milliGRAM(s) Oral every 6 hours  pancrelipase  (CREON 12,000 Lipase Units) 1 Capsule(s) Oral three times a day with meals  pantoprazole    Tablet 40 milliGRAM(s) Oral before breakfast  phenytoin   Capsule 130 milliGRAM(s) Oral two times a day  predniSONE   Tablet 4 milliGRAM(s) Oral daily  thiamine 100 milliGRAM(s) Oral daily    MEDICATIONS  (PRN):  acetaminophen   Tablet .. 650 milliGRAM(s) Oral every 6 hours PRN Temp greater or equal to 38C (100.4F), Mild Pain (1 - 3)  ALPRAZolam 0.25 milliGRAM(s) Oral two times a day PRN anxiety  artificial tears (preservative free) Ophthalmic Solution 1 Drop(s) Both EYES every 4 hours PRN Dry Eyes  loperamide 2 milliGRAM(s) Oral every 6 hours PRN Diarrhea  melatonin 3 milliGRAM(s) Oral at bedtime PRN Insomnia    Vital Signs Last 24 Hrs  T(F): 97.2 (03 Sep 2020 22:42), Max: 98.5 (03 Sep 2020 08:27)  HR: 70 (03 Sep 2020 22:42) (70 - 76)  BP: 96/61 (03 Sep 2020 22:42) (96/53 - 96/61)  RR: 16 (03 Sep 2020 22:42) (14 - 16)  SpO2: 96% (03 Sep 2020 22:42) (96% - 96%)  I&O's Summary    03 Sep 2020 07:01  -  04 Sep 2020 07:00  --------------------------------------------------------  IN: 120 mL / OUT: 0 mL / NET: 120 mL        PHYSICAL EXAM:  General: NAD, A/O x 3  ENT: MMM  Neck: Supple, No JVD  Lungs: Clear to auscultation bilaterally  Cardio: RRR, S1/S2, No murmurs  Abdomen: Soft, Nontender, Nondistended; Bowel sounds present  Extremities: No calf tenderness, No pitting edema    LABS:                        11.0   4.66  )-----------( 286      ( 04 Sep 2020 06:15 )             33.7       09-03    136  |  99  |  9   ----------------------------<  99  4.4   |  25  |  0.57    Ca    9.1      03 Sep 2020 06:13       eGFR if Non African American: 101 mL/min/1.73M2 (09-03-20 @ 06:13)  eGFR if African American: 117 mL/min/1.73M2 (09-03-20 @ 06:13)             08-30 Chol 242 mg/dL  mg/dL HDL 95 mg/dL Trig 112 mg/dL                        RADIOLOGY & ADDITIONAL TESTS:    Care Discussed with Consultants/Other Providers:

## 2020-09-04 NOTE — PROGRESS NOTE ADULT - ASSESSMENT
61yo female with hx of CAD s/p possible MI (ECHO ), lung adenocarcinoma (s/p RML resection 2018), mixed connective tissue disorder (on plaquenil and methylprednisolone), left soleal vein thrombosis (on apixaban, 7/2020), RLE cellulitis (RX doxycycline), CDiff colitis (PO vancomycin 8/3- 8/17), lumbar laminectomy and chronic pain, EtOH (admissions for Etoh withdrawal), alcoholic pancreatitis, opiate abuse, presented to [facility] for acute rehab from [facility] for seizure/CVA    Debility  -Comprehensive Multidisciplinary Rehab Program  -Continue comprehensive rehab program, 3 hours a day, 5 days a week.    Seizures  -Continue Keppra 1g BID, Vimpat 200mg BID, Fosphenytoin 140mg BID  -neurological checks  -Follow up with Epilepsy Dr. Gallegos as outpatient    CVA  -MRI revealed acute/subacute lacunar infarction within the right medial thalamus along with hypoperfused areas in bilateral frontal, temporal lobes consistent w/ post-ictal events  -Continue ASA 81mg daily  -Follow up with Lipid panel  -Continue Atorvastatin 20mg daily    Hyponatremia  -Secondary to cerebral salt wasting  -Continue 1L fluid restriction  -Continue Florinef 0.1mg BID  -F/u with nephrology outpatient in 2 weeks (~9/11/20)    Mixed Connective Tissue Disorder  -Continue Medrol 4mg PO daily (S/P Medrol taper)  -Transition Medrol to home dose of Prednisone  -Continue Plaquenil 200mg every other day    Pancreatic insufficiency likely 2/2 Chronic Pancreatitis  -Likely chronic pancreatitis, Etoh induced vs mixed connective tissue disorder  -Continue Pancrelipase [id number] TID with meals    Macrocytic Anemia  -Likely 2/2 EtOH abuse  -Continue multivitamin + Thiamine    Urinary retention  -Resolved  -Due to neurologic diagnosis and limited mobility  -Voiding by herself    Venous stasis dermatitis  -Resolves with leg elevation  -RLE skin changes not cellulitis at this time  -Encourage leg elevation   -started compression therapy 8/31, monitor for results     Opioid Overuse/abuse  - taper opioid pain meds  - per psych    Secondary hypercoagulable state due to immobility  - hep subq

## 2020-09-04 NOTE — PROGRESS NOTE ADULT - SUBJECTIVE AND OBJECTIVE BOX
Patient is a 60y old  Female who presents with a chief complaint of hyponatremia, generalized seizures and hypoxic respiratory failure, right thalamic CVA  01.4 (04 Sep 2020 08:09)      HPI:  Patient is a 61 y/o F with PMH CAD s/p possible MI (ECHO ), lung adenocarcinoma (s/p RML resection ), mixed connective tissue disorder (on plaquenil and methylprednisolone), left soleal vein thrombosis (on apixaban, 2020), RLE cellulitis (RX doxycycline), CDiff colitis (PO vancomycin 8/3- ), lumbar laminectomy and chronic pain, EtOH (admissions for Etoh withdrawal), alcoholic pancreatitis, opiate abuse, admitted on 20 to [facility] with symptomatic hyponatremia (Na 121) secondary to psychogenic polydipsia. She was treated with an appropriate rate of correction in serum Na.    Patient was lAter noted to have focal seizure activity of LUE on  which broke with IV lorazepam; seizure activity believed to be secondary to benzodiazepine withdrawal. EEG  showed intermittent sharp and polyspike wave discharges with generalized slowing with patient at risk for seizures, and patient started on Keppra although she continued to have periodic seizure activity. A code stroke was called 8/15/20 for right facial droop. CT head unremarkable for acute pathology. 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     On , RRT called because patient found to be unresponsive with generalized seizure activity and left UE/LE/facial twitching. Seizure activity would break transiently with IV lorazepam but recurred repetitively. After 8mg lorazepam, she began to lose airway protection abilities and developed hypoxemia (SpO2 70s). She was emergently intubated and given keppra load 1000mg, vimpat load 200 mg and propofol 10 mcg/kg/min infusion, which successfully suppressed her seizures. Found to have new fever of 101.6 F and was hypotensive post-intubation, started on levophed drip.      She was transferred to [facility] for EEG 20, which showed epileptic focus in right frontal region. Weaned off pressors 20. MRI was performed which revealed acute/subacute lacunar infarction within the right medial thalamus along with hypoperfused areas in bilateral frontal, temporal lobes consistent w/ post-ictal events. LP on 20 negative for CNS infection. Extubated 20. She was found to have hyponatremia post-extubation, likely signifying cerebral salt wasting (high urine output, low BP, and high urine sodium and hypovolemia). She was started on 2% saline drip and given 1 dose of Florinef. Once sodium was stabilized, 2% saline was stopped. Antibiotics were stopped as she showed no signs of infection. MRI w/wo contrast was performed. She was downgraded from ICU 20.     Patient was transferred to [facility] 20. (28 Aug 2020 15:02)    SUBJECTIVE/INTERVAL EVENTS:  Patient seen and assessed at bedside. No acute events overnight. Patient in pain this AM. Per patient, she has some pain meds that are currently in [facility].      PAST MEDICAL & SURGICAL HISTORY:  Lung cancer  Opiate dependence  Alcohol abuse  Adenocarcinoma of lung  Mixed connective tissue disease  Depression H/O panic attack: with anxiety  S/P Laminectomy  Lumbar 3/10  Chronic pain  Diverticulitis of colon (without mention of hemorrhage)  History of laminectomy  History of lung surgery  History of oophorectomy, unilateral: bilateral  History of hip replacement, total, right: 2020  S/P lumbar laminectomy  S/P cholecystectomy  History of lung cancer: s/p wedge resection of RML      Allergies    penicillin (Other)  penicillin (Swelling)    Intolerances          VITALS  60y  Vital Signs Last 24 Hrs  T(C): 36.7 (04 Sep 2020 09:04), Max: 36.7 (04 Sep 2020 09:04)  T(F): 98 (04 Sep 2020 09:04), Max: 98 (04 Sep 2020 09:04)  HR: 68 (04 Sep 2020 09:04) (68 - 70)  BP: 96/65 (04 Sep 2020 09:04) (96/61 - 96/65)  BP(mean): --  RR: 16 (04 Sep 2020 09:04) (16 - 16)  SpO2: 96% (04 Sep 2020 09:04) (96% - 96%)  Daily     Daily Weight in k.1 (04 Sep 2020 01:00)    PHYSICAL EXAM:  General: NAD, A/O x 3. Anxious, +tearfulness from pain. compliant with questions.   Very anxious about ability to manage pain at home    Lungs: Clear to auscultation bilaterally  Cardio: RRR, S1/S2, No murmurs  Abdomen: Soft, Nontender, Nondistended; Bowel sounds present  Extremities: No calf tenderness, No pitting edema  Ambulating well with cane in left hand      RECENT LABS:                          11.0   4.66  )-----------( 286      ( 04 Sep 2020 06:15 )             33.7     09-03    136  |  99  |  9   ----------------------------<  99  4.4   |  25  |  0.57    Ca    9.1      03 Sep 2020 06:13      MEDICATIONS  (STANDING):  aspirin  chewable 81 milliGRAM(s) Oral daily  atorvastatin 20 milliGRAM(s) Oral at bedtime  fludroCORTISONE 0.1 milliGRAM(s) Oral every 12 hours  gabapentin 600 milliGRAM(s) Oral three times a day  heparin   Injectable 5000 Unit(s) SubCutaneous every 8 hours  hydroxychloroquine 200 milliGRAM(s) Oral <User Schedule>  lacosamide 200 milliGRAM(s) Oral two times a day  levETIRAcetam 1000 milliGRAM(s) Oral two times a day  multivitamin 1 Tablet(s) Oral daily  oxyCODONE    IR 5 milliGRAM(s) Oral every 6 hours  pancrelipase  (CREON 12,000 Lipase Units) 1 Capsule(s) Oral three times a day with meals  pantoprazole    Tablet 40 milliGRAM(s) Oral before breakfast  phenytoin   Capsule 130 milliGRAM(s) Oral two times a day  predniSONE   Tablet 4 milliGRAM(s) Oral daily  thiamine 100 milliGRAM(s) Oral daily    MEDICATIONS  (PRN):  acetaminophen   Tablet .. 650 milliGRAM(s) Oral every 6 hours PRN Temp greater or equal to 38C (100.4F), Mild Pain (1 - 3)  ALPRAZolam 0.25 milliGRAM(s) Oral two times a day PRN anxiety  artificial tears (preservative free) Ophthalmic Solution 1 Drop(s) Both EYES every 4 hours PRN Dry Eyes  loperamide 2 milliGRAM(s) Oral every 6 hours PRN Diarrhea  melatonin 3 milliGRAM(s) Oral at bedtime PRN Insomnia Patient is a 60y old  Female who presents with a chief complaint of hyponatremia, generalized seizures and hypoxic respiratory failure, right thalamic CVA  01.4 (04 Sep 2020 08:09)      HPI:  Patient is a 61 y/o F with PMH CAD s/p possible MI (ECHO ), lung adenocarcinoma (s/p RML resection ), mixed connective tissue disorder (on plaquenil and methylprednisolone), left soleal vein thrombosis (on apixaban, 2020), RLE cellulitis (RX doxycycline), CDiff colitis (PO vancomycin 8/3- ), lumbar laminectomy and chronic pain, EtOH (admissions for Etoh withdrawal), alcoholic pancreatitis, opiate abuse, admitted on 20 to [facility] with symptomatic hyponatremia (Na 121) secondary to psychogenic polydipsia. She was treated with an appropriate rate of correction in serum Na.    Patient was lAter noted to have focal seizure activity of LUE on  which broke with IV lorazepam; seizure activity believed to be secondary to benzodiazepine withdrawal. EEG  showed intermittent sharp and polyspike wave discharges with generalized slowing with patient at risk for seizures, and patient started on Keppra although she continued to have periodic seizure activity. A code stroke was called 8/15/20 for right facial droop. CT head unremarkable for acute pathology. 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     On , RRT called because patient found to be unresponsive with generalized seizure activity and left UE/LE/facial twitching. Seizure activity would break transiently with IV lorazepam but recurred repetitively. After 8mg lorazepam, she began to lose airway protection abilities and developed hypoxemia (SpO2 70s). She was emergently intubated and given keppra load 1000mg, vimpat load 200 mg and propofol 10 mcg/kg/min infusion, which successfully suppressed her seizures. Found to have new fever of 101.6 F and was hypotensive post-intubation, started on levophed drip.      She was transferred to [facility] for EEG 20, which showed epileptic focus in right frontal region. Weaned off pressors 20. MRI was performed which revealed acute/subacute lacunar infarction within the right medial thalamus along with hypoperfused areas in bilateral frontal, temporal lobes consistent w/ post-ictal events. LP on 20 negative for CNS infection. Extubated 20. She was found to have hyponatremia post-extubation, likely signifying cerebral salt wasting (high urine output, low BP, and high urine sodium and hypovolemia). She was started on 2% saline drip and given 1 dose of Florinef. Once sodium was stabilized, 2% saline was stopped. Antibiotics were stopped as she showed no signs of infection. MRI w/wo contrast was performed. She was downgraded from ICU 20.     Patient was transferred to [facility] 20. (28 Aug 2020 15:02)        PAST MEDICAL & SURGICAL HISTORY:  Lung cancer  Opiate dependence  Alcohol abuse  Adenocarcinoma of lung  Mixed connective tissue disease  Depression H/O panic attack: with anxiety  S/P Laminectomy  Lumbar 3/10  Chronic pain  Diverticulitis of colon (without mention of hemorrhage)  History of laminectomy  History of lung surgery  History of oophorectomy, unilateral: bilateral  History of hip replacement, total, right: 2020  S/P lumbar laminectomy  S/P cholecystectomy  History of lung cancer: s/p wedge resection of RML      Allergies    penicillin (Other)  penicillin (Swelling)    Intolerances      SUBJECTIVE/INTERVAL EVENTS:  Patient seen and assessed at bedside. No acute events overnight. Patient in pain this AM. Per patient, she brought all her medications to [facility], including her pain medications, and they should have an accurate count of how many oxycodone pills she has left. she has tolerated the decrease in oxycodone here to every 6 hours, but is concerned she needs it back up to 5 x day. She is ambulating in hallway and not showing signs of withdrawal, and at this time we recommend continuing with every 6 hours for now without further plans to reduce dose in-house as she is not interestd in detoxing now.    Plans for medical follow up including: PCP, pain management, psychiatry/behaivoral health under dual diagnosis addiction and anxiety will be set up. this will delay planned discharge (initailly set for tomorrow) to Tuesday; patient agreeable. Appears less anxious and irritable in knowing plan. States she does want to detox, but cant' do it now emotionally    VITALS  60y  Vital Signs Last 24 Hrs  T(C): 36.7 (04 Sep 2020 09:04), Max: 36.7 (04 Sep 2020 09:04)  T(F): 98 (04 Sep 2020 09:04), Max: 98 (04 Sep 2020 09:04)  HR: 68 (04 Sep 2020 09:04) (68 - 70)  BP: 96/65 (04 Sep 2020 09:04) (96/61 - 96/65)  BP(mean): --  RR: 16 (04 Sep 2020 09:04) (16 - 16)  SpO2: 96% (04 Sep 2020 09:04) (96% - 96%)  Daily     Daily Weight in k.1 (04 Sep 2020 01:00)    PHYSICAL EXAM:  General: NAD, A/O x 3. anxious but compliant and redirectable.  Very anxious about ability to manage pain at home, but improved after discussions of plan and medical follow up in community    Lungs: Clear to auscultation bilaterally  Cardio: RRR, S1/S2, No murmurs  Abdomen: Soft, Nontender, Nondistended; Bowel sounds present  Extremities: No calf tenderness, No pitting edema  Ambulating with SAC, needs cues for proper placement and use of cane, slow and mildly antalgic gait      RECENT LABS:                          11.0   4.66  )-----------( 286      ( 04 Sep 2020 06:15 )             33.7     09-03    136  |  99  |  9   ----------------------------<  99  4.4   |  25  |  0.57    Ca    9.1      03 Sep 2020 06:13      MEDICATIONS  (STANDING):  aspirin  chewable 81 milliGRAM(s) Oral daily  atorvastatin 20 milliGRAM(s) Oral at bedtime  fludroCORTISONE 0.1 milliGRAM(s) Oral every 12 hours  gabapentin 600 milliGRAM(s) Oral three times a day  heparin   Injectable 5000 Unit(s) SubCutaneous every 8 hours  hydroxychloroquine 200 milliGRAM(s) Oral <User Schedule>  lacosamide 200 milliGRAM(s) Oral two times a day  levETIRAcetam 1000 milliGRAM(s) Oral two times a day  multivitamin 1 Tablet(s) Oral daily  oxyCODONE    IR 5 milliGRAM(s) Oral every 6 hours  pancrelipase  (CREON 12,000 Lipase Units) 1 Capsule(s) Oral three times a day with meals  pantoprazole    Tablet 40 milliGRAM(s) Oral before breakfast  phenytoin   Capsule 130 milliGRAM(s) Oral two times a day  predniSONE   Tablet 4 milliGRAM(s) Oral daily  thiamine 100 milliGRAM(s) Oral daily    MEDICATIONS  (PRN):  acetaminophen   Tablet .. 650 milliGRAM(s) Oral every 6 hours PRN Temp greater or equal to 38C (100.4F), Mild Pain (1 - 3)  ALPRAZolam 0.25 milliGRAM(s) Oral two times a day PRN anxiety  artificial tears (preservative free) Ophthalmic Solution 1 Drop(s) Both EYES every 4 hours PRN Dry Eyes  loperamide 2 milliGRAM(s) Oral every 6 hours PRN Diarrhea  melatonin 3 milliGRAM(s) Oral at bedtime PRN Insomnia

## 2020-09-04 NOTE — PROGRESS NOTE ADULT - ASSESSMENT
Patient is a 59 y/o F with PMH CAD s/p possible MI (ECHO ), lung adenocarcinoma (s/p RML resection 2018), mixed connective tissue disorder (on plaquenil and methylprednisolone), left soleal vein thrombosis (on apixaban, 7/2020), RLE cellulitis (RX doxycycline), CDiff colitis (PO vancomycin 8/3- 8/17), lumbar laminectomy and chronic pain, EtOH (admissions for Etoh withdrawal), alcoholic pancreatitis, opiate abuse, admitted on 8/11/20 to [facility] with symptomatic hyponatremia complicated by generalized seizures, hypoxic respiratory failure, subsequent right medial thalamic infarct and cerebral salt-wasting.     #Comprehensive Multidisciplinary Rehab Program:  - continue comprehensive rehab program, 3 hours a day, 5 days a week, Pt and OT  - neuropsychology, psychiatry, and SW consults for mood, substance abuse history and referral. Patient refuses referral to detox center 9/3/20  -Neuropsychology consults 9/2 reviewed:  mild cognitive deficits with difficulties in retrieval of verbal information, visuospatial skills (visuomotor integration) and aspects of executive functions (initiation), consistent with some involvement of her fronto temporal regions. Her insight and judgment are mildly impaired. Her affect is angry and depressed, and she reports a number of depression and anxiety symptoms as reported above, consistent with a hx of anxiety and possibly depression. She also expresses significant preoccupation and feels overwhelmed at having her pain medications weaned off as per her pain management doctor. FIM scores: Social Interaction 4; Problem Solving 6; Memory 5.  -Patient currently refusing HCP to be apprised of medical information    - Precautions: falls, seizures, respiratory, spinal, substance abuse including opiates, long-term steroid    #Seizures  - Low suspicion for EtOH withdrawal seizures given >3 weeks abstinence prior to admission and autoimmune encephalitis given her improvement  - Continue Keppra 1g BID, Vimpat 200mg BID, Fosphenytoin 140mg BID  - dilantin level in AM 9/3: 7.8    #Hyponatremia likely due to cerebral salt wasting   - Continue 1L fluid restriction  - Continue Florinef 0.1mg BID  - F/u with nephrology outpatient in 2 weeks (~9/11/20)  - Na 135 8/31-->136 9/3  - BMP 9/7    #Mixed Connective Tissue Disorder  - Continue Medrol 4mg PO daily (S/P Medrol taper). Confirmed with [facility], patient was on Medrol 4mg daily.   - Continue Plaquenil 200mg every other day    #Pancreatitis  - Lipase 8/16/20: 13  - Continue Pancrelipase [id number] TID with meals    #Macrocytic Anemia: Likely 2/2 EtOH abuse  - Iron studies 8/17/20: iron 36, TIBC 216, %iron saturation 17, transferrin 167, ferritin 583  - Vit B12 8/16/20: 753  - Folate 8/16/20: >20  - Hgb 12.5 8/31  - Hgb 9/3 10.9, but no signs of active bleed. Monitor for now. Repeat in AM 9/4, order stool guaiac    #Venous Stasis Dermatitis  - Completed antibiotics for RLE cellulitis on previous admission  -  ACE wrap PRN    #Dry Eyes  - Artificial tears OU BID    #Anxiety:  -on Xanax prn for anxiety  -Psych consult appreciated    #Pain Management: in setting of h/o opioid abuse  - Tylenol. Was on Oxycodone ER 10mg Q8hr at [facility],.  -increase gabapentin 300 q8 to 600 q8 9/2  - Contacted pain specialist Dr Hawk; patient had been detoxed and was off all opioids prior to hospitalization. Recommend tapering schedule: oxycodone IR 5 q6 standing x 72 hours, then 5 mg q8 x 72 hours, then 5 mg q12 x 72 hours. He can follow the patient on discharge but does not plan on placing her back on any opioid. Reviewed with patient  -patient refused referral to detox program 9/3  -[facility]  accessed 9/2 #[id number]. Patient does not have any controlled substances filled in last 12 months listed. checked again 9/3: #[id number] no results found.  checked with psychiatry, consistent with patient's history.  -DC oxycodone ER. Start oxycodone 5 q6 9/2 in effort to detox. 9/3: patient refused detox and does not want to taper off of opioids.   -Confirmed with [facility] in [place] ([phone]), patient was prescribed Oxycodone 5mg (30 pills), Oxycodone 10mg Q4hr prn, Fentanyl patch 0.25mcg/hr Q72hr on 7/16/20 with Fentanyl Rx filled 7/22/20, all prescribed by Dr. Theo Hawk.   -Patient was prescribed Oxycodone 5mg Q4hr prn and 10mg Q4hr prn (150 pills, maximum of 5 pills per day) at [facility] in [place] ([phone]) between Feb and June 2020. Oxycodone was last picked up at [facility] in 5/21/20. All [facility] Rx also prescribed by Dr. Theo Hawk. Patient also filled a Narcan spray Rx April 2020.   -Psychiatry consulted appreciated 9/3. Per 9/4 discussion with psych and SW, patient agreeable to dual diagnosis treatment as outpatient. Dispo planning with pain management, PCP referral, and follow up. Spoke to patient regarding dispo on 9/8 in order to set up appts for follow up in near future. D/C-ed 1:1 per psych recs, low suicide risk. Will keep patient on Oxycodone 5mg Q6hr for remainder of rehab stay.   -Will call [facility] pharmacy to confirm the amount of opioids patient has left. Patient states she has medications to be picked up at [facility] when she is discharged.    #GI/Bowel:  - H/o C. diff (completed treatment 8/3-8/17)  - Loperamide prn for diarrhea  - GI ppx: Pantoprazole 40mg daily    #Osteoporosis  -pt on alendronate 70mg qweek at home  -resume after disharge    #Code Status  - pt initially DNR at [place] (MOLST in chart on arrival to [facility]), however discussed wishes with patient and she states she would like to be full code.      #Diet:  - regular    #DVT ppx:  - Heparin  - Last Doppler on 8/18/20 (resolved L soleal vein DVT)    #Case discussed in IDT rounds 9/2:  supervision/CG transfers, min assist shower transfers, supervision bed mobility, ambulates 60 feet RW and 30 feet SAC with supervision  -goals: mod independent bed mobility, transfers, ambulation and bADLs  -target: dc home 9/5/20 with outpatient PT unless patient placed in detox program    #Labs  CBC BMP 9/7  pain management/detox   psychiatry        ---------------  Code Status/Emergency Contact:  Anita River (cousin) HCP - [phone]. Do not give any health care related information to HCP per patient wishes 9/3/20 unless found not to have capacity to make decisions re: dissemination health care information.      Outpatient Follow-up (Specialty/Name of physician):  PCP, Dr. Tim Davis, [address] [facility], [address], [place], [address], [phone]  Nephrology, Dr. Zuly Simms, [address] [place] [address], [phone]  Epilepsy, Dr. Gallegos, [address] [place] [address], [phone]  Neurology, Dr. Alida King, [address] [place] [address], [phone] Patient is a 61 y/o F with PMH CAD s/p possible MI (ECHO ), lung adenocarcinoma (s/p RML resection 2018), mixed connective tissue disorder (on plaquenil and methylprednisolone), left soleal vein thrombosis (on apixaban, 7/2020), RLE cellulitis (RX doxycycline), CDiff colitis (PO vancomycin 8/3- 8/17), lumbar laminectomy and chronic pain, EtOH (admissions for Etoh withdrawal), alcoholic pancreatitis, opiate abuse, admitted on 8/11/20 to [facility] with symptomatic hyponatremia complicated by generalized seizures, hypoxic respiratory failure, subsequent right medial thalamic infarct and cerebral salt-wasting.     #Comprehensive Multidisciplinary Rehab Program:  - continue comprehensive rehab program, 3 hours a day, 5 days a week, Pt and OT  -for outpatient PT on discharge  -pain management as below  - Precautions: falls, seizures, respiratory, spinal, substance abuse including opiates, long-term steroid    - neuropsychology, psychiatry, and SW consults greatly appreciated for mood, substance abuse history and referral.       #Seizures  - Low suspicion for EtOH withdrawal seizures given >3 weeks abstinence prior to admission and autoimmune encephalitis given her improvement  - Continue Keppra 1g BID, Vimpat 200mg BID, Fosphenytoin 140mg BID  - dilantin level in AM 9/3: 7.8  -neurology f/u on dc    #Hyponatremia likely due to cerebral salt wasting   - Continue 1L fluid restriction  - Continue Florinef 0.1mg BID  - Na 135 8/31-->136 9/3. BMP 9/7  - F/u with nephrology outpatient in 2 weeks (~9/11/20)    #Mixed Connective Tissue Disorder  - Continue Medrol 4mg PO daily (S/P Medrol taper). Confirmed with [facility], patient was on Medrol 4mg daily.   - Continue Plaquenil 200mg every other day    #Pancreatitis  - Lipase 8/16/20: 13  - Continue Pancrelipase [id number] TID with meals  -PCP f/u on dc    #Macrocytic Anemia: Likely 2/2 EtOH abuse  - Iron studies 8/17/20: iron 36, TIBC 216, %iron saturation 17, transferrin 167, ferritin 583  - Vit B12 8/16/20: 753  - Folate 8/16/20: >20  - Hgb 12.5 8/31--> Hgb 9/3 10.9, but no signs of active bleed  -Repeat Hgb 11 9/4, stable. Stool guaiac NEGATIVE 9/3  CBC 9/7    #Venous Stasis Dermatitis  - Completed antibiotics for RLE cellulitis on previous admission  -  ACE wrap PRN    #Dry Eyes  - Artificial tears OU BID    #Anxiety:  -on Xanax prn for anxiety  -Psych consult appreciated 9/3 and 9/4. Patient to be referred for dual diagnosis treatment on dc, patient agreeable, see below    #Pain Management: in setting of h/o opioid abuse  - Tylenol. Was on Oxycodone ER 10mg Q8hr at [facility],.  -increase gabapentin 300 q8 to 600 q8 9/2  -patient refused referral to detox program immediately on dc but agreeable for future detox  - checked with psychiatry, consistent with patient's self-reported history as well as [email or web address]  -DC oxycodone ER. Start oxycodone 5 q6 9/2. Will not reduce further at this time as patient does not wish to detox now  -Confirmed with [facility] in [place] ([phone]), patient was prescribed Oxycodone 5mg (30 pills), Oxycodone 10mg Q4hr prn, Fentanyl patch 0.25mcg/hr Q72hr on 7/16/20 with Fentanyl Rx filled 7/22/20, all prescribed by Dr. Theo Hawk.   -Patient was prescribed Oxycodone 5mg Q4hr prn and 10mg Q4hr prn (150 pills, maximum of 5 pills per day) at [facility] in [place] ([phone]) between Feb and June 2020. Oxycodone was last picked up at [facility] in 5/21/20. [facility] Dr[facility] Theo Hawk. Patient also filled a Narcan spray Rx April 2020.   -Psychiatry consulted appreciated 9/3 and 9/4  ·	patient agreeable to dual diagnosis treatment as outpatient  ·	Disposition planning with pain management, PCP referral, and behavioral health/psychiatry. Spoke to patient regarding dispo on 9/8 in order to set up appts for follow up in near future  ·	D/C-ed 1:1 9/4, cleared by psychiatry, low suicide risk  ·	Will keep patient on Oxycodone 5mg Q6hr for remainder of rehab stay.   - [facility] pharmacy contacted 9/4 to confirm the amount of opioids patient has left to determine amount of medication needed on discharge until seen by pain mgt. Patient states she has medications to be picked up at [facility] when she is discharged:  ·	xanax 0.25 mg (14 pills)  ·	oxycodone 10 mg (63 pills)  ·	oxycodone 5 mg (1/2 tab)    #GI/Bowel:  - H/o C. diff (completed treatment 8/3-8/17)  - Loperamide prn for diarrhea  - GI ppx: Pantoprazole 40mg daily    #Osteoporosis  -pt on alendronate 70mg qweek at home  -resume after disharge    #Code Status  - DNR rescinded, patient is FULL CODE    #Diet:  - regular    #DVT ppx:  - Heparin  - Last Doppler on 8/18/20 (resolved L soleal vein DVT)    #Case discussed in IDT rounds 9/2:  supervision/CG transfers, min assist shower transfers, supervision bed mobility, ambulates 60 feet RW and 30 feet SAC with supervision  -goals: mod independent bed mobility, transfers, ambulation and bADLs  -target: dc home 9/8/20 with outpatient PT  -f/u: PCP to be set up with family practice at ; pain management; behavioral health-dual dx treatment cetner; PM+R. Discussed with SW and RN, psychiatry    *Patient currently refusing HCP to be apprised of medical information    #Labs  CBC BMP 9/7  pain management/detox           ---------------  Code Status/Emergency Contact:  Anita River (cousin) HCP - [phone]. Do not give any health care related information to HCP per patient wishes 9/3/20 unless found not to have capacity to make decisions re: dissemination health care information.      Outpatient Follow-up (Specialty/Name of physician):  PCP, Dr. Tim Davis, [address] [place] [address], [address], [place], [address], [phone]  Nephrology, Dr. Zuly Simms, [address] [place] [address], [phone]  Epilepsy, Dr. Gallegos, [address] [facility], [phone]  Neurology, Dr. Alida King, [address] [facility], [phone]

## 2020-09-05 PROCEDURE — 99232 SBSQ HOSP IP/OBS MODERATE 35: CPT

## 2020-09-05 RX ORDER — GABAPENTIN 400 MG/1
800 CAPSULE ORAL THREE TIMES A DAY
Refills: 0 | Status: DISCONTINUED | OUTPATIENT
Start: 2020-09-05 | End: 2020-09-08

## 2020-09-05 RX ADMIN — FLUDROCORTISONE ACETATE 0.1 MILLIGRAM(S): 0.1 TABLET ORAL at 17:31

## 2020-09-05 RX ADMIN — HEPARIN SODIUM 5000 UNIT(S): 5000 INJECTION INTRAVENOUS; SUBCUTANEOUS at 05:13

## 2020-09-05 RX ADMIN — Medication 650 MILLIGRAM(S): at 11:34

## 2020-09-05 RX ADMIN — Medication 1 CAPSULE(S): at 08:22

## 2020-09-05 RX ADMIN — OXYCODONE HYDROCHLORIDE 5 MILLIGRAM(S): 5 TABLET ORAL at 17:29

## 2020-09-05 RX ADMIN — HEPARIN SODIUM 5000 UNIT(S): 5000 INJECTION INTRAVENOUS; SUBCUTANEOUS at 14:06

## 2020-09-05 RX ADMIN — Medication 1 TABLET(S): at 11:32

## 2020-09-05 RX ADMIN — Medication 0.25 MILLIGRAM(S): at 05:10

## 2020-09-05 RX ADMIN — LEVETIRACETAM 1000 MILLIGRAM(S): 250 TABLET, FILM COATED ORAL at 17:28

## 2020-09-05 RX ADMIN — GABAPENTIN 800 MILLIGRAM(S): 400 CAPSULE ORAL at 14:06

## 2020-09-05 RX ADMIN — FLUDROCORTISONE ACETATE 0.1 MILLIGRAM(S): 0.1 TABLET ORAL at 05:16

## 2020-09-05 RX ADMIN — ATORVASTATIN CALCIUM 20 MILLIGRAM(S): 80 TABLET, FILM COATED ORAL at 22:31

## 2020-09-05 RX ADMIN — Medication 81 MILLIGRAM(S): at 11:32

## 2020-09-05 RX ADMIN — OXYCODONE HYDROCHLORIDE 5 MILLIGRAM(S): 5 TABLET ORAL at 05:05

## 2020-09-05 RX ADMIN — OXYCODONE HYDROCHLORIDE 5 MILLIGRAM(S): 5 TABLET ORAL at 23:21

## 2020-09-05 RX ADMIN — PANTOPRAZOLE SODIUM 40 MILLIGRAM(S): 20 TABLET, DELAYED RELEASE ORAL at 05:14

## 2020-09-05 RX ADMIN — LEVETIRACETAM 1000 MILLIGRAM(S): 250 TABLET, FILM COATED ORAL at 05:13

## 2020-09-05 RX ADMIN — GABAPENTIN 600 MILLIGRAM(S): 400 CAPSULE ORAL at 05:13

## 2020-09-05 RX ADMIN — OXYCODONE HYDROCHLORIDE 5 MILLIGRAM(S): 5 TABLET ORAL at 06:31

## 2020-09-05 RX ADMIN — HEPARIN SODIUM 5000 UNIT(S): 5000 INJECTION INTRAVENOUS; SUBCUTANEOUS at 22:30

## 2020-09-05 RX ADMIN — Medication 1 CAPSULE(S): at 17:31

## 2020-09-05 RX ADMIN — Medication 100 MILLIGRAM(S): at 11:33

## 2020-09-05 RX ADMIN — Medication 650 MILLIGRAM(S): at 03:06

## 2020-09-05 RX ADMIN — LACOSAMIDE 200 MILLIGRAM(S): 50 TABLET ORAL at 17:30

## 2020-09-05 RX ADMIN — LACOSAMIDE 200 MILLIGRAM(S): 50 TABLET ORAL at 05:12

## 2020-09-05 RX ADMIN — OXYCODONE HYDROCHLORIDE 5 MILLIGRAM(S): 5 TABLET ORAL at 11:29

## 2020-09-05 RX ADMIN — Medication 130 MILLIGRAM(S): at 17:28

## 2020-09-05 RX ADMIN — GABAPENTIN 800 MILLIGRAM(S): 400 CAPSULE ORAL at 22:30

## 2020-09-05 RX ADMIN — Medication 650 MILLIGRAM(S): at 04:40

## 2020-09-05 RX ADMIN — Medication 1 CAPSULE(S): at 11:34

## 2020-09-05 RX ADMIN — Medication 130 MILLIGRAM(S): at 05:13

## 2020-09-05 RX ADMIN — Medication 4 MILLIGRAM(S): at 05:14

## 2020-09-05 NOTE — PROGRESS NOTE ADULT - SUBJECTIVE AND OBJECTIVE BOX
Chief complaint: pain control is not adequate according to patient, but appears comfortable , no distress, admits  to taking 3600 mg of Neurontin daily in the past and is willing to titrate current  dose higher     Patient is a 60y old  Female who presents with a chief complaint of hyponatremia, generalized seizures and hypoxic respiratory failure, right thalamic CVA  01.4 (05 Sep 2020 10:00)      HISTORY OF PRESENT ILLNESS  Alcohol use disorder, mild, in early remission, in controlled environment  Anxiety  Uncomplicated opioid dependence          PAST MEDICAL & SURGICAL HISTORY:  Lung cancer  Opiate dependence  Alcohol abuse  Adenocarcinoma of lung  Mixed connective tissue disease  Depression H/O panic attack: with anxiety  S/P Laminectomy  Lumbar 3/10  Chronic pain  Diverticulitis of colon (without mention of hemorrhage)  History of laminectomy  History of lung surgery  History of oophorectomy, unilateral: bilateral  History of hip replacement, total, right: 6/7/2020  S/P lumbar laminectomy  S/P cholecystectomy  History of lung cancer: s/p wedge resection of RML      VITALS  Vital Signs Last 24 Hrs  T(C): 36.8 (04 Sep 2020 22:56), Max: 36.8 (04 Sep 2020 22:56)  T(F): 98.2 (04 Sep 2020 22:56), Max: 98.2 (04 Sep 2020 22:56)  HR: 70 (05 Sep 2020 05:22) (70 - 70)  BP: 103/66 (05 Sep 2020 05:22) (96/59 - 103/66)  BP(mean): --  RR: 14 (05 Sep 2020 05:22) (14 - 15)  SpO2: 96% (05 Sep 2020 05:22) (96% - 96%)      PHYSICAL EXAM  Constitutional - NAD, Comfortable  HEENT - NCAT, EOMI  Neck - Supple, No limited ROM  Chest - CTA bilaterally  Cardiovascular - RRR, S1S2, No murmurs  Abdomen - BS+, Soft, NTND  Extremities - No C/C/E, No calf tenderness   Neurologic Exam -                    Cognitive - Awake, Alert, AAO X3     No new focal deficits            RECENT LABS                        11.0   4.66  )-----------( 286      ( 04 Sep 2020 06:15 )             33.7           CURRENT MEDICATIONS    MEDICATIONS  (STANDING):  aspirin  chewable 81 milliGRAM(s) Oral daily  atorvastatin 20 milliGRAM(s) Oral at bedtime  fludroCORTISONE 0.1 milliGRAM(s) Oral every 12 hours  gabapentin 800 milliGRAM(s) Oral three times a day  heparin   Injectable 5000 Unit(s) SubCutaneous every 8 hours  hydroxychloroquine 200 milliGRAM(s) Oral <User Schedule>  lacosamide 200 milliGRAM(s) Oral two times a day  levETIRAcetam 1000 milliGRAM(s) Oral two times a day  methylPREDNISolone 4 milliGRAM(s) Oral daily  multivitamin 1 Tablet(s) Oral daily  oxyCODONE    IR 5 milliGRAM(s) Oral every 6 hours  pancrelipase  (CREON 12,000 Lipase Units) 1 Capsule(s) Oral three times a day with meals  pantoprazole    Tablet 40 milliGRAM(s) Oral before breakfast  phenytoin   Capsule 130 milliGRAM(s) Oral two times a day  thiamine 100 milliGRAM(s) Oral daily    MEDICATIONS  (PRN):  acetaminophen   Tablet .. 650 milliGRAM(s) Oral every 6 hours PRN Temp greater or equal to 38C (100.4F), Mild Pain (1 - 3)  ALPRAZolam 0.25 milliGRAM(s) Oral two times a day PRN anxiety  artificial tears (preservative free) Ophthalmic Solution 1 Drop(s) Both EYES every 4 hours PRN Dry Eyes  loperamide 2 milliGRAM(s) Oral every 6 hours PRN Diarrhea  melatonin 3 milliGRAM(s) Oral at bedtime PRN Insomnia    ASSESSMENT & PLAN          GI/Bowel Management - Dulcolax PRN, Fleet PRN   Management - Toilet Q2  Skin - Turn Q2  Pain - Tylenol PRN  DVT PPX - Heparin  Pain management , titrate Neurontin  Case discussed with Medicine , input appreciated       Continue comprehensive acute rehab program consisting of 3hrs/day of OT/PT and SLP.

## 2020-09-05 NOTE — PROGRESS NOTE ADULT - ASSESSMENT
Assessment and Plan:   59yo female with hx of CAD s/p possible MI (ECHO ), lung adenocarcinoma (s/p RML resection 2018), mixed connective tissue disorder (on plaquenil and methylprednisolone), left soleal vein thrombosis (on apixaban, 7/2020), RLE cellulitis (RX doxycycline), CDiff colitis (PO vancomycin 8/3- 8/17), lumbar laminectomy and chronic pain, EtOH (admissions for Etoh withdrawal), alcoholic pancreatitis, opiate abuse, presented to [facility] for acute rehab from [facility] for seizure/CVA    Debility  -Comprehensive Multidisciplinary Rehab Program  -Continue comprehensive rehab program, 3 hours a day, 5 days a week.    Seizures  -Continue Keppra 1g BID, Vimpat 200mg BID, Fosphenytoin 140mg BID  -neurological checks  -Follow up with Epilepsy Dr. Gallegos as outpatient    CVA  -MRI revealed acute/subacute lacunar infarction within the right medial thalamus along with hypoperfused areas in bilateral frontal, temporal lobes consistent w/ post-ictal events  -Continue ASA 81mg daily  -Follow up with Lipid panel  -Continue Atorvastatin 20mg daily    Hyponatremia  -resolved  -Secondary to cerebral salt wasting  -Continue 1L fluid restriction  -Continue Florinef 0.1mg BID  -F/u with nephrology outpatient in 2 weeks (~9/11/20)    Mixed Connective Tissue Disorder  -Continue Medrol 4mg PO daily (S/P Medrol taper)  -Transition Medrol to home dose of Prednisone  -Continue Plaquenil 200mg every other day    Pancreatic insufficiency likely 2/2 Chronic Pancreatitis  -Likely chronic pancreatitis, Etoh induced vs mixed connective tissue disorder  -Continue Pancrelipase [id number] TID with meals    Macrocytic Anemia  -Likely 2/2 EtOH abuse  -Continue multivitamin + Thiamine    Urinary retention  -Resolved  -Due to neurologic diagnosis and limited mobility  -Voiding by herself    Venous stasis dermatitis  -Resolves with leg elevation  -RLE skin changes not cellulitis at this time  -Encourage leg elevation   -started compression therapy 8/31, monitor for results     Opioid Overuse/abuse  - taper opioid pain meds  - behavorial health consult noted 09/04  -increase gabapentin 800mg tid 09/05    Secondary hypercoagulable state due to immobility  - hep subq

## 2020-09-05 NOTE — PROGRESS NOTE ADULT - SUBJECTIVE AND OBJECTIVE BOX
Patient is a 60y old  Female who presents with a chief complaint of hyponatremia, generalized seizures and hypoxic respiratory failure, right thalamic CVA  01.4 (04 Sep 2020 13:15).    Patient complaining of chronic back pain, requesting increase in her pain meds.  Also states her right foot, knee and leg hurts- which she states started while at [facility].  She is tearful while telling me of the above.  Tells me she believes her pain is not being addressed here which appears contrary to the documentation of physician notes.  Behavioral health consult 09/04 noted.    Patient seen and examined at bedside.    ALLERGIES:  penicillin (Other)  penicillin (Swelling)    MEDICATIONS  (STANDING):  aspirin  chewable 81 milliGRAM(s) Oral daily  atorvastatin 20 milliGRAM(s) Oral at bedtime  fludroCORTISONE 0.1 milliGRAM(s) Oral every 12 hours  gabapentin 600 milliGRAM(s) Oral three times a day  heparin   Injectable 5000 Unit(s) SubCutaneous every 8 hours  hydroxychloroquine 200 milliGRAM(s) Oral <User Schedule>  lacosamide 200 milliGRAM(s) Oral two times a day  levETIRAcetam 1000 milliGRAM(s) Oral two times a day  methylPREDNISolone 4 milliGRAM(s) Oral daily  multivitamin 1 Tablet(s) Oral daily  oxyCODONE    IR 5 milliGRAM(s) Oral every 6 hours  pancrelipase  (CREON 12,000 Lipase Units) 1 Capsule(s) Oral three times a day with meals  pantoprazole    Tablet 40 milliGRAM(s) Oral before breakfast  phenytoin   Capsule 130 milliGRAM(s) Oral two times a day  thiamine 100 milliGRAM(s) Oral daily    MEDICATIONS  (PRN):  acetaminophen   Tablet .. 650 milliGRAM(s) Oral every 6 hours PRN Temp greater or equal to 38C (100.4F), Mild Pain (1 - 3)  ALPRAZolam 0.25 milliGRAM(s) Oral two times a day PRN anxiety  artificial tears (preservative free) Ophthalmic Solution 1 Drop(s) Both EYES every 4 hours PRN Dry Eyes  loperamide 2 milliGRAM(s) Oral every 6 hours PRN Diarrhea  melatonin 3 milliGRAM(s) Oral at bedtime PRN Insomnia    Vital Signs Last 24 Hrs  T(F): 98.2 (04 Sep 2020 22:56), Max: 98.2 (04 Sep 2020 22:56)  HR: 70 (05 Sep 2020 05:22) (70 - 70)  BP: 103/66 (05 Sep 2020 05:22) (96/59 - 103/66)  RR: 14 (05 Sep 2020 05:22) (14 - 15)  SpO2: 96% (05 Sep 2020 05:22) (96% - 96%)    PHYSICAL EXAM:  General: NAD, A/O x 3  Neck: Supple, No JVD  Lungs: Clear to auscultation bilaterally  Cardio: RRR, S1/S2, No murmurs  Abdomen: Soft, Nontender, Nondistended; Bowel sounds present  Extremities: No calf tenderness, No pitting edema  Neuro: face symmetric speech clear no focal deficits      LABS:                        11.0   4.66  )-----------( 286      ( 04 Sep 2020 06:15 )             33.7       09-03    136  |  99  |  9   ----------------------------<  99  4.4   |  25  |  0.57    Ca    9.1      03 Sep 2020 06:13       eGFR if Non African American: 101 mL/min/1.73M2 (09-03-20 @ 06:13)  eGFR if African American: 117 mL/min/1.73M2 (09-03-20 @ 06:13)             08-30 Chol 242 mg/dL  mg/dL HDL 95 mg/dL Trig 112 mg/dL                        RADIOLOGY & ADDITIONAL TESTS:    Care Discussed with Consultants/Other Providers: Patient is a 60y old  Female who presents with a chief complaint of hyponatremia, generalized seizures and hypoxic respiratory failure, right thalamic CVA  01.4 (04 Sep 2020 13:15).    Patient complaining of chronic back pain, requesting increase in her pain meds.  Also states her right foot, knee and leg hurts- which she states started while at [facility].  She is tearful while telling me of the above.  Tells me she believes her pain is not being addressed here which appears contrary to the documentation of physician notes.  Behavioral health consult 09/04 noted.    Patient seen and examined at bedside.    ALLERGIES:  penicillin (Other)  penicillin (Swelling)    MEDICATIONS  (STANDING):  aspirin  chewable 81 milliGRAM(s) Oral daily  atorvastatin 20 milliGRAM(s) Oral at bedtime  fludroCORTISONE 0.1 milliGRAM(s) Oral every 12 hours  gabapentin 600 milliGRAM(s) Oral three times a day  heparin   Injectable 5000 Unit(s) SubCutaneous every 8 hours  hydroxychloroquine 200 milliGRAM(s) Oral <User Schedule>  lacosamide 200 milliGRAM(s) Oral two times a day  levETIRAcetam 1000 milliGRAM(s) Oral two times a day  methylPREDNISolone 4 milliGRAM(s) Oral daily  multivitamin 1 Tablet(s) Oral daily  oxyCODONE    IR 5 milliGRAM(s) Oral every 6 hours  pancrelipase  (CREON 12,000 Lipase Units) 1 Capsule(s) Oral three times a day with meals  pantoprazole    Tablet 40 milliGRAM(s) Oral before breakfast  phenytoin   Capsule 130 milliGRAM(s) Oral two times a day  thiamine 100 milliGRAM(s) Oral daily    MEDICATIONS  (PRN):  acetaminophen   Tablet .. 650 milliGRAM(s) Oral every 6 hours PRN Temp greater or equal to 38C (100.4F), Mild Pain (1 - 3)  ALPRAZolam 0.25 milliGRAM(s) Oral two times a day PRN anxiety  artificial tears (preservative free) Ophthalmic Solution 1 Drop(s) Both EYES every 4 hours PRN Dry Eyes  loperamide 2 milliGRAM(s) Oral every 6 hours PRN Diarrhea  melatonin 3 milliGRAM(s) Oral at bedtime PRN Insomnia    Vital Signs Last 24 Hrs  T(F): 98.2 (04 Sep 2020 22:56), Max: 98.2 (04 Sep 2020 22:56)  HR: 70 (05 Sep 2020 05:22) (70 - 70)  BP: 103/66 (05 Sep 2020 05:22) (96/59 - 103/66)  RR: 14 (05 Sep 2020 05:22) (14 - 15)  SpO2: 96% (05 Sep 2020 05:22) (96% - 96%)    PHYSICAL EXAM:  General: aao x3 nad  Neck: supple, no JVD  Lungs: clear to auscultation bilaterally  Cardio: RRR, S1/S2, No murmurs  Abdomen: Soft, Nontender, Nondistended; Bowel sounds present  Extremities: thin extremities, RLE normal in temperature and color c/w LLE, capillary refill wnl, no pitting edema  Neuro: face symmetric speech clear no focal deficits    LABS:                        11.0   4.66  )-----------( 286      ( 04 Sep 2020 06:15 )             33.7       09-03    136  |  99  |  9   ----------------------------<  99  4.4   |  25  |  0.57    Ca    9.1      03 Sep 2020 06:13       eGFR if Non African American: 101 mL/min/1.73M2 (09-03-20 @ 06:13)  eGFR if African American: 117 mL/min/1.73M2 (09-03-20 @ 06:13)    08-30 Chol 242 mg/dL  mg/dL HDL 95 mg/dL Trig 112 mg/dL

## 2020-09-06 PROCEDURE — 99232 SBSQ HOSP IP/OBS MODERATE 35: CPT

## 2020-09-06 RX ORDER — LANOLIN ALCOHOL/MO/W.PET/CERES
9 CREAM (GRAM) TOPICAL AT BEDTIME
Refills: 0 | Status: DISCONTINUED | OUTPATIENT
Start: 2020-09-06 | End: 2020-09-08

## 2020-09-06 RX ORDER — LANOLIN ALCOHOL/MO/W.PET/CERES
10 CREAM (GRAM) TOPICAL AT BEDTIME
Refills: 0 | Status: DISCONTINUED | OUTPATIENT
Start: 2020-09-06 | End: 2020-09-06

## 2020-09-06 RX ADMIN — Medication 0.25 MILLIGRAM(S): at 16:17

## 2020-09-06 RX ADMIN — Medication 200 MILLIGRAM(S): at 08:24

## 2020-09-06 RX ADMIN — Medication 130 MILLIGRAM(S): at 17:25

## 2020-09-06 RX ADMIN — OXYCODONE HYDROCHLORIDE 5 MILLIGRAM(S): 5 TABLET ORAL at 12:15

## 2020-09-06 RX ADMIN — OXYCODONE HYDROCHLORIDE 5 MILLIGRAM(S): 5 TABLET ORAL at 23:27

## 2020-09-06 RX ADMIN — PANTOPRAZOLE SODIUM 40 MILLIGRAM(S): 20 TABLET, DELAYED RELEASE ORAL at 06:08

## 2020-09-06 RX ADMIN — OXYCODONE HYDROCHLORIDE 5 MILLIGRAM(S): 5 TABLET ORAL at 00:30

## 2020-09-06 RX ADMIN — FLUDROCORTISONE ACETATE 0.1 MILLIGRAM(S): 0.1 TABLET ORAL at 17:25

## 2020-09-06 RX ADMIN — Medication 1 TABLET(S): at 11:30

## 2020-09-06 RX ADMIN — FLUDROCORTISONE ACETATE 0.1 MILLIGRAM(S): 0.1 TABLET ORAL at 05:46

## 2020-09-06 RX ADMIN — OXYCODONE HYDROCHLORIDE 5 MILLIGRAM(S): 5 TABLET ORAL at 06:53

## 2020-09-06 RX ADMIN — OXYCODONE HYDROCHLORIDE 5 MILLIGRAM(S): 5 TABLET ORAL at 17:22

## 2020-09-06 RX ADMIN — GABAPENTIN 800 MILLIGRAM(S): 400 CAPSULE ORAL at 20:31

## 2020-09-06 RX ADMIN — ATORVASTATIN CALCIUM 20 MILLIGRAM(S): 80 TABLET, FILM COATED ORAL at 20:31

## 2020-09-06 RX ADMIN — HEPARIN SODIUM 5000 UNIT(S): 5000 INJECTION INTRAVENOUS; SUBCUTANEOUS at 14:41

## 2020-09-06 RX ADMIN — OXYCODONE HYDROCHLORIDE 5 MILLIGRAM(S): 5 TABLET ORAL at 05:47

## 2020-09-06 RX ADMIN — OXYCODONE HYDROCHLORIDE 5 MILLIGRAM(S): 5 TABLET ORAL at 23:20

## 2020-09-06 RX ADMIN — LACOSAMIDE 200 MILLIGRAM(S): 50 TABLET ORAL at 17:24

## 2020-09-06 RX ADMIN — Medication 9 MILLIGRAM(S): at 20:31

## 2020-09-06 RX ADMIN — Medication 130 MILLIGRAM(S): at 05:47

## 2020-09-06 RX ADMIN — Medication 1 CAPSULE(S): at 11:32

## 2020-09-06 RX ADMIN — LEVETIRACETAM 1000 MILLIGRAM(S): 250 TABLET, FILM COATED ORAL at 05:47

## 2020-09-06 RX ADMIN — HEPARIN SODIUM 5000 UNIT(S): 5000 INJECTION INTRAVENOUS; SUBCUTANEOUS at 05:45

## 2020-09-06 RX ADMIN — OXYCODONE HYDROCHLORIDE 5 MILLIGRAM(S): 5 TABLET ORAL at 11:30

## 2020-09-06 RX ADMIN — HEPARIN SODIUM 5000 UNIT(S): 5000 INJECTION INTRAVENOUS; SUBCUTANEOUS at 20:30

## 2020-09-06 RX ADMIN — Medication 100 MILLIGRAM(S): at 11:29

## 2020-09-06 RX ADMIN — LACOSAMIDE 200 MILLIGRAM(S): 50 TABLET ORAL at 05:46

## 2020-09-06 RX ADMIN — GABAPENTIN 800 MILLIGRAM(S): 400 CAPSULE ORAL at 14:41

## 2020-09-06 RX ADMIN — LEVETIRACETAM 1000 MILLIGRAM(S): 250 TABLET, FILM COATED ORAL at 17:25

## 2020-09-06 RX ADMIN — Medication 0.25 MILLIGRAM(S): at 03:24

## 2020-09-06 RX ADMIN — Medication 1 CAPSULE(S): at 08:24

## 2020-09-06 RX ADMIN — Medication 81 MILLIGRAM(S): at 11:29

## 2020-09-06 RX ADMIN — Medication 1 CAPSULE(S): at 17:25

## 2020-09-06 RX ADMIN — GABAPENTIN 800 MILLIGRAM(S): 400 CAPSULE ORAL at 05:45

## 2020-09-06 RX ADMIN — Medication 650 MILLIGRAM(S): at 20:31

## 2020-09-06 RX ADMIN — Medication 650 MILLIGRAM(S): at 21:30

## 2020-09-06 RX ADMIN — Medication 4 MILLIGRAM(S): at 05:47

## 2020-09-06 RX ADMIN — OXYCODONE HYDROCHLORIDE 5 MILLIGRAM(S): 5 TABLET ORAL at 18:00

## 2020-09-06 NOTE — CHART NOTE - NSCHARTNOTEFT_GEN_A_CORE
called to bedside by patient's bedside RN as  they are c/o inability to fall asleep  -on arrival found patient sitting up in bed, but c/o lack of sleep  Vitals: BP (102/65), HR 72, RR: 15, SpO2: 97%  NERUO: A&Ox3  CV: + S1/S2, RRR  RESP: CTA b/l  GI: Soft non tender, active BS  extremities: nonedematous  -other sleep health aides (quit/dark room, limiting noise etc) thus far unsuccessful-will trial dose of melatonin and re-assess patient in 45 minutes

## 2020-09-06 NOTE — PROGRESS NOTE ADULT - SUBJECTIVE AND OBJECTIVE BOX
Patient is a 60y old  Female who presents with a chief complaint of hyponatremia, generalized seizures and hypoxic respiratory failure, right thalamic CVA  01.4 (05 Sep 2020 14:29)    No overnight events noted.  Patient still complains of pain radiating from lower back, down right hip into the right foot.  States the last 2 hours of the day between 9pm to 11pm are unbearable.  Tells me she had been taking oxy 5mg q4h plus fentanyl 25mcg patch as outpatient.  Tells me her Pain Management provider is Dr Hawk who works out of [employer] and has recently refused to continue to prescribe opioids earlier this year.    Patient seen and examined at bedside.    ALLERGIES:  penicillin (Other)  penicillin (Swelling)      Vital Signs Last 24 Hrs  T(F): 97.9 (05 Sep 2020 21:00), Max: 98 (05 Sep 2020 18:21)  HR: 77 (06 Sep 2020 05:55) (72 - 77)  BP: 111/80 (06 Sep 2020 05:55) (100/66 - 120/76)  RR: 14 (05 Sep 2020 21:00) (14 - 14)  SpO2: 96% (05 Sep 2020 21:00) (96% - 96%)    PHYSICAL EXAM:  General: aao x3 nad  Neck: supple, no JVD  Lungs: clear to auscultation bilaterally  Cardio: RRR, S1/S2, No murmurs  Abdomen: Soft, Nontender, Nondistended; Bowel sounds present  Extremities: thin extremities, RLE normal in temperature and color c/w LLE, capillary refill wnl, no pitting edema  Neuro: face symmetric speech clear no focal deficits      LABS:                        11.0   4.66  )-----------( 286      ( 04 Sep 2020 06:15 )             33.7       08-30 Chol 242 mg/dL  mg/dL HDL 95 mg/dL Trig 112 mg/dL

## 2020-09-06 NOTE — PROGRESS NOTE ADULT - SUBJECTIVE AND OBJECTIVE BOX
Chief complaint: no acute events overnight, comfortable     Patient is a 60y old  Female who presents with a chief complaint of hyponatremia, generalized seizures and hypoxic respiratory failure, right thalamic CVA  01.4 (06 Sep 2020 09:23)    HEALTH ISSUES - PROBLEM Dx:  Alcohol use disorder, mild, in early remission, in controlled environment  Anxiety  Uncomplicated opioid dependence              PAST MEDICAL & SURGICAL HISTORY:  Lung cancer  Opiate dependence  Alcohol abuse  Adenocarcinoma of lung  Mixed connective tissue disease  Depression H/O panic attack: with anxiety  S/P Laminectomy  Lumbar 3/10  Chronic pain  Diverticulitis of colon (without mention of hemorrhage)  History of laminectomy  History of lung surgery  History of oophorectomy, unilateral: bilateral  History of hip replacement, total, right: 6/7/2020  S/P lumbar laminectomy  S/P cholecystectomy  History of lung cancer: s/p wedge resection of RML      VITALS  Vital Signs Last 24 Hrs  T(C): 36.6 (05 Sep 2020 21:00), Max: 36.7 (05 Sep 2020 18:21)  T(F): 97.9 (05 Sep 2020 21:00), Max: 98 (05 Sep 2020 18:21)  HR: 77 (06 Sep 2020 05:55) (72 - 77)  BP: 111/80 (06 Sep 2020 05:55) (100/66 - 120/76)  BP(mean): --  RR: 14 (05 Sep 2020 21:00) (14 - 14)  SpO2: 96% (05 Sep 2020 21:00) (96% - 96%)      PHYSICAL EXAM  Constitutional - NAD, Comfortable  HEENT - NCAT, EOMI  Neck - Supple, No limited ROM  Chest - CTA bilaterally  Cardiovascular - , S1S2  Abdomen - BS+, Soft, NTND  Extremities - No C/C/E, No calf tenderness               CURRENT MEDICATIONS    MEDICATIONS  (STANDING):  aspirin  chewable 81 milliGRAM(s) Oral daily  atorvastatin 20 milliGRAM(s) Oral at bedtime  fludroCORTISONE 0.1 milliGRAM(s) Oral every 12 hours  gabapentin 800 milliGRAM(s) Oral three times a day  heparin   Injectable 5000 Unit(s) SubCutaneous every 8 hours  hydroxychloroquine 200 milliGRAM(s) Oral <User Schedule>  lacosamide 200 milliGRAM(s) Oral two times a day  levETIRAcetam 1000 milliGRAM(s) Oral two times a day  methylPREDNISolone 4 milliGRAM(s) Oral daily  multivitamin 1 Tablet(s) Oral daily  oxyCODONE    IR 5 milliGRAM(s) Oral every 6 hours  pancrelipase  (CREON 12,000 Lipase Units) 1 Capsule(s) Oral three times a day with meals  pantoprazole    Tablet 40 milliGRAM(s) Oral before breakfast  phenytoin   Capsule 130 milliGRAM(s) Oral two times a day  thiamine 100 milliGRAM(s) Oral daily    MEDICATIONS  (PRN):  acetaminophen   Tablet .. 650 milliGRAM(s) Oral every 6 hours PRN Temp greater or equal to 38C (100.4F), Mild Pain (1 - 3)  ALPRAZolam 0.25 milliGRAM(s) Oral two times a day PRN anxiety  artificial tears (preservative free) Ophthalmic Solution 1 Drop(s) Both EYES every 4 hours PRN Dry Eyes  loperamide 2 milliGRAM(s) Oral every 6 hours PRN Diarrhea  melatonin 3 milliGRAM(s) Oral at bedtime PRN Insomnia    ASSESSMENT & PLAN          GI/Bowel Management - Dulcolax PRN, Fleet PRN   Management - Toilet Q2  Skin - Turn Q2  Pain - Tylenol PRN  DVT PPX - Heparin      Continue comprehensive acute rehab program consisting of 3hrs/day of OT/PT and SLP.

## 2020-09-06 NOTE — PROGRESS NOTE ADULT - ASSESSMENT
61yo female with hx of CAD s/p possible MI (ECHO ), lung adenocarcinoma (s/p RML resection 2018), mixed connective tissue disorder (on plaquenil and methylprednisolone), left soleal vein thrombosis (on apixaban, 7/2020), RLE cellulitis (RX doxycycline), CDiff colitis (PO vancomycin 8/3- 8/17), lumbar laminectomy and chronic pain, EtOH (admissions for Etoh withdrawal), alcoholic pancreatitis, opiate abuse, presented to [facility] for acute rehab from [facility] for seizure/CVA    Debility  -Comprehensive Multidisciplinary Rehab Program per primary team    Seizures  -Continue Keppra 1g BID, Vimpat 200mg BID, Fosphenytoin 140mg BID  -Follow up with Epilepsy Dr. Gallegos as outpatient  -Seizure precautions    CVA  -MRI revealed acute/subacute lacunar infarction within the right medial thalamus along with hypoperfused areas in bilateral frontal, temporal lobes consistent w/ post-ictal events  -Continue ASA 81mg daily  -Lipid panel 8/30 reviewed, Chol/ LDL not at goal  -Recommend increasing Atorvastatin if LFTs are wnl, LDL goal of 70    Hyponatremia  -resolved  -Secondary to cerebral salt wasting  -Continue 1L fluid restriction  -Continue Florinef 0.1mg BID  -F/u with nephrology outpatient in 2 weeks (~9/11/20)    Mixed Connective Tissue Disorder  -Continue Medrol 4mg PO daily (S/P Medrol taper)  -Transition Medrol to home dose of Prednisone  -Continue Plaquenil 200mg every other day    Pancreatic insufficiency likely 2/2 Chronic Pancreatitis  -Likely chronic pancreatitis, Etoh induced vs mixed connective tissue disorder  -Continue Pancrelipase [id number] TID with meals    Macrocytic Anemia  -Likely 2/2 EtOH abuse  -Continue multivitamin + Thiamine    Urinary retention  -Resolved  -Due to neurologic diagnosis and limited mobility  -Voiding by herself    Venous stasis dermatitis  -Resolves with leg elevation  -RLE skin changes not cellulitis at this time  -Encourage leg elevation   -started compression therapy 8/31, monitor for results     Opioid Dependency  - taper opioid pain meds per primary team and Behavorial health  - behavorial health consult noted 09/04  - increased gabapentin 800mg tid 09/05  - patient still stating pain not well controlled, will d/w primary team    Secondary hypercoagulable state due to immobility/ DVT prophylaxis:  hep subq

## 2020-09-07 LAB
ANION GAP SERPL CALC-SCNC: 6 MMOL/L — SIGNIFICANT CHANGE UP (ref 5–17)
APPEARANCE UR: CLEAR — SIGNIFICANT CHANGE UP
BACTERIA # UR AUTO: ABNORMAL /HPF
BILIRUB UR-MCNC: NEGATIVE — SIGNIFICANT CHANGE UP
BUN SERPL-MCNC: 7 MG/DL — SIGNIFICANT CHANGE UP (ref 7–23)
CALCIUM SERPL-MCNC: 9.3 MG/DL — SIGNIFICANT CHANGE UP (ref 8.4–10.5)
CHLORIDE SERPL-SCNC: 98 MMOL/L — SIGNIFICANT CHANGE UP (ref 96–108)
CO2 SERPL-SCNC: 31 MMOL/L — SIGNIFICANT CHANGE UP (ref 22–31)
COLOR SPEC: YELLOW — SIGNIFICANT CHANGE UP
CREAT SERPL-MCNC: 0.55 MG/DL — SIGNIFICANT CHANGE UP (ref 0.5–1.3)
DIFF PNL FLD: NEGATIVE — SIGNIFICANT CHANGE UP
EPI CELLS # UR: ABNORMAL
GLUCOSE SERPL-MCNC: 110 MG/DL — HIGH (ref 70–99)
GLUCOSE UR QL: NEGATIVE — SIGNIFICANT CHANGE UP
HCT VFR BLD CALC: 37 % — SIGNIFICANT CHANGE UP (ref 34.5–45)
HGB BLD-MCNC: 11.9 G/DL — SIGNIFICANT CHANGE UP (ref 11.5–15.5)
KETONES UR-MCNC: NEGATIVE — SIGNIFICANT CHANGE UP
LEUKOCYTE ESTERASE UR-ACNC: ABNORMAL
MCHC RBC-ENTMCNC: 32.2 GM/DL — SIGNIFICANT CHANGE UP (ref 32–36)
MCHC RBC-ENTMCNC: 34 PG — SIGNIFICANT CHANGE UP (ref 27–34)
MCV RBC AUTO: 105.7 FL — HIGH (ref 80–100)
NITRITE UR-MCNC: NEGATIVE — SIGNIFICANT CHANGE UP
NRBC # BLD: 0 /100 WBCS — SIGNIFICANT CHANGE UP (ref 0–0)
PH UR: 6 — SIGNIFICANT CHANGE UP (ref 5–8)
PLATELET # BLD AUTO: 261 K/UL — SIGNIFICANT CHANGE UP (ref 150–400)
POTASSIUM SERPL-MCNC: 4.1 MMOL/L — SIGNIFICANT CHANGE UP (ref 3.5–5.3)
POTASSIUM SERPL-SCNC: 4.1 MMOL/L — SIGNIFICANT CHANGE UP (ref 3.5–5.3)
PROT UR-MCNC: NEGATIVE — SIGNIFICANT CHANGE UP
RBC # BLD: 3.5 M/UL — LOW (ref 3.8–5.2)
RBC # FLD: 13.2 % — SIGNIFICANT CHANGE UP (ref 10.3–14.5)
RBC CASTS # UR COMP ASSIST: SIGNIFICANT CHANGE UP /HPF (ref 0–4)
SODIUM SERPL-SCNC: 135 MMOL/L — SIGNIFICANT CHANGE UP (ref 135–145)
SP GR SPEC: 1.02 — SIGNIFICANT CHANGE UP (ref 1.01–1.02)
UROBILINOGEN FLD QL: NEGATIVE — SIGNIFICANT CHANGE UP
WBC # BLD: 4.94 K/UL — SIGNIFICANT CHANGE UP (ref 3.8–10.5)
WBC # FLD AUTO: 4.94 K/UL — SIGNIFICANT CHANGE UP (ref 3.8–10.5)
WBC UR QL: ABNORMAL /HPF (ref 0–5)

## 2020-09-07 PROCEDURE — 99232 SBSQ HOSP IP/OBS MODERATE 35: CPT

## 2020-09-07 RX ADMIN — Medication 130 MILLIGRAM(S): at 05:02

## 2020-09-07 RX ADMIN — Medication 0.25 MILLIGRAM(S): at 00:31

## 2020-09-07 RX ADMIN — PANTOPRAZOLE SODIUM 40 MILLIGRAM(S): 20 TABLET, DELAYED RELEASE ORAL at 05:02

## 2020-09-07 RX ADMIN — FLUDROCORTISONE ACETATE 0.1 MILLIGRAM(S): 0.1 TABLET ORAL at 05:02

## 2020-09-07 RX ADMIN — FLUDROCORTISONE ACETATE 0.1 MILLIGRAM(S): 0.1 TABLET ORAL at 17:25

## 2020-09-07 RX ADMIN — OXYCODONE HYDROCHLORIDE 5 MILLIGRAM(S): 5 TABLET ORAL at 11:06

## 2020-09-07 RX ADMIN — OXYCODONE HYDROCHLORIDE 5 MILLIGRAM(S): 5 TABLET ORAL at 05:01

## 2020-09-07 RX ADMIN — GABAPENTIN 800 MILLIGRAM(S): 400 CAPSULE ORAL at 14:06

## 2020-09-07 RX ADMIN — Medication 1 CAPSULE(S): at 11:50

## 2020-09-07 RX ADMIN — OXYCODONE HYDROCHLORIDE 5 MILLIGRAM(S): 5 TABLET ORAL at 16:00

## 2020-09-07 RX ADMIN — Medication 4 MILLIGRAM(S): at 05:02

## 2020-09-07 RX ADMIN — Medication 130 MILLIGRAM(S): at 17:25

## 2020-09-07 RX ADMIN — Medication 100 MILLIGRAM(S): at 11:16

## 2020-09-07 RX ADMIN — Medication 0.25 MILLIGRAM(S): at 12:18

## 2020-09-07 RX ADMIN — GABAPENTIN 800 MILLIGRAM(S): 400 CAPSULE ORAL at 22:30

## 2020-09-07 RX ADMIN — Medication 1 CAPSULE(S): at 17:25

## 2020-09-07 RX ADMIN — Medication 81 MILLIGRAM(S): at 11:16

## 2020-09-07 RX ADMIN — Medication 0.25 MILLIGRAM(S): at 23:20

## 2020-09-07 RX ADMIN — Medication 1 CAPSULE(S): at 11:16

## 2020-09-07 RX ADMIN — HEPARIN SODIUM 5000 UNIT(S): 5000 INJECTION INTRAVENOUS; SUBCUTANEOUS at 05:01

## 2020-09-07 RX ADMIN — OXYCODONE HYDROCHLORIDE 5 MILLIGRAM(S): 5 TABLET ORAL at 17:24

## 2020-09-07 RX ADMIN — Medication 1 TABLET(S): at 11:16

## 2020-09-07 RX ADMIN — HEPARIN SODIUM 5000 UNIT(S): 5000 INJECTION INTRAVENOUS; SUBCUTANEOUS at 14:06

## 2020-09-07 RX ADMIN — GABAPENTIN 800 MILLIGRAM(S): 400 CAPSULE ORAL at 05:02

## 2020-09-07 RX ADMIN — OXYCODONE HYDROCHLORIDE 5 MILLIGRAM(S): 5 TABLET ORAL at 06:00

## 2020-09-07 RX ADMIN — ATORVASTATIN CALCIUM 20 MILLIGRAM(S): 80 TABLET, FILM COATED ORAL at 22:30

## 2020-09-07 RX ADMIN — LACOSAMIDE 200 MILLIGRAM(S): 50 TABLET ORAL at 05:01

## 2020-09-07 RX ADMIN — OXYCODONE HYDROCHLORIDE 5 MILLIGRAM(S): 5 TABLET ORAL at 12:00

## 2020-09-07 RX ADMIN — OXYCODONE HYDROCHLORIDE 5 MILLIGRAM(S): 5 TABLET ORAL at 23:19

## 2020-09-07 RX ADMIN — LEVETIRACETAM 1000 MILLIGRAM(S): 250 TABLET, FILM COATED ORAL at 17:25

## 2020-09-07 RX ADMIN — LEVETIRACETAM 1000 MILLIGRAM(S): 250 TABLET, FILM COATED ORAL at 05:02

## 2020-09-07 RX ADMIN — HEPARIN SODIUM 5000 UNIT(S): 5000 INJECTION INTRAVENOUS; SUBCUTANEOUS at 22:30

## 2020-09-07 RX ADMIN — LACOSAMIDE 200 MILLIGRAM(S): 50 TABLET ORAL at 17:24

## 2020-09-07 NOTE — CHART NOTE - NSCHARTNOTEFT_GEN_A_CORE
Called by RN to evaluate pt for dysuria.   Pt c/o pain on urination. Denies frequency, urgency, hematuria, chills. Pt also complaining of diffuse body pain so unable to assess for suprapubic pain and CVA tenderness.   Vital signs: afebrile 97.8, BP (112/73), HR 74, SpO2 95% on RA  PE: gen- A&Ox3, complaining of diffuse body pain  -cardio: S1S2+, RRR  -resp: clear to auscultation b/l  A&P: most likely UTI. Ordered UA.   Will continue to monitor closely. Rest of care per primary team.

## 2020-09-07 NOTE — PROGRESS NOTE ADULT - SUBJECTIVE AND OBJECTIVE BOX
Chief complaint: comfortable , no acute events overnight     Patient is a 60y old  Female who presents with a chief complaint of hyponatremia, generalized seizures and hypoxic respiratory failure, right thalamic CVA (07 Sep 2020 08:04)        HEALTH ISSUES - PROBLEM Dx:  Alcohol use disorder, mild, in early remission, in controlled environment  Anxiety  Uncomplicated opioid dependence              PAST MEDICAL & SURGICAL HISTORY:  Lung cancer  Opiate dependence  Alcohol abuse  Adenocarcinoma of lung  Mixed connective tissue disease  Depression H/O panic attack: with anxiety  S/P Laminectomy  Lumbar 3/10  Chronic pain  Diverticulitis of colon (without mention of hemorrhage)  History of laminectomy  History of lung surgery  History of oophorectomy, unilateral: bilateral  History of hip replacement, total, right: 2020  S/P lumbar laminectomy  S/P cholecystectomy  History of lung cancer: s/p wedge resection of RML  VITALS  Vital Signs Last 24 Hrs  T(C): 36.8 (07 Sep 2020 09:24), Max: 36.8 (06 Sep 2020 20:27)  T(F): 98.3 (07 Sep 2020 09:24), Max: 98.3 (07 Sep 2020 09:24)  HR: 74 (07 Sep 2020 09:24) (72 - 79)  BP: 102/62 (07 Sep 2020 09:24) (96/63 - 112/73)  BP(mean): --  RR: 15 (07 Sep 2020 09:24) (14 - 15)  SpO2: 98% (07 Sep 2020 09:24) (95% - 98%)      PHYSICAL EXAM  Constitutional - NAD, Comfortable  HEENT - NCAT, EOMI  Neck - Supple, No limited ROM  Chest - CTA bilaterally  Cardiovascular - RRR, S1S2  Abdomen - BS+, Soft, NTND  Extremities -No calf tenderness   Neurologic Exam -                    Cognitive - Awake, Alert, AAO X3     No new focal deficits               RECENT LABS                        11.9   4.94  )-----------( 261      ( 07 Sep 2020 06:55 )             37.0     09-07    135  |  98  |  7   ----------------------------<  110<H>  4.1   |  31  |  0.55    Ca    9.3      07 Sep 2020 06:55        Urinalysis Basic - ( 07 Sep 2020 09:33 )    Color: Yellow / Appearance: Clear / S.020 / pH: x  Gluc: x / Ketone: Negative  / Bili: Negative / Urobili: Negative   Blood: x / Protein: Negative / Nitrite: Negative   Leuk Esterase: Small / RBC: 0-4 /HPF / WBC 11-25 /HPF   Sq Epi: x / Non Sq Epi: Moderate / Bacteria: Moderate /HPF      CURRENT MEDICATIONS    MEDICATIONS  (STANDING):  aspirin  chewable 81 milliGRAM(s) Oral daily  atorvastatin 20 milliGRAM(s) Oral at bedtime  fludroCORTISONE 0.1 milliGRAM(s) Oral every 12 hours  gabapentin 800 milliGRAM(s) Oral three times a day  heparin   Injectable 5000 Unit(s) SubCutaneous every 8 hours  hydroxychloroquine 200 milliGRAM(s) Oral <User Schedule>  lacosamide 200 milliGRAM(s) Oral two times a day  levETIRAcetam 1000 milliGRAM(s) Oral two times a day  methylPREDNISolone 4 milliGRAM(s) Oral daily  multivitamin 1 Tablet(s) Oral daily  oxyCODONE    IR 5 milliGRAM(s) Oral every 6 hours  pancrelipase  (CREON 12,000 Lipase Units) 1 Capsule(s) Oral three times a day with meals  pantoprazole    Tablet 40 milliGRAM(s) Oral before breakfast  phenytoin   Capsule 130 milliGRAM(s) Oral two times a day  thiamine 100 milliGRAM(s) Oral daily    MEDICATIONS  (PRN):  acetaminophen   Tablet .. 650 milliGRAM(s) Oral every 6 hours PRN Temp greater or equal to 38C (100.4F), Mild Pain (1 - 3)  ALPRAZolam 0.25 milliGRAM(s) Oral two times a day PRN anxiety  artificial tears (preservative free) Ophthalmic Solution 1 Drop(s) Both EYES every 4 hours PRN Dry Eyes  loperamide 2 milliGRAM(s) Oral every 6 hours PRN Diarrhea  melatonin 9 milliGRAM(s) Oral at bedtime PRN Insomnia    ASSESSMENT & PLAN          GI/Bowel Management - Dulcolax PRN, Fleet PRN   Management - Toilet Q2  Skin - Turn Q2  Pain - Tylenol PRN  DVT PPX - Heparin      Continue comprehensive acute rehab program consisting of 3hrs/day of OT/PT and SLP.

## 2020-09-07 NOTE — PROGRESS NOTE ADULT - SUBJECTIVE AND OBJECTIVE BOX
Patient is a 60y old  Female who presents with a chief complaint of hyponatremia, generalized seizures and hypoxic respiratory failure, right thalamic CVA.    Patient complains of diffuse pain along with similar back pain with radiation into right foot as the past 2 days.  Also has burning with urination earlier this morning, UA was ordered by staff.    Patient seen and examined at bedside.    ALLERGIES:  penicillin (Other)  penicillin (Swelling)    Vital Signs Last 24 Hrs  T(F): 97.8 (07 Sep 2020 06:21), Max: 98.2 (06 Sep 2020 20:27)  HR: 74 (07 Sep 2020 06:21) (72 - 79)  BP: 112/73 (07 Sep 2020 06:21) (96/63 - 112/73)  RR: 15 (07 Sep 2020 06:21) (14 - 15)  SpO2: 95% (07 Sep 2020 06:21) (95% - 98%)    PHYSICAL EXAM:  General: thin female, aao x3 nad  Neck: supple, no JVD  Lungs: clear to auscultation bilaterally  Cardio: RRR, S1/S2, No murmurs  Abdomen: soft nontender +bs  Extremities: thin extremities, skin taught over right foot with chronic induration, RLE normal in temperature c/w LLE, capillary refill wnl, no pitting edema      LABS:                        11.9   4.94  )-----------( 261      ( 07 Sep 2020 06:55 )             37.0       09-07    135  |  98  |  7   ----------------------------<  110  4.1   |  31  |  0.55    Ca    9.3      07 Sep 2020 06:55       eGFR if Non African American: 102 mL/min/1.73M2 (09-07-20 @ 06:55)  eGFR if African American: 118 mL/min/1.73M2 (09-07-20 @ 06:55)       08-30 Chol 242 mg/dL  mg/dL HDL 95 mg/dL Trig 112 mg/dL

## 2020-09-07 NOTE — PROGRESS NOTE ADULT - ASSESSMENT
61yo female with hx of CAD s/p possible MI (ECHO ), lung adenocarcinoma (s/p RML resection 2018), mixed connective tissue disorder (on plaquenil and methylprednisolone), left soleal vein thrombosis (on apixaban, 7/2020), RLE cellulitis (RX doxycycline), CDiff colitis (PO vancomycin 8/3- 8/17), lumbar laminectomy and chronic pain, EtOH (admissions for Etoh withdrawal), alcoholic pancreatitis, opiate abuse, presented to [facility] for acute rehab from [facility] for seizure/CVA    Debility  -Comprehensive Multidisciplinary Rehab Program per primary team    Seizures  -Continue Keppra 1g BID, Vimpat 200mg BID, Fosphenytoin 140mg BID  -Follow up with Epilepsy Dr. Gallegos as outpatient  -Seizure precautions    CVA  -MRI revealed acute/subacute lacunar infarction within the right medial thalamus along with hypoperfused areas in bilateral frontal, temporal lobes consistent w/ post-ictal events  -Continue ASA 81mg daily  -Lipid panel 8/30 reviewed, Chol/ LDL not at goal  -Recommend increasing Atorvastatin if LFTs are wnl, LDL goal of 70    Hyponatremia  -resolved  -Secondary to cerebral salt wasting  -Continue 1L fluid restriction  -Continue Florinef 0.1mg BID  -F/u with nephrology outpatient in 2 weeks (~9/11/20)    Mixed Connective Tissue Disorder  -Continue Medrol 4mg PO daily (S/P Medrol taper)  -Transition Medrol to home dose of Prednisone  -Continue Plaquenil 200mg every other day    Pancreatic insufficiency likely 2/2 Chronic Pancreatitis  -Likely chronic pancreatitis, Etoh induced vs mixed connective tissue disorder  -Continue Pancrelipase [id number] TID with meals    Macrocytic Anemia  -improving CBC 9/07  -Likely 2/2 EtOH abuse  -Continue multivitamin + Thiamine    Urinary retention  -Resolved  -Due to neurologic diagnosis and limited mobility  -Voiding by herself    Venous stasis dermatitis  -Resolves with leg elevation  -RLE skin changes not cellulitis at this time  -Encourage leg elevation   -started compression therapy 8/31, monitor for results     Opioid Dependency  - taper opioid pain meds per primary team and Behavorial health  - behavorial health consult noted 09/04  - increased gabapentin 800mg tid 09/05  - patient continues to state pain not well controlled, d/w Dr Hawk, avoid increasing opioids, can titrate gabapentin to full daily dose    Secondary hypercoagulable state due to immobility/ DVT prophylaxis:  hep subq    CBC/ BMP 9/07 reviewed    Today's plan d/w Rehab team during rounds.

## 2020-09-08 ENCOUNTER — TRANSCRIPTION ENCOUNTER (OUTPATIENT)
Age: 60
End: 2020-09-08

## 2020-09-08 VITALS
DIASTOLIC BLOOD PRESSURE: 65 MMHG | TEMPERATURE: 98 F | OXYGEN SATURATION: 97 % | SYSTOLIC BLOOD PRESSURE: 96 MMHG | HEART RATE: 68 BPM | RESPIRATION RATE: 15 BRPM

## 2020-09-08 DIAGNOSIS — R53.81 OTHER MALAISE: ICD-10-CM

## 2020-09-08 PROCEDURE — 99232 SBSQ HOSP IP/OBS MODERATE 35: CPT

## 2020-09-08 PROCEDURE — 90832 PSYTX W PT 30 MINUTES: CPT

## 2020-09-08 PROCEDURE — 99239 HOSP IP/OBS DSCHRG MGMT >30: CPT

## 2020-09-08 RX ORDER — GABAPENTIN 400 MG/1
2 CAPSULE ORAL
Qty: 180 | Refills: 0
Start: 2020-09-08 | End: 2020-10-07

## 2020-09-08 RX ORDER — HYDROXYCHLOROQUINE SULFATE 200 MG
1 TABLET ORAL
Qty: 30 | Refills: 0
Start: 2020-09-08 | End: 2020-10-07

## 2020-09-08 RX ORDER — THIAMINE MONONITRATE (VIT B1) 100 MG
1 TABLET ORAL
Qty: 0 | Refills: 0 | DISCHARGE
Start: 2020-09-08

## 2020-09-08 RX ORDER — LACOSAMIDE 50 MG/1
1 TABLET ORAL
Qty: 60 | Refills: 0
Start: 2020-09-08 | End: 2020-10-07

## 2020-09-08 RX ORDER — PANTOPRAZOLE SODIUM 20 MG/1
1 TABLET, DELAYED RELEASE ORAL
Qty: 30 | Refills: 0
Start: 2020-09-08 | End: 2020-10-07

## 2020-09-08 RX ORDER — SACCHAROMYCES BOULARDII 250 MG
250 POWDER IN PACKET (EA) ORAL
Refills: 0 | Status: DISCONTINUED | OUTPATIENT
Start: 2020-09-08 | End: 2020-09-08

## 2020-09-08 RX ORDER — OXYCODONE HYDROCHLORIDE 5 MG/1
5 TABLET ORAL ONCE
Refills: 0 | Status: DISCONTINUED | OUTPATIENT
Start: 2020-09-08 | End: 2020-09-08

## 2020-09-08 RX ORDER — ATORVASTATIN CALCIUM 80 MG/1
1 TABLET, FILM COATED ORAL
Qty: 30 | Refills: 0
Start: 2020-09-08 | End: 2020-10-07

## 2020-09-08 RX ORDER — THIAMINE MONONITRATE (VIT B1) 100 MG
1 TABLET ORAL
Qty: 30 | Refills: 0
Start: 2020-09-08 | End: 2020-10-07

## 2020-09-08 RX ORDER — SACCHAROMYCES BOULARDII 250 MG
1 POWDER IN PACKET (EA) ORAL
Qty: 60 | Refills: 0
Start: 2020-09-08 | End: 2020-10-07

## 2020-09-08 RX ORDER — NITROFURANTOIN MACROCRYSTAL 50 MG
1 CAPSULE ORAL
Qty: 10 | Refills: 0
Start: 2020-09-08 | End: 2020-09-12

## 2020-09-08 RX ORDER — OXYCODONE HYDROCHLORIDE 5 MG/1
1 TABLET ORAL
Qty: 0 | Refills: 0 | DISCHARGE
Start: 2020-09-08

## 2020-09-08 RX ORDER — NITROFURANTOIN MACROCRYSTAL 50 MG
100 CAPSULE ORAL
Refills: 0 | Status: DISCONTINUED | OUTPATIENT
Start: 2020-09-08 | End: 2020-09-08

## 2020-09-08 RX ORDER — ASPIRIN/CALCIUM CARB/MAGNESIUM 324 MG
1 TABLET ORAL
Qty: 30 | Refills: 0
Start: 2020-09-08 | End: 2020-10-07

## 2020-09-08 RX ORDER — PANTOPRAZOLE SODIUM 20 MG/1
1 TABLET, DELAYED RELEASE ORAL
Qty: 0 | Refills: 0 | DISCHARGE

## 2020-09-08 RX ORDER — FLUDROCORTISONE ACETATE 0.1 MG/1
1 TABLET ORAL
Qty: 60 | Refills: 0
Start: 2020-09-08 | End: 2020-10-07

## 2020-09-08 RX ORDER — LIPASE/PROTEASE/AMYLASE 16-48-48K
1 CAPSULE,DELAYED RELEASE (ENTERIC COATED) ORAL
Qty: 90 | Refills: 0
Start: 2020-09-08 | End: 2020-10-07

## 2020-09-08 RX ORDER — LEVETIRACETAM 250 MG/1
1 TABLET, FILM COATED ORAL
Qty: 60 | Refills: 0
Start: 2020-09-08 | End: 2020-10-07

## 2020-09-08 RX ADMIN — PANTOPRAZOLE SODIUM 40 MILLIGRAM(S): 20 TABLET, DELAYED RELEASE ORAL at 05:35

## 2020-09-08 RX ADMIN — HEPARIN SODIUM 5000 UNIT(S): 5000 INJECTION INTRAVENOUS; SUBCUTANEOUS at 05:33

## 2020-09-08 RX ADMIN — LEVETIRACETAM 1000 MILLIGRAM(S): 250 TABLET, FILM COATED ORAL at 05:32

## 2020-09-08 RX ADMIN — OXYCODONE HYDROCHLORIDE 5 MILLIGRAM(S): 5 TABLET ORAL at 16:25

## 2020-09-08 RX ADMIN — LACOSAMIDE 200 MILLIGRAM(S): 50 TABLET ORAL at 05:32

## 2020-09-08 RX ADMIN — Medication 9 MILLIGRAM(S): at 02:30

## 2020-09-08 RX ADMIN — OXYCODONE HYDROCHLORIDE 5 MILLIGRAM(S): 5 TABLET ORAL at 05:31

## 2020-09-08 RX ADMIN — HEPARIN SODIUM 5000 UNIT(S): 5000 INJECTION INTRAVENOUS; SUBCUTANEOUS at 13:32

## 2020-09-08 RX ADMIN — Medication 130 MILLIGRAM(S): at 05:32

## 2020-09-08 RX ADMIN — GABAPENTIN 800 MILLIGRAM(S): 400 CAPSULE ORAL at 13:32

## 2020-09-08 RX ADMIN — Medication 81 MILLIGRAM(S): at 11:28

## 2020-09-08 RX ADMIN — Medication 1 CAPSULE(S): at 08:12

## 2020-09-08 RX ADMIN — OXYCODONE HYDROCHLORIDE 5 MILLIGRAM(S): 5 TABLET ORAL at 00:40

## 2020-09-08 RX ADMIN — OXYCODONE HYDROCHLORIDE 5 MILLIGRAM(S): 5 TABLET ORAL at 06:52

## 2020-09-08 RX ADMIN — Medication 1 CAPSULE(S): at 12:27

## 2020-09-08 RX ADMIN — OXYCODONE HYDROCHLORIDE 5 MILLIGRAM(S): 5 TABLET ORAL at 11:29

## 2020-09-08 RX ADMIN — FLUDROCORTISONE ACETATE 0.1 MILLIGRAM(S): 0.1 TABLET ORAL at 05:31

## 2020-09-08 RX ADMIN — Medication 4 MILLIGRAM(S): at 05:33

## 2020-09-08 RX ADMIN — Medication 0.25 MILLIGRAM(S): at 08:46

## 2020-09-08 RX ADMIN — OXYCODONE HYDROCHLORIDE 5 MILLIGRAM(S): 5 TABLET ORAL at 16:24

## 2020-09-08 RX ADMIN — Medication 1 TABLET(S): at 11:28

## 2020-09-08 RX ADMIN — OXYCODONE HYDROCHLORIDE 5 MILLIGRAM(S): 5 TABLET ORAL at 12:27

## 2020-09-08 RX ADMIN — Medication 200 MILLIGRAM(S): at 08:12

## 2020-09-08 RX ADMIN — Medication 100 MILLIGRAM(S): at 11:29

## 2020-09-08 RX ADMIN — GABAPENTIN 800 MILLIGRAM(S): 400 CAPSULE ORAL at 05:31

## 2020-09-08 NOTE — DISCHARGE NOTE NURSING/CASE MANAGEMENT/SOCIAL WORK - NSTRANSFERBELONGINGSRESP_GEN_A_NUR
Last few days he has a dry hacking cough x a few days.       Has tried OTC [employer] brand cough syrup. It did not work.       Nasal congestion a little bit. Blowing nose frequently. Not sure what color.       No fever.       [facility]    Pharmacy [facility]       Patient states \"when I cough I always come down with something.\"    He is asking for cough medicine to be sent to pharmacy. He states he can't come in as he works 9-6 daily. Please advise.         yes

## 2020-09-08 NOTE — PROGRESS NOTE BEHAVIORAL HEALTH - SUMMARY
HPI:  Patient is a 61 y/o F with PMH CAD s/p possible MI (ECHO ), lung adenocarcinoma (s/p RML resection 2018), mixed connective tissue disorder (on plaquenil and methylprednisolone), left soleal vein thrombosis (on apixaban, 7/2020), RLE cellulitis (RX doxycycline), CDiff colitis (PO vancomycin 8/3- 8/17), lumbar laminectomy and chronic pain, EtOH (admissions for Etoh withdrawal), alcoholic pancreatitis, opiate abuse, admitted on 8/11/20 to [facility] with symptomatic hyponatremia (Na 121) secondary to psychogenic polydipsia. She was treated with an appropriate rate of correction in serum Na.    Patient was later noted to have focal seizure activity of LUE on 8/13 which broke with IV lorazepam; seizure activity believed to be secondary to benzodiazepine withdrawal. EEG 8/13 showed intermittent sharp and polyspike wave discharges with generalized slowing with patient at risk for seizures, and patient started on Keppra although she continued to have periodic seizure activity. A code stroke was called 8/15/20 for right facial droop. CT head unremarkable for acute pathology. 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     On 8/16, RRT called because patient found to be unresponsive with generalized seizure activity and left UE/LE/facial twitching. Seizure activity would break transiently with IV lorazepam but recurred repetitively. After 8mg lorazepam, she began to lose airway protection abilities and developed hypoxemia (SpO2 70s). She was emergently intubated and given keppra load 1000mg, vimpat load 200 mg and propofol 10 mcg/kg/min infusion, which successfully suppressed her seizures. Found to have new fever of 101.6 F and was hypotensive post-intubation, started on levophed drip.      She was transferred to [facility] for EEG 8/18/20, which showed epileptic focus in right frontal region. Weaned off pressors 8/18/20. MRI was performed which revealed acute/subacute lacunar infarction within the right medial thalamus along with hypoperfused areas in bilateral frontal, temporal lobes consistent w/ post-ictal events. LP on 8/19/20 negative for CNS infection. Extubated 8/20/20. She was found to have hyponatremia post-extubation, likely signifying cerebral salt wasting (high urine output, low BP, and high urine sodium and hypovolemia). She was started on 2% saline drip and given 1 dose of Florinef. Once sodium was stabilized, 2% saline was stopped. Antibiotics were stopped as she showed no signs of infection. MRI w/wo contrast was performed. She was downgraded from ICU 8/23/20.     Patient was transferred to [facility] 8/28/20. (28 Aug 2020 15:02)    Pt presented with some agitation after her medications were decreased from q 4h ( which was home regimen) to q 6h. Pt voiced suicidal ideation as an indication of frustration and worry about being detox'ed entirely. Pt calmed and 1:1 d/c after it was made clear to pt that regimen would remain the same, however staff strongly recommends Suboxone treatment or potentially Vivitrol which would address both ETOH and narcotic use, pt stating she is amenable to getting dual diagnosis treatment.
Pt being prepared for DC today. She has plans to follow up with her PMD and pain management MD. Denies active suicidal ideations. Pt reports no complaints to writer prior to DC.

## 2020-09-08 NOTE — PROGRESS NOTE BEHAVIORAL HEALTH - NSBHCONSULTPRIMARYDISCUSSYES_PSY_A_CORE FT
case discussed with entire treatment team.   As per note above.
case discussed with entire treatment team.   As per note above.

## 2020-09-08 NOTE — PROGRESS NOTE BEHAVIORAL HEALTH - NSBHCONSULTOBSREASON_PSY_A_CORE FT
clinically improved and stable. Social work consult done and helping pt negotiate appointments post d/c.   Pt is future oriented.
Pt is future oriented.

## 2020-09-08 NOTE — DISCHARGE NOTE NURSING/CASE MANAGEMENT/SOCIAL WORK - NSDCPEPTSTRK_GEN_ALL_CORE
Stroke support groups for patients, families, and friends/Stroke education booklet/Need for follow up after discharge/Risk factors for stroke/Stroke warning signs and symptoms/Signs and symptoms of stroke/Call 911 for stroke/Prescribed medications

## 2020-09-08 NOTE — PROGRESS NOTE ADULT - PROVIDER SPECIALTY LIST ADULT
Hospitalist
Internal Medicine
Neurology
Physiatry
Psychology
Psychology
Rehab Medicine
Hospitalist
Rehab Medicine

## 2020-09-08 NOTE — PROGRESS NOTE BEHAVIORAL HEALTH - SECONDARY DX2
Alcohol use disorder, mild, in early remission, in controlled environment
Alcohol use disorder, mild, in early remission, in controlled environment

## 2020-09-08 NOTE — PROGRESS NOTE BEHAVIORAL HEALTH - ADDITIONAL DETAILS / COMMENTS
likelihood is that pt will not stop her opiate use, and seek out different pain management provider once d/c. However pt medications are at [facility], decreasing her immediate apprehension about being d/c with no medication which prompted her agitation.
likelihood is that pt will not stop her opiate use, and seek out different pain management provider once d/c.

## 2020-09-08 NOTE — PROGRESS NOTE BEHAVIORAL HEALTH - AXIS III
see HPI  History of mixed connective tissue disease, breast mass, lung cancer, hip surgery and back surgery, chronic pain, seizures.
see HPI  History of mixed connective tissue disease, breast mass, lung cancer, hip surgery and back surgery, chronic pain, seizures.

## 2020-09-08 NOTE — PROGRESS NOTE ADULT - ASSESSMENT
Pt alert, attentive, mildly constricted affect, mildly anxious mood, improved affect, stable at discharge. Plan: Case is closed.

## 2020-09-08 NOTE — PROGRESS NOTE ADULT - COMMENTS
Patient anxious, would like to go home today. Has medications settled: to  remainder of home meds from [facility] today (already called to confirm) and has set up appointment with her pain specialist, Dr Hawk on Thursday. goal is to maintain appropriate dose pain meds for now and then detox when emotionally ready. Has tolerated reduced oxycodone dose to every 6 hours over the weekend without seeking increased dose; has another physician managing xanax at home, and meds were checked on  as well as with pharmacies and prior physician

## 2020-09-08 NOTE — PROGRESS NOTE ADULT - SUBJECTIVE AND OBJECTIVE BOX
Patient is a 60y old  Female who presents with a chief complaint of hyponatremia, generalized seizures and hypoxic respiratory failure, right thalamic CVA  01.4 (08 Sep 2020 14:48)      Patient seen and examined at bedside. pt reports chronic RLE pain, however, understands that she is on tapering schedule of pain med.    ALLERGIES:  penicillin (Other)  penicillin (Swelling)    MEDICATIONS  (STANDING):  aspirin  chewable 81 milliGRAM(s) Oral daily  atorvastatin 20 milliGRAM(s) Oral at bedtime  fludroCORTISONE 0.1 milliGRAM(s) Oral every 12 hours  gabapentin 800 milliGRAM(s) Oral three times a day  heparin   Injectable 5000 Unit(s) SubCutaneous every 8 hours  hydroxychloroquine 200 milliGRAM(s) Oral <User Schedule>  lacosamide 200 milliGRAM(s) Oral two times a day  levETIRAcetam 1000 milliGRAM(s) Oral two times a day  methylPREDNISolone 4 milliGRAM(s) Oral daily  multivitamin 1 Tablet(s) Oral daily  nitrofurantoin monohydrate/macrocrystals (MACROBID) 100 milliGRAM(s) Oral two times a day with meals  oxyCODONE    IR 5 milliGRAM(s) Oral every 6 hours  pancrelipase  (CREON 12,000 Lipase Units) 1 Capsule(s) Oral three times a day with meals  pantoprazole    Tablet 40 milliGRAM(s) Oral before breakfast  phenytoin   Capsule 130 milliGRAM(s) Oral two times a day  saccharomyces boulardii 250 milliGRAM(s) Oral two times a day  thiamine 100 milliGRAM(s) Oral daily    MEDICATIONS  (PRN):  acetaminophen   Tablet .. 650 milliGRAM(s) Oral every 6 hours PRN Temp greater or equal to 38C (100.4F), Mild Pain (1 - 3)  ALPRAZolam 0.25 milliGRAM(s) Oral two times a day PRN anxiety  artificial tears (preservative free) Ophthalmic Solution 1 Drop(s) Both EYES every 4 hours PRN Dry Eyes  loperamide 2 milliGRAM(s) Oral every 6 hours PRN Diarrhea  melatonin 9 milliGRAM(s) Oral at bedtime PRN Insomnia    Vital Signs Last 24 Hrs  T(F): 98.3 (08 Sep 2020 08:14), Max: 98.3 (07 Sep 2020 22:28)  HR: 68 (08 Sep 2020 08:14) (68 - 78)  BP: 96/65 (08 Sep 2020 08:14) (96/65 - 104/68)  RR: 15 (08 Sep 2020 08:14) (15 - 15)  SpO2: 97% (08 Sep 2020 08:14) (96% - 97%)  I&O's Summary    07 Sep 2020 07:01  -  08 Sep 2020 07:00  --------------------------------------------------------  IN: 540 mL / OUT: 0 mL / NET: 540 mL    08 Sep 2020 07:01  -  08 Sep 2020 16:11  --------------------------------------------------------  IN: 240 mL / OUT: 0 mL / NET: 240 mL      PHYSICAL EXAM:  General: NAD, A/O x 3  ENT: MMM  Neck: Supple, No JVD  Lungs: Clear to auscultation bilaterally  Cardio: RRR, S1/S2, No murmurs  Abdomen: Soft, Nontender, Nondistended; Bowel sounds present  Extremities: No calf tenderness, No pitting edema + RLE discoloration (chronic and appears stable per pt)    LABS:                        11.9   4.94  )-----------( 261      ( 07 Sep 2020 06:55 )             37.0     -07    135  |  98  |  7   ----------------------------<  110  4.1   |  31  |  0.55    Ca    9.3      07 Sep 2020 06:55      eGFR if Non African American: 102 mL/min/1.73M2 (20 @ 06:55)  eGFR if African American: 118 mL/min/1.73M2 (20 @ 06:55)      08-30 Chol 242 mg/dL  mg/dL HDL 95 mg/dL Trig 112 mg/dL      Urinalysis Basic - ( 07 Sep 2020 09:33 )    Color: Yellow / Appearance: Clear / S.020 / pH: x  Gluc: x / Ketone: Negative  / Bili: Negative / Urobili: Negative   Blood: x / Protein: Negative / Nitrite: Negative   Leuk Esterase: Small / RBC: 0-4 /HPF / WBC 11-25 /HPF   Sq Epi: x / Non Sq Epi: Moderate / Bacteria: Moderate /HPF          RADIOLOGY & ADDITIONAL TESTS:      Care Discussed with Consultants/Other Providers: Discussed with Dr. Arevalo

## 2020-09-08 NOTE — PROGRESS NOTE ADULT - SUBJECTIVE AND OBJECTIVE BOX
Pt was seen for 20 min. Pt c/o pain, anxiety. Pt reported doing well since last seen. She reported having a good weekend, and having tx yesterday and today. She admitted being a little anxious about going home today and having many follow ups during the week. However, she reported feeling that it's time to return home and gradually to her routines. She is aware that she will have home care tx and will f/u with her pain management doctor on Thursday and the doctor who prescribes her Xanax. She was concerned about not having a PCP, and is willing to come to family practice if need be in the meantime. Pt reported sleeping better during the weekend, but still having a hard time when the pain medication effect stops in between doses. Discussed ways to cope with this at home, including postural change and ambulating safely with walker. Pt expressed confidence in herself to be able to return to some normalcy in the coming months, including being able to resume driving.  Pt displayed a broader range of affect than on her initial assessment, and also was better related. Briefly discussed the need to adhere to recommendations given at D/C as well as to safety precautions. Pt expressed understanding and agreed. Pt denied any SI/HI/I/P and overall appeared future oriented with some specific goals to accomplish and willing to be close to her brother and her pet. Support and encouragement were provided.

## 2020-09-08 NOTE — PROGRESS NOTE BEHAVIORAL HEALTH - NSBHCONSULTMEDS_PSY_A_CORE FT
MEDICATIONS  (STANDING):  aspirin  chewable 81 milliGRAM(s) Oral daily  atorvastatin 20 milliGRAM(s) Oral at bedtime  fludroCORTISONE 0.1 milliGRAM(s) Oral every 12 hours  gabapentin 600 milliGRAM(s) Oral three times a day  heparin   Injectable 5000 Unit(s) SubCutaneous every 8 hours  hydroxychloroquine 200 milliGRAM(s) Oral <User Schedule>  lacosamide 200 milliGRAM(s) Oral two times a day  levETIRAcetam 1000 milliGRAM(s) Oral two times a day  multivitamin 1 Tablet(s) Oral daily  oxyCODONE    IR 5 milliGRAM(s) Oral every 6 hours  pancrelipase  (CREON 12,000 Lipase Units) 1 Capsule(s) Oral three times a day with meals  pantoprazole    Tablet 40 milliGRAM(s) Oral before breakfast  phenytoin   Capsule 130 milliGRAM(s) Oral two times a day  thiamine 100 milliGRAM(s) Oral daily
MEDICATIONS  (STANDING):  aspirin  chewable 81 milliGRAM(s) Oral daily  atorvastatin 20 milliGRAM(s) Oral at bedtime  fludroCORTISONE 0.1 milliGRAM(s) Oral every 12 hours  gabapentin 600 milliGRAM(s) Oral three times a day  heparin   Injectable 5000 Unit(s) SubCutaneous every 8 hours  hydroxychloroquine 200 milliGRAM(s) Oral <User Schedule>  lacosamide 200 milliGRAM(s) Oral two times a day  levETIRAcetam 1000 milliGRAM(s) Oral two times a day  multivitamin 1 Tablet(s) Oral daily  oxyCODONE    IR 5 milliGRAM(s) Oral every 6 hours  pancrelipase  (CREON 12,000 Lipase Units) 1 Capsule(s) Oral three times a day with meals  pantoprazole    Tablet 40 milliGRAM(s) Oral before breakfast  phenytoin   Capsule 130 milliGRAM(s) Oral two times a day  thiamine 100 milliGRAM(s) Oral daily

## 2020-09-08 NOTE — PROGRESS NOTE BEHAVIORAL HEALTH - NSBHCONSULTFOLLOWAFTERCARE_PSY_A_CORE FT
Dual diagnosis treatment plan. According to treatment team, pt to be DC'd at some point today.
Dual diagnosis treatment plan, messages left with various resources, d/c planned for Tuesday as safer past holiday w/e.  and to solidify gains made.

## 2020-09-08 NOTE — PROGRESS NOTE BEHAVIORAL HEALTH - RISK ASSESSMENT
protective factors are her brother, financial stability, and beloved pet.   Risk factor is disinhibition from ETOH use, personality style.
protective factors are her brother, financial stability, and beloved pet.   Risk factor is disinhibition from ETOH use, personality style.

## 2020-09-08 NOTE — PROGRESS NOTE BEHAVIORAL HEALTH - NSBHFUPINTERVALHXFT_PSY_A_CORE
HPI:  Patient is a 61 y/o F with PMH CAD s/p possible MI (ECHO ), lung adenocarcinoma (s/p RML resection 2018), mixed connective tissue disorder (on plaquenil and methylprednisolone), left soleal vein thrombosis (on apixaban, 7/2020), RLE cellulitis (RX doxycycline), CDiff colitis (PO vancomycin 8/3- 8/17), lumbar laminectomy and chronic pain, EtOH (admissions for Etoh withdrawal), alcoholic pancreatitis, opiate abuse, admitted on 8/11/20 to [facility] with symptomatic hyponatremia (Na 121) secondary to psychogenic polydipsia. She was treated with an appropriate rate of correction in serum Na.    Patient was lAter noted to have focal seizure activity of LUE on 8/13 which broke with IV lorazepam; seizure activity believed to be secondary to benzodiazepine withdrawal. EEG 8/13 showed intermittent sharp and polyspike wave discharges with generalized slowing with patient at risk for seizures, and patient started on Keppra although she continued to have periodic seizure activity. A code stroke was called 8/15/20 for right facial droop. CT head unremarkable for acute pathology. 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     On 8/16, RRT called because patient found to be unresponsive with generalized seizure activity and left UE/LE/facial twitching. Seizure activity would break transiently with IV lorazepam but recurred repetitively. After 8mg lorazepam, she began to lose airway protection abilities and developed hypoxemia (SpO2 70s). She was emergently intubated and given keppra load 1000mg, vimpat load 200 mg and propofol 10 mcg/kg/min infusion, which successfully suppressed her seizures. Found to have new fever of 101.6 F and was hypotensive post-intubation, started on levophed drip.      She was transferred to [facility] for EEG 8/18/20, which showed epileptic focus in right frontal region. Weaned off pressors 8/18/20. MRI was performed which revealed acute/subacute lacunar infarction within the right medial thalamus along with hypoperfused areas in bilateral frontal, temporal lobes consistent w/ post-ictal events. LP on 8/19/20 negative for CNS infection. Extubated 8/20/20. She was found to have hyponatremia post-extubation, likely signifying cerebral salt wasting (high urine output, low BP, and high urine sodium and hypovolemia). She was started on 2% saline drip and given 1 dose of Florinef. Once sodium was stabilized, 2% saline was stopped. Antibiotics were stopped as she showed no signs of infection. MRI w/wo contrast was performed. She was downgraded from ICU 8/23/20.     Patient was transferred to [facility] 8/28/20. (28 Aug 2020 15:02)    Pt seen and evaluated today, writer had been asked to see patient due to acute voicing of suicidal ideation and with concerns about her substance use, in particular, her reliance on opiates.   Pt states at present she does not see herself coming off of opiates immediately post discharge, but did state she would consider Suboxone and would do induction as an outpatient. She states she is now comfortable with medications at q 6 hours and she was cooperative with both OT and PT. Pt mood less labile and affect more modulated.   Pt reports she has things to live for such as her brother, her home and that she is financially independent.   Pt denied suicidal ideation, intent or plan and as such 1:1 was discontinued. Pt aware of resources in the community and [facility] [place]. However, they do not do dual diagnosis ( in that pt with ETOH history as well).   Pt reports "my mother has been dead for 4 years, it is time for me to rebuild my life."   Sober lifestyle encouraged as well as discussion regarding both psychological and physiological dependence on opiates, pt is not showing any clinical signs or symptoms of substance withdrawal, rest of psychiatric ROS unremarkable.
HPI:  Patient is a 61 y/o F with PMH CAD s/p possible MI (ECHO ), lung adenocarcinoma (s/p RML resection 2018), mixed connective tissue disorder (on plaquenil and methylprednisolone), left soleal vein thrombosis (on apixaban, 7/2020), RLE cellulitis (RX doxycycline), CDiff colitis (PO vancomycin 8/3- 8/17), lumbar laminectomy and chronic pain, EtOH (admissions for Etoh withdrawal), alcoholic pancreatitis, opiate abuse, admitted on 8/11/20 to [facility] with symptomatic hyponatremia (Na 121) secondary to psychogenic polydipsia. She was treated with an appropriate rate of correction in serum Na.    Patient was lAter noted to have focal seizure activity of LUE on 8/13 which broke with IV lorazepam; seizure activity believed to be secondary to benzodiazepine withdrawal. EEG 8/13 showed intermittent sharp and polyspike wave discharges with generalized slowing with patient at risk for seizures, and patient started on Keppra although she continued to have periodic seizure activity. A code stroke was called 8/15/20 for right facial droop. CT head unremarkable for acute pathology. 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     On 8/16, RRT called because patient found to be unresponsive with generalized seizure activity and left UE/LE/facial twitching. Seizure activity would break transiently with IV lorazepam but recurred repetitively. After 8mg lorazepam, she began to lose airway protection abilities and developed hypoxemia (SpO2 70s). She was emergently intubated and given keppra load 1000mg, vimpat load 200 mg and propofol 10 mcg/kg/min infusion, which successfully suppressed her seizures. Found to have new fever of 101.6 F and was hypotensive post-intubation, started on levophed drip.      She was transferred to [facility] for EEG 8/18/20, which showed epileptic focus in right frontal region. Weaned off pressors 8/18/20. MRI was performed which revealed acute/subacute lacunar infarction within the right medial thalamus along with hypoperfused areas in bilateral frontal, temporal lobes consistent w/ post-ictal events. LP on 8/19/20 negative for CNS infection. Extubated 8/20/20. She was found to have hyponatremia post-extubation, likely signifying cerebral salt wasting (high urine output, low BP, and high urine sodium and hypovolemia). She was started on 2% saline drip and given 1 dose of Florinef. Once sodium was stabilized, 2% saline was stopped. Antibiotics were stopped as she showed no signs of infection. MRI w/wo contrast was performed. She was downgraded from ICU 8/23/20.     Patient was transferred to [facility] 8/28/20. (28 Aug 2020 15:02)    Pt seen and evaluated today, pt was originally being seen due to voicing of suicidal ideation and had concerns about her substance use, in particular, her reliance on opiates. Today pts mood has improved and pt being prepared for discharge today. The pt states she has all her appointments set up to follow up with her PMD and pain management MD. Writer had a brief conversation with pt. Pt denies suicidal ideation/homicidal ideation. Denies changes to sleep, appetite. Pt reports no complaints. Psychiatric ROS found to be unremarkable during f/u eval.

## 2020-09-08 NOTE — PROGRESS NOTE ADULT - EXTREMITIES COMMENTS
right LE in ACE wrap, trace erythema noted this morning by staff +venous insufficiency  NT  no pitting edema
calves soft, NT no erythema or warmth bialterally  RLE chronic vascular changes
no tremors  calves soft, NT no erythema or warmth

## 2020-09-08 NOTE — PROGRESS NOTE BEHAVIORAL HEALTH - NSBHCONSULTRECOMMENDOTHER_PSY_A_CORE FT
monitor Dilantin level.   Continue current management, agree with maxing out Gabapentin.
Continue current management, agree with maxing out Gabapentin.

## 2020-09-08 NOTE — PROGRESS NOTE ADULT - CONSTITUTIONAL COMMENTS
alert O x 3 anxious but redirectable and reports relief with review of dc plan
thin, alert, mildly anxious O x 3
alert, anxious, mildly irritable. Historical information especially re; pain history not reliable

## 2020-09-08 NOTE — PROGRESS NOTE BEHAVIORAL HEALTH - NSBHCHARTREVIEWVS_PSY_A_CORE FT
Vital Signs Last 24 Hrs  T(C): 36.7 (04 Sep 2020 09:04), Max: 36.7 (04 Sep 2020 09:04)  T(F): 98 (04 Sep 2020 09:04), Max: 98 (04 Sep 2020 09:04)  HR: 68 (04 Sep 2020 09:04) (68 - 70)  BP: 96/65 (04 Sep 2020 09:04) (96/61 - 96/65)  BP(mean): --  RR: 16 (04 Sep 2020 09:04) (16 - 16)  SpO2: 96% (04 Sep 2020 09:04) (96% - 96%)
ICU Vital Signs Last 24 Hrs  T(C): 36.8 (08 Sep 2020 08:14), Max: 36.8 (07 Sep 2020 22:28)  T(F): 98.3 (08 Sep 2020 08:14), Max: 98.3 (07 Sep 2020 22:28)  HR: 68 (08 Sep 2020 08:14) (68 - 78)  BP: 96/65 (08 Sep 2020 08:14) (96/65 - 104/68)  RR: 15 (08 Sep 2020 08:14) (15 - 15)  SpO2: 97% (08 Sep 2020 08:14) (96% - 97%)

## 2020-09-08 NOTE — DISCHARGE NOTE NURSING/CASE MANAGEMENT/SOCIAL WORK - NSDCFUADDAPPT_GEN_ALL_CORE_FT
***Appointment with Pain Management MD- Dr. Norris Hodge at [address] [facility] [address] ([phone]) for Friday, September 11, 2020 at 9:15 am. They will be emailing you paperwork to fill out prior. Appointment with new PCP Dr. Edd Farias at [facility] [address], [place] [address] ([phone]) for Tuesday, 9/8 at 4:30 pm    ***Appointment with Pain Management MD- Dr. Norris Hodge at [address] [place] [id number] ([phone]) for Friday, September 11, 2020 at 9:15 am. They will be emailing you paperwork to fill out prior. Appointment with new PCP Dr. Melvin at [facility] [address] [place], [place] [address] ([phone]) for Wednesday, 9/9 at 11:00 am (arrive at 10:30)    ***Appointment with Pain Management MD- Dr. Norris Hodge at [address] [place] [phone] ([phone]) for Friday, September 11, 2020 at 9:15 am. They will be emailing you paperwork to fill out prior.

## 2020-09-08 NOTE — PROGRESS NOTE ADULT - ASSESSMENT
Patient is a 59 y/o F with PMH CAD s/p possible MI (ECHO ), lung adenocarcinoma (s/p RML resection 2018), mixed connective tissue disorder (on plaquenil and methylprednisolone), left soleal vein thrombosis (on apixaban, 7/2020), RLE cellulitis (RX doxycycline), CDiff colitis (PO vancomycin 8/3- 8/17), lumbar laminectomy and chronic pain, EtOH (admissions for Etoh withdrawal), alcoholic pancreatitis, opiate abuse, admitted on 8/11/20 to [facility] with symptomatic hyponatremia complicated by generalized seizures, hypoxic respiratory failure, subsequent right medial thalamic infarct and cerebral salt-wasting.     #Comprehensive Multidisciplinary Rehab Program:  -for outpatient PT on discharge  -pain management as below  - Precautions: falls, seizures, respiratory, spinal, substance abuse including opiates, long-term steroid  - neuropsychology, psychiatry, and SW consults greatly appreciated for mood, substance abuse history and referral.       #Seizures  - Low suspicion for EtOH withdrawal seizures given >3 weeks abstinence prior to admission and autoimmune encephalitis given her improvement  - Continue Keppra 1g BID, Vimpat 200mg BID, Fosphenytoin 140mg BID  - dilantin level in AM 9/3: 7.8  -neurology f/u on dc    #Hyponatremia likely due to cerebral salt wasting   - Continue 1L fluid restriction  - Continue Florinef 0.1mg BID  - Na 135 8/31-->136 9/3. BMP 9/7-->135 Na stable  - F/u with nephrology outpatient in 2 weeks (~9/11/20)    #Mixed Connective Tissue Disorder  - Continue Medrol 4mg PO daily (S/P Medrol taper)  - Continue Plaquenil 200mg every other day    #Pancreatitis  - Lipase 8/16/20: 13  - Continue Pancrelipase [id number] TID with meals  -PCP f/u on dc. Patient had FP appointment at 4:30 today, but due to need to  medications, does not wish to have late appointment. New appointment for different day will be given     #Macrocytic Anemia: Likely 2/2 EtOH abuse  - Iron studies 8/17/20: iron 36, TIBC 216, %iron saturation 17, transferrin 167, ferritin 583  - Vit B12 8/16/20: 753  - Folate 8/16/20: >20  - Hgb 12.5 8/31--> Hgb 9/3 10.9, -->Repeat Hgb 11 9/4, stable. Stool guaiac NEGATIVE 9/3  Hgb 11.9 9/7  -PCP f/u on dc    #Venous Stasis Dermatitis  - Completed antibiotics for RLE cellulitis on previous admission  -  ACE wrap PRN    #Dry Eyes  - Artificial tears OU BID    #Anxiety:  -on Xanax prn for anxiety  -Psych consult appreciated 9/3 and 9/4. Patient to be referred for dual diagnosis treatment on dc, patient agreeable, see below    #Pain Management: in setting of h/o opioid abuse  - Tylenol. Was on Oxycodone ER 10mg Q8hr at [facility],.  -increase gabapentin 300 q8 to 600 q8 9/2  -patient refused referral to detox program immediately on dc but agreeable for future detox  - checked with psychiatry, consistent with patient's self-reported history as well as information from [facility] and [facility].  -DC oxycodone ER. Start oxycodone 5 q6 9/2. Will not reduce further at this time as patient does not wish to detox now  -Confirmed with [facility] in [place] ([phone]), patient was prescribed Oxycodone 5mg (30 pills), Oxycodone 10mg Q4hr prn, Fentanyl patch 0.25mcg/hr Q72hr on 7/16/20 with Fentanyl Rx filled 7/22/20, all prescribed by Dr. Theo Young.   -Patient was prescribed Oxycodone 5mg Q4hr prn and 10mg Q4hr prn (150 pills, maximum of 5 pills per day) at [facility] in [place] ([phone]) between Feb and June 2020. Oxycodone was last picked up at [facility] in 5/21/20. All [facility] Rx also prescribed by Dr. Theo Young. Patient also filled a Narcan spray Rx April 2020.   -Psychiatry consulted appreciated 9/3 and 9/4  ·	patient agreeable to dual diagnosis treatment as outpatient  ·	Disposition planning with pain management, PCP referral, and behavioral health/psychiatry. Spoke to patient regarding dispo on 9/8 in order to set up appts for follow up in near future  ·	D/C-ed 1:1 9/4, cleared by psychiatry, low suicide risk  ·	Will keep patient on Oxycodone 5mg Q6hr for remainder of rehab stay.   - [facility] pharmacy contacted 9/4 to confirm the amount of opioids patient has left to determine amount of medication needed on discharge until seen by pain mgt. Patient states she has medications to be picked up at [facility] when she is discharged:  ·	xanax 0.25 mg (14 pills)  ·	oxycodone 10 mg (63 pills)  ·	oxycodone 5 mg (1/2 tab)  -Patient will be discharged today, to  her medications from [place] [facility] with current oxycodone dose 5 mg q6 hours. Pain management follow up with Dr Young 9/10 , and backup pain management specialist if care is not to be continued with Dr young on 9/11    #GI/Bowel:  - H/o C. diff (completed treatment 8/3-8/17)  - Loperamide prn for diarrhea  - GI ppx: Pantoprazole 40mg daily    #Osteoporosis  -pt on alendronate 70mg qweek at home  -resume after disharge    #Code Status  - DNR rescinded, patient is FULL CODE    #Diet:  - regular    -f/u: PCP to be set up with family practice at ; pain management; behavioral health-dual dx treatment cetner; PM+R. Patient will not have new Rx for xanax and oxycodone as she has enough at home, confirmed with patient and pharmacy. Outpateint PT. Medically cleared for dc home today 9/8, time spent coordinating discharge >30 minutes            ---------------  Code Status/Emergency Contact:  Anita River (cousin) HCP - [phone]. Do not give any health care related information to HCP per patient wishes 9/3/20 unless found not to have capacity to make decisions re: dissemination health care information.      Outpatient Follow-up (Specialty/Name of physician):  PCP, Dr. Tim Davis, [address], [address], [place], [address], [phone]  Nephrology, Dr. Zuly Simms, [address] [place] [address], [phone]  Epilepsy, Dr. Gallegos, [address] [place] [address], [phone]  Neurology, Dr. Alida King, [address] [place] [address], [phone]

## 2020-09-08 NOTE — PROGRESS NOTE ADULT - ASSESSMENT
59yo female with hx of CAD s/p possible MI (ECHO ), lung adenocarcinoma (s/p RML resection 2018), mixed connective tissue disorder (on plaquenil and methylprednisolone), left soleal vein thrombosis (on apixaban, 7/2020), RLE cellulitis (RX doxycycline), CDiff colitis (PO vancomycin 8/3- 8/17), lumbar laminectomy and chronic pain, EtOH (admissions for Etoh withdrawal), alcoholic pancreatitis, opiate abuse, presented to [facility] for acute rehab from [facility] for seizure/CVA    Debility  -Comprehensive Multidisciplinary Rehab Program per primary team    Seizures  -Continue Keppra 1g BID, Vimpat 200mg BID, Fosphenytoin 140mg BID  -Follow up with Epilepsy Dr. Gallegos as outpatient  -Seizure precautions    CVA  -MRI revealed acute/subacute lacunar infarction within the right medial thalamus along with hypoperfused areas in bilateral frontal, temporal lobes consistent w/ post-ictal events  -Continue ASA 81mg daily  -Lipid panel 8/30 reviewed, Chol/ LDL not at goal  -Recommend increasing Atorvastatin to 80mg if LFTs are wnl, LDL goal of 70  -repeat LFT     Hyponatremia  -resolved  -Secondary to cerebral salt wasting  -Continue 1L fluid restriction  -Continue Florinef 0.1mg BID  -F/u with nephrology outpatient in 2 weeks (~9/11/20)    Mixed Connective Tissue Disorder  -Continue Medrol 4mg PO daily (S/P Medrol taper)  -Transition Medrol to home dose of Prednisone  -Continue Plaquenil 200mg every other day    Pancreatic insufficiency likely 2/2 Chronic Pancreatitis  -Likely chronic pancreatitis, Etoh induced vs mixed connective tissue disorder  -Continue Pancrelipase [id number] TID with meals    Macrocytic Anemia  -improving CBC 9/07  -Likely 2/2 EtOH abuse  -Continue multivitamin + Thiamine    Urinary retention  -Resolved  -Due to neurologic diagnosis and limited mobility  -Voiding by herself    Venous stasis dermatitis  -Resolves with leg elevation  -RLE skin changes not cellulitis at this time  -Encourage leg elevation   -started compression therapy 8/31, monitor for results     Opioid Dependency  - taper opioid pain meds per primary team and Behavorial health  - behavorial health consult noted 09/04  - increased gabapentin 800mg tid 09/05  - patient continues to state pain not well controlled, d/w rehab team, avoid increasing pain med  - pain management as outpatient    Secondary hypercoagulable state due to immobility/ DVT prophylaxis:  hep subq    CBC/ BMP 9/07 reviewed    Today's plan d/w Rehab team during rounds.

## 2020-09-08 NOTE — PROGRESS NOTE ADULT - SUBJECTIVE AND OBJECTIVE BOX
Patient is a 60y old  Female who presents with a chief complaint of hyponatremia, generalized seizures and hypoxic respiratory failure, right thalamic CVA  01.4 (07 Sep 2020 13:31)      HPI:  Patient is a 59 y/o F with PMH CAD s/p possible MI (ECHO ), lung adenocarcinoma (s/p RML resection ), mixed connective tissue disorder (on plaquenil and methylprednisolone), left soleal vein thrombosis (on apixaban, 2020), RLE cellulitis (RX doxycycline), CDiff colitis (PO vancomycin 8/3- ), lumbar laminectomy and chronic pain, EtOH (admissions for Etoh withdrawal), alcoholic pancreatitis, opiate abuse, admitted on 20 to [facility] with symptomatic hyponatremia (Na 121) secondary to psychogenic polydipsia. She was treated with an appropriate rate of correction in serum Na.    Patient was lAter noted to have focal seizure activity of LUE on  which broke with IV lorazepam; seizure activity believed to be secondary to benzodiazepine withdrawal. EEG  showed intermittent sharp and polyspike wave discharges with generalized slowing with patient at risk for seizures, and patient started on Keppra although she continued to have periodic seizure activity. A code stroke was called 8/15/20 for right facial droop. CT head unremarkable for acute pathology. 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     On , RRT called because patient found to be unresponsive with generalized seizure activity and left UE/LE/facial twitching. Seizure activity would break transiently with IV lorazepam but recurred repetitively. After 8mg lorazepam, she began to lose airway protection abilities and developed hypoxemia (SpO2 70s). She was emergently intubated and given keppra load 1000mg, vimpat load 200 mg and propofol 10 mcg/kg/min infusion, which successfully suppressed her seizures. Found to have new fever of 101.6 F and was hypotensive post-intubation, started on levophed drip.      She was transferred to [facility] for EEG 20, which showed epileptic focus in right frontal region. Weaned off pressors 20. MRI was performed which revealed acute/subacute lacunar infarction within the right medial thalamus along with hypoperfused areas in bilateral frontal, temporal lobes consistent w/ post-ictal events. LP on 20 negative for CNS infection. Extubated 20. She was found to have hyponatremia post-extubation, likely signifying cerebral salt wasting (high urine output, low BP, and high urine sodium and hypovolemia). She was started on 2% saline drip and given 1 dose of Florinef. Once sodium was stabilized, 2% saline was stopped. Antibiotics were stopped as she showed no signs of infection. MRI w/wo contrast was performed. She was downgraded from ICU 20.     Patient was transferred to [facility] 20. (28 Aug 2020 15:02)      PAST MEDICAL & SURGICAL HISTORY:  Lung cancer  Opiate dependence  Alcohol abuse  Adenocarcinoma of lung  Mixed connective tissue disease  Depression H/O panic attack: with anxiety  S/P Laminectomy  Lumbar 3/10  Chronic pain  Diverticulitis of colon (without mention of hemorrhage)  History of laminectomy  History of lung surgery  History of oophorectomy, unilateral: bilateral  History of hip replacement, total, right: 2020  S/P lumbar laminectomy  S/P cholecystectomy  History of lung cancer: s/p wedge resection of RML      MEDICATIONS  (STANDING):  aspirin  chewable 81 milliGRAM(s) Oral daily  atorvastatin 20 milliGRAM(s) Oral at bedtime  fludroCORTISONE 0.1 milliGRAM(s) Oral every 12 hours  gabapentin 800 milliGRAM(s) Oral three times a day  heparin   Injectable 5000 Unit(s) SubCutaneous every 8 hours  hydroxychloroquine 200 milliGRAM(s) Oral <User Schedule>  lacosamide 200 milliGRAM(s) Oral two times a day  levETIRAcetam 1000 milliGRAM(s) Oral two times a day  methylPREDNISolone 4 milliGRAM(s) Oral daily  multivitamin 1 Tablet(s) Oral daily  nitrofurantoin monohydrate/macrocrystals (MACROBID) 100 milliGRAM(s) Oral two times a day with meals  oxyCODONE    IR 5 milliGRAM(s) Oral every 6 hours  pancrelipase  (CREON 12,000 Lipase Units) 1 Capsule(s) Oral three times a day with meals  pantoprazole    Tablet 40 milliGRAM(s) Oral before breakfast  phenytoin   Capsule 130 milliGRAM(s) Oral two times a day  saccharomyces boulardii 250 milliGRAM(s) Oral two times a day  thiamine 100 milliGRAM(s) Oral daily    MEDICATIONS  (PRN):  acetaminophen   Tablet .. 650 milliGRAM(s) Oral every 6 hours PRN Temp greater or equal to 38C (100.4F), Mild Pain (1 - 3)  ALPRAZolam 0.25 milliGRAM(s) Oral two times a day PRN anxiety  artificial tears (preservative free) Ophthalmic Solution 1 Drop(s) Both EYES every 4 hours PRN Dry Eyes  loperamide 2 milliGRAM(s) Oral every 6 hours PRN Diarrhea  melatonin 9 milliGRAM(s) Oral at bedtime PRN Insomnia      Allergies    penicillin (Other)  penicillin (Swelling)    Intolerances          VITALS  60y  Vital Signs Last 24 Hrs  T(C): 36.8 (08 Sep 2020 08:14), Max: 36.8 (07 Sep 2020 22:28)  T(F): 98.3 (08 Sep 2020 08:14), Max: 98.3 (07 Sep 2020 22:28)  HR: 68 (08 Sep 2020 08:14) (68 - 78)  BP: 96/65 (08 Sep 2020 08:14) (96/65 - 104/68)  BP(mean): --  RR: 15 (08 Sep 2020 08:14) (15 - 15)  SpO2: 97% (08 Sep 2020 08:14) (96% - 97%)  Daily     Daily         RECENT LABS:                          11.9   4.94  )-----------( 261      ( 07 Sep 2020 06:55 )             37.0     09-07    135  |  98  |  7   ----------------------------<  110<H>  4.1   |  31  |  0.55    Ca    9.3      07 Sep 2020 06:55          Urinalysis Basic - ( 07 Sep 2020 09:33 )    Color: Yellow / Appearance: Clear / S.020 / pH: x  Gluc: x / Ketone: Negative  / Bili: Negative / Urobili: Negative   Blood: x / Protein: Negative / Nitrite: Negative   Leuk Esterase: Small / RBC: 0-4 /HPF / WBC 11-25 /HPF   Sq Epi: x / Non Sq Epi: Moderate / Bacteria: Moderate /HPF          CAPILLARY BLOOD GLUCOSE

## 2020-09-08 NOTE — DISCHARGE NOTE NURSING/CASE MANAGEMENT/SOCIAL WORK - PATIENT PORTAL LINK FT
You can access the FollowMyHealth Patient Portal offered by [facility] by registering at the following website: [email or web address]. By joining [facility]’s FollowMyHealth portal, you will also be able to view your health information using other applications (apps) compatible with our system.

## 2020-09-08 NOTE — PROGRESS NOTE BEHAVIORAL HEALTH - NSBHCHARTREVIEWLAB_PSY_A_CORE FT
09-03    136  |  99  |  9   ----------------------------<  99  4.4   |  25  |  0.57    Ca    9.1      03 Sep 2020 06:13
11.9   4.94  )-----------( 261      ( 07 Sep 2020 06:55 )             37.0       09-07    135  |  98  |  7   ----------------------------<  110<H>  4.1   |  31  |  0.55    Ca    9.3      07 Sep 2020 06:55        Urinalysis Basic - ( 07 Sep 2020 09:33 )    Color: Yellow / Appearance: Clear / S.020 / pH: x  Gluc: x / Ketone: Negative  / Bili: Negative / Urobili: Negative   Blood: x / Protein: Negative / Nitrite: Negative   Leuk Esterase: Small / RBC: 0-4 /HPF / WBC 11-25 /HPF   Sq Epi: x / Non Sq Epi: Moderate / Bacteria: Moderate /HPF

## 2020-09-08 NOTE — PROGRESS NOTE ADULT - RS GEN PE MLT RESP DETAILS PC
respirations non-labored/clear to auscultation bilaterally

## 2020-09-09 ENCOUNTER — OUTPATIENT (OUTPATIENT)
Dept: OUTPATIENT SERVICES | Facility: HOSPITAL | Age: 60
LOS: 1 days | End: 2020-09-09
Payer: MEDICARE

## 2020-09-09 ENCOUNTER — APPOINTMENT (OUTPATIENT)
Dept: FAMILY MEDICINE | Facility: HOSPITAL | Age: 60
End: 2020-09-09

## 2020-09-09 ENCOUNTER — MED ADMIN CHARGE (OUTPATIENT)
Age: 60
End: 2020-09-09

## 2020-09-09 VITALS
RESPIRATION RATE: 16 BRPM | WEIGHT: 111 LBS | OXYGEN SATURATION: 98 % | DIASTOLIC BLOOD PRESSURE: 76 MMHG | BODY MASS INDEX: 20.3 KG/M2 | TEMPERATURE: 97 F | SYSTOLIC BLOOD PRESSURE: 120 MMHG | HEART RATE: 79 BPM

## 2020-09-09 DIAGNOSIS — Z00.00 ENCOUNTER FOR GENERAL ADULT MEDICAL EXAMINATION WITHOUT ABNORMAL FINDINGS: ICD-10-CM

## 2020-09-09 DIAGNOSIS — Z98.890 OTHER SPECIFIED POSTPROCEDURAL STATES: Chronic | ICD-10-CM

## 2020-09-09 DIAGNOSIS — Z85.118 PERSONAL HISTORY OF OTHER MALIGNANT NEOPLASM OF BRONCHUS AND LUNG: Chronic | ICD-10-CM

## 2020-09-09 DIAGNOSIS — Z90.49 ACQUIRED ABSENCE OF OTHER SPECIFIED PARTS OF DIGESTIVE TRACT: Chronic | ICD-10-CM

## 2020-09-09 DIAGNOSIS — Z87.898 PERSONAL HISTORY OF OTHER SPECIFIED CONDITIONS: ICD-10-CM

## 2020-09-09 DIAGNOSIS — R07.89 OTHER CHEST PAIN: ICD-10-CM

## 2020-09-09 DIAGNOSIS — Z86.73 PERSONAL HISTORY OF TRANSIENT ISCHEMIC ATTACK (TIA), AND CEREBRAL INFARCTION W/OUT RESIDUAL DEFICITS: ICD-10-CM

## 2020-09-09 DIAGNOSIS — Z96.641 PRESENCE OF RIGHT ARTIFICIAL HIP JOINT: Chronic | ICD-10-CM

## 2020-09-09 DIAGNOSIS — Z23 ENCOUNTER FOR IMMUNIZATION: ICD-10-CM

## 2020-09-09 DIAGNOSIS — Z90.721 ACQUIRED ABSENCE OF OVARIES, UNILATERAL: Chronic | ICD-10-CM

## 2020-09-09 DIAGNOSIS — L03.115 CELLULITIS OF RIGHT LOWER LIMB: ICD-10-CM

## 2020-09-09 PROCEDURE — G0463: CPT

## 2020-09-09 RX ORDER — GABAPENTIN 300 MG/1
300 CAPSULE ORAL
Refills: 0 | Status: DISCONTINUED | COMMUNITY
End: 2020-09-09

## 2020-09-09 RX ORDER — GABAPENTIN 800 MG/1
800 TABLET, COATED ORAL
Qty: 60 | Refills: 0 | Status: DISCONTINUED | COMMUNITY
Start: 2019-05-13 | End: 2020-09-09

## 2020-09-09 RX ORDER — ASPIRIN 81 MG/1
81 TABLET, CHEWABLE ORAL
Qty: 30 | Refills: 0 | Status: ACTIVE | COMMUNITY
Start: 2020-09-09

## 2020-09-09 NOTE — PLAN
[FreeTextEntry1] : \par #Hyponatremia\par -Last Na (9/7/2020) prior to d/c was within normal limits\par -Pt counseled and discouraged from drinking large amounts of fluids- pt states she understands she needs to limit her fluid intake to <1-2L/ day\par -On Fludrocortisone q12h\par \par #CVA\par -with gait abnormality\par -Home PT recommended 2x/week x 6 weeks - referral printed for patient \par -Patient requesting home care services- referral printed for patient and  fax referral slip to home care center \par -Continue aspirin 81 mg QD and atorvastatin 20 mg qhs \par \par #Seizure d/o\par -F/u with Neurologist recommended for medication management\par -Vimpat 200 mg BID, Phenytoin 130 mg BID, Keppra 1000 mg BID \par \par #Chronic pain\par #Opioid dependence \par -Pt states she takes 10 mg oxycodone 3-4x/day, d/c paperwork indicates patient discharged on oxycodone 5 mg q6h \par -On Gabapentin 800 mg TID \par -Referral to pain management recommended- pt states she will be seeing pain management doctor this week (Norris Hodge - [address] [place] [id number])\par -Told patient to follow up with pain management for further management of chronic pain \par -Pt became irritated when discussing risk of constipation with use of opioids - states she does not need any stool softeners \par \par #Anxiety d/o\par -On Xanax .25 mg BID \par \par #ETOH abuse\par -Pt reports quitting about 2 months ago\par -On thiamine, MV and creon \par -Recommend folate supplementation given hx of alcoholism \par \par #Osteoporosis\par -Pt states she is not taking alendronate any longer, she reports not wanting to take it now, however will consider use in the future\par -Continue vitamin D\par \par #Mixed connective tissue d/o\par -On plaquenil 200 mg every other day, Medrol 4 mg QD and Restasis (opthalmic emulsion)\par -Pt to follow up with Rheumatology - consider referral at next visit \par \par #UTI\par -Noted on d/c paperwork\par -On macrobid 100 mg BID x 5 days\par \par Case and plan d/w Dr. Bone\par \par Follow up in 6 weeks after Neurology follow up

## 2020-09-09 NOTE — HISTORY OF PRESENT ILLNESS
[Post-hospitalization from ___ Hospital] : Post-hospitalization from [unfilled] Hospital [Discharge Summary] : discharge summary [Discharge Med List] : discharge medication list [Admitted on: ___] : The patient was admitted on [unfilled] [FreeTextEntry2] : 59 yo F with hx of alcohol abuse/withdrawal, anxiety/panic d/o, benzodiazepine and opioid dependence, s/p lumbar laminectomy complicated by chronic pain, lung cancer (s/p RML resection 2018), osteoporosis, mixed connective tissue disorder (on methylprednisolone and plaquenil), admitted at [facility] on 8/11/20 with symptomatic hyponatremia 2/2 psychogenic polydipsia. Hospital course was complicated by focal seizure of LUE presumably due to benzodiazepine withdrawal. During hospitalization, code stroke was called and patient with gait abnormality since CVA. No other neuro deficits reported.\par Patient was discharged to rehab after hospitalization and reports being discharged from rehab yesterday.\par \par Seizure meds on d/c- Vimpat 200 mg BID, Phenytoin 130 mg BID, Keppra 1000 mg BID \par

## 2020-09-09 NOTE — ASSESSMENT
[FreeTextEntry1] : 61 yo F with hx of alcohol abuse/withdrawal, anxiety/panic d/o, benzodiazepine and opioid dependence, s/p lumbar laminectomy complicated by chronic pain, lung cancer, osteoporosis, mixed connective tissue disorder presents for follow up visit after hospitalization for symptomatic hyponatremia.

## 2020-09-09 NOTE — PHYSICAL EXAM
[No Respiratory Distress] : no respiratory distress  [Well Developed] : well developed [No Accessory Muscle Use] : no accessory muscle use [Normal Rate] : normal rate  [Clear to Auscultation] : lungs were clear to auscultation bilaterally [Normal S1, S2] : normal S1 and S2 [Regular Rhythm] : with a regular rhythm [No Edema] : there was no peripheral edema [Soft] : abdomen soft [Non-distended] : non-distended [No Masses] : no abdominal mass palpated [Normal Bowel Sounds] : normal bowel sounds [No Focal Deficits] : no focal deficits [Alert and Oriented x3] : oriented to person, place, and time [de-identified] : + in distress due to chronic pain, moaning throughout visit  [de-identified] : + mild abdominal tenderness on exam, no rebound guarding or rigidity  [de-identified] : + bruising on abdomen b/l (likely due to SQ injections) [de-identified] : + abnormal gait [de-identified] : + anxious appearing

## 2020-09-09 NOTE — REVIEW OF SYSTEMS
[Muscle Pain] : muscle pain [Back Pain] : back pain [Fever] : no fever [Chills] : no chills [Chest Pain] : no chest pain [Palpitations] : no palpitations [Shortness Of Breath] : no shortness of breath [Abdominal Pain] : no abdominal pain [Nausea] : no nausea [Headache] : no headache [Vomiting] : no vomiting [Dizziness] : no dizziness

## 2020-09-10 DIAGNOSIS — E28.319 ASYMPTOMATIC PREMATURE MENOPAUSE: ICD-10-CM

## 2020-09-10 DIAGNOSIS — Z87.440 PERSONAL HISTORY OF URINARY (TRACT) INFECTIONS: ICD-10-CM

## 2020-09-10 DIAGNOSIS — Z86.59 PERSONAL HISTORY OF OTHER MENTAL AND BEHAVIORAL DISORDERS: ICD-10-CM

## 2020-09-10 DIAGNOSIS — N63.20 UNSPECIFIED LUMP IN THE LEFT BREAST, UNSPECIFIED QUADRANT: ICD-10-CM

## 2020-09-10 DIAGNOSIS — N95.2 POSTMENOPAUSAL ATROPHIC VAGINITIS: ICD-10-CM

## 2020-09-10 DIAGNOSIS — N63.10 UNSPECIFIED LUMP IN THE RIGHT BREAST, UNSPECIFIED QUADRANT: ICD-10-CM

## 2020-09-13 DIAGNOSIS — Z09 ENCOUNTER FOR FOLLOW-UP EXAMINATION AFTER COMPLETED TREATMENT FOR CONDITIONS OTHER THAN MALIGNANT NEOPLASM: ICD-10-CM

## 2020-09-18 DIAGNOSIS — E87.1 HYPO-OSMOLALITY AND HYPONATREMIA: ICD-10-CM

## 2020-09-18 DIAGNOSIS — G89.29 OTHER CHRONIC PAIN: ICD-10-CM

## 2020-09-18 DIAGNOSIS — M35.1 OTHER OVERLAP SYNDROMES: ICD-10-CM

## 2020-09-18 DIAGNOSIS — R56.9 UNSPECIFIED CONVULSIONS: ICD-10-CM

## 2020-09-18 DIAGNOSIS — N39.0 URINARY TRACT INFECTION, SITE NOT SPECIFIED: ICD-10-CM

## 2020-09-19 LAB
CULTURE RESULTS: SIGNIFICANT CHANGE UP
SPECIMEN SOURCE: SIGNIFICANT CHANGE UP

## 2020-09-22 ENCOUNTER — APPOINTMENT (OUTPATIENT)
Dept: NEPHROLOGY | Facility: CLINIC | Age: 60
End: 2020-09-22
Payer: MEDICARE

## 2020-09-22 PROCEDURE — 99443: CPT | Mod: 95

## 2020-09-23 ENCOUNTER — APPOINTMENT (OUTPATIENT)
Dept: FAMILY MEDICINE | Facility: HOSPITAL | Age: 60
End: 2020-09-23

## 2020-09-24 NOTE — ASSESSMENT
[FreeTextEntry1] : Hyponatremia- had features of cerebral salt wasting in the hospital and did respond to florinef. \par at this time she is on florinef 0.1 mg bid and her sodium is in the normal range. states her BP at home has been 110-120/80s. she is eating well. I will ask her to take florinef 0.1 mg once a day.

## 2020-09-24 NOTE — HISTORY OF PRESENT ILLNESS
[Home] : at home, [unfilled] , at the time of the visit. [Medical Office: ([facility])___] : at the medical office located in  [Verbal consent obtained from patient] : the patient, [unfilled] [FreeTextEntry1] : hospital follow up for hyponatremia \par \par 59 yo woman w/ h/o chronic pain, lung adenocarcinoma (s/p RML wedge resection 2018), MCTD ( On Plaquenil and steroids), anxiety, soleal DVT on eliquis, recent RLE cellulitis, CDiff initially admitted to [facility] for ataxia and found to have a sodium of 121 thought to be secondary to excessive water and low solute intake ( mention of psychogenic polydipsia but there was no urine osm measured at that time) - she was free water restricted and started on normal saline with normalization of serum sodium. 8/14- Na was 137. \par \par 8/15- Na - 142\par 8/16- Na- 141 \par 8/16- RRT called for generalized seizure activity with left UE/LE/facial twitching. s/p intubation and given Keppra/Vimpat/Propofol. started treatment for presumptive infectious process.\par 8/17-8/20- 132-139 - patient being treated for autoimmune encephalitis. Intubated, sedated. Extubated 8/20. \par \par 8/20 am - urine output increased to 175-390cc/hr. she became hypotensive ( on less sedation) - BPs early 8/19 were in the 140s/80s>>90s/50s today. Urine electrolytes- Urine Na- 159 urine osm- 395. Given the above clinical picture, she was suspected to have salt wasting ( likely cerebral) and was started on 2% saline and Florinef with improvement in her sodium level. \par \par On the floor, she was drinking water excessively again and had a drop in her sodium. she was placed on 1 L water restriction with improvement in her sodium level. \par \par ROS \par - denies any changes in urination, no blood in urine \par CVS- No chest pain, no shortness of breath \par \par \par \par \par

## 2020-09-28 ENCOUNTER — EMERGENCY (EMERGENCY)
Facility: HOSPITAL | Age: 60
LOS: 1 days | Discharge: HOME | End: 2020-09-28
Attending: INTERNAL MEDICINE | Admitting: INTERNAL MEDICINE
Payer: MEDICARE

## 2020-09-28 VITALS
OXYGEN SATURATION: 100 % | DIASTOLIC BLOOD PRESSURE: 88 MMHG | RESPIRATION RATE: 18 BRPM | SYSTOLIC BLOOD PRESSURE: 134 MMHG | HEIGHT: 63 IN | WEIGHT: 110.01 LBS | TEMPERATURE: 98 F | HEART RATE: 84 BPM

## 2020-09-28 DIAGNOSIS — Z85.118 PERSONAL HISTORY OF OTHER MALIGNANT NEOPLASM OF BRONCHUS AND LUNG: Chronic | ICD-10-CM

## 2020-09-28 DIAGNOSIS — Z98.890 OTHER SPECIFIED POSTPROCEDURAL STATES: Chronic | ICD-10-CM

## 2020-09-28 DIAGNOSIS — Z96.641 PRESENCE OF RIGHT ARTIFICIAL HIP JOINT: Chronic | ICD-10-CM

## 2020-09-28 DIAGNOSIS — M79.669 PAIN IN UNSPECIFIED LOWER LEG: ICD-10-CM

## 2020-09-28 DIAGNOSIS — Z90.721 ACQUIRED ABSENCE OF OVARIES, UNILATERAL: Chronic | ICD-10-CM

## 2020-09-28 DIAGNOSIS — Z90.49 ACQUIRED ABSENCE OF OTHER SPECIFIED PARTS OF DIGESTIVE TRACT: Chronic | ICD-10-CM

## 2020-09-28 LAB
ALBUMIN SERPL ELPH-MCNC: 3.4 G/DL — SIGNIFICANT CHANGE UP (ref 3.3–5)
ALP SERPL-CCNC: 66 U/L — SIGNIFICANT CHANGE UP (ref 40–120)
ALT FLD-CCNC: 122 U/L — HIGH (ref 10–45)
ANION GAP SERPL CALC-SCNC: 7 MMOL/L — SIGNIFICANT CHANGE UP (ref 5–17)
AST SERPL-CCNC: 68 U/L — HIGH (ref 10–40)
BASOPHILS # BLD AUTO: 0.01 K/UL — SIGNIFICANT CHANGE UP (ref 0–0.2)
BASOPHILS NFR BLD AUTO: 0.2 % — SIGNIFICANT CHANGE UP (ref 0–2)
BILIRUB SERPL-MCNC: 0.2 MG/DL — SIGNIFICANT CHANGE UP (ref 0.2–1.2)
BUN SERPL-MCNC: 5 MG/DL — LOW (ref 7–23)
CALCIUM SERPL-MCNC: 8.7 MG/DL — SIGNIFICANT CHANGE UP (ref 8.4–10.5)
CHLORIDE SERPL-SCNC: 104 MMOL/L — SIGNIFICANT CHANGE UP (ref 96–108)
CO2 SERPL-SCNC: 30 MMOL/L — SIGNIFICANT CHANGE UP (ref 22–31)
CREAT SERPL-MCNC: 0.59 MG/DL — SIGNIFICANT CHANGE UP (ref 0.5–1.3)
EOSINOPHIL # BLD AUTO: 0.08 K/UL — SIGNIFICANT CHANGE UP (ref 0–0.5)
EOSINOPHIL NFR BLD AUTO: 1.4 % — SIGNIFICANT CHANGE UP (ref 0–6)
GLUCOSE SERPL-MCNC: 113 MG/DL — HIGH (ref 70–99)
HCT VFR BLD CALC: 35.1 % — SIGNIFICANT CHANGE UP (ref 34.5–45)
HGB BLD-MCNC: 11.4 G/DL — LOW (ref 11.5–15.5)
IMM GRANULOCYTES NFR BLD AUTO: 0.2 % — SIGNIFICANT CHANGE UP (ref 0–1.5)
LYMPHOCYTES # BLD AUTO: 1.57 K/UL — SIGNIFICANT CHANGE UP (ref 1–3.3)
LYMPHOCYTES # BLD AUTO: 28.4 % — SIGNIFICANT CHANGE UP (ref 13–44)
MCHC RBC-ENTMCNC: 32.5 GM/DL — SIGNIFICANT CHANGE UP (ref 32–36)
MCHC RBC-ENTMCNC: 33.2 PG — SIGNIFICANT CHANGE UP (ref 27–34)
MCV RBC AUTO: 102.3 FL — HIGH (ref 80–100)
MONOCYTES # BLD AUTO: 0.57 K/UL — SIGNIFICANT CHANGE UP (ref 0–0.9)
MONOCYTES NFR BLD AUTO: 10.3 % — SIGNIFICANT CHANGE UP (ref 2–14)
NEUTROPHILS # BLD AUTO: 3.29 K/UL — SIGNIFICANT CHANGE UP (ref 1.8–7.4)
NEUTROPHILS NFR BLD AUTO: 59.5 % — SIGNIFICANT CHANGE UP (ref 43–77)
NRBC # BLD: 0 /100 WBCS — SIGNIFICANT CHANGE UP (ref 0–0)
PLATELET # BLD AUTO: 255 K/UL — SIGNIFICANT CHANGE UP (ref 150–400)
POTASSIUM SERPL-MCNC: 3.9 MMOL/L — SIGNIFICANT CHANGE UP (ref 3.5–5.3)
POTASSIUM SERPL-SCNC: 3.9 MMOL/L — SIGNIFICANT CHANGE UP (ref 3.5–5.3)
PROT SERPL-MCNC: 6.7 G/DL — SIGNIFICANT CHANGE UP (ref 6–8.3)
RBC # BLD: 3.43 M/UL — LOW (ref 3.8–5.2)
RBC # FLD: 12.3 % — SIGNIFICANT CHANGE UP (ref 10.3–14.5)
SODIUM SERPL-SCNC: 141 MMOL/L — SIGNIFICANT CHANGE UP (ref 135–145)
WBC # BLD: 5.53 K/UL — SIGNIFICANT CHANGE UP (ref 3.8–10.5)
WBC # FLD AUTO: 5.53 K/UL — SIGNIFICANT CHANGE UP (ref 3.8–10.5)

## 2020-09-28 PROCEDURE — 85027 COMPLETE CBC AUTOMATED: CPT

## 2020-09-28 PROCEDURE — 97110 THERAPEUTIC EXERCISES: CPT

## 2020-09-28 PROCEDURE — 71045 X-RAY EXAM CHEST 1 VIEW: CPT | Mod: 26

## 2020-09-28 PROCEDURE — 96374 THER/PROPH/DIAG INJ IV PUSH: CPT

## 2020-09-28 PROCEDURE — 71045 X-RAY EXAM CHEST 1 VIEW: CPT

## 2020-09-28 PROCEDURE — 97535 SELF CARE MNGMENT TRAINING: CPT

## 2020-09-28 PROCEDURE — 83735 ASSAY OF MAGNESIUM: CPT

## 2020-09-28 PROCEDURE — 80185 ASSAY OF PHENYTOIN TOTAL: CPT

## 2020-09-28 PROCEDURE — 85025 COMPLETE CBC W/AUTO DIFF WBC: CPT

## 2020-09-28 PROCEDURE — 97163 PT EVAL HIGH COMPLEX 45 MIN: CPT

## 2020-09-28 PROCEDURE — 36415 COLL VENOUS BLD VENIPUNCTURE: CPT

## 2020-09-28 PROCEDURE — 92610 EVALUATE SWALLOWING FUNCTION: CPT

## 2020-09-28 PROCEDURE — 81001 URINALYSIS AUTO W/SCOPE: CPT

## 2020-09-28 PROCEDURE — 82272 OCCULT BLD FECES 1-3 TESTS: CPT

## 2020-09-28 PROCEDURE — 84100 ASSAY OF PHOSPHORUS: CPT

## 2020-09-28 PROCEDURE — 99284 EMERGENCY DEPT VISIT MOD MDM: CPT | Mod: 25

## 2020-09-28 PROCEDURE — 80053 COMPREHEN METABOLIC PANEL: CPT

## 2020-09-28 PROCEDURE — 92523 SPEECH SOUND LANG COMPREHEN: CPT

## 2020-09-28 PROCEDURE — 99284 EMERGENCY DEPT VISIT MOD MDM: CPT

## 2020-09-28 PROCEDURE — 97112 NEUROMUSCULAR REEDUCATION: CPT

## 2020-09-28 PROCEDURE — 80061 LIPID PANEL: CPT

## 2020-09-28 PROCEDURE — 97116 GAIT TRAINING THERAPY: CPT

## 2020-09-28 PROCEDURE — 97530 THERAPEUTIC ACTIVITIES: CPT

## 2020-09-28 PROCEDURE — 80048 BASIC METABOLIC PNL TOTAL CA: CPT

## 2020-09-28 RX ORDER — SODIUM CHLORIDE 9 MG/ML
1550 INJECTION INTRAMUSCULAR; INTRAVENOUS; SUBCUTANEOUS ONCE
Refills: 0 | Status: DISCONTINUED | OUTPATIENT
Start: 2020-09-28 | End: 2020-09-28

## 2020-09-28 RX ORDER — OXYCODONE HYDROCHLORIDE 5 MG/1
2 TABLET ORAL
Qty: 36 | Refills: 0
Start: 2020-09-28 | End: 2020-09-30

## 2020-09-28 RX ORDER — OXYCODONE HYDROCHLORIDE 5 MG/1
15 TABLET ORAL ONCE
Refills: 0 | Status: DISCONTINUED | OUTPATIENT
Start: 2020-09-28 | End: 2020-09-28

## 2020-09-28 RX ADMIN — OXYCODONE HYDROCHLORIDE 15 MILLIGRAM(S): 5 TABLET ORAL at 14:19

## 2020-09-28 RX ADMIN — Medication 100 MILLIGRAM(S): at 14:18

## 2020-09-28 NOTE — ED PROVIDER NOTE - PHYSICAL EXAMINATION
General:     NAD   Eyes: PERRL  Head:     NC/AT   Neck:     trachea midline  Lungs:     CTA b/l  CVS:     RRR  Abd:     +BS, s/nt/nd  Ext:   leg foot erythema and b/l pitting edema  Neuro: AAOx3

## 2020-09-28 NOTE — ED ADULT NURSE NOTE - PSH
History of hip replacement, total, right  6/7/2020  History of laminectomy    History of lung cancer  s/p wedge resection of RML  History of lung surgery    History of oophorectomy, unilateral  bilateral  S/P cholecystectomy    S/P lumbar laminectomy

## 2020-09-28 NOTE — ED ADULT NURSE NOTE - OBJECTIVE STATEMENT
Pt a&ox3 ambulatory to ED c/o b/l lower extremity pain and swelling. Pt states she was seen for similar complaint on Friday in the city and received doppler and verbalizes that results were negative for DVT. Pt denies cp, sob, fever/chills. Pt also requesting rx for pain meds.

## 2020-09-28 NOTE — ED PROVIDER NOTE - PATIENT PORTAL LINK FT
You can access the FollowMyHealth Patient Portal offered by [facility] by registering at the following website: [email or web address]. By joining [facility]’s FollowMyHealth portal, you will also be able to view your health information using other applications (apps) compatible with our system.

## 2020-09-28 NOTE — ED PROVIDER NOTE - NSFOLLOWUPINSTRUCTIONS_ED_ALL_ED_FT
Follow up with pain management and your primary care provider next week  Take doxycycline as directed for infection  Fill the prescription for oxycodone as directed. Watch for drowsiness while taking this medication. No driving or operating heavy machinery.   Any worsening of symptoms or new concerning symptoms return to the ED

## 2020-09-28 NOTE — ED PROVIDER NOTE - PMH
Pt presents to Ed with C/O SOB. Pt is on home O2 and states that he has been out of his oxygen for some time now. Pt reports SOB upon exertion. WIll continue to monitor  
Adenocarcinoma of lung    Alcohol abuse    Chronic pain    Depression H/O panic attack  with anxiety  Diverticulitis of colon (without mention of hemorrhage)    Lung cancer    Mixed connective tissue disease    Opiate dependence    S/P Laminectomy  Lumbar 3/10

## 2020-09-28 NOTE — ED ADULT NURSE NOTE - PMH
Adenocarcinoma of lung    Alcohol abuse    Chronic pain    Depression H/O panic attack  with anxiety  Diverticulitis of colon (without mention of hemorrhage)    Lung cancer    Mixed connective tissue disease    Opiate dependence    S/P Laminectomy  Lumbar 3/10

## 2020-09-28 NOTE — ED ADULT TRIAGE NOTE - CHIEF COMPLAINT QUOTE
my leg is swollen and painful. I had a work up in the city on Friday and they started me on Doxycycline and my dopplers were negative.

## 2020-09-28 NOTE — ED PROVIDER NOTE - ATTENDING CONTRIBUTION TO CARE
patient is a 61 y/o F with PMH CAD s/p possible MI (ECHO ), lung adenocarcinoma (s/p RML resection 2018), mixed connective tissue disorder (on plaquenil and methylprednisolone), left soleal vein thrombosis (on apixaban, 7/2020), RLE cellulitis (RX doxycycline), CDiff colitis (PO vancomycin 8/3- 8/17), lumbar laminectomy and chronic pain, EtOH (admissions for Etoh withdrawal), alcoholic pancreatitis, opiate abuse presents requesting pain medications.  of note pt was seen 2 days ago and had a full sepsis workup and given doxy for foot infection. had neg dopplers and symptoms are improving however she wants pain medication refill  pts exam at baseline. pain meds given. Discussed with patient need to return to ED if symptoms don't continue to improve or recur or develops any new or worsening symptoms that are of concern.  Dr. Mendoza:  I have reviewed and discussed with the PA/ resident the case specifics, including the history, physical assessment, evaluation, conclusion, laboratory results, and medical plan. I agree with the contents, and conclusions. I have personally examined, and interviewed the patient.

## 2020-09-28 NOTE — ED PROVIDER NOTE - OBJECTIVE STATEMENT
hilda is a 59 y/o F with PMH CAD s/p possible MI (ECHO ), lung adenocarcinoma (s/p RML resection 2018), mixed connective tissue disorder (on plaquenil and methylprednisolone), left soleal vein thrombosis (on apixaban, 7/2020), RLE cellulitis (RX doxycycline), CDiff colitis (PO vancomycin 8/3- 8/17), lumbar laminectomy and chronic pain, EtOH (admissions for Etoh withdrawal), alcoholic pancreatitis, opiate abuse presents requesting pain medications.  of note pt was seen 2 days ago and had a full sepsis workup and given doxy for foot infection. had neg dopplers and symptoms are improving however she wants pain medication refill

## 2020-09-28 NOTE — ED PROVIDER NOTE - CLINICAL SUMMARY MEDICAL DECISION MAKING FREE TEXT BOX
hilda is a 59 y/o F with PMH CAD s/p possible MI (ECHO ), lung adenocarcinoma (s/p RML resection 2018), mixed connective tissue disorder (on plaquenil and methylprednisolone), left soleal vein thrombosis (on apixaban, 7/2020), RLE cellulitis (RX doxycycline), CDiff colitis (PO vancomycin 8/3- 8/17), lumbar laminectomy and chronic pain, EtOH (admissions for Etoh withdrawal), alcoholic pancreatitis, opiate abuse presents requesting pain medications.  of note pt was seen 2 days ago and had a full sepsis workup and given doxy for foot infection. had neg dopplers and symptoms are improving however she wants pain medication refill  pts exam at baseline. pain meds given. Discussed with patient need to return to ED if symptoms don't continue to improve or recur or develops any new or worsening symptoms that are of concern. patient is a 61 y/o F with PMH CAD s/p possible MI (ECHO ), lung adenocarcinoma (s/p RML resection 2018), mixed connective tissue disorder (on plaquenil and methylprednisolone), left soleal vein thrombosis (on apixaban, 7/2020), RLE cellulitis (RX doxycycline), CDiff colitis (PO vancomycin 8/3- 8/17), lumbar laminectomy and chronic pain, EtOH (admissions for Etoh withdrawal), alcoholic pancreatitis, opiate abuse presents requesting pain medications.  of note pt was seen 2 days ago and had a full sepsis workup and given doxy for foot infection. had neg dopplers and symptoms are improving however she wants pain medication refill  pts exam at baseline. pain meds given. Discussed with patient need to return to ED if symptoms don't continue to improve or recur or develops any new or worsening symptoms that are of concern.

## 2020-09-28 NOTE — ED ADULT NURSE NOTE - NSIMPLEMENTINTERV_GEN_ALL_ED
Implemented All Fall Risk Interventions:  [place] to call system. Call bell, personal items and telephone within reach. Instruct patient to call for assistance. Room bathroom lighting operational. Non-slip footwear when patient is off stretcher. Physically safe environment: no spills, clutter or unnecessary equipment. Stretcher in lowest position, wheels locked, appropriate side rails in place. Provide visual cue, wrist band, yellow gown, etc. Monitor gait and stability. Monitor for mental status changes and reorient to person, place, and time. Review medications for side effects contributing to fall risk. Reinforce activity limits and safety measures with patient and family.

## 2020-10-01 ENCOUNTER — EMERGENCY (EMERGENCY)
Facility: HOSPITAL | Age: 60
LOS: 1 days | End: 2020-10-01
Attending: EMERGENCY MEDICINE
Payer: MEDICARE

## 2020-10-01 ENCOUNTER — EMERGENCY (EMERGENCY)
Facility: HOSPITAL | Age: 60
LOS: 0 days | Discharge: LEFT BEFORE DISCHARGE | End: 2020-10-01
Attending: EMERGENCY MEDICINE
Payer: MEDICARE

## 2020-10-01 ENCOUNTER — EMERGENCY (EMERGENCY)
Facility: HOSPITAL | Age: 60
LOS: 1 days | Discharge: LEFT BEFORE DISCHARGE | End: 2020-10-01
Attending: EMERGENCY MEDICINE | Admitting: EMERGENCY MEDICINE
Payer: MEDICARE

## 2020-10-01 VITALS
OXYGEN SATURATION: 97 % | TEMPERATURE: 98 F | HEIGHT: 63 IN | WEIGHT: 110.01 LBS | RESPIRATION RATE: 15 BRPM | DIASTOLIC BLOOD PRESSURE: 87 MMHG | SYSTOLIC BLOOD PRESSURE: 146 MMHG | HEART RATE: 81 BPM

## 2020-10-01 VITALS
DIASTOLIC BLOOD PRESSURE: 77 MMHG | OXYGEN SATURATION: 97 % | TEMPERATURE: 98 F | SYSTOLIC BLOOD PRESSURE: 126 MMHG | RESPIRATION RATE: 16 BRPM | WEIGHT: 110.01 LBS | HEART RATE: 85 BPM | HEIGHT: 63 IN

## 2020-10-01 VITALS
HEART RATE: 90 BPM | SYSTOLIC BLOOD PRESSURE: 154 MMHG | DIASTOLIC BLOOD PRESSURE: 84 MMHG | TEMPERATURE: 98 F | RESPIRATION RATE: 18 BRPM | OXYGEN SATURATION: 100 %

## 2020-10-01 VITALS — HEIGHT: 63 IN | WEIGHT: 110.01 LBS

## 2020-10-01 DIAGNOSIS — Z96.641 PRESENCE OF RIGHT ARTIFICIAL HIP JOINT: Chronic | ICD-10-CM

## 2020-10-01 DIAGNOSIS — Z98.890 OTHER SPECIFIED POSTPROCEDURAL STATES: Chronic | ICD-10-CM

## 2020-10-01 DIAGNOSIS — Z90.721 ACQUIRED ABSENCE OF OVARIES, UNILATERAL: Chronic | ICD-10-CM

## 2020-10-01 DIAGNOSIS — Z85.118 PERSONAL HISTORY OF OTHER MALIGNANT NEOPLASM OF BRONCHUS AND LUNG: Chronic | ICD-10-CM

## 2020-10-01 DIAGNOSIS — M79.661 PAIN IN RIGHT LOWER LEG: ICD-10-CM

## 2020-10-01 DIAGNOSIS — Z85.3 PERSONAL HISTORY OF MALIGNANT NEOPLASM OF BREAST: ICD-10-CM

## 2020-10-01 DIAGNOSIS — M79.662 PAIN IN LEFT LOWER LEG: ICD-10-CM

## 2020-10-01 DIAGNOSIS — F11.29 OPIOID DEPENDENCE WITH UNSPECIFIED OPIOID-INDUCED DISORDER: ICD-10-CM

## 2020-10-01 DIAGNOSIS — Z96.641 PRESENCE OF RIGHT ARTIFICIAL HIP JOINT: ICD-10-CM

## 2020-10-01 DIAGNOSIS — R60.0 LOCALIZED EDEMA: ICD-10-CM

## 2020-10-01 DIAGNOSIS — F32.9 MAJOR DEPRESSIVE DISORDER, SINGLE EPISODE, UNSPECIFIED: ICD-10-CM

## 2020-10-01 DIAGNOSIS — Z90.49 ACQUIRED ABSENCE OF OTHER SPECIFIED PARTS OF DIGESTIVE TRACT: Chronic | ICD-10-CM

## 2020-10-01 DIAGNOSIS — Z88.0 ALLERGY STATUS TO PENICILLIN: ICD-10-CM

## 2020-10-01 DIAGNOSIS — Z79.82 LONG TERM (CURRENT) USE OF ASPIRIN: ICD-10-CM

## 2020-10-01 DIAGNOSIS — G89.29 OTHER CHRONIC PAIN: ICD-10-CM

## 2020-10-01 DIAGNOSIS — Z85.118 PERSONAL HISTORY OF OTHER MALIGNANT NEOPLASM OF BRONCHUS AND LUNG: ICD-10-CM

## 2020-10-01 PROCEDURE — 99281 EMR DPT VST MAYX REQ PHY/QHP: CPT

## 2020-10-01 PROCEDURE — L9991: CPT

## 2020-10-01 PROCEDURE — 99282 EMERGENCY DEPT VISIT SF MDM: CPT

## 2020-10-01 PROCEDURE — 99283 EMERGENCY DEPT VISIT LOW MDM: CPT

## 2020-10-01 RX ORDER — GABAPENTIN 400 MG/1
1200 CAPSULE ORAL
Qty: 0 | Refills: 0 | DISCHARGE

## 2020-10-01 RX ORDER — ALENDRONATE SODIUM 70 MG/1
1 TABLET ORAL
Qty: 0 | Refills: 0 | DISCHARGE

## 2020-10-01 RX ORDER — OXYCODONE AND ACETAMINOPHEN 5; 325 MG/1; MG/1
1 TABLET ORAL ONCE
Refills: 0 | Status: DISCONTINUED | OUTPATIENT
Start: 2020-10-01 | End: 2020-10-01

## 2020-10-01 NOTE — ED PROVIDER NOTE - CARE PROVIDER_API CALL
TIFFANY NGUYEN  [facility]  Phone: [phone]  Fax: [phone]  Follow Up Time: 1-3 Days

## 2020-10-01 NOTE — ED PROVIDER NOTE - MUSCULOSKELETAL, MLM
+ttp B lower legs with +1 pitting edema, +erythema noted to R lower leg extending to foot and erythema noted to L foot, pulses and sensation intact, toes warm & mobile, cap refill<2sec, NVI

## 2020-10-01 NOTE — ED PROVIDER NOTE - CARE PLAN
Principal Discharge DX:	Cellulitis of lower extremity  Secondary Diagnosis:	Leg swelling  Secondary Diagnosis:	Opioid dependence

## 2020-10-01 NOTE — ED ADULT NURSE NOTE - OBJECTIVE STATEMENT
Patient reports "awful cough and fever Patient left after seeing MD and PA, refused to be assessed by RN, left ED

## 2020-10-01 NOTE — ED ADULT NURSE REASSESSMENT NOTE - NS ED NURSE REASSESS COMMENT FT1
Patient initially refused nursing exam, labs and doppler, PA came to bedside. Patient agreed to labs and doppler if she could get pain medicine prior to doppler. When writer went to do labs and exam patient, the patient  demanded medication prior to labs. MADONNA Pisano aware, patient demanded double to dosage or medication that was ordered. Explained to patient I could not give her double dosage and that MD and PA will not increased the dose. Dr Isbell and PA aware. Patient stated she was not going to stay here and will go to another facility, got up and walked out of ED. Refused to sign AMA papers.

## 2020-10-01 NOTE — ED PROVIDER NOTE - PROGRESS NOTE DETAILS
Scribe IN for Dr. Mccall: 61 y/o female with PMHx of Lung CA, Opiate dependence, EtOH abuse, mixed connective disease, Depression, Laminectomy, Diverticulitis presents to the ED c/o few weeks of BLE pain and erythema. Seen at [facility] ED on 9/25, had labs, ultrasound, was told no blood clot but has venous insufficiency, given Rx for Percocet, f/u with MD. Pt then went to [place] ER on 9/28, had labs and was given Rx for more Percocet. Pt was also seen in [place] ER this morning. Per chart note, pt left without eval when told she would not receive Rx for Percocet. Pt then went to [place] ED, per chart review was seen and eloped after discussion with MD about [facility] ISTOP findings. Denies fevers, chills, HA, dizziness, SOB, cough, CP, lightheadedness, n/v, abd pain.  PE: Well nourished, well developed no acute distress, S1 S2 regular rate rhythm, Lungs CTA, +mild edema b\l LE, right LE erythema (per pt, improved since started doxycycline). [place]ed [place]. Refused pain meds/percocet in ED, when told she will not be receiving rx for pain meds upon discharge, got mad and refused blood work and US in ED, cursed at RN when told about signing AMA form, refused to sign AMA form and walked out of ED, told RN that she will go to see her pain management doctor. Scribe IN for Dr. Mccall: 61 y/o female with PMHx of Lung CA, Opiate dependence, EtOH abuse, mixed connective disease, Depression, Laminectomy, Diverticulitis presents to the ED c/o few weeks of BLE pain and erythema. Seen at [facility] ED on 9/25, had labs, ultrasound, was told no blood clot but has venous insufficiency, given Rx for Percocet, f/u with MD. Pt then went to [place] ER on 9/28, had labs and was given Rx for more Percocet and doxycycline. Pt was also seen in [place] ER this morning. Per chart note, pt left without eval when told she would not receive Rx for Percocet. Pt then went to [place] ED, per chart review was seen and eloped after discussion with MD about [facility] ISTOP findings. Denies fevers, chills, HA, dizziness, SOB, cough, CP, lightheadedness, n/v, abd pain.  PE: Well nourished, well developed no acute distress, S1 S2 regular rate rhythm, Lungs CTA, +mild edema b\l LE, right LE erythema (per pt, improved since started doxycycline). Pt initially agreed for labs, cultures and BLE US in ED, was requesting rx for narcotics upon discharge. [place]. Patient refused pain meds/percocet in ED when told she will not be receiving rx for pain meds upon discharge. Pt got mad and refused blood work and US in ED, she cursed at RN when told about signing AMA form, refused to sign AMA form and walked out of ED. Pt told RN that she will go to see her pain management doctor. Pt did not wait for myself or the ED attending to discuss about leaving AMA and eloped from ED.

## 2020-10-01 NOTE — ED PROVIDER NOTE - OBJECTIVE STATEMENT
PATIENT HERE FOR MEDICATION/OXYCODONE, 3-DAY SUPPLY ONLY. WHEN EXPLAINED THAT HER PCP AND OR PAIN MANAGEMENT DOCTOR WILL NEED TO RX, SHE STATED THAT SHE DID NOT WANT TO BE SEEN.

## 2020-10-01 NOTE — ED PROVIDER NOTE - OBJECTIVE STATEMENT
61 y/o F with hx of Adenocarcinoma of lung  Alcohol abuse  Chronic pain  Depression H/O panic attack  with anxiety Diverticulitis of colon (without mention of hemorrhage)  Lung cancer  Mixed connective tissue disease  Opiate dependence  presents with c/o B lower leg pain x redness x 2-3 weeks. Pt states that she was seen at [facility] on 9/25, was told that she does not have blood clot but told to have venous insufficiency, seen at [facility] ED on 9/28, had blood work done and discharged with doxycycline. States that redness in her legs have improved since the antibiotics but still having pain. Pt was discharged on oxycodone for pain at that time. Pt was seen at [facility], eloped from ED after informed about review of [place] ISTOP [facility] but left without eval as per chart review prior to arrival here. Pt denies numbness, tingling, fever, chills, N/V, CP, SOB or other symptoms.

## 2020-10-01 NOTE — ED STATDOCS - OBJECTIVE STATEMENT
59 y/o F with PMHx of presents to the ED c/o +b/l LE pain and +edema x3 weeks. Notes +diffuse joint pain as well. Reports she was d/c from physical rehab in [place] on 9/8 and has had worsening symptoms since. States she is on chronic pain medication 2/2 back pain and that this LE pain is new. Took pain medication at 8:30 AM this morning without relief and states she is out after today. No fever. Allergic to penicillin. Does not have a pain management doctor. PCP: Dr. Danya Sweeney.

## 2020-10-01 NOTE — ED STATDOCS - CLINICAL SUMMARY MEDICAL DECISION MAKING FREE TEXT BOX
Pt became agitated when presented with her [facility] Prescription registry hx, pt originally denied getting rx on 9/28 and 9/25. After looking at papers she then admitted to it. She states she states 12 10 mg oxycodone a day so 36 pill count on 9/28 only lasted her 3 days. Discussed with pt need for f/u with PCP for continued narcotics. Unable to refill here. She has been to 3 different hospitals in 4 days but states she is unable to see her PCPs. Given recent sonos on Fri that were without any acute findings and chronic venous insuff per pt, will d/c and advise f/u with PCP.

## 2020-10-01 NOTE — ED PROVIDER NOTE - CLINICAL SUMMARY MEDICAL DECISION MAKING FREE TEXT BOX
61 yo F with hx of opiate dependance, lung ca c/o BLE swelling and redness x 3 weeks, seen at [facility] on 9/25, had BLE US, was told no blood clots but had venous insufficiency, seen at  ED on 9/28, had blood work and discharged on doxycycline and oxycodone for pain. States that she ran out of pain meds, still c/o BLE pain but states that redness to RLE has improved. Upon chart review noticed pt was at [facility], walked out after informed rx for pain meds cannot be given. Pt denies f/c/n/v/cp/sob, VSS, +erythema with ttp and mild swelling noted to RLE, +mild swelling noted to L foot, NVI, lungs cta B, afebrile, will get labs, cultures, BLE US, pain meds, reassess

## 2020-10-01 NOTE — ED PROVIDER NOTE - SKIN, MLM
+erythema noted to RLE shin extending to foot dorsal aspect, +mild erythema noted to L foot, skin intact

## 2020-10-01 NOTE — ED ADULT TRIAGE NOTE - CHIEF COMPLAINT QUOTE
patient has c/o swollen leg on both legs and pain since last week, patient is able to walk with a cane. also stated "it is burning. I can't take this any more"

## 2020-10-01 NOTE — ED ADULT NURSE NOTE - OBJECTIVE STATEMENT
Chronic b/l venous insuffiencey. Had Venous and arterial doppler on Friday. Declined nursing exam, states "I just want to speak to the doctor", MADONNA Pisano and Dr Isbell made aware. Chronic b/l venous insuffiencey. Had Venous and arterial doppler on Friday.

## 2020-10-01 NOTE — ED ADULT NURSE NOTE - NS ED NURSE ELOPE COMMENTS
Patient was seen by Dr. Armenta and MADONNA SEALS and walked out of ED. Patient walked out before being assessed and elevated by RN.. MD and MADONNA molina.

## 2020-10-01 NOTE — ED ADULT NURSE NOTE - EXPLANATION OF PATIENT'S REASON FOR LEAVING
Demanding higher dose of pain medication that was ordered, States she will go to [place] for pain management.

## 2020-10-01 NOTE — ED ADULT NURSE NOTE - NSIMPLEMENTINTERV_GEN_ALL_ED
Implemented All Fall with Harm Risk Interventions:  [place] to call system. Call bell, personal items and telephone within reach. Instruct patient to call for assistance. Room bathroom lighting operational. Non-slip footwear when patient is off stretcher. Physically safe environment: no spills, clutter or unnecessary equipment. Stretcher in lowest position, wheels locked, appropriate side rails in place. Provide visual cue, wrist band, yellow gown, etc. Monitor gait and stability. Monitor for mental status changes and reorient to person, place, and time. Review medications for side effects contributing to fall risk. Reinforce activity limits and safety measures with patient and family. Provide visual clues: red socks.

## 2020-10-01 NOTE — ED ADULT NURSE NOTE - CHPI ED NUR SYMPTOMS NEG
no chills/no decreased eating/drinking/no vomiting/no tingling/no weakness/no nausea/no dizziness/no fever

## 2020-10-06 ENCOUNTER — APPOINTMENT (OUTPATIENT)
Dept: NEUROLOGY | Facility: CLINIC | Age: 60
End: 2020-10-06
Payer: MEDICARE

## 2020-10-06 ENCOUNTER — APPOINTMENT (OUTPATIENT)
Dept: PSYCHIATRY | Facility: CLINIC | Age: 60
End: 2020-10-06
Payer: MEDICARE

## 2020-10-06 VITALS — TEMPERATURE: 97.3 F

## 2020-10-06 VITALS
HEART RATE: 84 BPM | BODY MASS INDEX: 20.24 KG/M2 | SYSTOLIC BLOOD PRESSURE: 122 MMHG | DIASTOLIC BLOOD PRESSURE: 82 MMHG | WEIGHT: 110 LBS | HEIGHT: 62 IN

## 2020-10-06 DIAGNOSIS — R93.89 ABNORMAL FINDINGS ON DIAGNOSTIC IMAGING OF OTHER SPECIFIED BODY STRUCTURES: ICD-10-CM

## 2020-10-06 PROCEDURE — 95816 EEG AWAKE AND DROWSY: CPT

## 2020-10-06 PROCEDURE — 99214 OFFICE O/P EST MOD 30 MIN: CPT

## 2020-10-06 PROCEDURE — 99205 OFFICE O/P NEW HI 60 MIN: CPT

## 2020-10-06 RX ORDER — PHENYTOIN SODIUM 100 MG/1
100 CAPSULE ORAL TWICE DAILY
Refills: 0 | Status: DISCONTINUED | COMMUNITY
Start: 2020-09-09 | End: 2020-10-06

## 2020-10-06 RX ORDER — EXTENDED PHENYTOIN SODIUM 30 MG/1
30 CAPSULE ORAL TWICE DAILY
Refills: 0 | Status: DISCONTINUED | COMMUNITY
Start: 2020-09-09 | End: 2020-10-06

## 2020-10-07 NOTE — HISTORY OF PRESENT ILLNESS
[FreeTextEntry1] : 61 y/o R-H F with h/o lung adenocarcinoma (S/P RML wedge resection 2018), suspected autoimmune mixed connective tissue disease on Plaquenil, soleal DVT previously on Eliquis, former EtOH abuse c/b pancreatitis, hyponatremia secondary to polydipsia, recent cellulitis of RLE and recent prolonged hospitalization for hyponatremia c/b seizures being evaluated now for outpatient establishment of seizure care. \par \par PT denies any prior seizures, though notes she had a transient 7 week episode of dissociation and holocephalic headache in 2011.  Describes initial episode as waving her clip-board around at work (was a vet OR tech) and having a nondescript sense of something being wrong.  Denies any convulsive activity, incontinence, LOC or tongue bite.  She had one similar less intense transient episode 1 month after lasting several hours, but has not had any issues since that time.  No h/o head trauma, personal or familial seizure history, birth trauma or developmental delay. \par \par In mid 08/2020 she suddenly felt "off" and unwell prompting her to be go to the hospital, though she doesn't recall if this "off" feeling was similar to that she felt in 2011. She was found to have severe hyponatremia suspected secondary to polydipsia, for which she was admitted.  RRT was called on 08/16/20 for focal seizure like activity with LUE, LLE and facial twitching.  Due to suspicion of status epilepticus, she was intubated, given LEV, LAC and Propofol.  It was initially suspected this was due to infection vs. autoimmune encephalitis, though CSF was unremarkable including cell count of 1 and protein of 34.  EEG revealed bifrontal R>L LPD+R.  MRI brain demonstrated DWI hyperintensity in the R. frontal lobe in cortical ribboning pattern, thought to be secondary to prolonged seizure activity, as well as a R. thalamic area of DWI hyperintensity with possible ADC correlate which was thought to possibly be subacute ischemic stroke.  \par \par She has little to no recollection of her hospitalization, though she recalls waking up and gradually improving in her speech, mental clarity and ambulation.  At baseline, she has used a cane since 06/2020 after a R. hip replacement. Upon discharge, she was continued on her AEDs, ASA 81 and Lipitor 20.  Current AED include LEV 1000mg PO BID, LAC 200mg PO BID and PHT 125mg/5mL 1 teaspoon PO BID.  She does report some mood issues, though she is unclear if this is a S/E from LEV as she has overall been very upset since discharge. She denies any seizures, missed doses or medication s/e since discharge from  the hospital.  She does report feeling "spacey" and having some trouble when writing names making occasional syllabic paraphasic written errors.  \par \par Shortly after her discharge in late 08/2020, she began having RLE erythema, swelling and burning for which she went to [place].  She was found to have cellulitis and was admitted for IV antibiotics.  Upon discharge, she was given PO doxycycline and sent to rehab which she completed on 09/08.  Upon return home from rehab, the RLE erythema and swelling recurred and began involving the LLE prompting her to go back to [place] ED.  She was afebrile, CRP was slightly elevated, venous and arterial US of B/L LE were reported to her as normal.  She was told to continue taking doxycycline, follow up with her PCP and return if any fevers and/or signs of ascending cellulitis. \par \par She now reports that the burning pain, erythema and pitting edema have become significantly worse with pain to light touch RLE > LLE but pitting edema in LLE>RLE.  Denies any fevers, night sweats, chills.  She reports significant difficulty ambulating, constant interference in her life from pain and that  she has not driven since hospitalization (both due to reported seizures and leg issues).  \par \par Finally, she reports recently abruptly stopping her pain management course.  She was previously on oxycodone and fentanyl patches since her hip replacement but has switched to Suboxone since 10/02.  She has continued her gabapentin 600 mg 2 tabs (1200 mg) TID as well as alprazolam 0.25 mg BID for concomitant anxiety.  Denies any abrupt stopping or lower of either gabapentin or alprazolam. \par \par Denies any other acute new focal neurologic deficits, diplopia, numbness, weakness or vertigo though she did have a few near falls.

## 2020-10-07 NOTE — DISCUSSION/SUMMARY
[FreeTextEntry1] : 61 y/o R-HF with multiple comorbidities including most notably lung adenocarcinoma s/p RML wedge resection 2018, cellulitis, MCTD on plaquenil, anxiety and recent new onset focal motor seizures with impaired awareness involving left face, arm and leg now being evaluated for post-hospital seizure care, suspected worsening B/L LE cellulitis and severe chronic pain.  \par \par Impression:\par 1) Worsening B/L cellulitis, possibly recurrence.  \par -Advised PT to go to ED, though she did not want to go immediately due to not having anyone to take care of her dog. \par -Established plan that she would continue doxycycline for now and if cellulitis continues to worsen by 10/08, will go to ED.  Marked B/L line RLE just under knee, LLE mid calf. 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 \par -Possible R. frontal focal lesion serving as focal ictus causing left sided focal motor activity.  \par -Continue LEV 1000mg BID, Continue LAC 200mg BID.\par -Will begin to taper down PHT to 125mg/5mL from 5mL BID to 2.5 mL BID for 1 week and then off. \par -Repeat MRI brain to re-evaluate right frontal abnormality and determine if patient indeed had a right thalamic lacunar infarct.\par \par 3)R. thalamic lesion, stroke vs. postictal DWI abnormality. \par -Repeat MRI brain to see if R. thalamic lesion has persisted and thus would truly be stroke as opposed to thalamocortical transient DWI changes from seizure activity.  If so, will continue ASA 81 and Lipitor 20 indefinitely with adjustment to LDL <70\par \par 4)Chronic pain\par -Continue gabapentin 1200mg TID for now. \par -Renewed lidocaine 5% topical for pain PRN but advised PT to deal with underlying cause which is likely cellulitis. \par -PT slated to see new pain management specialist 11/04. \par \par Return to clinic in 6 weeks. \par \par Guru Mark, \par Neurology Resident\par \par Attending co-sign:\par I have reviewed the resident's history and physical exam and confirmed findings with patient.  Agree with assessment and plan.\par \par Drake Gallegos MD\par [facility]\par \par Greater than 50% of the encounter was spent on counseling and coordination of care discussing differential diagnosis, diagnostic testing, and treatment options. We have talked about appropriate follow up, and I have spent 45 minutes of face to face time with the patient.\par \par

## 2020-10-07 NOTE — PHYSICAL EXAM
[Person] : oriented to person [Place] : oriented to place [Time] : oriented to time [Short Term Intact] : short term memory intact [Remote Intact] : remote memory intact [Registration Intact] : recent registration memory intact [Visual Intact] : visual attention was ~T not ~L decreased [Naming Objects] : no difficulty naming common objects [Repeating Phrases] : no difficulty repeating a phrase [Writing A Sentence] : no difficulty writing a sentence [Fluency] : fluency intact [Comprehension] : comprehension intact [Reading] : reading intact [Current Events] : adequate knowledge of current events [Past History] : adequate knowledge of personal past history [Vocabulary] : adequate range of vocabulary [Cranial Nerves Optic (II)] : visual acuity intact bilaterally,  visual fields full to confrontation, pupils equal round and reactive to light [Cranial Nerves Oculomotor (III)] : extraocular motion intact [Cranial Nerves Trigeminal (V)] : facial sensation intact symmetrically [Cranial Nerves Facial (VII)] : face symmetrical [Cranial Nerves Vestibulocochlear (VIII)] : hearing was intact bilaterally [Cranial Nerves Glossopharyngeal (IX)] : tongue and palate midline [Cranial Nerves Accessory (XI - Cranial And Spinal)] : head turning and shoulder shrug symmetric [Cranial Nerves Hypoglossal (XII)] : there was no tongue deviation with protrusion [Motor Handedness Right-Handed] : the patient is right hand dominant [Motor Strength Lower Extremities Right] : there was weakness of the right lower extremity [Sensation Tactile Decrease] : light touch was intact [Sensation Pain / Temperature Decrease] : pain and temperature was intact [Allodynia] : ~T allodynia present [Hyperesthesia] : hyperesthesia was present [1+] : Ankle jerk left 1+ [Span Intact] : the attention span was decreased [Concentration Intact] : a decrease in concentrating ability was observed [Dysdiadochokinesia Bilaterally] : not present [Coordination - Dysmetria Impaired Finger-to-Nose Bilateral] : not present [Plantar Reflex Right Only] : normal on the right [Plantar Reflex Left Only] : normal on the left [FreeTextEntry5] : Transient fatigable L. beating horizontal nystagmus on L. gaze.  [FreeTextEntry6] : 4-/5 hip flexion.  B/L LE exam limited due to diffuse pain, swelling and erythema.  [FreeTextEntry8] : Antalgic gait with RLE circumduction with cane ambulation.  [FreeTextEntry1] : B/L erythema, warm to touch, rising erythema to level of just under knee on RLE and to mid calf of LLE.

## 2020-10-07 NOTE — DATA REVIEWED
[de-identified] : R. frontal DWI hyperintensity without much ADC correlate in cortical ribboning pattern and not respecting vascular territory.  R. thalamic DWI hyperintensity with unclear ADC correlate.  [de-identified] : rEEG in office 10/06/20 without overt epileptiform activities.  Reports per [facility] during 08/2020 note

## 2020-10-07 NOTE — REVIEW OF SYSTEMS
[As Noted in HPI] : as noted in HPI [Memory Lapses or Loss] : memory loss [Decr. Concentrating Ability] : decreased concentrating ability [Difficulty with Language] : ~M difficulty with language [Tingling] : tingling [Abnormal Sensation] : an abnormal sensation [Hypersensitivity] : hypersensitivity [Difficulty Walking] : difficulty walking [Inability to Walk] : inability to walk [Limping] : limping [Anxiety] : anxiety [Seizures] : no convulsions [Dizziness] : no dizziness [Fainting] : no fainting [de-identified] : Had anxiety attack in office, which she states was unprovoked

## 2020-10-08 ENCOUNTER — OUTPATIENT (OUTPATIENT)
Dept: OUTPATIENT SERVICES | Facility: HOSPITAL | Age: 60
LOS: 1 days | End: 2020-10-08
Payer: MEDICARE

## 2020-10-08 ENCOUNTER — RESULT REVIEW (OUTPATIENT)
Age: 60
End: 2020-10-08

## 2020-10-08 ENCOUNTER — APPOINTMENT (OUTPATIENT)
Dept: MRI IMAGING | Facility: HOSPITAL | Age: 60
End: 2020-10-08
Payer: MEDICARE

## 2020-10-08 DIAGNOSIS — R93.89 ABNORMAL FINDINGS ON DIAGNOSTIC IMAGING OF OTHER SPECIFIED BODY STRUCTURES: ICD-10-CM

## 2020-10-08 DIAGNOSIS — Z96.641 PRESENCE OF RIGHT ARTIFICIAL HIP JOINT: Chronic | ICD-10-CM

## 2020-10-08 DIAGNOSIS — Z98.890 OTHER SPECIFIED POSTPROCEDURAL STATES: Chronic | ICD-10-CM

## 2020-10-08 DIAGNOSIS — Z90.721 ACQUIRED ABSENCE OF OVARIES, UNILATERAL: Chronic | ICD-10-CM

## 2020-10-08 DIAGNOSIS — R56.9 UNSPECIFIED CONVULSIONS: ICD-10-CM

## 2020-10-08 DIAGNOSIS — Z90.49 ACQUIRED ABSENCE OF OTHER SPECIFIED PARTS OF DIGESTIVE TRACT: Chronic | ICD-10-CM

## 2020-10-08 DIAGNOSIS — Z85.118 PERSONAL HISTORY OF OTHER MALIGNANT NEOPLASM OF BRONCHUS AND LUNG: Chronic | ICD-10-CM

## 2020-10-08 PROCEDURE — 70551 MRI BRAIN STEM W/O DYE: CPT | Mod: 26

## 2020-10-08 PROCEDURE — 70551 MRI BRAIN STEM W/O DYE: CPT

## 2020-10-08 PROCEDURE — 76377 3D RENDER W/INTRP POSTPROCES: CPT | Mod: 26

## 2020-10-08 PROCEDURE — 76377 3D RENDER W/INTRP POSTPROCES: CPT

## 2020-10-09 ENCOUNTER — EMERGENCY (EMERGENCY)
Facility: HOSPITAL | Age: 60
LOS: 1 days | Discharge: HOME | End: 2020-10-09
Attending: EMERGENCY MEDICINE
Payer: MEDICARE

## 2020-10-09 VITALS
RESPIRATION RATE: 17 BRPM | DIASTOLIC BLOOD PRESSURE: 77 MMHG | HEIGHT: 63 IN | SYSTOLIC BLOOD PRESSURE: 130 MMHG | TEMPERATURE: 99 F | WEIGHT: 110.01 LBS | HEART RATE: 79 BPM | OXYGEN SATURATION: 98 %

## 2020-10-09 DIAGNOSIS — Z98.890 OTHER SPECIFIED POSTPROCEDURAL STATES: Chronic | ICD-10-CM

## 2020-10-09 DIAGNOSIS — Z90.49 ACQUIRED ABSENCE OF OTHER SPECIFIED PARTS OF DIGESTIVE TRACT: Chronic | ICD-10-CM

## 2020-10-09 DIAGNOSIS — Z96.641 PRESENCE OF RIGHT ARTIFICIAL HIP JOINT: Chronic | ICD-10-CM

## 2020-10-09 DIAGNOSIS — Z90.721 ACQUIRED ABSENCE OF OVARIES, UNILATERAL: Chronic | ICD-10-CM

## 2020-10-09 DIAGNOSIS — Z85.118 PERSONAL HISTORY OF OTHER MALIGNANT NEOPLASM OF BRONCHUS AND LUNG: Chronic | ICD-10-CM

## 2020-10-09 PROCEDURE — 99284 EMERGENCY DEPT VISIT MOD MDM: CPT | Mod: GC

## 2020-10-09 NOTE — ED ADULT TRIAGE NOTE - CHIEF COMPLAINT QUOTE
Patient c/o bilateral lower legs pain, swelling and redness. Her Neurologist told her to come here. Patient was admitted here on August for "seizures".

## 2020-10-10 VITALS
HEART RATE: 88 BPM | DIASTOLIC BLOOD PRESSURE: 74 MMHG | OXYGEN SATURATION: 99 % | RESPIRATION RATE: 16 BRPM | SYSTOLIC BLOOD PRESSURE: 115 MMHG

## 2020-10-10 LAB
ALBUMIN SERPL ELPH-MCNC: 4.3 G/DL — SIGNIFICANT CHANGE UP (ref 3.3–5)
ALP SERPL-CCNC: 65 U/L — SIGNIFICANT CHANGE UP (ref 40–120)
ALT FLD-CCNC: 52 U/L — HIGH (ref 10–45)
ANION GAP SERPL CALC-SCNC: 13 MMOL/L — SIGNIFICANT CHANGE UP (ref 5–17)
AST SERPL-CCNC: 38 U/L — SIGNIFICANT CHANGE UP (ref 10–40)
BASOPHILS # BLD AUTO: 0.02 K/UL — SIGNIFICANT CHANGE UP (ref 0–0.2)
BASOPHILS NFR BLD AUTO: 0.4 % — SIGNIFICANT CHANGE UP (ref 0–2)
BILIRUB SERPL-MCNC: 0.2 MG/DL — SIGNIFICANT CHANGE UP (ref 0.2–1.2)
BUN SERPL-MCNC: 7 MG/DL — SIGNIFICANT CHANGE UP (ref 7–23)
CALCIUM SERPL-MCNC: 9.9 MG/DL — SIGNIFICANT CHANGE UP (ref 8.4–10.5)
CHLORIDE SERPL-SCNC: 97 MMOL/L — SIGNIFICANT CHANGE UP (ref 96–108)
CO2 SERPL-SCNC: 27 MMOL/L — SIGNIFICANT CHANGE UP (ref 22–31)
CREAT SERPL-MCNC: 0.56 MG/DL — SIGNIFICANT CHANGE UP (ref 0.5–1.3)
CULTURE RESULTS: SIGNIFICANT CHANGE UP
EOSINOPHIL # BLD AUTO: 0.2 K/UL — SIGNIFICANT CHANGE UP (ref 0–0.5)
EOSINOPHIL NFR BLD AUTO: 3.7 % — SIGNIFICANT CHANGE UP (ref 0–6)
GLUCOSE SERPL-MCNC: 101 MG/DL — HIGH (ref 70–99)
HCT VFR BLD CALC: 36.6 % — SIGNIFICANT CHANGE UP (ref 34.5–45)
HGB BLD-MCNC: 12 G/DL — SIGNIFICANT CHANGE UP (ref 11.5–15.5)
IMM GRANULOCYTES NFR BLD AUTO: 0.4 % — SIGNIFICANT CHANGE UP (ref 0–1.5)
LYMPHOCYTES # BLD AUTO: 1.96 K/UL — SIGNIFICANT CHANGE UP (ref 1–3.3)
LYMPHOCYTES # BLD AUTO: 36.1 % — SIGNIFICANT CHANGE UP (ref 13–44)
MAGNESIUM SERPL-MCNC: 2.3 MG/DL — SIGNIFICANT CHANGE UP (ref 1.6–2.6)
MCHC RBC-ENTMCNC: 32.5 PG — SIGNIFICANT CHANGE UP (ref 27–34)
MCHC RBC-ENTMCNC: 32.8 GM/DL — SIGNIFICANT CHANGE UP (ref 32–36)
MCV RBC AUTO: 99.2 FL — SIGNIFICANT CHANGE UP (ref 80–100)
MONOCYTES # BLD AUTO: 0.73 K/UL — SIGNIFICANT CHANGE UP (ref 0–0.9)
MONOCYTES NFR BLD AUTO: 13.4 % — SIGNIFICANT CHANGE UP (ref 2–14)
NEUTROPHILS # BLD AUTO: 2.5 K/UL — SIGNIFICANT CHANGE UP (ref 1.8–7.4)
NEUTROPHILS NFR BLD AUTO: 46 % — SIGNIFICANT CHANGE UP (ref 43–77)
NRBC # BLD: 0 /100 WBCS — SIGNIFICANT CHANGE UP (ref 0–0)
PHOSPHATE SERPL-MCNC: 5.2 MG/DL — HIGH (ref 2.5–4.5)
PLATELET # BLD AUTO: 269 K/UL — SIGNIFICANT CHANGE UP (ref 150–400)
POTASSIUM SERPL-MCNC: 4 MMOL/L — SIGNIFICANT CHANGE UP (ref 3.5–5.3)
POTASSIUM SERPL-SCNC: 4 MMOL/L — SIGNIFICANT CHANGE UP (ref 3.5–5.3)
PROT SERPL-MCNC: 6.8 G/DL — SIGNIFICANT CHANGE UP (ref 6–8.3)
RBC # BLD: 3.69 M/UL — LOW (ref 3.8–5.2)
RBC # FLD: 12.3 % — SIGNIFICANT CHANGE UP (ref 10.3–14.5)
SODIUM SERPL-SCNC: 137 MMOL/L — SIGNIFICANT CHANGE UP (ref 135–145)
SPECIMEN SOURCE: SIGNIFICANT CHANGE UP
WBC # BLD: 5.43 K/UL — SIGNIFICANT CHANGE UP (ref 3.8–10.5)
WBC # FLD AUTO: 5.43 K/UL — SIGNIFICANT CHANGE UP (ref 3.8–10.5)

## 2020-10-10 PROCEDURE — 99283 EMERGENCY DEPT VISIT LOW MDM: CPT

## 2020-10-10 PROCEDURE — 84100 ASSAY OF PHOSPHORUS: CPT

## 2020-10-10 PROCEDURE — 85025 COMPLETE CBC W/AUTO DIFF WBC: CPT

## 2020-10-10 PROCEDURE — 83735 ASSAY OF MAGNESIUM: CPT

## 2020-10-10 PROCEDURE — 80053 COMPREHEN METABOLIC PANEL: CPT

## 2020-10-10 RX ORDER — FUROSEMIDE 40 MG
20 TABLET ORAL ONCE
Refills: 0 | Status: COMPLETED | OUTPATIENT
Start: 2020-10-10 | End: 2020-10-10

## 2020-10-10 RX ADMIN — Medication 20 MILLIGRAM(S): at 03:33

## 2020-10-10 NOTE — ED PROVIDER NOTE - NSFOLLOWUPINSTRUCTIONS_ED_ALL_ED_FT
Follow up with your primary doctor at your appointment in November.  You can be seen at the [facility] in 5-7 days    Tylenol 500 mg every 8 hours as needed for pain    Continue with your medications as prescribed    Return to the ER for severe pain, fevers, worsening redness, or ANY other concerns

## 2020-10-10 NOTE — ED PROVIDER NOTE - OBJECTIVE STATEMENT
60F PMH salt wasting disease, opiate abuse hx now on suboxone as per pt, prior lung cancer hx, cad s/p MI, recently admitted in ICU for seizures, presents to ED for bilateral lower extremity leg pain. Pt states they have had worsening lower extremity swelling, redness, burning pain which has been moving more and more proximal, R leg > L leg, for the past several weeks, and over the past four days, has had worsening pitting edema in her legs as well 60F PMH salt wasting disease, opiate abuse hx now on suboxone as per pt, prior lung cancer hx, cad s/p MI, recently admitted in ICU for seizures, presents to ED for bilateral lower extremity leg pain. Pt states they have had worsening lower extremity swelling, redness, burning pain which has been moving more and more proximal, R leg > L leg, for the past several weeks, and over the past four days, has had worsening pitting edema in her legs as well. Pt endorses burning bilateral leg pain that extends through her posterior thighs into her back.

## 2020-10-10 NOTE — ED PROVIDER NOTE - CLINICAL SUMMARY MEDICAL DECISION MAKING FREE TEXT BOX
LE swelling, chronic, improving.  Here because she was referred by neurologist several days ago about c/f infection.  No clinical s/s of infection to b/l JO-ANN.  Nontoxic appearing, no f/c; LE swelling reported to be improved and she had multiple venous duplexes in the recent past.  Labs, diuresis, reassess

## 2020-10-10 NOTE — ED PROVIDER NOTE - PATIENT PORTAL LINK FT
You can access the FollowMyHealth Patient Portal offered by [facility] by registering at the following website: [email or web address]. By joining [facility]’s FollowMyHealth portal, you will also be able to view your health information using other applications (apps) compatible with our system.

## 2020-10-10 NOTE — ED PROVIDER NOTE - CARE PROVIDER_API CALL
Maximino Pro  CARDIOVASCULAR DISEASE  [address] [place] [id number]  Phone: [phone]  Fax: [phone]  Follow Up Time: 4-6 Days

## 2020-10-10 NOTE — ED PROVIDER NOTE - NS ED ROS FT
Constitutional:  (-) fever, (-) chills, (-) lethargy  Eyes:  (-) eye pain (-) visual changes  Cardiac: (-) chest pain (-) palpitations  Respiratory:  (-) cough (-) respiratory distress.   GI:  (-) nausea (-) vomiting (-) diarrhea (-) abdominal pain.  :  (-) dysuria (-) frequency (-) burning.  MS:  (+) leg pain (+) leg swelling (+) leg rendess   Neuro:  (-) headache (-) numbness (-) tingling (-) focal weakness  Skin:  (+) Rash  Except as documented in the HPI,  all other systems are negative

## 2020-10-10 NOTE — ED ADULT NURSE NOTE - OBJECTIVE STATEMENT
pt reports since early september she has been struggling with bilateral lower extremity pain/swelling and redness.  pt reports she has had bilat lower extremity ultrasounds that were negative and she was given a course of po abx without improvement.  pt reports her md does not know what causing this.  pt denies any fevers.  pt Is alert and oriented x3, speaking full clear sentences, respirations non-labored, skin warm dry and intact, +2 pitting edema in both lower extrems and redness to both feet extending 1/3 was up shins, strong pulses throughout, abd is soft and non-distended,  pt is ambulating with a strong steady gait.

## 2020-10-10 NOTE — ED PROVIDER NOTE - PHYSICAL EXAMINATION
CONSTITUTIONAL: uncomfortable appearing, in NAD  SKIN: Warm dry. Edema + erythema over distal lower extremities to lower leg, levido reticularis rash proximal to erythema up to to mid thighs  HEAD: NCAT  NECK: Supple; non tender. Full ROM.  CARD: RRR, no murmurs.  RESP: clear to ausculation b/l. No crackles or wheezing.  ABD: soft, non-tender, non-distended, no rebound or guarding.  EXT: 3+ pitting edema on lower extremities bilaterally, erythema + tenderness to palpation over lower extremities. No calf tenderness. No tenderness of proximal lower extremities. Several scars over upper extremities bilaterally.  PSYCH: Cooperative, appropriate, anxious

## 2020-10-15 LAB
ALBUMIN SERPL ELPH-MCNC: 4.3 G/DL
ANION GAP SERPL CALC-SCNC: 10 MMOL/L
APPEARANCE: CLEAR
BACTERIA: NEGATIVE
BILIRUBIN URINE: NEGATIVE
BLOOD URINE: NEGATIVE
BUN SERPL-MCNC: 7 MG/DL
CALCIUM SERPL-MCNC: 9.2 MG/DL
CHLORIDE SERPL-SCNC: 101 MMOL/L
CO2 SERPL-SCNC: 27 MMOL/L
COLOR: NORMAL
CREAT SERPL-MCNC: 0.53 MG/DL
GLUCOSE QUALITATIVE U: NEGATIVE
GLUCOSE SERPL-MCNC: 134 MG/DL
HYALINE CASTS: 0 /LPF
KETONES URINE: NEGATIVE
LEUKOCYTE ESTERASE URINE: NEGATIVE
MICROSCOPIC-UA: NORMAL
NITRITE URINE: NEGATIVE
OSMOLALITY SERPL: 293 MOSMOL/KG
OSMOLALITY UR: 361 MOSM/KG
PH URINE: 6
POTASSIUM SERPL-SCNC: 4.5 MMOL/L
POTASSIUM UR-SCNC: 12.6 MMOL/L
PROTEIN URINE: NEGATIVE
RED BLOOD CELLS URINE: 0 /HPF
SODIUM ?TM SUB UR QN: 72 MMOL/L
SODIUM SERPL-SCNC: 138 MMOL/L
SPECIFIC GRAVITY URINE: 1.01
SQUAMOUS EPITHELIAL CELLS: 0 /HPF
UROBILINOGEN URINE: NORMAL
WHITE BLOOD CELLS URINE: 1 /HPF

## 2020-10-20 ENCOUNTER — RX RENEWAL (OUTPATIENT)
Age: 60
End: 2020-10-20

## 2020-10-21 ENCOUNTER — APPOINTMENT (OUTPATIENT)
Dept: NEUROLOGY | Facility: CLINIC | Age: 60
End: 2020-10-21
Payer: MEDICARE

## 2020-10-21 VITALS — DIASTOLIC BLOOD PRESSURE: 69 MMHG | SYSTOLIC BLOOD PRESSURE: 113 MMHG | HEART RATE: 77 BPM

## 2020-10-21 DIAGNOSIS — R41.89 OTHER SYMPTOMS AND SIGNS INVOLVING COGNITIVE FUNCTIONS AND AWARENESS: ICD-10-CM

## 2020-10-21 PROCEDURE — 99215 OFFICE O/P EST HI 40 MIN: CPT

## 2020-10-21 PROCEDURE — 96116 NUBHVL XM PHYS/QHP 1ST HR: CPT | Mod: 59

## 2020-10-21 RX ORDER — NITROFURANTOIN MACROCRYSTALS 100 MG/1
100 CAPSULE ORAL
Qty: 10 | Refills: 0 | Status: COMPLETED | COMMUNITY
Start: 2020-09-09 | End: 2020-10-21

## 2020-10-21 RX ORDER — CALCIUM CARBONATE/VITAMIN D3 600 MG-10
600-400 TABLET ORAL
Refills: 0 | Status: ACTIVE | COMMUNITY

## 2020-10-21 RX ORDER — PHENYTOIN 125 MG/5ML
125 SUSPENSION ORAL
Qty: 35 | Refills: 0 | Status: COMPLETED | COMMUNITY
Start: 2020-10-06 | End: 2020-10-21

## 2020-10-21 RX ORDER — OXYCODONE 5 MG/1
5 TABLET ORAL
Qty: 30 | Refills: 0 | Status: COMPLETED | COMMUNITY
Start: 2020-07-16 | End: 2020-10-21

## 2020-10-21 RX ORDER — PANTOPRAZOLE 40 MG/1
40 TABLET, DELAYED RELEASE ORAL
Qty: 30 | Refills: 0 | Status: COMPLETED | COMMUNITY
Start: 2020-07-31 | End: 2020-10-21

## 2020-10-21 RX ORDER — GABAPENTIN 400 MG/1
400 CAPSULE ORAL 3 TIMES DAILY
Qty: 180 | Refills: 5 | Status: COMPLETED | COMMUNITY
Start: 2020-09-09 | End: 2020-10-21

## 2020-10-29 ENCOUNTER — APPOINTMENT (OUTPATIENT)
Dept: PHYSICAL MEDICINE AND REHAB | Facility: CLINIC | Age: 60
End: 2020-10-29
Payer: MEDICARE

## 2020-10-29 VITALS
RESPIRATION RATE: 16 BRPM | SYSTOLIC BLOOD PRESSURE: 110 MMHG | HEART RATE: 83 BPM | BODY MASS INDEX: 20.24 KG/M2 | HEIGHT: 62 IN | WEIGHT: 110 LBS | DIASTOLIC BLOOD PRESSURE: 70 MMHG | TEMPERATURE: 98 F | OXYGEN SATURATION: 96 %

## 2020-10-29 DIAGNOSIS — I99.8 OTHER DISORDER OF CIRCULATORY SYSTEM: ICD-10-CM

## 2020-10-29 PROCEDURE — 99215 OFFICE O/P EST HI 40 MIN: CPT

## 2020-10-29 NOTE — PHYSICAL EXAM
[FreeTextEntry1] : PE: slightly pressured speech, mildly anxious but much improved since discharge. Feels more optimistic and expresses desire to stay off narcotics. continues to decline supportive counseling but knows she has options if she changes her mind\par \par \par lungs: clear bilaterally, fair inspiratory effort no R?R/W\par cor: NL S1 and S2\par \par ext: BLE 1+ piting edema trace redness, blanching, no warmth\par no ulcerations\par trace pedal edema\par calves mildly tense, no cyanosis or discoloration\par \par tone WNL aside from limitation of swelling\par HF 4-/5 quad 4+/5 ankle PF and DF 4/5 limited by swelling, effort, distractability\par BUE normal tone an dORM\par motor 5-/5\par ambulates with mildly antalgic gait, wide based gait, no assisitve device +leg length discrepancy

## 2020-10-29 NOTE — ASSESSMENT
[FreeTextEntry1] : Patient is a 61 y/o F with PMH CAD s/p possible MI (ECHO ), lung adenocarcinoma (s/p RML resection 2018), mixed connective tissue disorder (on plaquenil and methylprednisolone), left soleal vein thrombosis (on apixaban, 7/2020), RLE cellulitis (RX doxycycline), CDiff colitis (PO vancomycin 8/3- 8/17), lumbar \par laminectomy and chronic pain, EtOH , alcoholic pancreatitis and opioid abuse, now sober for past month, on subaxone and following with new pain management specialist\par \par Patient presents with progressive debility , decreased ambulation. \par \par 1. PT 2 x week x 8 weeks endurance, breathing exercises, GCE/GSE, ambulation, posture HEP\par \par 2. vascular referral for insufficiency. Patient will take referral but declines physician in [facility]\par \par 3. BELGICA stockings\par \par 4. orthotics for leg length discrepancy\par \par 5. f/u 2 months. conitnue f/u  and neuro, pain management\par \par 6. counseling provided re: health, plan >45 minutes spent with patient

## 2020-10-29 NOTE — HISTORY OF PRESENT ILLNESS
[FreeTextEntry1] : Patient is a 61 y/o F with PMH CAD s/p possible MI (ECHO ), lung adenocarcinoma (s/p RML resection 2018), mixed connective tissue disorder (on plaquenil and methylprednisolone), left soleal vein thrombosis (on apixaban, 7/2020), RLE cellulitis (RX doxycycline), CDiff colitis (PO vancomycin 8/3- 8/17), lumbar \par laminectomy and chronic pain, EtOH (admissions for Etoh withdrawal), alcoholic pancreatitis, opiate abuse, admitted on 8/11/20 to [facility] with symptomatic hyponatremia (Na 121) secondary to psychogenic polydipsia. She was treated with an appropriate rate of correction in serum Na. \par \par Patient was later noted to have focal seizure activity of LUE on 8/13 which broke with IV lorazepam; seizure activity believed to be secondary to benzodiazepine withdrawal. EEG 8/13 showed intermittent sharp and polyspike wave discharges with generalized slowing with patient at risk for seizures, and \par patient started on Keppra although she continued to have periodic seizure activity. A code stroke was called 8/15/20 for right facial droop. CT head unremarkable for acute pathology. 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 \par \par She was transferred to [facility] for EEG 8/18/20, which showed epileptic focus in right frontal region. Weaned off pressors 8/18/20. MRI was performed which revealed acute/subacute lacunar infarction within the right medial thalamus along with hypoperfused areas in bilateral frontal, temporal lobes consistent w/ post-ictal events. LP on 8/19/20 negative for CNS infection. Extubated \par 8/20/20. She was found to have hyponatremia post-extubation, likely signifying cerebral salt wasting (high urine output, low BP, and high urine sodium and hypovolemia). She was started on 2% saline drip and given 1 dose of Florinef. \par \par . Patient was medically optimized for discharge to [facility] on 8/28/20. Psychiatry and psychology was consulted. Psychiatry saw patient for suicidal ideation but upon evaluation, patient did not have an acute suicidal ideation and it was likely due to severe pain. Patient recommended to go to dual diagnosis program for anxiety and pain/opioid use. Patient currently does not want to go to detox but will consider it for the \par future. \par \par Rehab course significant for pain. Per patient, she was on a fentanyl patch and Oxycodone prior to admission. Her pain management doctor is Dr. Theo Hawk who sent her a letter stating he will no longer prescribe her opioids. Dr. Hawk was called and per Dr. Hawk, she had gone through a opioid detox program and was off opioids a few weeks ago and was unaware of current hospitalization \par but he was aware of her recent hip surgery. Confirmed her pain meds with her pharmacy and with [facility]. Patient to continue Oxycodone 5mg Q6hr without further taper. Patient made an appt with her pain management specialist, Dr. Hawk for 9/10/20, but because he had expressed that he would not prescribe any more opioids, a pain management appt was also made for 9/11/20 with another doctor in the event she can no longer see Dr. Hawk. She is unable to continue the opioid taper and will require outpatient f/u. \par \par On discharge, she had 1 weeks' worth of Xanax and 63 pills of Oxycodone at [facility] (on previous admission) that she will be picking up upon discharge. Xanax has been prescribed by her \[facility] private doctor for 09/2020 that she will be able to fill. No opioids or Xanax were prescribed by rehab upon discharge. Patient was found to have UTI, discharged with Macrobid for 5 days. \par \par All other medical co-morbidities were stable. Patient tolerated course of inpatient PT/OT/SLP rehab with significant improvements and met rehab goals prior to discharge. She is ambulating with SAC with supervision. 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 Had psychiatry follow up anxiety and panic attacks but patient refusing therapy. Neuro recommending exercises to improve ambulation and endurance. She declines neuropsychological testing at this time. Last saw behavioral medicine 10/6/20. \par \par Patient is now on subaxone, lyrica, medrol, zanaflex and xanax. She is on vimpat and keppra for SZ, off dilantin now.\par .\par Pain management: Farhad Maxwell MD (new, patient states she has a good relationshipo with him as well as with  specialist Eli Rollins.\par \par Patient states burning in legs improved on lyrica which she started 2 weeks ago after gabepentin stopped. Patient has perrsistent LE swelling that started after cellulitis and venous stasis dz, however had increasing LE sweling in week post discharge.R> L +pitting edema Doppler and arterial scans negative.

## 2020-11-04 ENCOUNTER — APPOINTMENT (OUTPATIENT)
Dept: INTERNAL MEDICINE | Facility: CLINIC | Age: 60
End: 2020-11-04
Payer: MEDICARE

## 2020-11-04 ENCOUNTER — NON-APPOINTMENT (OUTPATIENT)
Age: 60
End: 2020-11-04

## 2020-11-04 VITALS
WEIGHT: 135 LBS | OXYGEN SATURATION: 99 % | HEIGHT: 62 IN | TEMPERATURE: 98 F | DIASTOLIC BLOOD PRESSURE: 72 MMHG | RESPIRATION RATE: 18 BRPM | SYSTOLIC BLOOD PRESSURE: 118 MMHG | HEART RATE: 76 BPM | BODY MASS INDEX: 24.84 KG/M2

## 2020-11-04 DIAGNOSIS — Z00.00 ENCOUNTER FOR GENERAL ADULT MEDICAL EXAMINATION W/OUT ABNORMAL FINDINGS: ICD-10-CM

## 2020-11-04 PROCEDURE — G0442 ANNUAL ALCOHOL SCREEN 15 MIN: CPT | Mod: 59

## 2020-11-04 PROCEDURE — 93000 ELECTROCARDIOGRAM COMPLETE: CPT | Mod: 59

## 2020-11-04 PROCEDURE — G0439: CPT

## 2020-11-04 NOTE — PLAN
[FreeTextEntry1] : Pulmonary\par current smoker - check EKG (results as above) - discussed importance of smoking cessation \par Cardiovascular\par lower extremity edema - secondary to venous insufficiency - continue using compression stockings - continue keeping legs elevated\par hyperlipidemia - continue Atorvastatin Calcium (Lipitor) 20mg p.o.q.h.s. - check FLP and LFTs\par Results of last EKG, echocardiogram, and nuclear stress test to be requested from patient's cardiologist's office\par continue Aspirin 81mg p.o.q.d. as directed given history of CVA\par Gynecology\par Advised to follow up with GYN annually - Rx given for mammogram and breast US\par Musculoskeletal/Endocrinology\par osteoporosis - continue Fosamax (Alendronate Sodium) 70mg p.o.q.w. as directed and Calcium/Vitamin D BID p.o.q.d. \par Rheumatology\par mixed connective tissue disease - continue Hydroxychloroquine Sulfate 200mg p.o.q.d. and Medrol 4mg p.o.q.d. as directed - continue to follow up with rheumatologist\par Gastroenterology\par screening colonoscopy - advised to follow up for referral to gastroenterologist; IFOBT in September 2020 was negative\par history of pancreatitis - continue Creo 12000-unit TID p.o.q.d. as directed\par Musculoskeletal\par back pain - s/p laminectomy - continue Lyrica (Pregabalin) 100mg p.o. as directed, Zanaflex (Tizanidine HCl) 4mg, and using Lidocaine patches as directed - continue to follow up with pain management physician\par opiate dependence - continue Suboxone 8-2mg p.o. as directed - continue to follow up with pain management physician\par Neurology\par seizures - continue Keppra (Levetiracetam) 1000mg BID p.o.q.d. and Vimpat 200mg p.o. as directed - continue to follow up with physiatrist \par Nephrology\par cerebral salt-wasting syndrome - continue Florinef (Fludrocortisone Acetate) 0.1mg p.o. as directed - continue to follow up with nephrologist \par Oncology\par lung cancer - chest CT to be done q. 6 months per oncologist/surgeon \par Immunization\par flu vaccine - evidence of vaccine administration to be requested\par Pneumovax 23 - evidence of vaccine administration to be requested from Dr. Su's office\par \par check EKG (results as above)\par Rx given for blood work (female panel, hemoglobin A1C); to be done at nephrologist's office.

## 2020-11-04 NOTE — ADDENDUM
[FreeTextEntry1] : I, Jose Daniel Correia, acted solely as scribe for Dr. Tariq Delgado DO on this date 11/04/2020 11:30AM .\par \par All medical record entries made by the Scribe were at my, Dr. Tariq Delgado DO direction and personally dictated by me on 11/04/2020 11:30AM. I have reviewed the chart and agree that the record accurately reflects my personal performance of the history, physical exam, assessment and plan. I have also personally directed, reviewed and agreed with the chart.\par

## 2020-11-04 NOTE — REVIEW OF SYSTEMS
[Negative] : Heme/Lymph [Dryness] : dryness  [Lower Ext Edema] : lower extremity edema [Shortness Of Breath] : shortness of breath [FreeTextEntry5] : ankle swelling/pain

## 2020-11-04 NOTE — ASSESSMENT
[FreeTextEntry1] : New patient is a 60 year old female with a past medical history as above who presents for an annual wellness visit. \par

## 2020-11-04 NOTE — HISTORY OF PRESENT ILLNESS
[FreeTextEntry1] : new patient annual wellness visit  [de-identified] : New patient is a 60 year old female with a past medical history as below who presents for an annual wellness visit. Patient states she is taking all medications as prescribed and denies any adverse reactions or side effects. Patient is currently on Florinef per her nephrologist for cerebral salt-wasting syndrome. She is currently on Creon given multiple episodes of pancreatitis. Patient is currently on Hydroxychloroquine and Medrol per her rheumatologist for mixed connective tissue disease. She has been using Restasis eye drops for dry eyes. Patient denies any seizures since being started on Keppra (Levetiracetam) and Vimpat (Lacosamide). She will be following up with her physiatrist. Patient had been on opiates in the past for back pain secondary to degenerative disc disease. She is s/p laminectomy. She also notes a history of vertebral compression fractures. She is currently taking Lyrica (Pregabalin) and Zanaflex (Tizanidine HCl) for back pain. She is also on Suboxone given history of opiate dependence. She regularly follows up with her pain management physician. Patient is currently using compression stockings for lower extremity edema/pain secondary to venous insufficiency. Patient notes SOB. She notes having an EKG, echocardiogram, and nuclear stress test at her cardiologist's office in [facility] last year. Chest CT is done every 6 months given history of lung cancer. Patient is due for a screening colonoscopy. IFOBT in September 2020 was negative. Patient sees GYN, Dr. Garcia. She is due for annual breast imaging. Patient notes recent weight gain which she attributes to a poor diet during the quarantine. Patient states she has already received the flu vaccine for this season. She states she received the Pneumovax 23 2-3 years ago at her rheumatologist, Dr. Su's office. Patient is not fasting today.

## 2020-11-04 NOTE — HEALTH RISK ASSESSMENT
[] : Yes [No] : In the past 12 months have you used drugs other than those required for medical reasons? No [0] : 2) Feeling down, depressed, or hopeless: Not at all (0) [de-identified] : Daily or almost daily.  [Audit-CScore] : 0 [XDX6Stevd] : 0 [MammogramComments] : Rx given for mammogram and breast US. [ColonoscopyComments] : Discussed referral to gastroenterologist as patient is due; IFOBT in September 2020 was negative.

## 2020-11-06 ENCOUNTER — APPOINTMENT (OUTPATIENT)
Dept: NEUROLOGY | Facility: CLINIC | Age: 60
End: 2020-11-06
Payer: MEDICARE

## 2020-11-06 VITALS
DIASTOLIC BLOOD PRESSURE: 75 MMHG | BODY MASS INDEX: 24.84 KG/M2 | WEIGHT: 135 LBS | HEART RATE: 73 BPM | HEIGHT: 62 IN | SYSTOLIC BLOOD PRESSURE: 114 MMHG

## 2020-11-06 PROCEDURE — 99214 OFFICE O/P EST MOD 30 MIN: CPT

## 2020-11-09 ENCOUNTER — NON-APPOINTMENT (OUTPATIENT)
Age: 60
End: 2020-11-09

## 2020-11-09 DIAGNOSIS — E83.39 OTHER DISORDERS OF PHOSPHORUS METABOLISM: ICD-10-CM

## 2020-11-09 LAB
ALBUMIN SERPL ELPH-MCNC: 4.2 G/DL
ANION GAP SERPL CALC-SCNC: 14 MMOL/L
APPEARANCE: CLEAR
BACTERIA: NEGATIVE
BILIRUBIN URINE: NEGATIVE
BLOOD URINE: NEGATIVE
BUN SERPL-MCNC: 9 MG/DL
CALCIUM SERPL-MCNC: 9.3 MG/DL
CHLORIDE SERPL-SCNC: 101 MMOL/L
CO2 SERPL-SCNC: 23 MMOL/L
COLOR: NORMAL
CREAT SERPL-MCNC: 0.51 MG/DL
GLUCOSE QUALITATIVE U: NEGATIVE
GLUCOSE SERPL-MCNC: 134 MG/DL
HYALINE CASTS: 0 /LPF
KETONES URINE: NEGATIVE
LEUKOCYTE ESTERASE URINE: NEGATIVE
MICROSCOPIC-UA: NORMAL
NITRITE URINE: NEGATIVE
OSMOLALITY SERPL: 290 MOSMOL/KG
OSMOLALITY UR: 239 MOSM/KG
PH URINE: 6.5
PHOSPHATE SERPL-MCNC: 5.2 MG/DL
POTASSIUM SERPL-SCNC: 5.2 MMOL/L
PROTEIN URINE: NEGATIVE
RED BLOOD CELLS URINE: 1 /HPF
SODIUM ?TM SUB UR QN: 20 MMOL/L
SODIUM SERPL-SCNC: 138 MMOL/L
SPECIFIC GRAVITY URINE: 1.01
SQUAMOUS EPITHELIAL CELLS: 1 /HPF
UROBILINOGEN URINE: NORMAL
WHITE BLOOD CELLS URINE: 1 /HPF

## 2020-11-09 NOTE — ASSESSMENT
[FreeTextEntry1] : 59 y/o R-HF with focal epilpesy, right frontal lobe epilepsy.  \par Currently, seizure free on AED regimen with no seizures or side effects.\par Multiple comorbidities including lung adenocarcinoma s/p RML wedge resection 2018, MCTD on plaquenil, anxiety and chronic pain. \par No stroke on brain MRI (thalamic DWI change was seizure-related).\par \par Plan:\par 1. Continue Keppra 1000mg PO BID\par 2. Continue Vimpat 200mg PO BID\par 3. Seizure precautions, including no driving or operating heavy equipment, no unsupervised swimming, using a shower instead of a bathtub, no climbing ladders or working at elevations, no use of sharp dangerous tools or devices.  Patient understands that she should not driving until seizure free for at least 1 year.\par 4. No clear indication for daily aspirin as there was no stroke on brain MRI\par 5. PCP to check lipid profile and discuss use of Atorvastatin\par 6. PT to improve gait stability\par 7. Patient agrees with plan.\par 8. Follow up in 3 months.\par \par Drake Gallegos MD\par [facility]\par \par Greater than 50% of the encounter was spent on counseling and coordination of care discussing differential diagnosis, diagnostic testing, and treatment options. We have talked about appropriate follow up, and I have spent 25 minutes of face to face time with the patient.\par

## 2020-11-09 NOTE — PHYSICAL EXAM
[FreeTextEntry1] : Neurologic: \par Orientation: oriented to person, oriented to place and oriented to time. \par Memory: short term memory intact, remote memory intact and recent registration memory intact. \par Attention: the attention span was decreased and a decrease in concentrating ability was observed, but visual attention was not decreased. \par Language: no difficulty naming common objects, no difficulty repeating a phrase, no difficulty writing a sentence, fluency intact, comprehension intact and reading intact. \par Fund of knowledge: displays adequate knowledge of current events, adequate knowledge of personal past history and adequate range of vocabulary. \par Cranial Nerves: visual acuity intact bilaterally, visual fields full to confrontation, pupils equal round and reactive to light, extraocular motion intact, facial sensation intact symmetrically, face symmetrical, hearing was intact bilaterally, tongue and palate midline, head turning and shoulder shrug symmetric and there was no tongue deviation with protrusion . Transient fatigable L. beating horizontal nystagmus on L. gaze. \par Motor Strength:. the patient is right hand dominant. there was weakness of the right lower extremity. 4-/5 hip flexion. B/L LE exam limited due to diffuse pain, swelling and erythema. \par Sensory exam: allodynia present and hyperesthesia was present, but light touch was intact and pain and temperature was intact. \par Coordination:. Antalgic gait with RLE circumduction with cane ambulation. Dysdiadochokinesia was not present. Finger to nose dysmetria was not present. \par Deep tendon reflexes: \par Biceps right 1+. Biceps left 1+.  \par Triceps right 1+. Triceps left 1+.  \par Brachioradialis right 1+. Brachioradialis left 1+.  \par Patella right 1+. Patella left 1+.  \par Ankle jerk right 1+. Ankle jerk left 1+. \par Plantar responses normal on the right, normal on the left.  \par Musculoskeletal:. 2+ B/L pitting edema to mid calf. Swollen LLE. \par Skin:. B/L erythema, warm to touch, rising erythema to level of just under knee on RLE and to mid calf of LLE.

## 2020-11-09 NOTE — HISTORY OF PRESENT ILLNESS
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 \par \par Since last visit, repeat brain MRI shows resolution of previous DWI changes, suggesting these changes were all seizure related (no acute infarct).\par \par She has been clinically stable, doing well.  No seizures.  She tapered off Dilantin, and feels well on current dose of Keppra and Vimpat.\par \par Current AEDs:\par Keppra 1000mg PO BID\par Vimpat 200mg PO BID\par Gabapentin 1200mg TID (used for pain mgmt)\par \par Previous AEDs:\par PHT

## 2020-11-18 ENCOUNTER — APPOINTMENT (OUTPATIENT)
Dept: INTERNAL MEDICINE | Facility: CLINIC | Age: 60
End: 2020-11-18
Payer: MEDICARE

## 2020-11-18 VITALS
RESPIRATION RATE: 16 BRPM | HEIGHT: 62 IN | DIASTOLIC BLOOD PRESSURE: 70 MMHG | OXYGEN SATURATION: 99 % | HEART RATE: 72 BPM | SYSTOLIC BLOOD PRESSURE: 140 MMHG | BODY MASS INDEX: 25.76 KG/M2 | TEMPERATURE: 97.5 F | WEIGHT: 140 LBS

## 2020-11-18 DIAGNOSIS — R35.0 FREQUENCY OF MICTURITION: ICD-10-CM

## 2020-11-18 DIAGNOSIS — E83.52 HYPERCALCEMIA: ICD-10-CM

## 2020-11-18 PROCEDURE — 36415 COLL VENOUS BLD VENIPUNCTURE: CPT

## 2020-11-18 PROCEDURE — 99214 OFFICE O/P EST MOD 30 MIN: CPT | Mod: 25

## 2020-11-18 NOTE — ASSESSMENT
[FreeTextEntry1] : Patient is a 60 year old female with a past medical history as above who presents for fasting blood work and general follow-up.\par

## 2020-11-18 NOTE — ADDENDUM
[FreeTextEntry1] : I, Jose Daniel Correia, acted solely as scribe for Dr. Tariq Delgado DO on this date 11/18/2020  1:20PM .\par \par All medical record entries made by the Scribe were at my, Dr. Tariq Delgado DO direction and personally dictated by me on 11/18/2020  1:20PM. I have reviewed the chart and agree that the record accurately reflects my personal performance of the history, physical exam, assessment and plan. I have also personally directed, reviewed and agreed with the chart.\par

## 2020-11-18 NOTE — PLAN
[FreeTextEntry1] : Pulmonary\par current smoker - previously discussed importance of smoking cessation in reducing CV risk\par Cardiovascular\par lower extremity edema - secondary to venous insufficiency - continue using compression stockings - continue keeping legs elevated\par hyperlipidemia - continue Atorvastatin Calcium (Lipitor) 20mg p.o.q.h.s. - check FLP and LFTs\par continue Aspirin 81mg p.o.q.d. as directed given history of CVA\par Gynecology\par Previously advised to follow up with GYN annually - Previously given Rx given for mammogram and breast US\par Musculoskeletal/Endocrinology\par osteoporosis - continue Fosamax (Alendronate Sodium) 70mg p.o.q.w. as directed and Calcium/Vitamin D BID p.o.q.d. \par Rheumatology\par mixed connective tissue disease - continue Hydroxychloroquine Sulfate 200mg p.o.q.d. and Medrol 4mg p.o.q.d. as directed - continue to follow up with rheumatologist\par Gastroenterology\par screening colonoscopy - previously advised to follow up for referral to gastroenterologist; IFOBT in September 2020 was negative\par history of pancreatitis - continue Creo 12000-unit TID p.o.q.d. as directed - check serum amylase and lipase\par Musculoskeletal\par back pain - s/p laminectomy - continue Lyrica (Pregabalin) 100mg p.o. as directed, Zanaflex (Tizanidine HCl) 4mg, and using Lidocaine patches as directed - continue to follow up with pain management physician\par opiate dependence - continue Suboxone 8-2mg p.o. as directed - continue to follow up with pain management physician\par Neurology\par seizures - continue Keppra (Levetiracetam) 1000mg BID p.o.q.d. and Vimpat 200mg p.o. as directed - continue to follow up with physiatrist \par Nephrology\par cerebral salt-wasting syndrome - continue Florinef (Fludrocortisone Acetate) 0.1mg p.o. as directed - check CMP and Osmolality (Serum and Random Urine) - continue to follow up with nephrologist \par hyponatremia - check sodium \par hyperphosphatemia - check serum alkaline phosphatase \par hypercalcemia - check serum intact PTH and serum alkaline phosphatase \par Urology\par increased urinary frequency - check UA\par Oncology\par lung cancer - chest CT to be done q. 6 months per oncologist/surgeon \par \par check female panel, hemoglobin A1C, serum amylase, serum lipase, vitamin panel, Osmolality (Serum and Random Urine), intact PTH, serum alkaline phosphatase, and UA

## 2020-11-18 NOTE — HEALTH RISK ASSESSMENT
[] : Yes [No] : In the past 12 months have you used drugs other than those required for medical reasons? No [0] : 2) Feeling down, depressed, or hopeless: Not at all (0) [de-identified] : Daily or almost daily.  [Audit-CScore] : 0 [[id number]] : 0 [MammogramComments] : Rx previously given for mammogram and breast US. [ColonoscopyComments] : Previously discussed referral to gastroenterologist as patient is due; IFOBT in September 2020 was negative.

## 2020-11-18 NOTE — HISTORY OF PRESENT ILLNESS
[FreeTextEntry1] : fasting blood work and general follow-up\par  [de-identified] : Patient is a 60 year old female with a past medical history as below who presents for fasting blood work and general follow-up. Blood work is being done today as patient was not fasting during her annual wellness visit on 11/4/20. Patient states she is taking all medications as prescribed and denies any adverse reactions or side effects. Patient notes feeling more fatigued/sleepy recently. She notes increased urinary frequency. Patient requests additional testing with blood work per her nephrologist, Dr. Simms.

## 2020-11-24 LAB
24R-OH-CALCIDIOL SERPL-MCNC: 68.2 PG/ML
25(OH)D3 SERPL-MCNC: 46.5 NG/ML
ALBUMIN SERPL ELPH-MCNC: 4.8 G/DL
ALP BLD-CCNC: 62 U/L
ALP BLD-CCNC: 64 U/L
ALT SERPL-CCNC: 29 U/L
AMYLASE/CREAT SERPL: 39 U/L
ANION GAP SERPL CALC-SCNC: 11 MMOL/L
APPEARANCE: CLEAR
AST SERPL-CCNC: 32 U/L
BASOPHILS # BLD AUTO: 0.02 K/UL
BASOPHILS NFR BLD AUTO: 0.2 %
BILIRUB SERPL-MCNC: 0.3 MG/DL
BILIRUBIN URINE: NEGATIVE
BLOOD URINE: NEGATIVE
BUN SERPL-MCNC: 13 MG/DL
CALCIUM SERPL-MCNC: 9.5 MG/DL
CALCIUM SERPL-MCNC: 9.7 MG/DL
CHLORIDE SERPL-SCNC: 100 MMOL/L
CHOLEST SERPL-MCNC: 149 MG/DL
CO2 SERPL-SCNC: 28 MMOL/L
COLOR: COLORLESS
CREAT SERPL-MCNC: 0.68 MG/DL
EOSINOPHIL # BLD AUTO: 0.05 K/UL
EOSINOPHIL NFR BLD AUTO: 0.6 %
ESTIMATED AVERAGE GLUCOSE: 117 MG/DL
GLUCOSE QUALITATIVE U: NEGATIVE
GLUCOSE SERPL-MCNC: 98 MG/DL
HBA1C MFR BLD HPLC: 5.7 %
HCT VFR BLD CALC: 38.2 %
HCT VFR BLD CALC: 38.2 %
HDLC SERPL-MCNC: 115 MG/DL
HGB BLD-MCNC: 11.8 G/DL
HGB BLD-MCNC: 11.8 G/DL
IMM GRANULOCYTES NFR BLD AUTO: 0.5 %
IRON SERPL-MCNC: 112 UG/DL
KETONES URINE: NEGATIVE
LDLC SERPL CALC-MCNC: 25 MG/DL
LEUKOCYTE ESTERASE URINE: NEGATIVE
LPL SERPL-CCNC: 8 U/L
LYMPHOCYTES # BLD AUTO: 1.11 K/UL
LYMPHOCYTES NFR BLD AUTO: 13.1 %
MAGNESIUM SERPL-MCNC: 2.5 MG/DL
MAN DIFF?: NORMAL
MCHC RBC-ENTMCNC: 30.6 PG
MCHC RBC-ENTMCNC: 30.9 GM/DL
MCV RBC AUTO: 99 FL
MONOCYTES # BLD AUTO: 0.53 K/UL
MONOCYTES NFR BLD AUTO: 6.3 %
NEUTROPHILS # BLD AUTO: 6.7 K/UL
NEUTROPHILS NFR BLD AUTO: 79.3 %
NITRITE URINE: NEGATIVE
NONHDLC SERPL-MCNC: 34 MG/DL
OSMOLALITY SERPL: 294 MOSMOL/KG
OSMOLALITY UR: 156 MOSM/KG
PARATHYROID HORMONE INTACT: 20 PG/ML
PH URINE: 6
PLATELET # BLD AUTO: 320 K/UL
POTASSIUM SERPL-SCNC: 4.8 MMOL/L
PROT SERPL-MCNC: 6.9 G/DL
PROTEIN URINE: NEGATIVE
RBC # BLD: 3.86 M/UL
RBC # FLD: 12.5 %
SODIUM SERPL-SCNC: 138 MMOL/L
SPECIFIC GRAVITY URINE: 1
TRIGL SERPL-MCNC: 45 MG/DL
TSH SERPL-ACNC: 0.86 UIU/ML
UROBILINOGEN URINE: NORMAL
VIT B1 SERPL-MCNC: 168.4 NMOL/L
VIT B12 SERPL-MCNC: 467 PG/ML
VIT C SERPL-MCNC: 0.8 MG/DL
WBC # FLD AUTO: 8.45 K/UL

## 2020-11-28 LAB
UBIQUINONE10 SERPL-MCNC: 0.31 UG/ML
VIT A SERPL-MCNC: 50 UG/DL

## 2020-12-03 ENCOUNTER — APPOINTMENT (OUTPATIENT)
Dept: NEUROLOGY | Facility: CLINIC | Age: 60
End: 2020-12-03
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
## 2021-01-01 NOTE — PROGRESS NOTE ADULT - SUBJECTIVE AND OBJECTIVE BOX
Patient is a 60y old  Female who presents with a chief complaint of Seizures (21 Aug 2020 12:30)      24 hour events: ***    REVIEW OF SYSTEMS:  Constitutional: [ ] fevers [ ] chills [ ] weight loss [ ] weight gain  HEENT: [ ] dry eyes [ ] eye irritation [ ] postnasal drip [ ] nasal congestion  CV: [ ] chest pain [ ] orthopnea [ ] palpitations [ ] murmur  Resp: [ ] cough [ ] shortness of breath [ ] dyspnea [ ] wheezing [ ] sputum [ ] hemoptysis  GI: [ ] nausea [ ] vomiting [ ] diarrhea [ ] constipation [ ] abd pain [ ] dysphagia   : [ ] dysuria [ ] nocturia [ ] hematuria [ ] increased urinary frequency  Musculoskeletal: [ ] back pain [ ] myalgias [ ] arthralgias [ ] fracture  Skin: [ ] rash [ ] itch  Neurological: [ ] headache [ ] dizziness [ ] syncope [ ] weakness [ ] numbness  Psychiatric: [ ] anxiety [ ] depression  Endocrine: [ ] diabetes [ ] thyroid problem  Hematologic/Lymphatic: [ ] anemia [ ] bleeding problem  Allergic/Immunologic: [ ] itchy eyes [ ] nasal discharge [ ] hives [ ] angioedema  [ ] All other systems negative  [ ] Unable to assess ROS because ________    OBJECTIVE:  ICU Vital Signs Last 24 Hrs  T(C): 36.9 (22 Aug 2020 04:00), Max: 37.7 (21 Aug 2020 20:00)  T(F): 98.4 (22 Aug 2020 04:00), Max: 99.8 (21 Aug 2020 20:00)  HR: 103 (22 Aug 2020 07:00) (84 - 103)  BP: 141/77 (22 Aug 2020 07:00) (131/71 - 174/94)  BP(mean): 101 (22 Aug 2020 07:00) (93 - 128)  ABP: --  ABP(mean): --  RR: 22 (22 Aug 2020 07:00) (19 - 32)  SpO2: 96% (22 Aug 2020 07:00) (88% - 98%)        08-21 @ 07:01  -  08-22 @ 07:00  --------------------------------------------------------  IN: 1755 mL / OUT: 3145 mL / NET: -1390 mL      CAPILLARY BLOOD GLUCOSE      POCT Blood Glucose.: 125 mg/dL (22 Aug 2020 05:53)      PHYSICAL EXAM:  GENERAL: NAD, well-developed  HEAD:  Atraumatic, Normocephalic  EYES: EOMI, PERRLA, conjunctiva and sclera clear  NECK: Supple, No JVD, Thyroid not palpable, non tender, Trachea midline  CHEST/LUNG: ( )ETT in place, ( )Tracheostomy in place, ( )no chest deformity,  ( )  Normal expansion/effort/palpation,  ( )Normal percussion/auscultation,  Clear to auscultation bilaterally; No wheeze  HEART: Regular rate and rhythm; No murmurs, rubs, or gallops, ( ) No JVD, ( )Normal Pulses, ( )Edema   ABDOMEN: Soft, Nontender, Nondistended; Bowel sounds presen, ( ) No Masses, (  ) No organomegaly,  (  ) Non-tender normal BS   : Melton in Place, Voiding freely  Musculoskeletal/EXTREMITIES:(  ) Normal strength, movement, and tone, (  ) No focal atropy, (  ) Normal ROM, (  ) Normal digits and nails,  2+ Peripheral Pulses, No clubbing, cyanosis, or edema  PSYCH: AAOx3, (  ) Normal mood/affect/judgment/insight, (  ) Intact memory,   NEUROLOGY: non-focal, exam  SKIN: No rashes or lesions      LINES:    HOSPITAL MEDICATIONS:  MEDICATIONS  (STANDING):  chlorhexidine 4% Liquid 1 Application(s) Topical <User Schedule>  fludroCORTISONE 0.1 milliGRAM(s) Oral every 12 hours  fosphenytoin IVPB 140 milliGRAM(s) PE IV Intermittent every 12 hours  heparin   Injectable 5000 Unit(s) SubCutaneous every 8 hours  hydroxychloroquine 200 milliGRAM(s) Oral daily  insulin lispro (HumaLOG) corrective regimen sliding scale   SubCutaneous every 6 hours  lacosamide IVPB 200 milliGRAM(s) IV Intermittent every 12 hours  levETIRAcetam  IVPB 1000 milliGRAM(s) IV Intermittent every 12 hours  methylPREDNISolone sodium succinate Injectable 20 milliGRAM(s) IV Push daily  sodium chloride 2% . 1000 milliLiter(s) (50 mL/Hr) IV Continuous <Continuous>    MEDICATIONS  (PRN):  artificial  tears Solution 1 Drop(s) Both EYES three times a day PRN Eye irritation      LABS:                        11.3   7.39  )-----------( 277      ( 22 Aug 2020 00:24 )             35.6     Hgb Trend: 11.3<--, 10.4<--, 10.3<--, 11.8<--, 12.2<--  08-22    138  |  100  |  6<L>  ----------------------------<  142<H>  3.5   |  21<L>  |  0.32<L>    Ca    9.3      22 Aug 2020 06:20  Phos  2.9     08-22  Mg     2.0     08-22    TPro  6.2  /  Alb  3.4  /  TBili  0.1<L>  /  DBili  x   /  AST  31  /  ALT  21  /  AlkPhos  32<L>  08-22    Creatinine Trend: 0.32<--, 0.39<--, 0.49<--, 0.36<--, 0.41<--, 0.36<--  PT/INR - ( 22 Aug 2020 00:24 )   PT: 11.4 sec;   INR: 0.96 ratio         PTT - ( 22 Aug 2020 00:24 )  PTT:30.0 sec      Venous Blood Gas:  08-20 @ 18:27  7.48/38/74/28/96  VBG Lactate: 1.6      EKG:    MICROBIOLOGY:     RADIOLOGY:  [ ] Reviewed and interpreted by me    EKG:  MICROBIOLOGY:     Radiology: ***    Bedside lung ultrasound: ***    Bedside ECHO: ***    EKG:    CENTRAL LINE: Y/N          DATE INSERTED:              REMOVE: Y/N    MELTON: Y/N                        DATE INSERTED:              REMOVE: Y/N    A-LINE: Y/N                       DATE INSERTED:              REMOVE: Y/N    GLOBAL ISSUE/BEST PRACTICE:  Analgesia:  Sedation:  HOB elevation: yes  Stress ulcer prophylaxis:  VTE prophylaxis:  Glycemic control:  Nutrition:    CODE STATUS: ***  [facility] discussion: Y Patient is a 60y old  Female who presents with a chief complaint of Seizures (21 Aug 2020 12:30)      24 hour events: ON: No events. MRI w/ contrast planned for today to r/o autoimmune encephalitis. Pt continues to have increased speech latency and occasionally fails to answer questions unless prompted several times. Pt remains A/Ox1, although appears more alert than previous days.    REVIEW OF SYSTEMS:  Constitutional:  (-) chills, (-) lethargy  Cardiac: (-) chest pain (-) palpitations  Respiratory:  (-) cough (-) respiratory distress.   GI:  (-) nausea (-) vomiting (-) diarrhea (-) abdominal pain.  :  (-) dysuria  MS:  (-) back pain   Except as documented in the HPI,  all other systems are negative     OBJECTIVE:  ICU Vital Signs Last 24 Hrs  T(C): 36.9 (22 Aug 2020 04:00), Max: 37.7 (21 Aug 2020 20:00)  T(F): 98.4 (22 Aug 2020 04:00), Max: 99.8 (21 Aug 2020 20:00)  HR: 103 (22 Aug 2020 07:00) (84 - 103)  BP: 141/77 (22 Aug 2020 07:00) (131/71 - 174/94)  BP(mean): 101 (22 Aug 2020 07:00) (93 - 128)  ABP: --  ABP(mean): --  RR: 22 (22 Aug 2020 07:00) (19 - 32)  SpO2: 96% (22 Aug 2020 07:00) (88% - 98%)        08-21 @ 07:01  -  08-22 @ 07:00  --------------------------------------------------------  IN: 1755 mL / OUT: 3145 mL / NET: -1390 mL      CAPILLARY BLOOD GLUCOSE      POCT Blood Glucose.: 125 mg/dL (22 Aug 2020 05:53)      PHYSICAL EXAM:  CONSTITUTIONAL: well-appearing, in NAD, intermittent tracking around room   SKIN: Warm dry, normal skin turgor  EYES: EOMI, PERRLA, no scleral icterus  CARD: RRR, no murmurs.  RESP: clear to ausculation b/l. No crackles or wheezing.  ABD: soft, non-tender, non-distended, no rebound or guarding.  EXT: no pedal edema  PSYCH: Cooperative, appropriate, speech latency, often doesn't answer questions unless prompted several times.      LINES:    HOSPITAL MEDICATIONS:  MEDICATIONS  (STANDING):  chlorhexidine 4% Liquid 1 Application(s) Topical <User Schedule>  fludroCORTISONE 0.1 milliGRAM(s) Oral every 12 hours  fosphenytoin IVPB 140 milliGRAM(s) PE IV Intermittent every 12 hours  heparin   Injectable 5000 Unit(s) SubCutaneous every 8 hours  hydroxychloroquine 200 milliGRAM(s) Oral daily  insulin lispro (HumaLOG) corrective regimen sliding scale   SubCutaneous every 6 hours  lacosamide IVPB 200 milliGRAM(s) IV Intermittent every 12 hours  levETIRAcetam  IVPB 1000 milliGRAM(s) IV Intermittent every 12 hours  methylPREDNISolone sodium succinate Injectable 20 milliGRAM(s) IV Push daily  sodium chloride 2% . 1000 milliLiter(s) (50 mL/Hr) IV Continuous <Continuous>    MEDICATIONS  (PRN):  artificial  tears Solution 1 Drop(s) Both EYES three times a day PRN Eye irritation      LABS:                        11.3   7.39  )-----------( 277      ( 22 Aug 2020 00:24 )             35.6     Hgb Trend: 11.3<--, 10.4<--, 10.3<--, 11.8<--, 12.2<--  08-22    138  |  100  |  6<L>  ----------------------------<  142<H>  3.5   |  21<L>  |  0.32<L>    Ca    9.3      22 Aug 2020 06:20  Phos  2.9     08-22  Mg     2.0     08-22    TPro  6.2  /  Alb  3.4  /  TBili  0.1<L>  /  DBili  x   /  AST  31  /  ALT  21  /  AlkPhos  32<L>  08-22    Creatinine Trend: 0.32<--, 0.39<--, 0.49<--, 0.36<--, 0.41<--, 0.36<--  PT/INR - ( 22 Aug 2020 00:24 )   PT: 11.4 sec;   INR: 0.96 ratio         PTT - ( 22 Aug 2020 00:24 )  PTT:30.0 sec      Venous Blood Gas:  08-20 @ 18:27  7.48/38/74/28/96  VBG Lactate: 1.6    CODE STATUS: DNR/DNI  GOC discussion: Y ED MD

## 2021-01-06 ENCOUNTER — APPOINTMENT (OUTPATIENT)
Dept: PSYCHIATRY | Facility: CLINIC | Age: 61
End: 2021-01-06

## 2021-01-09 ENCOUNTER — TRANSCRIPTION ENCOUNTER (OUTPATIENT)
Age: 61
End: 2021-01-09

## 2021-01-12 ENCOUNTER — APPOINTMENT (OUTPATIENT)
Dept: PSYCHIATRY | Facility: CLINIC | Age: 61
End: 2021-01-12
Payer: MEDICARE

## 2021-01-12 PROCEDURE — 99214 OFFICE O/P EST MOD 30 MIN: CPT

## 2021-01-14 ENCOUNTER — APPOINTMENT (OUTPATIENT)
Dept: INTERNAL MEDICINE | Facility: CLINIC | Age: 61
End: 2021-01-14

## 2021-02-08 ENCOUNTER — APPOINTMENT (OUTPATIENT)
Dept: NEUROLOGY | Facility: CLINIC | Age: 61
End: 2021-02-08
Payer: MEDICARE

## 2021-02-08 VITALS — SYSTOLIC BLOOD PRESSURE: 163 MMHG | HEIGHT: 62 IN | DIASTOLIC BLOOD PRESSURE: 78 MMHG | HEART RATE: 73 BPM

## 2021-02-08 PROCEDURE — 99214 OFFICE O/P EST MOD 30 MIN: CPT

## 2021-02-08 RX ORDER — LACOSAMIDE 200 MG/1
200 TABLET, FILM COATED ORAL
Qty: 60 | Refills: 5 | Status: DISCONTINUED | COMMUNITY
Start: 1900-01-01 | End: 2021-02-08

## 2021-02-10 NOTE — PHYSICAL EXAM
[FreeTextEntry1] : Awake, alert, oriented x 3.  Language fluent.  Comprehension intact.  Naming intact.  Repetition intact.  Affect normal.  Cranial nerves grossly intact.  Motor exam: normal bulk, normal tone, 5/5 in all four extremities.  No tremors or fasciculations.  Sensory exam: intact to LT/PP/KEVIN/vibration.  DTRs: 1+ throughout, flexor plantar response bilaterally, no clonus.  Coordination: no dysmetria.  Gait: antalgic gait.  Romberg - negative\par

## 2021-02-10 NOTE — HISTORY OF PRESENT ILLNESS
[FreeTextEntry1] : 60 y/o RH woman with right frontal lobe focal epilepsy. 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 \par \par No stroke on brain MRI (thalamic DWI change was seizure-related).\par \par EEG (10/06/20):\par EEG Summary/Classification:\par Abnormal EEG in the awake, drowsy states.\par -	Continuous right hemispheric slowing\par EEG Impression/Clinical Correlate:\par Findings indicate non-specific right hemispheric cerebral dysfunction. \par No epileptic pattern or seizure seen.\par \par Current AEDs:\par Keppra 1000mg PO BID\par Vimpat 200mg PO BID\par Gabapentin 1200mg TID (used for pain mgmt)\par \par Previous AEDs:\par PHT 
Infant Carrier

## 2021-02-10 NOTE — ASSESSMENT
[FreeTextEntry1] : 62 y/o RH woman with right frontal lobe focal epilepsy. 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 \par No stroke on brain MRI (thalamic DWI change was seizure-related).\par \par Seizure exacerbation in August 2020 was likely in part related to hyponatremia at that time.\par \par Plan: \par 1. Continue Keppra 1000mg PO BID\par 2. Taper Vimpat off as follows:\par Instructions for Vimpat (Lacosamide 50mg tablets):\par Week 1: 3 tablets (150mg) twice a day\par Week 2: 2 tablets (100mg) twice a day\par Week 3: 1 tablet (50mg) twice a day\par Week 4: STOP. \par 3. Seizure precautions, including no driving or operating heavy equipment, no unsupervised swimming, using a shower instead of a bathtub, no climbing ladders or working at elevations, no use of sharp dangerous tools or devices.\par 4. Patient agrees with plan.\par 5. Follow up in 3 months.\par \par Drake Gallegos MD\par [facility]\par \par Greater than 50% of the encounter was spent on counseling and coordination of care discussing treatment options. We have talked about appropriate follow up, and I have spent 25 minutes of face to face time with the patient.\par

## 2021-03-01 ENCOUNTER — APPOINTMENT (OUTPATIENT)
Dept: INTERNAL MEDICINE | Facility: CLINIC | Age: 61
End: 2021-03-01
Payer: MEDICARE

## 2021-03-01 VITALS
HEIGHT: 62 IN | TEMPERATURE: 97.6 F | DIASTOLIC BLOOD PRESSURE: 88 MMHG | OXYGEN SATURATION: 97 % | SYSTOLIC BLOOD PRESSURE: 119 MMHG | HEART RATE: 59 BPM | BODY MASS INDEX: 24.84 KG/M2 | WEIGHT: 135 LBS

## 2021-03-01 DIAGNOSIS — M81.0 AGE-RELATED OSTEOPOROSIS W/OUT CURRENT PATHOLOGICAL FRACTURE: ICD-10-CM

## 2021-03-01 DIAGNOSIS — C34.90 MALIGNANT NEOPLASM OF UNSPECIFIED PART OF UNSPECIFIED BRONCHUS OR LUNG: ICD-10-CM

## 2021-03-01 PROCEDURE — 36415 COLL VENOUS BLD VENIPUNCTURE: CPT

## 2021-03-01 PROCEDURE — 99214 OFFICE O/P EST MOD 30 MIN: CPT | Mod: 25

## 2021-03-01 RX ORDER — LACOSAMIDE 50 MG/1
50 TABLET, FILM COATED ORAL
Qty: 84 | Refills: 0 | Status: DISCONTINUED | COMMUNITY
Start: 2021-02-08 | End: 2021-03-01

## 2021-03-01 NOTE — PHYSICAL EXAM
[No Acute Distress] : no acute distress [Well Nourished] : well nourished [Well Developed] : well developed [Well-Appearing] : well-appearing [Normal Sclera/Conjunctiva] : normal sclera/conjunctiva [PERRL] : pupils equal round and reactive to light [EOMI] : extraocular movements intact [Normal Outer Ear/Nose] : the outer ears and nose were normal in appearance [Normal Oropharynx] : the oropharynx was normal [No JVD] : no jugular venous distention [No Lymphadenopathy] : no lymphadenopathy [Supple] : supple [Thyroid Normal, No Nodules] : the thyroid was normal and there were no nodules present [Normal Rate] : normal rate  [Regular Rhythm] : with a regular rhythm [Normal S1, S2] : normal S1 and S2 [No Murmur] : no murmur heard [No Carotid Bruits] : no carotid bruits [No Abdominal Bruit] : a ~M bruit was not heard ~T in the abdomen [No Varicosities] : no varicosities [Pedal Pulses Present] : the pedal pulses are present [No Edema] : there was no peripheral edema [No Palpable Aorta] : no palpable aorta [No Extremity Clubbing/Cyanosis] : no extremity clubbing/cyanosis [Soft] : abdomen soft [Non Tender] : non-tender [Non-distended] : non-distended [No Masses] : no abdominal mass palpated [No HSM] : no HSM [Normal Bowel Sounds] : normal bowel sounds [Normal Posterior Cervical Nodes] : no posterior cervical lymphadenopathy [Normal Anterior Cervical Nodes] : no anterior cervical lymphadenopathy [No CVA Tenderness] : no CVA  tenderness [No Spinal Tenderness] : no spinal tenderness [No Joint Swelling] : no joint swelling [Grossly Normal Strength/Tone] : grossly normal strength/tone [No Rash] : no rash [Coordination Grossly Intact] : coordination grossly intact [No Focal Deficits] : no focal deficits [Normal Gait] : normal gait [Deep Tendon Reflexes (DTR)] : deep tendon reflexes were 2+ and symmetric [Normal Affect] : the affect was normal [Normal Insight/Judgement] : insight and judgment were intact [de-identified] : decreased breath sounds diffusely in both lungs

## 2021-03-01 NOTE — ASSESSMENT
[FreeTextEntry1] : Patient is a 60 year old female with a past medical history as above who presents for blood work and general follow-up.\par

## 2021-03-01 NOTE — PLAN
[FreeTextEntry1] : Endocrinology\par hyperglycemia - continue low carbohydrate/low sugar diet - check hemoglobin A1C\par Pulmonary\par current smoker - continue decreased smoking; previously discussed importance of smoking cessation in reducing CV risk\par Cardiovascular\par lower extremity edema - secondary to venous insufficiency - continue using compression stockings - continue keeping legs elevated\par hyperlipidemia - continue Atorvastatin Calcium (Lipitor) 20mg p.o.q.h.s. as directed - continue low cholesterol/low fat diet\par continue Aspirin 81mg p.o.q.d. as directed given history of CVA\par Musculoskeletal/Endocrinology\par osteoporosis - continue Fosamax (Alendronate Sodium) 70mg p.o.q.w. as directed; continue Calcium/Vitamin D 600-400mg BID p.o.q.d. as directed; recommended DEXA scan q. 2 years\par Rheumatology\par mixed connective tissue disease - continue Hydroxychloroquine Sulfate 200mg p.o.q.d. and Medrol 4mg p.o.q.d. as directed - continue to follow up with rheumatologist\par Gastroenterology\par history of pancreatitis - continue Creo 12000-unit capsules TID p.o.q.d. as directed \par Musculoskeletal\par back pain - s/p laminectomy - continue Lidocaine 5% External Ointment as directed \par opiate dependence - continue Suboxone 8-2mg p.o. as directed - continue to follow up with pain management physician\par Neurology\par seizures - continue Keppra (Levetiracetam) 1000mg BID p.o.q.d. as directed and Gabapentin 1200mg p.o.q.d. as directed - continue to follow up with neurologist, Dr. Gallegos\par Nephrology\par cerebral salt-wasting syndrome - continue Florinef (Fludrocortisone Acetate) 0.1mg p.o. as directed  - continue to follow up with nephrologist \par Oncology\par lung cancer - chest CT to be done q. 6 months as per oncologist/surgeon \par \par check hemoglobin A1C

## 2021-03-01 NOTE — ADDENDUM
[FreeTextEntry1] : I, Jose Daniel Correia, acted solely as scribe for Dr. Tariq Delgado DO on this date 03/01/2021 11:00AM .\par \par All medical record entries made by the Scribe were at my, Dr. Tariq Delgado DO direction and personally dictated by me on 03/01/2021 11:00AM. I have reviewed the chart and agree that the record accurately reflects my personal performance of the history, physical exam, assessment and plan. I have also personally directed, reviewed and agreed with the chart.\par

## 2021-03-01 NOTE — HISTORY OF PRESENT ILLNESS
[FreeTextEntry1] : blood work and general follow-up\par  [de-identified] : Patient is a 60 year old female with a past medical history as below who presents for blood work and general follow-up. Blood work done on 11/19/21 revealed elevated HGB A1C (5.7). Patient denies diffuse arthralgias or myalgias on Atorvastatin Calcium. Patient was recently taken off Lyrica (Pregabalin) and started on Gabapentin by her neurologist. She states she has been taking 1200mg daily. Patient states she restarted Alendronate Sodium 2 weeks ago. She notes recent history of lumbar compression fracture, but denies any prior fall/trauma. Patient notes recent emphysemas/COPD exacerbations. She intermittently notes BRAND. She notes an upcoming CT chest secondary to Hx of lung cancer.

## 2021-03-01 NOTE — HEALTH RISK ASSESSMENT
[] : Yes [No] : In the past 12 months have you used drugs other than those required for medical reasons? No [0] : 2) Feeling down, depressed, or hopeless: Not at all (0) [de-identified] : Daily or almost daily.  [Audit-CScore] : 0 [[id number]] : 0 [MammogramComments] : Rx previously previously given for mammogram and breast US. [BoneDensityComments] : Recommended DEXA scan q. 2 years. [ColonoscopyComments] : Previously discussed referral to gastroenterologist as patient is due; IFOBT in September 2020 was negative.

## 2021-03-02 LAB
ESTIMATED AVERAGE GLUCOSE: 128 MG/DL
HBA1C MFR BLD HPLC: 6.1 %

## 2021-03-03 ENCOUNTER — NON-APPOINTMENT (OUTPATIENT)
Age: 61
End: 2021-03-03

## 2021-03-04 ENCOUNTER — APPOINTMENT (OUTPATIENT)
Dept: PHYSICAL MEDICINE AND REHAB | Facility: CLINIC | Age: 61
End: 2021-03-04
Payer: MEDICARE

## 2021-03-04 ENCOUNTER — NON-APPOINTMENT (OUTPATIENT)
Age: 61
End: 2021-03-04

## 2021-03-04 VITALS
DIASTOLIC BLOOD PRESSURE: 85 MMHG | OXYGEN SATURATION: 96 % | HEIGHT: 62 IN | TEMPERATURE: 98 F | SYSTOLIC BLOOD PRESSURE: 165 MMHG | BODY MASS INDEX: 24.84 KG/M2 | HEART RATE: 83 BPM | WEIGHT: 135 LBS | RESPIRATION RATE: 16 BRPM

## 2021-03-04 PROCEDURE — 99214 OFFICE O/P EST MOD 30 MIN: CPT

## 2021-03-11 ENCOUNTER — APPOINTMENT (OUTPATIENT)
Dept: NEPHROLOGY | Facility: CLINIC | Age: 61
End: 2021-03-11
Payer: MEDICARE

## 2021-03-11 PROCEDURE — 99212 OFFICE O/P EST SF 10 MIN: CPT | Mod: 95

## 2021-03-11 NOTE — HISTORY OF PRESENT ILLNESS
[FreeTextEntry1] : A case seizure disorder, lung cancer with chronic joint pains and MCTD, hyperglycemia,  with h/o hyponatremia being treated with Florinef  and being evaluated for hyponatremia.

## 2021-03-11 NOTE — REASON FOR VISIT
[Home] : at home, [unfilled] , at the time of the visit. [Medical Office: ([facility])___] : at the medical office located in  [Verbal consent obtained from patient] : the patient, [unfilled] [Follow-Up] : a follow-up visit [FreeTextEntry1] : Hyponatremia

## 2021-03-11 NOTE — ASSESSMENT
[FreeTextEntry1] : A case seizure disorder, lung cancer with chronic joint pains and MCTD, hyperglycemia,  with h/o hyponatremia being treated with Florinef  and being evaluated for hyponatremia. Patient feels comfortable. Weight is stable.  Advised BMP and urine analysis.

## 2021-04-06 ENCOUNTER — APPOINTMENT (OUTPATIENT)
Dept: PSYCHIATRY | Facility: CLINIC | Age: 61
End: 2021-04-06

## 2021-04-12 ENCOUNTER — APPOINTMENT (OUTPATIENT)
Dept: DISASTER EMERGENCY | Facility: OTHER | Age: 61
End: 2021-04-12

## 2021-04-21 ENCOUNTER — APPOINTMENT (OUTPATIENT)
Dept: PSYCHIATRY | Facility: CLINIC | Age: 61
End: 2021-04-21
Payer: MEDICARE

## 2021-04-21 PROCEDURE — 99214 OFFICE O/P EST MOD 30 MIN: CPT

## 2021-05-07 ENCOUNTER — APPOINTMENT (OUTPATIENT)
Dept: DISASTER EMERGENCY | Facility: OTHER | Age: 61
End: 2021-05-07

## 2021-05-10 ENCOUNTER — APPOINTMENT (OUTPATIENT)
Dept: DISASTER EMERGENCY | Facility: OTHER | Age: 61
End: 2021-05-10

## 2021-05-11 ENCOUNTER — APPOINTMENT (OUTPATIENT)
Dept: NEUROLOGY | Facility: CLINIC | Age: 61
End: 2021-05-11

## 2021-05-12 ENCOUNTER — APPOINTMENT (OUTPATIENT)
Dept: NEUROLOGY | Facility: CLINIC | Age: 61
End: 2021-05-12
Payer: MEDICARE

## 2021-05-12 VITALS
SYSTOLIC BLOOD PRESSURE: 160 MMHG | DIASTOLIC BLOOD PRESSURE: 94 MMHG | HEART RATE: 60 BPM | BODY MASS INDEX: 23.92 KG/M2 | WEIGHT: 130 LBS | HEIGHT: 62 IN

## 2021-05-12 PROCEDURE — 99214 OFFICE O/P EST MOD 30 MIN: CPT

## 2021-05-13 NOTE — ASSESSMENT
[FreeTextEntry1] : 62 y/o RH woman with right frontal lobe focal epilepsy. 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 \par \par Plan: \par 1. Continue Keppra 1000mg PO BID\par 2. Seizure precautions discussed.\par 3. Patient agrees with plan.\par 4. Follow up in 1 year\par \par Drake Gallegos MD\par [facility]\par \par Greater than 50% of the encounter was spent on counseling and coordination of care discussing treatment options. We have talked about appropriate follow up, and I have spent 25 minutes of face to face time with the patient.

## 2021-05-13 NOTE — HISTORY OF PRESENT ILLNESS
[FreeTextEntry1] : 62 y/o RH woman with right frontal lobe focal epilepsy. 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 \par \par Current AEDs:\par Keppra 1000mg PO BID\par \par Previous AEDs:\par Vimpat\par LEV\par PHT\par

## 2021-05-13 NOTE — PHYSICAL EXAM
[FreeTextEntry1] : Awake, alert, oriented x 3. Language fluent. Comprehension intact. Naming intact. Repetition intact. Affect normal. Cranial nerves grossly intact. Motor exam: normal bulk, normal tone, 5/5 in all four extremities. No tremors or fasciculations. Sensory exam: intact to LT/PP/KEVIN/vibration. DTRs: 1+ throughout, flexor plantar response bilaterally, no clonus. Coordination: no dysmetria. Gait: antalgic gait. Romberg - negative

## 2021-07-06 ENCOUNTER — APPOINTMENT (OUTPATIENT)
Dept: PSYCHIATRY | Facility: CLINIC | Age: 61
End: 2021-07-06
Payer: MEDICARE

## 2021-07-06 PROCEDURE — 99214 OFFICE O/P EST MOD 30 MIN: CPT

## 2021-07-19 ENCOUNTER — APPOINTMENT (OUTPATIENT)
Dept: NEPHROLOGY | Facility: CLINIC | Age: 61
End: 2021-07-19
Payer: MEDICARE

## 2021-07-19 VITALS
WEIGHT: 124 LBS | DIASTOLIC BLOOD PRESSURE: 122 MMHG | SYSTOLIC BLOOD PRESSURE: 167 MMHG | OXYGEN SATURATION: 99 % | HEIGHT: 62 IN | TEMPERATURE: 97.7 F | BODY MASS INDEX: 22.82 KG/M2 | HEART RATE: 51 BPM

## 2021-07-19 VITALS — DIASTOLIC BLOOD PRESSURE: 80 MMHG | SYSTOLIC BLOOD PRESSURE: 160 MMHG

## 2021-07-19 PROCEDURE — 99212 OFFICE O/P EST SF 10 MIN: CPT

## 2021-07-19 NOTE — PHYSICAL THERAPY INITIAL EVALUATION ADULT - FUNCTIONAL LIMITATIONS, PT EVAL
Render In Strict Bullet Format?: No Samples Given: Aquaphor healing ointment Detail Level: Zone Continue Regimen: Betamethasone diproprionate BID community/leisure/self-care/home management

## 2021-07-20 ENCOUNTER — APPOINTMENT (OUTPATIENT)
Dept: INTERNAL MEDICINE | Facility: CLINIC | Age: 61
End: 2021-07-20
Payer: MEDICARE

## 2021-07-20 VITALS
RESPIRATION RATE: 16 BRPM | TEMPERATURE: 97.7 F | OXYGEN SATURATION: 99 % | WEIGHT: 115 LBS | HEART RATE: 68 BPM | BODY MASS INDEX: 21.16 KG/M2 | SYSTOLIC BLOOD PRESSURE: 132 MMHG | HEIGHT: 62 IN | DIASTOLIC BLOOD PRESSURE: 84 MMHG

## 2021-07-20 DIAGNOSIS — R09.89 OTHER SPECIFIED SYMPTOMS AND SIGNS INVOLVING THE CIRCULATORY AND RESPIRATORY SYSTEMS: ICD-10-CM

## 2021-07-20 DIAGNOSIS — K59.00 CONSTIPATION, UNSPECIFIED: ICD-10-CM

## 2021-07-20 LAB
ALBUMIN SERPL ELPH-MCNC: 4.6 G/DL
ANION GAP SERPL CALC-SCNC: 15 MMOL/L
APPEARANCE: CLEAR
BACTERIA: NEGATIVE
BASOPHILS # BLD AUTO: 0.03 K/UL
BASOPHILS NFR BLD AUTO: 0.5 %
BILIRUBIN URINE: NEGATIVE
BLOOD URINE: NEGATIVE
BUN SERPL-MCNC: 10 MG/DL
CALCIUM SERPL-MCNC: 9.6 MG/DL
CHLORIDE SERPL-SCNC: 98 MMOL/L
CO2 SERPL-SCNC: 26 MMOL/L
COLOR: NORMAL
COVID-19 SPIKE DOMAIN ANTIBODY INTERPRETATION: POSITIVE
CREAT SERPL-MCNC: 0.69 MG/DL
EOSINOPHIL # BLD AUTO: 0.08 K/UL
EOSINOPHIL NFR BLD AUTO: 1.2 %
GLUCOSE QUALITATIVE U: NEGATIVE
GLUCOSE SERPL-MCNC: 110 MG/DL
HCT VFR BLD CALC: 38 %
HGB BLD-MCNC: 12.5 G/DL
HYALINE CASTS: 0 /LPF
IMM GRANULOCYTES NFR BLD AUTO: 0.3 %
KETONES URINE: NEGATIVE
LEUKOCYTE ESTERASE URINE: NEGATIVE
LYMPHOCYTES # BLD AUTO: 0.95 K/UL
LYMPHOCYTES NFR BLD AUTO: 14.4 %
MAN DIFF?: NORMAL
MCHC RBC-ENTMCNC: 31.5 PG
MCHC RBC-ENTMCNC: 32.9 GM/DL
MCV RBC AUTO: 95.7 FL
MICROSCOPIC-UA: NORMAL
MONOCYTES # BLD AUTO: 0.44 K/UL
MONOCYTES NFR BLD AUTO: 6.7 %
NEUTROPHILS # BLD AUTO: 5.07 K/UL
NEUTROPHILS NFR BLD AUTO: 76.9 %
NITRITE URINE: NEGATIVE
PH URINE: 7
PHOSPHATE SERPL-MCNC: 4.4 MG/DL
PLATELET # BLD AUTO: 257 K/UL
POTASSIUM SERPL-SCNC: 4.2 MMOL/L
PROTEIN URINE: NEGATIVE
RBC # BLD: 3.97 M/UL
RBC # FLD: 12 %
RED BLOOD CELLS URINE: 0 /HPF
SARS-COV-2 AB SERPL IA-ACNC: >250 U/ML
SODIUM ?TM SUB UR QN: 29 MMOL/L
SODIUM SERPL-SCNC: 139 MMOL/L
SPECIFIC GRAVITY URINE: 1
SQUAMOUS EPITHELIAL CELLS: 0 /HPF
UROBILINOGEN URINE: NORMAL
WBC # FLD AUTO: 6.59 K/UL
WHITE BLOOD CELLS URINE: 0 /HPF

## 2021-07-20 PROCEDURE — G0442 ANNUAL ALCOHOL SCREEN 15 MIN: CPT | Mod: 59

## 2021-07-20 PROCEDURE — 99214 OFFICE O/P EST MOD 30 MIN: CPT | Mod: 25

## 2021-07-20 RX ORDER — PANCRELIPASE 60000; 12000; 38000 [USP'U]/1; [USP'U]/1; [USP'U]/1
12000-38000 CAPSULE, DELAYED RELEASE PELLETS ORAL
Qty: 90 | Refills: 0 | Status: DISCONTINUED | COMMUNITY
Start: 2020-07-31 | End: 2021-07-20

## 2021-07-20 NOTE — ASSESSMENT
[FreeTextEntry1] : A case of hyponatremia with seizure syndrome, mixed connective tissue disease, hypercalcemia, anxiety  has come for follow-up. Patient has two episodes of seizures which may be related to hyponatremia. Advised renal panel. Urine sodium fractional excretion. \par Lab tests evaluated. Renal function are normal. Serum electrolytes are within acceptable range.

## 2021-07-20 NOTE — HISTORY OF PRESENT ILLNESS
[FreeTextEntry1] : A case of hyponatremia with seizure syndrome, mixed connective tissue disease, hypercalcemia, anxiety  has come for follow-up. Patient has been  taking florinef 1 tab PO BID.

## 2021-07-20 NOTE — PHYSICAL EXAM
[General Appearance - Alert] : alert [General Appearance - In No Acute Distress] : in no acute distress [Sclera] : the sclera and conjunctiva were normal [Outer Ear] : the ears and nose were normal in appearance [Neck Appearance] : the appearance of the neck was normal [Neck Cervical Mass (___cm)] : no neck mass was observed [] : no respiratory distress [Respiration, Rhythm And Depth] : normal respiratory rhythm and effort [Exaggerated Use Of Accessory Muscles For Inspiration] : no accessory muscle use [Apical Impulse] : the apical impulse was normal [Heart Rate And Rhythm] : heart rate was normal and rhythm regular [Heart Sounds] : normal S1 and S2 [Edema] : there was no peripheral edema [Bowel Sounds] : normal bowel sounds [Abdomen Soft] : soft [Cervical Lymph Nodes Enlarged Posterior Bilaterally] : posterior cervical [Cervical Lymph Nodes Enlarged Anterior Bilaterally] : anterior cervical [Supraclavicular Lymph Nodes Enlarged Bilaterally] : supraclavicular [No CVA Tenderness] : no ~M costovertebral angle tenderness [Abnormal Walk] : normal gait [Oriented To Time, Place, And Person] : oriented to person, place, and time [FreeTextEntry1] : ecchymotic patches i both arms

## 2021-07-20 NOTE — REVIEW OF SYSTEMS
[Recent Weight Loss (___ Lbs)] : recent [unfilled] ~Ulb weight loss [Dry Eyes] : dryness of the eyes [Constipation] : constipation [Arthralgias] : arthralgias [Anxiety] : anxiety [Easy Bruising] : a tendency for easy bruising [Negative] : ENT [Feeling Poorly] : not feeling poorly [Feeling Tired] : not feeling tired [Recent Weight Gain (___ Lbs)] : no recent weight gain [Heart Rate Is Slow] : the heart rate was not slow [Heart Rate Is Fast] : the heart rate was not fast [Palpitations] : no palpitations [Lower Ext Edema] : no lower extremity edema [Cough] : no cough [SOB on Exertion] : no shortness of breath during exertion [Heartburn] : no heartburn [Joint Pain] : no joint pain [Itching] : no itching [Dizziness] : no dizziness [Fainting] : no fainting [Muscle Weakness] : no muscle weakness [Easy Bleeding] : no tendency for easy bleeding [FreeTextEntry3] : eyes dry [FreeTextEntry6] : h/o lung cancer [FreeTextEntry9] : ankle

## 2021-07-22 ENCOUNTER — NON-APPOINTMENT (OUTPATIENT)
Age: 61
End: 2021-07-22

## 2021-07-22 NOTE — HEALTH RISK ASSESSMENT
[No] : In the past 12 months have you used drugs other than those required for medical reasons? No [] : No [YearQuit] : 2019 [Audit-CScore] : 0

## 2021-07-22 NOTE — PHYSICAL EXAM
[No Acute Distress] : no acute distress [Well Nourished] : well nourished [Well Developed] : well developed [Normal Sclera/Conjunctiva] : normal sclera/conjunctiva [PERRL] : pupils equal round and reactive to light [EOMI] : extraocular movements intact [Normal Outer Ear/Nose] : the outer ears and nose were normal in appearance [Normal Oropharynx] : the oropharynx was normal [Normal TMs] : both tympanic membranes were normal [No JVD] : no jugular venous distention [No Lymphadenopathy] : no lymphadenopathy [Supple] : supple [Thyroid Normal, No Nodules] : the thyroid was normal and there were no nodules present [No Respiratory Distress] : no respiratory distress  [No Accessory Muscle Use] : no accessory muscle use [Clear to Auscultation] : lungs were clear to auscultation bilaterally [Normal Rate] : normal rate  [Regular Rhythm] : with a regular rhythm [Normal S1, S2] : normal S1 and S2 [No Abdominal Bruit] : a ~M bruit was not heard ~T in the abdomen [No Varicosities] : no varicosities [Pedal Pulses Present] : the pedal pulses are present [No Edema] : there was no peripheral edema [No Palpable Aorta] : no palpable aorta [No Extremity Clubbing/Cyanosis] : no extremity clubbing/cyanosis [Soft] : abdomen soft [Non Tender] : non-tender [Non-distended] : non-distended [No Masses] : no abdominal mass palpated [No HSM] : no HSM [Normal Bowel Sounds] : normal bowel sounds [Normal Posterior Cervical Nodes] : no posterior cervical lymphadenopathy [Normal Anterior Cervical Nodes] : no anterior cervical lymphadenopathy [No CVA Tenderness] : no CVA  tenderness [No Spinal Tenderness] : no spinal tenderness [No Joint Swelling] : no joint swelling [Grossly Normal Strength/Tone] : grossly normal strength/tone [No Rash] : no rash [Coordination Grossly Intact] : coordination grossly intact [No Focal Deficits] : no focal deficits [Normal Gait] : normal gait [Speech Grossly Normal] : speech grossly normal [Deep Tendon Reflexes (DTR)] : deep tendon reflexes were 2+ and symmetric [Normal Affect] : the affect was normal [Alert and Oriented x3] : oriented to person, place, and time [Normal Mood] : the mood was normal [Normal Insight/Judgement] : insight and judgment were intact [de-identified] : with murmur  [de-identified] : ? right bruit  [de-identified] : exam was limited pt refused to go supine    [de-identified] : as per gyn  [de-identified] : multiple contusion on upper ext

## 2021-07-22 NOTE — REVIEW OF SYSTEMS
[Constipation] : constipation [Frequency] : frequency [Negative] : Heme/Lymph [FreeTextEntry7] : bloated

## 2021-07-22 NOTE — HISTORY OF PRESENT ILLNESS
[FreeTextEntry1] : Constipation -   [de-identified] : Ms. FAYE is a 61 year  female, who present to the office for evaluation of constipation.  States her last BM one week ago.\par Has had prior episodes in the past and would take a stool softener which would help.  states this time it did not.  States she feels tender and bloated.  \par Saw nephrologist  recently for a follow up on Low NA  had repeat labs ad was normal \par in the past had a seizure for low Na levels \par Going to see   for  evaluation of  OAB

## 2021-07-22 NOTE — COUNSELING
[Behavioral health counseling provided] : Behavioral health counseling provided [Encouraged to increase physical activity] : Encouraged to increase physical activity [Good understanding] : Patient has a good understanding of disease, goals and obesity follow-up plan

## 2021-07-22 NOTE — ASSESSMENT
[FreeTextEntry1] : Mrs. FAYE is a 61 year  female, who present to the office for  abdominal bloating and constipation

## 2021-07-22 NOTE — PLAN
[FreeTextEntry1] : GI-  Abd bloating and costipation -  Advised exam was liited secondary to non compliance on exam.  Advised pt if pain is getting worse to go to the ED for evaluation\par Check flat plate abdomen for obstruction\par Advised increase fiber ad fluid intake.\par If no obstruction is noted  advise pt to start miralx.\par Follow up with GI \par Check labs\par \par -  OAB -  Advised pt to follow up with urologist.\par \par Endo - Elevated a1c-  Advised diet   repeat a1c .   \par \par Vasc - ? carotid bruit  - Check carotid sonogram \par \par Patient  education  - COVID-19   Counseling and education provided to the patient.  Advised sign and symptoms of the virus.  Advised contact precautions.  Educated patient on proper hand washing and to participate in social distancing. \par Patient is in full awareness of the plan and agrees to it.  All pt question was answered.  \par \par  \par

## 2021-09-08 ENCOUNTER — APPOINTMENT (OUTPATIENT)
Dept: INTERNAL MEDICINE | Facility: CLINIC | Age: 61
End: 2021-09-08
Payer: MEDICARE

## 2021-09-08 ENCOUNTER — NON-APPOINTMENT (OUTPATIENT)
Age: 61
End: 2021-09-08

## 2021-09-08 VITALS
RESPIRATION RATE: 16 BRPM | WEIGHT: 127 LBS | TEMPERATURE: 97.7 F | DIASTOLIC BLOOD PRESSURE: 78 MMHG | SYSTOLIC BLOOD PRESSURE: 140 MMHG | HEIGHT: 62 IN | HEART RATE: 67 BPM | OXYGEN SATURATION: 98 % | BODY MASS INDEX: 23.37 KG/M2

## 2021-09-08 DIAGNOSIS — Z91.89 OTHER SPECIFIED PERSONAL RISK FACTORS, NOT ELSEWHERE CLASSIFIED: ICD-10-CM

## 2021-09-08 DIAGNOSIS — M35.1 OTHER OVERLAP SYNDROMES: ICD-10-CM

## 2021-09-08 DIAGNOSIS — F11.20 OPIOID DEPENDENCE, UNCOMPLICATED: ICD-10-CM

## 2021-09-08 PROCEDURE — 93000 ELECTROCARDIOGRAM COMPLETE: CPT

## 2021-09-08 PROCEDURE — 36415 COLL VENOUS BLD VENIPUNCTURE: CPT

## 2021-09-08 PROCEDURE — 99214 OFFICE O/P EST MOD 30 MIN: CPT | Mod: 25

## 2021-09-09 LAB
ANION GAP SERPL CALC-SCNC: 13 MMOL/L
APTT BLD: 31.8 SEC
BASOPHILS # BLD AUTO: 0.02 K/UL
BASOPHILS NFR BLD AUTO: 0.2 %
BUN SERPL-MCNC: 16 MG/DL
CALCIUM SERPL-MCNC: 9.4 MG/DL
CHLORIDE SERPL-SCNC: 103 MMOL/L
CO2 SERPL-SCNC: 28 MMOL/L
CREAT SERPL-MCNC: 0.75 MG/DL
EOSINOPHIL # BLD AUTO: 0.08 K/UL
EOSINOPHIL NFR BLD AUTO: 1 %
GLUCOSE SERPL-MCNC: 92 MG/DL
HCT VFR BLD CALC: 38.4 %
HGB BLD-MCNC: 12.2 G/DL
IMM GRANULOCYTES NFR BLD AUTO: 0.4 %
INR PPP: 0.79 RATIO
LYMPHOCYTES # BLD AUTO: 1.18 K/UL
LYMPHOCYTES NFR BLD AUTO: 14.5 %
MAN DIFF?: NORMAL
MCHC RBC-ENTMCNC: 31.8 GM/DL
MCHC RBC-ENTMCNC: 32 PG
MCV RBC AUTO: 100.8 FL
MONOCYTES # BLD AUTO: 0.47 K/UL
MONOCYTES NFR BLD AUTO: 5.8 %
NEUTROPHILS # BLD AUTO: 6.37 K/UL
NEUTROPHILS NFR BLD AUTO: 78.1 %
PLATELET # BLD AUTO: 253 K/UL
POTASSIUM SERPL-SCNC: 4.5 MMOL/L
PT BLD: 9.5 SEC
RBC # BLD: 3.81 M/UL
RBC # FLD: 12.7 %
SODIUM SERPL-SCNC: 143 MMOL/L
WBC # FLD AUTO: 8.15 K/UL

## 2021-09-09 NOTE — HISTORY OF PRESENT ILLNESS
[COPD] : COPD [Smoker] : smoker [No Adverse Anesthesia Reaction] : no adverse anesthesia reaction in self or family member [(Patient denies any chest pain, claudication, dyspnea on exertion, orthopnea, palpitations or syncope)] : Patient denies any chest pain, claudication, dyspnea on exertion, orthopnea, palpitations or syncope [Good (7-10 METs)] : Good (7-10 METs) [Anti-Platelet Agents: _____] : Anti-Platelet Agents: [unfilled] [Aortic Stenosis] : no aortic stenosis [Atrial Fibrillation] : no atrial fibrillation [Coronary Artery Disease] : no coronary artery disease [Recent Myocardial Infarction] : no recent myocardial infarction [Implantable Device/Pacemaker] : no implantable device/pacemaker [Asthma] : no asthma [Sleep Apnea] : no sleep apnea [Chronic Anticoagulation] : no chronic anticoagulation [Chronic Kidney Disease] : no chronic kidney disease [Diabetes] : no diabetes [FreeTextEntry1] : dental implant/bridge placement [FreeTextEntry3] : Dr. Jon Chaney/Dr. Prince Rosen   [FreeTextEntry2] : 9/29/21 [FreeTextEntry4] : Patient is a 61 year-old female with a past medical history as below who presents for preoperative evaluation prior to dental implant/bridge placement. The procedure will be performed by Dr. Jon Chaney/Dr. Prince Rosen. The patient has no history of allergy or adverse reaction to anesthesia. The patient can walk many blocks and can walk up 1-2 flights of stairs without dyspnea on exertion. She denies chest pain or palpitations.

## 2021-09-09 NOTE — PLAN
[FreeTextEntry1] : 1. Preoperative EKG performed - results are noted above.\par 2. The patient was instructed to stop eating prior to midnight the evening before the procedure.\par 3. The patient was advised to take Clindamycin prophylactically prior to the procedure. She was also previously instructed to increase Medrol dosage on the day of the procedure.\par 4. There is no medical contraindication to the planned procedure and the patient is therefore medically optimized/cleared for the procedure.\par \par check EKG (results as above), CBC, BMP, PT/INR, and PTT

## 2021-09-09 NOTE — ASSESSMENT
[Patient Optimized for Surgery] : Patient optimized for surgery [No Further Testing Recommended] : no further testing recommended [Modify medications prior to procedure] : Modify medications prior to procedure [FreeTextEntry4] : The patient is a 61 year-old female who is a low-intermediate risk surgical patient with good functional capacity (EKG WNL) going for a low risk surgical procedure.  [FreeTextEntry7] : Patient was advised to take Clindamycin prior to the procedure. She was also previously instructed to increase Medrol dosage on the day of the procedure.

## 2021-09-09 NOTE — ADDENDUM
[FreeTextEntry1] : I, Jose Daniel Correia, acted solely as scribe for Dr. Tariq Delgado DO on this date 09/08/2021 11:30AM .\par \par All medical record entries made by the Scribe were at my, Dr. Tariq Delgado DO direction and personally dictated by me on 09/08/2021 11:30AM. I have reviewed the chart and agree that the record accurately reflects my personal performance of the history, physical exam, assessment and plan. I have also personally directed, reviewed and agreed with the chart.\par

## 2021-09-14 ENCOUNTER — NON-APPOINTMENT (OUTPATIENT)
Age: 61
End: 2021-09-14

## 2021-09-30 ENCOUNTER — APPOINTMENT (OUTPATIENT)
Dept: PSYCHIATRY | Facility: CLINIC | Age: 61
End: 2021-09-30
Payer: MEDICARE

## 2021-09-30 PROCEDURE — 99214 OFFICE O/P EST MOD 30 MIN: CPT

## 2021-10-20 ENCOUNTER — APPOINTMENT (OUTPATIENT)
Dept: INTERNAL MEDICINE | Facility: CLINIC | Age: 61
End: 2021-10-20

## 2021-10-25 ENCOUNTER — RESULT CHARGE (OUTPATIENT)
Age: 61
End: 2021-10-25

## 2021-10-26 ENCOUNTER — APPOINTMENT (OUTPATIENT)
Dept: INTERNAL MEDICINE | Facility: CLINIC | Age: 61
End: 2021-10-26
Payer: MEDICARE

## 2021-10-26 ENCOUNTER — LABORATORY RESULT (OUTPATIENT)
Age: 61
End: 2021-10-26

## 2021-10-26 ENCOUNTER — NON-APPOINTMENT (OUTPATIENT)
Age: 61
End: 2021-10-26

## 2021-10-26 VITALS
HEART RATE: 63 BPM | WEIGHT: 119.8 LBS | TEMPERATURE: 97.8 F | HEIGHT: 62 IN | DIASTOLIC BLOOD PRESSURE: 82 MMHG | OXYGEN SATURATION: 96 % | SYSTOLIC BLOOD PRESSURE: 130 MMHG | BODY MASS INDEX: 22.05 KG/M2 | RESPIRATION RATE: 16 BRPM

## 2021-10-26 DIAGNOSIS — Z09 ENCOUNTER FOR FOLLOW-UP EXAMINATION AFTER COMPLETED TREATMENT FOR CONDITIONS OTHER THAN MALIGNANT NEOPLASM: ICD-10-CM

## 2021-10-26 DIAGNOSIS — M79.89 OTHER SPECIFIED SOFT TISSUE DISORDERS: ICD-10-CM

## 2021-10-26 LAB
BILIRUB UR QL STRIP: NORMAL
CLARITY UR: CLEAR
COLLECTION METHOD: NORMAL
GLUCOSE UR-MCNC: NORMAL
HCG UR QL: 0.2 EU/DL
HGB UR QL STRIP.AUTO: NORMAL
KETONES UR-MCNC: NORMAL
LEUKOCYTE ESTERASE UR QL STRIP: NORMAL
NITRITE UR QL STRIP: NORMAL
PH UR STRIP: 7.5
PROT UR STRIP-MCNC: NORMAL
SP GR UR STRIP: 1.01

## 2021-10-26 PROCEDURE — 93000 ELECTROCARDIOGRAM COMPLETE: CPT

## 2021-10-26 PROCEDURE — 36415 COLL VENOUS BLD VENIPUNCTURE: CPT

## 2021-10-26 PROCEDURE — 81003 URINALYSIS AUTO W/O SCOPE: CPT | Mod: QW

## 2021-10-26 PROCEDURE — 99214 OFFICE O/P EST MOD 30 MIN: CPT | Mod: 25

## 2021-10-26 RX ORDER — HYDROXYCHLOROQUINE SULFATE 200 MG/1
200 TABLET, FILM COATED ORAL DAILY
Refills: 0 | Status: ACTIVE | COMMUNITY

## 2021-10-26 RX ORDER — HYDROXYCHLOROQUINE SULFATE 200 MG/1
200 TABLET, FILM COATED ORAL
Qty: 15 | Refills: 0 | Status: DISCONTINUED | COMMUNITY
Start: 2020-09-09 | End: 2021-10-26

## 2021-10-26 NOTE — HISTORY OF PRESENT ILLNESS
[Coronary Artery Disease] : coronary artery disease [COPD] : COPD [(Patient denies any chest pain, claudication, dyspnea on exertion, orthopnea, palpitations or syncope)] : Patient denies any chest pain, claudication, dyspnea on exertion, orthopnea, palpitations or syncope [Aortic Stenosis] : no aortic stenosis [Atrial Fibrillation] : no atrial fibrillation [Recent Myocardial Infarction] : no recent myocardial infarction [Asthma] : no asthma [Sleep Apnea] : no sleep apnea [Smoker] : not a smoker [Family Member] : no family member with adverse anesthesia reaction/sudden death [Self] : no previous adverse anesthesia reaction [Chronic Anticoagulation] : no chronic anticoagulation [Chronic Kidney Disease] : no chronic kidney disease [Diabetes] : no diabetes [FreeTextEntry1] : Sacral Nerve Stimulator  [FreeTextEntry2] : 11/01/21 [FreeTextEntry3] : Dr. Jc Butcher  [FreeTextEntry4] : Mrs. FAYE is a 61 year  female, who present to the office for medical clearance for Sacral Nerve Stimulator.\par Surgical procedure is schedule for November 1st, 202.  \par States she has cardiac clearance done by Dr. Jessa Baca [address] [place] [address].  [place] [address].\par \par Patient is very poor historian on past medical history as well medication regimen  she is currently taking \par \par  [FreeTextEntry5] : Hx MI 2013  Former smoker quit 3 years ago

## 2021-10-26 NOTE — REVIEW OF SYSTEMS
[Joint Pain] : joint pain [Back Pain] : back pain [Memory Loss] : memory loss [Unsteady Walking] : ataxia [Anxiety] : anxiety [Easy Bruising] : easy bruising [Negative] : Psychiatric [Fever] : no fever [Chills] : no chills [Fatigue] : no fatigue [Redness] : no redness [Vision Problems] : no vision problems [Earache] : no earache [Sore Throat] : no sore throat [Chest Pain] : no chest pain [Palpitations] : no palpitations [Lower Ext Edema] : no lower extremity edema [Shortness Of Breath] : no shortness of breath [Wheezing] : no wheezing [Cough] : no cough [Dyspnea on Exertion] : no dyspnea on exertion [Abdominal Pain] : no abdominal pain [Nausea] : no nausea [Vomiting] : no vomiting [Heartburn] : no heartburn [Skin Rash] : no skin rash [Headache] : no headache [Dizziness] : no dizziness [Depression] : no depression [Easy Bleeding] : no easy bleeding [Swollen Glands] : no swollen glands

## 2021-10-26 NOTE — PLAN
[FreeTextEntry1] : Patient history, physical and ancillary testing was reviewed by  Dr. Tariq Delgado. \par JULIO CESAR FAYE  was advised not to take any NSAIDs, ASA, Vitamin E, omega 3, ginkgo biloba or MVI 7 days prior to the surgery. \par \par Patient advised she needs to be cleared by cardio for the procedure.\par \par Advised no eating after 12 midnight. \par \par Patient was advised to take  methylprednisone  the morning of her surgery with a sip of water.\par \par On chronic prednisone-  Recommend to take morning steroid dose.   Under 5 mg daily does not require stress dose steroids.  \par \par Patient in optimal condition for proposed procedure and anesthesia pending labs and cardiac clearance.

## 2021-10-26 NOTE — PHYSICAL EXAM
[No Acute Distress] : no acute distress [Well Nourished] : well nourished [Well Developed] : well developed [Well-Appearing] : well-appearing [Normal Sclera/Conjunctiva] : normal sclera/conjunctiva [PERRL] : pupils equal round and reactive to light [EOMI] : extraocular movements intact [Normal Outer Ear/Nose] : the outer ears and nose were normal in appearance [Normal Oropharynx] : the oropharynx was normal [Normal TMs] : both tympanic membranes were normal [No JVD] : no jugular venous distention [No Lymphadenopathy] : no lymphadenopathy [Supple] : supple [Thyroid Normal, No Nodules] : the thyroid was normal and there were no nodules present [No Respiratory Distress] : no respiratory distress  [No Accessory Muscle Use] : no accessory muscle use [Clear to Auscultation] : lungs were clear to auscultation bilaterally [Normal Rate] : normal rate  [Regular Rhythm] : with a regular rhythm [Normal S1, S2] : normal S1 and S2 [No Carotid Bruits] : no carotid bruits [No Abdominal Bruit] : a ~M bruit was not heard ~T in the abdomen [No Varicosities] : no varicosities [Pedal Pulses Present] : the pedal pulses are present [No Edema] : there was no peripheral edema [No Palpable Aorta] : no palpable aorta [No Extremity Clubbing/Cyanosis] : no extremity clubbing/cyanosis [Soft] : abdomen soft [Non Tender] : non-tender [Non-distended] : non-distended [No Masses] : no abdominal mass palpated [No HSM] : no HSM [Normal Bowel Sounds] : normal bowel sounds [Normal Posterior Cervical Nodes] : no posterior cervical lymphadenopathy [Normal Anterior Cervical Nodes] : no anterior cervical lymphadenopathy [No CVA Tenderness] : no CVA  tenderness [No Spinal Tenderness] : no spinal tenderness [No Joint Swelling] : no joint swelling [Grossly Normal Strength/Tone] : grossly normal strength/tone [No Rash] : no rash [Coordination Grossly Intact] : coordination grossly intact [No Focal Deficits] : no focal deficits [Normal Gait] : normal gait [Deep Tendon Reflexes (DTR)] : deep tendon reflexes were 2+ and symmetric [Speech Grossly Normal] : speech grossly normal [Normal Affect] : the affect was normal [Alert and Oriented x3] : oriented to person, place, and time [Normal Mood] : the mood was normal [Normal Insight/Judgement] : insight and judgment were intact [Scoliosis] : scoliosis [de-identified] : with murmur  [de-identified] : exam was limited pt refused to go supine    [de-identified] : as per gyn  [de-identified] : multiple contusion on upper ext

## 2021-10-26 NOTE — ASSESSMENT
[Cardiology consultation] : Cardiology consultation [Other: _____] : [unfilled] [Patient Requires Further Testing] : Patient requires further testing [FreeTextEntry4] : Ms. FAYE is a 61 year  female, who present to the office for medical clearance  [FreeTextEntry7] : hold MVI

## 2021-10-26 NOTE — RESULTS/DATA
[] : results reviewed [de-identified] : sinus rhythm with non specific T wave abnormality   [de-identified] : ct scan chest  08/20  showed coronary atherosclerosis

## 2021-10-29 LAB
ALBUMIN SERPL ELPH-MCNC: 4.6 G/DL
ALP BLD-CCNC: 29 U/L
ALT SERPL-CCNC: 40 U/L
ANION GAP SERPL CALC-SCNC: 12 MMOL/L
APPEARANCE: ABNORMAL
APTT BLD: 33.3 SEC
AST SERPL-CCNC: 42 U/L
BACTERIA UR CULT: NORMAL
BASOPHILS # BLD AUTO: 0.01 K/UL
BASOPHILS NFR BLD AUTO: 0.2 %
BILIRUB SERPL-MCNC: 0.3 MG/DL
BILIRUBIN URINE: NEGATIVE
BLOOD URINE: NEGATIVE
BUN SERPL-MCNC: 16 MG/DL
CALCIUM SERPL-MCNC: 10.3 MG/DL
CHLORIDE SERPL-SCNC: 100 MMOL/L
CO2 SERPL-SCNC: 29 MMOL/L
COLOR: YELLOW
CREAT SERPL-MCNC: 0.8 MG/DL
EOSINOPHIL # BLD AUTO: 0.03 K/UL
EOSINOPHIL NFR BLD AUTO: 0.5 %
ESTIMATED AVERAGE GLUCOSE: 120 MG/DL
FOLATE SERPL-MCNC: >20 NG/ML
GLUCOSE QUALITATIVE U: NEGATIVE
GLUCOSE SERPL-MCNC: 113 MG/DL
HBA1C MFR BLD HPLC: 5.8 %
HCT VFR BLD CALC: 39.6 %
HGB BLD-MCNC: 13.1 G/DL
IMM GRANULOCYTES NFR BLD AUTO: 0.2 %
INR PPP: 0.98 RATIO
KETONES URINE: NEGATIVE
LEUKOCYTE ESTERASE URINE: NEGATIVE
LYMPHOCYTES # BLD AUTO: 0.81 K/UL
LYMPHOCYTES NFR BLD AUTO: 14.8 %
MAN DIFF?: NORMAL
MCHC RBC-ENTMCNC: 32 PG
MCHC RBC-ENTMCNC: 33.1 GM/DL
MCV RBC AUTO: 96.8 FL
MONOCYTES # BLD AUTO: 0.28 K/UL
MONOCYTES NFR BLD AUTO: 5.1 %
NEUTROPHILS # BLD AUTO: 4.34 K/UL
NEUTROPHILS NFR BLD AUTO: 79.2 %
NITRITE URINE: NEGATIVE
PH URINE: 7.5
PLATELET # BLD AUTO: 282 K/UL
POTASSIUM SERPL-SCNC: 4.6 MMOL/L
PROT SERPL-MCNC: 6.7 G/DL
PROTEIN URINE: NEGATIVE
PT BLD: 11.6 SEC
RBC # BLD: 4.09 M/UL
RBC # FLD: 11.5 %
SODIUM SERPL-SCNC: 141 MMOL/L
SPECIFIC GRAVITY URINE: 1.01
UROBILINOGEN URINE: NORMAL
VIT B12 SERPL-MCNC: 973 PG/ML
WBC # FLD AUTO: 5.48 K/UL

## 2021-12-14 ENCOUNTER — APPOINTMENT (OUTPATIENT)
Dept: PSYCHIATRY | Facility: CLINIC | Age: 61
End: 2021-12-14
Payer: MEDICARE

## 2021-12-14 PROCEDURE — 99214 OFFICE O/P EST MOD 30 MIN: CPT

## 2021-12-30 NOTE — ED PROVIDER NOTE - EXTREMITY EXAM
right lower extremity findings Review of Systems:  	•	CONSTITUTIONAL - no fever, no diaphoresis  	•	SKIN - no rash, no lesions  	•	HEMATOLOGIC - no bleeding, no bruising  	•	EYES - no discharge, no injection  	•	ENT - no sore throat, no runny nose  	•	RESPIRATORY - no shortness of breath, no cough  	•	CARDIAC - no chest pain, no palpitations  	•	GI - +abd pain, no nausea, no vomiting, no diarrhea  	•	GENITO-URINARY - no dysuria, no hematuria, +flank pain  	•	MUSCULOSKELETAL - no joint pain, no muscle aches  	•	NEUROLOGIC - no dizziness, no headache

## 2022-01-01 NOTE — PROGRESS NOTE BEHAVIORAL HEALTH - [facility] ED [facility] MED ROS ALLERGIC IMMUNOLOGIC
Assessment/Plan:  Normal . Physical Exam within normal limits. Feeding ad geovani, wt loss within normal limits. TC bilirubin appropriate.    -Breast feed or formula on demand, at least every 2-3 hours.  -Routine  care.  -Discussed with mother.    Perlita Lomas DO  Pediatric Hospitalist
No complaints
No complaints

## 2022-01-24 NOTE — CONSULT NOTE ADULT - MINUTES
Back Pain   AMBULATORY CARE:   Back pain  is common  You may have back pain and muscle spasms  You may feel sore or stiff on one or both sides of your back  The pain may spread to your lower body  Conditions that affect the spine, joints, or muscles can cause back pain  These may include arthritis, spinal stenosis (narrowing of the spinal column), muscle tension, or breakdown of the spinal discs  Call your local emergency number (911 in the [address] [facility]) if:   · You have severe back pain with chest pain  · You cannot control your urine or bowel movements  · Your pain becomes so severe that you cannot walk  Seek care immediately if:   · You have pain, numbness, or weakness in one or both legs  · You have severe back pain, nausea, and vomiting  · You have severe back pain that spreads to your side or genital area  Call your doctor if:   · You have back pain that does not get better with rest and pain medicine  · You have a fever  · You have pain that worsens when you are on your back or when you rest     · You have pain that worsens when you cough or sneeze  · You lose weight without trying  · You have questions or concerns about your condition or care  Medicines: You may need any of the following:  · NSAIDs , such as ibuprofen, help decrease swelling, pain, and fever  This medicine is available with or without a doctor's order  NSAIDs can cause stomach bleeding or kidney problems in certain people  If you take blood thinner medicine, always ask your healthcare provider if NSAIDs are safe for you  Always read the medicine label and follow directions  · Acetaminophen  decreases pain and fever  It is available without a doctor's order  Ask how much to take and how often to take it  Follow directions  Read the labels of all other medicines you are using to see if they also contain acetaminophen, or ask your doctor or pharmacist  Acetaminophen can cause liver damage if not taken correctly   Do not use more than 4 grams (4,000 milligrams) total of acetaminophen in one day  · Muscle relaxers  help decrease muscle spasms and back pain  · Take your medicine as directed  Contact your healthcare provider if you think your medicine is not helping or if you have side effects  Tell him or her if you are allergic to any medicine  Keep a list of the medicines, vitamins, and herbs you take  Include the amounts, and when and why you take them  Bring the list or the pill bottles to follow-up visits  Carry your medicine list with you in case of an emergency  Manage your back pain:   · Apply ice  on your back for 15 to 20 minutes every hour or as directed  Use an ice pack, or put crushed ice in a plastic bag  Cover it with a towel before you apply it to your skin  Ice helps prevent tissue damage and decreases pain  · Apply heat  on your back for 20 to 30 minutes every 2 hours for as many days as directed  Heat helps decrease pain and muscle spasms  · Stay active  as much as you can without causing more pain  Bed rest could make your back pain worse  Avoid heavy lifting until your pain is gone  · Go to physical therapy as directed  A physical therapist can teach you exercises to help improve movement and strength, and to decrease pain  Follow up with your doctor in 2 weeks, or as directed: You might need to see a specialist  Write down your questions so you remember to ask them during your visits  © Copyright [employer] 2021 Information is for End User's use only and may not be sold, redistributed or otherwise used for commercial purposes  All illustrations and images included in CareNotes® are the copyrighted property of A D A M , Inc  or [facility] Leonel Lindsay   The above information is an  only  It is not intended as medical advice for individual conditions or treatments   Talk to your doctor, nurse or pharmacist before following any medical regimen to see if it is safe and effective for you  60

## 2022-02-08 ENCOUNTER — APPOINTMENT (OUTPATIENT)
Dept: PSYCHIATRY | Facility: CLINIC | Age: 62
End: 2022-02-08
Payer: MEDICARE

## 2022-02-08 PROCEDURE — 99214 OFFICE O/P EST MOD 30 MIN: CPT

## 2022-03-08 ENCOUNTER — APPOINTMENT (OUTPATIENT)
Dept: INTERNAL MEDICINE | Facility: CLINIC | Age: 62
End: 2022-03-08
Payer: MEDICARE

## 2022-03-09 ENCOUNTER — RESULT CHARGE (OUTPATIENT)
Age: 62
End: 2022-03-09

## 2022-03-10 ENCOUNTER — NON-APPOINTMENT (OUTPATIENT)
Age: 62
End: 2022-03-10

## 2022-03-10 ENCOUNTER — APPOINTMENT (OUTPATIENT)
Dept: INTERNAL MEDICINE | Facility: CLINIC | Age: 62
End: 2022-03-10
Payer: MEDICARE

## 2022-03-10 VITALS
OXYGEN SATURATION: 98 % | HEART RATE: 70 BPM | HEIGHT: 62 IN | TEMPERATURE: 97.2 F | RESPIRATION RATE: 16 BRPM | WEIGHT: 115 LBS | SYSTOLIC BLOOD PRESSURE: 132 MMHG | BODY MASS INDEX: 21.16 KG/M2 | DIASTOLIC BLOOD PRESSURE: 72 MMHG

## 2022-03-10 DIAGNOSIS — Z01.818 ENCOUNTER FOR OTHER PREPROCEDURAL EXAMINATION: ICD-10-CM

## 2022-03-10 DIAGNOSIS — F17.200 NICOTINE DEPENDENCE, UNSPECIFIED, UNCOMPLICATED: ICD-10-CM

## 2022-03-10 DIAGNOSIS — Z72.0 TOBACCO USE: ICD-10-CM

## 2022-03-10 DIAGNOSIS — Z87.891 PERSONAL HISTORY OF NICOTINE DEPENDENCE: ICD-10-CM

## 2022-03-10 DIAGNOSIS — R56.9 UNSPECIFIED CONVULSIONS: ICD-10-CM

## 2022-03-10 LAB
BILIRUB UR QL STRIP: NEGATIVE
CLARITY UR: CLEAR
COLLECTION METHOD: NORMAL
GLUCOSE UR-MCNC: NEGATIVE
HCG UR QL: 0.2 EU/DL
HGB UR QL STRIP.AUTO: NEGATIVE
KETONES UR-MCNC: NEGATIVE
LEUKOCYTE ESTERASE UR QL STRIP: NEGATIVE
NITRITE UR QL STRIP: NEGATIVE
PH UR STRIP: 5
PROT UR STRIP-MCNC: NEGATIVE
SP GR UR STRIP: 1.01

## 2022-03-10 PROCEDURE — 93000 ELECTROCARDIOGRAM COMPLETE: CPT | Mod: 59

## 2022-03-10 PROCEDURE — 36415 COLL VENOUS BLD VENIPUNCTURE: CPT

## 2022-03-10 PROCEDURE — 99214 OFFICE O/P EST MOD 30 MIN: CPT | Mod: 25

## 2022-03-10 PROCEDURE — 81003 URINALYSIS AUTO W/O SCOPE: CPT | Mod: 59,QW

## 2022-03-10 RX ORDER — METRONIDAZOLE 500 MG/1
500 TABLET ORAL
Qty: 28 | Refills: 0 | Status: COMPLETED | COMMUNITY
Start: 2022-02-15

## 2022-03-10 RX ORDER — BUPRENORPHINE HCL/NALOXONE HCL 8 MG-2 MG
8-2 TABLET, SUBLINGUAL SUBLINGUAL
Refills: 0 | Status: COMPLETED | COMMUNITY
End: 2022-03-10

## 2022-03-10 RX ORDER — LIDOCAINE 36 MG/1
1.8 PATCH TOPICAL
Qty: 60 | Refills: 0 | Status: ACTIVE | COMMUNITY
Start: 2021-06-29

## 2022-03-10 RX ORDER — CEPHALEXIN 500 MG/1
500 CAPSULE ORAL
Qty: 28 | Refills: 0 | Status: COMPLETED | COMMUNITY
Start: 2021-11-01

## 2022-03-10 RX ORDER — CHLORHEXIDINE GLUCONATE, 0.12% ORAL RINSE 1.2 MG/ML
0.12 SOLUTION DENTAL
Qty: 473 | Refills: 0 | Status: ACTIVE | COMMUNITY
Start: 2022-02-15

## 2022-03-10 RX ORDER — METHYLPREDNISOLONE 4 MG/1
4 TABLET ORAL
Qty: 21 | Refills: 0 | Status: COMPLETED | COMMUNITY
Start: 2022-02-15

## 2022-03-10 RX ORDER — CLINDAMYCIN HYDROCHLORIDE 300 MG/1
300 CAPSULE ORAL
Qty: 10 | Refills: 0 | Status: COMPLETED | COMMUNITY
Start: 2021-09-07

## 2022-03-10 RX ORDER — DOCUSATE SODIUM 100 MG/1
100 CAPSULE, LIQUID FILLED ORAL
Qty: 21 | Refills: 0 | Status: COMPLETED | COMMUNITY
Start: 2021-11-01

## 2022-03-10 RX ORDER — MOXIFLOXACIN HYDROCHLORIDE TABLETS, 400 MG 400 MG/1
400 TABLET, FILM COATED ORAL
Qty: 14 | Refills: 0 | Status: COMPLETED | COMMUNITY
Start: 2022-02-15

## 2022-03-10 RX ORDER — PREGABALIN 100 MG/1
100 CAPSULE ORAL TWICE DAILY
Refills: 0 | Status: ACTIVE | COMMUNITY

## 2022-03-10 RX ORDER — BUPRENORPHINE AND NALOXONE 8; 2 MG/1; MG/1
8-2 FILM BUCCAL; SUBLINGUAL
Refills: 0 | Status: ACTIVE | COMMUNITY
Start: 2021-09-20

## 2022-03-10 RX ORDER — PREGABALIN 200 MG/1
200 CAPSULE ORAL
Qty: 90 | Refills: 0 | Status: COMPLETED | COMMUNITY
Start: 2021-08-24

## 2022-03-10 NOTE — REVIEW OF SYSTEMS
[Back Pain] : back pain [Unsteady Walking] : ataxia [Anxiety] : anxiety [Easy Bruising] : easy bruising [Negative] : Heme/Lymph [Memory Loss] : memory loss [Fever] : no fever [Chills] : no chills [Fatigue] : no fatigue [Redness] : no redness [Vision Problems] : no vision problems [Earache] : no earache [Sore Throat] : no sore throat [Chest Pain] : no chest pain [Palpitations] : no palpitations [Lower Ext Edema] : no lower extremity edema [Shortness Of Breath] : no shortness of breath [Wheezing] : no wheezing [Cough] : no cough [Dyspnea on Exertion] : no dyspnea on exertion [Abdominal Pain] : no abdominal pain [Nausea] : no nausea [Vomiting] : no vomiting [Heartburn] : no heartburn [Joint Pain] : no joint pain [Itching] : no itching [Skin Rash] : no skin rash [Headache] : no headache [Dizziness] : no dizziness [Depression] : no depression [Easy Bleeding] : no easy bleeding [Swollen Glands] : no swollen glands

## 2022-03-10 NOTE — HISTORY OF PRESENT ILLNESS
[Coronary Artery Disease] : coronary artery disease [Implantable Device/Pacemaker] : implantable device/pacemaker [COPD] : COPD [Smoker] : smoker [No Adverse Anesthesia Reaction] : no adverse anesthesia reaction in self or family member [(Patient denies any chest pain, claudication, dyspnea on exertion, orthopnea, palpitations or syncope)] : Patient denies any chest pain, claudication, dyspnea on exertion, orthopnea, palpitations or syncope [Good (7-10 METs)] : Good (7-10 METs) [Anti-Platelet Agents: _____] : Anti-Platelet Agents: [unfilled] [Aortic Stenosis] : no aortic stenosis [Atrial Fibrillation] : no atrial fibrillation [Recent Myocardial Infarction] : no recent myocardial infarction [Asthma] : no asthma [Sleep Apnea] : no sleep apnea [Family Member] : no family member with adverse anesthesia reaction/sudden death [Self] : no previous adverse anesthesia reaction [Chronic Anticoagulation] : no chronic anticoagulation [Chronic Kidney Disease] : no chronic kidney disease [Diabetes] : no diabetes [FreeTextEntry1] : dental implant/bridge placement [FreeTextEntry2] : 12/16/22 [FreeTextEntry3] : Dr. Jon Chaney/Dr. Prince Rosen   [FreeTextEntry4] : Patient is a 62  year-old female with a past medical history as below who presents for preoperative evaluation prior to dental implant/bridge placement. The procedure will be performed by Dr. Jon Chaney/Dr. Prince Rosen. The patient has no history of allergy or adverse reaction to anesthesia. The patient can walk many blocks and can walk up 1-2 flights of stairs without dyspnea on exertion. She denies chest pain or palpitations. [FreeTextEntry5] : Hx MI 2013  Former smoker quit 3 years ago  -   Never stimulator    -  Continue to use vapor cigarettes

## 2022-03-10 NOTE — PHYSICAL EXAM
[No Acute Distress] : no acute distress [Well Nourished] : well nourished [Well Developed] : well developed [Well-Appearing] : well-appearing [Normal Sclera/Conjunctiva] : normal sclera/conjunctiva [PERRL] : pupils equal round and reactive to light [EOMI] : extraocular movements intact [Normal Outer Ear/Nose] : the outer ears and nose were normal in appearance [Normal Oropharynx] : the oropharynx was normal [Normal TMs] : both tympanic membranes were normal [No JVD] : no jugular venous distention [No Lymphadenopathy] : no lymphadenopathy [Supple] : supple [Thyroid Normal, No Nodules] : the thyroid was normal and there were no nodules present [No Respiratory Distress] : no respiratory distress  [No Accessory Muscle Use] : no accessory muscle use [Clear to Auscultation] : lungs were clear to auscultation bilaterally [Normal Rate] : normal rate  [Regular Rhythm] : with a regular rhythm [Normal S1, S2] : normal S1 and S2 [No Murmur] : no murmur heard [No Carotid Bruits] : no carotid bruits [No Abdominal Bruit] : a ~M bruit was not heard ~T in the abdomen [No Varicosities] : no varicosities [Pedal Pulses Present] : the pedal pulses are present [No Edema] : there was no peripheral edema [No Palpable Aorta] : no palpable aorta [No Extremity Clubbing/Cyanosis] : no extremity clubbing/cyanosis [Soft] : abdomen soft [Non Tender] : non-tender [Non-distended] : non-distended [No Masses] : no abdominal mass palpated [No HSM] : no HSM [Normal Bowel Sounds] : normal bowel sounds [Normal Posterior Cervical Nodes] : no posterior cervical lymphadenopathy [Normal Anterior Cervical Nodes] : no anterior cervical lymphadenopathy [No CVA Tenderness] : no CVA  tenderness [No Spinal Tenderness] : no spinal tenderness [Scoliosis] : scoliosis [No Joint Swelling] : no joint swelling [Grossly Normal Strength/Tone] : grossly normal strength/tone [No Rash] : no rash [Coordination Grossly Intact] : coordination grossly intact [No Focal Deficits] : no focal deficits [Normal Gait] : normal gait [Speech Grossly Normal] : speech grossly normal [Normal Affect] : the affect was normal [Alert and Oriented x3] : oriented to person, place, and time [Normal Mood] : the mood was normal [Normal Insight/Judgement] : insight and judgment were intact [de-identified] : with murmur  [de-identified] : exam was limited pt refused to go supine   secondary to hip and back pain  [de-identified] : as per gyn  [de-identified] : 1 plus DTR

## 2022-03-10 NOTE — ASSESSMENT
[Patient Optimized for Surgery] : Patient optimized for surgery [No Further Testing Recommended] : no further testing recommended [Other: _____] : [unfilled] [Modify medications prior to procedure] : Modify medications prior to procedure [FreeTextEntry4] : Mrs. FAYE is a 62 year  female, who present to the office for medical clearance for dental extract and implants  [FreeTextEntry7] : Patient was advised to take Clindamycin prior to the procedure. She was also previously instructed to increase Medrol dosage on the day of the procedure.

## 2022-03-11 NOTE — ED PROVIDER NOTE - CPE EDP GASTRO NORM
[FreeTextEntry1] : GERD: The patient was advised to avoid late-night meals and dietary indiscretions.  The patient was advised to avoid fried and fatty foods.  The patient was advised to abide by an anti-GERD diet. The patient was given a pamphlet for anti-GERD.  The patient and I reviewed the anti-GERD diet at length. I recommend a trial of famotidine 40 mg twice a day x 3 months for the symptoms.\par Colon Cancer screening: I recommend colonoscopy for colon cancer screening over the age of 45 to assess for colonic polyps. The patient was told of the risks and benefits of the procedure.  The patient was told of the risks of perforation, emergency surgery, bleeding, infections and missed lesions. The patient is to be on a clear liquid diet the entire day prior to the procedure. The patient is to complete the entire prescribed bowel prep the day prior to the procedure as directed. The patient is to have a COVID 19 PCR nasopharyngeal swab performed at the approved sites 3 days prior to the procedure.  The patient is told not to drive, drink alcohol, use recreational drugs, exercise, or work the day of the procedure.  The patient was told of the need for an escort to accompany the patient home after the procedure. The patient is aware that the procedure may be cancelled if they fail to follow the directions.  The patient agreed and will schedule for the procedure. The patient can take the antihypertensive medication with a sip of water one hour prior to the procedure. The patient is to hold the diabetic medication the day before and the morning of the procedure. The patient is to hold the blood thinner medication for 7 days prior to the procedure. The patient is to be n.p.o. after midnight and bowel prep was given.  The patient is to return for the procedure.\par Colonoscopy: I recommend a colonoscopy to assess the symptoms and for colonic polyps.  The patient was told of the risks and benefits of the procedure.  The patient was told of the risks of perforation, emergency surgery, bleeding, infections and missed lesions.  The patient is to be on a clear liquid diet the entire day prior to the procedure. The patient is to complete the entire prescribed bowel prep the day prior to the procedure as directed. The patient is to have a COVID 19 PCR nasopharyngeal swab performed at the approved sites 3 days prior to the procedure.  The patient is told not to drive, drink alcohol, use recreational drugs, exercise, or work the day of the procedure.  The patient was told of the need for an escort to accompany the patient home after the procedure. The patient is aware that the procedure may be cancelled if they fail to follow the directions.  The patient agreed and will schedule for the procedure. The patient can take the antihypertensive medication with a sip of water one hour prior to the procedure. The patient is to hold the diabetic medication the day before and the morning of the procedure. The patient is to hold the blood thinner medication for 7 days prior to the procedure. The patient is to be n.p.o. after midnight and bowel prep was given.  The patient is to return for the procedure after cardiac clearance.\par Follow-up: The patient is to follow-up in the office in 4 weeks to reassess the symptoms. The patient was told to call the office if any further problems. \par \par \par \par \par  normal...

## 2022-03-15 ENCOUNTER — NON-APPOINTMENT (OUTPATIENT)
Age: 62
End: 2022-03-15

## 2022-03-15 LAB
ALBUMIN SERPL ELPH-MCNC: 5 G/DL
ALP BLD-CCNC: 30 U/L
ALT SERPL-CCNC: 36 U/L
ANION GAP SERPL CALC-SCNC: 14 MMOL/L
APTT BLD: 34.2 SEC
AST SERPL-CCNC: 49 U/L
BACTERIA UR CULT: NORMAL
BASOPHILS # BLD AUTO: 0.02 K/UL
BASOPHILS NFR BLD AUTO: 0.3 %
BILIRUB SERPL-MCNC: 0.3 MG/DL
BUN SERPL-MCNC: 16 MG/DL
CALCIUM SERPL-MCNC: 10.4 MG/DL
CHLORIDE SERPL-SCNC: 100 MMOL/L
CO2 SERPL-SCNC: 25 MMOL/L
CREAT SERPL-MCNC: 0.75 MG/DL
EGFR: 90 ML/MIN/1.73M2
EOSINOPHIL # BLD AUTO: 0.06 K/UL
EOSINOPHIL NFR BLD AUTO: 0.9 %
ESTIMATED AVERAGE GLUCOSE: 126 MG/DL
GLUCOSE SERPL-MCNC: 90 MG/DL
HBA1C MFR BLD HPLC: 6 %
HCT VFR BLD CALC: 39.8 %
HGB BLD-MCNC: 13 G/DL
IMM GRANULOCYTES NFR BLD AUTO: 0.2 %
INR PPP: 0.93 RATIO
LYMPHOCYTES # BLD AUTO: 0.76 K/UL
LYMPHOCYTES NFR BLD AUTO: 11.4 %
MAN DIFF?: NORMAL
MCHC RBC-ENTMCNC: 31.5 PG
MCHC RBC-ENTMCNC: 32.7 GM/DL
MCV RBC AUTO: 96.4 FL
MONOCYTES # BLD AUTO: 0.31 K/UL
MONOCYTES NFR BLD AUTO: 4.7 %
NEUTROPHILS # BLD AUTO: 5.48 K/UL
NEUTROPHILS NFR BLD AUTO: 82.5 %
PLATELET # BLD AUTO: 265 K/UL
POTASSIUM SERPL-SCNC: 5.1 MMOL/L
PROT SERPL-MCNC: 7.1 G/DL
PT BLD: 10.9 SEC
RBC # BLD: 4.13 M/UL
RBC # FLD: 12.1 %
SODIUM SERPL-SCNC: 139 MMOL/L
VIT B12 SERPL-MCNC: 794 PG/ML
WBC # FLD AUTO: 6.64 K/UL

## 2022-03-21 ENCOUNTER — EMERGENCY (EMERGENCY)
Facility: HOSPITAL | Age: 62
LOS: 1 days | Discharge: HOME | End: 2022-03-21
Attending: EMERGENCY MEDICINE | Admitting: INTERNAL MEDICINE
Payer: MEDICARE

## 2022-03-21 VITALS
SYSTOLIC BLOOD PRESSURE: 188 MMHG | TEMPERATURE: 99 F | HEART RATE: 75 BPM | WEIGHT: 108.91 LBS | RESPIRATION RATE: 18 BRPM | HEIGHT: 63 IN | DIASTOLIC BLOOD PRESSURE: 100 MMHG | OXYGEN SATURATION: 100 %

## 2022-03-21 VITALS — DIASTOLIC BLOOD PRESSURE: 82 MMHG | SYSTOLIC BLOOD PRESSURE: 140 MMHG

## 2022-03-21 DIAGNOSIS — Z85.118 PERSONAL HISTORY OF OTHER MALIGNANT NEOPLASM OF BRONCHUS AND LUNG: Chronic | ICD-10-CM

## 2022-03-21 DIAGNOSIS — Z90.49 ACQUIRED ABSENCE OF OTHER SPECIFIED PARTS OF DIGESTIVE TRACT: Chronic | ICD-10-CM

## 2022-03-21 DIAGNOSIS — Z96.641 PRESENCE OF RIGHT ARTIFICIAL HIP JOINT: Chronic | ICD-10-CM

## 2022-03-21 DIAGNOSIS — Z90.721 ACQUIRED ABSENCE OF OVARIES, UNILATERAL: Chronic | ICD-10-CM

## 2022-03-21 DIAGNOSIS — Z98.890 OTHER SPECIFIED POSTPROCEDURAL STATES: Chronic | ICD-10-CM

## 2022-03-21 LAB — VIT B1 SERPL-MCNC: 162.5 NMOL/L

## 2022-03-21 PROCEDURE — 99281 EMR DPT VST MAYX REQ PHY/QHP: CPT

## 2022-03-21 PROCEDURE — 99283 EMERGENCY DEPT VISIT LOW MDM: CPT | Mod: FS

## 2022-03-21 RX ORDER — OXYCODONE HYDROCHLORIDE 5 MG/1
1 TABLET ORAL
Qty: 8 | Refills: 0
Start: 2022-03-21 | End: 2022-03-22

## 2022-03-21 NOTE — ED PROVIDER NOTE - NSFOLLOWUPINSTRUCTIONS_ED_ALL_ED_FT
Follow up with your oral surgeon as planned, taking all results from the ER to be reviewed.  You can take the oxycodone 10 mg 1 tab every 6 hours if needed.  Caution  may cause drowsiness.  If any worsening, concerning or new signs or symptoms return to the ER.

## 2022-03-21 NOTE — ED PROVIDER NOTE - NS ED ATTENDING STATEMENT MOD
This was a shared visit with the ASHLY. I reviewed and verified the documentation and independently performed the documented:

## 2022-03-21 NOTE — ED PROVIDER NOTE - PATIENT PORTAL LINK FT
You can access the FollowMyHealth Patient Portal offered by [facility] by registering at the following website: [email or web address]. By joining [employer]’s FollowMyHealth portal, you will also be able to view your health information using other applications (apps) compatible with our system.

## 2022-03-21 NOTE — ED PROVIDER NOTE - OBJECTIVE STATEMENT
63 yo female, recent dental implants 5 days ago, comes to the ED asking for oxycodone 10 mg refill.  Was taking 6 a day instead of 4 a day so ran out 2 days earlier.  Denies any other complaints.  Mentions she has been using an old oxycodone script from February she never used ( which she has with her ) , not a current script.      Dr Rojas Checked prescription writer, No recent rx

## 2022-03-21 NOTE — ED PROVIDER NOTE - CLINICAL SUMMARY MEDICAL DECISION MAKING FREE TEXT BOX
61 yo female, recent dental implants 5 days ago, comes to the ED asking for oxycodone 10 mg refill.  Was taking 6 a day instead of 4 a day so ran out 2 days earlier.  Denies any other complaints. Gilda: 61 yo female, recent dental implants 5 days ago, comes to the ED asking for oxycodone 10 mg refill.  Was taking 6 a day instead of 4 a day so ran out 2 days earlier.  Denies any other complaints.

## 2022-03-25 ENCOUNTER — APPOINTMENT (OUTPATIENT)
Dept: INTERNAL MEDICINE | Facility: CLINIC | Age: 62
End: 2022-03-25
Payer: MEDICARE

## 2022-03-25 VITALS
DIASTOLIC BLOOD PRESSURE: 82 MMHG | SYSTOLIC BLOOD PRESSURE: 130 MMHG | HEIGHT: 62 IN | TEMPERATURE: 96.1 F | HEART RATE: 77 BPM | RESPIRATION RATE: 16 BRPM | OXYGEN SATURATION: 97 % | WEIGHT: 109 LBS | BODY MASS INDEX: 20.06 KG/M2

## 2022-03-25 DIAGNOSIS — R19.7 DIARRHEA, UNSPECIFIED: ICD-10-CM

## 2022-03-25 PROCEDURE — 99213 OFFICE O/P EST LOW 20 MIN: CPT

## 2022-03-25 RX ORDER — DIPHENOXYLATE HYDROCHLORIDE AND ATROPINE SULFATE 2.5; .025 MG/1; MG/1
2.5-0.025 TABLET ORAL 4 TIMES DAILY
Qty: 40 | Refills: 0 | Status: ACTIVE | COMMUNITY
Start: 2022-03-25 | End: 1900-01-01

## 2022-03-25 NOTE — ADDENDUM
[FreeTextEntry1] : I, Jose Daniel Correia, acted solely as scribe for Dr. Tariq Delgado DO on this date 03/25/2022 11:50AM .\par \par All medical record entries made by the Scribe were at my, Dr. Tariq Delgado DO direction and personally dictated by me on 03/25/2022 11:50AM. I have reviewed the chart and agree that the record accurately reflects my personal performance of the history, physical exam, assessment and plan. I have also personally directed, reviewed and agreed with the chart.\par

## 2022-03-25 NOTE — HEALTH RISK ASSESSMENT
[Current] : Current [No] : In the past 12 months have you used drugs other than those required for medical reasons? No [0] : 2) Feeling down, depressed, or hopeless: Not at all (0) [PHQ-2 Negative - No further assessment needed] : PHQ-2 Negative - No further assessment needed [de-identified] : Daily or almost daily.  [Audit-CScore] : 0 [[id number]] : 0 [MammogramComments] : Rx previously given for Mammogram and Breast US. [ColonoscopyComments] : Previously discussed referral to gastroenterologist as patient is due; IFOBT dated 9/3/20 was negative.

## 2022-03-25 NOTE — PLAN
[FreeTextEntry1] : Neurology\par seizures - continue Keppra (Levetiracetam) 1000mg BID p.o.q.d. as directed - continue to follow up with physiatrist \par neuropathy - continue Lyrica (Pregabalin) 100mg BID p.o.q.d. as directed; continue Gabapentin p.o. as directed; discussed starting Metanx - follow up with neurology  \par Gastroenterology\par diarrhea - possibly antibiotic associated diarrhea, R/O infectious etiology (history of C diff colitis) start Diphenoxylate-Atropine 2.5-0.025mg p.o. p.r.n. as directed (after doing stool studies), Rx filled; Rx given for Stool Studies\par Previously discussed seeing gastroenterologist for consultation as patient is due for a screening colonoscopy, patient to consider and follow up for referral; IFOBT dated 9/3/20 was negative\par Pulmonary\par current smoker - previously discussed the importance of smoking cessation in reducing risk for CV disease/lung cancer\par Cardiovascular\par lower extremity edema - secondary to venous insufficiency - continue using compression stockings - continue keeping legs elevated\par hyperlipidemia - continue Atorvastatin Calcium (Lipitor) 20mg p.o.q.h.s. as directed\par Continue Aspirin 81mg p.o.q.d. as directed given history of CVA\par Gynecology\par Previously advised to follow up with GYN annually; previously given Rx for Mammogram/Breast US\par Musculoskeletal/Endocrinology\par osteoporosis - continue Fosamax (Alendronate Sodium) 70mg p.o.q.w. as directed; continue Calcium/Vitamin D BID p.o.q.d. as directed \par Rheumatology\par mixed connective tissue disease - continue Hydroxychloroquine Sulfate 200mg p.o.q.d. and Medrol 4mg p.o.q.d. as directed - continue to follow up with rheumatology\par Musculoskeletal\par back pain - s/p laminectomy - continue Lyrica (Pregabalin) 100mg p.o. as directed, Zanaflex (Tizanidine HCl) 4mg, and ZTlido 1.8% External Patch as directed - continue to follow up with pain management physician\par Nephrology\par cerebral salt-wasting syndrome - continue Florinef (Fludrocortisone Acetate) 0.1mg p.o. as directed - continue to follow up with nephrology \par Oncology\par lung cancer - Chest CT to be done q. 6 months per oncologist/surgeon

## 2022-03-25 NOTE — REVIEW OF SYSTEMS
[Diarrhea] : diarrhea [Insomnia] : insomnia [Negative] : Heme/Lymph [Abdominal Pain] : no abdominal pain [FreeTextEntry7] : decreased appetite  [de-identified] : neuropathy

## 2022-03-25 NOTE — HISTORY OF PRESENT ILLNESS
[FreeTextEntry1] : general follow-up [de-identified] : Patient is a 62 year old female with a past medical history as below who presents for general follow-up. Patient states she is taking all medications as prescribed and denies any adverse reactions or side effects. Patient has noted worsening peripheral neuropathy; currently taking Lyrica 100mg TID and Gabapentin once daily. She notes an upcoming appointment with neurology. Patient is s/p dental implant/bridge placement with Dr. Jon Chaney/Dr. Prince Rosen. She has noted diarrhea since the procedure; no improvement with Imodium. She has also noted a decreased appetite. She denies abdominal cramping or blood in the toilet bowl. She had completed courses of Moxifloxacin, Flagyl (Metronidazole), secondary to the dental work. Patient has noted issues with sleep.

## 2022-03-25 NOTE — ASSESSMENT
[FreeTextEntry1] : Patient is a 62 year old female with a past medical history as above who presents for general follow-up.

## 2022-03-30 ENCOUNTER — LABORATORY RESULT (OUTPATIENT)
Age: 62
End: 2022-03-30

## 2022-04-01 ENCOUNTER — APPOINTMENT (OUTPATIENT)
Dept: NEUROLOGY | Facility: CLINIC | Age: 62
End: 2022-04-01
Payer: MEDICARE

## 2022-04-01 VITALS
WEIGHT: 106 LBS | HEIGHT: 62 IN | HEART RATE: 73 BPM | BODY MASS INDEX: 19.51 KG/M2 | DIASTOLIC BLOOD PRESSURE: 86 MMHG | SYSTOLIC BLOOD PRESSURE: 159 MMHG

## 2022-04-01 PROCEDURE — 99214 OFFICE O/P EST MOD 30 MIN: CPT

## 2022-04-01 RX ORDER — NORTRIPTYLINE HYDROCHLORIDE 25 MG/1
25 CAPSULE ORAL
Qty: 60 | Refills: 3 | Status: ACTIVE | COMMUNITY
Start: 2022-04-01 | End: 1900-01-01

## 2022-04-05 ENCOUNTER — NON-APPOINTMENT (OUTPATIENT)
Age: 62
End: 2022-04-05

## 2022-04-05 NOTE — HISTORY OF PRESENT ILLNESS
[FreeTextEntry1] : 61 y/o RH woman with right frontal lobe focal epilepsy. 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 \par \par Since last visit, no seizures, taking Keppra compliantly with no side effects.\par \par Her main complaint is burning paresthesias in the feet from her chronic polyneuropathy.  She takes Lyrica 100mg BID which has not been completely effective, would like to try alternative.\par \par Current AEDs:\par Keppra 1000mg PO BID\par \par Previous AEDs:\par Vimpat\par LEV\par PHT

## 2022-04-05 NOTE — ASSESSMENT
[FreeTextEntry1] : \par 63 y/o RH woman with right frontal lobe focal epilepsy. 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 \par \par Burning paresthesias in feet related to polyneuropathy.\par \par Plan: \par 1. Trial of Notriptyline 25mg daily, can increase to BID if no improvement.\par 2. Trial of capsaicin topical prn\par 3. Continue Keppra 1000mg PO BID\par 4. Seizure precautions discussed.\par 5. Patient agrees with plan.\par 6. Follow up in 3 months.\par \par Drake Gallegos MD\par [facility]\par \par Greater than 50% of the encounter was spent on counseling and coordination of care discussing treatment options. We have talked about appropriate follow up, and I have spent 25 minutes of face to face time with the patient.

## 2022-04-05 NOTE — PHYSICAL EXAM
[FreeTextEntry1] : Awake, alert, oriented x 3. Language fluent. Comprehension intact. Naming intact. Repetition intact. Affect normal. Cranial nerves grossly intact. Motor exam: normal bulk, normal tone, 5/5 in all four extremities. No tremors or fasciculations. Sensory exam: intact to LT/PP/KEVIN/vibration. DTRs: 1+ throughout, flexor plantar response bilaterally, no clonus. Coordination: no dysmetria. Gait: antalgic gait. Romberg - negative.

## 2022-04-07 LAB — TSH SERPL-ACNC: 1.19 UIU/ML

## 2022-04-09 LAB — COPPER SERPL-MCNC: 80 UG/DL

## 2022-04-13 LAB
ALBUMIN MFR SERPL ELPH: 63.5 %
ALBUMIN SERPL-MCNC: 4 G/DL
ALBUMIN/GLOB SERPL: 1.7 RATIO
ALPHA1 GLOB MFR SERPL ELPH: 5.2 %
ALPHA1 GLOB SERPL ELPH-MCNC: 0.3 G/DL
ALPHA2 GLOB MFR SERPL ELPH: 10.2 %
ALPHA2 GLOB SERPL ELPH-MCNC: 0.6 G/DL
B-GLOBULIN MFR SERPL ELPH: 11.6 %
B-GLOBULIN SERPL ELPH-MCNC: 0.7 G/DL
GAMMA GLOB FLD ELPH-MCNC: 0.6 G/DL
GAMMA GLOB MFR SERPL ELPH: 9.5 %
INTERPRETATION SERPL IEP-IMP: NORMAL
M PROTEIN SPEC IFE-MCNC: NORMAL
PROT SERPL-MCNC: 6.3 G/DL
PROT SERPL-MCNC: 6.3 G/DL

## 2022-05-03 ENCOUNTER — APPOINTMENT (OUTPATIENT)
Dept: PSYCHIATRY | Facility: CLINIC | Age: 62
End: 2022-05-03
Payer: MEDICARE

## 2022-05-03 PROCEDURE — 99213 OFFICE O/P EST LOW 20 MIN: CPT

## 2022-05-23 ENCOUNTER — NON-APPOINTMENT (OUTPATIENT)
Age: 62
End: 2022-05-23

## 2022-06-03 NOTE — PROGRESS NOTE ADULT - ASSESSMENT
59yo female with hx of CAD s/p possible MI (ECHO ), lung adenocarcinoma (s/p RML resection 2018), mixed connective tissue disorder (on plaquenil and methylprednisolone), left soleal vein thrombosis (on apixaban, 7/2020), RLE cellulitis (RX doxycycline), CDiff colitis (PO vancomycin 8/3- 8/17), lumbar laminectomy and chronic pain, EtOH (admissions for Etoh withdrawal), alcoholic pancreatitis, opiate abuse, presented to [facility] for acute rehab from [facility] for seizure/CVA    #Debility  -Comprehensive Multidisciplinary Rehab Program  -Continue comprehensive rehab program, 3 hours a day, 5 days a week.    #Seizures  -Continue Keppra 1g BID, Vimpat 200mg BID, Fosphenytoin 140mg BID  -neurological checks  -Follow up with Epilepsy Dr. Gallegos as outpatient    #CVA  -MRI revealed acute/subacute lacunar infarction within the right medial thalamus along with hypoperfused areas in bilateral frontal, temporal lobes consistent w/ post-ictal events  -Continue ASA 81mg daily  -Follow up with Lipid panel  -Continue Atorvastatin 20mg daily    #Hyponatremia  -Secondary to cerebral salt wasting  -Continue 1L fluid restriction  -Continue Florinef 0.1mg BID  -F/u with nephrology outpatient in 2 weeks (~9/11/20)    #Mixed Connective Tissue Disorder  -Continue Medrol 4mg PO daily (S/P Medrol taper)  -Transition Medrol to home dose of Prednisone  -Continue Plaquenil 200mg every other day    #Pancreatic insufficiency likely 2/2 Chronic Pancreatitis  -Likely chronic pancreatitis, Etoh induced vs mixed connective tissue disorder  -Continue Pancrelipase [id number] TID with meals    #Macrocytic Anemia  -Likely 2/2 EtOH abuse  -Continue multivitamin + Thiamine    #Urinary retention  -Resolved  -Due to neurologic diagnosis and limited mobility  -Voiding by herself    #Venous stasis dermatitis  -Resolves with leg elevation  -RLE skin changes not cellulitis at this time  -Encourage leg elevation   -started compression therapy 8/31, monitor for results     #Secondary hypercoagulable state due to immobility  - hep subq [id number]

## 2022-06-13 ENCOUNTER — APPOINTMENT (OUTPATIENT)
Dept: INTERNAL MEDICINE | Facility: CLINIC | Age: 62
End: 2022-06-13
Payer: MEDICARE

## 2022-06-13 ENCOUNTER — APPOINTMENT (OUTPATIENT)
Dept: INTERNAL MEDICINE | Facility: CLINIC | Age: 62
End: 2022-06-13

## 2022-06-13 ENCOUNTER — NON-APPOINTMENT (OUTPATIENT)
Age: 62
End: 2022-06-13

## 2022-06-13 DIAGNOSIS — R73.03 PREDIABETES.: ICD-10-CM

## 2022-06-13 DIAGNOSIS — R19.5 OTHER FECAL ABNORMALITIES: ICD-10-CM

## 2022-06-13 DIAGNOSIS — E78.5 HYPERLIPIDEMIA, UNSPECIFIED: ICD-10-CM

## 2022-06-13 PROCEDURE — 99213 OFFICE O/P EST LOW 20 MIN: CPT | Mod: 95

## 2022-06-13 NOTE — ASSESSMENT
[FreeTextEntry1] : Mrs. FAYE is a 62 year  female, who present  for televideo appointment  for medication renewal  and rx for fasting labs \par

## 2022-06-13 NOTE — HISTORY OF PRESENT ILLNESS
[Home] : at home, [unfilled] , at the time of the visit. [Other Location: e.g. Home (Enter Location, City,State)___] : at [unfilled] [Verbal consent obtained from patient] : the patient, [unfilled] [FreeTextEntry1] : Medication renewal and fasting labs  [de-identified] : Ms. FAYE is a 62 year  female, who present for televideo appointment for medication renewal.\par States she due for blood work and rx renewal on lipitor \par States takes the medication daily\par Since i last saw the pt  Dental procedure went well  States she had no issues or complication \par States her stool are normal and does not have diarrhea  any longer \par Denies any recent infection or change in medical hx\par States she eating better and limiting the sugar intake \par \par

## 2022-06-13 NOTE — PLAN
[FreeTextEntry1] : Hyperlipidemia -   Advised low fat cholesterol diet.  Advised importance of having low cholesterol / LDL/ triglycerides. Advised life style modifications.  Continue with current medications.  Refilled dose will consider decreasing to 10 mg pending labs if LDL is much  lower than 70  secondary to hx \par \par GI hx diarrhea abnormal stool sample - Currently asymptomatic - repeat sample\par \par Endo - pre DM - ADA diet repeat cmp, lipids, a1c and u/a\par \par GYN advised to follow up with GYN for a clinical breast exam order placed for mammo and sono\par \par Patient  education  - COVID-19   Counseling and education provided to the patient.  Advised sign and symptoms of the virus.  Advised contact precautions.  Educated patient on proper hand washing and to participate in social distancing. \par  \par We discussed how COVID 19  is tested.  Which is via nasopharyngeal swab.  Patient agree to testing at this time.\par   \par Advised  Ms. FAYE  if they feel SOB, BRAND, change in mental status, orthopnea, or dizziness upon standing to seek immediate medical attention. \par \par Patient is in full awareness of the plan and agrees to it.  All pt question was answered.  \par \par Ms. FAYE  advised to self quarantine for 14 days. Discussed in full detail what that means. Note was provided to the pt.\par \par  JULIO CESAR  was advised to contact anybody that has been in close contact with since she started feeling sick.  They should be told that you are highly suspicious for being COVID 19 positive and they should monitor for signs and symptoms of COVID.  If the start to have symptoms to call there medical provider for evaluation.  \par \par Hand out was given to the patient to refer to for more information about COVID 19. \par \par I spent 21 Minutes with the patient, half of which we discussed finding on physical exam  and coordinated care.  As well as reviewed my plans and follow ups.

## 2022-07-08 NOTE — DISCHARGE NOTE NURSING/CASE MANAGEMENT/SOCIAL WORK - MODE OF TRANSPORTATION
[Transvaginal OB Sonogram] : Transvaginal OB Sonogram [Intrauterine Pregnancy] : intrauterine pregnancy [Yolk Sac] : yolk sac present Ambulatory [Fetal Heart] : fetal heart present [CRL: ___ (mm)] : CRL - [unfilled]Umm [Date: ___] : Date: [unfilled] [Current GA by Sonogram: ___ (wks)] : Current GA by Sonogram: [unfilled]Uwks [___ day(s)] : [unfilled] days [Transvaginal OB Sonogram WNL] : Transvaginal OB Sonogram WNL [FreeTextEntry1] : edc 2/21/23   possible medical top

## 2022-08-02 ENCOUNTER — APPOINTMENT (OUTPATIENT)
Dept: PSYCHIATRY | Facility: CLINIC | Age: 62
End: 2022-08-02

## 2022-08-02 PROCEDURE — 99214 OFFICE O/P EST MOD 30 MIN: CPT

## 2022-08-17 LAB
25(OH)D3 SERPL-MCNC: 56.8 NG/ML
ALBUMIN SERPL ELPH-MCNC: 4.5 G/DL
ALP BLD-CCNC: 33 U/L
ALT SERPL-CCNC: 37 U/L
ANION GAP SERPL CALC-SCNC: 13 MMOL/L
APPEARANCE: CLEAR
AST SERPL-CCNC: 59 U/L
BASOPHILS # BLD AUTO: 0.02 K/UL
BASOPHILS NFR BLD AUTO: 0.4 %
BILIRUB SERPL-MCNC: 0.3 MG/DL
BILIRUBIN URINE: NEGATIVE
BLOOD URINE: NEGATIVE
BUN SERPL-MCNC: 14 MG/DL
CALCIUM SERPL-MCNC: 9.3 MG/DL
CHLORIDE SERPL-SCNC: 99 MMOL/L
CHOLEST SERPL-MCNC: 149 MG/DL
CO2 SERPL-SCNC: 26 MMOL/L
COLOR: COLORLESS
CREAT SERPL-MCNC: 0.79 MG/DL
CREAT SPEC-SCNC: 26 MG/DL
EGFR: 85 ML/MIN/1.73M2
EOSINOPHIL # BLD AUTO: 0.18 K/UL
EOSINOPHIL NFR BLD AUTO: 3.5 %
ESTIMATED AVERAGE GLUCOSE: 120 MG/DL
GLUCOSE QUALITATIVE U: NEGATIVE
GLUCOSE SERPL-MCNC: 86 MG/DL
HBA1C MFR BLD HPLC: 5.8 %
HCT VFR BLD CALC: 37.5 %
HDLC SERPL-MCNC: 102 MG/DL
HGB BLD-MCNC: 12.5 G/DL
IMM GRANULOCYTES NFR BLD AUTO: 0.2 %
KETONES URINE: NEGATIVE
LDLC SERPL CALC-MCNC: 37 MG/DL
LEUKOCYTE ESTERASE URINE: NEGATIVE
LYMPHOCYTES # BLD AUTO: 1.76 K/UL
LYMPHOCYTES NFR BLD AUTO: 33.9 %
MAN DIFF?: NORMAL
MCHC RBC-ENTMCNC: 31.3 PG
MCHC RBC-ENTMCNC: 33.3 GM/DL
MCV RBC AUTO: 94 FL
MICROALBUMIN 24H UR DL<=1MG/L-MCNC: <1.2 MG/DL
MICROALBUMIN/CREAT 24H UR-RTO: NORMAL MG/G
MONOCYTES # BLD AUTO: 0.57 K/UL
MONOCYTES NFR BLD AUTO: 11 %
NEUTROPHILS # BLD AUTO: 2.65 K/UL
NEUTROPHILS NFR BLD AUTO: 51 %
NITRITE URINE: NEGATIVE
NONHDLC SERPL-MCNC: 47 MG/DL
PH URINE: 6
PLATELET # BLD AUTO: 233 K/UL
POTASSIUM SERPL-SCNC: 4 MMOL/L
PROT SERPL-MCNC: 6.5 G/DL
PROTEIN URINE: NEGATIVE
RBC # BLD: 3.99 M/UL
RBC # FLD: 11.5 %
SODIUM SERPL-SCNC: 138 MMOL/L
SPECIFIC GRAVITY URINE: 1.01
TRIGL SERPL-MCNC: 53 MG/DL
TSH SERPL-ACNC: 1.55 UIU/ML
UROBILINOGEN URINE: NORMAL
WBC # FLD AUTO: 5.19 K/UL

## 2022-08-23 ENCOUNTER — RX RENEWAL (OUTPATIENT)
Age: 62
End: 2022-08-23

## 2022-09-01 ENCOUNTER — APPOINTMENT (OUTPATIENT)
Dept: PSYCHIATRY | Facility: CLINIC | Age: 62
End: 2022-09-01

## 2022-09-01 PROCEDURE — 99214 OFFICE O/P EST MOD 30 MIN: CPT

## 2022-09-12 ENCOUNTER — APPOINTMENT (OUTPATIENT)
Dept: INTERNAL MEDICINE | Facility: CLINIC | Age: 62
End: 2022-09-12

## 2022-09-18 ENCOUNTER — RESULT CHARGE (OUTPATIENT)
Age: 62
End: 2022-09-18

## 2022-09-19 ENCOUNTER — APPOINTMENT (OUTPATIENT)
Dept: INTERNAL MEDICINE | Facility: CLINIC | Age: 62
End: 2022-09-19

## 2022-09-19 ENCOUNTER — NON-APPOINTMENT (OUTPATIENT)
Age: 62
End: 2022-09-19

## 2022-09-19 VITALS
HEART RATE: 69 BPM | OXYGEN SATURATION: 95 % | HEIGHT: 62 IN | TEMPERATURE: 97.2 F | RESPIRATION RATE: 16 BRPM | DIASTOLIC BLOOD PRESSURE: 70 MMHG | SYSTOLIC BLOOD PRESSURE: 144 MMHG | BODY MASS INDEX: 19.74 KG/M2 | WEIGHT: 107.25 LBS

## 2022-09-19 DIAGNOSIS — R26.9 UNSPECIFIED ABNORMALITIES OF GAIT AND MOBILITY: ICD-10-CM

## 2022-09-19 DIAGNOSIS — Z01.818 ENCOUNTER FOR OTHER PREPROCEDURAL EXAMINATION: ICD-10-CM

## 2022-09-19 DIAGNOSIS — R94.31 ABNORMAL ELECTROCARDIOGRAM [ECG] [EKG]: ICD-10-CM

## 2022-09-19 PROCEDURE — 93000 ELECTROCARDIOGRAM COMPLETE: CPT | Mod: 59

## 2022-09-19 PROCEDURE — 99214 OFFICE O/P EST MOD 30 MIN: CPT | Mod: 25

## 2022-09-19 RX ORDER — BUSPIRONE HYDROCHLORIDE 10 MG/1
10 TABLET ORAL
Qty: 60 | Refills: 0 | Status: COMPLETED | COMMUNITY
Start: 2022-08-02 | End: 2022-08-10

## 2022-09-19 RX ORDER — PNV NO.95/FERROUS FUM/FOLIC AC 28MG-0.8MG
TABLET ORAL DAILY
Qty: 15 | Refills: 0 | Status: COMPLETED | COMMUNITY
Start: 2020-09-09 | End: 2022-09-19

## 2022-09-19 RX ORDER — ALPRAZOLAM 0.5 MG/1
0.5 TABLET ORAL
Qty: 1 | Refills: 0 | Status: DISCONTINUED | COMMUNITY
Start: 2022-08-02 | End: 2022-09-19

## 2022-09-19 RX ORDER — TIZANIDINE 4 MG/1
4 TABLET ORAL EVERY 6 HOURS
Refills: 0 | Status: ACTIVE | COMMUNITY

## 2022-09-19 RX ORDER — BUSPIRONE HYDROCHLORIDE 5 MG/1
5 TABLET ORAL
Qty: 14 | Refills: 0 | Status: COMPLETED | COMMUNITY
Start: 2022-08-02 | End: 2022-08-10

## 2022-09-19 RX ORDER — OXYCODONE 10 MG/1
10 TABLET ORAL
Qty: 30 | Refills: 0 | Status: COMPLETED | COMMUNITY
Start: 2022-02-15 | End: 2022-09-19

## 2022-09-20 ENCOUNTER — APPOINTMENT (OUTPATIENT)
Dept: CARDIOLOGY | Facility: CLINIC | Age: 62
End: 2022-09-20

## 2022-09-20 VITALS
OXYGEN SATURATION: 98 % | DIASTOLIC BLOOD PRESSURE: 66 MMHG | BODY MASS INDEX: 20.06 KG/M2 | SYSTOLIC BLOOD PRESSURE: 142 MMHG | HEIGHT: 62 IN | WEIGHT: 109 LBS | HEART RATE: 73 BPM

## 2022-09-20 PROCEDURE — 99205 OFFICE O/P NEW HI 60 MIN: CPT

## 2022-09-20 PROCEDURE — 93000 ELECTROCARDIOGRAM COMPLETE: CPT | Mod: NC

## 2022-09-20 NOTE — ASSESSMENT
[FreeTextEntry1] : A/P:\par \par *Preop clearance\par -Intermediate risk surgery - tolerated similar surgery\par last year w/o problems.\par -coronary calcium on CT scan -severe\par -stress test 1 yr ago neg with another cardiologist\par -functional capacity 10 METs no cardiac symptoms\par -ECG normal w/ no ischemia, nonspecific changes III only.\par \par -May proceed w/ surgery w/o further cardiac testing.\par -FU w/ cardiologist Dr. Baca next scheduled visit.\par

## 2022-09-20 NOTE — HISTORY OF PRESENT ILLNESS
[FreeTextEntry1] : 61 y/o \par \par here for preop clearance\par scheduled for sacral nerve stimulator procedure revision.\par Had stimulator procedure last year w/o complications but\par this device needs revision.\par \par Had CT scan of chest screening for lung cancer\par incidental finding of severe coronary artery calcification noted.\par PCP indicated need for cardiac clearance given severe coronary calcification.\par \par Pt reports cardiac clearance prior to sacral nerve surgery 1 yr ago.\par reports she had an exercise treadmill test that was neg for ischemia\par & was cleared for surgery. Unclear if any other cardiac testing done.\par \par Reports a "heart attack"  but did not get treatment\par Was told by other doctors she did not have MI.\par \par '13 years ago. No stents (?)\par Has required cardiac clearance since.\par \par Reviewed history:\par \par *COPD\par  *MI (?) questionable.\par *seizure\par *former smoker\par \par No chest pain, dyspnea, palpitations\par 5 miles a day w/ dog w/o problems\par >2 flights of stairs w/o problems.\par \par ECG NSR 1 mm T wave inversion mild nonspecific.\par no significant abnormalities.\par \par Reviewed records from cardiologist Dr. Kapil Baca\par Oct '21.  He describes Stress echo '21 which was nondiagnostic\par for ischemia due to HR - 74% MPHR. No ischemic changes on ECG\par at peak stress, 10 METs.  Cleared for surgery.

## 2022-09-21 NOTE — RESULTS/DATA
[] : results reviewed [de-identified] :   White blood cell count was 4.0, hemoglobin was  13.4, hematocrit was 40.9, platelet count was 228. [de-identified] :   Sodium level was 144, potassium was 4.5, chloride was 101, CO2 is 32, BUN was 13, creatinine was 0.6, glucose was 107, calcium was 9.0, AST was 40, ALT was 25, [de-identified] :  abnormal EKG. [de-identified] :  lipid panel showed a total cholesterol 204, triglycerides 60, HDLs 125, LDL 67

## 2022-09-21 NOTE — PHYSICAL EXAM
[No Acute Distress] : no acute distress [Well Nourished] : well nourished [Well Developed] : well developed [Well-Appearing] : well-appearing [Normal Sclera/Conjunctiva] : normal sclera/conjunctiva [PERRL] : pupils equal round and reactive to light [EOMI] : extraocular movements intact [Normal Outer Ear/Nose] : the outer ears and nose were normal in appearance [Normal Oropharynx] : the oropharynx was normal [Normal TMs] : both tympanic membranes were normal [No JVD] : no jugular venous distention [No Lymphadenopathy] : no lymphadenopathy [Supple] : supple [Thyroid Normal, No Nodules] : the thyroid was normal and there were no nodules present [No Respiratory Distress] : no respiratory distress  [No Accessory Muscle Use] : no accessory muscle use [Clear to Auscultation] : lungs were clear to auscultation bilaterally [Normal Rate] : normal rate  [Regular Rhythm] : with a regular rhythm [Normal S1, S2] : normal S1 and S2 [No Carotid Bruits] : no carotid bruits [No Abdominal Bruit] : a ~M bruit was not heard ~T in the abdomen [Pedal Pulses Present] : the pedal pulses are present [No Edema] : there was no peripheral edema [No Palpable Aorta] : no palpable aorta [No Extremity Clubbing/Cyanosis] : no extremity clubbing/cyanosis [Soft] : abdomen soft [Non Tender] : non-tender [Non-distended] : non-distended [No Masses] : no abdominal mass palpated [No HSM] : no HSM [Normal Bowel Sounds] : normal bowel sounds [Normal Posterior Cervical Nodes] : no posterior cervical lymphadenopathy [Normal Anterior Cervical Nodes] : no anterior cervical lymphadenopathy [No CVA Tenderness] : no CVA  tenderness [No Spinal Tenderness] : no spinal tenderness [Scoliosis] : scoliosis [No Joint Swelling] : no joint swelling [Grossly Normal Strength/Tone] : grossly normal strength/tone [No Rash] : no rash [Coordination Grossly Intact] : coordination grossly intact [No Focal Deficits] : no focal deficits [Normal Gait] : normal gait [Deep Tendon Reflexes (DTR)] : deep tendon reflexes were 2+ and symmetric [Speech Grossly Normal] : speech grossly normal [Alert and Oriented x3] : oriented to person, place, and time [Normal Insight/Judgement] : insight and judgment were intact [Declined Rectal Exam] : declined rectal exam [Kyphosis] : kyphosis [de-identified] :  upper and lower bridge [de-identified] : with murmur  [de-identified] : as per gyn  [de-identified] : multiple contusion on upper ext  [de-identified] :  patient was very agitated during exam

## 2022-09-21 NOTE — ASSESSMENT
[Patient Requires Further Testing] : Patient requires further testing [Cardiology consultation] : Cardiology consultation [Other: _____] : [unfilled] [Stress Test] : stress test [FreeTextEntry4] : Mrs. FAYE is a 62 year  female, who present to the office for medical clearance  [FreeTextEntry7] : hold MVI

## 2022-09-21 NOTE — REVIEW OF SYSTEMS
[Joint Pain] : joint pain [Back Pain] : back pain [Memory Loss] : memory loss [Anxiety] : anxiety [Easy Bruising] : easy bruising [Negative] : Psychiatric [Fever] : no fever [Chills] : no chills [Fatigue] : no fatigue [Redness] : no redness [Vision Problems] : no vision problems [Earache] : no earache [Nosebleed] : no nosebleeds [Sore Throat] : no sore throat [Chest Pain] : no chest pain [Palpitations] : no palpitations [Lower Ext Edema] : no lower extremity edema [Shortness Of Breath] : no shortness of breath [Wheezing] : no wheezing [Cough] : no cough [Dyspnea on Exertion] : no dyspnea on exertion [Abdominal Pain] : no abdominal pain [Nausea] : no nausea [Constipation] : no constipation [Diarrhea] : diarrhea [Vomiting] : no vomiting [Heartburn] : no heartburn [Melena] : no melena [Dysuria] : no dysuria [Incontinence] : no incontinence [Frequency] : no frequency [Skin Rash] : no skin rash [Headache] : no headache [Dizziness] : no dizziness [Fainting] : no fainting [Confusion] : no confusion [Unsteady Walking] : no ataxia [Depression] : no depression [Easy Bleeding] : no easy bleeding [Swollen Glands] : no swollen glands [FreeTextEntry9] : back pain has improved in lumbar area

## 2022-09-21 NOTE — PLAN
[FreeTextEntry1] : Patient history, physical and ancillary testing was reviewed by  Dr. Tariq Delgado. \par JULIO CESAR FAYE  was advised not to take any NSAIDs, ASA, Vitamin E, omega 3, ginkgo biloba or MVI 7 days prior to the surgery. \par \par Julio Cesar saw Dr. Montana on September 20September 20, 2022 and was given cardiac clearance.  refer to cardiac clearance.\par \par On chronic steroids- fludrocortisone and medrol-need and plan for stress dose steroids per rheumatology-DX Mixed Connective Tissue Disease\par \par   Patient was educated on the risk of the surgery.

## 2022-09-21 NOTE — ADDENDUM
[FreeTextEntry1] : Patient was seen by Dr. Montana  On September 19, 2022 for cardiac clearance.   patient was cleared by cardiology  to have the procedure done.

## 2022-09-21 NOTE — HISTORY OF PRESENT ILLNESS
[Coronary Artery Disease] : coronary artery disease [COPD] : COPD [(Patient denies any chest pain, claudication, dyspnea on exertion, orthopnea, palpitations or syncope)] : Patient denies any chest pain, claudication, dyspnea on exertion, orthopnea, palpitations or syncope [Smoker] : smoker [Aortic Stenosis] : no aortic stenosis [Atrial Fibrillation] : no atrial fibrillation [Recent Myocardial Infarction] : no recent myocardial infarction [Asthma] : no asthma [Sleep Apnea] : no sleep apnea [Family Member] : no family member with adverse anesthesia reaction/sudden death [Self] : no previous adverse anesthesia reaction [Chronic Anticoagulation] : no chronic anticoagulation [Chronic Kidney Disease] : no chronic kidney disease [Diabetes] : no diabetes [FreeTextEntry1] : Sacral Nerve stimulator  [FreeTextEntry2] : 09/26/22 [FreeTextEntry3] : Dr. Jc Butcher [FreeTextEntry4] : Mrs. FAYE is a 62 year  female, With a past medical history as noted below,who present to the office for  medical clearance.\par Patient scheduled to have a sacral nerve stimulator redone.    States the for stimulator did not work.\par   Recently had blood work done on September 18, 2022 by rheumatologist which include a CBC, comprehensive metabolic.\par   Also recently had a CT scan of the chest.\par   Patient denies ever being diagnosed with COVID infection.\par  patient states she stopped taking her Lipitor she does not need it.\par Patient is very argumentative,  and used a lot of profanity, while trying to take her medical history.  \par \par  [FreeTextEntry5] : Hx MI 2013  Former smoker quit 4  years ago [FreeTextEntry7] :   Had a recent CT scan without IV contrast.  Impression showed severe coronary artery calcification\par Mild peripheral reticulation, predominantly in the right lower lobe, likely  reflects interstitial lung abnormality

## 2022-09-23 ENCOUNTER — LABORATORY RESULT (OUTPATIENT)
Age: 62
End: 2022-09-23

## 2022-09-23 PROBLEM — Z01.818 PREOP TESTING: Status: ACTIVE | Noted: 2022-09-23

## 2022-09-26 ENCOUNTER — NON-APPOINTMENT (OUTPATIENT)
Age: 62
End: 2022-09-26

## 2022-10-04 NOTE — ED ADULT TRIAGE NOTE - BANDS:
[FreeTextEntry1] : physical examination is unchanged. Let us one umbilical hernia with a circumumbilical hold scar from abdominoplasty. After examination patient was discouraged from having any umbilical surgery because of possible umbilical skin loss. Followup as needed. Allergy;

## 2022-10-25 ENCOUNTER — APPOINTMENT (OUTPATIENT)
Dept: NEUROLOGY | Facility: CLINIC | Age: 62
End: 2022-10-25

## 2022-10-31 ENCOUNTER — APPOINTMENT (OUTPATIENT)
Dept: PSYCHIATRY | Facility: CLINIC | Age: 62
End: 2022-10-31

## 2022-10-31 ENCOUNTER — RX RENEWAL (OUTPATIENT)
Age: 62
End: 2022-10-31

## 2022-10-31 PROCEDURE — 99214 OFFICE O/P EST MOD 30 MIN: CPT

## 2022-10-31 NOTE — PHYSICAL EXAM
[None] : none [Talkative] : not talkative [Illogical tangential] : no logical tangential [Loose Circumstantial] : no loose circumstantial [Suicidal Ideation] : no suicidal ideation [Homicidal Ideation] : no homicidal ideation [Anxious] : anxious [Appropriate] : not appropriate [Irritable] : irritability [Constricted] : not constricted [Normal] : good [FreeTextEntry8] : Constricted

## 2022-10-31 NOTE — CURRENT PSYCHIATRIC SYMPTOMS
[Depressed Mood] : no depressed mood [Insomnia] : no insomnia disorder [Euphoria] : no euphoria [Grandeur] : no feelings of grandeur [Delusions] : no ~T delusions [Excessive Worry] : excessive worry [Hypochondriasis] : no hypochondriasis [Recent Onset] : no recent onset of confusion [Tremor] : ~T no ~M tremor was seen [Hallucination Tactile] : no tactile hallucinations [Yawning] : no yawning

## 2022-10-31 NOTE — DISCUSSION/SUMMARY
[FreeTextEntry1] : 62-year-old female with anxiety long history of depression benzodiazepine opiate dependence.  Plan Xanax 0.25 mg 3 times daily.  Follow-up in 3 months.

## 2022-10-31 NOTE — HISTORY OF PRESENT ILLNESS
[FreeTextEntry1] : Patient seen for transfer of care.  Patient having a lot of anxiety feels that she has been on 0.25 mg of Xanax twice a day for years and it is no longer having the effect.  Multiple trials of SSRIs BuSpar etc. no longer helpful.  Patient also on Lyrica. [No] : no [de-identified] : Intake 3/20/19: 59 y old female disabled single no dependents She has a long standing hx of anxiety since she had to put her dog to sleep \par \par She has been taking Opiates and benzodiazepines for many years. Alprazolam for over 10 years\par Recently treated by NP they tried sertraline cymbalta seroquel lexapro and wellbutrin "nothing works" \par \par Pt presents tearful appropriately emotional and she relates well to interventions \par She denies acute sxs of depression ho hopelessness no s/h/iip\par She relates anxiety that she manages with Xanax\par SHe takes one in the am and later in the day she takes one or two more tablets\par She uses Gabapentin for back pain and sleep \par \par She denies hypomania \par no psychosis although she relates an admission in 1993 for hallucinations\par she denies drugs or alcohol use present of past \par \par She takes care of brother's needs He lives in a home for the disabled with CP \par DIscussed therapy and mindfulness practice\par \par

## 2022-11-04 ENCOUNTER — RX RENEWAL (OUTPATIENT)
Age: 62
End: 2022-11-04

## 2022-11-23 ENCOUNTER — RX RENEWAL (OUTPATIENT)
Age: 62
End: 2022-11-23

## 2022-11-30 ENCOUNTER — OFFICE (OUTPATIENT)
Dept: URBAN - METROPOLITAN AREA CLINIC 102 | Facility: CLINIC | Age: 62
Setting detail: OPHTHALMOLOGY
End: 2022-11-30
Payer: MEDICARE

## 2022-11-30 DIAGNOSIS — H01.002: ICD-10-CM

## 2022-11-30 DIAGNOSIS — H35.362: ICD-10-CM

## 2022-11-30 DIAGNOSIS — Z79.899: ICD-10-CM

## 2022-11-30 DIAGNOSIS — H01.001: ICD-10-CM

## 2022-11-30 DIAGNOSIS — H01.005: ICD-10-CM

## 2022-11-30 DIAGNOSIS — H01.004: ICD-10-CM

## 2022-11-30 DIAGNOSIS — H16.223: ICD-10-CM

## 2022-11-30 PROCEDURE — 99214 OFFICE O/P EST MOD 30 MIN: CPT | Performed by: OPHTHALMOLOGY

## 2022-11-30 PROCEDURE — 68761 CLOSE TEAR DUCT OPENING: CPT | Performed by: OPHTHALMOLOGY

## 2022-11-30 PROCEDURE — 92134 CPTRZ OPH DX IMG PST SGM RTA: CPT | Performed by: OPHTHALMOLOGY

## 2022-11-30 PROCEDURE — 92083 EXTENDED VISUAL FIELD XM: CPT | Performed by: OPHTHALMOLOGY

## 2022-11-30 ASSESSMENT — REFRACTION_CURRENTRX
OD_OVR_VA: 20/
OS_SPHERE: +2.75
OS_OVR_VA: 20/
OD_SPHERE: +2.75

## 2022-11-30 ASSESSMENT — VISUAL ACUITY
OS_BCVA: 20/20
OD_BCVA: 20/20

## 2022-11-30 ASSESSMENT — SPHEQUIV_DERIVED
OS_SPHEQUIV: 1
OD_SPHEQUIV: 0.875

## 2022-11-30 ASSESSMENT — LID EXAM ASSESSMENTS
OD_BLEPHARITIS: RLL RUL 1+
OS_BLEPHARITIS: LLL LUL 1+

## 2022-11-30 ASSESSMENT — REFRACTION_AUTOREFRACTION
OD_SPHERE: +1.00
OS_AXIS: 093
OD_CYLINDER: -0.25
OS_CYLINDER: -0.50
OD_AXIS: 170
OS_SPHERE: +1.25

## 2022-11-30 ASSESSMENT — DECREASING TEAR LAKE - SEVERITY SCORE
OS_DEC_TEARLAKE: 1+
OD_DEC_TEARLAKE: 1+

## 2022-11-30 ASSESSMENT — SUPERFICIAL PUNCTATE KERATITIS (SPK)
OD_SPK: 1+
OS_SPK: 2+

## 2022-11-30 ASSESSMENT — TONOMETRY
OS_IOP_MMHG: 11
OD_IOP_MMHG: 10

## 2022-11-30 ASSESSMENT — CONFRONTATIONAL VISUAL FIELD TEST (CVF)
OS_FINDINGS: FULL
OD_FINDINGS: FULL

## 2022-12-06 ENCOUNTER — LABORATORY RESULT (OUTPATIENT)
Age: 62
End: 2022-12-06

## 2022-12-06 ENCOUNTER — APPOINTMENT (OUTPATIENT)
Dept: INTERNAL MEDICINE | Facility: CLINIC | Age: 62
End: 2022-12-06

## 2022-12-06 VITALS
DIASTOLIC BLOOD PRESSURE: 70 MMHG | HEART RATE: 65 BPM | RESPIRATION RATE: 17 BRPM | OXYGEN SATURATION: 97 % | TEMPERATURE: 98 F | SYSTOLIC BLOOD PRESSURE: 122 MMHG | HEIGHT: 62 IN

## 2022-12-06 DIAGNOSIS — R63.5 ABNORMAL WEIGHT GAIN: ICD-10-CM

## 2022-12-06 DIAGNOSIS — R23.2 FLUSHING: ICD-10-CM

## 2022-12-06 DIAGNOSIS — R30.0 DYSURIA: ICD-10-CM

## 2022-12-06 LAB
BILIRUB UR QL STRIP: NEGATIVE
CLARITY UR: CLEAR
COLLECTION METHOD: NORMAL
GLUCOSE UR-MCNC: NEGATIVE
HCG UR QL: 0.2 EU/DL
HGB UR QL STRIP.AUTO: ABNORMAL
KETONES UR-MCNC: NEGATIVE
LEUKOCYTE ESTERASE UR QL STRIP: NEGATIVE
NITRITE UR QL STRIP: NEGATIVE
PH UR STRIP: 6.5
PROT UR STRIP-MCNC: NEGATIVE
SP GR UR STRIP: 1.01

## 2022-12-06 PROCEDURE — 99214 OFFICE O/P EST MOD 30 MIN: CPT | Mod: 25

## 2022-12-06 PROCEDURE — 81003 URINALYSIS AUTO W/O SCOPE: CPT | Mod: QW

## 2022-12-06 PROCEDURE — 36415 COLL VENOUS BLD VENIPUNCTURE: CPT

## 2022-12-06 RX ORDER — NORTRIPTYLINE HYDROCHLORIDE 10 MG/1
10 CAPSULE ORAL
Qty: 60 | Refills: 0 | Status: ACTIVE | COMMUNITY
Start: 2022-11-07

## 2022-12-06 RX ORDER — OXYCODONE AND ACETAMINOPHEN 5; 325 MG/1; MG/1
5-325 TABLET ORAL
Qty: 10 | Refills: 0 | Status: ACTIVE | COMMUNITY
Start: 2022-09-26

## 2022-12-06 RX ORDER — BUPRENORPHINE AND NALOXONE 4; 1 MG/1; MG/1
4-1 FILM BUCCAL; SUBLINGUAL
Qty: 30 | Refills: 0 | Status: ACTIVE | COMMUNITY
Start: 2022-11-02

## 2022-12-06 NOTE — COUNSELING
[Behavioral health counseling provided] : Behavioral health counseling provided [Engage in a relaxing activity] : Engage in a relaxing activity [Yes] : Risk of tobacco use and health benefits of smoking cessation discussed: Yes [Cessation strategies including cessation program discussed] : Cessation strategies including cessation program discussed [No] : Not willing to quit smoking [Encouraged to maintain food diary] : Encouraged to maintain food diary [Encouraged to increase physical activity] : Encouraged to increase physical activity [Decrease Portions] : decrease portions [Good understanding] : Patient has a good understanding of disease, goals and obesity follow-up plan

## 2022-12-06 NOTE — HEALTH RISK ASSESSMENT
[Current] : Current [No] : In the past 12 months have you used drugs other than those required for medical reasons? No [0] : 2) Feeling down, depressed, or hopeless: Not at all (0) [PHQ-2 Negative - No further assessment needed] : PHQ-2 Negative - No further assessment needed [No falls in past year] : Patient reported no falls in the past year [de-identified] : Daily or almost daily.  [Audit-CScore] : 0 [[id number]] : 0

## 2022-12-06 NOTE — REVIEW OF SYSTEMS
[Negative] : Heme/Lymph [Fatigue] : fatigue [Night Sweats] : night sweats [Recent Change In Weight] : ~T recent weight change [Dysuria] : dysuria [Frequency] : frequency [Joint Pain] : joint pain [Muscle Pain] : muscle pain [Anxiety] : anxiety [Shortness Of Breath] : no shortness of breath [Cough] : no cough [Abdominal Pain] : no abdominal pain [Nausea] : no nausea [Vomiting] : no vomiting [Heartburn] : no heartburn [Incontinence] : no incontinence [Nocturia] : no nocturia [Hematuria] : no hematuria [Vaginal Discharge] : no vaginal discharge [Itching] : no itching [Skin Rash] : no skin rash [Memory Loss] : no memory loss [Easy Bleeding] : no easy bleeding [Easy Bruising] : no easy bruising [Swollen Glands] : no swollen glands [FreeTextEntry2] : Weight gain

## 2022-12-06 NOTE — PLAN
[FreeTextEntry1] : Urology  -dysuria- history of neurogenic bladder  -reviewed urine analysis with the patient we will check urine culture.  Advised patient increase fluid in diet.  Advised patient to follow-up with urologist.\par \par Endocrinology\par Fatigue-weight gain, heat intolerance -check full thyroid panel.  Counseling given to the patient.  We will discuss further pending ancillary blood test.\par \par Pulmonary  -COPD- tobacco abuse advised patient to discontinue tobacco use ASAP.  Follow-up with pulmonology.\par \par Cardiology-history of coronary artery disease- hyperlipidemia- advised patient to follow-up with a cardiologist.  Continue with the atorvastatin.  Monitor liver function tests as well as lipid panel.  Goal of an LDL less than 70.\par Encourage aerobic exercise as tolerated\par \par I spent 30  Minutes with the patient, half of which we discussed finding on physical exam  and coordinated care.  As well as reviewed my plans and follow ups. \par \par Will call patient with results once it is resulted

## 2022-12-06 NOTE — HISTORY OF PRESENT ILLNESS
[FreeTextEntry8] : Ms. FAYE is a 62 year  female, with a past medical history as noted below,who present to the office  today for evaluation of a UTI. \par Patient states she has been feeling fatigued, tired and having a pressure feeling with urination.  Also at nighttime with getting some hot flashes.  Unaware what makes it better or worse.  Denies having fever or chills.   In the past had similar symptoms was diagnosed with a urinary tract infection

## 2022-12-06 NOTE — ASSESSMENT
[FreeTextEntry1] : A 62-year-old female who presents to the office for evaluation of urinary tract infection.  As well as for evaluation of fatigue and weight gain

## 2022-12-06 NOTE — PHYSICAL EXAM
[No Acute Distress] : no acute distress [Well Nourished] : well nourished [Well Developed] : well developed [Well-Appearing] : well-appearing [Normal Sclera/Conjunctiva] : normal sclera/conjunctiva [PERRL] : pupils equal round and reactive to light [EOMI] : extraocular movements intact [Normal Outer Ear/Nose] : the outer ears and nose were normal in appearance [Normal Oropharynx] : the oropharynx was normal [Normal TMs] : both tympanic membranes were normal [No JVD] : no jugular venous distention [No Lymphadenopathy] : no lymphadenopathy [Supple] : supple [Thyroid Normal, No Nodules] : the thyroid was normal and there were no nodules present [No Respiratory Distress] : no respiratory distress  [No Accessory Muscle Use] : no accessory muscle use [Clear to Auscultation] : lungs were clear to auscultation bilaterally [Normal Rate] : normal rate  [Regular Rhythm] : with a regular rhythm [Normal S1, S2] : normal S1 and S2 [No Carotid Bruits] : no carotid bruits [No Abdominal Bruit] : a ~M bruit was not heard ~T in the abdomen [Pedal Pulses Present] : the pedal pulses are present [No Edema] : there was no peripheral edema [No Palpable Aorta] : no palpable aorta [No Extremity Clubbing/Cyanosis] : no extremity clubbing/cyanosis [Soft] : abdomen soft [Non Tender] : non-tender [Non-distended] : non-distended [No Masses] : no abdominal mass palpated [No HSM] : no HSM [Normal Bowel Sounds] : normal bowel sounds [Declined Rectal Exam] : declined rectal exam [Normal Posterior Cervical Nodes] : no posterior cervical lymphadenopathy [Normal Anterior Cervical Nodes] : no anterior cervical lymphadenopathy [No CVA Tenderness] : no CVA  tenderness [No Spinal Tenderness] : no spinal tenderness [Kyphosis] : kyphosis [Scoliosis] : scoliosis [No Joint Swelling] : no joint swelling [Grossly Normal Strength/Tone] : grossly normal strength/tone [No Rash] : no rash [Coordination Grossly Intact] : coordination grossly intact [No Focal Deficits] : no focal deficits [Normal Gait] : normal gait [Deep Tendon Reflexes (DTR)] : deep tendon reflexes were 2+ and symmetric [Speech Grossly Normal] : speech grossly normal [Alert and Oriented x3] : oriented to person, place, and time [Normal Insight/Judgement] : insight and judgment were intact [de-identified] :  upper and lower bridge [de-identified] : with murmur  [de-identified] : As per gynecology [de-identified] : Soft [de-identified] : as per gyn  [de-identified] : multiple contusion on upper ext  [de-identified] :  patient was very agitated during exam

## 2022-12-12 LAB
ALBUMIN SERPL ELPH-MCNC: 4.8 G/DL
ALP BLD-CCNC: 35 U/L
ALT SERPL-CCNC: 28 U/L
ANION GAP SERPL CALC-SCNC: 11 MMOL/L
AST SERPL-CCNC: 43 U/L
BACTERIA UR CULT: NORMAL
BASOPHILS # BLD AUTO: 0.01 K/UL
BASOPHILS NFR BLD AUTO: 0.2 %
BILIRUB SERPL-MCNC: 0.2 MG/DL
BUN SERPL-MCNC: 16 MG/DL
CALCIUM SERPL-MCNC: 9.7 MG/DL
CHLORIDE SERPL-SCNC: 100 MMOL/L
CO2 SERPL-SCNC: 30 MMOL/L
CREAT SERPL-MCNC: 0.69 MG/DL
EGFR: 98 ML/MIN/1.73M2
EOSINOPHIL # BLD AUTO: 0.1 K/UL
EOSINOPHIL NFR BLD AUTO: 1.7 %
GLUCOSE SERPL-MCNC: 94 MG/DL
HCT VFR BLD CALC: 40 %
HGB BLD-MCNC: 12.9 G/DL
IMM GRANULOCYTES NFR BLD AUTO: 0.2 %
LYMPHOCYTES # BLD AUTO: 1.06 K/UL
LYMPHOCYTES NFR BLD AUTO: 18.1 %
MAN DIFF?: NORMAL
MCHC RBC-ENTMCNC: 31.8 PG
MCHC RBC-ENTMCNC: 32.3 GM/DL
MCV RBC AUTO: 98.5 FL
MONOCYTES # BLD AUTO: 0.42 K/UL
MONOCYTES NFR BLD AUTO: 7.2 %
NEUTROPHILS # BLD AUTO: 4.26 K/UL
NEUTROPHILS NFR BLD AUTO: 72.6 %
PLATELET # BLD AUTO: 228 K/UL
POTASSIUM SERPL-SCNC: 5 MMOL/L
PROT SERPL-MCNC: 6.9 G/DL
RBC # BLD: 4.06 M/UL
RBC # FLD: 11.8 %
SODIUM SERPL-SCNC: 141 MMOL/L
T3 SERPL-MCNC: 94 NG/DL
T3FREE SERPL-MCNC: 2.88 PG/ML
T3RU NFR SERPL: 1.1 TBI
T4 FREE SERPL-MCNC: 1 NG/DL
T4 SERPL-MCNC: 6.4 UG/DL
THYROGLOB AB SERPL-ACNC: <20 IU/ML
THYROPEROXIDASE AB SERPL IA-ACNC: <10 IU/ML
TSH SERPL-ACNC: 1.97 UIU/ML
WBC # FLD AUTO: 5.86 K/UL

## 2022-12-13 ENCOUNTER — NON-APPOINTMENT (OUTPATIENT)
Age: 62
End: 2022-12-13

## 2022-12-19 ENCOUNTER — APPOINTMENT (OUTPATIENT)
Dept: NEPHROLOGY | Facility: CLINIC | Age: 62
End: 2022-12-19

## 2022-12-19 VITALS
HEART RATE: 61 BPM | OXYGEN SATURATION: 96 % | HEIGHT: 62 IN | DIASTOLIC BLOOD PRESSURE: 80 MMHG | TEMPERATURE: 97.8 F | SYSTOLIC BLOOD PRESSURE: 148 MMHG

## 2022-12-19 DIAGNOSIS — E87.1 HYPO-OSMOLALITY AND HYPONATREMIA: ICD-10-CM

## 2022-12-19 DIAGNOSIS — N31.9 NEUROMUSCULAR DYSFUNCTION OF BLADDER, UNSPECIFIED: ICD-10-CM

## 2022-12-19 PROCEDURE — 99212 OFFICE O/P EST SF 10 MIN: CPT

## 2022-12-23 ENCOUNTER — RX RENEWAL (OUTPATIENT)
Age: 62
End: 2022-12-23

## 2022-12-28 LAB
ANION GAP SERPL CALC-SCNC: 9 MMOL/L
BUN SERPL-MCNC: 20 MG/DL
CALCIUM SERPL-MCNC: 9.8 MG/DL
CHLORIDE SERPL-SCNC: 98 MMOL/L
CO2 SERPL-SCNC: 33 MMOL/L
CREAT SERPL-MCNC: 0.84 MG/DL
EGFR: 79 ML/MIN/1.73M2
GLUCOSE SERPL-MCNC: 78 MG/DL
POTASSIUM SERPL-SCNC: 4.5 MMOL/L
SODIUM SERPL-SCNC: 140 MMOL/L

## 2022-12-28 RX ORDER — FLUDROCORTISONE ACETATE 0.1 MG/1
0.1 TABLET ORAL
Qty: 60 | Refills: 0 | Status: DISCONTINUED | COMMUNITY
Start: 2020-09-09 | End: 2022-12-28

## 2022-12-28 NOTE — REVIEW OF SYSTEMS
[Recent Weight Loss (___ Lbs)] : recent [unfilled] ~Ulb weight loss [Dry Eyes] : dryness of the eyes [Constipation] : constipation [Arthralgias] : arthralgias [Anxiety] : anxiety [Easy Bruising] : a tendency for easy bruising [Negative] : ENT [Feeling Poorly] : not feeling poorly [Feeling Tired] : not feeling tired [Recent Weight Gain (___ Lbs)] : no recent weight gain [Heart Rate Is Slow] : the heart rate was not slow [Heart Rate Is Fast] : the heart rate was not fast [Palpitations] : no palpitations [Lower Ext Edema] : no lower extremity edema [Cough] : no cough [SOB on Exertion] : no shortness of breath during exertion [Heartburn] : no heartburn [Itching] : no itching [Dizziness] : no dizziness [Fainting] : no fainting [Muscle Weakness] : no muscle weakness [Easy Bleeding] : no tendency for easy bleeding [FreeTextEntry3] : eyes dry [FreeTextEntry6] : h/o lung cancer [FreeTextEntry9] : ankle

## 2022-12-28 NOTE — ASSESSMENT
[FreeTextEntry1] : A case of hyponatremia with seizure syndrome, mixed connective tissue disease, hypercalcemia, anxiety  has come for follow-up. There are no new symptoms.\par BP is stable. Weight is stable.\par Patient had blood tests on Dec 6th. Serum sodium and potassium were within normal range.\par Advised to hold Florinef for one week and repeat BMP.\par Electrolytes are within acceptable range despite holding Florinef. Advised d/c Florinef.

## 2022-12-28 NOTE — HISTORY OF PRESENT ILLNESS
[FreeTextEntry1] : A case of hyponatremia with seizure syndrome, mixed connective tissue disease, hypercalcemia, anxiety  has come for follow-up. There are no new symptoms.

## 2023-01-09 RX ORDER — ALENDRONATE SODIUM 70 MG/1
70 TABLET ORAL
Qty: 12 | Refills: 1 | Status: ACTIVE | COMMUNITY
Start: 2023-01-09 | End: 1900-01-01

## 2023-01-31 ENCOUNTER — APPOINTMENT (OUTPATIENT)
Dept: PSYCHIATRY | Facility: CLINIC | Age: 63
End: 2023-01-31
Payer: MEDICARE

## 2023-01-31 DIAGNOSIS — G62.9 POLYNEUROPATHY, UNSPECIFIED: ICD-10-CM

## 2023-01-31 PROCEDURE — 99214 OFFICE O/P EST MOD 30 MIN: CPT

## 2023-01-31 NOTE — DISCUSSION/SUMMARY
[FreeTextEntry1] : 63-year-old female with anxiety long history of depression benzodiazepine opiate dependence peripheral neuropathy.  Plan Xanax 0.25 mg 3 times daily Cymbalta 40 mg.  Follow-up in 3 months.

## 2023-01-31 NOTE — HISTORY OF PRESENT ILLNESS
[FreeTextEntry1] : Patient developing peripheral neuropathy in hands and feet.  Experiencing a lot of pain.  Scheduled to go in for an EMG.  Anxiety under better control with Xanax.  Was prescribed tricyclic inquiring about SNRI Cymbalta [de-identified] : Intake 3/20/19: 59 y old female disabled single no dependents She has a long standing hx of anxiety since she had to put her dog to sleep \par \par She has been taking Opiates and benzodiazepines for many years. Alprazolam for over 10 years\par Recently treated by NP they tried sertraline cymbalta seroquel lexapro and wellbutrin "nothing works" \par \par Pt presents tearful appropriately emotional and she relates well to interventions \par She denies acute sxs of depression ho hopelessness no s/h/iip\par She relates anxiety that she manages with Xanax\par SHe takes one in the am and later in the day she takes one or two more tablets\par She uses Gabapentin for back pain and sleep \par \par She denies hypomania \par no psychosis although she relates an admission in 1993 for hallucinations\par she denies drugs or alcohol use present of past \par \par She takes care of brother's needs He lives in a home for the disabled with CP \par DIscussed therapy and mindfulness practice\par \par

## 2023-02-25 ENCOUNTER — EMERGENCY (EMERGENCY)
Facility: HOSPITAL | Age: 63
LOS: 1 days | Discharge: HOME | End: 2023-02-25
Attending: STUDENT IN AN ORGANIZED HEALTH CARE EDUCATION/TRAINING PROGRAM | Admitting: STUDENT IN AN ORGANIZED HEALTH CARE EDUCATION/TRAINING PROGRAM
Payer: MEDICARE

## 2023-02-25 VITALS
WEIGHT: 110.01 LBS | TEMPERATURE: 98 F | OXYGEN SATURATION: 99 % | HEIGHT: 62 IN | DIASTOLIC BLOOD PRESSURE: 71 MMHG | HEART RATE: 67 BPM | SYSTOLIC BLOOD PRESSURE: 219 MMHG | RESPIRATION RATE: 18 BRPM

## 2023-02-25 VITALS
SYSTOLIC BLOOD PRESSURE: 194 MMHG | OXYGEN SATURATION: 98 % | HEART RATE: 69 BPM | RESPIRATION RATE: 16 BRPM | DIASTOLIC BLOOD PRESSURE: 98 MMHG

## 2023-02-25 DIAGNOSIS — Z90.721 ACQUIRED ABSENCE OF OVARIES, UNILATERAL: Chronic | ICD-10-CM

## 2023-02-25 DIAGNOSIS — Z98.890 OTHER SPECIFIED POSTPROCEDURAL STATES: Chronic | ICD-10-CM

## 2023-02-25 DIAGNOSIS — Z96.641 PRESENCE OF RIGHT ARTIFICIAL HIP JOINT: Chronic | ICD-10-CM

## 2023-02-25 DIAGNOSIS — Z90.49 ACQUIRED ABSENCE OF OTHER SPECIFIED PARTS OF DIGESTIVE TRACT: Chronic | ICD-10-CM

## 2023-02-25 DIAGNOSIS — Z85.118 PERSONAL HISTORY OF OTHER MALIGNANT NEOPLASM OF BRONCHUS AND LUNG: Chronic | ICD-10-CM

## 2023-02-25 PROCEDURE — 99283 EMERGENCY DEPT VISIT LOW MDM: CPT

## 2023-02-25 PROCEDURE — 99284 EMERGENCY DEPT VISIT MOD MDM: CPT | Mod: FS

## 2023-02-25 RX ORDER — OXYCODONE HYDROCHLORIDE 5 MG/1
1 TABLET ORAL
Qty: 12 | Refills: 0
Start: 2023-02-25 | End: 2023-02-27

## 2023-02-25 RX ORDER — OXYCODONE HYDROCHLORIDE 5 MG/1
5 TABLET ORAL ONCE
Refills: 0 | Status: DISCONTINUED | OUTPATIENT
Start: 2023-02-25 | End: 2023-02-25

## 2023-02-25 RX ADMIN — OXYCODONE HYDROCHLORIDE 5 MILLIGRAM(S): 5 TABLET ORAL at 14:08

## 2023-02-25 RX ADMIN — OXYCODONE HYDROCHLORIDE 5 MILLIGRAM(S): 5 TABLET ORAL at 13:27

## 2023-02-25 NOTE — ED PROVIDER NOTE - NSFOLLOWUPINSTRUCTIONS_ED_ALL_ED_FT
Be sure to follow-up with your hand surgeon Dr. Tello and keep your follow-up appointment for Thursday, 3/2/2023 or earlier If necessary.    Elevate injured hand is much as possible.     Continue Tylenol and Motrin for mild to moderate pain.  Oxycodone has been sent to your pharmacy for severe pain.      Return to ED immediately for any concerns including fingers blue, numb, tingling.      Your blood pressure was also elevated in the emergency room, follow-up with your primary care physician.      Cast or Splint Care, Adult      Casts and splints are supports that are worn to protect broken bones and other injuries. A cast or splint may hold a bone still and in the correct position while it heals. Casts and splints may also help with pain, swelling, and muscle spasms.    A cast is a hardened support that is usually made of fiberglass or plaster. It is custom-fit to the body and offers more protection than a splint. Most casts cannot be taken off and put back on. A splint is a type of soft support that is usually made from cloth and elastic. It can be adjusted or taken off as needed. Often, splints are used on broken bones at first. Later, a cast can replace the splint.      What are the risks?    In some cases, wearing a cast or splint can make it so that less blood gets to the wrist or hand or to the foot and toes. This can happen if there is a lot of swelling or if the cast or splint is too tight. Limited blood supply can cause a problem called compartment syndrome. This can lead to lasting damage. Symptoms include:  •Pain that gets worse.      •Numbness and tingling.      •Changes in skin color, including paleness or a bluish color.      •Cold fingers or toes.      Other problems from wearing a cast or splint can include:•Skin irritation that can cause:  •Itching.      •Rash.      •Skin sores.      •Skin infection.        •Limb stiffness or weakness.        How to care for a cast or splint that cannot be taken off  A person checking the skin around a cast on his foot and lower leg.   • Do not put pressure on any part of the cast or splint until it is fully hardened.      • Do not stick anything inside the cast or splint to scratch your skin.      •Check the skin around the cast or splint every day. Tell your doctor if you see problems.      •You may put lotion on dry skin around the cast or splint. Do not put lotion on the skin under the cast or splint.      •Keep the cast or splint clean and dry.        How to care for your splint that you can take off    •Wear the splint as told by your doctor. Take it off only as told by your doctor.      •Check the skin around it every day. Tell your doctor if you see problems.    •Loosen it if your fingers or toes:  •Tingle.      •Become numb.      •Turn cold and blue.        •Keep it clean and dry. Clean your splint as told by your doctor. Use mild soap and water and let it air-dry. Do not use heat on the splint.        Follow these instructions at home:    Bathing     • Do not take baths, swim, or use a hot tub until your doctor approves. Ask your doctor if you may take showers. You may only be allowed to take sponge baths.    •If the cast or splint is not waterproof:  •Do not let it get wet.      •Cover it with a watertight covering when you take a bath or a shower.          Managing pain, stiffness, and swelling   A person resting with her lower leg elevated.  •If told, put ice on the affected area. To do this:  •If you have a cast or splint that can be taken off, take it off as told by your doctor.      •Put ice in a plastic bag.      •Place a towel between your skin and the bag or between your cast and the bag.      •Leave the ice on for 20 minutes, 2–3 times a day.      •Take off the ice if your skin turns bright red. This is very important. If you cannot feel pain, heat, or cold, you have a greater risk of damage to the area.        •Move your fingers or toes often.      •Raise the injured area above the level of your heart while you are sitting or lying down.      Safety     • Do not use your injured leg or foot to support your body weight until your doctor says that you can.      •Use crutches or other helpful (assistive) devices as told by your doctor.      •Ask your doctor when it is safe to drive if you have a cast or splint on part of your body.      General instructions     •Take over-the-counter and prescription medicines only as told by your doctor.      •Return to your normal activities as told by your doctor. Ask your doctor what activities are safe for you.      •Keep all follow-up visits. This is important.        Contact a doctor if:    •The skin around the cast or splint gets red or raw.      •The skin under the cast is very itchy or painful.    •Your cast or splint:  •Gets damaged.      •Feels very uncomfortable.      •Is too tight or too loose.        •Your cast becomes wet or it starts to have a soft spot or area.      •There is a bad smell coming from under your cast.      •You get an object stuck under your cast.        Get help right away if:  •You get any symptoms of compartment syndrome, such as:  •Very bad pain or pressure under the cast.      •Numbness, tingling, coldness, or pale or bluish skin.        •The part of your body above or below the cast is swollen, and it turns a different color (is discolored).      •You cannot feel or move your fingers or toes.      •Your pain gets worse.      •There is fluid leaking through the cast.      •You have trouble breathing or shortness of breath.      •You have chest pain.      These symptoms may be an emergency. Get help right away. Call your local emergency services (911 in the [place]   • Do not wait to see if the symptoms will go away.       • Do not drive yourself to the hospital.         Summary    •Casts and splints are worn to protect broken bones and other injuries.      •Keep your cast or splint clean and dry.      •Take off your cast or splint only as told by your doctor.      •Get help right away if you have very bad pain, numbness, tingling, or skin that turns cold or another color.      This information is not intended to replace advice given to you by your health care provider. Make sure you discuss any questions you have with your health care provider.

## 2023-02-25 NOTE — ED ADULT NURSE NOTE - OBJECTIVE STATEMENT
pt came in from home with c/o right hand pain s/p surgery yesterday. pt reports she was walking her dog and he plled her hand with the lease causing her to tear a tendon in her hand. pt reports she has tendon repair done yesterday at [facility] but was told no to pain meds by surgeon. pt presents with bruising noted and splint on right hand. pt reports shes been taking Tylenol and motrin with no relief.

## 2023-02-25 NOTE — ED PROVIDER NOTE - PATIENT PORTAL LINK FT
You can access the FollowMyHealth Patient Portal offered by [facility] by registering at the following website: [email or web address]. By joining [facility]’s FollowMyHealth portal, you will also be able to view your health information using other applications (apps) compatible with our system.

## 2023-02-25 NOTE — ED PROVIDER NOTE - PHYSICAL EXAMINATION
Right hand/wrist with splint in place, not excessively restrictive, able to pass finger inside splint, ecchymosis noted to visible dorsum of hand, capillary refill of fingers less than 2 seconds, brisk anterior & dorsal distal x5 digits, full range of motion of exposed fingers, sensation and strength intact.

## 2023-02-25 NOTE — ED PROVIDER NOTE - NSICDXPASTSURGICALHX_GEN_ALL_CORE_FT
PAST SURGICAL HISTORY:  H/O hand surgery     History of hip replacement, total, right 6/7/2020    History of laminectomy     History of lung cancer s/p wedge resection of RML    History of lung surgery     History of oophorectomy, unilateral bilateral    S/P cholecystectomy     S/P lumbar laminectomy

## 2023-02-25 NOTE — ED PROVIDER NOTE - LOCATION
Right hand/wrist with splint in place, not excessively restrictive, able to pass finger inside splint, ecchymosis noted to visible dorsum of hand, capillary refill of fingers less than 2 seconds, brisk anterior & dorsal distal x5 digits, full range of motion of exposed fingers, sensation and strength intact./hand

## 2023-02-25 NOTE — ED PROVIDER NOTE - ATTENDING APP SHARED VISIT CONTRIBUTION OF CARE
I have personally performed a face to face medical and diagnostic evaluation of the patient. I have discussed with and reviewed the ASHLY's note and agree with the History, ROS, Physical Exam and MDM unless otherwise indicated. A brief summary of my personal evaluation and impression can be found below.    Faizan YOUNG: 63F history status post tendon release surgery to right upper extremity as of the last few days, right-hand-dominant, presents with a chief complaint today of right arm pain, she was prescribed Tylenol Motrin for pain by her outpatient surgeon, however pain is persisting prompting ED visit, no fever, she can move everything and feel everything, she notes diffuse bruising to her forearm and fingers since the surgery, no fever, no chest pain no shortness of breath otherwise has no acute concerns today.    All other ROS negative, except as above and as per HPI and ROS section.    VITALS: Initial triage and subsequent vitals have been reviewed by me.  GEN APPEARANCE: WDWN, alert, non-toxic, NAD  HEAD: Atraumatic.  EYES: PERRLa, EOMI, vision grossly intact.   NECK: Supple  CV: RRR, S1S2, no c/r/m/g. Cap refill <2 seconds. No bruits.   LUNGS: CTAB. No abnormal breath sounds.  ABDOMEN: Soft, NTND. No guarding or rebound.   MSK/EXT: Mild diffuse swelling to right upper extremity, forearm is in splint, patient preferring we do not take splint down distal cap refill is less than 2 seconds, sensation and motor intact distally, able to pass fingers in between skin and splint compartments appear soft.  NEURO: Alert, follows commands. Weight bearing normal. Speech normal. Sensation and motor normal x4 extremities.   SKIN: Warm, dry and intact. No rash.  PSYCH: Appropriate    Plan/MDM: Faizan YOUNG: 63F history status post tendon release surgery to right upper extremity as of the last few days, right-hand-dominant, presents with a chief complaint today of right arm pain, she was prescribed Tylenol Motrin for pain by her outpatient surgeon, however pain is persisting prompting ED visit, no fever, she can move everything and feel everything, she notes diffuse bruising to her forearm and fingers since the surgery, no fever, no chest pain no shortness of breath otherwise has no acute concerns today. low c/f acute infectious process does not appear c/w compartment syndrome, or limb ischemia, I suspect she more likely has post op changes, will give pain control, educate on returns for limb ischemia and compartment syndrome.

## 2023-02-25 NOTE — ED PROVIDER NOTE - CARE PROVIDER_API CALL
Jose Tello)  Orthopaedic Surgery; Surgery of the Hand  [address] [place] [address]  Phone: [phone]  Fax: [phone]  Follow Up Time:

## 2023-02-25 NOTE — ED PROVIDER NOTE - OBJECTIVE STATEMENT
63-year-old female complaining of right hand pain, RH dominant, s/p a tendon release surgery done by Dr. Tello yesterday 2/24/23, reports taking Tylenol & Motrin without adequate relief of pain, denies any numbness or tingling in the fingers, states was not prescribed any pain medication.

## 2023-02-25 NOTE — ED PROVIDER NOTE - CLINICAL SUMMARY MEDICAL DECISION MAKING FREE TEXT BOX
63-year-old female complaining of right hand pain, patient had a tendon release surgery done by Dr. Tello yesterday 2/24/23, reports taking Tylenol & Motrin without adequate relief of pain, denies any numbness or tingling in the fingers.    On examination capillary refill of fingers less than 2 seconds, brisk anterior & posterior distal x5 digits, full range of motion, sensation and strength intact.  Splint in place.    Patient cautioned regarding fingers turning blue numb and tingly any concerns return immediately to the emergency room.  Patient has follow-up appoint with Dr. Valles and the hand surgeon on this Thursday, 3/2/2023.  Emphasized importance of follow-up.  Patient pain medications at the pharmacy.  Patient indicates understanding and agrees with plan to discharge home. 63-year-old female complaining of right hand pain, RH dominant, s/p a tendon release surgery done by Dr. Tello yesterday 2/24/23, reports taking Tylenol & Motrin without adequate relief of pain, denies any numbness or tingling in the fingers, states was not prescribed any pain medication.    On examination splint in place, not excessively restrictive, able to pass finger inside splint, ecchymosis noted, dorsal capillary refill of fingers less than 2 seconds, brisk anterior & posterior distal x5 digits, full range of motion, sensation and strength intact.  Splint in place.    Patient cautioned regarding fingers turning blue, numb and tingly, decreased capillary refill (patient aware of what this is and visualized own),  any concerns return immediately to the emergency room.  Patient has follow-up appoint with Dr. Valles and the hand surgeon on this Thursday, 3/2/2023.  Emphasized importance of follow-up.  Patient pain medications at the pharmacy.  Patient indicates understanding and agrees with plan to discharge home.

## 2023-02-25 NOTE — ED PROVIDER NOTE - CARDIOVASCULAR NEGATIVE STATEMENT, MLM
Anesthesia Evaluation     Patient summary reviewed and Nursing notes reviewed   NPO Solid Status: > 8 hours  NPO Liquid Status: > 8 hours           Airway   Mallampati: II  TM distance: >3 FB  Neck ROM: full  No difficulty expected  Dental - normal exam     Pulmonary - normal exam    breath sounds clear to auscultation  (+) asthma,  (-) not a smoker  Cardiovascular   Exercise tolerance: good (4-7 METS)    ECG reviewed  Rhythm: regular  Rate: normal        Neuro/Psych  (+) headaches, dizziness/light headedness, psychiatric history Anxiety,    GI/Hepatic/Renal/Endo    (+) obesity, morbid obesity, GERD well controlled,  diabetes mellitus gestational,     Musculoskeletal (-) negative ROS    Abdominal   (+) obese,    Substance History - negative use     OB/GYN    (+) Pregnant,         Other - negative ROS                       Anesthesia Plan    ASA 2     epidural     intravenous induction     Anesthetic plan, risks, benefits, and alternatives have been provided, discussed and informed consent has been obtained with: patient.  Use of blood products discussed with patient  Consented to blood products.       CODE STATUS:        no chest pain and no edema.

## 2023-03-27 NOTE — DISCHARGE NOTE PROVIDER - NSDCACTIVITY_GEN_ALL_CORE
No heavy lifting/straining Normal rate, regular rhythm.  Heart sounds S1, S2.  No murmurs, rubs or gallops.

## 2023-04-17 ENCOUNTER — OFFICE (OUTPATIENT)
Dept: URBAN - METROPOLITAN AREA CLINIC 102 | Facility: CLINIC | Age: 63
Setting detail: OPHTHALMOLOGY
End: 2023-04-17
Payer: MEDICARE

## 2023-04-17 DIAGNOSIS — H01.002: ICD-10-CM

## 2023-04-17 DIAGNOSIS — H01.005: ICD-10-CM

## 2023-04-17 DIAGNOSIS — H01.001: ICD-10-CM

## 2023-04-17 DIAGNOSIS — H25.13: ICD-10-CM

## 2023-04-17 DIAGNOSIS — H16.223: ICD-10-CM

## 2023-04-17 DIAGNOSIS — H16.222: ICD-10-CM

## 2023-04-17 DIAGNOSIS — H01.004: ICD-10-CM

## 2023-04-17 DIAGNOSIS — H16.221: ICD-10-CM

## 2023-04-17 DIAGNOSIS — Z79.899: ICD-10-CM

## 2023-04-17 PROCEDURE — 99214 OFFICE O/P EST MOD 30 MIN: CPT | Performed by: OPHTHALMOLOGY

## 2023-04-17 PROCEDURE — 92083 EXTENDED VISUAL FIELD XM: CPT | Performed by: OPHTHALMOLOGY

## 2023-04-17 PROCEDURE — 83861 MICROFLUID ANALY TEARS: CPT | Performed by: OPHTHALMOLOGY

## 2023-04-17 PROCEDURE — 92250 FUNDUS PHOTOGRAPHY W/I&R: CPT | Performed by: OPHTHALMOLOGY

## 2023-04-17 PROCEDURE — 68761 CLOSE TEAR DUCT OPENING: CPT | Performed by: OPHTHALMOLOGY

## 2023-04-17 PROCEDURE — 92020 GONIOSCOPY: CPT | Performed by: OPHTHALMOLOGY

## 2023-04-17 ASSESSMENT — KERATOMETRY
METHOD_AUTO_MANUAL: AUTO
OD_K2POWER_DIOPTERS: 44.00
OD_AXISANGLE_DEGREES: 78
OD_K1POWER_DIOPTERS: 43.50
OS_K2POWER_DIOPTERS: 44.25
OS_AXISANGLE_DEGREES: 120
OS_K1POWER_DIOPTERS: 43.75

## 2023-04-17 ASSESSMENT — REFRACTION_CURRENTRX
OS_OVR_VA: 20/
OD_SPHERE: +2.75
OD_OVR_VA: 20/
OS_SPHERE: +2.75

## 2023-04-17 ASSESSMENT — REFRACTION_AUTOREFRACTION
OD_SPHERE: +1.25
OD_CYLINDER: -0.50
OS_CYLINDER: -0.50
OS_AXIS: 86
OD_AXIS: 87
OS_SPHERE: +1.50

## 2023-04-17 ASSESSMENT — VISUAL ACUITY
OD_BCVA: 20/25-1
OS_BCVA: 20/30

## 2023-04-17 ASSESSMENT — DECREASING TEAR LAKE - SEVERITY SCORE
OD_DEC_TEARLAKE: 1+
OS_DEC_TEARLAKE: 1+

## 2023-04-17 ASSESSMENT — CONFRONTATIONAL VISUAL FIELD TEST (CVF)
OS_FINDINGS: FULL
OD_FINDINGS: FULL

## 2023-04-17 ASSESSMENT — SUPERFICIAL PUNCTATE KERATITIS (SPK)
OD_SPK: 1+
OS_SPK: 2+

## 2023-04-17 ASSESSMENT — SPHEQUIV_DERIVED
OD_SPHEQUIV: 1
OS_SPHEQUIV: 1.25

## 2023-04-17 ASSESSMENT — AXIALLENGTH_DERIVED
OD_AL: 23.1201
OS_AL: 22.9398

## 2023-04-17 ASSESSMENT — TONOMETRY
OD_IOP_MMHG: 11
OS_IOP_MMHG: 12

## 2023-04-17 ASSESSMENT — LID EXAM ASSESSMENTS
OS_BLEPHARITIS: LLL LUL 1+
OD_BLEPHARITIS: RLL RUL 1+

## 2023-04-25 ENCOUNTER — APPOINTMENT (OUTPATIENT)
Dept: PSYCHIATRY | Facility: CLINIC | Age: 63
End: 2023-04-25
Payer: MEDICARE

## 2023-04-25 PROCEDURE — 99214 OFFICE O/P EST MOD 30 MIN: CPT

## 2023-04-25 NOTE — HISTORY OF PRESENT ILLNESS
[FreeTextEntry1] : Patient feeling better on the Cymbalta.  Had started it and stopped it and restarted it.  Neuropathy a little better.  Patient also on Pamelor.  Patient advised to contact neurologist informed him of medication and get suggestions.  Patient advised that Cymbalta probably a better medication with fewer side effects.  Patient concerned about weight gain. [de-identified] : Intake 3/20/19: 59 y old female disabled single no dependents She has a long standing hx of anxiety since she had to put her dog to sleep \par \par She has been taking Opiates and benzodiazepines for many years. Alprazolam for over 10 years\par Recently treated by NP they tried sertraline cymbalta seroquel lexapro and wellbutrin "nothing works" \par \par Pt presents tearful appropriately emotional and she relates well to interventions \par She denies acute sxs of depression ho hopelessness no s/h/iip\par She relates anxiety that she manages with Xanax\par SHe takes one in the am and later in the day she takes one or two more tablets\par She uses Gabapentin for back pain and sleep \par \par She denies hypomania \par no psychosis although she relates an admission in 1993 for hallucinations\par she denies drugs or alcohol use present of past \par \par She takes care of brother's needs He lives in a home for the disabled with CP \par DIscussed therapy and mindfulness practice\par \par

## 2023-05-02 ENCOUNTER — RX RENEWAL (OUTPATIENT)
Age: 63
End: 2023-05-02

## 2023-05-11 RX ORDER — ATORVASTATIN CALCIUM 20 MG/1
20 TABLET, FILM COATED ORAL
Qty: 30 | Refills: 0 | Status: ACTIVE | COMMUNITY
Start: 2020-09-09 | End: 1900-01-01

## 2023-06-09 RX ORDER — LEVETIRACETAM 1000 MG/1
1000 TABLET, FILM COATED ORAL
Qty: 60 | Refills: 5 | Status: ACTIVE | COMMUNITY
Start: 2020-09-09 | End: 1900-01-01

## 2023-06-14 ENCOUNTER — APPOINTMENT (OUTPATIENT)
Dept: INTERNAL MEDICINE | Facility: CLINIC | Age: 63
End: 2023-06-14
Payer: MEDICARE

## 2023-06-14 VITALS
DIASTOLIC BLOOD PRESSURE: 80 MMHG | HEART RATE: 71 BPM | WEIGHT: 113 LBS | BODY MASS INDEX: 20.8 KG/M2 | TEMPERATURE: 98.3 F | HEIGHT: 62 IN | SYSTOLIC BLOOD PRESSURE: 124 MMHG | RESPIRATION RATE: 17 BRPM | OXYGEN SATURATION: 95 %

## 2023-06-14 DIAGNOSIS — Z12.11 ENCOUNTER FOR SCREENING FOR MALIGNANT NEOPLASM OF COLON: ICD-10-CM

## 2023-06-14 DIAGNOSIS — R73.9 HYPERGLYCEMIA, UNSPECIFIED: ICD-10-CM

## 2023-06-14 DIAGNOSIS — Z12.31 ENCOUNTER FOR SCREENING MAMMOGRAM FOR MALIGNANT NEOPLASM OF BREAST: ICD-10-CM

## 2023-06-14 DIAGNOSIS — J43.9 EMPHYSEMA, UNSPECIFIED: ICD-10-CM

## 2023-06-14 DIAGNOSIS — T14.8XXA OTHER INJURY OF UNSPECIFIED BODY REGION, INITIAL ENCOUNTER: ICD-10-CM

## 2023-06-14 DIAGNOSIS — R53.83 OTHER FATIGUE: ICD-10-CM

## 2023-06-14 DIAGNOSIS — Z12.39 ENCOUNTER FOR OTHER SCREENING FOR MALIGNANT NEOPLASM OF BREAST: ICD-10-CM

## 2023-06-14 DIAGNOSIS — I25.10 ATHEROSCLEROTIC HEART DISEASE OF NATIVE CORONARY ARTERY W/OUT ANGINA PECTORIS: ICD-10-CM

## 2023-06-14 PROCEDURE — 99214 OFFICE O/P EST MOD 30 MIN: CPT | Mod: 25

## 2023-06-14 NOTE — DATA REVIEWED
[FreeTextEntry1] :  reviewed basic metabolic DecemberDecember 7, 2022 as well as a CBC and comprehensive metabolic and TSH have slight elevation in her AST\par \par  reviewed chart for mammogram, colonoscopy, Pap smear. did not see any recent examination/evaluations.\par \par   Did  Reviewed CT scan September 2022\par \par  patient slight increased weight since her last visit.

## 2023-06-14 NOTE — PLAN
[FreeTextEntry1] : derm I&D -  superficial abrasion left lateral aspect of the foot.  Mechanism of action on\par .  Advised patient to keep the area clean and dry.  Apply bacitracin ointment twice daily.  Continue to monitor very closely for signs of infection.  Education was provided about how to detect signs of infection.   if not healing or seems to be getting worse return to office immediately.   advised patient to follow-up with vascular as well as podiatry\par \par Endocrinology\par Fatigue-weight gain, heat intolerance -check TSH free t4 .  Counseling given to the patient.  We will discuss further pending ancillary blood test.\par \par Pulmonary  -COPD- tobacco abuse advised patient to discontinue tobacco use ASAP.  Follow-up with pulmonology.\par  advised patient to follow-up with pulmonology for PFTs and evaluation of the COPD.  Recommend CT scan [place] yearly\par \par  gynecology-advised patient follow-up with gynecologist for routine Pap smear and clinical breast exam.  Prescription given for mammogram.  Advised the importance of screening testing.\par   Patient states more likely she is not going.\par \par Cardiology-history of coronary artery disease- hyperlipidemia- advised patient to follow-up with a cardiologist.  Continue with the atorvastatin.  Monitor liver function tests as well as lipid panel.  Goal of an LDL less than 70.\par Encourage aerobic exercise as tolerated.  Check LFTs lipid panel today\par \par Vasc  -    Concerns of  Peripheral vascular disease.    history of COPD and tobacco abuse  - Advised patient follow-up with vascular  for HANSA.\par Neuro  -  history of peripheral neuropathy.  Advised patient follow-up with podiatrist for monitoring of her bilateral feet as well as clipping her nails to prevent any infection.\par \par  gastroenterology- advised patient follow-up with gastroenterologist for screening colonoscopy.  Advised importance of colon cancer screening.  Advised risk of secondary to tobacco abuse\par I spent 30  Minutes with the patient, half of which we discussed finding on physical exam  and coordinated care.  As well as reviewed my plans and follow ups. Dragon speech recognition software was used to create portions of this document.  An attempt at proofreading has been made to minimize errors please call if any questions arise. \par \par  patient states more likely she does not need follow-up with pulmonology, gastroenterology, mammogram, and/or podiatrist   education was provided to the patient

## 2023-06-14 NOTE — COUNSELING
[Behavioral health counseling provided] : Behavioral health counseling provided [Engage in a relaxing activity] : Engage in a relaxing activity [Yes] : Risk of tobacco use and health benefits of smoking cessation discussed: Yes [Cessation strategies including cessation program discussed] : Cessation strategies including cessation program discussed [No] : Not willing to quit smoking [Encouraged to maintain food diary] : Encouraged to maintain food diary [Encouraged to increase physical activity] : Encouraged to increase physical activity [Decrease Portions] : decrease portions [Good understanding] : Patient has a good understanding of disease, goals and obesity follow-up plan [FreeTextEntry2] :  slight increase weight since last visit

## 2023-06-14 NOTE — REVIEW OF SYSTEMS
[Fatigue] : fatigue [Anxiety] : anxiety [Negative] : Heme/Lymph [Night Sweats] : no night sweats [Recent Change In Weight] : ~T no recent weight change [Pain] : no pain [Vision Problems] : no vision problems [Itching] : no itching [Earache] : no earache [Nosebleed] : no nosebleeds [Sore Throat] : no sore throat [Chest Pain] : no chest pain [Palpitations] : no palpitations [Lower Ext Edema] : no lower extremity edema [Paroxysmal Nocturnal Dyspnea] : no paroxysmal nocturnal dyspnea [Shortness Of Breath] : no shortness of breath [Cough] : no cough [Abdominal Pain] : no abdominal pain [Nausea] : no nausea [Vomiting] : no vomiting [Heartburn] : no heartburn [Dysuria] : no dysuria [Incontinence] : no incontinence [Nocturia] : no nocturia [Hematuria] : no hematuria [Frequency] : no frequency [Vaginal Discharge] : no vaginal discharge [Muscle Pain] : no muscle pain [Skin Rash] : no skin rash [Memory Loss] : no memory loss [Easy Bleeding] : no easy bleeding [Easy Bruising] : no easy bruising [Swollen Glands] : no swollen glands [FreeTextEntry2] : Weight gain [FreeTextEntry9] : Foot pain

## 2023-06-14 NOTE — HEALTH RISK ASSESSMENT
[No] : In the past 12 months have you used drugs other than those required for medical reasons? No [No falls in past year] : Patient reported no falls in the past year [0] : 2) Feeling down, depressed, or hopeless: Not at all (0) [PHQ-2 Negative - No further assessment needed] : PHQ-2 Negative - No further assessment needed [Audit-CScore] : 0 [XPR2Kexns] : 0 [Current] : Current

## 2023-06-14 NOTE — PHYSICAL EXAM
[No Acute Distress] : no acute distress [Well Nourished] : well nourished [Well Developed] : well developed [Well-Appearing] : well-appearing [Normal Sclera/Conjunctiva] : normal sclera/conjunctiva [PERRL] : pupils equal round and reactive to light [EOMI] : extraocular movements intact [Normal Outer Ear/Nose] : the outer ears and nose were normal in appearance [Normal Oropharynx] : the oropharynx was normal [Normal TMs] : both tympanic membranes were normal [No JVD] : no jugular venous distention [No Lymphadenopathy] : no lymphadenopathy [Supple] : supple [No Respiratory Distress] : no respiratory distress  [No Accessory Muscle Use] : no accessory muscle use [Clear to Auscultation] : lungs were clear to auscultation bilaterally [Normal Rate] : normal rate  [Regular Rhythm] : with a regular rhythm [Normal S1, S2] : normal S1 and S2 [No Carotid Bruits] : no carotid bruits [No Abdominal Bruit] : a ~M bruit was not heard ~T in the abdomen [Pedal Pulses Present] : the pedal pulses are present [No Edema] : there was no peripheral edema [No Palpable Aorta] : no palpable aorta [No Extremity Clubbing/Cyanosis] : no extremity clubbing/cyanosis [Soft] : abdomen soft [Non Tender] : non-tender [Non-distended] : non-distended [No Masses] : no abdominal mass palpated [No HSM] : no HSM [Normal Bowel Sounds] : normal bowel sounds [Declined Rectal Exam] : declined rectal exam [No CVA Tenderness] : no CVA  tenderness [No Spinal Tenderness] : no spinal tenderness [Kyphosis] : kyphosis [Scoliosis] : scoliosis [No Joint Swelling] : no joint swelling [Grossly Normal Strength/Tone] : grossly normal strength/tone [No Rash] : no rash [Coordination Grossly Intact] : coordination grossly intact [No Focal Deficits] : no focal deficits [Normal Gait] : normal gait [Deep Tendon Reflexes (DTR)] : deep tendon reflexes were 2+ and symmetric [Speech Grossly Normal] : speech grossly normal [Alert and Oriented x3] : oriented to person, place, and time [Normal Insight/Judgement] : insight and judgment were intact [Normal Posterior Cervical Nodes] : no posterior cervical lymphadenopathy [Normal Anterior Cervical Nodes] : no anterior cervical lymphadenopathy [Normal Mood] : the mood was normal [de-identified] :  upper and lower bridge [de-identified] : with murmur  [de-identified] :  significant varicosities lower extremity, decreased pulses dorsalis pedis and posterior tibialis,  discoloration of the fingertips and toes [de-identified] : As per gynecology [de-identified] : Soft [FreeTextEntry1] :   As per gynecology [de-identified] : as per gyn  [de-identified] : multiple contusion on upper ext -   abrasion noted lateral aspect of the left foot   no cellulitis noted superficial scratches

## 2023-06-14 NOTE — ASSESSMENT
[FreeTextEntry1] : Ms. FAYE is a 63 year  female, with a past medical history as noted above ,who present to the office  today for Fasting blood work, general checkup and evaluation of the abrasion on the left foot

## 2023-06-14 NOTE — HISTORY OF PRESENT ILLNESS
[FreeTextEntry8] : Ms. FAYE is a 62 year  female, with a past medical history as noted below,who present to the office  today for evaluation of a UTI. \par Patient states she has been feeling fatigued, tired and having a pressure feeling with urination.  Also at nighttime with getting some hot flashes.  Unaware what makes it better or worse.  Denies having fever or chills.   In the past had similar symptoms was diagnosed with a urinary tract infection [FreeTextEntry1] : Fasting blood work, evaluation of anjelica foot abrasion. [de-identified] : Ms. FAYE is a 63 year  female, with a past medical history as noted below,who present to the office  today for   For fasting blood work and evaluation   Foot abrasion.  Patient denies following up with gastroenterology for colonoscopy.  Patient denies following up with gynecology for routine clinical breast exam as well as Pap smear.  Patient also denies going for routine mammogram.\par   Patient states she has no time to follow-up with multiple specialist.  Patient denies follow-up with pulmonology and/cardiology\par  patient states she has a painful abrasion on her lateral aspect of her foot.  Denies any trauma to the area.  States she probably got it when she was sleepwalking.

## 2023-06-26 NOTE — ED ADULT TRIAGE NOTE - PAIN RATING/NUMBER SCALE (0-10): ACTIVITY
How Severe Are Your Spot(S)?: mild What Is The Reason For Today's Visit?: Annual Full Body Skin Examination with No Concerns 3

## 2023-06-30 ENCOUNTER — LABORATORY RESULT (OUTPATIENT)
Age: 63
End: 2023-06-30

## 2023-07-07 ENCOUNTER — RESULT REVIEW (OUTPATIENT)
Age: 63
End: 2023-07-07

## 2023-07-07 ENCOUNTER — APPOINTMENT (OUTPATIENT)
Dept: INTERNAL MEDICINE | Facility: CLINIC | Age: 63
End: 2023-07-07
Payer: MEDICARE

## 2023-07-07 VITALS
OXYGEN SATURATION: 98 % | SYSTOLIC BLOOD PRESSURE: 122 MMHG | HEIGHT: 62 IN | WEIGHT: 111 LBS | DIASTOLIC BLOOD PRESSURE: 76 MMHG | HEART RATE: 80 BPM | BODY MASS INDEX: 20.43 KG/M2

## 2023-07-07 DIAGNOSIS — I73.9 PERIPHERAL VASCULAR DISEASE, UNSPECIFIED: ICD-10-CM

## 2023-07-07 DIAGNOSIS — R79.89 OTHER SPECIFIED ABNORMAL FINDINGS OF BLOOD CHEMISTRY: ICD-10-CM

## 2023-07-07 LAB
ALBUMIN SERPL ELPH-MCNC: 5.3 G/DL
ALP BLD-CCNC: 109 U/L
ALT SERPL-CCNC: 163 U/L
ANION GAP SERPL CALC-SCNC: 16 MMOL/L
AST SERPL-CCNC: 111 U/L
BILIRUB SERPL-MCNC: 0.3 MG/DL
BUN SERPL-MCNC: 17 MG/DL
CALCIUM SERPL-MCNC: 10.5 MG/DL
CHLORIDE SERPL-SCNC: 97 MMOL/L
CHOLEST SERPL-MCNC: 209 MG/DL
CO2 SERPL-SCNC: 27 MMOL/L
CREAT SERPL-MCNC: 0.75 MG/DL
EGFR: 89 ML/MIN/1.73M2
ESTIMATED AVERAGE GLUCOSE: 114 MG/DL
GLUCOSE SERPL-MCNC: 90 MG/DL
HBA1C MFR BLD HPLC: 5.6 %
HDLC SERPL-MCNC: 135 MG/DL
LDLC SERPL CALC-MCNC: 42 MG/DL
NONHDLC SERPL-MCNC: 73 MG/DL
POTASSIUM SERPL-SCNC: 4.4 MMOL/L
PROT SERPL-MCNC: 8 G/DL
SODIUM SERPL-SCNC: 141 MMOL/L
T3 SERPL-MCNC: 115 NG/DL
T3FREE SERPL-MCNC: 2.92 PG/ML
T3RU NFR SERPL: 1.2 TBI
T4 FREE SERPL-MCNC: 1 NG/DL
T4 SERPL-MCNC: 7.3 UG/DL
THYROGLOB AB SERPL-ACNC: <20 IU/ML
THYROPEROXIDASE AB SERPL IA-ACNC: <10 IU/ML
TRIGL SERPL-MCNC: 157 MG/DL
TSH SERPL-ACNC: 2.7 UIU/ML

## 2023-07-07 PROCEDURE — 99214 OFFICE O/P EST MOD 30 MIN: CPT | Mod: 25

## 2023-07-07 RX ORDER — LEVOFLOXACIN 500 MG/1
500 TABLET, FILM COATED ORAL
Qty: 7 | Refills: 0 | Status: COMPLETED | COMMUNITY
Start: 2022-09-26 | End: 2023-07-12

## 2023-07-10 NOTE — REVIEW OF SYSTEMS
[Fatigue] : fatigue [Night Sweats] : no night sweats [Recent Change In Weight] : ~T no recent weight change [Pain] : no pain [Vision Problems] : no vision problems [Itching] : no itching [Earache] : no earache [Nosebleed] : no nosebleeds [Sore Throat] : no sore throat [Chest Pain] : no chest pain [Palpitations] : no palpitations [Lower Ext Edema] : no lower extremity edema [Paroxysmal Nocturnal Dyspnea] : no paroxysmal nocturnal dyspnea [Shortness Of Breath] : no shortness of breath [Cough] : no cough [Abdominal Pain] : no abdominal pain [Nausea] : no nausea [Vomiting] : no vomiting [Heartburn] : no heartburn [Dysuria] : no dysuria [Incontinence] : no incontinence [Nocturia] : no nocturia [Hematuria] : no hematuria [Frequency] : no frequency [Vaginal Discharge] : no vaginal discharge [Muscle Pain] : no muscle pain [Itching] : no itching [Skin Rash] : no skin rash [Memory Loss] : no memory loss [Anxiety] : anxiety [Easy Bleeding] : no easy bleeding [Easy Bruising] : no easy bruising [Swollen Glands] : no swollen glands [Negative] : Heme/Lymph [FreeTextEntry9] : Foot pain, leg pain  [de-identified] : discoloration LE b/l

## 2023-07-10 NOTE — PLAN
[FreeTextEntry1] : Vasc - Decreases pulses bilateral, leg pain-referred patient to vascular surgeon for evaluation.  Also advised patient to go for venous and arterial Doppler of the lower extremity ASAP.   advised to go to radiology today for the examination.   advised patient to increase her legs.  start Levaquin 500 mg 1 tab p.o. daily.  Return to office next week for follow-up.  If get worse go to emergency room for evaluation.\par  check CBC, CPK, ESR.\par  advised risk of significant cellulitis versus  concerns of significant stenosis of lower extremity.    Advised possibility of  bone infection   versus necrosis.   advised if get worse go to emergency room immediately\par \par GI   Marked elevation in LFTs.  Repeat comprehensive metabolic today with hepatitis C.  Advised patient follow-up gastroenterology.  Check liver sonogram.   Advise no Tylenol products, alcohol intake.  Patient declined blood test encourage patient to reconsider.    Placed order for blood work to be done\par \par Pulmonary  -COPD- tobacco abuse advised patient to discontinue tobacco use ASAP.  Follow-up with pulmonology.\par  advised patient to follow-up with pulmonology for PFTs and evaluation of the COPD.  Recommend CT scan screening yearly\par \par  gynecology-advised patient follow-up with gynecologist for routine Pap smear and clinical breast exam.  Prescription given for mammogram.  Advised the importance of screening testing.\par   Patient states more likely she is not going.\par \par Cardiology-history of coronary artery disease- hyperlipidemia- advised patient to follow-up with a cardiologist.  Continue with the atorvastatin.  Monitor liver function tests as well as lipid panel.  Goal of an LDL less than 70.\par Encourage aerobic exercise as tolerated.   advise repeat  LFTs today.\par \par Vasc  -    Concerns of  Peripheral vascular disease.    history of COPD and tobacco abuse  - Again advised patient follow-up with vascular  for HANSA.\par \par Neuro  -  history of peripheral neuropathy.  Advised patient follow-up with podiatrist for monitoring of her bilateral feet as well as clipping her nails to prevent any infection.\par \par Gastroenterology- advised patient follow-up with gastroenterologist for screening colonoscopy.  Advised importance of colon cancer screening.  Advised risk of secondary to tobacco abuse.\par \par I spent 32  Minutes with the patient, half of which we discussed finding on physical exam  and coordinated care.  As well as reviewed my plans and follow ups. Dragon speech recognition software was used to create portions of this document.  An attempt at proofreading has been made to minimize errors please call if any questions arise. \par \par

## 2023-07-10 NOTE — ASSESSMENT
[FreeTextEntry1] : Ms. FAYE is a 63 year  female, with a past medical history as noted above ,who present to the office  today for leg pain and swelling b/l

## 2023-07-10 NOTE — HISTORY OF PRESENT ILLNESS
[Musculoskeletal Symptoms Legs] : legs [___ Weeks ago] : [unfilled] weeks ago [Constant] : constant [Severe] : severe [Rest] : rest [Activity] : with activity [Stable] : stable [FreeTextEntry4] : touching  [FreeTextEntry8] : States she made a vascular appointment  with 2 different vascular surgeons and the  earliest appointment was in 2 weeks.\par States legs are painful and red.  Unaware of what makes it better or worse.  \par Denies trauma to the area \par States she not smoking.\par Denies SOB, BRAND or night sweats

## 2023-07-10 NOTE — HEALTH RISK ASSESSMENT
[No] : In the past 12 months have you used drugs other than those required for medical reasons? No [No falls in past year] : Patient reported no falls in the past year [0] : 2) Feeling down, depressed, or hopeless: Not at all (0) [PHQ-2 Negative - No further assessment needed] : PHQ-2 Negative - No further assessment needed [Audit-CScore] : 0 [[id number]] : 0 [Former] : Former

## 2023-07-10 NOTE — PHYSICAL EXAM
[No Acute Distress] : no acute distress [Well Nourished] : well nourished [Well Developed] : well developed [Well-Appearing] : well-appearing [Normal Sclera/Conjunctiva] : normal sclera/conjunctiva [PERRL] : pupils equal round and reactive to light [EOMI] : extraocular movements intact [Normal Outer Ear/Nose] : the outer ears and nose were normal in appearance [Normal Oropharynx] : the oropharynx was normal [Normal TMs] : both tympanic membranes were normal [No JVD] : no jugular venous distention [No Lymphadenopathy] : no lymphadenopathy [Supple] : supple [No Respiratory Distress] : no respiratory distress  [No Accessory Muscle Use] : no accessory muscle use [Clear to Auscultation] : lungs were clear to auscultation bilaterally [Normal Rate] : normal rate  [Regular Rhythm] : with a regular rhythm [Normal S1, S2] : normal S1 and S2 [No Carotid Bruits] : no carotid bruits [No Abdominal Bruit] : a ~M bruit was not heard ~T in the abdomen [Pedal Pulses Present] : the pedal pulses are present [No Edema] : there was no peripheral edema [No Palpable Aorta] : no palpable aorta [No Extremity Clubbing/Cyanosis] : no extremity clubbing/cyanosis [Soft] : abdomen soft [Non Tender] : non-tender [Non-distended] : non-distended [No Masses] : no abdominal mass palpated [No HSM] : no HSM [Normal Bowel Sounds] : normal bowel sounds [Declined Rectal Exam] : declined rectal exam [Normal Posterior Cervical Nodes] : no posterior cervical lymphadenopathy [Normal Anterior Cervical Nodes] : no anterior cervical lymphadenopathy [No CVA Tenderness] : no CVA  tenderness [No Spinal Tenderness] : no spinal tenderness [Kyphosis] : kyphosis [Scoliosis] : scoliosis [No Joint Swelling] : no joint swelling [Grossly Normal Strength/Tone] : grossly normal strength/tone [No Rash] : no rash [Coordination Grossly Intact] : coordination grossly intact [No Focal Deficits] : no focal deficits [Normal Gait] : normal gait [Deep Tendon Reflexes (DTR)] : deep tendon reflexes were 2+ and symmetric [Speech Grossly Normal] : speech grossly normal [Alert and Oriented x3] : oriented to person, place, and time [Normal Mood] : the mood was normal [Normal Insight/Judgement] : insight and judgment were intact [de-identified] :  upper and lower bridge [de-identified] : with murmur  [de-identified] :  significant varicosities lower extremity, decreased pulses dorsalis pedis and posterior tibialis,  discoloration of the fingertips and toes,  small abrasion noted b/l toes   trace edema -  [de-identified] : As per gynecology [de-identified] : Soft [FreeTextEntry1] :   As per gynecology [de-identified] : as per gyn  [de-identified] : multiple contusion on upper ext -   abrasion noted lateral aspect of the left foot   no cellulitis noted superficial scratches

## 2023-07-10 NOTE — DATA REVIEWED
[FreeTextEntry1] : reviewed cbc 06/23 and a1c \par LFT elevated bhavin elevation \par reviewed labs with th pt

## 2023-07-18 NOTE — DIETITIAN INITIAL EVALUATION ADULT. - PROBLEM SELECTOR PLAN 10
Problem: Non-Pressure Injury Wound  Goal: # No deterioration in wound  Outcome: Outcome Met, Continue evaluating goal progress toward completion  Goal: # Verbalizes understanding of wound and wound care  Description: If abnormality is a skin tear, avoid using tape on skin including transparent dressings. Document education using the patient education activity.  Outcome: Outcome Met, Continue evaluating goal progress toward completion  Goal: Participates in wound care activities  Outcome: Outcome Met, Continue evaluating goal progress toward completion     Wound Care Provider Visit     Patient seen in Wound Care Clinic by: MEIR Arroyo NP RN assisted by: Dori    Wound care orders and/or updates:   LLE anterior - wound vac @ 125 cont  L foot and L ankle - AC flex  LLE - cover with cotton and coban  R and L heel - prevalon boots    Wound clinic will be managing wounds for the next two weeks.    Lab(s)/additional orders placed this visit: Wound Culture  Wound(s) debrided by provider: No Consent obtained/verified: N/A  Dermal Applied: No Next Dermal Placement on: N/A    Wound care being completed by: Wound Clinic 3x/week  Supplies provided/ordered: N/A    Patient education complete: Yes  Patient verbalized understanding of education/patient questions answered: Yes    Follow up in 2 weeks.     DVT prophylaxis: SCDs    IMPROVE VTE Individual Risk Assessment          RISK                                                          Points  [  ] Previous VTE                                                3  [  ] Thrombophilia                                             2  [  ] Lower limb paralysis                                   2        (unable to hold up >15 seconds)    [  ] Current Cancer                                             2         (within 6 months)  [  ] Immobilization > 24 hrs                              1  [  ] ICU/CCU stay > 24 hours                             1  [  ] Age > 60                                                         1    IMPROVE VTE Score: 0

## 2023-07-19 ENCOUNTER — OUTPATIENT (OUTPATIENT)
Dept: OUTPATIENT SERVICES | Facility: HOSPITAL | Age: 63
LOS: 1 days | End: 2023-07-19
Payer: MEDICARE

## 2023-07-19 ENCOUNTER — APPOINTMENT (OUTPATIENT)
Dept: ULTRASOUND IMAGING | Facility: HOSPITAL | Age: 63
End: 2023-07-19
Payer: MEDICARE

## 2023-07-19 ENCOUNTER — NON-APPOINTMENT (OUTPATIENT)
Age: 63
End: 2023-07-19

## 2023-07-19 DIAGNOSIS — Z90.49 ACQUIRED ABSENCE OF OTHER SPECIFIED PARTS OF DIGESTIVE TRACT: Chronic | ICD-10-CM

## 2023-07-19 DIAGNOSIS — Z98.890 OTHER SPECIFIED POSTPROCEDURAL STATES: Chronic | ICD-10-CM

## 2023-07-19 DIAGNOSIS — Z85.118 PERSONAL HISTORY OF OTHER MALIGNANT NEOPLASM OF BRONCHUS AND LUNG: Chronic | ICD-10-CM

## 2023-07-19 DIAGNOSIS — M79.606 PAIN IN LEG, UNSPECIFIED: ICD-10-CM

## 2023-07-19 DIAGNOSIS — Z00.00 ENCOUNTER FOR GENERAL ADULT MEDICAL EXAMINATION WITHOUT ABNORMAL FINDINGS: ICD-10-CM

## 2023-07-19 DIAGNOSIS — Z96.641 PRESENCE OF RIGHT ARTIFICIAL HIP JOINT: Chronic | ICD-10-CM

## 2023-07-19 PROCEDURE — 93970 EXTREMITY STUDY: CPT | Mod: 26

## 2023-07-19 PROCEDURE — 93925 LOWER EXTREMITY STUDY: CPT

## 2023-07-19 PROCEDURE — 93970 EXTREMITY STUDY: CPT

## 2023-07-19 PROCEDURE — 93925 LOWER EXTREMITY STUDY: CPT | Mod: 26

## 2023-07-19 NOTE — GOALS OF CARE CONVERSATION - ADVANCED CARE PLANNING - NS PRO AD PATIENT TYPE
No driving, operating machinery, making legal decisions, or signing legal documents until tomorrow.   Continue diet as tolerated. Drink plenty of fluids and rest.   If unable to void in 8 hours proceed to nearest ER.   Notify MD of redness or drainage from incision site as well as any fever over the next 3-4 days.  No lifting over 5 lbs for the next 24 hrs.   Continue medications as prescribed. May take pain medication as prescribed.   May shower tomorrow. No tub baths for 48 hours following procedure.   May remove bandaids tomorrow, if they fall off before tomorrow they do not have to be replaced.    
Health Care Proxy (HCP)

## 2023-07-20 ENCOUNTER — NON-APPOINTMENT (OUTPATIENT)
Age: 63
End: 2023-07-20

## 2023-07-21 ENCOUNTER — NON-APPOINTMENT (OUTPATIENT)
Age: 63
End: 2023-07-21

## 2023-07-21 RX ORDER — SULFAMETHOXAZOLE AND TRIMETHOPRIM 800; 160 MG/1; MG/1
800-160 TABLET ORAL
Qty: 14 | Refills: 0 | Status: ACTIVE | COMMUNITY
Start: 2023-07-21 | End: 1900-01-01

## 2023-07-25 ENCOUNTER — APPOINTMENT (OUTPATIENT)
Dept: PSYCHIATRY | Facility: CLINIC | Age: 63
End: 2023-07-25
Payer: MEDICARE

## 2023-07-25 PROCEDURE — 99214 OFFICE O/P EST MOD 30 MIN: CPT

## 2023-07-25 NOTE — HISTORY OF PRESENT ILLNESS
[FreeTextEntry1] : Developed cellulitis in both legs.  On antibiotics. Having a difficult time walking.  On the 40 mg of Cymbalta not really helping with the neuropathy. [de-identified] : Intake 3/20/19: 59 y old female disabled single no dependents She has a long standing hx of anxiety since she had to put her dog to sleep \par \par She has been taking Opiates and benzodiazepines for many years. Alprazolam for over 10 years\par Recently treated by NP they tried sertraline cymbalta seroquel lexapro and wellbutrin "nothing works" \par \par Pt presents tearful appropriately emotional and she relates well to interventions \par She denies acute sxs of depression ho hopelessness no s/h/iip\par She relates anxiety that she manages with Xanax\par SHe takes one in the am and later in the day she takes one or two more tablets\par She uses Gabapentin for back pain and sleep \par \par She denies hypomania \par no psychosis although she relates an admission in 1993 for hallucinations\par she denies drugs or alcohol use present of past \par \par She takes care of brother's needs He lives in a home for the disabled with CP \par DIscussed therapy and mindfulness practice\par \par

## 2023-07-31 ENCOUNTER — OFFICE (OUTPATIENT)
Dept: URBAN - METROPOLITAN AREA CLINIC 102 | Facility: CLINIC | Age: 63
Setting detail: OPHTHALMOLOGY
End: 2023-07-31
Payer: MEDICARE

## 2023-07-31 DIAGNOSIS — H16.222: ICD-10-CM

## 2023-07-31 DIAGNOSIS — H16.221: ICD-10-CM

## 2023-07-31 DIAGNOSIS — H01.001: ICD-10-CM

## 2023-07-31 DIAGNOSIS — H35.362: ICD-10-CM

## 2023-07-31 DIAGNOSIS — H01.002: ICD-10-CM

## 2023-07-31 DIAGNOSIS — H01.005: ICD-10-CM

## 2023-07-31 DIAGNOSIS — H16.223: ICD-10-CM

## 2023-07-31 DIAGNOSIS — H01.004: ICD-10-CM

## 2023-07-31 PROCEDURE — 83861 MICROFLUID ANALY TEARS: CPT | Performed by: OPHTHALMOLOGY

## 2023-07-31 PROCEDURE — 99213 OFFICE O/P EST LOW 20 MIN: CPT | Performed by: OPHTHALMOLOGY

## 2023-07-31 PROCEDURE — 68761 CLOSE TEAR DUCT OPENING: CPT | Performed by: OPHTHALMOLOGY

## 2023-07-31 PROCEDURE — 92134 CPTRZ OPH DX IMG PST SGM RTA: CPT | Performed by: OPHTHALMOLOGY

## 2023-07-31 ASSESSMENT — SUPERFICIAL PUNCTATE KERATITIS (SPK)
OS_SPK: 2+
OD_SPK: 1+

## 2023-07-31 ASSESSMENT — REFRACTION_CURRENTRX
OD_SPHERE: +2.75
OD_OVR_VA: 20/
OS_SPHERE: +2.75
OS_OVR_VA: 20/

## 2023-07-31 ASSESSMENT — REFRACTION_AUTOREFRACTION
OS_CYLINDER: -0.25
OD_AXIS: 75
OD_CYLINDER: -0.25
OS_SPHERE: +1.50
OS_AXIS: 87
OD_SPHERE: +1.50

## 2023-07-31 ASSESSMENT — TONOMETRY
OS_IOP_MMHG: 11
OD_IOP_MMHG: 11

## 2023-07-31 ASSESSMENT — VISUAL ACUITY
OD_BCVA: 20/30
OS_BCVA: 20/30-2

## 2023-07-31 ASSESSMENT — LID EXAM ASSESSMENTS
OD_BLEPHARITIS: RLL RUL 1+
OS_BLEPHARITIS: LLL LUL 1+

## 2023-07-31 ASSESSMENT — CONFRONTATIONAL VISUAL FIELD TEST (CVF)
OD_FINDINGS: FULL
OS_FINDINGS: FULL

## 2023-07-31 ASSESSMENT — DECREASING TEAR LAKE - SEVERITY SCORE
OD_DEC_TEARLAKE: 1+
OS_DEC_TEARLAKE: 1+

## 2023-07-31 ASSESSMENT — SPHEQUIV_DERIVED
OS_SPHEQUIV: 1.375
OD_SPHEQUIV: 1.375

## 2023-08-04 ENCOUNTER — APPOINTMENT (OUTPATIENT)
Dept: INTERNAL MEDICINE | Facility: CLINIC | Age: 63
End: 2023-08-04
Payer: MEDICARE

## 2023-08-04 VITALS
SYSTOLIC BLOOD PRESSURE: 128 MMHG | RESPIRATION RATE: 16 BRPM | DIASTOLIC BLOOD PRESSURE: 80 MMHG | WEIGHT: 110 LBS | BODY MASS INDEX: 20.24 KG/M2 | TEMPERATURE: 97.3 F | HEIGHT: 62 IN

## 2023-08-04 DIAGNOSIS — I87.2 VENOUS INSUFFICIENCY (CHRONIC) (PERIPHERAL): ICD-10-CM

## 2023-08-04 DIAGNOSIS — L03.119 CELLULITIS OF UNSPECIFIED PART OF LIMB: ICD-10-CM

## 2023-08-04 PROCEDURE — 99213 OFFICE O/P EST LOW 20 MIN: CPT

## 2023-08-04 RX ORDER — CEPHALEXIN 500 MG/1
500 TABLET ORAL
Qty: 40 | Refills: 0 | Status: ACTIVE | COMMUNITY
Start: 2023-08-04 | End: 1900-01-01

## 2023-08-04 RX ORDER — FUROSEMIDE 20 MG/1
20 TABLET ORAL
Qty: 7 | Refills: 0 | Status: ACTIVE | COMMUNITY
Start: 2023-08-04 | End: 1900-01-01

## 2023-08-04 NOTE — DATA REVIEWED
[No studies available for review at this time.] : No studies available for review at this time. Dapsone Counseling: I discussed with the patient the risks of dapsone including but not limited to hemolytic anemia, agranulocytosis, rashes, methemoglobinemia, kidney failure, peripheral neuropathy, headaches, GI upset, and liver toxicity.  Patients who start dapsone require monitoring including baseline LFTs and weekly CBCs for the first month, then every month thereafter.  The patient verbalized understanding of the proper use and possible adverse effects of dapsone.  All of the patient's questions and concerns were addressed.

## 2023-08-06 PROBLEM — L03.119 CELLULITIS OF LOWER EXTREMITY, UNSPECIFIED LATERALITY: Status: ACTIVE | Noted: 2023-07-07

## 2023-08-06 PROBLEM — I87.2 VENOUS INSUFFICIENCY OF BOTH LOWER EXTREMITIES: Status: ACTIVE | Noted: 2023-08-06

## 2023-08-06 NOTE — ASSESSMENT
[FreeTextEntry1] : Patient is a 63 year old female with past medical history as below who presents for follow up of pitting edema of the lower extremities secondary to chronic venous insufficiency with associated bilateral lower extremity cellulitis.

## 2023-08-06 NOTE — PHYSICAL EXAM
[No Acute Distress] : no acute distress [Well Nourished] : well nourished [Well Developed] : well developed [Well-Appearing] : well-appearing [Normal Sclera/Conjunctiva] : normal sclera/conjunctiva [PERRL] : pupils equal round and reactive to light [EOMI] : extraocular movements intact [Normal Outer Ear/Nose] : the outer ears and nose were normal in appearance [Normal Oropharynx] : the oropharynx was normal [No JVD] : no jugular venous distention [No Lymphadenopathy] : no lymphadenopathy [Supple] : supple [Thyroid Normal, No Nodules] : the thyroid was normal and there were no nodules present [No Respiratory Distress] : no respiratory distress  [No Accessory Muscle Use] : no accessory muscle use [Clear to Auscultation] : lungs were clear to auscultation bilaterally [Normal Rate] : normal rate  [Regular Rhythm] : with a regular rhythm [Normal S1, S2] : normal S1 and S2 [No Murmur] : no murmur heard [No Carotid Bruits] : no carotid bruits [No Abdominal Bruit] : a ~M bruit was not heard ~T in the abdomen [No Varicosities] : no varicosities [Pedal Pulses Present] : the pedal pulses are present [No Edema] : there was no peripheral edema [No Palpable Aorta] : no palpable aorta [No Extremity Clubbing/Cyanosis] : no extremity clubbing/cyanosis [Soft] : abdomen soft [Non Tender] : non-tender [Non-distended] : non-distended [No Masses] : no abdominal mass palpated [No HSM] : no HSM [Normal Bowel Sounds] : normal bowel sounds [Normal Posterior Cervical Nodes] : no posterior cervical lymphadenopathy [Normal Anterior Cervical Nodes] : no anterior cervical lymphadenopathy [No CVA Tenderness] : no CVA  tenderness [No Spinal Tenderness] : no spinal tenderness [No Joint Swelling] : no joint swelling [Grossly Normal Strength/Tone] : grossly normal strength/tone [No Rash] : no rash [Coordination Grossly Intact] : coordination grossly intact [No Focal Deficits] : no focal deficits [Normal Gait] : normal gait [Deep Tendon Reflexes (DTR)] : deep tendon reflexes were 2+ and symmetric [Normal Affect] : the affect was normal [Normal Insight/Judgement] : insight and judgment were intact [de-identified] : erythema and warmth in the bilateral lower extremities up to approximately 1 inch above the knees [Normal Voice/Communication] : normal voice/communication [Normal Supraclavicular Nodes] : no supraclavicular lymphadenopathy [Speech Grossly Normal] : speech grossly normal [Memory Grossly Normal] : memory grossly normal [Alert and Oriented x3] : oriented to person, place, and time [de-identified] : PE 1-2+ pitting edema of lower extremities bilaterally, varicose veins and spider veins bilaterally [de-identified] : erythema/warmth bilateral lower extremities up to 1 inch below knees

## 2023-08-06 NOTE — HISTORY OF PRESENT ILLNESS
[FreeTextEntry1] :  follow up of cellulitis [de-identified] : Patient is a 63 year old female with past medical history as below who presents for follow up of bilateral lower extremity cellulitis.  Patient presents in office for follow up of bilateral lower extremity cellulitis. Patient had a venous and arterial doppler done with results coming back normal, and has seen her vascular surgeon . He reportedly advised her to start a cephalosporin antibiotic and diuretic but recommended she get the Rx from her PCP. Patient presents for evaluation of erythema associated with exquisite tenderness and warmth that is increasing in size and going up her bilateral extremities just below her knees. Patient has history of c-diff colitis and  understands that going on a third antibiotic (previously on levofloxacin and bactrim) puts her in an increased possibility to getting c-diff and also has an ampicillin allergy but has taken Keflex in the past without any adverse reactions.

## 2023-08-06 NOTE — END OF VISIT
[FreeTextEntry4] : Recorded by Norris Ricks acting as a scribe for  (8/4/23)  [Time Spent: ___ minutes] : I have spent [unfilled] minutes of time on the encounter.

## 2023-08-06 NOTE — REVIEW OF SYSTEMS
[Negative] : Heme/Lymph [Skin Rash] : skin rash [FreeTextEntry1] : ROS leg swelling, leg pain, redness and warmth

## 2023-08-06 NOTE — PLAN
[FreeTextEntry1] : Vascular venous insufficiency with associated bilateral lower extremity cellulitis - start Keflex 500mg four times a day as prescribed, Rx filled and Lasix 20mg every other day as needed, Rx filled, keep legs elevated, patient warned of possible effects of c-diff, instructed to take probiotics secondary to history of c-diff; continue to follow up with Vascular doctor, has appointment on 8/17/23; advised to continue to low sodium diet.  RTO in 1-2 weeks to get electrolytes checked on lasix and to reassess cellulitis

## 2023-10-02 NOTE — H&P ADULT - ASSESSMENT
the toothbrush. Their hands and fingers may be stiff, painful, or weak. If this is the case, you can: Offer an electric toothbrush. Enlarge the handle of a non-electric toothbrush by wrapping a sponge, an elastic bandage, or adhesive tape around it. Push the toothbrush handle through a ball made of rubber or soft foam.  Make the handle longer and thicker by taping Popsicle sticks or tongue depressors to it. You may also be able to buy special toothbrushes, toothpaste dispensers, and floss holders. Your doctor may recommend a soft-bristle toothbrush if the person you care for bleeds easily. Bleeding can happen because of a health problem or from certain medicines. A toothpaste for sensitive teeth may help if the person you care for has sensitive teeth. How do you brush and floss someone's teeth? If the person you are caring for has a hard time cleaning their teeth on their own, you may need to brush and floss their teeth for them. It may be easiest to have the person sit and face away from you, and to sit or stand behind them. That way you can steady their head against your arm as you reach around to floss and brush their teeth. Choose a place that has good lighting and is comfortable for both of you. Before you begin, gather your supplies. You will need gloves, floss, a toothbrush, and a container to hold water if you are not near a sink. Wash and dry your hands well and put on gloves. Start by flossing:  Gently work a piece of floss between each of the teeth toward the gums. A plastic flossing tool may make this easier, and they are available at [facility]. Curve the floss around each tooth into a U-shape and gently slide it under the gum line. Move the floss firmly up and down several times to scrape off the plaque. After you've finished flossing, throw away the used floss and begin brushing:  Wet the brush and apply toothpaste. Place the brush at a 45-degree angle where the teeth meet the gums.
60F.  admitted 07/27/20.  presented to ED c/o abdominal pain.    acute pancreatitis.  -ddx:  EtOH;  gallstone pancreatitis.  -CT AB/pelvis, moderately severe pancreatitis.  mild biliary ductal dilatation.  -MRCP ordered.  -trend LFTs.  -monitor/supplement electrolytes.  -NPO.  -IVF 0.9%NS @ 150mL/hr.  -pain management.  -Protonix.  -Zofran.  -GI consult.    acute upon chronic pain.  -hydromorphone 1mg iv q3h PRN moderate pain, 1.5mg PRN severe pain.  -c/w fentalny patch.  -iSTOP noted above.    EtOH abuse.  -Ativan CIWA protocol.  -MVI, thiamine and folic acid.  -GI prophylaxis w/ PPI.    LE pitting edema.  hx malignancy, ? hypercoagulable.  -b/l LE venous doppler, r/o DVT.    elevated BP.  -suspect secondary to pain and anxiety.  -r/o primary HTN.  -enalapril 1.25mg iv q6.  -monitor.    leukocytosis.  -suspect acute phase reactant due to pancreatitis and chronic steroids.  -afebrile.  -UA, no pyuria.  -CXR, no infiltrate.  -CBC in AM.  -monitor.    hx mixed connective tissue disease.  -prednisone.  -hydroxychloroquine.    hx depression, panic attacks and anxiety.  -hold Xanax that she takes at home.  -Ativan 1mg po bid.    hx adenoCA of lung.  -s/p RML wedge resection (2018).  -former tobacco.  -no active issues.    DVT prophylaxis.  -LMWH.    disposition.  -general medical cardenas.    communication.  -RN.

## 2023-10-18 ENCOUNTER — APPOINTMENT (OUTPATIENT)
Dept: PSYCHIATRY | Facility: CLINIC | Age: 63
End: 2023-10-18
Payer: MEDICARE

## 2023-10-18 PROCEDURE — 99214 OFFICE O/P EST MOD 30 MIN: CPT

## 2023-11-10 NOTE — PATIENT PROFILE ADULT - HEALTH LITERACY
No
no
Clofazimine Counseling:  I discussed with the patient the risks of clofazimine including but not limited to skin and eye pigmentation, liver damage, nausea/vomiting, gastrointestinal bleeding and allergy.

## 2024-01-01 NOTE — PATIENT PROFILE ADULT - NSPROPTRIGHTSUPPORTPERSON_GEN_A_NUR
Pediatric ENT Progress Note    HPI:  Inocente Rodriguez is a 6 week old male with a PMH of born term with a prenatal diagnosis of truncus arteriosus (s/p repair on 2/23/24), DiGeorge syndrome, and cleft palate. ENT team initially on consult for cleft palate management. Peds team reached out to ENT on 3/28/24 after significant desaturation events. Bedside scope was done which revealed significant laryngomalacia with possible subglottic cyst v subglottic stenosis. Decision was made to take patient to the OR for DLB. He is s/p direct laryngoscopy and bronchoscopy with Dr. Cardenas on 3/29/24. Intra-operative course was complicated by significant desaturation events with runs of ST depression. No subglottic cysts or subglottic stenosis noted. Patient noted to have an anteriorly displaced airway with significant laryngomalacia. Patient should be considered a difficult airway.      Interval History: Patient escalated to NIV-PC overnight due to continued desaturation events and mild respiratory acidosis. Sleep study cancelled in anticipation of surgical intervention. Tentative plan for DLB and intubation with possible mandibular distraction in the OR tomorrow.    REVIEW OF SYSTEMS:  12 point review of systems completed and all negative, except as noted in HPI.    Birth History: Gestational Age: <None> 12:00 AM   Past Medical History:   Diagnosis Date    Confirmed venous thromboembolism (VTE) 2024    Critical airway 2024    DiGeorge's syndrome (CMD) 2024    Hypocalcemia 2024    Hypomagnesemia 2024    Laryngomalacia 2024    Small for gestational age 2024    Status post repair of truncus 2024     Past Surgical History:   Procedure Laterality Date    Truncus arteriosus repair  2024     ALLERGIES:  No Known Allergies  Current Facility-Administered Medications   Medication Dose Route Frequency Provider Last Rate Last Admin    lidocaine (ANECREAM/LMX) 4 % cream 1 application.  1  application. Topical PRN YamilePatsy freemanfer, DO        sodium chloride 0.9 % injection 0.6-4.6 mL  0.6-4.6 mL Intravenous PRN Yamile, Emilia, DO        sodium chloride 0.9 % flush bag 25 mL  25 mL Intravenous PRN Yamile, Emilia, DO        sodium chloride 0.9 % injection 0.5-10 mL  0.5-10 mL Intravenous PRN Yamile, Emilia, DO        ondansetron (ZOFRAN) injection 0.28 mg  0.1 mg/kg (Dosing Weight) Intravenous Q6H PRN YamilePatsy freemanfer, DO        acetaminophen (TYLENOL) rectal suppository 40 mg  15 mg/kg (Dosing Weight) Rectal Q6H PRN Emilia Ovalle, DO   40 mg at 03/30/24 0406    [Held by provider] dextrose 5 % / sodium chloride 0.9% infusion   Intravenous Continuous Kendal Grant DO 11 mL/hr at 03/29/24 1215 New Bag at 03/29/24 1215    digoxin (LANOXIN) 0.05 MG/ML solution 15 mcg  15 mcg Oral 2 times per day Bel Martin DO   15 mcg at 04/03/24 2041    calcium carbonate 1250 MG/5ML suspension 100 mg  100 mg Oral BID Harmony Haas CNP   100 mg at 04/04/24 0616    [Held by provider] rivaroxaban (XARELTO) 1 MG/ML suspension 0.9 mg  0.9 mg Oral TID Harmony Haas CNP   0.9 mg at 03/28/24 1437    famotidine (PEPCID) oral suspension 1.6 mg  0.58 mg/kg (Dosing Weight) Oral 2 times per day Willi Kendall CNP   1.6 mg at 04/03/24 2041    [Held by provider] pediatric multivitamin with iron (POLY-VI-SOL WITH IRON) oral solution 1 mL  1 mL Oral Q24H Willi Kendall CNP   1 mL at 03/28/24 0858    calcitRIOL ORAL (ROCALTROL) 1 MCG/ML oral solution Solution 0.2 mcg  0.2 mcg Oral Daily Margareth Estrada CNP   0.2 mcg at 04/03/24 1226    potassium CHLORIDE debra/ped oral liquid 2.9333 mEq  1 mEq/kg Oral Q4H PRN Sonny Foster DO        [Held by provider] aspirin chewable 20.25 mg  20.25 mg Oral Daily Alida Brown APNP   20.25 mg at 03/28/24 0857    glycerin pediatric suppository 0.25 suppository  0.25 suppository Rectal Daily PRN Saurav Barrios MD   0.25 suppository at 04/02/24 0317     History reviewed. No pertinent  family history.  Social History     Socioeconomic History    Marital status: Single     Spouse name: Not on file    Number of children: Not on file    Years of education: Not on file    Highest education level: Not on file   Occupational History    Not on file   Tobacco Use    Smoking status: Not on file    Smokeless tobacco: Not on file   Substance and Sexual Activity    Alcohol use: Not on file    Drug use: Not on file    Sexual activity: Not on file   Other Topics Concern    Not on file   Social History Narrative    Not on file     Social Determinants of Health     Financial Resource Strain: Not on file   Food Insecurity: Medium Risk (2024)    Food Insecurity     Worried about Food: Sometimes true     Food is Gone: Sometimes true   Transportation Needs: Not on file   Physical Activity: Not on file   Stress: Not on file   Social Connections: Not on file   Interpersonal Safety: Not on file       There is no immunization history on file for this patient.     PHYSICAL EXAM:  Visit Vitals  BP (!) 100/34 (BP Location: LLE - Left lower extremity, Patient Position: Supine)   Pulse 145   Temp 97.3 °F (36.3 °C) (Axillary)   Resp (!) 30   Ht 17.72\" (45 cm)   Wt 3.255 kg (7 lb 2.8 oz)   HC 32 cm (12.6\")   SpO2 100%   BMI 15.18 kg/m²       Physical Exam:   General: NAD, crying.    HENT: Oral mucosa is moist, dysmorphic features, nares patent, tympanic membranes clear bilaterally, low set ears, posterior narrow cleft palate, + lip tie, + micrognathia.   Neck: Supple.  Respiratory: Respirations are mildly labored, symmetrical chest wall expansion, +intermittent stridor and stertor, on NIV-PC.    Cardiovascular: + murmur.  Gastrointestinal: Soft, NGT in place.   Integumentary: Warm, Dry, Pink        Flexible Laryngoscopy 3/28/24:  After verbal consent was obtained from the mother & procedure discussed with the mother, a pediatric scope was passed through the right nasal cavity with the patient in a supine position. Nasal  cavity normal, nasopharynx clear. Supraglottic larynx is remarkable for significant laryngomalacia--there is an omega-shaped epiglottis with severe redundancy of the arytenoid mucosa. Vocal cords appear to be mobile with possible subglottic cyst versus subglottic stenosis noted. Subglottic view is significantly obstructed due to displacement of epiglottis and arytenoids. Hoarse cry noted. Patient tolerated the procedure without complication.      Assessment/Plan:  6 week old male with DiGeorge syndrome, truncus arteriosus (s/p repair on 2/23/24), cleft palate, and micrognathia who is s/p direct laryngoscopy and bronchoscopy with Dr. Cardenas on 3/29/24. Patient has remained in PICU and requiring increased respiratory support with failure to wean.     ENT planning for DLB and intubation in the OR tomorrow with possible mandibular distraction with Plastics. Continue to hold anticoagulation for OR tomorrow. Please stop all feeds at midnight per OR protocol. Reach out to ENT with questions or concerns.    Recommendations:  - Medical management per PICU   - Consider patient a critical airway  - Sleep medicine on consult, PSG cancelled for now  - Continue Pepcid   - Hold aspirin and xarelto per Cardiology Team for OR tomorrow  - Stop all feeds at midnight per OR protocol  - Contact ENT for any airway management  - Reach out to ENT team with any questions or concerns       Sincerely,      Shanika Stock PA-C  Pediatric Otolaryngology  [facility]           declines

## 2024-01-10 ENCOUNTER — OFFICE (OUTPATIENT)
Dept: URBAN - METROPOLITAN AREA CLINIC 101 | Facility: CLINIC | Age: 64
Setting detail: OPHTHALMOLOGY
End: 2024-01-10
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
## 2024-01-10 PROCEDURE — NO SHOW FE NO SHOW FEE: Performed by: OPHTHALMOLOGY

## 2024-01-12 ENCOUNTER — EMERGENCY (EMERGENCY)
Facility: HOSPITAL | Age: 64
LOS: 1 days | Discharge: HOME | End: 2024-01-12
Attending: STUDENT IN AN ORGANIZED HEALTH CARE EDUCATION/TRAINING PROGRAM | Admitting: EMERGENCY MEDICINE
Payer: MEDICARE

## 2024-01-12 VITALS
HEIGHT: 62 IN | WEIGHT: 115.08 LBS | DIASTOLIC BLOOD PRESSURE: 103 MMHG | TEMPERATURE: 99 F | SYSTOLIC BLOOD PRESSURE: 170 MMHG | RESPIRATION RATE: 18 BRPM | OXYGEN SATURATION: 98 % | HEART RATE: 100 BPM

## 2024-01-12 DIAGNOSIS — Z98.890 OTHER SPECIFIED POSTPROCEDURAL STATES: Chronic | ICD-10-CM

## 2024-01-12 DIAGNOSIS — Z90.49 ACQUIRED ABSENCE OF OTHER SPECIFIED PARTS OF DIGESTIVE TRACT: Chronic | ICD-10-CM

## 2024-01-12 DIAGNOSIS — Z96.641 PRESENCE OF RIGHT ARTIFICIAL HIP JOINT: Chronic | ICD-10-CM

## 2024-01-12 DIAGNOSIS — Z90.721 ACQUIRED ABSENCE OF OVARIES, UNILATERAL: Chronic | ICD-10-CM

## 2024-01-12 DIAGNOSIS — Z85.118 PERSONAL HISTORY OF OTHER MALIGNANT NEOPLASM OF BRONCHUS AND LUNG: Chronic | ICD-10-CM

## 2024-01-12 PROCEDURE — 99283 EMERGENCY DEPT VISIT LOW MDM: CPT

## 2024-01-12 PROCEDURE — 99284 EMERGENCY DEPT VISIT MOD MDM: CPT

## 2024-01-12 RX ORDER — OXYCODONE HYDROCHLORIDE 5 MG/1
1 TABLET ORAL
Qty: 8 | Refills: 0
Start: 2024-01-12 | End: 2024-01-13

## 2024-01-12 NOTE — ED ADULT NURSE NOTE - OBJECTIVE STATEMENT
Patient comes to the ER with complaints of right rib pain after a fall. Patient was diagnosed with 4th rib, fracture on the right side. Patient took tylenol and motrin prior to arrival.

## 2024-01-12 NOTE — ED ADULT TRIAGE NOTE - CHIEF COMPLAINT QUOTE
Patient came from home with complaint of Rib pain. Patient fell on Monday in the bathroom and went to get to CT scan and noted to have R 4th rib fx. Patient complaint of increased pain to the area. Denies hitting her head or any LOC. Patient took Tylenol and Advil in the morning.

## 2024-01-12 NOTE — ED PROVIDER NOTE - CLINICAL SUMMARY MEDICAL DECISION MAKING FREE TEXT BOX
Dr. Prakash Butcher MD, EM And Medical Toxicology Attending:  Pt w/ PMHx of XXX p/w XXX   History obtained from independent historian: Parent, caregiver, EMS   External note reviewed: XXX  DDx: XXX  Decision making, ED course, independent interpretation of imaging studies, and consults: XXX    Consideration hospitalization vs de-escalation of care: XXX    XXX I, Dr. Prakash Butcher MD discussed the case with XXXX attending XXXX who advises me XXXX    Disposition: XXX Dr. Prakash Butcher MD, EM And Medical Toxicology AttendinF PMHx of chronic pain, adenocarcinoma of lung s/p resection, alcohol dependence, anxiety, s/p laminectomy, right hip ORIF presenting with right sided rib pain s/p mechanical fall about 4-5 days ago. Had an outpatient XR performed, confirming right rib single fracture. Otherwise: denies any other chest pain, abdominal pain, shortness of breath, nausea/vomiting, head trauma, neck pain, back pain, hip pain, other extremity injury, LOC, syncope, lightheadedness, anticoagulation use, headaches, visual complaints, rashes, fevers/chills, difficulty ambulating, abrasions, lacerations, subjective neurological deficits, numbness/tingling.   History obtained from independent historian: na  External note reviewed: patients report of rib fracture outpatient reading XR.  DDx: rib fracture, rib pain, contusion  Decision making, ED course, independent interpretation of imaging studies, and consults:   - patient with clear lungs on exam, no shortness of breath. will dc with pain control and incentive spirometer.   - Patient requesting oxycodone as tylenol and ibuprofen is not working.  - defer opioid medication in the ED since patient is driving.   Consideration hospitalization vs de-escalation of care: na    Disposition: dc

## 2024-01-12 NOTE — ED ADULT NURSE NOTE - NSFALLUNIVINTERV_ED_ALL_ED
Bed/Stretcher in lowest position, wheels locked, appropriate side rails in place/Call bell, personal items and telephone in reach/Instruct patient to call for assistance before getting out of bed/chair/stretcher/Non-slip footwear applied when patient is off stretcher/[place] to call system/Physically safe environment - no spills, clutter or unnecessary equipment/Purposeful proactive rounding/Room/bathroom lighting operational, light cord in reach Bed/Stretcher in lowest position, wheels locked, appropriate side rails in place/Call bell, personal items and telephone in reach/Instruct patient to call for assistance before getting out of bed/chair/stretcher/Non-slip footwear applied when patient is off stretcher/[place] to call system/Physically safe environment - no spills, clutter or unnecessary equipment/Purposeful proactive rounding/Room/bathroom lighting operational, light cord in reach

## 2024-01-12 NOTE — ED PROVIDER NOTE - PHYSICAL EXAMINATION
VITAL SIGNS: I have reviewed nursing notes and confirm.   GEN: Well-developed; well-nourished; in no acute distress. Speaking full sentences.  SKIN: Warm, pink, no rash, no diaphoresis, no cyanosis, well perfused.   HEAD: Normocephalic; atraumatic. No scalp lacerations, no abrasions.  NECK: Supple; non tender.   EYES: Pupils 3mm equal, round, reactive to light and accomodation, conjunctiva and sclera clear. Extra-ocular movements intact bilaterally.  ENT: No nasal discharge; airway clear. Trachea is midline. Normal dentition.  CV: RRR. S1, S2 normal; no murmurs, gallops, or rubs. Capillary refill < 2 seconds throughout. Distal pulses intact 2+ throughout.  RESP: CTA bilaterally. No wheezes, rales, or rhonchi.   ABD: Normal bowel sounds, soft, non-distended, non-tender, no rebound, no guarding, no rigidity, no hepatosplenomegaly. (+) ttp mild right posterior lateral chest wall/back upper thoracic.   MSK: Normal range of motion and movement of all 4 extremities. No apparent joint or muscular pain throughout.    BACK: No thoracolumbar midline or paravertebral tenderness. No step-offs or obvious deformities.  NEURO: Alert & oriented x 3, Grossly unremarkable. Sensory and motor intact throughout. No focal deficits. Gait: Fluid. Normal speech and coordination.

## 2024-01-12 NOTE — ED PROVIDER NOTE - OBJECTIVE STATEMENT
63F PMHx of 63F PMHx of chronic pain, adenocarcinoma of lung s/p resection, alcohol dependence, anxiety, s/p laminectomy, right hip ORIF presenting with right sided rib pain s/p mechanical fall about 4-5 days ago. Had an outpatient XR performed, confirming right rib single fracture. Otherwise: denies any other chest pain, abdominal pain, shortness of breath, nausea/vomiting, head trauma, neck pain, back pain, hip pain, other extremity injury, LOC, syncope, lightheadedness, anticoagulation use, headaches, visual complaints, rashes, fevers/chills, difficulty ambulating, abrasions, lacerations, subjective neurological deficits, numbness/tingling.

## 2024-01-12 NOTE — ED PROVIDER NOTE - PATIENT PORTAL LINK FT
You can access the FollowMyHealth Patient Portal offered by [facility] by registering at the following website: [email or web address]. By joining [employer]s FollowMyHealth portal, you will also be able to view your health information using other applications (apps) compatible with our system. You can access the FollowMyHealth Patient Portal offered by [facility] by registering at the following website: [email or web address]. By joining [employer]’s FollowMyHealth portal, you will also be able to view your health information using other applications (apps) compatible with our system.

## 2024-01-22 ENCOUNTER — APPOINTMENT (OUTPATIENT)
Dept: PSYCHIATRY | Facility: CLINIC | Age: 64
End: 2024-01-22

## 2024-01-22 ENCOUNTER — APPOINTMENT (OUTPATIENT)
Dept: PSYCHIATRY | Facility: CLINIC | Age: 64
End: 2024-01-22
Payer: MEDICARE

## 2024-01-22 DIAGNOSIS — M79.606 PAIN IN LEG, UNSPECIFIED: ICD-10-CM

## 2024-01-22 DIAGNOSIS — F13.20 SEDATIVE, HYPNOTIC OR ANXIOLYTIC DEPENDENCE, UNCOMPLICATED: ICD-10-CM

## 2024-01-22 PROCEDURE — 99214 OFFICE O/P EST MOD 30 MIN: CPT

## 2024-01-22 NOTE — DISCUSSION/SUMMARY
[FreeTextEntry1] : 63-year-old female with anxiety long history of depression benzodiazepine opiate dependence peripheral neuropathy.  Plan continue Xanax 0.25 mg 3 times daily Cymbalta 60 mg.  Follow-up in 6 months.

## 2024-01-22 NOTE — HISTORY OF PRESENT ILLNESS
[de-identified] : Intake 3/20/19: 59 y old female disabled single no dependents She has a long standing hx of anxiety since she had to put her dog to sleep \par  \par  She has been taking Opiates and benzodiazepines for many years. Alprazolam for over 10 years\par  Recently treated by NP they tried sertraline cymbalta seroquel lexapro and wellbutrin "nothing works" \par  \par  Pt presents tearful appropriately emotional and she relates well to interventions \par  She denies acute sxs of depression ho hopelessness no s/h/iip\par  She relates anxiety that she manages with Xanax\par  SHe takes one in the am and later in the day she takes one or two more tablets\par  She uses Gabapentin for back pain and sleep \par  \par  She denies hypomania \par  no psychosis although she relates an admission in 1993 for hallucinations\par  she denies drugs or alcohol use present of past \par  \par  She takes care of brother's needs He lives in a home for the disabled with CP \par  DIscussed therapy and mindfulness practice\par  \par   [FreeTextEntry1] : Still trying to deal with the diagnosis of lung disease and interstitial fibrosis.  Trying to maintain a positive outlook.  Pain is much better with Cymbalta.  Xanax helping with anxiety.

## 2024-02-05 ENCOUNTER — APPOINTMENT (OUTPATIENT)
Dept: INTERNAL MEDICINE | Facility: CLINIC | Age: 64
End: 2024-02-05

## 2024-04-17 ENCOUNTER — INPATIENT (INPATIENT)
Facility: HOSPITAL | Age: 64
LOS: 3 days | Discharge: LEFT BEFORE DISCHARGE | DRG: 84 | End: 2024-04-21
Attending: STUDENT IN AN ORGANIZED HEALTH CARE EDUCATION/TRAINING PROGRAM | Admitting: STUDENT IN AN ORGANIZED HEALTH CARE EDUCATION/TRAINING PROGRAM
Payer: MEDICARE

## 2024-04-17 ENCOUNTER — EMERGENCY (EMERGENCY)
Facility: HOSPITAL | Age: 64
LOS: 1 days | Discharge: ANOTHER HOSPITAL | End: 2024-04-17
Attending: EMERGENCY MEDICINE | Admitting: EMERGENCY MEDICINE
Payer: MEDICARE

## 2024-04-17 VITALS
RESPIRATION RATE: 20 BRPM | OXYGEN SATURATION: 97 % | WEIGHT: 115.08 LBS | DIASTOLIC BLOOD PRESSURE: 126 MMHG | HEIGHT: 63 IN | HEART RATE: 59 BPM | SYSTOLIC BLOOD PRESSURE: 174 MMHG | TEMPERATURE: 98 F

## 2024-04-17 VITALS
HEIGHT: 62 IN | SYSTOLIC BLOOD PRESSURE: 148 MMHG | DIASTOLIC BLOOD PRESSURE: 93 MMHG | WEIGHT: 115.08 LBS | HEART RATE: 57 BPM | RESPIRATION RATE: 18 BRPM | TEMPERATURE: 98 F | OXYGEN SATURATION: 98 %

## 2024-04-17 VITALS
DIASTOLIC BLOOD PRESSURE: 89 MMHG | RESPIRATION RATE: 20 BRPM | HEART RATE: 58 BPM | OXYGEN SATURATION: 99 % | SYSTOLIC BLOOD PRESSURE: 129 MMHG

## 2024-04-17 DIAGNOSIS — Z98.890 OTHER SPECIFIED POSTPROCEDURAL STATES: Chronic | ICD-10-CM

## 2024-04-17 DIAGNOSIS — Z90.721 ACQUIRED ABSENCE OF OVARIES, UNILATERAL: Chronic | ICD-10-CM

## 2024-04-17 DIAGNOSIS — Z90.49 ACQUIRED ABSENCE OF OTHER SPECIFIED PARTS OF DIGESTIVE TRACT: Chronic | ICD-10-CM

## 2024-04-17 DIAGNOSIS — Z85.118 PERSONAL HISTORY OF OTHER MALIGNANT NEOPLASM OF BRONCHUS AND LUNG: Chronic | ICD-10-CM

## 2024-04-17 DIAGNOSIS — S06.5XAA TRAUMATIC SUBDURAL HEMORRHAGE WITH LOSS OF CONSCIOUSNESS STATUS UNKNOWN, INITIAL ENCOUNTER: ICD-10-CM

## 2024-04-17 DIAGNOSIS — Z96.641 PRESENCE OF RIGHT ARTIFICIAL HIP JOINT: Chronic | ICD-10-CM

## 2024-04-17 LAB
ALBUMIN SERPL ELPH-MCNC: 3.5 G/DL — SIGNIFICANT CHANGE UP (ref 3.3–5)
ALBUMIN SERPL ELPH-MCNC: 4.2 G/DL — SIGNIFICANT CHANGE UP (ref 3.3–5.2)
ALP SERPL-CCNC: 100 U/L — SIGNIFICANT CHANGE UP (ref 40–120)
ALP SERPL-CCNC: 85 U/L — SIGNIFICANT CHANGE UP (ref 30–120)
ALT FLD-CCNC: 114 U/L — HIGH
ALT FLD-CCNC: 133 U/L — HIGH (ref 10–60)
AMPHET UR-MCNC: NEGATIVE — SIGNIFICANT CHANGE UP
ANION GAP SERPL CALC-SCNC: 15 MMOL/L — SIGNIFICANT CHANGE UP (ref 5–17)
ANION GAP SERPL CALC-SCNC: 7 MMOL/L — SIGNIFICANT CHANGE UP (ref 5–17)
APPEARANCE UR: CLEAR — SIGNIFICANT CHANGE UP
APTT BLD: 26.7 SEC — SIGNIFICANT CHANGE UP (ref 24.5–35.6)
APTT BLD: 28.2 SEC — SIGNIFICANT CHANGE UP (ref 24.5–35.6)
AST SERPL-CCNC: 127 U/L — HIGH
AST SERPL-CCNC: 142 U/L — HIGH (ref 10–40)
BARBITURATES UR SCN-MCNC: NEGATIVE — SIGNIFICANT CHANGE UP
BASOPHILS # BLD AUTO: 0.02 K/UL — SIGNIFICANT CHANGE UP (ref 0–0.2)
BASOPHILS # BLD AUTO: 0.02 K/UL — SIGNIFICANT CHANGE UP (ref 0–0.2)
BASOPHILS NFR BLD AUTO: 0.3 % — SIGNIFICANT CHANGE UP (ref 0–2)
BASOPHILS NFR BLD AUTO: 0.3 % — SIGNIFICANT CHANGE UP (ref 0–2)
BENZODIAZ UR-MCNC: NEGATIVE — SIGNIFICANT CHANGE UP
BILIRUB SERPL-MCNC: 0.8 MG/DL — SIGNIFICANT CHANGE UP (ref 0.4–2)
BILIRUB SERPL-MCNC: 1 MG/DL — SIGNIFICANT CHANGE UP (ref 0.2–1.2)
BILIRUB UR-MCNC: NEGATIVE — SIGNIFICANT CHANGE UP
BLD GP AB SCN SERPL QL: SIGNIFICANT CHANGE UP
BUN SERPL-MCNC: 11 MG/DL — SIGNIFICANT CHANGE UP (ref 8–20)
BUN SERPL-MCNC: 15 MG/DL — SIGNIFICANT CHANGE UP (ref 7–23)
CALCIUM SERPL-MCNC: 9.1 MG/DL — SIGNIFICANT CHANGE UP (ref 8.4–10.5)
CALCIUM SERPL-MCNC: 9.1 MG/DL — SIGNIFICANT CHANGE UP (ref 8.4–10.5)
CHLORIDE SERPL-SCNC: 96 MMOL/L — SIGNIFICANT CHANGE UP (ref 96–108)
CHLORIDE SERPL-SCNC: 99 MMOL/L — SIGNIFICANT CHANGE UP (ref 96–108)
CO2 SERPL-SCNC: 23 MMOL/L — SIGNIFICANT CHANGE UP (ref 22–31)
CO2 SERPL-SCNC: 24 MMOL/L — SIGNIFICANT CHANGE UP (ref 22–29)
COCAINE METAB.OTHER UR-MCNC: NEGATIVE — SIGNIFICANT CHANGE UP
COLOR SPEC: YELLOW — SIGNIFICANT CHANGE UP
CREAT SERPL-MCNC: 0.68 MG/DL — SIGNIFICANT CHANGE UP (ref 0.5–1.3)
CREAT SERPL-MCNC: 0.8 MG/DL — SIGNIFICANT CHANGE UP (ref 0.5–1.3)
DIFF PNL FLD: NEGATIVE — SIGNIFICANT CHANGE UP
EGFR: 82 ML/MIN/1.73M2 — SIGNIFICANT CHANGE UP
EGFR: 97 ML/MIN/1.73M2 — SIGNIFICANT CHANGE UP
EOSINOPHIL # BLD AUTO: 0.66 K/UL — HIGH (ref 0–0.5)
EOSINOPHIL # BLD AUTO: 0.69 K/UL — HIGH (ref 0–0.5)
EOSINOPHIL NFR BLD AUTO: 10.6 % — HIGH (ref 0–6)
EOSINOPHIL NFR BLD AUTO: 10.6 % — HIGH (ref 0–6)
ETHANOL SERPL-MCNC: <3 MG/DL — SIGNIFICANT CHANGE UP (ref 0–3)
GLUCOSE SERPL-MCNC: 118 MG/DL — HIGH (ref 70–99)
GLUCOSE SERPL-MCNC: 98 MG/DL — SIGNIFICANT CHANGE UP (ref 70–99)
GLUCOSE UR QL: NEGATIVE MG/DL — SIGNIFICANT CHANGE UP
HCT VFR BLD CALC: 41.7 % — SIGNIFICANT CHANGE UP (ref 34.5–45)
HCT VFR BLD CALC: 45 % — SIGNIFICANT CHANGE UP (ref 34.5–45)
HGB BLD-MCNC: 14.5 G/DL — SIGNIFICANT CHANGE UP (ref 11.5–15.5)
HGB BLD-MCNC: 16 G/DL — HIGH (ref 11.5–15.5)
IMM GRANULOCYTES NFR BLD AUTO: 0.5 % — SIGNIFICANT CHANGE UP (ref 0–0.9)
IMM GRANULOCYTES NFR BLD AUTO: 0.5 % — SIGNIFICANT CHANGE UP (ref 0–0.9)
INR BLD: 0.93 RATIO — SIGNIFICANT CHANGE UP (ref 0.85–1.18)
INR BLD: 0.99 RATIO — SIGNIFICANT CHANGE UP (ref 0.85–1.18)
KETONES UR-MCNC: NEGATIVE MG/DL — SIGNIFICANT CHANGE UP
LEUKOCYTE ESTERASE UR-ACNC: NEGATIVE — SIGNIFICANT CHANGE UP
LYMPHOCYTES # BLD AUTO: 0.99 K/UL — LOW (ref 1–3.3)
LYMPHOCYTES # BLD AUTO: 1.08 K/UL — SIGNIFICANT CHANGE UP (ref 1–3.3)
LYMPHOCYTES # BLD AUTO: 15.9 % — SIGNIFICANT CHANGE UP (ref 13–44)
LYMPHOCYTES # BLD AUTO: 16.5 % — SIGNIFICANT CHANGE UP (ref 13–44)
MCHC RBC-ENTMCNC: 32.2 PG — SIGNIFICANT CHANGE UP (ref 27–34)
MCHC RBC-ENTMCNC: 32.9 PG — SIGNIFICANT CHANGE UP (ref 27–34)
MCHC RBC-ENTMCNC: 34.8 GM/DL — SIGNIFICANT CHANGE UP (ref 32–36)
MCHC RBC-ENTMCNC: 35.6 GM/DL — SIGNIFICANT CHANGE UP (ref 32–36)
MCV RBC AUTO: 92.4 FL — SIGNIFICANT CHANGE UP (ref 80–100)
MCV RBC AUTO: 92.5 FL — SIGNIFICANT CHANGE UP (ref 80–100)
METHADONE UR-MCNC: NEGATIVE — SIGNIFICANT CHANGE UP
MONOCYTES # BLD AUTO: 0.55 K/UL — SIGNIFICANT CHANGE UP (ref 0–0.9)
MONOCYTES # BLD AUTO: 0.68 K/UL — SIGNIFICANT CHANGE UP (ref 0–0.9)
MONOCYTES NFR BLD AUTO: 10.9 % — SIGNIFICANT CHANGE UP (ref 2–14)
MONOCYTES NFR BLD AUTO: 8.4 % — SIGNIFICANT CHANGE UP (ref 2–14)
NEUTROPHILS # BLD AUTO: 3.86 K/UL — SIGNIFICANT CHANGE UP (ref 1.8–7.4)
NEUTROPHILS # BLD AUTO: 4.16 K/UL — SIGNIFICANT CHANGE UP (ref 1.8–7.4)
NEUTROPHILS NFR BLD AUTO: 61.8 % — SIGNIFICANT CHANGE UP (ref 43–77)
NEUTROPHILS NFR BLD AUTO: 63.7 % — SIGNIFICANT CHANGE UP (ref 43–77)
NITRITE UR-MCNC: NEGATIVE — SIGNIFICANT CHANGE UP
NRBC # BLD: 0 /100 WBCS — SIGNIFICANT CHANGE UP (ref 0–0)
OPIATES UR-MCNC: NEGATIVE — SIGNIFICANT CHANGE UP
PCP SPEC-MCNC: SIGNIFICANT CHANGE UP
PCP UR-MCNC: NEGATIVE — SIGNIFICANT CHANGE UP
PH UR: 5.5 — SIGNIFICANT CHANGE UP (ref 5–8)
PLATELET # BLD AUTO: 230 K/UL — SIGNIFICANT CHANGE UP (ref 150–400)
PLATELET # BLD AUTO: 245 K/UL — SIGNIFICANT CHANGE UP (ref 150–400)
POTASSIUM SERPL-MCNC: 4.1 MMOL/L — SIGNIFICANT CHANGE UP (ref 3.5–5.3)
POTASSIUM SERPL-MCNC: 4.6 MMOL/L — SIGNIFICANT CHANGE UP (ref 3.5–5.3)
POTASSIUM SERPL-SCNC: 4.1 MMOL/L — SIGNIFICANT CHANGE UP (ref 3.5–5.3)
POTASSIUM SERPL-SCNC: 4.6 MMOL/L — SIGNIFICANT CHANGE UP (ref 3.5–5.3)
PROT SERPL-MCNC: 6.9 G/DL — SIGNIFICANT CHANGE UP (ref 6.6–8.7)
PROT SERPL-MCNC: 6.9 G/DL — SIGNIFICANT CHANGE UP (ref 6–8.3)
PROT UR-MCNC: NEGATIVE MG/DL — SIGNIFICANT CHANGE UP
PROTHROM AB SERPL-ACNC: 10.3 SEC — SIGNIFICANT CHANGE UP (ref 9.5–13)
PROTHROM AB SERPL-ACNC: 10.8 SEC — SIGNIFICANT CHANGE UP (ref 9.5–13)
RBC # BLD: 4.51 M/UL — SIGNIFICANT CHANGE UP (ref 3.8–5.2)
RBC # BLD: 4.87 M/UL — SIGNIFICANT CHANGE UP (ref 3.8–5.2)
RBC # FLD: 11.8 % — SIGNIFICANT CHANGE UP (ref 10.3–14.5)
RBC # FLD: 11.9 % — SIGNIFICANT CHANGE UP (ref 10.3–14.5)
SODIUM SERPL-SCNC: 129 MMOL/L — LOW (ref 135–145)
SODIUM SERPL-SCNC: 135 MMOL/L — SIGNIFICANT CHANGE UP (ref 135–145)
SP GR SPEC: 1.01 — SIGNIFICANT CHANGE UP (ref 1–1.03)
THC UR QL: NEGATIVE — SIGNIFICANT CHANGE UP
UROBILINOGEN FLD QL: 0.2 MG/DL — SIGNIFICANT CHANGE UP (ref 0.2–1)
WBC # BLD: 6.24 K/UL — SIGNIFICANT CHANGE UP (ref 3.8–10.5)
WBC # BLD: 6.53 K/UL — SIGNIFICANT CHANGE UP (ref 3.8–10.5)
WBC # FLD AUTO: 6.24 K/UL — SIGNIFICANT CHANGE UP (ref 3.8–10.5)
WBC # FLD AUTO: 6.53 K/UL — SIGNIFICANT CHANGE UP (ref 3.8–10.5)

## 2024-04-17 PROCEDURE — 99291 CRITICAL CARE FIRST HOUR: CPT

## 2024-04-17 PROCEDURE — 72125 CT NECK SPINE W/O DYE: CPT | Mod: MC

## 2024-04-17 PROCEDURE — 70450 CT HEAD/BRAIN W/O DYE: CPT | Mod: MC

## 2024-04-17 PROCEDURE — 80053 COMPREHEN METABOLIC PANEL: CPT

## 2024-04-17 PROCEDURE — 81003 URINALYSIS AUTO W/O SCOPE: CPT

## 2024-04-17 PROCEDURE — 72125 CT NECK SPINE W/O DYE: CPT | Mod: 26,MC

## 2024-04-17 PROCEDURE — 71045 X-RAY EXAM CHEST 1 VIEW: CPT | Mod: 26

## 2024-04-17 PROCEDURE — 99285 EMERGENCY DEPT VISIT HI MDM: CPT

## 2024-04-17 PROCEDURE — 85730 THROMBOPLASTIN TIME PARTIAL: CPT

## 2024-04-17 PROCEDURE — 80307 DRUG TEST PRSMV CHEM ANLYZR: CPT

## 2024-04-17 PROCEDURE — 71045 X-RAY EXAM CHEST 1 VIEW: CPT

## 2024-04-17 PROCEDURE — 73130 X-RAY EXAM OF HAND: CPT

## 2024-04-17 PROCEDURE — 93010 ELECTROCARDIOGRAM REPORT: CPT

## 2024-04-17 PROCEDURE — 73130 X-RAY EXAM OF HAND: CPT | Mod: 26,RT

## 2024-04-17 PROCEDURE — 85610 PROTHROMBIN TIME: CPT

## 2024-04-17 PROCEDURE — 70450 CT HEAD/BRAIN W/O DYE: CPT | Mod: 26,MC

## 2024-04-17 PROCEDURE — 36415 COLL VENOUS BLD VENIPUNCTURE: CPT

## 2024-04-17 PROCEDURE — 72170 X-RAY EXAM OF PELVIS: CPT | Mod: 26

## 2024-04-17 PROCEDURE — 99285 EMERGENCY DEPT VISIT HI MDM: CPT | Mod: 25

## 2024-04-17 PROCEDURE — 71045 X-RAY EXAM CHEST 1 VIEW: CPT | Mod: 26,77

## 2024-04-17 PROCEDURE — 93005 ELECTROCARDIOGRAM TRACING: CPT

## 2024-04-17 PROCEDURE — 85025 COMPLETE CBC W/AUTO DIFF WBC: CPT

## 2024-04-17 PROCEDURE — 70450 CT HEAD/BRAIN W/O DYE: CPT | Mod: 26,77

## 2024-04-17 PROCEDURE — 73110 X-RAY EXAM OF WRIST: CPT | Mod: 26,LT

## 2024-04-17 PROCEDURE — 99285 EMERGENCY DEPT VISIT HI MDM: CPT | Mod: GC

## 2024-04-17 PROCEDURE — 73130 X-RAY EXAM OF HAND: CPT | Mod: 26,LT,77

## 2024-04-17 RX ORDER — CHLORHEXIDINE GLUCONATE 213 G/1000ML
1 SOLUTION TOPICAL DAILY
Refills: 0 | Status: DISCONTINUED | OUTPATIENT
Start: 2024-04-17 | End: 2024-04-19

## 2024-04-17 RX ORDER — LEVETIRACETAM 250 MG/1
500 TABLET, FILM COATED ORAL EVERY 12 HOURS
Refills: 0 | Status: DISCONTINUED | OUTPATIENT
Start: 2024-04-17 | End: 2024-04-17

## 2024-04-17 RX ORDER — SODIUM CHLORIDE 9 MG/ML
1000 INJECTION INTRAMUSCULAR; INTRAVENOUS; SUBCUTANEOUS
Refills: 0 | Status: DISCONTINUED | OUTPATIENT
Start: 2024-04-17 | End: 2024-04-18

## 2024-04-17 RX ORDER — HYDROMORPHONE HYDROCHLORIDE 2 MG/ML
0.5 INJECTION INTRAMUSCULAR; INTRAVENOUS; SUBCUTANEOUS ONCE
Refills: 0 | Status: DISCONTINUED | OUTPATIENT
Start: 2024-04-17 | End: 2024-04-17

## 2024-04-17 RX ORDER — DULOXETINE HYDROCHLORIDE 30 MG/1
60 CAPSULE, DELAYED RELEASE ORAL DAILY
Refills: 0 | Status: DISCONTINUED | OUTPATIENT
Start: 2024-04-17 | End: 2024-04-21

## 2024-04-17 RX ORDER — ACETAMINOPHEN 500 MG
1000 TABLET ORAL ONCE
Refills: 0 | Status: COMPLETED | OUTPATIENT
Start: 2024-04-17 | End: 2024-04-17

## 2024-04-17 RX ORDER — NICARDIPINE HYDROCHLORIDE 30 MG/1
5 CAPSULE, EXTENDED RELEASE ORAL
Qty: 40 | Refills: 0 | Status: ACTIVE | OUTPATIENT
Start: 2024-04-17 | End: 2025-03-16

## 2024-04-17 RX ORDER — BUPROPION HYDROCHLORIDE 150 MG/1
150 TABLET, EXTENDED RELEASE ORAL DAILY
Refills: 0 | Status: DISCONTINUED | OUTPATIENT
Start: 2024-04-17 | End: 2024-04-21

## 2024-04-17 RX ORDER — ACETAMINOPHEN 500 MG
650 TABLET ORAL EVERY 6 HOURS
Refills: 0 | Status: DISCONTINUED | OUTPATIENT
Start: 2024-04-17 | End: 2024-04-18

## 2024-04-17 RX ORDER — NORTRIPTYLINE HYDROCHLORIDE 10 MG/1
10 CAPSULE ORAL
Refills: 0 | Status: DISCONTINUED | OUTPATIENT
Start: 2024-04-17 | End: 2024-04-21

## 2024-04-17 RX ORDER — ALPRAZOLAM 0.25 MG
0.5 TABLET ORAL AT BEDTIME
Refills: 0 | Status: DISCONTINUED | OUTPATIENT
Start: 2024-04-17 | End: 2024-04-18

## 2024-04-17 RX ORDER — SENNA PLUS 8.6 MG/1
2 TABLET ORAL AT BEDTIME
Refills: 0 | Status: ACTIVE | OUTPATIENT
Start: 2024-04-17 | End: 2025-03-16

## 2024-04-17 RX ORDER — HYDROXYCHLOROQUINE SULFATE 200 MG
200 TABLET ORAL DAILY
Refills: 0 | Status: DISCONTINUED | OUTPATIENT
Start: 2024-04-17 | End: 2024-04-21

## 2024-04-17 RX ORDER — LEVETIRACETAM 250 MG/1
1000 TABLET, FILM COATED ORAL
Refills: 0 | Status: DISCONTINUED | OUTPATIENT
Start: 2024-04-17 | End: 2024-04-21

## 2024-04-17 RX ADMIN — Medication 1000 MILLIGRAM(S): at 19:25

## 2024-04-17 RX ADMIN — HYDROMORPHONE HYDROCHLORIDE 0.5 MILLIGRAM(S): 2 INJECTION INTRAMUSCULAR; INTRAVENOUS; SUBCUTANEOUS at 19:25

## 2024-04-17 RX ADMIN — Medication 400 MILLIGRAM(S): at 18:46

## 2024-04-17 RX ADMIN — HYDROMORPHONE HYDROCHLORIDE 0.5 MILLIGRAM(S): 2 INJECTION INTRAMUSCULAR; INTRAVENOUS; SUBCUTANEOUS at 18:43

## 2024-04-17 NOTE — GOALS OF CARE CONVERSATION - ADVANCED CARE PLANNING - CONVERSATION DETAILS
Patient states that they would like there cousin named Anita River [phone], to make decisions for them if the patient was unable to make decisions for themself. Discussed/defined DNR/DNI w/patient, including explaining the use of medications/electricity to restart the heart and the use of ETT/mechanical ventilation.  Patient is clear that they WOULD want chest compressions, shocks or medications to restart the heart should it stop beating or he have a rhythm not compatible with life.  Patient is also clear that they WOULD want to be intubated and put on a ventilator should they require it.     Patient to be: [x] FULL CODE

## 2024-04-17 NOTE — H&P ADULT - ATTENDING COMMENTS
Patient is s/p GLF w/ SDH  --admit to SICU for neuro checks q1hr  --Hold AC  --f/u 6hr repeat scans and NSG recs   --MMPR

## 2024-04-17 NOTE — CONSULT NOTE ADULT - ASSESSMENT
63 yo female s/p a fall down approximately 6 steps, (+) Headstrike, (-) LOC, who presents as a transfer from [facility] after being founf to have bifrontal contusions, and a small SDH, neurologically intact     Plan:  -Case and plan discussed with Dr. Erickson attending on call  - Keppra for seizure 500mg BID x 7 days  - Rpt CTH in 6 hours (approximately at 8:45pm), if stable neurosurgery will sign off  -Patient reports history of seizures secondary to hyponatremia, please maintain normal natremia  -Trauma consult

## 2024-04-17 NOTE — ED PROVIDER NOTE - CLINICAL SUMMARY MEDICAL DECISION MAKING FREE TEXT BOX
64-year-old female with history of chronic pain depression fell in her basement at home this morning complaining of injury to the back of her head and her left hand.  Denies LOC nausea vomiting visual disturbance neck or back pain or other injury or symptom.    Physical exam reveals abrasion to the posterior scalp but no bony deformity no suturable laceration.  Neurologically intact.  There is minor bruising over the left hand.  Impression is status post fall rule out cranial hemorrhage rule out skull fracture rule out hand fracture.  Plan is CT brain x-ray hand if negative discharged with head injury instructions.

## 2024-04-17 NOTE — ED PROVIDER NOTE - ENMT, MLM
Airway patent, Nasal mucosa clear. Mouth with normal mucosa. Throat has no vesicles, no oropharyngeal exudates and uvula is midline.  Abrasion on posterior scalp with dried blood.  Patient has hair extensions sewn into place and refused to remove them for further examination.  There is no Moody's or raccoon signs.  There is no palpable skull deformity.  Neck is supple full range of motion intact nontender

## 2024-04-17 NOTE — H&P ADULT - NSHPLABSRESULTS_GEN_ALL_CORE
LABS:  cret                        16.0   6.53  )-----------( 245      ( 17 Apr 2024 18:18 )             45.0     04-17    135  |  96  |  11.0  ----------------------------<  98  4.1   |  24.0  |  0.68    Ca    9.1      17 Apr 2024 18:18    TPro  6.9  /  Alb  4.2  /  TBili  0.8  /  DBili  x   /  AST  127<H>  /  ALT  114<H>  /  AlkPhos  100  04-17    PT/INR - ( 17 Apr 2024 18:18 )   PT: 10.3 sec;   INR: 0.93 ratio         PTT - ( 17 Apr 2024 18:18 )  PTT:26.7 sec

## 2024-04-17 NOTE — ED ADULT NURSE NOTE - NSFALLHARMRISKINTERV_ED_ALL_ED
Assistance OOB with selected safe patient handling equipment if applicable/Assistance with ambulation/Communicate risk of Fall with Harm to all staff, patient, and family/Encourage patient to sit up slowly, dangle for a short time, stand at bedside before walking/Monitor for mental status changes and reorient to person, place, and time, as needed/Move patient closer to nursing station/within visual sight of ED staff/Orthostatic vital signs/Provide visual cue: red socks, yellow wristband, yellow gown, etc/Reinforce activity limits and safety measures with patient and family/Review medications for side effects contributing to fall risk/Toileting schedule using arm’s reach rule for commode and bathroom/Use of alarms - bed, stretcher, chair and/or video monitoring/Bed in lowest position, wheels locked, appropriate side rails in place/Call bell, personal items and telephone in reach/Instruct patient to call for assistance before getting out of bed/chair/stretcher/Non-slip footwear applied when patient is off stretcher/[place] to call system/Physically safe environment - no spills, clutter or unnecessary equipment/Purposeful Proactive Rounding/Room/bathroom lighting operational, light cord in reach

## 2024-04-17 NOTE — ED ADULT NURSE NOTE - OBJECTIVE STATEMENT
64 yof presents to ed s/p fall down a couple steps and hit head no loc. pt sent from other facility for neuro evaluation. pt has slurred speech, pain to head and states she has had worsening balance

## 2024-04-17 NOTE — H&P ADULT - NSHPPHYSICALEXAM_GEN_ALL_CORE
GENERAL: NAD, lying in bed comfortably  HEAD:  Atraumatic, normocephalic  NECK: Supple, no JVD  HEART: RRR  LUNGS: Unlabored respirations. No conversational dyspnea.   ABDOMEN: Soft, nontender, nondistended  EXTREMITIES: L fourth digit tender to touch with bruising.   NERVOUS SYSTEM:  A&Ox3, no focal deficits   SKIN: No rashes or lesions

## 2024-04-17 NOTE — H&P ADULT - ASSESSMENT
ASSESSMENT: 63yo F presenting after mechanical fall and found to have SDH.     PLAN:  - admit to SICU, Dr. Vallecillo  - q1hr neurochecks  - f/u NSGY recommendations  - 6hr CT scan  - f/u L hand xray  - resume home meds  - pain control     Pt seen and plan discussed with attending, Dr. Vallecillo

## 2024-04-17 NOTE — H&P ADULT - HISTORY OF PRESENT ILLNESS
65yo F w/ hx of autoimmune disease presenting after fall and found to have SDH. Pt states that she was walking her dog this morning when she fell and hit head. Denies any LOC. Was able to get herself up after fall. Presented to ED at OSH complaining only of headache. CT head showed small SDH. C-spine negative for any pathology. She was transferred to [facility] for further management. On presentation she was HDS complaining of mild headache and pain in finger. GCS 15, ABCs intact. Trauma consulted for eval.

## 2024-04-17 NOTE — ED PROVIDER NOTE - PHYSICAL EXAMINATION
General: Well appearing in no acute distress, alert and cooperative  Head: Normocephalic, atraumatic  Eyes: PERRLA, no conjunctival injection  Neck: Soft and supple, full ROM without pain  Cardiac: Regular rate and regular rhythm, no murmurs, peripheral pulses 2+ and symmetric in all extremities, no LE edema.  Resp: Unlabored respiratory effort, lungs CTAB  Abd: Soft, non-tender, non-distended  MSK: Spine midline and non-tender, no CVA tenderness   Skin: Warm and dry  Neuro: AO x 3, moves all extremities symmetrically, Motor strength and sensation grossly intact General: Well appearing in no acute distress, alert and cooperative  Head: Normocephalic, atraumatic  Eyes: PERRLA, no conjunctival injection  Neck: Soft and supple, full ROM without pain  Cardiac: Regular rate and regular rhythm, no murmurs, peripheral pulses 2+ and symmetric in all extremities, no LE edema.  Resp: Unlabored respiratory effort, lungs CTAB  Abd: Soft, non-tender, non-distended  MSK: Spine midline and non-tender, Left fourth digit tender with swelling with normal strength and sensation throughout.  Skin: Warm and dry  Neuro: AO x 3, moves all extremities symmetrically, Motor strength and sensation grossly intact

## 2024-04-17 NOTE — ED PROVIDER NOTE - OBJECTIVE STATEMENT
64-year-old female with history of chronic pain depression fell in her basement at home this morning complaining of injury to the back of her head and her left hand.  Denies LOC nausea vomiting visual disturbance neck or back pain or other injury or symptom.

## 2024-04-17 NOTE — ED PROVIDER NOTE - CONSTITUTIONAL NEGATIVE STATEMENT, MLM
[address] [address]  [phone]        August 8, 2018    Russel Denny  [address] [place] [id number]              Dear Russel Denny    This is to remind you that your non-fasting labs is due.    You may call our office at [phone] to schedule an appointment.    Please disregard this notice if you have already had your labs drawn or made an appointment.        Sincerely,        Remberto Peralta MD           no fever and no chills.

## 2024-04-17 NOTE — ED PROVIDER NOTE - OBJECTIVE STATEMENT
chronic pain, pulmonary fibrosis, CVA, HTN    Takes aspirin 81 mg daily, last dose this morning. 64-year-old female with history of chronic pain, pulmonary fibrosis, CVA, HTN transferred from [place] emergency department for traumatic subdural hematoma presenting with headache.  Patient reports mechanical fall earlier this morning with head strike.  Denies loss of consciousness.  Denies focal neurologic symptoms.  Denies chest pain, shortness of breath, nausea, vomiting, diarrhea, urinary symptoms.  Patient is on aspirin 81 mg with last dose taken this morning.  Denies anticoagulation.

## 2024-04-17 NOTE — ED ADULT TRIAGE NOTE - CHIEF COMPLAINT QUOTE
Pt BIBA transfer from [place], fall down stairs in basement hitting head, subdural hematoma noted on CT, pt c/o headache, brought to critical care for further evaluation

## 2024-04-17 NOTE — ED ADULT NURSE NOTE - OBJECTIVE STATEMENT
Pt fell down 6 steps in house and cut back of head, denies LOC or blurry vision, no blood thinners  patient refused to take Pt fell down 6 steps in house and cut back of head, denies LOC or blurry vision, no blood thinners  patient refused to take off her hair extension at this time, MD at bedside, patient's speech is slurred but it is baseline as per patient, wound is cleaned and wrapped, pending ct scan, plan of care ongoing

## 2024-04-17 NOTE — ED ADULT NURSE NOTE - BEFAST ARM NUMBNESS
"   03/06/17 1245   General Information   Date Initially Attended OT 03/06/17   Clinical Impression   Affect Flat   Orientation Oriented to person, place and time   Appearance and ADLs General cleanliness observed in most areas   Attention to Internal Stimuli No observed signs   Interaction Skills Initiates appropriately with staff;Initiates appropriately with peers   Ability to Communicate Needs Independent   Verbal Content Clear;Appropriate to topic   Ability to Maintain Boundaries Maintains appropriate physical boundaries;Maintains appropriate verbal boundaries   Participation Independently participates   Concentration Concentrates 50 minutes   Ability to Concentrate Without difficulty   Follows and Comprehends Directions Needs further assessment;Independently follows multi-step directions   Memory Delayed and immediate recall intact;Needs further assessment   Organization Independently organizes all tasks;Needs further assessment   Decision Making Independent   Planning and Problem Solving Needs further assessment;Independently plans ahead   Ability to Apply and Learn Concepts Applies within group structure   Frustrations / Stress Tolerance Needs further assessment;Independently identifies sources of frustration/stress   Level of Insight Needs further assessment;Insightful into needs, issues, goals   Self Esteem Can identify positives;Needs further assessment   Social Supports Identifies utilizing supports   General Observation/Plan   General Observations/Plan See Comments   Attended 2 of 2 OT groups. Stated feeling tired though stayed the entire sessions and participated. Needed no prompting at hier turns, seemed attentive and interested in being involved in activity focused on topic of using sensory interventions for calming. Was pleasant and social with peers. Quick to be involved. Stated reason for admission as \"I was misunderstood, was just expressing my feelings\". Stated her support to be \"good\" and offered " multiple ideas she uses successfully for calming in her daily routine. Pt was given and completed a written self assessment. She chose goals she wants to focus on to include deal with frustration more effectively, express feelings better, ask for help as needed and improve organization.  OT purpose was explained with the value of having involvement in treatment plan, and provided options to meet self identified goals.no further plans with d/c today.   No

## 2024-04-17 NOTE — ED PROVIDER NOTE - PROGRESS NOTE DETAILS
Trauma/surgery consult placed. -DO Van Neurosurgery PA at the bedside.  Pending further recommendations. -Van, DO

## 2024-04-17 NOTE — CONSULT NOTE ADULT - SUBJECTIVE AND OBJECTIVE BOX
Patient is a 64y old  Female who presents with a chief complaint of fall     HISTORY OF PRESENT ILLNESS:   63 yo female with pmhs RML Lobectomy, 49 yo smoking history, idiopathic connective disease, H/o L5-S1 Surgery (patient unsure of what was done), Peripheral neuropathy, MI 2015, Chronic pain, Stroke x 2, GERD, H/o of seizures 2/2 hyponatremia who presents as a transfer from [facility] with reports of a fall. Patient states that she was walking down the stairs tripped and hit the back of her head and fell down approximately 6 steps, She denies any LOC.     SOCIAL HISTORY:  Tobacco Use: Vapes  EtOH use: Denies  Substance: Denies    Allergies  penicillin (Unknown)    REVIEW OF SYSTEMS  Negative except as noted in HPI    Vital Signs Last 24 Hrs  T(C): 36.7 (17 Apr 2024 17:53), Max: 36.7 (17 Apr 2024 17:53)  T(F): 98 (17 Apr 2024 17:53), Max: 98 (17 Apr 2024 17:53)  HR: 57 (17 Apr 2024 17:53) (57 - 57)  BP: 148/93 (17 Apr 2024 17:53) (148/93 - 148/93)  BP(mean): --  RR: 18 (17 Apr 2024 17:53) (18 - 18)  SpO2: 98% (17 Apr 2024 17:53) (98% - 98%)    Parameters below as of 17 Apr 2024 17:53  Patient On (Oxygen Delivery Method): room air    PHYSICAL EXAM:  GENERAL: NAD, well-groomed, well-developed  HEAD:  Atraumatic, normocephalic  NECK: Supple  RAFAEL COMA SCORE: E- V- M- =15   MENTAL STATUS: AAO x3; Awake, Opens eyes spontaneously, Appropriately conversant without aphasia, following simple commands  CRANIAL NERVES: PERRL. EOMI without nystagmus. Face symmetric w/ normal eye closure and smile, tongue midline. Hearing grossly intact. Speech clear  MOTOR: strength 5/5 b/l upper and lower extremities, no pronator drift  SENSATION: grossly intact to light touch all extremities  SKIN: Warm, dry; no rashes or lesions    RADIOLOGY & ADDITIONAL STUDIES:  4/17 CTH (at [facility]): Small Bifrontal Contusions and small SDH     CAPRINI SCORE [CLOT]:  Patient has an estimated Caprini score of greater than 5.  However, the patient's unique clinical situation will be addressed in an individual manner to determine appropriate anticoagulation treatment, if any.

## 2024-04-17 NOTE — ED PROVIDER NOTE - MUSCULOSKELETAL, MLM
Minor tenderness over the left hand third MCP area.  No bony deformity.  Full range of motion pulses sensation intact throughout.

## 2024-04-17 NOTE — ED PROVIDER NOTE - ATTENDING CONTRIBUTION TO CARE
Yes
64-year-old female with traumatic subdural hematoma 2/2 mechanical fall, transferred from [place] emergency department, here is alert, oriented to place and situation, requesting pain medication; will d/w neurosurg + trauma service, tba trauma service for ongoing monitoring of a high risk -- but currently stable appearing -- patient.

## 2024-04-17 NOTE — ED ADULT NURSE NOTE - NSFALLRISKASMT_ED_ALL_ED_DT
Refill request for alprazolam.   LOV: 6/24/2020 with Dr. Ireland   Upcoming visit: 9/25/2020 with Dr. Ireland  Last Refill: 6/24/2020, #15, 0 refills     Pharmacy: [facility]       17-Apr-2024 19:06

## 2024-04-17 NOTE — ED ADULT NURSE NOTE - CHIEF COMPLAINT QUOTE
Pt BIBA transfer from [place], fall down stairs in basement hitting head, subdural hematoma noted on CT, pt c/o headache, brought to critical care for further evaluation

## 2024-04-17 NOTE — ED PROVIDER NOTE - PROGRESS NOTE DETAILS
CT brain revealed 4 mm frontal subdural with extension into the falx.  X-ray of the hand revealed fourth finger fracture.  Splint applied.  Plan is we will contact neurosurgery and transfer patient for further evaluation and treatment.

## 2024-04-17 NOTE — ED ADULT NURSE NOTE - NSFALLHARMRISKINTERV_ED_ALL_ED

## 2024-04-17 NOTE — ED PROVIDER NOTE - CLINICAL SUMMARY MEDICAL DECISION MAKING FREE TEXT BOX
64-year-old female with traumatic subdural hematoma transferred from [place] emergency department. Hemodynamically stable without hypertension and neurovascularly intact.  Neurosurgery and trauma service consulted on patient arrival.  Patient not on anticoagulation. 64-year-old female with traumatic subdural hematoma transferred from [place] emergency department. Hemodynamically stable without hypertension and neurovascularly intact.  Neurosurgery and trauma service consulted on patient arrival.  Patient not on anticoagulation. SICU admission.

## 2024-04-17 NOTE — ED PROVIDER NOTE - CARE PLAN
Principal Discharge DX:	Head injury   1 Principal Discharge DX:	Traumatic subdural hematoma without loss of consciousness  Secondary Diagnosis:	Finger fracture

## 2024-04-18 LAB
ACETONE SERPL-MCNC: ABNORMAL
ANION GAP SERPL CALC-SCNC: 13 MMOL/L — SIGNIFICANT CHANGE UP (ref 5–17)
ANION GAP SERPL CALC-SCNC: 16 MMOL/L — SIGNIFICANT CHANGE UP (ref 5–17)
BUN SERPL-MCNC: 8.1 MG/DL — SIGNIFICANT CHANGE UP (ref 8–20)
BUN SERPL-MCNC: 9 MG/DL — SIGNIFICANT CHANGE UP (ref 8–20)
CALCIUM SERPL-MCNC: 8.2 MG/DL — LOW (ref 8.4–10.5)
CALCIUM SERPL-MCNC: 8.7 MG/DL — SIGNIFICANT CHANGE UP (ref 8.4–10.5)
CHLORIDE SERPL-SCNC: 98 MMOL/L — SIGNIFICANT CHANGE UP (ref 96–108)
CHLORIDE SERPL-SCNC: 99 MMOL/L — SIGNIFICANT CHANGE UP (ref 96–108)
CO2 SERPL-SCNC: 21 MMOL/L — LOW (ref 22–29)
CO2 SERPL-SCNC: 24 MMOL/L — SIGNIFICANT CHANGE UP (ref 22–29)
CREAT SERPL-MCNC: 0.56 MG/DL — SIGNIFICANT CHANGE UP (ref 0.5–1.3)
CREAT SERPL-MCNC: 0.74 MG/DL — SIGNIFICANT CHANGE UP (ref 0.5–1.3)
EGFR: 102 ML/MIN/1.73M2 — SIGNIFICANT CHANGE UP
EGFR: 90 ML/MIN/1.73M2 — SIGNIFICANT CHANGE UP
GLUCOSE SERPL-MCNC: 105 MG/DL — HIGH (ref 70–99)
GLUCOSE SERPL-MCNC: 84 MG/DL — SIGNIFICANT CHANGE UP (ref 70–99)
HCT VFR BLD CALC: 41.3 % — SIGNIFICANT CHANGE UP (ref 34.5–45)
HCV AB S/CO SERPL IA: 0.1 S/CO — SIGNIFICANT CHANGE UP (ref 0–0.99)
HCV AB SERPL-IMP: SIGNIFICANT CHANGE UP
HGB BLD-MCNC: 14.5 G/DL — SIGNIFICANT CHANGE UP (ref 11.5–15.5)
LACTATE SERPL-SCNC: 0.8 MMOL/L — SIGNIFICANT CHANGE UP (ref 0.5–2)
MAGNESIUM SERPL-MCNC: 1.9 MG/DL — SIGNIFICANT CHANGE UP (ref 1.6–2.6)
MAGNESIUM SERPL-MCNC: 2.7 MG/DL — HIGH (ref 1.6–2.6)
MCHC RBC-ENTMCNC: 32.7 PG — SIGNIFICANT CHANGE UP (ref 27–34)
MCHC RBC-ENTMCNC: 35.1 GM/DL — SIGNIFICANT CHANGE UP (ref 32–36)
MCV RBC AUTO: 93.2 FL — SIGNIFICANT CHANGE UP (ref 80–100)
PHOSPHATE SERPL-MCNC: 2.6 MG/DL — SIGNIFICANT CHANGE UP (ref 2.4–4.7)
PHOSPHATE SERPL-MCNC: 3.2 MG/DL — SIGNIFICANT CHANGE UP (ref 2.4–4.7)
PLATELET # BLD AUTO: 195 K/UL — SIGNIFICANT CHANGE UP (ref 150–400)
POTASSIUM SERPL-MCNC: 3.6 MMOL/L — SIGNIFICANT CHANGE UP (ref 3.5–5.3)
POTASSIUM SERPL-MCNC: 4.5 MMOL/L — SIGNIFICANT CHANGE UP (ref 3.5–5.3)
POTASSIUM SERPL-SCNC: 3.6 MMOL/L — SIGNIFICANT CHANGE UP (ref 3.5–5.3)
POTASSIUM SERPL-SCNC: 4.5 MMOL/L — SIGNIFICANT CHANGE UP (ref 3.5–5.3)
RBC # BLD: 4.43 M/UL — SIGNIFICANT CHANGE UP (ref 3.8–5.2)
RBC # FLD: 12 % — SIGNIFICANT CHANGE UP (ref 10.3–14.5)
SODIUM SERPL-SCNC: 135 MMOL/L — SIGNIFICANT CHANGE UP (ref 135–145)
SODIUM SERPL-SCNC: 136 MMOL/L — SIGNIFICANT CHANGE UP (ref 135–145)
WBC # BLD: 6.17 K/UL — SIGNIFICANT CHANGE UP (ref 3.8–10.5)
WBC # FLD AUTO: 6.17 K/UL — SIGNIFICANT CHANGE UP (ref 3.8–10.5)

## 2024-04-18 PROCEDURE — 93010 ELECTROCARDIOGRAM REPORT: CPT

## 2024-04-18 PROCEDURE — 70450 CT HEAD/BRAIN W/O DYE: CPT | Mod: 26,77

## 2024-04-18 PROCEDURE — 99232 SBSQ HOSP IP/OBS MODERATE 35: CPT

## 2024-04-18 PROCEDURE — 72125 CT NECK SPINE W/O DYE: CPT | Mod: 26

## 2024-04-18 PROCEDURE — 73130 X-RAY EXAM OF HAND: CPT | Mod: 26,LT

## 2024-04-18 PROCEDURE — 99291 CRITICAL CARE FIRST HOUR: CPT

## 2024-04-18 PROCEDURE — 70450 CT HEAD/BRAIN W/O DYE: CPT | Mod: 26

## 2024-04-18 PROCEDURE — 72128 CT CHEST SPINE W/O DYE: CPT | Mod: 26

## 2024-04-18 RX ORDER — ALPRAZOLAM 0.25 MG
0.5 TABLET ORAL EVERY 8 HOURS
Refills: 0 | Status: DISCONTINUED | OUTPATIENT
Start: 2024-04-18 | End: 2024-04-18

## 2024-04-18 RX ORDER — ALPRAZOLAM 0.25 MG
0.25 TABLET ORAL AT BEDTIME
Refills: 0 | Status: DISCONTINUED | OUTPATIENT
Start: 2024-04-18 | End: 2024-04-20

## 2024-04-18 RX ORDER — OXYCODONE HYDROCHLORIDE 5 MG/1
2.5 TABLET ORAL EVERY 6 HOURS
Refills: 0 | Status: DISCONTINUED | OUTPATIENT
Start: 2024-04-18 | End: 2024-04-18

## 2024-04-18 RX ORDER — SODIUM CHLORIDE 9 MG/ML
1000 INJECTION, SOLUTION INTRAVENOUS
Refills: 0 | Status: DISCONTINUED | OUTPATIENT
Start: 2024-04-18 | End: 2024-04-18

## 2024-04-18 RX ORDER — OXYCODONE HYDROCHLORIDE 5 MG/1
5 TABLET ORAL EVERY 6 HOURS
Refills: 0 | Status: DISCONTINUED | OUTPATIENT
Start: 2024-04-18 | End: 2024-04-18

## 2024-04-18 RX ORDER — OXYCODONE HYDROCHLORIDE 5 MG/1
5 TABLET ORAL EVERY 4 HOURS
Refills: 0 | Status: DISCONTINUED | OUTPATIENT
Start: 2024-04-18 | End: 2024-04-20

## 2024-04-18 RX ORDER — NINTEDANIB 100 MG/1
1 CAPSULE ORAL
Refills: 0 | DISCHARGE

## 2024-04-18 RX ORDER — AMLODIPINE BESYLATE 2.5 MG/1
2.5 TABLET ORAL DAILY
Refills: 0 | Status: DISCONTINUED | OUTPATIENT
Start: 2024-04-18 | End: 2024-04-18

## 2024-04-18 RX ORDER — CALCIUM CARBONATE 500(1250)
2 TABLET ORAL ONCE
Refills: 0 | Status: COMPLETED | OUTPATIENT
Start: 2024-04-18 | End: 2024-04-18

## 2024-04-18 RX ORDER — ALPRAZOLAM 0.25 MG
1 TABLET ORAL
Refills: 0 | DISCHARGE

## 2024-04-18 RX ORDER — ALPRAZOLAM 0.25 MG
0.5 TABLET ORAL DAILY
Refills: 0 | Status: DISCONTINUED | OUTPATIENT
Start: 2024-04-18 | End: 2024-04-20

## 2024-04-18 RX ORDER — AMLODIPINE BESYLATE 2.5 MG/1
5 TABLET ORAL DAILY
Refills: 0 | Status: DISCONTINUED | OUTPATIENT
Start: 2024-04-18 | End: 2024-04-20

## 2024-04-18 RX ORDER — ACETAMINOPHEN 500 MG
1000 TABLET ORAL EVERY 6 HOURS
Refills: 0 | Status: COMPLETED | OUTPATIENT
Start: 2024-04-18 | End: 2024-04-19

## 2024-04-18 RX ORDER — HYDROXYCHLOROQUINE SULFATE 200 MG
1 TABLET ORAL
Refills: 0 | DISCHARGE

## 2024-04-18 RX ORDER — CIMETIDINE 200 MG
1 TABLET ORAL
Refills: 0 | DISCHARGE

## 2024-04-18 RX ORDER — TIZANIDINE 4 MG/1
1 TABLET ORAL
Refills: 0 | DISCHARGE

## 2024-04-18 RX ORDER — OXYCODONE HYDROCHLORIDE 5 MG/1
10 TABLET ORAL EVERY 4 HOURS
Refills: 0 | Status: DISCONTINUED | OUTPATIENT
Start: 2024-04-18 | End: 2024-04-20

## 2024-04-18 RX ORDER — AMLODIPINE BESYLATE 2.5 MG/1
2 TABLET ORAL
Refills: 0 | DISCHARGE

## 2024-04-18 RX ORDER — AMLODIPINE BESYLATE 2.5 MG/1
2.5 TABLET ORAL ONCE
Refills: 0 | Status: COMPLETED | OUTPATIENT
Start: 2024-04-18 | End: 2024-04-18

## 2024-04-18 RX ORDER — AMLODIPINE BESYLATE 2.5 MG/1
1 TABLET ORAL
Refills: 0 | DISCHARGE

## 2024-04-18 RX ORDER — ACETAMINOPHEN 500 MG
650 TABLET ORAL EVERY 6 HOURS
Refills: 0 | Status: DISCONTINUED | OUTPATIENT
Start: 2024-04-18 | End: 2024-04-18

## 2024-04-18 RX ORDER — HYDROMORPHONE HYDROCHLORIDE 2 MG/ML
0.5 INJECTION INTRAMUSCULAR; INTRAVENOUS; SUBCUTANEOUS
Refills: 0 | Status: DISCONTINUED | OUTPATIENT
Start: 2024-04-18 | End: 2024-04-20

## 2024-04-18 RX ORDER — MAGNESIUM SULFATE 500 MG/ML
2 VIAL (ML) INJECTION ONCE
Refills: 0 | Status: COMPLETED | OUTPATIENT
Start: 2024-04-18 | End: 2024-04-18

## 2024-04-18 RX ORDER — SIMETHICONE 80 MG/1
80 TABLET, CHEWABLE ORAL ONCE
Refills: 0 | Status: DISCONTINUED | OUTPATIENT
Start: 2024-04-18 | End: 2024-04-18

## 2024-04-18 RX ORDER — NORTRIPTYLINE HYDROCHLORIDE 10 MG/1
2 CAPSULE ORAL
Refills: 0 | DISCHARGE

## 2024-04-18 RX ORDER — POTASSIUM CHLORIDE 20 MEQ
40 PACKET (EA) ORAL DAILY
Refills: 0 | Status: DISCONTINUED | OUTPATIENT
Start: 2024-04-18 | End: 2024-04-21

## 2024-04-18 RX ADMIN — Medication 0.5 MILLIGRAM(S): at 01:37

## 2024-04-18 RX ADMIN — BUPROPION HYDROCHLORIDE 150 MILLIGRAM(S): 150 TABLET, EXTENDED RELEASE ORAL at 12:03

## 2024-04-18 RX ADMIN — Medication 400 MILLIGRAM(S): at 17:20

## 2024-04-18 RX ADMIN — Medication 200 MILLIGRAM(S): at 12:04

## 2024-04-18 RX ADMIN — Medication 25 GRAM(S): at 04:22

## 2024-04-18 RX ADMIN — Medication 40 MILLIEQUIVALENT(S): at 06:16

## 2024-04-18 RX ADMIN — NORTRIPTYLINE HYDROCHLORIDE 10 MILLIGRAM(S): 10 CAPSULE ORAL at 18:34

## 2024-04-18 RX ADMIN — CHLORHEXIDINE GLUCONATE 1 APPLICATION(S): 213 SOLUTION TOPICAL at 12:10

## 2024-04-18 RX ADMIN — OXYCODONE HYDROCHLORIDE 5 MILLIGRAM(S): 5 TABLET ORAL at 06:53

## 2024-04-18 RX ADMIN — DULOXETINE HYDROCHLORIDE 60 MILLIGRAM(S): 30 CAPSULE, DELAYED RELEASE ORAL at 12:04

## 2024-04-18 RX ADMIN — LEVETIRACETAM 1000 MILLIGRAM(S): 250 TABLET, FILM COATED ORAL at 18:34

## 2024-04-18 RX ADMIN — Medication 2 TABLET(S): at 06:14

## 2024-04-18 RX ADMIN — AMLODIPINE BESYLATE 2.5 MILLIGRAM(S): 2.5 TABLET ORAL at 22:33

## 2024-04-18 RX ADMIN — Medication 63.75 MILLIMOLE(S): at 20:28

## 2024-04-18 RX ADMIN — OXYCODONE HYDROCHLORIDE 10 MILLIGRAM(S): 5 TABLET ORAL at 09:00

## 2024-04-18 RX ADMIN — AMLODIPINE BESYLATE 2.5 MILLIGRAM(S): 2.5 TABLET ORAL at 10:32

## 2024-04-18 RX ADMIN — OXYCODONE HYDROCHLORIDE 5 MILLIGRAM(S): 5 TABLET ORAL at 04:22

## 2024-04-18 RX ADMIN — Medication 650 MILLIGRAM(S): at 06:53

## 2024-04-18 RX ADMIN — NORTRIPTYLINE HYDROCHLORIDE 10 MILLIGRAM(S): 10 CAPSULE ORAL at 06:13

## 2024-04-18 RX ADMIN — LEVETIRACETAM 1000 MILLIGRAM(S): 250 TABLET, FILM COATED ORAL at 06:53

## 2024-04-18 RX ADMIN — SODIUM CHLORIDE 75 MILLILITER(S): 9 INJECTION, SOLUTION INTRAVENOUS at 08:09

## 2024-04-18 RX ADMIN — Medication 400 MILLIGRAM(S): at 09:09

## 2024-04-18 RX ADMIN — SODIUM CHLORIDE 100 MILLILITER(S): 9 INJECTION INTRAMUSCULAR; INTRAVENOUS; SUBCUTANEOUS at 06:16

## 2024-04-18 RX ADMIN — Medication 650 MILLIGRAM(S): at 01:37

## 2024-04-18 RX ADMIN — Medication 200 MILLIGRAM(S): at 13:52

## 2024-04-18 RX ADMIN — Medication 1000 MILLIGRAM(S): at 18:00

## 2024-04-18 RX ADMIN — OXYCODONE HYDROCHLORIDE 10 MILLIGRAM(S): 5 TABLET ORAL at 10:00

## 2024-04-18 RX ADMIN — Medication 200 MILLIGRAM(S): at 21:30

## 2024-04-18 RX ADMIN — Medication 0.25 MILLIGRAM(S): at 22:33

## 2024-04-18 RX ADMIN — Medication 200 MILLIGRAM(S): at 01:37

## 2024-04-18 RX ADMIN — Medication 1000 MILLIGRAM(S): at 10:00

## 2024-04-18 RX ADMIN — Medication 200 MILLIGRAM(S): at 06:13

## 2024-04-18 NOTE — DISCHARGE NOTE PROVIDER - NSDCFUADDINST_GEN_ALL_CORE_FT
Please follow up with Ortho Hand within 2 weeks of discharge. The patient is recommended to elevated the affected extremity to reduce swelling. If the splint becomes too tight, the patient is to immediately elevate and contact the office to discuss further management or immediately proceed to the office if unable to make contact with the office. Patient must keep splint DRY at all times. Patient is non-weight bearing of the left upper extremity.  Please follow up with Ortho Hand within 2 weeks of discharge. The patient is recommended to elevated the affected extremity to reduce swelling. If the splint becomes too tight, the patient is to immediately elevate and contact the office to discuss further management or immediately proceed to the office if unable to make contact with the office. Patient must keep splint DRY at all times. Patient is non-weight bearing of the left upper extremity.   Continue keppra for 3 more days as prescribed  hold your aspirin until seen by your neurosurgeon . f/u with neurosurgery (Dr. Erickson) 1-2 weeks after discharge.

## 2024-04-18 NOTE — PATIENT PROFILE ADULT - FALL HARM RISK - HARM RISK INTERVENTIONS

## 2024-04-18 NOTE — DISCHARGE NOTE PROVIDER - NSDCFUSCHEDAPPT_GEN_ALL_CORE_FT
Jon Martin  Health system Physician [facility]  PSYCHIATRY [facility]   Scheduled Appointment: 07/15/2024

## 2024-04-18 NOTE — DISCHARGE NOTE PROVIDER - NSDCMRMEDTOKEN_GEN_ALL_CORE_FT
Cymbalta 30 mg oral delayed release capsule: 5 cap(s) orally once a day  hydroxychloroquine 200 mg oral tablet: 1 tab(s) orally once a day  Keppra 1000 mg oral tablet: 2 tab(s) orally once a day  Lyrica 200 mg oral capsule: 1 cap(s) orally 2 times a day  methylPREDNISolone 2 mg oral tablet: 1 tab(s) orally once a day  nintedanib 100 mg oral capsule: 1 cap(s) orally 2 times a day  nortriptyline 10 mg oral capsule: 2 cap(s) orally 2 times a day  Norvasc 10 mg oral tablet: 2 tab(s) orally once a day  Ofev 100 mg oral capsule: 1 cap(s) orally 2 times a day  Tagamet 200 mg oral tablet: 1 tab(s) orally once a day  tiZANidine 4 mg oral tablet: 1 tab(s) orally once a day  Xanax 0.25 mg oral tablet: 1 tab(s) orally once a day (at bedtime)  Xanax 0.5 mg oral tablet: 1 tab(s) orally once a day   acetaminophen 325 mg oral tablet: 2 tab(s) orally every 6 hours As needed Mild Pain (1 - 3)  ALPRAZolam 0.25 mg oral tablet: 1 tab(s) orally 2 times a day, As needed, anxiety  Cymbalta 30 mg oral delayed release capsule: 5 cap(s) orally once a day  doxycycline hyclate 100 mg oral capsule: 1 cap(s) orally 2 times a day   fludrocortisone 0.1 mg oral tablet: 1 tab(s) orally every 12 hours   gabapentin: 1200 milligram(s) orally  hydroxychloroquine 200 mg oral tablet: 1 tab(s) orally once a day   hydroxychloroquine 200 mg oral tablet: 1 tab(s) orally once a day  Keppra 1000 mg oral tablet: 2 tab(s) orally once a day  lacosamide 200 mg oral tablet: 1 tab(s) orally 2 times a day MDD:400mg  Lipitor 20 mg oral tablet: 1 tab(s) orally once a day (at bedtime)  methylPREDNISolone 2 mg oral tablet: 1 tab(s) orally once a day  methylPREDNISolone 4 mg oral tablet: 1 tab(s) orally once a day  Multiple Vitamins oral tablet: 1 tab(s) orally once a day  nintedanib 100 mg oral capsule: 1 cap(s) orally 2 times a day  nortriptyline 10 mg oral capsule: 2 cap(s) orally 2 times a day  Norvasc 10 mg oral tablet: 2 tab(s) orally once a day  ocular lubricant ophthalmic solution: 1 drop(s) to each affected eye 3 times a day, As needed, Eye irritation  Ofev 100 mg oral capsule: 1 cap(s) orally 2 times a day  oxyCODONE 10 mg oral tablet: 1 tab(s) orally every 6 hours, As Needed MDD:4  oxyCODONE 5 mg oral tablet: 1 tab(s) orally every 6 hours, As Needed -for severe pain MDD:4 tabs  oxyCODONE 5 mg oral tablet: 1 tab(s) orally 4 times a day x 2 days as needed for breakthrough pain MDD: 4  oxyCODONE 5 mg oral tablet: 2 tab(s) orally every 4 hours MDD:12 tab  pancrelipase 12,000 units-38,000 units-60,000 units oral delayed release capsule: 1 cap(s) orally 3 times a day (with meals)  phenytoin 25 mg/mL oral suspension: 125 milliliter(s) orally 2 times a day   pregabalin 200 mg oral capsule: 1 cap(s) orally every 8 hours  saccharomyces boulardii lyo 250 mg oral capsule: 1 cap(s) orally 2 times a day  Tagamet 200 mg oral tablet: 1 tab(s) orally once a day  thiamine 100 mg oral tablet: 1 tab(s) orally once a day  tiZANidine 4 mg oral tablet: 1 tab(s) orally once a day  Xanax 0.25 mg oral tablet: 1 tab(s) orally once a day (at bedtime)  Xanax 0.5 mg oral tablet: 1 tab(s) orally once a day

## 2024-04-18 NOTE — DISCHARGE NOTE PROVIDER - HOSPITAL COURSE
65yo F w/ hx of autoimmune disease presenting after fall and found to have SDH. Pt states that she was walking her dog this morning when she fell and hit head. Denies any LOC. Was able to get herself up after fall. Presented to ED at OSH complaining only of headache. CT head showed small SDH. C-spine negative for any pathology. She was transferred to [facility] for further management. On presentation she was HDS complaining of mild headache and pain in finger. GCS 15, ABCs intact. Trauma consulted for eval.   Patient was admitted to SICU, downgraded on 4/20. OT cleared for home, PT recommended home pending progress with stairs. patient did not want to wait to be cleared and wanted to sign out AMA.

## 2024-04-18 NOTE — PROGRESS NOTE ADULT - SUBJECTIVE AND OBJECTIVE BOX
INTERVAL HPI/OVERNIGHT EVENTS:      SUBJECTIVE:      MEDICATIONS  (STANDING):  ALPRAZolam 0.5 milliGRAM(s) Oral at bedtime  buPROPion XL (24-Hour) . 150 milliGRAM(s) Oral daily  chlorhexidine 2% Cloths 1 Application(s) Topical daily  DULoxetine 60 milliGRAM(s) Oral daily  hydroxychloroquine 200 milliGRAM(s) Oral daily  levETIRAcetam 1000 milliGRAM(s) Oral two times a day  nortriptyline 10 milliGRAM(s) Oral two times a day  potassium chloride    Tablet ER 40 milliEquivalent(s) Oral daily  pregabalin 200 milliGRAM(s) Oral every 8 hours  sodium chloride 0.9%. 1000 milliLiter(s) (100 mL/Hr) IV Continuous <Continuous>    MEDICATIONS  (PRN):  acetaminophen     Tablet .. 650 milliGRAM(s) Oral every 6 hours PRN Mild Pain (1 - 3)  oxyCODONE    IR 2.5 milliGRAM(s) Oral every 6 hours PRN Moderate Pain (4 - 6)  oxyCODONE    IR 5 milliGRAM(s) Oral every 6 hours PRN Severe Pain (7 - 10)      Drug Dosing Weight  Height (cm): 157.5 (17 Apr 2024 17:53)  Weight (kg): 52.2 (17 Apr 2024 17:53)  BMI (kg/m2): 21 (17 Apr 2024 17:53)  BSA (m2): 1.51 (17 Apr 2024 17:53)    PAST MEDICAL & SURGICAL HISTORY:    ICU Vital Signs Last 24 Hrs  T(C): 36.9 (18 Apr 2024 03:07), Max: 36.9 (17 Apr 2024 23:53)  T(F): 98.4 (18 Apr 2024 03:07), Max: 98.4 (17 Apr 2024 23:53)  HR: 107 (18 Apr 2024 07:00) (57 - 107)  BP: 152/84 (18 Apr 2024 07:00) (103/70 - 162/89)  BP(mean): 104 (18 Apr 2024 07:00) (80 - 111)  ABP: --  ABP(mean): --  RR: 22 (18 Apr 2024 07:00) (16 - 22)  SpO2: 96% (18 Apr 2024 07:00) (95% - 98%)    O2 Parameters below as of 17 Apr 2024 20:33  Patient On (Oxygen Delivery Method): room air            I&O's Detail    17 Apr 2024 07:01  -  18 Apr 2024 07:00  --------------------------------------------------------  IN:    sodium chloride 0.9%: 200 mL  Total IN: 200 mL    OUT:  Total OUT: 0 mL    Total NET: 200 mL            Physical Exam:    Neurological:     HEENT:     Neck: Neck supple, No JVD    Respiratory:  Equal chest rise.  Breath Sounds equal bilateral    Cardiovascular:     Gastrointestinal:     Extremities:    Vascular:     Skin: No rashes    PATIENT CARE ACCESS DEVICES:  [ ] Peripheral IV  [ ] Central Venous Line	[ ] R	[ ] L	[ ] IJ	[ ] Fem	[ ] SC	Placed:   [ ] Arterial Line		[ ] R	[ ] L	[ ] Fem	[ ] Rad	[ ] Ax	Placed:   [ ] PICC:					[ ] Mediport  [ ] Urinary Catheter:   [ ] Necessity of urinary, arterial, and venous catheters discussed    LABS:  CBC Full  -  ( 18 Apr 2024 03:23 )  WBC Count : 6.17 K/uL  RBC Count : 4.43 M/uL  Hemoglobin : 14.5 g/dL  Hematocrit : 41.3 %  Platelet Count - Automated : 195 K/uL  Mean Cell Volume : 93.2 fl  Mean Cell Hemoglobin : 32.7 pg  Mean Cell Hemoglobin Concentration : 35.1 gm/dL  Auto Neutrophil # : x  Auto Lymphocyte # : x  Auto Monocyte # : x  Auto Eosinophil # : x  Auto Basophil # : x  Auto Neutrophil % : x  Auto Lymphocyte % : x  Auto Monocyte % : x  Auto Eosinophil % : x  Auto Basophil % : x    04-18    135  |  98  |  9.0  ----------------------------<  84  3.6   |  21.0<L>  |  0.56    Ca    8.2<L>      18 Apr 2024 03:23  Phos  3.2     04-18  Mg     1.9     04-18    TPro  6.9  /  Alb  4.2  /  TBili  0.8  /  DBili  x   /  AST  127<H>  /  ALT  114<H>  /  AlkPhos  100  04-17    PT/INR - ( 17 Apr 2024 18:18 )   PT: 10.3 sec;   INR: 0.93 ratio         PTT - ( 17 Apr 2024 18:18 )  PTT:26.7 sec  Urinalysis Basic - ( 18 Apr 2024 03:23 )    Color: x / Appearance: x / SG: x / pH: x  Gluc: 84 mg/dL / Ketone: x  / Bili: x / Urobili: x   Blood: x / Protein: x / Nitrite: x   Leuk Esterase: x / RBC: x / WBC x   Sq Epi: x / Non Sq Epi: x / Bacteria: x           INTERVAL HPI/OVERNIGHT EVENTS:  Repeat scans suggested worsening of contusions.    SUBJECTIVE:    Reports HA this am.  MEDICATIONS  (STANDING):  ALPRAZolam 0.5 milliGRAM(s) Oral at bedtime  buPROPion XL (24-Hour) . 150 milliGRAM(s) Oral daily  chlorhexidine 2% Cloths 1 Application(s) Topical daily  DULoxetine 60 milliGRAM(s) Oral daily  hydroxychloroquine 200 milliGRAM(s) Oral daily  levETIRAcetam 1000 milliGRAM(s) Oral two times a day  nortriptyline 10 milliGRAM(s) Oral two times a day  potassium chloride    Tablet ER 40 milliEquivalent(s) Oral daily  pregabalin 200 milliGRAM(s) Oral every 8 hours  sodium chloride 0.9%. 1000 milliLiter(s) (100 mL/Hr) IV Continuous <Continuous>    MEDICATIONS  (PRN):  acetaminophen     Tablet .. 650 milliGRAM(s) Oral every 6 hours PRN Mild Pain (1 - 3)  oxyCODONE    IR 2.5 milliGRAM(s) Oral every 6 hours PRN Moderate Pain (4 - 6)  oxyCODONE    IR 5 milliGRAM(s) Oral every 6 hours PRN Severe Pain (7 - 10)      Drug Dosing Weight  Height (cm): 157.5 (17 Apr 2024 17:53)  Weight (kg): 52.2 (17 Apr 2024 17:53)  BMI (kg/m2): 21 (17 Apr 2024 17:53)  BSA (m2): 1.51 (17 Apr 2024 17:53)    PAST MEDICAL & SURGICAL HISTORY:    ICU Vital Signs Last 24 Hrs  T(C): 36.9 (18 Apr 2024 03:07), Max: 36.9 (17 Apr 2024 23:53)  T(F): 98.4 (18 Apr 2024 03:07), Max: 98.4 (17 Apr 2024 23:53)  HR: 107 (18 Apr 2024 07:00) (57 - 107)  BP: 152/84 (18 Apr 2024 07:00) (103/70 - 162/89)  BP(mean): 104 (18 Apr 2024 07:00) (80 - 111)  ABP: --  ABP(mean): --  RR: 22 (18 Apr 2024 07:00) (16 - 22)  SpO2: 96% (18 Apr 2024 07:00) (95% - 98%)    O2 Parameters below as of 17 Apr 2024 20:33  Patient On (Oxygen Delivery Method): room air            I&O's Detail    17 Apr 2024 07:01  -  18 Apr 2024 07:00  --------------------------------------------------------  IN:    sodium chloride 0.9%: 200 mL  Total IN: 200 mL    OUT:  Total OUT: 0 mL    Total NET: 200 mL            Physical Exam:    Neurological:     HEENT:     Neck: Neck supple, No JVD    Respiratory:  Equal chest rise.  Breath Sounds equal bilateral    Cardiovascular:     Gastrointestinal:     Extremities:    Vascular:     Skin: No rashes    PATIENT CARE ACCESS DEVICES:  [ ] Peripheral IV  [ ] Central Venous Line	[ ] R	[ ] L	[ ] IJ	[ ] Fem	[ ] SC	Placed:   [ ] Arterial Line		[ ] R	[ ] L	[ ] Fem	[ ] Rad	[ ] Ax	Placed:   [ ] PICC:					[ ] Mediport  [ ] Urinary Catheter:   [ ] Necessity of urinary, arterial, and venous catheters discussed    LABS:  CBC Full  -  ( 18 Apr 2024 03:23 )  WBC Count : 6.17 K/uL  RBC Count : 4.43 M/uL  Hemoglobin : 14.5 g/dL  Hematocrit : 41.3 %  Platelet Count - Automated : 195 K/uL  Mean Cell Volume : 93.2 fl  Mean Cell Hemoglobin : 32.7 pg  Mean Cell Hemoglobin Concentration : 35.1 gm/dL  Auto Neutrophil # : x  Auto Lymphocyte # : x  Auto Monocyte # : x  Auto Eosinophil # : x  Auto Basophil # : x  Auto Neutrophil % : x  Auto Lymphocyte % : x  Auto Monocyte % : x  Auto Eosinophil % : x  Auto Basophil % : x    04-18    135  |  98  |  9.0  ----------------------------<  84  3.6   |  21.0<L>  |  0.56    Ca    8.2<L>      18 Apr 2024 03:23  Phos  3.2     04-18  Mg     1.9     04-18    TPro  6.9  /  Alb  4.2  /  TBili  0.8  /  DBili  x   /  AST  127<H>  /  ALT  114<H>  /  AlkPhos  100  04-17    PT/INR - ( 17 Apr 2024 18:18 )   PT: 10.3 sec;   INR: 0.93 ratio         PTT - ( 17 Apr 2024 18:18 )  PTT:26.7 sec  Urinalysis Basic - ( 18 Apr 2024 03:23 )    Color: x / Appearance: x / SG: x / pH: x  Gluc: 84 mg/dL / Ketone: x  / Bili: x / Urobili: x   Blood: x / Protein: x / Nitrite: x   Leuk Esterase: x / RBC: x / WBC x   Sq Epi: x / Non Sq Epi: x / Bacteria: x           INTERVAL HPI/OVERNIGHT EVENTS:  Repeat scans suggested worsening of contusions.    SUBJECTIVE:  Reports HA this am.    MEDICATIONS  (STANDING):  ALPRAZolam 0.5 milliGRAM(s) Oral at bedtime  buPROPion XL (24-Hour) . 150 milliGRAM(s) Oral daily  chlorhexidine 2% Cloths 1 Application(s) Topical daily  DULoxetine 60 milliGRAM(s) Oral daily  hydroxychloroquine 200 milliGRAM(s) Oral daily  levETIRAcetam 1000 milliGRAM(s) Oral two times a day  nortriptyline 10 milliGRAM(s) Oral two times a day  potassium chloride    Tablet ER 40 milliEquivalent(s) Oral daily  pregabalin 200 milliGRAM(s) Oral every 8 hours  sodium chloride 0.9%. 1000 milliLiter(s) (100 mL/Hr) IV Continuous <Continuous>    MEDICATIONS  (PRN):  acetaminophen     Tablet .. 650 milliGRAM(s) Oral every 6 hours PRN Mild Pain (1 - 3)  oxyCODONE    IR 2.5 milliGRAM(s) Oral every 6 hours PRN Moderate Pain (4 - 6)  oxyCODONE    IR 5 milliGRAM(s) Oral every 6 hours PRN Severe Pain (7 - 10)      Drug Dosing Weight  Height (cm): 157.5 (17 Apr 2024 17:53)  Weight (kg): 52.2 (17 Apr 2024 17:53)  BMI (kg/m2): 21 (17 Apr 2024 17:53)  BSA (m2): 1.51 (17 Apr 2024 17:53)    PAST MEDICAL & SURGICAL HISTORY:    ICU Vital Signs Last 24 Hrs  T(C): 36.9 (18 Apr 2024 03:07), Max: 36.9 (17 Apr 2024 23:53)  T(F): 98.4 (18 Apr 2024 03:07), Max: 98.4 (17 Apr 2024 23:53)  HR: 107 (18 Apr 2024 07:00) (57 - 107)  BP: 152/84 (18 Apr 2024 07:00) (103/70 - 162/89)  BP(mean): 104 (18 Apr 2024 07:00) (80 - 111)  ABP: --  ABP(mean): --  RR: 22 (18 Apr 2024 07:00) (16 - 22)  SpO2: 96% (18 Apr 2024 07:00) (95% - 98%)    O2 Parameters below as of 17 Apr 2024 20:33  Patient On (Oxygen Delivery Method): room air            I&O's Detail    17 Apr 2024 07:01  -  18 Apr 2024 07:00  --------------------------------------------------------  IN:    sodium chloride 0.9%: 200 mL  Total IN: 200 mL    OUT:  Total OUT: 0 mL    Total NET: 200 mL    Physical Exam:    Neurological: GCS 15, moving all extremities without difficulty    HEENT: EOMI, hair extensions in place w/superficial abrasion at occiput noted at site of connection of one extension    Neck: Neck supple, No JVD, non tender & non tender in upper cervical spine, midline tenderness in lower cervical spine    Respiratory:  Equal chest rise.  Breath Sounds equal bilateral    Cardiovascular: RRR    Gastrointestinal: Flat, soft, non tender, non distended    Extremities: Warm, no hip/thigh tenderness or ecchymosis noted, chronic venous stasis changes noted in lower legs, LUE splinted, fingers warm and able to move, sensation intact    Vascular: palpable DP/PT pulses b/l    Back:  Tender in midline in lower cervical/upper thoracic area, well healed midline incision just above gluteal cleft w/palpable nerve stimulator on patient's right back side, tender over nervce stimulator    PATIENT CARE ACCESS DEVICES:  [x ] Peripheral IV  [ ] Central Venous Line	[ ] R	[ ] L	[ ] IJ	[ ] Fem	[ ] SC	Placed:   [ ] Arterial Line		[ ] R	[ ] L	[ ] Fem	[ ] Rad	[ ] Ax	Placed:   [ ] PICC:					[ ] Mediport  [ ] Urinary Catheter:   [x ] Necessity of urinary, arterial, and venous catheters discussed    LABS:  CBC Full  -  ( 18 Apr 2024 03:23 )  WBC Count : 6.17 K/uL  RBC Count : 4.43 M/uL  Hemoglobin : 14.5 g/dL  Hematocrit : 41.3 %  Platelet Count - Automated : 195 K/uL  Mean Cell Volume : 93.2 fl  Mean Cell Hemoglobin : 32.7 pg  Mean Cell Hemoglobin Concentration : 35.1 gm/dL  Auto Neutrophil # : x  Auto Lymphocyte # : x  Auto Monocyte # : x  Auto Eosinophil # : x  Auto Basophil # : x  Auto Neutrophil % : x  Auto Lymphocyte % : x  Auto Monocyte % : x  Auto Eosinophil % : x  Auto Basophil % : x    04-18    135  |  98  |  9.0  ----------------------------<  84  3.6   |  21.0<L>  |  0.56    Ca    8.2<L>      18 Apr 2024 03:23  Phos  3.2     04-18  Mg     1.9     04-18    TPro  6.9  /  Alb  4.2  /  TBili  0.8  /  DBili  x   /  AST  127<H>  /  ALT  114<H>  /  AlkPhos  100  04-17    PT/INR - ( 17 Apr 2024 18:18 )   PT: 10.3 sec;   INR: 0.93 ratio         PTT - ( 17 Apr 2024 18:18 )  PTT:26.7 sec  Urinalysis Basic - ( 18 Apr 2024 03:23 )    Color: x / Appearance: x / SG: x / pH: x  Gluc: 84 mg/dL / Ketone: x  / Bili: x / Urobili: x   Blood: x / Protein: x / Nitrite: x   Leuk Esterase: x / RBC: x / WBC x   Sq Epi: x / Non Sq Epi: x / Bacteria: x

## 2024-04-18 NOTE — PROGRESS NOTE ADULT - ASSESSMENT
65 yo female s/p a fall who was found to have bifrontal contusions and a right SDH s/p multiple CT scans showing slight changes in SDH     Plan:  - 63 yo female s/p a fall who was found to have bifrontal contusions and a right SDH s/p multiple CT scans showing slight changes in SDH, patient also found to have a subacute T6 VB Fracture    Plan:  - Rpt CTH done, grossly stable except for a slight increase to the right tentorial leaflet. Recommend Rpt CTH in AM  -Neurochecks q2  -Pain Control by primary team  -Patient seen and examined on AM rounds with Dr. Erickson  -Neurosurgery will continue to follow  63 yo female s/p a fall who was found to have bifrontal contusions and a right SDH s/p multiple CT scans showing slight changes in SDH, patient also found to have a subacute T6 VB Fracture, and chronic fracture at T10, T11 and T12, L1 and L2    Plan:  - Rpt CTH done, grossly stable except for a slight increase to the right tentorial leaflet. Recommend Rpt CTH in AM  -Neurochecks q2  -Thoracic AND Lumbar fractures: Patient states she has had them for a  long time, she denies having back pain. No additional intervention needed for fractures   -Pain Control by primary team  -Patient seen and examined on AM rounds with Dr. Erickson  -Neurosurgery will continue to follow

## 2024-04-18 NOTE — DISCHARGE NOTE PROVIDER - PROVIDER TOKENS
PROVIDER:[TOKEN:[[id number]]] PROVIDER:[TOKEN:[[id number]]],PROVIDER:[TOKEN:[[id number]]]

## 2024-04-18 NOTE — PROGRESS NOTE ADULT - ASSESSMENT
64 year old woman w/hx of hypertension, lung cancer s/p RML lobectomy, who was transferred from [facility]     -Pain regimen added for HA, of note hx of oxycodone use as well as suboxone as well  -Plan for repeat CT Head today, on home keppra, as well as other neurologic medications  -Small AG metabolic acidosis noted, w/+ acetone, started on D5NS  -HD stable but hypertension noted this am - likely in part related to pain but will also restart low dose home antihypertensive 2.5 of amlodipine  -Breathing comfortably on RA, currently on medication for pulmonary fibrosis (ideopathic) -will try to have patient family member bring in for patient  -H/H slightly decreased but adequate, scds, hold chemical prophylaxis until after stable head CT  -NPO for now until stable scan, D5NS at 75  -Voiding, Cr stable  -Afebrile, WBC normal, no antibiotics  -Appreciate orthopedics in put for L 4th phalanx fracture - now splinted 64 year old woman w/hx of hypertension, lung cancer s/p RML lobectomy, pulmonary fibrosis, and seizures in setting of hyponatremia who was transferred from [facility] after a trip and fall down 6 steps found to have small traumatic subdural hematoma and bifrontal contusions, a right occipital bone fracture, and a left 4th phalanx fracture.  Worsening contusion noted on repeat CTH x2.      -Pain regimen added for HA, of note hx of oxycodone use as well as suboxone as well (last use months ago)  -Plan for repeat CT Head (4th scan) this am per NSGY, on home keppra, as well as other neurologic medications  -Will obtain CT cervical spine/thoracic spine for further evaluation of spine tenderness noted on exam  -HD stable but hypertension noted this am - likely in part related to pain but will also restart low dose home antihypertensive 2.5 of amlodipine  -Breathing comfortably on RA, currently on medication for pulmonary fibrosis (ideopathic) -will try to have patient family member bring in for patient  -H/H slightly decreased but adequate, scds, hold chemical prophylaxis until after stable head CT  -NPO for now until stable scan, D5NS at 75  -Voiding, Cr stable  -Afebrile, WBC normal, no antibiotics  -Small AG metabolic acidosis noted, w/+ acetone, started on D5NS while NPO  -Appreciate orthopedics in put for L 4th phalanx fracture - now splinted  -Pelvic film reviewed - no evidence of fracture

## 2024-04-18 NOTE — CONSULT NOTE ADULT - SUBJECTIVE AND OBJECTIVE BOX
Pt Name: JULIO CESAR FAYE  MRN: [id number]    Patient is a 64y Female seen by ortho for left hand pain and swelling after falling down 6 stairs today. Patient was a transfer from [place] after being diagnosed with a SDH. Patient admits to pain to the 5th finger and to the ulnar aspect of the palm. Admits to swelling and bruising. Denies numbness and tingling. Patient denies pain in any other extremities at this time. Admits to a headache,     REVIEW OF SYSTEMS  General: Alert, responsive, in NAD  Musculoskeletal: SEE HPI.  Neurological: No sensory or motor changes.     Allergies: penicillin (Unknown)    Medications: acetaminophen     Tablet .. 650 milliGRAM(s) Oral every 6 hours PRN  ALPRAZolam 0.5 milliGRAM(s) Oral at bedtime  buPROPion XL (24-Hour) . 150 milliGRAM(s) Oral daily  chlorhexidine 2% Cloths 1 Application(s) Topical daily  DULoxetine 60 milliGRAM(s) Oral daily  hydroxychloroquine 200 milliGRAM(s) Oral daily  levETIRAcetam 1000 milliGRAM(s) Oral two times a day  nortriptyline 10 milliGRAM(s) Oral two times a day  pregabalin 200 milliGRAM(s) Oral every 8 hours  sodium chloride 0.9%. 1000 milliLiter(s) IV Continuous <Continuous>    Ambulation: Walking independently                           16.0   6.53  )-----------( 245      ( 17 Apr 2024 18:18 )             45.0       04-17    135  |  96  |  11.0  ----------------------------<  98  4.1   |  24.0  |  0.68    Ca    9.1      17 Apr 2024 18:18    TPro  6.9  /  Alb  4.2  /  TBili  0.8  /  DBili  x   /  AST  127<H>  /  ALT  114<H>  /  AlkPhos  100  04-17    Vital Signs Last 24 Hrs  T(C): 36.9 (17 Apr 2024 23:53), Max: 36.9 (17 Apr 2024 23:53)  T(F): 98.4 (17 Apr 2024 23:53), Max: 98.4 (17 Apr 2024 23:53)  HR: 62 (17 Apr 2024 20:33) (57 - 62)  BP: 110/87 (17 Apr 2024 20:33) (103/70 - 148/93)  BP(mean): 80 (17 Apr 2024 19:23) (80 - 80)  RR: 17 (17 Apr 2024 20:33) (17 - 18)  SpO2: 96% (17 Apr 2024 20:33) (95% - 98%)  Parameters below as of 17 Apr 2024 20:33  Patient On (Oxygen Delivery Method): room air  Daily Height in cm: 157.48 (17 Apr 2024 17:53)    Daily     PHYSICAL EXAM:   Appearance: Alert, responsive, in no acute distress.  Neurological: Sensation is grossly intact to light touch to the distal LUE  Vascular: 2+ distal pulses. Cap refill < 2 sec.  No cyanosis.  Musculoskeletal:  Left Upper Extremity - bruising of the     Imaging Studies: Official reads pending - Xray left wrist shows displaced 5th metacarpal fracture     SPLINTING   PROCEDURE NOTE: Splinting  Performed by: Noelle Davis PA-C   Indication: Fracture  The left hand was appropriately positioned. A orthoglass splint was applied. Distally, the extremity was neurovascular intact following the procedure. The patient tolerated the procedure well.    A/P:  Pt is a 64y Female with found to have left 5th metacarpal fracture     PLAN:   * Keep splint Dry   * Elevate extremity   * NWB of the LUE   * Follow up Xray of Left hand   * Follow up with Dr. Lindsay Drew within 2 weeks of discharge    * No acute orthopedic intervention needed at this time     Case discussed with Dr. Lindsay Drew who reviewed imaging and gave plan Pt Name: JULIO CESAR FAYE  MRN: [id number]    Patient is a 64y Female seen by ortho for left hand pain and swelling after falling down 6 stairs today. Patient was a transfer from [place] after being diagnosed with a SDH. Patient admits to pain to the 5th finger and to the ulnar aspect of the palm. Admits to swelling and bruising. Denies numbness and tingling. Patient denies pain in any other extremities at this time. Admits to a headache,     REVIEW OF SYSTEMS  General: Alert, responsive, in NAD  Musculoskeletal: SEE HPI.  Neurological: No sensory or motor changes.     Allergies: penicillin (Unknown)    Medications: acetaminophen     Tablet .. 650 milliGRAM(s) Oral every 6 hours PRN  ALPRAZolam 0.5 milliGRAM(s) Oral at bedtime  buPROPion XL (24-Hour) . 150 milliGRAM(s) Oral daily  chlorhexidine 2% Cloths 1 Application(s) Topical daily  DULoxetine 60 milliGRAM(s) Oral daily  hydroxychloroquine 200 milliGRAM(s) Oral daily  levETIRAcetam 1000 milliGRAM(s) Oral two times a day  nortriptyline 10 milliGRAM(s) Oral two times a day  pregabalin 200 milliGRAM(s) Oral every 8 hours  sodium chloride 0.9%. 1000 milliLiter(s) IV Continuous <Continuous>    Ambulation: Walking independently                           16.0   6.53  )-----------( 245      ( 17 Apr 2024 18:18 )             45.0       04-17    135  |  96  |  11.0  ----------------------------<  98  4.1   |  24.0  |  0.68    Ca    9.1      17 Apr 2024 18:18    TPro  6.9  /  Alb  4.2  /  TBili  0.8  /  DBili  x   /  AST  127<H>  /  ALT  114<H>  /  AlkPhos  100  04-17    Vital Signs Last 24 Hrs  T(C): 36.9 (17 Apr 2024 23:53), Max: 36.9 (17 Apr 2024 23:53)  T(F): 98.4 (17 Apr 2024 23:53), Max: 98.4 (17 Apr 2024 23:53)  HR: 62 (17 Apr 2024 20:33) (57 - 62)  BP: 110/87 (17 Apr 2024 20:33) (103/70 - 148/93)  BP(mean): 80 (17 Apr 2024 19:23) (80 - 80)  RR: 17 (17 Apr 2024 20:33) (17 - 18)  SpO2: 96% (17 Apr 2024 20:33) (95% - 98%)  Parameters below as of 17 Apr 2024 20:33  Patient On (Oxygen Delivery Method): room air  Daily Height in cm: 157.48 (17 Apr 2024 17:53)    Daily     PHYSICAL EXAM:   Appearance: Alert, responsive, in no acute distress.  Neurological: Sensation is grossly intact to light touch to the distal LUE  Vascular: 2+ distal pulses. Cap refill < 2 sec.  No cyanosis.  Musculoskeletal:  Left Upper Extremity - bruising of the     Imaging Studies: Official reads pending - Xray left hand show 5th metacarpal fracture and 4th proximal phalanx fracture      SPLINTING   PROCEDURE NOTE: Splinting  Performed by: Noelle Davis PA-C   Indication: Fracture  The left hand was appropriately positioned. A ortho glass splint was applied. Distally, the extremity was neurovascular intact following the procedure. The patient tolerated the procedure well.    A/P:  Pt is a 64y Female with found to have left 5th metacarpal fracture and 4th proximal phalanx fracture     PLAN:   * Keep splint Dry   * Elevate extremity   * NWB of the LUE   * Follow up with Dr. Lindsay Drew within 2 weeks of discharge    * No acute orthopedic intervention needed at this time     Case discussed with Dr. Lindsay Drew who reviewed imaging and gave plan Pt Name: JULIO CESAR FAYE  MRN: [id number]    Patient is a 64y Female seen by ortho for left hand pain and swelling after falling down 6 stairs today. Patient was a transfer from [place] after being diagnosed with a SDH. Patient admits to pain to the 4th finger and to the ulnar aspect of the palm. Admits to swelling and bruising. Denies numbness and tingling. Patient denies pain in any other extremities at this time. Admits to a headache. Patient states she has an old 5th metacarpal fracture on the left hand as well.     REVIEW OF SYSTEMS  General: Alert, responsive, in NAD  Musculoskeletal: SEE HPI.  Neurological: No sensory or motor changes.     Allergies: penicillin (Unknown)    Medications: acetaminophen     Tablet .. 650 milliGRAM(s) Oral every 6 hours PRN  ALPRAZolam 0.5 milliGRAM(s) Oral at bedtime  buPROPion XL (24-Hour) . 150 milliGRAM(s) Oral daily  chlorhexidine 2% Cloths 1 Application(s) Topical daily  DULoxetine 60 milliGRAM(s) Oral daily  hydroxychloroquine 200 milliGRAM(s) Oral daily  levETIRAcetam 1000 milliGRAM(s) Oral two times a day  nortriptyline 10 milliGRAM(s) Oral two times a day  pregabalin 200 milliGRAM(s) Oral every 8 hours  sodium chloride 0.9%. 1000 milliLiter(s) IV Continuous <Continuous>    Ambulation: Walking independently                           16.0   6.53  )-----------( 245      ( 17 Apr 2024 18:18 )             45.0       04-17    135  |  96  |  11.0  ----------------------------<  98  4.1   |  24.0  |  0.68    Ca    9.1      17 Apr 2024 18:18    TPro  6.9  /  Alb  4.2  /  TBili  0.8  /  DBili  x   /  AST  127<H>  /  ALT  114<H>  /  AlkPhos  100  04-17    Vital Signs Last 24 Hrs  T(C): 36.9 (17 Apr 2024 23:53), Max: 36.9 (17 Apr 2024 23:53)  T(F): 98.4 (17 Apr 2024 23:53), Max: 98.4 (17 Apr 2024 23:53)  HR: 62 (17 Apr 2024 20:33) (57 - 62)  BP: 110/87 (17 Apr 2024 20:33) (103/70 - 148/93)  BP(mean): 80 (17 Apr 2024 19:23) (80 - 80)  RR: 17 (17 Apr 2024 20:33) (17 - 18)  SpO2: 96% (17 Apr 2024 20:33) (95% - 98%)  Parameters below as of 17 Apr 2024 20:33  Patient On (Oxygen Delivery Method): room air  Daily Height in cm: 157.48 (17 Apr 2024 17:53)    Daily     PHYSICAL EXAM:   Appearance: Alert, responsive, in no acute distress.  Neurological: Sensation is grossly intact to light touch to the distal LUE  Vascular: 2+ distal pulses. Cap refill < 2 sec.  No cyanosis.  Musculoskeletal:  Left Upper Extremity - bruising of the 4th and 5th digits. Swelling of the 4th digit proximally. TTP over the proximal and distal phalanx. No TTP over the metacarpal bones of the hand. ROM of the 4th digit limited secondary to pain.     Imaging Studies: Official reads pending - Xray left hand shows 5th metacarpal fracture and 4th proximal phalanx fracture      SPLINTING   PROCEDURE NOTE: Splinting  Performed by: Noelle Davis PA-C   Indication: Fracture  The left hand was appropriately positioned. A ortho glass splint was applied. Distally, the extremity was neurovascular intact following the procedure. The patient tolerated the procedure well.    A/P:  Pt is a 64y Female with found to have and old left 5th metacarpal fracture and acute 4th proximal phalanx fracture     PLAN:   * Keep splint Dry   * Elevate extremity   * NWB of the LUE   * Follow up with Dr. Lindsay Drew within 2 weeks of discharge    * No acute orthopedic intervention needed at this time     Case discussed with Dr. Lindsay Drew who reviewed imaging and gave plan

## 2024-04-18 NOTE — DISCHARGE NOTE PROVIDER - CARE PROVIDER_API CALL
Lindsay Drew  Plastic Surgery  [address], [address]  [place] [address]  Phone: [phone]  Fax: [phone]  Follow Up Time:    Lindsay Drew  Plastic Surgery  [address], [address]  [place] [address]  Phone: [phone]  Fax: [phone]  Follow Up Time:     Mariah Erickson  Neurosurgery  [address] [place] [address]  Phone: [phone]  Fax: [phone]  Follow Up Time:

## 2024-04-18 NOTE — PROGRESS NOTE ADULT - SUBJECTIVE AND OBJECTIVE BOX
HPI:  65yo F w/ hx of autoimmune disease presenting after fall and found to have SDH. Pt states that she was walking her dog this morning when she fell and hit head. Denies any LOC. Was able to get herself up after fall. Presented to ED at OSH complaining only of headache. CT head showed small SDH. C-spine negative for any pathology. She was transferred to [facility] for further management. On presentation she was HDS complaining of mild headache and pain in finger. GCS 15, ABCs intact. Trauma consulted for eval.  (17 Apr 2024 21:05)    INTERVAL HPI:  Rpt CTHs     OVERNIGHT EVENTS:  None neurosurgical in nature     64y Female s/p a fall who was found to have bifrontal contusions. Patient seen and examined in bed NAD. Patient reports continued pain ***.       Vital Signs Last 24 Hrs  T(C): 36.9 (18 Apr 2024 03:07), Max: 36.9 (17 Apr 2024 23:53)  T(F): 98.4 (18 Apr 2024 03:07), Max: 98.4 (17 Apr 2024 23:53)  HR: 107 (18 Apr 2024 07:00) (57 - 107)  BP: 152/84 (18 Apr 2024 07:00) (103/70 - 162/89)  BP(mean): 104 (18 Apr 2024 07:00) (80 - 111)  RR: 22 (18 Apr 2024 07:00) (16 - 22)  SpO2: 96% (18 Apr 2024 07:00) (95% - 98%)    Parameters below as of 17 Apr 2024 20:33  Patient On (Oxygen Delivery Method): room air    PHYSICAL EXAM:  GENERAL: NAD, well-groomed, well-developed  HEAD:  Atraumatic, normocephalic  MENTAL STATUS: AAO x3; Awake, Opens eyes spontaneously, Appropriately conversant without aphasia; following simple commands  CRANIAL NERVES: PERRL; EOMI, No facial asymmetry; facial sensation grossly intact to light touch b/l  MOTOR: strength 5/5 B/L upper and lower extremities; sensation grossly intact all extremities  SKIN: Warm, dry; no rashes or lesions      LABS:                        14.5   6.17  )-----------( 195      ( 18 Apr 2024 03:23 )             41.3     04-18    135  |  98  |  9.0  ----------------------------<  84  3.6   |  21.0<L>  |  0.56    Ca    8.2<L>      18 Apr 2024 03:23  Phos  3.2     04-18  Mg     1.9     04-18    TPro  6.9  /  Alb  4.2  /  TBili  0.8  /  DBili  x   /  AST  127<H>  /  ALT  114<H>  /  AlkPhos  100  04-17    PT/INR - ( 17 Apr 2024 18:18 )   PT: 10.3 sec;   INR: 0.93 ratio         PTT - ( 17 Apr 2024 18:18 )  PTT:26.7 sec  Urinalysis Basic - ( 18 Apr 2024 03:23 )    Color: x / Appearance: x / SG: x / pH: x  Gluc: 84 mg/dL / Ketone: x  / Bili: x / Urobili: x   Blood: x / Protein: x / Nitrite: x   Leuk Esterase: x / RBC: x / WBC x   Sq Epi: x / Non Sq Epi: x / Bacteria: x        04-17 @ 07:01  -  04-18 @ 07:00  --------------------------------------------------------  IN: 200 mL / OUT: 0 mL / NET: 200 mL    RADIOLOGY & ADDITIONAL TESTS:  < from: CT Head No Cont (04.18.24 @ 02:56) >  IMPRESSION:  1.  A low-attenuation subdural collection overlying the left frontal   convexity, measuring up to 4 mm is more conspicuous compared to the prior   exam.  This may represent posttraumatic hygroma.  Hemorrhagic contusions   in the bilateral frontal and temporal regions, subarachnoid hemorrhage   and small subdural hemorrhages are otherwise grossly stable 04/17/2024 at   8:53 PM.  2.  Redemonstration of right occipital calvarial fracture.  Punctate foci   of pneumocephalus in the left middle cranial fossa.    --- End of Report ---    STACY IBARRA MD; Attending Radiologist  This document has been electronically signed. Apr 18 2024  5:10AM    < end of copied text >  < from: CT Head No Cont (04.17.24 @ 21:04) >  IMPRESSION:  1.  Traumatic brain injury with multiple hemorrhagic contusions,   subarachnoid hemorrhage and subdural hemorrhage is grossly stable from   04/17/2024 2:45 PM.  2.  Redemonstration of right occipital calvarial fracture.  Punctate foci   of pneumocephalus in the left middle cranial fossa.    --- End of Report ---  STACY IBARRA MD; Attending Radiologist  This document has been electronically signed. Apr 18 2024  4:58A    < end of copied text >   HPI:  63yo F w/ hx of autoimmune disease presenting after fall and found to have SDH. Pt states that she was walking her dog this morning when she fell and hit head. Denies any LOC. Was able to get herself up after fall. Presented to ED at OSH complaining only of headache. CT head showed small SDH. C-spine negative for any pathology. She was transferred to [facility] for further management. On presentation she was HDS complaining of mild headache and pain in finger. GCS 15, ABCs intact. Trauma consulted for eval.  (17 Apr 2024 21:05)    INTERVAL HPI:  Rpt CTHs     OVERNIGHT EVENTS:  None neurosurgical in nature     64y Female s/p a fall who was found to have bifrontal contusions. Patient seen and examined in bed NAD. Patient reports continued pain to multiple places. She denies any new weakness in her extremities.      Vital Signs Last 24 Hrs  T(C): 36.9 (18 Apr 2024 03:07), Max: 36.9 (17 Apr 2024 23:53)  T(F): 98.4 (18 Apr 2024 03:07), Max: 98.4 (17 Apr 2024 23:53)  HR: 107 (18 Apr 2024 07:00) (57 - 107)  BP: 152/84 (18 Apr 2024 07:00) (103/70 - 162/89)  BP(mean): 104 (18 Apr 2024 07:00) (80 - 111)  RR: 22 (18 Apr 2024 07:00) (16 - 22)  SpO2: 96% (18 Apr 2024 07:00) (95% - 98%)    Parameters below as of 17 Apr 2024 20:33  Patient On (Oxygen Delivery Method): room air    PHYSICAL EXAM:  GENERAL: NAD, well-groomed, well-developed  HEAD:  Atraumatic, normocephalic  MENTAL STATUS: AAO x3; Awake, Opens eyes spontaneously, Appropriately conversant without aphasia; following simple commands  CRANIAL NERVES: PERRL; EOMI, No facial asymmetry  MOTOR: Right upper extremity 5/5, left upper in splint, bilateral lower 5/5; sensation grossly intact all extremities  SKIN: Warm, dry; no rashes or lesions    LABS:                        14.5   6.17  )-----------( 195      ( 18 Apr 2024 03:23 )             41.3     04-18    135  |  98  |  9.0  ----------------------------<  84  3.6   |  21.0<L>  |  0.56    Ca    8.2<L>      18 Apr 2024 03:23  Phos  3.2     04-18  Mg     1.9     04-18    TPro  6.9  /  Alb  4.2  /  TBili  0.8  /  DBili  x   /  AST  127<H>  /  ALT  114<H>  /  AlkPhos  100  04-17    PT/INR - ( 17 Apr 2024 18:18 )   PT: 10.3 sec;   INR: 0.93 ratio         PTT - ( 17 Apr 2024 18:18 )  PTT:26.7 sec  Urinalysis Basic - ( 18 Apr 2024 03:23 )    Color: x / Appearance: x / SG: x / pH: x  Gluc: 84 mg/dL / Ketone: x  / Bili: x / Urobili: x   Blood: x / Protein: x / Nitrite: x   Leuk Esterase: x / RBC: x / WBC x   Sq Epi: x / Non Sq Epi: x / Bacteria: x        04-17 @ 07:01  -  04-18 @ 07:00  --------------------------------------------------------  IN: 200 mL / OUT: 0 mL / NET: 200 mL    RADIOLOGY & ADDITIONAL TESTS:  < from: CT Head No Cont (04.18.24 @ 02:56) >  IMPRESSION:  1.  A low-attenuation subdural collection overlying the left frontal   convexity, measuring up to 4 mm is more conspicuous compared to the prior   exam.  This may represent posttraumatic hygroma.  Hemorrhagic contusions   in the bilateral frontal and temporal regions, subarachnoid hemorrhage   and small subdural hemorrhages are otherwise grossly stable 04/17/2024 at   8:53 PM.  2.  Redemonstration of right occipital calvarial fracture.  Punctate foci   of pneumocephalus in the left middle cranial fossa.    --- End of Report ---    STACY IBARRA MD; Attending Radiologist  This document has been electronically signed. Apr 18 2024  5:10AM    < end of copied text >  < from: CT Head No Cont (04.17.24 @ 21:04) >  IMPRESSION:  1.  Traumatic brain injury with multiple hemorrhagic contusions,   subarachnoid hemorrhage and subdural hemorrhage is grossly stable from   04/17/2024 2:45 PM.  2.  Redemonstration of right occipital calvarial fracture.  Punctate foci   of pneumocephalus in the left middle cranial fossa.    --- End of Report ---  STACY IBARRA MD; Attending Radiologist  This document has been electronically signed. Apr 18 2024  4:58A    < end of copied text >

## 2024-04-19 LAB
ACETONE SERPL-MCNC: ABNORMAL
ANION GAP SERPL CALC-SCNC: 16 MMOL/L — SIGNIFICANT CHANGE UP (ref 5–17)
B BURGDOR C6 AB SER-ACNC: NEGATIVE — SIGNIFICANT CHANGE UP
B BURGDOR IGG+IGM SER-ACNC: 0.79 INDEX — SIGNIFICANT CHANGE UP (ref 0.01–0.89)
BUN SERPL-MCNC: 7.9 MG/DL — LOW (ref 8–20)
CALCIUM SERPL-MCNC: 8.8 MG/DL — SIGNIFICANT CHANGE UP (ref 8.4–10.5)
CHLORIDE SERPL-SCNC: 97 MMOL/L — SIGNIFICANT CHANGE UP (ref 96–108)
CO2 SERPL-SCNC: 21 MMOL/L — LOW (ref 22–29)
CREAT SERPL-MCNC: 0.53 MG/DL — SIGNIFICANT CHANGE UP (ref 0.5–1.3)
EGFR: 103 ML/MIN/1.73M2 — SIGNIFICANT CHANGE UP
GLUCOSE BLDC GLUCOMTR-MCNC: 139 MG/DL — HIGH (ref 70–99)
GLUCOSE SERPL-MCNC: 112 MG/DL — HIGH (ref 70–99)
HCT VFR BLD CALC: 46.8 % — HIGH (ref 34.5–45)
HGB BLD-MCNC: 16.3 G/DL — HIGH (ref 11.5–15.5)
LACTATE SERPL-SCNC: 0.9 MMOL/L — SIGNIFICANT CHANGE UP (ref 0.5–2)
MAGNESIUM SERPL-MCNC: 2.6 MG/DL — SIGNIFICANT CHANGE UP (ref 1.6–2.6)
MCHC RBC-ENTMCNC: 32.4 PG — SIGNIFICANT CHANGE UP (ref 27–34)
MCHC RBC-ENTMCNC: 34.8 GM/DL — SIGNIFICANT CHANGE UP (ref 32–36)
MCV RBC AUTO: 93 FL — SIGNIFICANT CHANGE UP (ref 80–100)
MRSA PCR RESULT.: SIGNIFICANT CHANGE UP
PHOSPHATE SERPL-MCNC: 3.2 MG/DL — SIGNIFICANT CHANGE UP (ref 2.4–4.7)
PLATELET # BLD AUTO: 264 K/UL — SIGNIFICANT CHANGE UP (ref 150–400)
POTASSIUM SERPL-MCNC: 4.5 MMOL/L — SIGNIFICANT CHANGE UP (ref 3.5–5.3)
POTASSIUM SERPL-SCNC: 4.5 MMOL/L — SIGNIFICANT CHANGE UP (ref 3.5–5.3)
RBC # BLD: 5.03 M/UL — SIGNIFICANT CHANGE UP (ref 3.8–5.2)
RBC # FLD: 12.6 % — SIGNIFICANT CHANGE UP (ref 10.3–14.5)
S AUREUS DNA NOSE QL NAA+PROBE: DETECTED
SODIUM SERPL-SCNC: 134 MMOL/L — LOW (ref 135–145)
WBC # BLD: 7.36 K/UL — SIGNIFICANT CHANGE UP (ref 3.8–10.5)
WBC # FLD AUTO: 7.36 K/UL — SIGNIFICANT CHANGE UP (ref 3.8–10.5)

## 2024-04-19 PROCEDURE — 70450 CT HEAD/BRAIN W/O DYE: CPT | Mod: 26

## 2024-04-19 PROCEDURE — 99233 SBSQ HOSP IP/OBS HIGH 50: CPT

## 2024-04-19 PROCEDURE — 99223 1ST HOSP IP/OBS HIGH 75: CPT

## 2024-04-19 PROCEDURE — 99232 SBSQ HOSP IP/OBS MODERATE 35: CPT

## 2024-04-19 RX ORDER — ENOXAPARIN SODIUM 100 MG/ML
40 INJECTION SUBCUTANEOUS EVERY 12 HOURS
Refills: 0 | Status: DISCONTINUED | OUTPATIENT
Start: 2024-04-19 | End: 2024-04-20

## 2024-04-19 RX ORDER — MIDAZOLAM HYDROCHLORIDE 1 MG/ML
1 INJECTION, SOLUTION INTRAMUSCULAR; INTRAVENOUS ONCE
Refills: 0 | Status: DISCONTINUED | OUTPATIENT
Start: 2024-04-19 | End: 2024-04-19

## 2024-04-19 RX ORDER — SODIUM CHLORIDE 9 MG/ML
1000 INJECTION, SOLUTION INTRAVENOUS
Refills: 0 | Status: DISCONTINUED | OUTPATIENT
Start: 2024-04-19 | End: 2024-04-20

## 2024-04-19 RX ORDER — HYDRALAZINE HCL 50 MG
10 TABLET ORAL ONCE
Refills: 0 | Status: COMPLETED | OUTPATIENT
Start: 2024-04-19 | End: 2024-04-19

## 2024-04-19 RX ORDER — METOPROLOL TARTRATE 50 MG
5 TABLET ORAL ONCE
Refills: 0 | Status: COMPLETED | OUTPATIENT
Start: 2024-04-19 | End: 2024-04-19

## 2024-04-19 RX ADMIN — Medication 400 MILLIGRAM(S): at 05:43

## 2024-04-19 RX ADMIN — Medication 200 MILLIGRAM(S): at 16:54

## 2024-04-19 RX ADMIN — LEVETIRACETAM 1000 MILLIGRAM(S): 250 TABLET, FILM COATED ORAL at 05:43

## 2024-04-19 RX ADMIN — Medication 5 MILLIGRAM(S): at 01:20

## 2024-04-19 RX ADMIN — Medication 200 MILLIGRAM(S): at 05:43

## 2024-04-19 RX ADMIN — NORTRIPTYLINE HYDROCHLORIDE 10 MILLIGRAM(S): 10 CAPSULE ORAL at 17:39

## 2024-04-19 RX ADMIN — Medication 40 MILLIEQUIVALENT(S): at 11:49

## 2024-04-19 RX ADMIN — NORTRIPTYLINE HYDROCHLORIDE 10 MILLIGRAM(S): 10 CAPSULE ORAL at 05:43

## 2024-04-19 RX ADMIN — OXYCODONE HYDROCHLORIDE 10 MILLIGRAM(S): 5 TABLET ORAL at 08:41

## 2024-04-19 RX ADMIN — OXYCODONE HYDROCHLORIDE 10 MILLIGRAM(S): 5 TABLET ORAL at 16:54

## 2024-04-19 RX ADMIN — SODIUM CHLORIDE 100 MILLILITER(S): 9 INJECTION, SOLUTION INTRAVENOUS at 06:54

## 2024-04-19 RX ADMIN — HYDROMORPHONE HYDROCHLORIDE 0.5 MILLIGRAM(S): 2 INJECTION INTRAMUSCULAR; INTRAVENOUS; SUBCUTANEOUS at 11:54

## 2024-04-19 RX ADMIN — AMLODIPINE BESYLATE 5 MILLIGRAM(S): 2.5 TABLET ORAL at 05:43

## 2024-04-19 RX ADMIN — Medication 200 MILLIGRAM(S): at 11:49

## 2024-04-19 RX ADMIN — HYDROMORPHONE HYDROCHLORIDE 0.5 MILLIGRAM(S): 2 INJECTION INTRAMUSCULAR; INTRAVENOUS; SUBCUTANEOUS at 12:54

## 2024-04-19 RX ADMIN — Medication 0.5 MILLIGRAM(S): at 11:49

## 2024-04-19 RX ADMIN — MIDAZOLAM HYDROCHLORIDE 1 MILLIGRAM(S): 1 INJECTION, SOLUTION INTRAMUSCULAR; INTRAVENOUS at 02:45

## 2024-04-19 RX ADMIN — HYDROMORPHONE HYDROCHLORIDE 0.5 MILLIGRAM(S): 2 INJECTION INTRAMUSCULAR; INTRAVENOUS; SUBCUTANEOUS at 18:50

## 2024-04-19 RX ADMIN — Medication 0.25 MILLIGRAM(S): at 21:50

## 2024-04-19 RX ADMIN — OXYCODONE HYDROCHLORIDE 10 MILLIGRAM(S): 5 TABLET ORAL at 17:42

## 2024-04-19 RX ADMIN — CHLORHEXIDINE GLUCONATE 1 APPLICATION(S): 213 SOLUTION TOPICAL at 11:50

## 2024-04-19 RX ADMIN — OXYCODONE HYDROCHLORIDE 10 MILLIGRAM(S): 5 TABLET ORAL at 09:30

## 2024-04-19 RX ADMIN — SODIUM CHLORIDE 100 MILLILITER(S): 9 INJECTION, SOLUTION INTRAVENOUS at 17:40

## 2024-04-19 RX ADMIN — ENOXAPARIN SODIUM 40 MILLIGRAM(S): 100 INJECTION SUBCUTANEOUS at 16:55

## 2024-04-19 RX ADMIN — BUPROPION HYDROCHLORIDE 150 MILLIGRAM(S): 150 TABLET, EXTENDED RELEASE ORAL at 11:49

## 2024-04-19 RX ADMIN — DULOXETINE HYDROCHLORIDE 60 MILLIGRAM(S): 30 CAPSULE, DELAYED RELEASE ORAL at 11:49

## 2024-04-19 RX ADMIN — Medication 400 MILLIGRAM(S): at 00:04

## 2024-04-19 RX ADMIN — SODIUM CHLORIDE 100 MILLILITER(S): 9 INJECTION, SOLUTION INTRAVENOUS at 21:47

## 2024-04-19 RX ADMIN — HYDROMORPHONE HYDROCHLORIDE 0.5 MILLIGRAM(S): 2 INJECTION INTRAMUSCULAR; INTRAVENOUS; SUBCUTANEOUS at 18:20

## 2024-04-19 RX ADMIN — LEVETIRACETAM 1000 MILLIGRAM(S): 250 TABLET, FILM COATED ORAL at 16:54

## 2024-04-19 RX ADMIN — Medication 10 MILLIGRAM(S): at 02:45

## 2024-04-19 NOTE — PROGRESS NOTE ADULT - ASSESSMENT
63 yo female s/p a fall who was found to have bifrontal contusions and a right SDH, patient also found to have a subacute T6 VB Fracture, and chronic fracture at T10, T11 and T12, L1 and L2. Repeat CTHs 4/18/24 and 4/19/24 stable, neurological exam stable.    Plan:  -D/w attending Dr. Erickson   -All imaging reviewed   -Repeat CTH stable x 2  -Neurochecks Q4hrs  -Continue Keppra x 7 days   -Pain control PRN, avoid oversedation   -Follow up in office with Dr. Erickson in 4-6 weeks   -Neurosurgery signing off, please reconsult as needed

## 2024-04-19 NOTE — PROGRESS NOTE ADULT - ASSESSMENT
-Repeat scan this am stable, OK for q4 hour per NSGY, remains on home keppra  -Tachycardia and hypertension improved with xanax (home med).  Did require 1mg versed overnight for tachycardia/hypertension in thought of BDZ withdrawal -with marked improvement in both  -Breathing comfortably on RA, patient has no one who can bring in her home med for pulmonary fibrosis  -H/H increased but adequate, scds, lovenox for prophlyaxis  -Diet as tolerated, but on IVF given poor PO intake yesterday    -Voiding and cr stable  -Afebrile, WBC normal, off antibiotics      No acute critical issues 64 year old woman w/hx of hypertension, lung cancer s/p RML lobectomy, pulmonary fibrosis, and seizures in setting of hyponatremia who was transferred from [facility] after a trip and fall down 6 steps found to have small traumatic subdural hematomas and bifrontal contusions, a right occipital bone fracture, and a left 4th phalanx fracture.  In addition, patient with subacute T6 VB fracture, and chronic fracture at T10, T11, T12, L1 and L2.  Worsening contusion noted on repeat CTH x2.  4th scan stable.  24hr scan 4/19/24 stable as well.  Benzodiazapine withdrawal.    -Repeat scan this am stable, OK for q4 hour per NSGY, remains on home keppra,   -Tachycardia and hypertension improved with xanax (home med).  Did require 1mg versed overnight for tachycardia/hypertension in thought of BDZ withdrawal -with marked improvement in both, now back on home dosing for xanax  -Breathing comfortably on RA, patient has no one who can bring in her home med for pulmonary fibrosis  -H/H increased but adequate, scds, lovenox for prophlyaxis  -Diet as tolerated, but on gentle IVF given poor PO intake yesterday -HLIV when PO improved  -Voiding and cr stable  -Afebrile, WBC normal, off antibiotics  -FS q6hr  -SCDs, H/H increased (hemoconcentrated after poor PO?), will start chemical prophylaxis 24 hours after initial stable head scan  -PIV  -Tertiary exam  No acute critical care issues.  Will transfer from critical care setting.

## 2024-04-19 NOTE — CONSULT NOTE ADULT - SUBJECTIVE AND OBJECTIVE BOX
Pt Name: JULIO CESAR FAYE    MRN: [id number]      Patient is a 64y Female transfer from [place] for SDH s/p fall. Patient unable to provide any history. All information gathered from chart. Patient states she was watching tv and was outside swimming. When asked if she knows where she is; she did not know.   Called by SICU team regarding incidental finding on xray age indeterminate lesser troch fx.         REVIEW OF SYSTEMS      General: Unable to obtain 	    Musculoskeletal:	 see HPI     ROS is otherwise negative.      PAST MEDICAL & SURGICAL HISTORY:  Diverticulitis of colon (without mention of hemorrhage)    Chronic pain    S/P Laminectomy  Lumbar 3/10    Depression H/O panic attack  with anxiety    Mixed connective tissue disease    Adenocarcinoma of lung    Alcohol abuse    Opiate dependence    Lung cancer    History of lung cancer  s/p wedge resection of RML    S/P cholecystectomy    S/P lumbar laminectomy    History of hip replacement, total, right  2020    History of oophorectomy, unilateral  bilateral    History of lung surgery    History of laminectomy    H/O hand surgery          Allergies: penicillin (Swelling)  penicillin (Other)  penicillin (Unknown)  morphine (Vomiting; Nausea)      Medications: ALPRAZolam 0.25 milliGRAM(s) Oral at bedtime  ALPRAZolam 0.5 milliGRAM(s) Oral daily  amLODIPine   Tablet 5 milliGRAM(s) Oral daily  buPROPion XL (24-Hour) . 150 milliGRAM(s) Oral daily  dextrose 5% + lactated ringers. 1000 milliLiter(s) IV Continuous <Continuous>  DULoxetine 60 milliGRAM(s) Oral daily  enoxaparin Injectable 40 milliGRAM(s) SubCutaneous every 12 hours  HYDROmorphone  Injectable 0.5 milliGRAM(s) IV Push every 3 hours PRN  hydroxychloroquine 200 milliGRAM(s) Oral daily  levETIRAcetam 1000 milliGRAM(s) Oral two times a day  nortriptyline 10 milliGRAM(s) Oral two times a day  oxyCODONE    IR 5 milliGRAM(s) Oral every 4 hours PRN  oxyCODONE    IR 10 milliGRAM(s) Oral every 4 hours PRN  potassium chloride    Tablet ER 40 milliEquivalent(s) Oral daily  pregabalin 200 milliGRAM(s) Oral every 8 hours      FAMILY HISTORY:  Family history of leukemia  in father    FHx: rheumatoid arthritis  in mother - with RA associated lung fibrosis                              16.3   7.36  )-----------( 264      ( 2024 03:15 )             46.8     -19    134<L>  |  97  |  7.9<L>  ----------------------------<  112<H>  4.5   |  21.0<L>  |  0.53    Ca    8.8      2024 03:15  Phos  3.2     -  Mg     2.6     -    TPro  6.9  /  Alb  4.2  /  TBili  0.8  /  DBili  x   /  AST  127<H>  /  ALT  114<H>  /  AlkPhos  100        PHYSICAL EXAM:    Vital Signs Last 24 Hrs  T(C): 36.5 (2024 16:13), Max: 37.2 (2024 04:00)  T(F): 97.7 (2024 16:13), Max: 98.9 (2024 04:00)  HR: 104 (2024 16:00) (86 - 124)  BP: 154/84 (2024 16:00) (109/71 - 194/111)  BP(mean): 106 (2024 16:00) (84 - 152)  RR: 24 (2024 16:00) (14 - 29)  SpO2: 100% (2024 14:00) (73% - 100%)    Parameters below as of 2024 16:00  Patient On (Oxygen Delivery Method): room air      Daily     Daily Weight in k.8 (2024 03:00)    Appearance: Alert, responsive, in no acute distress.  Neurological: Sensation is grossly intact to light touch.  2+ distal pulses. Cap refill < 2 sec..  Patient sitting up in bed with knees flexed. Patient has no pain with ROM bilateral LE.    Imaging Studies:  ACC: [id number] EXAM:  XR PELVIS AP ONLY 1-2 VIEWS   ORDERED BY: ALTA CHAPARRO     PROCEDURE DATE:  2024      INTERPRETATION:  Radiograph of the pelvis    CLINICAL INFORMATION: Injury with Pain.    TECHNIQUE: AP pelvic view.    FINDINGS:   No prior similar studies are available for review.  RIGHT total hip arthroplasty also components in anatomic alignment.  Age indeterminate cortical avulsion fracture of the lesser trochanter.  Remaining osseous pelvis and LEFT hip radiographically intact.  RIGHT sacral stimulator wire in place.    The sacroiliac joints and pubic symphysis are intact.   No pathologic calcifications.  Soft tissues unremarkable.    Bowel overlies and obscures portions of the sacrum and iliac bone.    IMPRESSION:   Age indeterminate small cortical avulsed fracture of the   RIGHT  lesser trochanter. RIGHT Hip prosthesis and remaining osseous and   joint structures of the pelvis are radiographically intact..    --- End of Report ---      DOMO JARA MD; Attending Radiologist  This document has been electronically signed. 2024  8:21PM      A/P:  Pt is a  64y Female with Age indeterminate small cortical avulsed fracture of the   RIGHT lesser trochanter.     PLAN:   * Case discussed w Dr Camp  * No orthopedic surgical intervention required.   * Ortho signs off

## 2024-04-19 NOTE — PROGRESS NOTE ADULT - SUBJECTIVE AND OBJECTIVE BOX
HPI:  65yo F w/ hx of autoimmune disease presenting after fall and found to have SDH. Pt states that she was walking her dog this morning when she fell and hit head. Denies any LOC. Was able to get herself up after fall. Presented to ED at OSH complaining only of headache. CT head showed small SDH. C-spine negative for any pathology. She was transferred to [facility] for further management. On presentation she was HDS complaining of mild headache and pain in finger. GCS 15, ABCs intact. Trauma consulted for eval.  (17 Apr 2024 21:05)      INTERVAL HPI/OVERNIGHT EVENTS:  64 year old female seen and examined by neurosurgery team, sitting comfortably in bed. No acute overnight events reported. Denies headache, photophobia, neck pain, paresthesias, bladder/bowel dysfunction, nausea, vomiting. Repeat CTH 4/19/24 stable.     Vital Signs Last 24 Hrs  T(C): 36.8 (19 Apr 2024 07:00), Max: 37.2 (18 Apr 2024 15:41)  T(F): 98.3 (19 Apr 2024 07:00), Max: 98.9 (18 Apr 2024 15:41)  HR: 97 (19 Apr 2024 10:00) (86 - 129)  BP: 112/75 (19 Apr 2024 10:00) (112/75 - 194/111)  BP(mean): 86 (19 Apr 2024 10:00) (86 - 152)  RR: 15 (19 Apr 2024 10:00) (15 - 30)  SpO2: 94% (19 Apr 2024 10:00) (73% - 98%)    Parameters below as of 19 Apr 2024 08:00  Patient On (Oxygen Delivery Method): room air        PHYSICAL EXAM:  GENERAL: NAD, well-groomed, well-developed  HEAD:  Atraumatic, normocephalic  DRAINS:   WOUND: Dressing clean dry intact  RAFAEL COMA SCORE: E- V- M- =       E: 4= opens eyes spontaneously 3= to voice 2= to noxious 1= no opening       V: 5= oriented 4= confused 3= inappropriate words 2= incomprehensible sounds 1= nonverbal 1T= intubated       M: 6= follows commands 5= localizes 4= withdraws 3= flexor posturing 2= extensor posturing 1= no movement  MENTAL STATUS: AAO x3; Awake/Comatose; Opens eyes spontaneously/to voice/to light touch/to noxious stimuli; Appropriately conversant without aphasia/Nonverbal; following simple commands/mimicking/not following commands  CRANIAL NERVES: Visual acuity normal for age, visual fields full to confrontation, PERRL. EOMI without nystagmus. Facial sensation intact V1-3 distribution b/l. Face symmetric w/ normal eye closure and smile, tongue midline. Hearing grossly intact. Speech clear. Head turning and shoulder shrug intact.   REFLEXES: PERRL. Corneals intact b/l. Gag intact. Cough intact. Oculocephalic reflex intact (Doll's eye). Negative Mejia's b/l. Negative clonus b/l  MOTOR: strength 5/5 b/l upper and lower extremities  Uppers     Delt (C5/6)     Bicep (C5/6)     Wrist Extend (C6)     Tricep (C7)     HG (C8/T1)  R                     5/5                 5/5                         5/5                           5/5                   5/5  L                      5/5                 5/5                         5/5                           5/5                   5/5  Lowers      HF(L1/L2)     KE (L3)     DF (L4)     EHL (L5)     PF (S1)      R                     5/5              5/5           5/5           5/5            5/5  L                     5/5               5/5          5/5            5/5            5/5  SENSATION: grossly intact to light touch all extremities  COORDINATION: Gait intact; rapid alternating movements intact; heel to shin intact; no upper extremity dysmetria  CHEST/LUNG: Clear to auscultation bilaterally; no rales, rhonchi, wheezing, or rubs  HEART: +S1/+S2; Regular rate and rhythm; no murmurs, rubs, or gallops  ABDOMEN: Soft, nontender, nondistended; bowel sounds present all four quadrants  EXTREMITIES:  2+ peripheral pulses, no clubbing, cyanosis, or edema  SKIN: Warm, dry; no rashes or lesions    GENERAL: NAD, well-groomed, well-developed  HEAD:  Atraumatic, normocephalic  MENTAL STATUS: AAO x3; Awake, Opens eyes spontaneously, Appropriately conversant without aphasia; following simple commands  CRANIAL NERVES: PERRL; EOMI, No facial asymmetry  MOTOR: Right upper extremity 5/5, left upper in splint, bilateral lower 5/5; sensation grossly intact all extremities  SKIN: Warm, dry; no rashes or lesions    LABS:                        16.3   7.36  )-----------( 264      ( 19 Apr 2024 03:15 )             46.8     04-19    134<L>  |  97  |  7.9<L>  ----------------------------<  112<H>  4.5   |  21.0<L>  |  0.53    Ca    8.8      19 Apr 2024 03:15  Phos  3.2     04-19  Mg     2.6     04-19    TPro  6.9  /  Alb  4.2  /  TBili  0.8  /  DBili  x   /  AST  127<H>  /  ALT  114<H>  /  AlkPhos  100  04-17    PT/INR - ( 17 Apr 2024 18:18 )   PT: 10.3 sec;   INR: 0.93 ratio         PTT - ( 17 Apr 2024 18:18 )  PTT:26.7 sec  Urinalysis Basic - ( 19 Apr 2024 03:15 )    Color: x / Appearance: x / SG: x / pH: x  Gluc: 112 mg/dL / Ketone: x  / Bili: x / Urobili: x   Blood: x / Protein: x / Nitrite: x   Leuk Esterase: x / RBC: x / WBC x   Sq Epi: x / Non Sq Epi: x / Bacteria: x        04-18 @ 07:01 - 04-19 @ 07:00  --------------------------------------------------------  IN: 1605 mL / OUT: 2350 mL / NET: -745 mL    04-19 @ 07:01 - 04-19 @ 10:56  --------------------------------------------------------  IN: 400 mL / OUT: 300 mL / NET: 100 mL        RADIOLOGY & ADDITIONAL TESTS:          CAPRINI SCORE [CLOT]:  Patient has an estimated Caprini score of greater than 5.  However, the patient's unique clinical situation will be addressed in an individual manner to determine appropriate anticoagulation treatment, if any. HPI:  63yo F w/ hx of autoimmune disease presenting after fall and found to have SDH. Pt states that she was walking her dog this morning when she fell and hit head. Denies any LOC. Was able to get herself up after fall. Presented to ED at OSH complaining only of headache. CT head showed small SDH. C-spine negative for any pathology. She was transferred to [facility] for further management. On presentation she was HDS complaining of mild headache and pain in finger. GCS 15, ABCs intact. Trauma consulted for eval.  (17 Apr 2024 21:05)      INTERVAL HPI/OVERNIGHT EVENTS:  64 year old female seen and examined by neurosurgery team, sitting comfortably in bed. No acute overnight events reported. Denies headache, photophobia, neck pain, paresthesias, bladder/bowel dysfunction, nausea, vomiting. Repeat CTH 4/19/24 stable.     Vital Signs Last 24 Hrs  T(C): 36.8 (19 Apr 2024 07:00), Max: 37.2 (18 Apr 2024 15:41)  T(F): 98.3 (19 Apr 2024 07:00), Max: 98.9 (18 Apr 2024 15:41)  HR: 97 (19 Apr 2024 10:00) (86 - 129)  BP: 112/75 (19 Apr 2024 10:00) (112/75 - 194/111)  BP(mean): 86 (19 Apr 2024 10:00) (86 - 152)  RR: 15 (19 Apr 2024 10:00) (15 - 30)  SpO2: 94% (19 Apr 2024 10:00) (73% - 98%)    Parameters below as of 19 Apr 2024 08:00  Patient On (Oxygen Delivery Method): room air        PHYSICAL EXAM:  GENERAL: NAD, well-groomed, well-developed  HEAD: Atraumatic, normocephalic  RAFAEL COMA SCORE: E-4 V-5 M-6 = 15  MENTAL STATUS: AAO x3; Awake; Opens eyes spontaneously; Appropriately conversant without aphasia; Following commands  CRANIAL NERVES: PERRL. EOMI without nystagmus. Facial sensation intact V1-3 distribution b/l. Face symmetric w/ normal eye closure and smile, tongue midline. Hearing grossly intact. Speech clear. Head turning and shoulder shrug intact.   MOTOR: strength 5/5 b/l upper and lower extremities  SENSATION: grossly intact to light touch all extremities  EXTREMITIES: LUE in ace bandage wrap; no calf tenderness b/l    LABS:                        16.3   7.36  )-----------( 264      ( 19 Apr 2024 03:15 )             46.8     04-19    134<L>  |  97  |  7.9<L>  ----------------------------<  112<H>  4.5   |  21.0<L>  |  0.53    Ca    8.8      19 Apr 2024 03:15  Phos  3.2     04-19  Mg     2.6     04-19    TPro  6.9  /  Alb  4.2  /  TBili  0.8  /  DBili  x   /  AST  127<H>  /  ALT  114<H>  /  AlkPhos  100  04-17    PT/INR - ( 17 Apr 2024 18:18 )   PT: 10.3 sec;   INR: 0.93 ratio         PTT - ( 17 Apr 2024 18:18 )  PTT:26.7 sec  Urinalysis Basic - ( 19 Apr 2024 03:15 )    Color: x / Appearance: x / SG: x / pH: x  Gluc: 112 mg/dL / Ketone: x  / Bili: x / Urobili: x   Blood: x / Protein: x / Nitrite: x   Leuk Esterase: x / RBC: x / WBC x   Sq Epi: x / Non Sq Epi: x / Bacteria: x        04-18 @ 07:01 - 04-19 @ 07:00  --------------------------------------------------------  IN: 1605 mL / OUT: 2350 mL / NET: -745 mL    04-19 @ 07:01 - 04-19 @ 10:56  --------------------------------------------------------  IN: 400 mL / OUT: 300 mL / NET: 100 mL        RADIOLOGY & ADDITIONAL TESTS:  CT Head No Cont (04.19.24 @ 09:17):  IMPRESSION:     Unchanged hemorrhagic contusions in the anterior inferior   BILATERAL frontal lobes, RIGHT greater than LEFT. Slight decrease in   degree of subdural hemorrhage along the tentorium, RIGHT greater than   LEFT. Unchanged tiny LEFT chronic subdural hematoma/hygroma.    CT Thoracic Spine No Cont (04.18.24 @ 10:09):  IMPRESSION:  Limited by diffuse osteoporosis.  No definite acute fracture visualized.  Severe subacute appearing compression of the T6 vertebral body.  Multiple chronic appearing compression deformities as described.    CT Head & Cervical Spine No Cont (04.18.24 @ 10:05):  IMPRESSION:  HEAD CT:    Hemorrhagic contusions and subarachnoid hemorrhage appears   stable. Mild change in distribution of the subdural hemorrhage with mild   decreased subdural hemorrhage along the posterior falx and mild increased   subdural hemorrhage along the right leaf of the tentorium. No significant   midline shift or hydrocephalus. Continued follow-up recommended  CERVICAL SPINE CT:    No evidence of an acute cervical spine fracture.

## 2024-04-19 NOTE — PROGRESS NOTE ADULT - SUBJECTIVE AND OBJECTIVE BOX
INTERVAL HPI/OVERNIGHT EVENTS:      SUBJECTIVE:      MEDICATIONS  (STANDING):  ALPRAZolam 0.25 milliGRAM(s) Oral at bedtime  ALPRAZolam 0.5 milliGRAM(s) Oral daily  amLODIPine   Tablet 5 milliGRAM(s) Oral daily  buPROPion XL (24-Hour) . 150 milliGRAM(s) Oral daily  chlorhexidine 2% Cloths 1 Application(s) Topical daily  dextrose 5% + lactated ringers. 1000 milliLiter(s) (100 mL/Hr) IV Continuous <Continuous>  DULoxetine 60 milliGRAM(s) Oral daily  hydroxychloroquine 200 milliGRAM(s) Oral daily  levETIRAcetam 1000 milliGRAM(s) Oral two times a day  nortriptyline 10 milliGRAM(s) Oral two times a day  potassium chloride    Tablet ER 40 milliEquivalent(s) Oral daily  pregabalin 200 milliGRAM(s) Oral every 8 hours    MEDICATIONS  (PRN):  HYDROmorphone  Injectable 0.5 milliGRAM(s) IV Push every 3 hours PRN Breakthrough pain  oxyCODONE    IR 5 milliGRAM(s) Oral every 4 hours PRN Moderate Pain (4 - 6)  oxyCODONE    IR 10 milliGRAM(s) Oral every 4 hours PRN Severe Pain (7 - 10)      Drug Dosing Weight  Height (cm): 157.5 (18 Apr 2024 08:00)  Weight (kg): 52 (18 Apr 2024 08:00)  BMI (kg/m2): 21 (18 Apr 2024 08:00)  BSA (m2): 1.51 (18 Apr 2024 08:00)    PAST MEDICAL & SURGICAL HISTORY:  Diverticulitis of colon (without mention of hemorrhage)      Chronic pain      S/P Laminectomy  Lumbar 3/10      Depression H/O panic attack  with anxiety      Mixed connective tissue disease      Adenocarcinoma of lung      Alcohol abuse      Opiate dependence      Lung cancer      History of lung cancer  s/p wedge resection of RML      S/P cholecystectomy      S/P lumbar laminectomy      History of hip replacement, total, right  6/7/2020      History of oophorectomy, unilateral  bilateral      History of lung surgery      History of laminectomy      H/O hand surgery        ICU Vital Signs Last 24 Hrs  T(C): 37.2 (19 Apr 2024 04:00), Max: 37.2 (18 Apr 2024 15:41)  T(F): 98.9 (19 Apr 2024 04:00), Max: 98.9 (18 Apr 2024 15:41)  HR: 94 (19 Apr 2024 06:00) (86 - 129)  BP: 128/96 (19 Apr 2024 06:00) (128/96 - 194/111)  BP(mean): 105 (19 Apr 2024 06:00) (102 - 152)  ABP: --  ABP(mean): --  RR: 21 (19 Apr 2024 06:00) (15 - 30)  SpO2: 97% (19 Apr 2024 06:00) (73% - 98%)    O2 Parameters below as of 19 Apr 2024 04:00  Patient On (Oxygen Delivery Method): room air            I&O's Detail    17 Apr 2024 07:01  -  18 Apr 2024 07:00  --------------------------------------------------------  IN:    sodium chloride 0.9%: 200 mL  Total IN: 200 mL    OUT:  Total OUT: 0 mL    Total NET: 200 mL      18 Apr 2024 07:01  -  19 Apr 2024 06:53  --------------------------------------------------------  IN:    dextrose 5% + sodium chloride 0.9%: 675 mL    IV PiggyBack: 250 mL    IV PiggyBack: 400 mL    Oral Fluid: 180 mL  Total IN: 1505 mL    OUT:    Voided (mL): 2350 mL  Total OUT: 2350 mL    Total NET: -845 mL            Physical Exam:    Neurological:     HEENT:     Neck: Neck supple, No JVD    Respiratory:  Equal chest rise.  Breath Sounds equal bilateral    Cardiovascular:     Gastrointestinal:     Extremities:    Vascular:     Skin: No rashes    PATIENT CARE ACCESS DEVICES:  [ ] Peripheral IV  [ ] Central Venous Line	[ ] R	[ ] L	[ ] IJ	[ ] Fem	[ ] SC	Placed:   [ ] Arterial Line		[ ] R	[ ] L	[ ] Fem	[ ] Rad	[ ] Ax	Placed:   [ ] PICC:					[ ] Mediport  [ ] Urinary Catheter:   [ ] Necessity of urinary, arterial, and venous catheters discussed    LABS:  CBC Full  -  ( 19 Apr 2024 03:15 )  WBC Count : 7.36 K/uL  RBC Count : 5.03 M/uL  Hemoglobin : 16.3 g/dL  Hematocrit : 46.8 %  Platelet Count - Automated : 264 K/uL  Mean Cell Volume : 93.0 fl  Mean Cell Hemoglobin : 32.4 pg  Mean Cell Hemoglobin Concentration : 34.8 gm/dL  Auto Neutrophil # : x  Auto Lymphocyte # : x  Auto Monocyte # : x  Auto Eosinophil # : x  Auto Basophil # : x  Auto Neutrophil % : x  Auto Lymphocyte % : x  Auto Monocyte % : x  Auto Eosinophil % : x  Auto Basophil % : x    04-19    134<L>  |  97  |  7.9<L>  ----------------------------<  112<H>  4.5   |  21.0<L>  |  0.53    Ca    8.8      19 Apr 2024 03:15  Phos  3.2     04-19  Mg     2.6     04-19    TPro  6.9  /  Alb  4.2  /  TBili  0.8  /  DBili  x   /  AST  127<H>  /  ALT  114<H>  /  AlkPhos  100  04-17    PT/INR - ( 17 Apr 2024 18:18 )   PT: 10.3 sec;   INR: 0.93 ratio         PTT - ( 17 Apr 2024 18:18 )  PTT:26.7 sec  Urinalysis Basic - ( 19 Apr 2024 03:15 )    Color: x / Appearance: x / SG: x / pH: x  Gluc: 112 mg/dL / Ketone: x  / Bili: x / Urobili: x   Blood: x / Protein: x / Nitrite: x   Leuk Esterase: x / RBC: x / WBC x   Sq Epi: x / Non Sq Epi: x / Bacteria: x           INTERVAL HPI/OVERNIGHT EVENTS:  Tachycardia and hypertension overnight.  Home dosing of xanax reported and ordered    SUBJECTIVE:      MEDICATIONS  (STANDING):  ALPRAZolam 0.25 milliGRAM(s) Oral at bedtime  ALPRAZolam 0.5 milliGRAM(s) Oral daily  amLODIPine   Tablet 5 milliGRAM(s) Oral daily  buPROPion XL (24-Hour) . 150 milliGRAM(s) Oral daily  chlorhexidine 2% Cloths 1 Application(s) Topical daily  dextrose 5% + lactated ringers. 1000 milliLiter(s) (100 mL/Hr) IV Continuous <Continuous>  DULoxetine 60 milliGRAM(s) Oral daily  hydroxychloroquine 200 milliGRAM(s) Oral daily  levETIRAcetam 1000 milliGRAM(s) Oral two times a day  nortriptyline 10 milliGRAM(s) Oral two times a day  potassium chloride    Tablet ER 40 milliEquivalent(s) Oral daily  pregabalin 200 milliGRAM(s) Oral every 8 hours    MEDICATIONS  (PRN):  HYDROmorphone  Injectable 0.5 milliGRAM(s) IV Push every 3 hours PRN Breakthrough pain  oxyCODONE    IR 5 milliGRAM(s) Oral every 4 hours PRN Moderate Pain (4 - 6)  oxyCODONE    IR 10 milliGRAM(s) Oral every 4 hours PRN Severe Pain (7 - 10)      Drug Dosing Weight  Height (cm): 157.5 (18 Apr 2024 08:00)  Weight (kg): 52 (18 Apr 2024 08:00)  BMI (kg/m2): 21 (18 Apr 2024 08:00)  BSA (m2): 1.51 (18 Apr 2024 08:00)    PAST MEDICAL & SURGICAL HISTORY:  Diverticulitis of colon (without mention of hemorrhage)      Chronic pain      S/P Laminectomy  Lumbar 3/10      Depression H/O panic attack  with anxiety      Mixed connective tissue disease      Adenocarcinoma of lung      Alcohol abuse      Opiate dependence      Lung cancer      History of lung cancer  s/p wedge resection of RML      S/P cholecystectomy      S/P lumbar laminectomy      History of hip replacement, total, right  6/7/2020      History of oophorectomy, unilateral  bilateral      History of lung surgery      History of laminectomy      H/O hand surgery        ICU Vital Signs Last 24 Hrs  T(C): 37.2 (19 Apr 2024 04:00), Max: 37.2 (18 Apr 2024 15:41)  T(F): 98.9 (19 Apr 2024 04:00), Max: 98.9 (18 Apr 2024 15:41)  HR: 94 (19 Apr 2024 06:00) (86 - 129)  BP: 128/96 (19 Apr 2024 06:00) (128/96 - 194/111)  BP(mean): 105 (19 Apr 2024 06:00) (102 - 152)  ABP: --  ABP(mean): --  RR: 21 (19 Apr 2024 06:00) (15 - 30)  SpO2: 97% (19 Apr 2024 06:00) (73% - 98%)    O2 Parameters below as of 19 Apr 2024 04:00  Patient On (Oxygen Delivery Method): room air  I&O's Detail    17 Apr 2024 07:01  -  18 Apr 2024 07:00  --------------------------------------------------------  IN:    sodium chloride 0.9%: 200 mL  Total IN: 200 mL    OUT:  Total OUT: 0 mL    Total NET: 200 mL      18 Apr 2024 07:01  -  19 Apr 2024 06:53  --------------------------------------------------------  IN:    dextrose 5% + sodium chloride 0.9%: 675 mL    IV PiggyBack: 250 mL    IV PiggyBack: 400 mL    Oral Fluid: 180 mL  Total IN: 1505 mL    OUT:    Voided (mL): 2350 mL  Total OUT: 2350 mL    Total NET: -845 mL            Physical Exam:    Neurological:     HEENT:     Neck: Neck supple, No JVD    Respiratory:  Equal chest rise.  Breath Sounds equal bilateral    Cardiovascular:     Gastrointestinal:     Extremities:    Vascular:     Skin: No rashes    PATIENT CARE ACCESS DEVICES:  [ ] Peripheral IV  [ ] Central Venous Line	[ ] R	[ ] L	[ ] IJ	[ ] Fem	[ ] SC	Placed:   [ ] Arterial Line		[ ] R	[ ] L	[ ] Fem	[ ] Rad	[ ] Ax	Placed:   [ ] PICC:					[ ] Mediport  [ ] Urinary Catheter:   [ ] Necessity of urinary, arterial, and venous catheters discussed    LABS:  CBC Full  -  ( 19 Apr 2024 03:15 )  WBC Count : 7.36 K/uL  RBC Count : 5.03 M/uL  Hemoglobin : 16.3 g/dL  Hematocrit : 46.8 %  Platelet Count - Automated : 264 K/uL  Mean Cell Volume : 93.0 fl  Mean Cell Hemoglobin : 32.4 pg  Mean Cell Hemoglobin Concentration : 34.8 gm/dL  Auto Neutrophil # : x  Auto Lymphocyte # : x  Auto Monocyte # : x  Auto Eosinophil # : x  Auto Basophil # : x  Auto Neutrophil % : x  Auto Lymphocyte % : x  Auto Monocyte % : x  Auto Eosinophil % : x  Auto Basophil % : x    04-19    134<L>  |  97  |  7.9<L>  ----------------------------<  112<H>  4.5   |  21.0<L>  |  0.53    Ca    8.8      19 Apr 2024 03:15  Phos  3.2     04-19  Mg     2.6     04-19    TPro  6.9  /  Alb  4.2  /  TBili  0.8  /  DBili  x   /  AST  127<H>  /  ALT  114<H>  /  AlkPhos  100  04-17    PT/INR - ( 17 Apr 2024 18:18 )   PT: 10.3 sec;   INR: 0.93 ratio         PTT - ( 17 Apr 2024 18:18 )  PTT:26.7 sec  Urinalysis Basic - ( 19 Apr 2024 03:15 )    Color: x / Appearance: x / SG: x / pH: x  Gluc: 112 mg/dL / Ketone: x  / Bili: x / Urobili: x   Blood: x / Protein: x / Nitrite: x   Leuk Esterase: x / RBC: x / WBC x   Sq Epi: x / Non Sq Epi: x / Bacteria: x           INTERVAL HPI/OVERNIGHT EVENTS:  Tachycardia and hypertension overnight.  Home dosing of xanax reported and ordered    SUBJECTIVE:  Denies any pain at present.  Reports feeling better than yesterday    MEDICATIONS  (STANDING):  ALPRAZolam 0.25 milliGRAM(s) Oral at bedtime  ALPRAZolam 0.5 milliGRAM(s) Oral daily  amLODIPine   Tablet 5 milliGRAM(s) Oral daily  buPROPion XL (24-Hour) . 150 milliGRAM(s) Oral daily  chlorhexidine 2% Cloths 1 Application(s) Topical daily  dextrose 5% + lactated ringers. 1000 milliLiter(s) (100 mL/Hr) IV Continuous <Continuous>  DULoxetine 60 milliGRAM(s) Oral daily  hydroxychloroquine 200 milliGRAM(s) Oral daily  levETIRAcetam 1000 milliGRAM(s) Oral two times a day  nortriptyline 10 milliGRAM(s) Oral two times a day  potassium chloride    Tablet ER 40 milliEquivalent(s) Oral daily  pregabalin 200 milliGRAM(s) Oral every 8 hours    MEDICATIONS  (PRN):  HYDROmorphone  Injectable 0.5 milliGRAM(s) IV Push every 3 hours PRN Breakthrough pain  oxyCODONE    IR 5 milliGRAM(s) Oral every 4 hours PRN Moderate Pain (4 - 6)  oxyCODONE    IR 10 milliGRAM(s) Oral every 4 hours PRN Severe Pain (7 - 10)      Drug Dosing Weight  Height (cm): 157.5 (18 Apr 2024 08:00)  Weight (kg): 52 (18 Apr 2024 08:00)  BMI (kg/m2): 21 (18 Apr 2024 08:00)  BSA (m2): 1.51 (18 Apr 2024 08:00)    PAST MEDICAL & SURGICAL HISTORY:  Diverticulitis of colon (without mention of hemorrhage)      Chronic pain      S/P Laminectomy  Lumbar 3/10      Depression H/O panic attack  with anxiety      Mixed connective tissue disease      Adenocarcinoma of lung      Alcohol abuse      Opiate dependence      Lung cancer      History of lung cancer  s/p wedge resection of RML      S/P cholecystectomy      S/P lumbar laminectomy      History of hip replacement, total, right  6/7/2020      History of oophorectomy, unilateral  bilateral      History of lung surgery      History of laminectomy      H/O hand surgery        ICU Vital Signs Last 24 Hrs  T(C): 37.2 (19 Apr 2024 04:00), Max: 37.2 (18 Apr 2024 15:41)  T(F): 98.9 (19 Apr 2024 04:00), Max: 98.9 (18 Apr 2024 15:41)  HR: 94 (19 Apr 2024 06:00) (86 - 129)  BP: 128/96 (19 Apr 2024 06:00) (128/96 - 194/111)  BP(mean): 105 (19 Apr 2024 06:00) (102 - 152)  ABP: --  ABP(mean): --  RR: 21 (19 Apr 2024 06:00) (15 - 30)  SpO2: 97% (19 Apr 2024 06:00) (73% - 98%)    O2 Parameters below as of 19 Apr 2024 04:00  Patient On (Oxygen Delivery Method): room air  I&O's Detail    17 Apr 2024 07:01  -  18 Apr 2024 07:00  --------------------------------------------------------  IN:    sodium chloride 0.9%: 200 mL  Total IN: 200 mL    OUT:  Total OUT: 0 mL    Total NET: 200 mL      18 Apr 2024 07:01  -  19 Apr 2024 06:53  --------------------------------------------------------  IN:    dextrose 5% + sodium chloride 0.9%: 675 mL    IV PiggyBack: 250 mL    IV PiggyBack: 400 mL    Oral Fluid: 180 mL  Total IN: 1505 mL    OUT:    Voided (mL): 2350 mL  Total OUT: 2350 mL    Total NET: -845 mL            Physical Exam:    Neurological: Awakens (recently medicated with dialudid for pain) but sleepy, able to follow commands and answer all questions appropriately but then mumbles to herself somewhat incoherently, when asked for clarification patient is then clear    HEENT: Abrasion on occiput - w/o bleeding    Neck: Neck supple, No JVD    Gastrointestinal: soft, non tender, non distended    Extremities: Warm, chronic stasis changes w/waning erythema of b/l legs, hairless, LUE splinted, fingers warm, able to move w/normal sensation    Skin: No rashes    PATIENT CARE ACCESS DEVICES:  [ x] Peripheral IV  [ ] Central Venous Line	[ ] R	[ ] L	[ ] IJ	[ ] Fem	[ ] SC	Placed:   [ ] Arterial Line		[ ] R	[ ] L	[ ] Fem	[ ] Rad	[ ] Ax	Placed:   [ ] PICC:					[ ] Mediport  [ ] Urinary Catheter:   [ ] Necessity of urinary, arterial, and venous catheters discussed    LABS:  CBC Full  -  ( 19 Apr 2024 03:15 )  WBC Count : 7.36 K/uL  RBC Count : 5.03 M/uL  Hemoglobin : 16.3 g/dL  Hematocrit : 46.8 %  Platelet Count - Automated : 264 K/uL  Mean Cell Volume : 93.0 fl  Mean Cell Hemoglobin : 32.4 pg  Mean Cell Hemoglobin Concentration : 34.8 gm/dL  Auto Neutrophil # : x  Auto Lymphocyte # : x  Auto Monocyte # : x  Auto Eosinophil # : x  Auto Basophil # : x  Auto Neutrophil % : x  Auto Lymphocyte % : x  Auto Monocyte % : x  Auto Eosinophil % : x  Auto Basophil % : x    04-19    134<L>  |  97  |  7.9<L>  ----------------------------<  112<H>  4.5   |  21.0<L>  |  0.53    Ca    8.8      19 Apr 2024 03:15  Phos  3.2     04-19  Mg     2.6     04-19    TPro  6.9  /  Alb  4.2  /  TBili  0.8  /  DBili  x   /  AST  127<H>  /  ALT  114<H>  /  AlkPhos  100  04-17    PT/INR - ( 17 Apr 2024 18:18 )   PT: 10.3 sec;   INR: 0.93 ratio         PTT - ( 17 Apr 2024 18:18 )  PTT:26.7 sec  Urinalysis Basic - ( 19 Apr 2024 03:15 )    Color: x / Appearance: x / SG: x / pH: x  Gluc: 112 mg/dL / Ketone: x  / Bili: x / Urobili: x   Blood: x / Protein: x / Nitrite: x   Leuk Esterase: x / RBC: x / WBC x   Sq Epi: x / Non Sq Epi: x / Bacteria: x

## 2024-04-19 NOTE — CONSULT NOTE ADULT - NS ATTEND AMEND GEN_ALL_CORE FT
Orthopaedic Trauma Surgeon Addendum:    I have personally performed a face-to-face diagnostic evaluation on this patient.  I have reviewed the physician assistant note and agree with the history, exam, and plan of care, except as noted.    No intervention needed at this time. Likely old injury. Please call with questions or concerns.    Masood Camp MD  Orthopaedic Trauma Surgeon  [facility] [place]

## 2024-04-20 LAB
ANION GAP SERPL CALC-SCNC: 11 MMOL/L — SIGNIFICANT CHANGE UP (ref 5–17)
ANION GAP SERPL CALC-SCNC: 12 MMOL/L — SIGNIFICANT CHANGE UP (ref 5–17)
BUN SERPL-MCNC: 4.3 MG/DL — LOW (ref 8–20)
BUN SERPL-MCNC: 4.9 MG/DL — LOW (ref 8–20)
CALCIUM SERPL-MCNC: 8.4 MG/DL — SIGNIFICANT CHANGE UP (ref 8.4–10.5)
CALCIUM SERPL-MCNC: 8.5 MG/DL — SIGNIFICANT CHANGE UP (ref 8.4–10.5)
CHLORIDE SERPL-SCNC: 98 MMOL/L — SIGNIFICANT CHANGE UP (ref 96–108)
CHLORIDE SERPL-SCNC: 99 MMOL/L — SIGNIFICANT CHANGE UP (ref 96–108)
CO2 SERPL-SCNC: 24 MMOL/L — SIGNIFICANT CHANGE UP (ref 22–29)
CO2 SERPL-SCNC: 26 MMOL/L — SIGNIFICANT CHANGE UP (ref 22–29)
CREAT SERPL-MCNC: 0.44 MG/DL — LOW (ref 0.5–1.3)
CREAT SERPL-MCNC: 0.45 MG/DL — LOW (ref 0.5–1.3)
EGFR: 107 ML/MIN/1.73M2 — SIGNIFICANT CHANGE UP
EGFR: 108 ML/MIN/1.73M2 — SIGNIFICANT CHANGE UP
GLUCOSE BLDC GLUCOMTR-MCNC: 150 MG/DL — HIGH (ref 70–99)
GLUCOSE SERPL-MCNC: 121 MG/DL — HIGH (ref 70–99)
GLUCOSE SERPL-MCNC: 126 MG/DL — HIGH (ref 70–99)
HCT VFR BLD CALC: 39.6 % — SIGNIFICANT CHANGE UP (ref 34.5–45)
HCT VFR BLD CALC: 40.4 % — SIGNIFICANT CHANGE UP (ref 34.5–45)
HGB BLD-MCNC: 13.4 G/DL — SIGNIFICANT CHANGE UP (ref 11.5–15.5)
HGB BLD-MCNC: 14.1 G/DL — SIGNIFICANT CHANGE UP (ref 11.5–15.5)
MAGNESIUM SERPL-MCNC: 2.2 MG/DL — SIGNIFICANT CHANGE UP (ref 1.6–2.6)
MCHC RBC-ENTMCNC: 32.6 PG — SIGNIFICANT CHANGE UP (ref 27–34)
MCHC RBC-ENTMCNC: 33.3 PG — SIGNIFICANT CHANGE UP (ref 27–34)
MCHC RBC-ENTMCNC: 33.8 GM/DL — SIGNIFICANT CHANGE UP (ref 32–36)
MCHC RBC-ENTMCNC: 34.9 GM/DL — SIGNIFICANT CHANGE UP (ref 32–36)
MCV RBC AUTO: 95.3 FL — SIGNIFICANT CHANGE UP (ref 80–100)
MCV RBC AUTO: 96.4 FL — SIGNIFICANT CHANGE UP (ref 80–100)
PHOSPHATE SERPL-MCNC: 2.3 MG/DL — LOW (ref 2.4–4.7)
PLATELET # BLD AUTO: 198 K/UL — SIGNIFICANT CHANGE UP (ref 150–400)
PLATELET # BLD AUTO: 201 K/UL — SIGNIFICANT CHANGE UP (ref 150–400)
POTASSIUM SERPL-MCNC: 4.3 MMOL/L — SIGNIFICANT CHANGE UP (ref 3.5–5.3)
POTASSIUM SERPL-MCNC: 4.7 MMOL/L — SIGNIFICANT CHANGE UP (ref 3.5–5.3)
POTASSIUM SERPL-SCNC: 4.3 MMOL/L — SIGNIFICANT CHANGE UP (ref 3.5–5.3)
POTASSIUM SERPL-SCNC: 4.7 MMOL/L — SIGNIFICANT CHANGE UP (ref 3.5–5.3)
RBC # BLD: 4.11 M/UL — SIGNIFICANT CHANGE UP (ref 3.8–5.2)
RBC # BLD: 4.24 M/UL — SIGNIFICANT CHANGE UP (ref 3.8–5.2)
RBC # FLD: 12.8 % — SIGNIFICANT CHANGE UP (ref 10.3–14.5)
RBC # FLD: 12.8 % — SIGNIFICANT CHANGE UP (ref 10.3–14.5)
SODIUM SERPL-SCNC: 135 MMOL/L — SIGNIFICANT CHANGE UP (ref 135–145)
SODIUM SERPL-SCNC: 135 MMOL/L — SIGNIFICANT CHANGE UP (ref 135–145)
WBC # BLD: 6.5 K/UL — SIGNIFICANT CHANGE UP (ref 3.8–10.5)
WBC # BLD: 6.66 K/UL — SIGNIFICANT CHANGE UP (ref 3.8–10.5)
WBC # FLD AUTO: 6.5 K/UL — SIGNIFICANT CHANGE UP (ref 3.8–10.5)
WBC # FLD AUTO: 6.66 K/UL — SIGNIFICANT CHANGE UP (ref 3.8–10.5)

## 2024-04-20 PROCEDURE — 70450 CT HEAD/BRAIN W/O DYE: CPT | Mod: 26

## 2024-04-20 PROCEDURE — 99233 SBSQ HOSP IP/OBS HIGH 50: CPT

## 2024-04-20 RX ORDER — HYDROMORPHONE HYDROCHLORIDE 2 MG/ML
0.25 INJECTION INTRAMUSCULAR; INTRAVENOUS; SUBCUTANEOUS ONCE
Refills: 0 | Status: DISCONTINUED | OUTPATIENT
Start: 2024-04-20 | End: 2024-04-20

## 2024-04-20 RX ORDER — METHOCARBAMOL 500 MG/1
750 TABLET, FILM COATED ORAL THREE TIMES A DAY
Refills: 0 | Status: DISCONTINUED | OUTPATIENT
Start: 2024-04-20 | End: 2024-04-21

## 2024-04-20 RX ORDER — ACETAMINOPHEN 500 MG
1000 TABLET ORAL ONCE
Refills: 0 | Status: COMPLETED | OUTPATIENT
Start: 2024-04-20 | End: 2024-04-20

## 2024-04-20 RX ORDER — AMLODIPINE BESYLATE 2.5 MG/1
10 TABLET ORAL DAILY
Refills: 0 | Status: DISCONTINUED | OUTPATIENT
Start: 2024-04-20 | End: 2024-04-21

## 2024-04-20 RX ORDER — ALPRAZOLAM 0.25 MG
0.25 TABLET ORAL EVERY 12 HOURS
Refills: 0 | Status: DISCONTINUED | OUTPATIENT
Start: 2024-04-20 | End: 2024-04-20

## 2024-04-20 RX ORDER — SODIUM CHLORIDE 9 MG/ML
1000 INJECTION, SOLUTION INTRAVENOUS
Refills: 0 | Status: DISCONTINUED | OUTPATIENT
Start: 2024-04-20 | End: 2024-04-20

## 2024-04-20 RX ORDER — ENOXAPARIN SODIUM 100 MG/ML
30 INJECTION SUBCUTANEOUS EVERY 12 HOURS
Refills: 0 | Status: DISCONTINUED | OUTPATIENT
Start: 2024-04-20 | End: 2024-04-21

## 2024-04-20 RX ORDER — ALPRAZOLAM 0.25 MG
0.25 TABLET ORAL THREE TIMES A DAY
Refills: 0 | Status: DISCONTINUED | OUTPATIENT
Start: 2024-04-20 | End: 2024-04-21

## 2024-04-20 RX ORDER — ACETAMINOPHEN 500 MG
1000 TABLET ORAL EVERY 6 HOURS
Refills: 0 | Status: DISCONTINUED | OUTPATIENT
Start: 2024-04-20 | End: 2024-04-21

## 2024-04-20 RX ADMIN — Medication 1000 MILLIGRAM(S): at 05:43

## 2024-04-20 RX ADMIN — Medication 200 MILLIGRAM(S): at 21:16

## 2024-04-20 RX ADMIN — Medication 200 MILLIGRAM(S): at 11:17

## 2024-04-20 RX ADMIN — HYDROMORPHONE HYDROCHLORIDE 0.25 MILLIGRAM(S): 2 INJECTION INTRAMUSCULAR; INTRAVENOUS; SUBCUTANEOUS at 13:13

## 2024-04-20 RX ADMIN — DULOXETINE HYDROCHLORIDE 60 MILLIGRAM(S): 30 CAPSULE, DELAYED RELEASE ORAL at 11:18

## 2024-04-20 RX ADMIN — Medication 400 MILLIGRAM(S): at 04:44

## 2024-04-20 RX ADMIN — Medication 0.25 MILLIGRAM(S): at 14:03

## 2024-04-20 RX ADMIN — NORTRIPTYLINE HYDROCHLORIDE 10 MILLIGRAM(S): 10 CAPSULE ORAL at 05:17

## 2024-04-20 RX ADMIN — Medication 200 MILLIGRAM(S): at 13:13

## 2024-04-20 RX ADMIN — Medication 40 MILLIEQUIVALENT(S): at 11:17

## 2024-04-20 RX ADMIN — Medication 400 MILLIGRAM(S): at 11:13

## 2024-04-20 RX ADMIN — METHOCARBAMOL 750 MILLIGRAM(S): 500 TABLET, FILM COATED ORAL at 13:23

## 2024-04-20 RX ADMIN — AMLODIPINE BESYLATE 10 MILLIGRAM(S): 2.5 TABLET ORAL at 05:16

## 2024-04-20 RX ADMIN — Medication 0.25 MILLIGRAM(S): at 22:48

## 2024-04-20 RX ADMIN — Medication 1000 MILLIGRAM(S): at 12:13

## 2024-04-20 RX ADMIN — HYDROMORPHONE HYDROCHLORIDE 0.25 MILLIGRAM(S): 2 INJECTION INTRAMUSCULAR; INTRAVENOUS; SUBCUTANEOUS at 14:13

## 2024-04-20 RX ADMIN — Medication 85 MILLIMOLE(S): at 11:58

## 2024-04-20 RX ADMIN — LEVETIRACETAM 1000 MILLIGRAM(S): 250 TABLET, FILM COATED ORAL at 17:02

## 2024-04-20 RX ADMIN — BUPROPION HYDROCHLORIDE 150 MILLIGRAM(S): 150 TABLET, EXTENDED RELEASE ORAL at 11:18

## 2024-04-20 RX ADMIN — Medication 200 MILLIGRAM(S): at 05:15

## 2024-04-20 RX ADMIN — SODIUM CHLORIDE 100 MILLILITER(S): 9 INJECTION, SOLUTION INTRAVENOUS at 04:50

## 2024-04-20 RX ADMIN — ENOXAPARIN SODIUM 30 MILLIGRAM(S): 100 INJECTION SUBCUTANEOUS at 17:02

## 2024-04-20 RX ADMIN — ENOXAPARIN SODIUM 40 MILLIGRAM(S): 100 INJECTION SUBCUTANEOUS at 05:16

## 2024-04-20 RX ADMIN — LEVETIRACETAM 1000 MILLIGRAM(S): 250 TABLET, FILM COATED ORAL at 05:15

## 2024-04-20 RX ADMIN — NORTRIPTYLINE HYDROCHLORIDE 10 MILLIGRAM(S): 10 CAPSULE ORAL at 17:02

## 2024-04-20 NOTE — PROGRESS NOTE ADULT - ASSESSMENT
Assessment and Plan:    Neuro:   - Multimodal pain control   - delirium precautions  - Optimize sleep / wake cycle  - daily sedation holidays    CV:   - Continue hemodynamic monitoring  - Maintain MAP > 65    Pulm:   - Incentive spirometry  - Pulmonary toilet  - OOB to chair    GI/Nutrition:   - Monitor bowel function and continue serial abdominal exams  - continue bowel reg    /Renal:   - araya for strict I&O  - monitor kidney fxn  - Replete lytes as needed    ID:  - F/up Culture results  - Monitor fever curve  - Leukocytosis    Endo:  - monitor blood glucose    Skin:   - repositioning for DTI prevention while in bed    Heme/DVT Prophylaxis:  - SCDs  - Chemical prophylaxis with    Dispo:  - patient remains critically ill  - Continue SICU level of care Assessment and Plan:    65 yo Female with PMH autoimmune disease (idiopathic connective tissue disease), peripheral neuropathy, MI, CVA, GERD, seizures (2/2 hyponatremia), who presented to the ER as a transfer from OSH after a fall. Patient was walking her dog when she fell and hit her head, no LOC. At OSH patient with headache, imaging showed small bifrontal contusions and small SDH, R occipital fracture, patient transferred to [facility] for [facility]surgical evaluation. Luis exam, patient with L hand pain and found to have L 4th phalanx fracture, s/p ortho evaluation, nonoperative management. In the ICU, patient had tachycardia and hypertension, it was elucidated that she has been drinking EtOH at home and taking xanax, home xanax restarted with improvement of tachycardia and hypertension    Neuro:   Repeat Head CT overnight stable  Continue q4h neurochecks  Continue home xanax  Pain control PRN  continue home keppra  Q4h neurochecks  CIWA monitoring    CVS:   monitor BP/HR,   continue home amlodipine    Resp:   monitor RR/So2  Pulmonary toilet    GI:  Tolerating diet    :   monitor electrolytes and replete as needed   strict Is/Os    Heme:   monitor CBC  Lovenox 30q12h in setting of TBI    ID:   Monitor fever curve    Endo:   monitor glucose     MSK:  Left 4th phalanx fracture  Ortho states non-op management of regarding age-indeterminate R lesser trochanteric fracture,   NWB LUE with splint in place  Patient with known subacute T6 vertebral body frx, known by patient and has brace at home    Dispo:  Pt is awaiting floor transfer  - Tertiary: 4/18/24  - PTSD: Screened positive. Awaiting Psyche consult  - SBIRT  Date: 4/18/24

## 2024-04-20 NOTE — PROGRESS NOTE ADULT - SUBJECTIVE AND OBJECTIVE BOX
INTERVAL HPI/OVERNIGHT EVENTS:    Patient was downgraded from SICU level of care yesterday evening and was found to be lethargic on the floor. She had also received her Xanax and IV dilaudid. She is arousable to verbal stimulation and able to follow commands for a brief period of time, however then begins to slur her words and fall back asleep again. Pt received repeat head CT that was stable with no new bleeding noted. Pt is now alert and oriented this AM. Pt's HCP arrived and states she has struggled with substance abuse for a long time. Pt states she has pain to her entire body and needs IV Dilaudid. Pt also complaining that we decreased her Xanax dose. Pt was lethargic overnight so dose was decreased. No signs of drug withdrawal symptoms at this time.     MEDICATIONS  (STANDING):  amLODIPine   Tablet 10 milliGRAM(s) Oral daily  buPROPion XL (24-Hour) . 150 milliGRAM(s) Oral daily  DULoxetine 60 milliGRAM(s) Oral daily  enoxaparin Injectable 40 milliGRAM(s) SubCutaneous every 12 hours  HYDROmorphone  Injectable 0.25 milliGRAM(s) IV Push once  hydroxychloroquine 200 milliGRAM(s) Oral daily  levETIRAcetam 1000 milliGRAM(s) Oral two times a day  nortriptyline 10 milliGRAM(s) Oral two times a day  potassium chloride    Tablet ER 40 milliEquivalent(s) Oral daily  pregabalin 200 milliGRAM(s) Oral every 8 hours    MEDICATIONS  (PRN):  acetaminophen   IVPB .. 1000 milliGRAM(s) IV Intermittent every 6 hours PRN Mild Pain (1 - 3), Moderate Pain (4 - 6), Severe Pain (7 - 10)  ALPRAZolam 0.25 milliGRAM(s) Oral three times a day PRN Anxiety      Drug Dosing Weight  Height (cm): 157.5 (18 Apr 2024 08:00)  Weight (kg): 52 (18 Apr 2024 08:00)  BMI (kg/m2): 21 (18 Apr 2024 08:00)  BSA (m2): 1.51 (18 Apr 2024 08:00)      PAST MEDICAL & SURGICAL HISTORY:  Diverticulitis of colon (without mention of hemorrhage)      Chronic pain      S/P Laminectomy  Lumbar 3/10      Depression H/O panic attack  with anxiety      Mixed connective tissue disease      Adenocarcinoma of lung      Alcohol abuse      Opiate dependence      Lung cancer      History of lung cancer  s/p wedge resection of RML      S/P cholecystectomy      S/P lumbar laminectomy      History of hip replacement, total, right  6/7/2020      History of oophorectomy, unilateral  bilateral      History of lung surgery      History of laminectomy      H/O hand surgery          ICU Vital Signs Last 24 Hrs  T(C): 36.7 (20 Apr 2024 10:00), Max: 37.8 (20 Apr 2024 04:14)  T(F): 98 (20 Apr 2024 10:00), Max: 100 (20 Apr 2024 04:14)  HR: 101 (20 Apr 2024 12:00) (96 - 111)  BP: 165/110 (20 Apr 2024 12:00) (109/71 - 172/96)  BP(mean): 124 (20 Apr 2024 12:00) (82 - 124)  ABP: --  ABP(mean): --  RR: 26 (20 Apr 2024 12:00) (14 - 28)  SpO2: 95% (20 Apr 2024 12:00) (92% - 100%)    O2 Parameters below as of 20 Apr 2024 12:00  Patient On (Oxygen Delivery Method): room air                I&O's Detail    19 Apr 2024 07:01  -  20 Apr 2024 07:00  --------------------------------------------------------  IN:    dextrose 5% + lactated ringers: 1300 mL    Oral Fluid: 200 mL  Total IN: 1500 mL    OUT:    Intermittent Catheterization - Urethral (mL): 900 mL    Voided (mL): 500 mL  Total OUT: 1400 mL    Total NET: 100 mL      20 Apr 2024 07:01  -  20 Apr 2024 12:54  --------------------------------------------------------  IN:    dextrose 5% + sodium chloride 0.9%: 200 mL    IV PiggyBack: 249.9 mL    Oral Fluid: 300 mL  Total IN: 749.9 mL    OUT:    Voided (mL): 0 mL  Total OUT: 0 mL    Total NET: 749.9 mL          Physical Exam:    Neurological: Awakens (recently medicated with dialudid for pain) but sleepy, able to follow commands and answer all questions appropriately but then mumbles to herself somewhat incoherently, when asked for clarification patient is then clear    HEENT: Abrasion on occiput - w/o bleeding    Neck: Neck supple, No JVD    Gastrointestinal: soft, non tender, non distended    Extremities: Warm, chronic stasis changes w/waning erythema of b/l legs, hairless, LUE splinted, fingers warm, able to move w/normal sensation      LABS:  CBC Full  -  ( 20 Apr 2024 07:00 )  WBC Count : 6.50 K/uL  RBC Count : 4.11 M/uL  Hemoglobin : 13.4 g/dL  Hematocrit : 39.6 %  Platelet Count - Automated : 201 K/uL  Mean Cell Volume : 96.4 fl  Mean Cell Hemoglobin : 32.6 pg  Mean Cell Hemoglobin Concentration : 33.8 gm/dL  Auto Neutrophil # : x  Auto Lymphocyte # : x  Auto Monocyte # : x  Auto Eosinophil # : x  Auto Basophil # : x  Auto Neutrophil % : x  Auto Lymphocyte % : x  Auto Monocyte % : x  Auto Eosinophil % : x  Auto Basophil % : x    04-20    135  |  98  |  4.3<L>  ----------------------------<  126<H>  4.3   |  26.0  |  0.45<L>    Ca    8.5      20 Apr 2024 07:00  Phos  2.3     04-20  Mg     2.2     04-20        Urinalysis Basic - ( 20 Apr 2024 07:00 )    Color: x / Appearance: x / SG: x / pH: x  Gluc: 126 mg/dL / Ketone: x  / Bili: x / Urobili: x   Blood: x / Protein: x / Nitrite: x   Leuk Esterase: x / RBC: x / WBC x   Sq Epi: x / Non Sq Epi: x / Bacteria: x           INTERVAL HPI/OVERNIGHT EVENTS:    Patient was downgraded from SICU level of care yesterday evening and was found to be lethargic on the floor. She had also received her Xanax and IV dilaudid. She is arousable to verbal stimulation and able to follow commands for a brief period of time, however then begins to slur her words and fall back asleep again. Pt received repeat head CT that was stable with no new bleeding noted. Pt is now alert and oriented this AM. Pt's HCP arrived and states she has struggled with substance abuse for a long time. Pt states she has pain to her entire body and needs IV Dilaudid. Pt also complaining that we decreased her Xanax dose. Pt was lethargic overnight so dose was decreased. No signs of drug withdrawal symptoms at this time.     MEDICATIONS  (STANDING):  amLODIPine   Tablet 10 milliGRAM(s) Oral daily  buPROPion XL (24-Hour) . 150 milliGRAM(s) Oral daily  DULoxetine 60 milliGRAM(s) Oral daily  enoxaparin Injectable 40 milliGRAM(s) SubCutaneous every 12 hours  HYDROmorphone  Injectable 0.25 milliGRAM(s) IV Push once  hydroxychloroquine 200 milliGRAM(s) Oral daily  levETIRAcetam 1000 milliGRAM(s) Oral two times a day  nortriptyline 10 milliGRAM(s) Oral two times a day  potassium chloride    Tablet ER 40 milliEquivalent(s) Oral daily  pregabalin 200 milliGRAM(s) Oral every 8 hours    MEDICATIONS  (PRN):  acetaminophen   IVPB .. 1000 milliGRAM(s) IV Intermittent every 6 hours PRN Mild Pain (1 - 3), Moderate Pain (4 - 6), Severe Pain (7 - 10)  ALPRAZolam 0.25 milliGRAM(s) Oral three times a day PRN Anxiety      Drug Dosing Weight  Height (cm): 157.5 (18 Apr 2024 08:00)  Weight (kg): 52 (18 Apr 2024 08:00)  BMI (kg/m2): 21 (18 Apr 2024 08:00)  BSA (m2): 1.51 (18 Apr 2024 08:00)      PAST MEDICAL & SURGICAL HISTORY:  Diverticulitis of colon (without mention of hemorrhage)      Chronic pain      S/P Laminectomy  Lumbar 3/10      Depression H/O panic attack  with anxiety      Mixed connective tissue disease      Adenocarcinoma of lung      Alcohol abuse      Opiate dependence      Lung cancer      History of lung cancer  s/p wedge resection of RML      S/P cholecystectomy      S/P lumbar laminectomy      History of hip replacement, total, right  6/7/2020      History of oophorectomy, unilateral  bilateral      History of lung surgery      History of laminectomy      H/O hand surgery          ICU Vital Signs Last 24 Hrs  T(C): 36.7 (20 Apr 2024 10:00), Max: 37.8 (20 Apr 2024 04:14)  T(F): 98 (20 Apr 2024 10:00), Max: 100 (20 Apr 2024 04:14)  HR: 101 (20 Apr 2024 12:00) (96 - 111)  BP: 165/110 (20 Apr 2024 12:00) (109/71 - 172/96)  BP(mean): 124 (20 Apr 2024 12:00) (82 - 124)  ABP: --  ABP(mean): --  RR: 26 (20 Apr 2024 12:00) (14 - 28)  SpO2: 95% (20 Apr 2024 12:00) (92% - 100%)    O2 Parameters below as of 20 Apr 2024 12:00  Patient On (Oxygen Delivery Method): room air                I&O's Detail    19 Apr 2024 07:01  -  20 Apr 2024 07:00  --------------------------------------------------------  IN:    dextrose 5% + lactated ringers: 1300 mL    Oral Fluid: 200 mL  Total IN: 1500 mL    OUT:    Intermittent Catheterization - Urethral (mL): 900 mL    Voided (mL): 500 mL  Total OUT: 1400 mL    Total NET: 100 mL      20 Apr 2024 07:01  -  20 Apr 2024 12:54  --------------------------------------------------------  IN:    dextrose 5% + sodium chloride 0.9%: 200 mL    IV PiggyBack: 249.9 mL    Oral Fluid: 300 mL  Total IN: 749.9 mL    OUT:    Voided (mL): 0 mL  Total OUT: 0 mL    Total NET: 749.9 mL          Physical Exam:    Neurological: Alert and oriented x 3 but some difficulty identifying place. Non-focal exam. Motor and sensation intact    HEENT: Abrasion on occiput - w/o bleeding    Neck: Neck supple, No JVD    Resp: CTA bilateral    CV: S1, S2 present. RRR    Gastrointestinal: soft, non tender, non distended    Extremities: Warm, chronic stasis changes w/waning erythema of b/l legs, hairless, LUE splinted, fingers warm, able to move w/normal sensation      LABS:  CBC Full  -  ( 20 Apr 2024 07:00 )  WBC Count : 6.50 K/uL  RBC Count : 4.11 M/uL  Hemoglobin : 13.4 g/dL  Hematocrit : 39.6 %  Platelet Count - Automated : 201 K/uL  Mean Cell Volume : 96.4 fl  Mean Cell Hemoglobin : 32.6 pg  Mean Cell Hemoglobin Concentration : 33.8 gm/dL  Auto Neutrophil # : x  Auto Lymphocyte # : x  Auto Monocyte # : x  Auto Eosinophil # : x  Auto Basophil # : x  Auto Neutrophil % : x  Auto Lymphocyte % : x  Auto Monocyte % : x  Auto Eosinophil % : x  Auto Basophil % : x    04-20    135  |  98  |  4.3<L>  ----------------------------<  126<H>  4.3   |  26.0  |  0.45<L>    Ca    8.5      20 Apr 2024 07:00  Phos  2.3     04-20  Mg     2.2     04-20        Urinalysis Basic - ( 20 Apr 2024 07:00 )    Color: x / Appearance: x / SG: x / pH: x  Gluc: 126 mg/dL / Ketone: x  / Bili: x / Urobili: x   Blood: x / Protein: x / Nitrite: x   Leuk Esterase: x / RBC: x / WBC x   Sq Epi: x / Non Sq Epi: x / Bacteria: x

## 2024-04-20 NOTE — PROGRESS NOTE ADULT - SUBJECTIVE AND OBJECTIVE BOX
DOWNGRADE NOTE    63 yo Female with PMH autoimmune disease (idiopathic connective tissue disease), peripheral neuropathy, MI, CVA, GERD, seizures (2/2 hyponatremia), who presented to the ER as a transfer from OSH after a fall. Patient was walking her dog when she fell and hit her head, no LOC. At OSH patient with headache, imaging showed small bifrontal contusions and small SDH, R occipital fracture, patient transferred to [facility] for neurosurgical evaluation. Luis exam, patient with L hand pain and found to have L 4th phalanx fracture, s/p ortho evaluation, nonoperative management. In the ICU, patient had tachycardia and hypertension, it was elucidated that she has been drinking EtOH at home and taking xanax, home xanax restarted with improvement of tachycardia and hypertension. Patient was downgraded from ICU yesterday, readmitted overnight for lethargy. Repeat CTH stable. Pt likely lethargic from xanax. Pt now AAOx3, no lethargic, xanax dose decreased.     ICU Vital Signs Last 24 Hrs  T(C): 36.7 (20 Apr 2024 10:00), Max: 37.8 (20 Apr 2024 04:14)  T(F): 98 (20 Apr 2024 10:00), Max: 100 (20 Apr 2024 04:14)  HR: 101 (20 Apr 2024 12:00) (100 - 111)  BP: 165/110 (20 Apr 2024 12:00) (111/72 - 172/96)  BP(mean): 124 (20 Apr 2024 12:00) (82 - 124)  ABP: --  ABP(mean): --  RR: 26 (20 Apr 2024 12:00) (14 - 28)  SpO2: 95% (20 Apr 2024 12:00) (92% - 99%)    O2 Parameters below as of 20 Apr 2024 12:00  Patient On (Oxygen Delivery Method): room air      I&O's Detail    19 Apr 2024 07:01  -  20 Apr 2024 07:00  --------------------------------------------------------  IN:    dextrose 5% + lactated ringers: 1300 mL    Oral Fluid: 200 mL  Total IN: 1500 mL    OUT:    Intermittent Catheterization - Urethral (mL): 900 mL    Voided (mL): 500 mL  Total OUT: 1400 mL    Total NET: 100 mL      20 Apr 2024 07:01  -  20 Apr 2024 14:57  --------------------------------------------------------  IN:    dextrose 5% + sodium chloride 0.9%: 200 mL    IV PiggyBack: 249.9 mL    Oral Fluid: 300 mL  Total IN: 749.9 mL    OUT:    Voided (mL): 0 mL  Total OUT: 0 mL    Total NET: 749.9 mL      MEDICATIONS  (STANDING):  amLODIPine   Tablet 10 milliGRAM(s) Oral daily  buPROPion XL (24-Hour) . 150 milliGRAM(s) Oral daily  DULoxetine 60 milliGRAM(s) Oral daily  enoxaparin Injectable 30 milliGRAM(s) SubCutaneous every 12 hours  hydroxychloroquine 200 milliGRAM(s) Oral daily  levETIRAcetam 1000 milliGRAM(s) Oral two times a day  methocarbamol 750 milliGRAM(s) Oral three times a day  nortriptyline 10 milliGRAM(s) Oral two times a day  potassium chloride    Tablet ER 40 milliEquivalent(s) Oral daily  pregabalin 200 milliGRAM(s) Oral every 8 hours    MEDICATIONS  (PRN):  acetaminophen   IVPB .. 1000 milliGRAM(s) IV Intermittent every 6 hours PRN Mild Pain (1 - 3), Moderate Pain (4 - 6), Severe Pain (7 - 10)  ALPRAZolam 0.25 milliGRAM(s) Oral three times a day PRN Anxiety      MISC    LABS:  CBC Full  -  ( 20 Apr 2024 07:00 )  WBC Count : 6.50 K/uL  RBC Count : 4.11 M/uL  Hemoglobin : 13.4 g/dL  Hematocrit : 39.6 %  Platelet Count - Automated : 201 K/uL  Mean Cell Volume : 96.4 fl  Mean Cell Hemoglobin : 32.6 pg  Mean Cell Hemoglobin Concentration : 33.8 gm/dL  Auto Neutrophil # : x  Auto Lymphocyte # : x  Auto Monocyte # : x  Auto Eosinophil # : x  Auto Basophil # : x  Auto Neutrophil % : x  Auto Lymphocyte % : x  Auto Monocyte % : x  Auto Eosinophil % : x  Auto Basophil % : x    04-20    135  |  98  |  4.3<L>  ----------------------------<  126<H>  4.3   |  26.0  |  0.45<L>    Ca    8.5      20 Apr 2024 07:00  Phos  2.3     04-20  Mg     2.2     04-20        Urinalysis Basic - ( 20 Apr 2024 07:00 )    Color: x / Appearance: x / SG: x / pH: x  Gluc: 126 mg/dL / Ketone: x  / Bili: x / Urobili: x   Blood: x / Protein: x / Nitrite: x   Leuk Esterase: x / RBC: x / WBC x   Sq Epi: x / Non Sq Epi: x / Bacteria: x      ASSESSMENT/PLAN:  63 yo Female with PMH autoimmune disease (idiopathic connective tissue disease), peripheral neuropathy, MI, CVA, GERD, seizures (2/2 hyponatremia), who presented to the ER as a transfer from OSH after a fall. Patient was walking her dog when she fell and hit her head, no LOC. At OSH patient with headache, imaging showed small bifrontal contusions and small SDH, R occipital fracture, patient transferred to [facility] for neurosurgical evaluation. Luis exam, patient with L hand pain and found to have L 4th phalanx fracture, s/p ortho evaluation, nonoperative management. In the ICU, patient had tachycardia and hypertension, it was elucidated that she has been drinking EtOH at home and taking xanax, home xanax restarted with improvement of tachycardia and hypertension. Patient was downgraded from ICU yesterday, readmitted overnight for lethargy. Repeat CTH stable. Pt likely lethargic from xanax. Pt now AAOx3, no lethargic, xanax dose decreased.          Neuro:  SAH, SDH, IPH. Polysubtance abuse   Repeat Head CT overnight stable  Continue q4h neurochecks  Continue home xanax  Continue to monitor for drug withdrawal symptoms  Multimodal pain control  continue home keppra  CIWA monitoring    CVS:   monitor BP/HR,   continue home amlodipine    Resp:   monitor RR/So2  Pulmonary toilet    GI:  Tolerating diet    :   Pt is tolerating PO diet  monitor electrolytes and replete as needed   strict Is/Os    Heme:   monitor CBC  Lovenox 30q12h in setting of TBI    ID:   Monitor fever curve    Endo:   monitor glucose     MSK:  Left 4th phalanx fracture  Ortho states non-op management of regarding age-indeterminate R lesser trochanteric fracture,   NWB LUE with splint in place  Patient with known subacute T6 vertebral body frx, known by patient and has brace at home    Dispo:  Floor transfer  - Tertiary: 4/18/24  - PTSD: Screened positive. Awaiting Psyche consult  - SBIRT  Date: 4/18/24.

## 2024-04-21 ENCOUNTER — TRANSCRIPTION ENCOUNTER (OUTPATIENT)
Age: 64
End: 2024-04-21

## 2024-04-21 VITALS
DIASTOLIC BLOOD PRESSURE: 112 MMHG | OXYGEN SATURATION: 96 % | HEART RATE: 105 BPM | TEMPERATURE: 98 F | SYSTOLIC BLOOD PRESSURE: 178 MMHG | RESPIRATION RATE: 18 BRPM

## 2024-04-21 LAB
ANION GAP SERPL CALC-SCNC: 16 MMOL/L — SIGNIFICANT CHANGE UP (ref 5–17)
BASOPHILS # BLD AUTO: 0.02 K/UL — SIGNIFICANT CHANGE UP (ref 0–0.2)
BASOPHILS NFR BLD AUTO: 0.3 % — SIGNIFICANT CHANGE UP (ref 0–2)
BUN SERPL-MCNC: 6.7 MG/DL — LOW (ref 8–20)
CALCIUM SERPL-MCNC: 9.3 MG/DL — SIGNIFICANT CHANGE UP (ref 8.4–10.5)
CHLORIDE SERPL-SCNC: 96 MMOL/L — SIGNIFICANT CHANGE UP (ref 96–108)
CO2 SERPL-SCNC: 23 MMOL/L — SIGNIFICANT CHANGE UP (ref 22–29)
CREAT SERPL-MCNC: 0.6 MG/DL — SIGNIFICANT CHANGE UP (ref 0.5–1.3)
EGFR: 100 ML/MIN/1.73M2 — SIGNIFICANT CHANGE UP
EOSINOPHIL # BLD AUTO: 0.39 K/UL — SIGNIFICANT CHANGE UP (ref 0–0.5)
EOSINOPHIL NFR BLD AUTO: 6.5 % — HIGH (ref 0–6)
GLUCOSE SERPL-MCNC: 125 MG/DL — HIGH (ref 70–99)
HCT VFR BLD CALC: 43.8 % — SIGNIFICANT CHANGE UP (ref 34.5–45)
HGB BLD-MCNC: 15.2 G/DL — SIGNIFICANT CHANGE UP (ref 11.5–15.5)
IMM GRANULOCYTES NFR BLD AUTO: 0.3 % — SIGNIFICANT CHANGE UP (ref 0–0.9)
LYMPHOCYTES # BLD AUTO: 1.25 K/UL — SIGNIFICANT CHANGE UP (ref 1–3.3)
LYMPHOCYTES # BLD AUTO: 20.7 % — SIGNIFICANT CHANGE UP (ref 13–44)
MAGNESIUM SERPL-MCNC: 2.1 MG/DL — SIGNIFICANT CHANGE UP (ref 1.6–2.6)
MCHC RBC-ENTMCNC: 33 PG — SIGNIFICANT CHANGE UP (ref 27–34)
MCHC RBC-ENTMCNC: 34.7 GM/DL — SIGNIFICANT CHANGE UP (ref 32–36)
MCV RBC AUTO: 95 FL — SIGNIFICANT CHANGE UP (ref 80–100)
MONOCYTES # BLD AUTO: 0.74 K/UL — SIGNIFICANT CHANGE UP (ref 0–0.9)
MONOCYTES NFR BLD AUTO: 12.3 % — SIGNIFICANT CHANGE UP (ref 2–14)
NEUTROPHILS # BLD AUTO: 3.62 K/UL — SIGNIFICANT CHANGE UP (ref 1.8–7.4)
NEUTROPHILS NFR BLD AUTO: 59.9 % — SIGNIFICANT CHANGE UP (ref 43–77)
PHOSPHATE SERPL-MCNC: 3.2 MG/DL — SIGNIFICANT CHANGE UP (ref 2.4–4.7)
PLATELET # BLD AUTO: 231 K/UL — SIGNIFICANT CHANGE UP (ref 150–400)
POTASSIUM SERPL-MCNC: 4.4 MMOL/L — SIGNIFICANT CHANGE UP (ref 3.5–5.3)
POTASSIUM SERPL-SCNC: 4.4 MMOL/L — SIGNIFICANT CHANGE UP (ref 3.5–5.3)
RBC # BLD: 4.61 M/UL — SIGNIFICANT CHANGE UP (ref 3.8–5.2)
RBC # FLD: 12.6 % — SIGNIFICANT CHANGE UP (ref 10.3–14.5)
SODIUM SERPL-SCNC: 135 MMOL/L — SIGNIFICANT CHANGE UP (ref 135–145)
WBC # BLD: 6.04 K/UL — SIGNIFICANT CHANGE UP (ref 3.8–10.5)
WBC # FLD AUTO: 6.04 K/UL — SIGNIFICANT CHANGE UP (ref 3.8–10.5)

## 2024-04-21 PROCEDURE — 86900 BLOOD TYPING SEROLOGIC ABO: CPT

## 2024-04-21 PROCEDURE — 83605 ASSAY OF LACTIC ACID: CPT

## 2024-04-21 PROCEDURE — 97167 OT EVAL HIGH COMPLEX 60 MIN: CPT

## 2024-04-21 PROCEDURE — 96374 THER/PROPH/DIAG INJ IV PUSH: CPT

## 2024-04-21 PROCEDURE — 99231 SBSQ HOSP IP/OBS SF/LOW 25: CPT

## 2024-04-21 PROCEDURE — 72128 CT CHEST SPINE W/O DYE: CPT | Mod: MC

## 2024-04-21 PROCEDURE — 80048 BASIC METABOLIC PNL TOTAL CA: CPT

## 2024-04-21 PROCEDURE — 80053 COMPREHEN METABOLIC PANEL: CPT

## 2024-04-21 PROCEDURE — 71045 X-RAY EXAM CHEST 1 VIEW: CPT

## 2024-04-21 PROCEDURE — 93005 ELECTROCARDIOGRAM TRACING: CPT

## 2024-04-21 PROCEDURE — 85025 COMPLETE CBC W/AUTO DIFF WBC: CPT

## 2024-04-21 PROCEDURE — 97163 PT EVAL HIGH COMPLEX 45 MIN: CPT

## 2024-04-21 PROCEDURE — 73130 X-RAY EXAM OF HAND: CPT

## 2024-04-21 PROCEDURE — 73110 X-RAY EXAM OF WRIST: CPT

## 2024-04-21 PROCEDURE — 87641 MR-STAPH DNA AMP PROBE: CPT

## 2024-04-21 PROCEDURE — 86618 LYME DISEASE ANTIBODY: CPT

## 2024-04-21 PROCEDURE — 36415 COLL VENOUS BLD VENIPUNCTURE: CPT

## 2024-04-21 PROCEDURE — 85610 PROTHROMBIN TIME: CPT

## 2024-04-21 PROCEDURE — 87640 STAPH A DNA AMP PROBE: CPT

## 2024-04-21 PROCEDURE — 86901 BLOOD TYPING SEROLOGIC RH(D): CPT

## 2024-04-21 PROCEDURE — 84100 ASSAY OF PHOSPHORUS: CPT

## 2024-04-21 PROCEDURE — 72170 X-RAY EXAM OF PELVIS: CPT

## 2024-04-21 PROCEDURE — 82009 KETONE BODYS QUAL: CPT

## 2024-04-21 PROCEDURE — 86803 HEPATITIS C AB TEST: CPT

## 2024-04-21 PROCEDURE — 82962 GLUCOSE BLOOD TEST: CPT

## 2024-04-21 PROCEDURE — 99285 EMERGENCY DEPT VISIT HI MDM: CPT | Mod: 25

## 2024-04-21 PROCEDURE — 85027 COMPLETE CBC AUTOMATED: CPT

## 2024-04-21 PROCEDURE — 85730 THROMBOPLASTIN TIME PARTIAL: CPT

## 2024-04-21 PROCEDURE — 83735 ASSAY OF MAGNESIUM: CPT

## 2024-04-21 PROCEDURE — 72125 CT NECK SPINE W/O DYE: CPT | Mod: MC

## 2024-04-21 PROCEDURE — 86850 RBC ANTIBODY SCREEN: CPT

## 2024-04-21 PROCEDURE — 70450 CT HEAD/BRAIN W/O DYE: CPT | Mod: MC

## 2024-04-21 PROCEDURE — 96375 TX/PRO/DX INJ NEW DRUG ADDON: CPT

## 2024-04-21 RX ORDER — ACETAMINOPHEN 500 MG
650 TABLET ORAL EVERY 6 HOURS
Refills: 0 | Status: DISCONTINUED | OUTPATIENT
Start: 2024-04-21 | End: 2024-04-21

## 2024-04-21 RX ORDER — ACETAMINOPHEN 500 MG
2 TABLET ORAL
Qty: 0 | Refills: 0 | DISCHARGE
Start: 2024-04-21

## 2024-04-21 RX ORDER — LEVETIRACETAM 250 MG/1
2 TABLET, FILM COATED ORAL
Refills: 0 | DISCHARGE

## 2024-04-21 RX ORDER — LEVETIRACETAM 250 MG/1
2 TABLET, FILM COATED ORAL
Qty: 6 | Refills: 0
Start: 2024-04-21 | End: 2024-04-23

## 2024-04-21 RX ORDER — OXYCODONE HYDROCHLORIDE 5 MG/1
10 TABLET ORAL ONCE
Refills: 0 | Status: DISCONTINUED | OUTPATIENT
Start: 2024-04-21 | End: 2024-04-21

## 2024-04-21 RX ADMIN — Medication 200 MILLIGRAM(S): at 05:51

## 2024-04-21 RX ADMIN — OXYCODONE HYDROCHLORIDE 10 MILLIGRAM(S): 5 TABLET ORAL at 04:01

## 2024-04-21 RX ADMIN — AMLODIPINE BESYLATE 10 MILLIGRAM(S): 2.5 TABLET ORAL at 05:52

## 2024-04-21 RX ADMIN — Medication 650 MILLIGRAM(S): at 12:45

## 2024-04-21 RX ADMIN — LEVETIRACETAM 1000 MILLIGRAM(S): 250 TABLET, FILM COATED ORAL at 05:52

## 2024-04-21 RX ADMIN — Medication 200 MILLIGRAM(S): at 12:00

## 2024-04-21 RX ADMIN — METHOCARBAMOL 750 MILLIGRAM(S): 500 TABLET, FILM COATED ORAL at 05:52

## 2024-04-21 RX ADMIN — ENOXAPARIN SODIUM 30 MILLIGRAM(S): 100 INJECTION SUBCUTANEOUS at 05:51

## 2024-04-21 RX ADMIN — OXYCODONE HYDROCHLORIDE 10 MILLIGRAM(S): 5 TABLET ORAL at 03:01

## 2024-04-21 RX ADMIN — BUPROPION HYDROCHLORIDE 150 MILLIGRAM(S): 150 TABLET, EXTENDED RELEASE ORAL at 11:58

## 2024-04-21 RX ADMIN — DULOXETINE HYDROCHLORIDE 60 MILLIGRAM(S): 30 CAPSULE, DELAYED RELEASE ORAL at 11:58

## 2024-04-21 RX ADMIN — NORTRIPTYLINE HYDROCHLORIDE 10 MILLIGRAM(S): 10 CAPSULE ORAL at 05:52

## 2024-04-21 NOTE — DISCHARGE NOTE NURSING/CASE MANAGEMENT/SOCIAL WORK - PATIENT PORTAL LINK FT
You can access the FollowMyHealth Patient Portal offered by [facility] by registering at the following website: [email or web address]. By joining [facility]’s FollowMyHealth portal, you will also be able to view your health information using other applications (apps) compatible with our system.

## 2024-04-21 NOTE — PROGRESS NOTE ADULT - NS ATTEND AMEND GEN_ALL_CORE FT
Above assessment noted.  The patient was seen and examined by myself with the surgical PA.  The patient is without new events overnight. She has complaints of headache this morning.  She has a left forearm splint in place, with pink warm fingers.  The patient is to continue with PT/OT. discharge planning.
Seen and examined on AM rounds.   Alert, oriented, appropriately interactive; no focal neuro deficits.   Discussed episodes of gait instability leading to trauma event; was planning on seeing neurologist.   --Neuro consult for assessment  --MMPR  --DVT ppx  --Downgrade from SDU

## 2024-04-21 NOTE — OCCUPATIONAL THERAPY INITIAL EVALUATION ADULT - LEVEL OF INDEPENDENCE: TOILET, REHAB EVAL
as per clinical observation- declined attempt during evaluation/minimum assist (75% patients effort)

## 2024-04-21 NOTE — PHYSICAL THERAPY INITIAL EVALUATION ADULT - PERTINENT HX OF CURRENT PROBLEM, REHAB EVAL
Patient was walking her dog when she fell and hit her head, no LOC. At OSH patient with headache, imaging showed small bifrontal contusions and small SDH, R occipital fracture, patient transferred to [facility] for neurosurgical evaluation. On exam, patient with L hand pain and found to have L 4th phalanx fracture, s/p ortho evaluation, nonoperative management. In the ICU, patient had tachycardia and hypertension, it was elucidated that she has been drinking EtOH at home and taking xanax, home xanax restarted with improvement of tachycardia and hypertension.

## 2024-04-21 NOTE — PROGRESS NOTE ADULT - SUBJECTIVE AND OBJECTIVE BOX
Subjective: patient evaluated and examined at the bedside. No overnight events. Patient continues to c/o generalized pain requesting more pain medications.     MEDICATIONS  (STANDING):  amLODIPine   Tablet 10 milliGRAM(s) Oral daily  buPROPion XL (24-Hour) . 150 milliGRAM(s) Oral daily  DULoxetine 60 milliGRAM(s) Oral daily  enoxaparin Injectable 30 milliGRAM(s) SubCutaneous every 12 hours  hydroxychloroquine 200 milliGRAM(s) Oral daily  levETIRAcetam 1000 milliGRAM(s) Oral two times a day  methocarbamol 750 milliGRAM(s) Oral three times a day  nortriptyline 10 milliGRAM(s) Oral two times a day  potassium chloride    Tablet ER 40 milliEquivalent(s) Oral daily  pregabalin 200 milliGRAM(s) Oral every 8 hours    MEDICATIONS  (PRN):  acetaminophen   IVPB .. 1000 milliGRAM(s) IV Intermittent every 6 hours PRN Mild Pain (1 - 3), Moderate Pain (4 - 6), Severe Pain (7 - 10)  ALPRAZolam 0.25 milliGRAM(s) Oral three times a day PRN Anxiety      Vital Signs Last 24 Hrs  T(C): 36.4 (20 Apr 2024 19:30), Max: 37.8 (20 Apr 2024 04:14)  T(F): 97.5 (20 Apr 2024 19:30), Max: 100 (20 Apr 2024 04:14)  HR: 90 (20 Apr 2024 22:47) (84 - 109)  BP: 155/80 (20 Apr 2024 22:47) (148/97 - 172/96)  BP(mean): 112 (20 Apr 2024 16:00) (109 - 124)  RR: 18 (20 Apr 2024 22:47) (14 - 26)  SpO2: 97% (20 Apr 2024 22:47) (95% - 99%)    Parameters below as of 20 Apr 2024 22:47  Patient On (Oxygen Delivery Method): room air        Physical Exam:    Constitutional: NAD, laying comfortably in bed   Respiratory: Respirations non-labored, no accessory muscle use  Extremities: LUE in cast no signs of cyanosis       LABS:                        13.4   6.50  )-----------( 201      ( 20 Apr 2024 07:00 )             39.6     04-20    135  |  98  |  4.3<L>  ----------------------------<  126<H>  4.3   |  26.0  |  0.45<L>    Ca    8.5      20 Apr 2024 07:00  Phos  2.3     04-20  Mg     2.2     04-20        Urinalysis Basic - ( 20 Apr 2024 07:00 )    Color: x / Appearance: x / SG: x / pH: x  Gluc: 126 mg/dL / Ketone: x  / Bili: x / Urobili: x   Blood: x / Protein: x / Nitrite: x   Leuk Esterase: x / RBC: x / WBC x   Sq Epi: x / Non Sq Epi: x / Bacteria: x    A: 64F with PMH autoimmune disease (idiopathic connective tissue disease), peripheral neuropathy, MI, CVA, GERD, seizures (2/2 hyponatremia), who presented to the ER as a transfer from OSH after a fall. Patient was walking her dog when she fell and hit her head, no LOC. At OSH patient with headache, imaging showed small bifrontal contusions and small SDH, R occipital fracture, patient transferred to [facility] for neurosurgical evaluation. On exam, patient with L hand pain and found to have L 4th phalanx fracture, s/p ortho evaluation, nonoperative management. In the ICU, patient had tachycardia and hypertension, it was elucidated that she has been drinking EtOH at home and taking xanax, home xanax restarted with improvement of tachycardia and hypertension. Patient was downgraded from ICU yesterday, readmitted overnight for lethargy. Repeat CTH stable. Pt likely lethargic from xanax. Pt now AAOx3, no lethargic, xanax dose decreased. Pt remains HD stable, complaining of uncontrolled pain however remains in no acute distress. Positive PTSD screen - psych consulted, recommended social work o/p referrals as long as patient is not having any PTSD symptoms.       Plan:   - Continue Q4H neurochecks  - Cont xanax, monitor for any AMS/dec respiratory drive   - Monitor for withdrawal symptoms/CIWA monitoring   - MM pain control   - Continue Keppra   - monitor vitals  - encourage IS and OOB as vince, aggressive pulm toileting   - diet: DASH/TLC   - f/u social work for o/p referrals s/p positive PTSD screen   - f/u AM labs, replete lytes prn   - dvt ppx: Lov, SCDs  - ortho: non-op management of regarding age-indeterminate R lesser trochanteric fracture, NWB LUE with splint in place  - Patient with known subacute T6 vertebral body frx, known by patient and has brace at home  - dispo pending PT/OT eval

## 2024-04-21 NOTE — OCCUPATIONAL THERAPY INITIAL EVALUATION ADULT - PERTINENT HX OF CURRENT PROBLEM, REHAB EVAL
As per MD note: 64F with PMH autoimmune disease (idiopathic connective tissue disease), peripheral neuropathy, MI, CVA, GERD, seizures (2/2 hyponatremia), who presented to the ER as a transfer from OSH after a fall. Patient was walking her dog when she fell and hit her head, no LOC. At OSH patient with headache, imaging showed small bifrontal contusions and small SDH, R occipital fracture, patient transferred to [facility] for neurosurgical evaluation. On exam, patient with L hand pain and found to have L 4th phalanx fracture, s/p ortho evaluation, nonoperative management. In the ICU, patient had tachycardia and hypertension, it was elucidated that she has been drinking EtOH at home and taking xanax, home xanax restarted with improvement of tachycardia and hypertension. Patient was downgraded from ICU yesterday, readmitted overnight for lethargy. Repeat CTH stable. Pt likely lethargic from xanax. Pt now AAOx3, no lethargic, xanax dose decreased. Pt remains HD stable, complaining of uncontrolled pain however remains in no acute distress. Positive PTSD screen - psych consulted, recommended social work o/p referrals as long as patient is not having any PTSD symptoms.

## 2024-04-22 NOTE — ED ADULT TRIAGE NOTE - SOURCE OF INFORMATION
Name: Ranjith Naidu      : 1975      MRN: [id number]  Encounter Provider: ELI Judd DO  Encounter Date: 2024   Encounter department: [facility]    Assessment & Plan     Assessment & Plan  1. Routine wellness examination.    2. Psychogenic air hunger.  The patient is under the care of [facility].    3. Pure hypercholesterolemia.  The patient's cholesterol levels have shown improvement, with a target cholesterol level below 200.    4. Vitamin D insufficiency.  The patient will maintain his current regimen of 4000 International Units daily.    5. Iron deficiency anemia.  This condition is no longer a concern for the patient. Iron studies were conducted in , which yielded adequate results. The patient's CBC is essentially identical, thus no repetition is deemed necessary.    6. Family history of multiple cancers.  The patient's mother passed away from colon cancer that metastasized to the brain at the age of 44. The patient's father is living with 5 cancers. The patient has been advised to undergo DNA testing through [facility].     No orders of the defined types were placed in this encounter.      Subjective      History of Present Illness  The patient presents for an annual physical and review of labs.    The patient reports a general sense of well-being. He is currently not on any medication. He has made dietary modifications, including the elimination of processed foods and sugar, and incorporating healthier fats into his diet. He engages in regular physical activity, playing basketball thrice weekly and lifting weights thrice weekly.    The patient is currently on a regimen of cholecalciferol 4000 International Units daily, although he occasionally misses doses. He expresses concern about potential malabsorption due to food consumption.    The patient experiences intermittent episodes of dyspnea at rest, which have been occurring for the past year. He is uncertain if these  "episodes are related to his dietary habits or stress. Despite these symptoms, he maintains an active lifestyle, engaging in basketball thrice weekly and weightlifting thrice weekly, without any associated symptoms.    Supplemental Information  He gets colonoscopies every 3 years. His last colonoscopy had no polyps.   He is a  at [employer], works on a computer all day. He has 2 daughters. He used to smoke occasionally in the early , but never more than 20 years. He drinks alcohol socially, but starting 2024, he had a couple of drinks. He does not use marijuana.   His mother  of cancer. His father has had 5 cancers, and he had a stent put in his heart. His mother's sister  when she was younger. His father had kidney cancer. His mother and father both had colon cancer. His mother  of brain cancer at 44.  Review of Systems   Constitutional:  Negative for chills and fever.   HENT:  Negative for ear pain and sore throat.    Eyes:  Negative for pain and visual disturbance.   Respiratory:  Negative for cough and shortness of breath.    Cardiovascular:  Negative for chest pain and palpitations.   Gastrointestinal:  Negative for abdominal pain and vomiting.   Genitourinary:  Negative for dysuria and hematuria.   Musculoskeletal:  Negative for arthralgias and back pain.   Skin:  Negative for color change and rash.   Neurological:  Negative for seizures and syncope.   All other systems reviewed and are negative.        Objective     /80 (BP Location: Left arm, Patient Position: Sitting, Cuff Size: Large)   Pulse 60   Temp 97.7 °F (36.5 °C) (Temporal)   Resp 16   Ht 5' 11.75\" (1.822 m)   Wt 106 kg (233 lb)   SpO2 96%   BMI 31.82 kg/m²     Physical Exam    Physical Exam  Vitals and nursing note reviewed.   Constitutional:       General: He is not in acute distress.     Appearance: Normal appearance. He is well-developed. He is not ill-appearing, toxic-appearing or " diaphoretic.   HENT:      Head: Normocephalic and atraumatic.      Right Ear: Tympanic membrane normal.      Left Ear: Tympanic membrane normal.      Nose: Nose normal.      Mouth/Throat:      Mouth: Mucous membranes are moist.   Eyes:      Pupils: Pupils are equal, round, and reactive to light.   Neck:      Trachea: No tracheal deviation.   Cardiovascular:      Rate and Rhythm: Normal rate and regular rhythm.      Pulses: Normal pulses.      Heart sounds: Normal heart sounds.   Pulmonary:      Effort: Pulmonary effort is normal.      Breath sounds: Normal breath sounds. No wheezing, rhonchi or rales.   Abdominal:      General: Abdomen is flat.      Palpations: Abdomen is soft.   Musculoskeletal:         General: Normal range of motion.      Cervical back: Normal range of motion and neck supple.   Lymphadenopathy:      Cervical: No cervical adenopathy.   Skin:     General: Skin is warm and dry.   Neurological:      General: No focal deficit present.      Mental Status: He is alert and oriented to person, place, and time. Mental status is at baseline.   Psychiatric:         Mood and Affect: Mood normal.         Behavior: Behavior normal.         Thought Content: Thought content normal.         Judgment: Judgment normal.       I personally reviewed the recent (and prior)  lab results, the image studies, pathology, other specialty/physicians consult notes and recommendations, and outside medical records from other institutions, as appropriate. I had a lengthy discussion with the patient and shared the work-up findings. We discussed the diagnosis and management plan.  I spent appropriate time with the medical record and patient,  commensurate with the level of service reviewing the records (labs, clinician notes, outside records, medical history, ordering medicine/tests/procedures, interpreting the imaging/labs previously done) and coordination of care as well as direct time with the patient today, of which greater than  50% of the time was spent in counseling and coordination of care with the patient/family.       Patient

## 2024-05-06 ENCOUNTER — EMERGENCY (EMERGENCY)
Facility: HOSPITAL | Age: 64
LOS: 1 days | Discharge: HOME | End: 2024-05-06
Attending: EMERGENCY MEDICINE | Admitting: STUDENT IN AN ORGANIZED HEALTH CARE EDUCATION/TRAINING PROGRAM
Payer: MEDICARE

## 2024-05-06 VITALS
WEIGHT: 115.08 LBS | OXYGEN SATURATION: 99 % | HEART RATE: 69 BPM | DIASTOLIC BLOOD PRESSURE: 76 MMHG | SYSTOLIC BLOOD PRESSURE: 124 MMHG | RESPIRATION RATE: 18 BRPM | HEIGHT: 62 IN | TEMPERATURE: 98 F

## 2024-05-06 DIAGNOSIS — Z98.890 OTHER SPECIFIED POSTPROCEDURAL STATES: Chronic | ICD-10-CM

## 2024-05-06 DIAGNOSIS — Z90.721 ACQUIRED ABSENCE OF OVARIES, UNILATERAL: Chronic | ICD-10-CM

## 2024-05-06 DIAGNOSIS — Z85.118 PERSONAL HISTORY OF OTHER MALIGNANT NEOPLASM OF BRONCHUS AND LUNG: Chronic | ICD-10-CM

## 2024-05-06 DIAGNOSIS — Z96.641 PRESENCE OF RIGHT ARTIFICIAL HIP JOINT: Chronic | ICD-10-CM

## 2024-05-06 DIAGNOSIS — Z90.49 ACQUIRED ABSENCE OF OTHER SPECIFIED PARTS OF DIGESTIVE TRACT: Chronic | ICD-10-CM

## 2024-05-06 PROCEDURE — 99283 EMERGENCY DEPT VISIT LOW MDM: CPT

## 2024-05-06 PROCEDURE — 99282 EMERGENCY DEPT VISIT SF MDM: CPT

## 2024-05-06 RX ORDER — DULOXETINE HYDROCHLORIDE 30 MG/1
5 CAPSULE, DELAYED RELEASE ORAL
Refills: 0 | DISCHARGE

## 2024-05-06 NOTE — ED PROVIDER NOTE - PHYSICAL EXAMINATION
GEN: No acute distress; lying in bed comfortably  NEURO: A&O x 3 no focal deficit  HEENT: No new lesions; prior to fall on 4/17  HEART: Normal rate & Regular rhythm  LUNGS: Normal respiratory effort, CTAB.   ABD: Soft, non-tender, non-distended.  SKIN: Left arm splinted for fracture of l. 4th phalanx

## 2024-05-06 NOTE — ED ADULT NURSE NOTE - OBJECTIVE STATEMENT
Pt came from home requesting med refill for oxycodone. Pt states that on 4/17 she fell down 6 steps and was evaluated in the ER, found to have a small bifrontal contusions and small SDH, R occipital fracture, and a left 4th phalanx fracture. PMH chronic pain, opiate dependence and lung ca. Pt stating that she was recently seen at [facility] where she was prescribed 6 oxycodone pills for pain, but has now run out of her meds and is unable to manage pain at home with OTC meds. C/o 7/10 pain.

## 2024-05-06 NOTE — ED PROVIDER NOTE - NSFOLLOWUPINSTRUCTIONS_ED_ALL_ED_FT
1.  Take Motrin 600mg every 6 hours for 5 days with meal.  2.  Take Tylenol 650mg every 6 hours for 5 days.

## 2024-05-06 NOTE — ED PROVIDER NOTE - CLINICAL SUMMARY MEDICAL DECISION MAKING FREE TEXT BOX
65 y/o female w/ PMHx of depression, chronic pain, opidate dependency, presenting to the ED after running out of pain medication prescribed after a recent trauma on 4/17 for which the patient sustained a SDH, concussion and left hand fracture of her 4th phalanx. 63 y/o female w/ PMHx of depression, chronic pain, opidate dependency, presenting to the ED after running out of pain medication prescribed after a recent trauma on 4/17 for which the patient sustained a SDH, concussion and left hand fracture of her 4th phalanx.    DT: I have personally performed a face to face diagnostic evaluation on this patient.  I have reviewed the PA's/resident's/PA student's/NP's note and agree with the history, exam, and plan of care, except as noted.  History and Exam by me shows 63 y/o female w/ PMH of chronic pain, depression, opiate dependence presenting to the ED for head pain. Patient states on 4/17 she fell down 6 steps and was evaluated in the ER, found to have a small bifrontal contusions and small SDH, R occipital fracture, and a left 4th phalanx fracture. Patient states she went to [facility] ER who did not prescribe her any medication, but [facility] gave her a prescription, for which she had run out off. Complaining of 7/10 back of the head pain that worsens with movement. .  Patient is NAD.  A n O x 3. Head NC/AT. Skin- normal. Ext- FROM actively,  ambulating s any difficulty.  Labs and ct  and cxr were unremarkable.

## 2024-05-06 NOTE — ED PROVIDER NOTE - PROGRESS NOTE DETAILS
Patient has left arm and a splint and will follow-up with her hand surgeon tomorrow.  Patient is here for the back of the head since head trauma on April 17, 2024.  Patient wanted prescription for Percocet for 3 days.  Patient had prescription prescribed by Dr. Kamron gonzalez on May 2 for a 2-day supply.  Informed patient to take Tylenol 650 mg Patient has a left arm splint and will follow-up with her hand surgeon tomorrow.  Patient is here for the back of the head since head trauma on April 17, 2024.  Patient wanted prescription for Percocet for 3 days.  Patient had prescription prescribed by Dr. Kamron gonzalez on May 2 for a 2-day supply.  Informed patient to take Tylenol 650 mg Every 6 hours for 5 days as needed for pain.  Also advised her to take Motrin every 600 mg every 6 hours for 5 days as needed for pain as well.  Patient defer CT of head and blood work.

## 2024-05-06 NOTE — ED PROVIDER NOTE - NS ED ATTENDING STATEMENT MOD
I have seen and examined this patient and fully participated in the care of this patient as the teaching attending.  The service was shared with the ASHLY.  I reviewed and verified the documentation.

## 2024-05-06 NOTE — ED ADULT NURSE NOTE - NSFALLCONCLUSION_ED_ALL_ED
>>ASSESSMENT AND PLAN FOR ESSENTIAL HYPERTENSION WRITTEN ON 12/3/2023  2:40 PM BY LISE DRISCOLL MD    Chronic, stable.  Meds include lisinopril 10 mg daily and & Lopressor 25 mg BID. Lisinopril was held in the setting of ENMA and soft BP.  Creatinine trending down and is 1.32 on discharge.       Plan  Continue home Lopressor .  Continue with lisinopril 10 mg daily   Fall with Harm Risk

## 2024-05-06 NOTE — ED ADULT NURSE NOTE - CHIEF COMPLAINT QUOTE
Pt c/o pain left hand/back of head, s/p fall 4/17, seen at [facility] and was prescribed with Oxycodone and ran out pain med, stated fracture os left 4th finger

## 2024-05-06 NOTE — ED ADULT NURSE NOTE - NSFALLHARMRISKINTERV_ED_ALL_ED

## 2024-05-06 NOTE — ED PROVIDER NOTE - PATIENT PORTAL LINK FT
You can access the FollowMyHealth Patient Portal offered by [facility] by registering at the following website: [email or web address]. By joining [facility]’s FollowMyHealth portal, you will also be able to view your health information using other applications (apps) compatible with our system.

## 2024-05-06 NOTE — ED ADULT NURSE NOTE - PLAN OF CARE
Call bell Rituxan Pregnancy And Lactation Text: This medication is Pregnancy Category C and it isn't know if it is safe during pregnancy. It is unknown if this medication is excreted in breast milk but similar antibodies are known to be excreted.

## 2024-05-06 NOTE — ED ADULT TRIAGE NOTE - NS ED NURSE BANDS TYPE
Anesthetic History   No history of anesthetic complications            Review of Systems / Medical History  Patient summary reviewed and pertinent labs reviewed    Pulmonary        Sleep apnea (likely by history, no sleep study): No treatment  Undiagnosed apnea         Neuro/Psych   Within defined limits           Cardiovascular      Valvular problems/murmurs (severe AS 0.9 QIAN)      Dysrhythmias       Exercise tolerance: <4 METS  Comments: AVR in A fib   GI/Hepatic/Renal  Within defined limits              Endo/Other  Within defined limits           Other Findings              Physical Exam    Airway  Mallampati: I  TM Distance: 4 - 6 cm  Neck ROM: normal range of motion, short neck   Mouth opening: Normal     Cardiovascular    Rhythm: regular  Rate: normal    Murmur: Grade 1, Aortic area     Dental  No notable dental hx       Pulmonary  Breath sounds clear to auscultation               Abdominal         Other Findings            Anesthetic Plan    ASA: 3  Anesthesia type: general          Induction: Intravenous  Anesthetic plan and risks discussed with: Patient
Name band;

## 2024-05-06 NOTE — ED PROVIDER NOTE - CARE PROVIDER_API CALL
Ozzy Barcenas  Neurology  [address], [address]  [place] [address]  Phone: [phone]  Fax: [phone]  Follow Up Time: 1-3 Days

## 2024-05-06 NOTE — ED PROVIDER NOTE - OBJECTIVE STATEMENT
65 y/o female w/ PMH of chronic pain, depression, opiate dependence presenting to the ED for head pain. Patient states on 4/17 she fell down 6 steps and was evaluated in the ER, found to have a small bifrontal contusions and small SDH, R occipital fracture, and a left 4th phalanx fracture. Patient states she went to [facility] ER who did not prescribe her any medication, but [facility] gave her a prescription, for which she had run out off. Complaining of 7/10 back of the head pain that worsens with movement.

## 2024-05-06 NOTE — ED ADULT TRIAGE NOTE - CHIEF COMPLAINT QUOTE
Pt c/o pain left hand/back of head, s/p fall 4/17, seen at [facility] and was prescribed with Oxycodone and ran out pain med, stated fracture os left 4th finger

## 2024-05-09 NOTE — CHART NOTE - NSCHARTNOTEFT_GEN_A_CORE
Based on review of the medical record in particular the CT scan report, the patient had vasogenic edema on head CT scan with a 4 to 5 mm hemorrhagic contusion in the left frontal lobe with vasogenic edema and a 2.5 x 1 cm hemorrhagic contusion of the anterior inferior right frontal lobe with vasogenic edema.
Drug utilization report, Reference #: [id number]        Patient Demographic Information (PDI)       PDI	First Name	Last Name	Birth Date	Gender	Street Address	City	State	Zip Code  ADRIANO Olsen	1960	Female	[address] 	[facility]	[address]    Prescription Information      PDI Filter:    PDI	Current Rx	Drug Type	Rx Written	Rx Dispensed	Drug	Quantity	Days Supply	Prescriber Name	Prescriber GALO #	Payment Method	Dispenser  A	Y	B	04/01/2024	04/01/2024	alprazolam 0.25 mg tablet	90	30	Jon Martin MD	[id number]	Insurance	[facility]	N		03/26/2024	03/28/2024	pregabalin 200 mg capsule	60	20	Irma Garcia	[id number]	Insurance	[facility]	B	02/28/2024	03/04/2024	alprazolam 0.25 mg tablet	90	30	GalJon madrid MD	[id number]	Insurance	[facility]	N		02/06/2024	02/26/2024	pregabalin 200 mg capsule	60	20	Irma Garcia	[id number]	Insurance	[facility]	B	02/05/2024	02/06/2024	alprazolam 0.25 mg tablet	90	30	GalJon madrid MD	[id number]	Insurance	[facility]	N	O	02/02/2024	02/03/2024	oxycodone-acetaminophen 5-325 mg tab	6	2	Shanthi Orozco	[id number]	Insurance	[facility]	N		01/12/2024	01/29/2024	pregabalin 200 mg capsule	60	30	Lizabeth Sutherland [id number]	Insurance	[facility]  A	N	O	01/12/2024	01/15/2024	buprenorphine-naloxone 2-0.5 mg sl tablet	30	30	Masood Jean-Baptiste	[id number]	Insurance	[facility]	N	O	01/12/2024	01/12/2024	oxycodone hcl (ir) 5 mg tablet	8	2	[facility]	[id number]	Insurance	[facility]	N	B	01/03/2024	01/03/2024	alprazolam 0.25 mg tablet	90	30	GalJon madrid MD	[id number]	Insurance	[facility]  A	N		12/12/2023	12/27/2023	pregabalin 200 mg capsule	60	30	Irma Garcia	[id number]	Insurance	[facility]  A	N	O	12/12/2023	12/14/2023	buprenorphine-naloxone 4-1 mg sl film	30	30	Irma Garcia	[id number]	Insurance	[facility]  A	N	B	12/05/2023	12/06/2023	alprazolam 0.25 mg tablet	90	30	GalJon madrid MD	[id number]	Insurance	[facility]  A	N		11/15/2023	12/02/2023	pregabalin 200 mg capsule	60	20	TeaganManjula campbell	[id number]	Insurance	[facility]  A	N	O	11/15/2023	11/17/2023	buprenorphine-naloxone 4-1 mg sl film	30	30	Masood Jean-Baptiste	[id number]	Insurance	[facility]  A	N		11/07/2023	11/08/2023	pregabalin 200 mg capsule	60	20	Irma Garcia	[id number]	Insurance	[facility]  A	N	B	11/08/2023	11/08/2023	alprazolam 0.25 mg tablet	90	30	GalJon madrid MD	[id number]	Insurance	[facility]  A	N	O	10/12/2023	10/18/2023	buprenorphine-naloxone 4-1 mg sl film	30	30	Masood Jean-Baptiste	[id number]	Insurance	[facility]  A	N	B	10/10/2023	10/11/2023	alprazolam 0.25 mg tablet	90	30	GalJon madrid MD	[id number]	Insurance	[facility]  A	N		09/18/2023	09/18/2023	pregabalin 200 mg capsule	60	20	Irma Garcia	[id number]	Insurance	[facility]  A	N	O	09/18/2023	09/18/2023	buprenorphine-naloxone 4-1 mg sl film	30	30	Irma Garcia	[id number]	Insurance	[facility]  A	N	B	09/11/2023	09/13/2023	alprazolam 0.25 mg tablet	90	30	GalJon madrid MD	[id number]	Insurance	[facility]  A	N	O	08/21/2023	08/22/2023	buprenorphine-naloxone 4-1 mg sl film	30	30	Irma Garcia	[id number]	Insurance	[facility]  A	N		08/21/2023	08/22/2023	pregabalin 200 mg capsule	60	20	Irma Garcia	[id number]	Insurance	[facility]  A	N	B	08/14/2023	08/16/2023	alprazolam 0.25 mg tablet	90	30	GalJon madrid MD	[id number]	Insurance	[facility]  A	N		07/17/2023	07/21/2023	pregabalin 200 mg capsule	60	30	Irma Garcia	[id number]	Insurance	[facility]  A	N	O	07/17/2023	07/18/2023	buprenorphine-naloxone 4-1 mg sl film	30	30	Irma Garcia	[id number]	Insurance	[facility]  A	N	B	07/18/2023	07/18/2023	alprazolam 0.25 mg tablet	90	30	GalJon madrid MD	[id number]	Insurance	[facility]  A	N		06/20/2023	06/23/2023	pregabalin 200 mg capsule	60	30	Irma Garcia	[id number]	Insurance	[facility]  A	N	B	06/19/2023	06/20/2023	alprazolam 0.25 mg tablet	90	30	GalakJon MD	[id number]	Insurance	[facility]  A	N	O	06/19/2023	06/20/2023	buprenorphine-naloxone 4-1 mg sl film	30	30	Irma Garcia	[id number]	Insurance	[facility]  A	N	O	05/22/2023	05/23/2023	buprenorphine-naloxone 4-1 mg sl film	30	30	Irma Garcia	[id number]	Insurance	[facility]  A	N	B	05/22/2023	05/23/2023	alprazolam 0.25 mg tablet	90	30	GalJon madrid MD	[id number]	Insurance	[facility]  A	N	O	04/24/2023	04/25/2023	buprenorphine-naloxone 4-1 mg sl film	30	30	Irma Garcia	[id number]	Insurance	[facility]  A	N	B	04/25/2023	04/25/2023	alprazolam 0.25 mg tablet	90	30	Jon Martin MD	[id number]	[facility]
Patient is a 64yoF with PMH autoimmune disease (idiopathic connective tissue disease), peripheral neuropathy, MI, CVA, GERD, seizures (2/2 hyponatremia), who presented to the ER as a transfer from OSH after a fall. Patient was walking her dog when she fell and hit her head, no LOC. At OSH patient with headache, imaging showed small bifrontal contusions and small SDH, R occipital fracture, patient transferred to [facility] for neurosurgical evaluation. Patient admitted to SICU for q1h neurochecks. On exam, patient with L hand pain and found to have L 4th phalanx fracture, s/p ortho evaluation, nonoperative management. In the ICU, patient had tachycardia and hypertension, it was elucidated that she has been drinking EtOH at home and taking xanax, home xanax restarted with improvement of tachycardia and hypertension.     mall Bifrontal Contusions and small SDH      anterior inferior   BILATERAL frontal lobes, RIGHT greater than LEFT. Slight decrease in   degree of subdural hemorrhage along the tentorium, RIGHT greater than   LEFT. Unchanged tiny LEFT chronic subdural hematoma/hygroma.        Vitals:  Vital Signs Last 24 Hrs  T(C): 36.8 (19 Apr 2024 11:00), Max: 37.2 (18 Apr 2024 15:41)  T(F): 98.2 (19 Apr 2024 11:00), Max: 98.9 (18 Apr 2024 15:41)  HR: 96 (19 Apr 2024 14:00) (86 - 125)  BP: 131/78 (19 Apr 2024 14:00) (109/71 - 194/111)  BP(mean): 92 (19 Apr 2024 14:00) (84 - 152)  RR: 14 (19 Apr 2024 14:00) (14 - 30)  SpO2: 100% (19 Apr 2024 14:00) (73% - 100%)    Parameters below as of 19 Apr 2024 12:00  Patient On (Oxygen Delivery Method): room air        Labs:  04-19    134<L>  |  97  |  7.9<L>  ----------------------------<  112<H>  4.5   |  21.0<L>  |  0.53    Ca    8.8      19 Apr 2024 03:15  Phos  3.2     04-19  Mg     2.6     04-19    TPro  6.9  /  Alb  4.2  /  TBili  0.8  /  DBili  x   /  AST  127<H>  /  ALT  114<H>  /  AlkPhos  100  04-17                            16.3   7.36  )-----------( 264      ( 19 Apr 2024 03:15 )             46.8       Exam:  Gen: pt lying in bed, alert, in NAD  Resp: unlabored on room air  CVS: RRR  Abd: soft, NT, ND  Ext: moving all extremities spontaneously, sensation intact, pulses 2+, LUE splint, mild back tenderness    Plan:  Neuro: pain control PRN, continue home keppra, q4h neurochecks, CIWA monitoring  CVS: monitor BP/HR, continue home amlodipine  Resp: monitor RR/So2  GI: diet  : monitor electrolytes and replete as needed, strict Is/Os, continue D5LR for starvation ketosis, continue home xanax  Heme: monitor CBC, lovenox 40q12h  ID: monitor temperature, continue home plaquenil  Endo: monitor glucose   MSK: f/u ortho regarding age-indeterminate R lesser trochanteric fracture, NWB LUE, f/u PT. Patient with known subacute T6 vertebral body frx, known by patient and has brace at home  - Tertiary [ x ]  Date:: 4/18/24  - PTSD  [  ]  Date: pending  - SBIRT [ x ]  Date: 4/18/24  - Geriatric consult [  ]  Date: n/a  - GOC [ x ]  Date: 4/17    - Geriatric consult [  ]  Date:_____  - GOC [ x ]  Date: 4/17/24
Patient re-evaluated this AM is now alert, oriented and conversant upon entering the room. Patient states "I feel much more awake today". She continues to complain of HA and Ofirmev was ordered. Remains hypertensive to 170's. Amlodipine was increased from 5 mg to 10 mg.
Patient was seen by PT however was pending progress with stairs. patient does not want to wait to work with stairs and wants to leave AMA. risks explained. AMA paperwork signed
Please see previous event note for events on the floor.    Patient was downgraded from SICU level of care yesterday evening and was found to be lethargic on the floor. She had also received her Xanax and IV dilaudid. She is arousable to verbal stimulation and able to follow commands for a brief period of time, however then begins to slur her words and fall back asleep again. Will follow commands once aroused again. Maintaining her airway on her own. Vital signs stable. . CT head performed and discussed with [facility] radiologist who read scan as unchanged from previous at 9 am on 4/19. Patient will be upgraded to stepdown unit for closer monitoring and will re-evaluate dosing of home xanax now that it is in combination with opiates for pain control. Discussed with Dr. Hylton.
Tertiary Trauma Survey (TTS)    Date of TTS: 04-18-24 @ 14:03                             Admit Date: 04-17-24 @ 19:43      Trauma Activation: B    List Injuries Identified to Date: SDH, bifrontal contusions, L 4th phalanx fracture, R occipital bone fx    List Operative and Interventional Radiological Procedures:       - PTSD  [  ]  Date:_____    - SBIRT [x ]  Date: 4/18    - Geriatric consult [  ]  Date:_____    - GOC [ x]  Date: 4/17      Physical Exam:    Neuro: No focal deficits, GCS 15, A&Ox3    HEENT: dried blood noted on posterior scalp with no active bleeding. no laceration seen, small abrasion noted, other wise normocephalic, moist mucous membranes, trachea midline     Pulm/Chest: no chest wall tenderness, equal and bilateral chest rise, normal respiratory effort, saturating well on monitor     Cardiac: RRR    GI / Abdomen: soft, non-tender, non-distended, no rebound/guarding     Musculoskeletal / Extremities: MENA, strength 5/5 in all 4 extremities     Back: T spine tenderness to palpation     Integumentary: LLE bruising, otherwise skin is intact       RADIOLOGICAL FINDINGS REVIEW:  Pelvic Xray: Age indeterminate small cortical avulsed fracture of the RIGHT lesser trochanter. RIGHT Hip prosthesis and remaining osseous and joint structures of the pelvis are radiographically intact.    Left wrist. 3 views: There is an old fracture deformity of the distal shaft of the fifth metacarpal. There is mild degeneration around the trapezium. No acute fracture.    Left hand. 3 views. 4 images: There is a fracture through the proximal shaft of the proximal phalanx of the fourth finger.    CT head: Traumatic brain injury with multiple hemorrhagic contusions, subarachnoid hemorrhage and subdural hemorrhage is grossly stable from 04/17/2024 2:45 PM. Re-demonstration of right occipital calvarial fracture. Punctate foci of pneumocephalus in the left middle cranial fossa.    Rpt CTH: 1. A low-attenuation subdural collection overlying the left frontal convexity, measuring up to 4 mm is more conspicuous compared to the prior exam. This may represent posttraumatic hygroma. Hemorrhagic contusions in the bilateral frontal and temporal regions, subarachnoid hemorrhage and small subdural hemorrhages are otherwise grossly stable 04/17/2024 at 8:53 PM.  2. Redemonstration of right occipital calvarial fracture. Punctate foci of pneumocephalus in the left middle cranial fossa.    CERVICAL SPINE CT: No evidence of an acute cervical spine fracture    CT T spine: There is a moderate thoracolumbar levoscoliosis centered at T12-L1. No significant subluxation  There is a severe subacute appearing compression deformity of the T6 vertebral body with associated sclerosis. Minimal retropulsion towards the inferior endplate without significant spinal canal narrowing.  Mild chronic appearing loss of height of the inferior endplate of T9.  Moderate chronic appearing loss of height of the superior and inferior endplates of T10 greatest towards the left. This has occurred in the interim from 2020. Mild chronic superior endplate deformity of T11. Severe chronic appearing compression deformity superior greater than inferior endplates of T12 eccentric to the left which has worsened as compared to 2020. Mild bony ridges towards the superior and inferior endplates without significant spinal canal narrowing. Chronic appearing superior central endplate deformity of L1 eccentric to the right which appears grossly stable. Severe chronic appearing superior greater than inferior endplate deformity of L2 with the superior component occurring in the interim from 2020. Mild chronic appearing compression deformity inferior endplate of L4 which has occurred in the interim from 2020.      INCIDENTAL FINDINGS:    [ x] No  - Pt was aware of the old and subacute Thoracic fractures. States she has a brace at home for comfort       [x] Tertiary exam complete, there are no new injuries identified    - Pt will get a 24 hour stability CT head non-con tomorrow at 9AM    [ ] Tertiary exam done, new injuries identified are:                [ ] Imaging ordered:
Called to bedside by RN with report of patient's worsened lethargy x 15 min. Per RN patient keeps falling asleep while answering questions and speaking, also mumbling and speaking incoherently. Patient otherwise without acute complaints of pain. However, reports that "she is spinning" when asked about symptoms of dizziness/lightheadedness. RN states that patient was very lethargic prior to admin of home dose Xanax. CT head 4/18, 4/19 stable.     Physical examination:   General - A&Ox3, lethargic laying in bed   HEENT - PERRL, EOMI without nystagmus   Respiratory - in no acute respiratory distress on 2L NC with SPO2 99%   Neuro - patient arousable to speech and will follow commands only momentarily before closing eyes, sensation intact BL upper and lower extremities, strength intact BL upper extremities, limited strength examination of LUE d/t cast; GCS 13 [id number]     Finger stick BS - 139     Called neurosurgery team to evaluate patient at bedside. Neuro examination performed by team, recommended STAT CT head w/o contrast. Advised if CT head stable to avoid sedating medications. Will continue to reevaluate the patient and monitor for any worsening mental status. Neurosurgery team to be updated with resulted CT imaging.
Nutrition Note: Aware pt downgraded being from ICU to 3TWR. Chart reviewed; RD to follow up with full nutrition assessment per medical floor protocol.
SICU TRANSFER NOTE  -----------------------------  ICU Admission Date: 04-  Transfer Date: 04-19-24 @ 14:53    Admission Diagnosis: SDH    Active Problems/injuries:   1) SDH / SAH, stable  2) Right occipital fracture  3) Lt 4th phalanx fracture  4) Rt lesser trochanter avulsion fracture (age indeterminate)  5) Subacute T6 vertebral body compression fracture      Procedures: N/A    Consultants:  [ ] Cardiology  [ ] Endocrine  [ ] Infectious Disease  [ ] Medicine  [x]Neurosurgery  [x] Ortho       [ ] Weight Bearing Status: WB LUE  [ ] Palliative       [ ] Advanced Directives:    [ ] Physical Medicine and Rehab       [ ] Disposition :   [ ] Plastics  [ ] Pulmonary    Medications  ALPRAZolam 0.5 milliGRAM(s) Oral daily  ALPRAZolam 0.25 milliGRAM(s) Oral at bedtime  amLODIPine   Tablet 5 milliGRAM(s) Oral daily  buPROPion XL (24-Hour) . 150 milliGRAM(s) Oral daily  dextrose 5% + lactated ringers. 1000 milliLiter(s) IV Continuous <Continuous>  DULoxetine 60 milliGRAM(s) Oral daily  enoxaparin Injectable 40 milliGRAM(s) SubCutaneous every 12 hours  HYDROmorphone  Injectable 0.5 milliGRAM(s) IV Push every 3 hours PRN  hydroxychloroquine 200 milliGRAM(s) Oral daily  levETIRAcetam 1000 milliGRAM(s) Oral two times a day  nortriptyline 10 milliGRAM(s) Oral two times a day  oxyCODONE    IR 5 milliGRAM(s) Oral every 4 hours PRN  oxyCODONE    IR 10 milliGRAM(s) Oral every 4 hours PRN  potassium chloride    Tablet ER 40 milliEquivalent(s) Oral daily  pregabalin 200 milliGRAM(s) Oral every 8 hours      [x] I attest I have reviewed and reconciled all medications prior to transfer    IV Fluids  sodium chloride 0.9%.: Solution, 1000 milliLiter(s) infuse at 100 mL/Hr    Indication: Poor PO intake        I have discussed this case with Trauma team upon transfer and all questions regarding ICU course were answered.  The following items are to be followed up:    -Repeat CTH (24 hr scan) this AM stable, OK for Q4 hour per NSGY, remains on home keppra; CIWA monitoring  -Tachycardia and hypertension improved with xanax (home med); continue Amlodipine  -Breathing comfortably on RA, patient has no one who can bring in her home med for pulmonary fibrosis  -Continue scds, started lovenox for prophlyaxis  -Diet as tolerated, but on IVF given poor PO intake yesterday  -NWB LUE and splint for Lt 4th phalanx fracture, 2wk outpatient f/u; f/u ortho recommendations regarding Rt. lesser trochanter avulsion fracture
SICU TRANSFER NOTE  -----------------------------  ICU Admission Date: 04-  Transfer Date: 04-20-24 @ 14:53    Admission Diagnosis: SDH    Active Problems/injuries:   1) SDH / SAH, IPH stable  2) Right occipital fracture  3) Lt 4th phalanx fracture  4) Rt lesser trochanter avulsion fracture (age indeterminate): Noted on prior CT. Ortho states no acute intervention, old injury  5) Subacute T6 vertebral body compression fracture  6) Polysubstance abuse with drug withdrawal syndrome      Consultants:  Ortho  Behavioral health  Neurosurgery    Medications  acetaminophen   IVPB .. 1000 milliGRAM(s) IV Intermittent every 6 hours PRN  ALPRAZolam 0.25 milliGRAM(s) Oral three times a day PRN  amLODIPine   Tablet 10 milliGRAM(s) Oral daily  buPROPion XL (24-Hour) . 150 milliGRAM(s) Oral daily  DULoxetine 60 milliGRAM(s) Oral daily  enoxaparin Injectable 30 milliGRAM(s) SubCutaneous every 12 hours  hydroxychloroquine 200 milliGRAM(s) Oral daily  levETIRAcetam 1000 milliGRAM(s) Oral two times a day  methocarbamol 750 milliGRAM(s) Oral three times a day  nortriptyline 10 milliGRAM(s) Oral two times a day  potassium chloride    Tablet ER 40 milliEquivalent(s) Oral daily  pregabalin 200 milliGRAM(s) Oral every 8 hours      [x] I attest I have reviewed and reconciled all medications prior to transfer    IV Fluids  sodium chloride 0.9%.: Solution, 1000 milliLiter(s) infuse at 100 mL/Hr    Indication: IVL    Antibiotics:  hydroxychloroquine 200 milliGRAM(s) Oral daily    Indication: Home med      I have discussed this case with MADONNA Salomon with Trauma team upon transfer and all questions regarding ICU course were answered.  The following items are to be followed up:    65 yo Female with PMH autoimmune disease (idiopathic connective tissue disease), peripheral neuropathy, MI, CVA, GERD, seizures (2/2 hyponatremia), who presented to the ER as a transfer from OSH after a fall. Patient was walking her dog when she fell and hit her head, no LOC. At OSH patient with headache, imaging showed small bifrontal contusions and small SDH, R occipital fracture, patient transferred to [facility] for neurosurgical evaluation. Luis exam, patient with L hand pain and found to have L 4th phalanx fracture, s/p ortho evaluation, nonoperative management. In the ICU, patient had tachycardia and hypertension, it was elucidated that she has been drinking EtOH at home and taking xanax, home xanax restarted with improvement of tachycardia and hypertension    Neuro:  SAH, SDH, IPH. Polysubtance abuse   Repeat Head CT overnight stable  Continue q4h neurochecks  Continue home xanax  Continue to monitor for drug withdrawal symptoms  Multimodal pain control  continue home keppra  CIWA monitoring    CVS:   monitor BP/HR,   continue home amlodipine    Resp:   monitor RR/So2  Pulmonary toilet    GI:  Tolerating diet    :   Starvation Ketoacidosis resolved and IVL  Pt is tolerating PO diet  monitor electrolytes and replete as needed   strict Is/Os    Heme:   monitor CBC  Lovenox 30q12h in setting of TBI    ID:   Monitor fever curve    Endo:   monitor glucose     MSK:  Left 4th phalanx fracture  Ortho states non-op management of regarding age-indeterminate R lesser trochanteric fracture,   NWB LUE with splint in place  Patient with known subacute T6 vertebral body frx, known by patient and has brace at home    Dispo:  Floor transfer  - Tertiary: 4/18/24  - PTSD: Screened positive. Awaiting Psyche consult  - SBIRT  Date: 4/18/24

## 2024-05-09 NOTE — CHART NOTE - NSCHARTNOTESELECT_GEN_ALL_CORE
Event Note
Nutrition Services
Transfer Note
Transfer Note
Event Note
Event Note
ISTOP/Event Note
Post-Discharge Note
SICU downgrade/Event Note
Stepdown upgrade/Event Note
Tertiary

## 2024-05-22 ENCOUNTER — OFFICE (OUTPATIENT)
Dept: URBAN - METROPOLITAN AREA CLINIC 102 | Facility: CLINIC | Age: 64
Setting detail: OPHTHALMOLOGY
End: 2024-05-22
Payer: MEDICARE

## 2024-05-22 ENCOUNTER — APPOINTMENT (OUTPATIENT)
Dept: PSYCHIATRY | Facility: CLINIC | Age: 64
End: 2024-05-22

## 2024-05-22 VITALS — HEIGHT: 55 IN

## 2024-05-22 DIAGNOSIS — H43.393: ICD-10-CM

## 2024-05-22 DIAGNOSIS — Z79.899: ICD-10-CM

## 2024-05-22 DIAGNOSIS — H01.001: ICD-10-CM

## 2024-05-22 DIAGNOSIS — H16.223: ICD-10-CM

## 2024-05-22 DIAGNOSIS — H35.362: ICD-10-CM

## 2024-05-22 DIAGNOSIS — H25.13: ICD-10-CM

## 2024-05-22 PROBLEM — M35.9 MIXED CONNECTIVE TISSUE DISORDER: Status: ACTIVE | Noted: 2024-05-22

## 2024-05-22 PROCEDURE — 92083 EXTENDED VISUAL FIELD XM: CPT | Performed by: OPHTHALMOLOGY

## 2024-05-22 PROCEDURE — 68761 CLOSE TEAR DUCT OPENING: CPT | Mod: 50 | Performed by: OPHTHALMOLOGY

## 2024-05-22 PROCEDURE — 92250 FUNDUS PHOTOGRAPHY W/I&R: CPT | Performed by: OPHTHALMOLOGY

## 2024-05-22 PROCEDURE — 92014 COMPRE OPH EXAM EST PT 1/>: CPT | Mod: 25 | Performed by: OPHTHALMOLOGY

## 2024-05-22 ASSESSMENT — CONFRONTATIONAL VISUAL FIELD TEST (CVF)
OD_FINDINGS: FULL
OS_FINDINGS: FULL

## 2024-05-22 ASSESSMENT — LID EXAM ASSESSMENTS
OS_BLEPHARITIS: LLL LUL 1+
OD_BLEPHARITIS: RLL RUL 1+

## 2024-06-26 ENCOUNTER — APPOINTMENT (OUTPATIENT)
Dept: PSYCHIATRY | Facility: CLINIC | Age: 64
End: 2024-06-26

## 2024-06-26 DIAGNOSIS — F41.9 ANXIETY DISORDER, UNSPECIFIED: ICD-10-CM

## 2024-06-26 DIAGNOSIS — G89.29 OTHER CHRONIC PAIN: ICD-10-CM

## 2024-06-26 DIAGNOSIS — R45.89 OTHER SYMPTOMS AND SIGNS INVOLVING EMOTIONAL STATE: ICD-10-CM

## 2024-06-26 DIAGNOSIS — M79.605 PAIN IN RIGHT LEG: ICD-10-CM

## 2024-06-26 DIAGNOSIS — M79.604 PAIN IN RIGHT LEG: ICD-10-CM

## 2024-06-26 RX ORDER — DULOXETINE HYDROCHLORIDE 60 MG/1
60 CAPSULE, DELAYED RELEASE PELLETS ORAL
Qty: 90 | Refills: 1 | Status: ACTIVE | COMMUNITY
Start: 2023-01-31 | End: 1900-01-01

## 2024-06-26 RX ORDER — ALPRAZOLAM 0.25 MG/1
0.25 TABLET ORAL
Qty: 90 | Refills: 0 | Status: ACTIVE | COMMUNITY
Start: 2019-03-20 | End: 1900-01-01

## 2024-07-29 ENCOUNTER — NON-APPOINTMENT (OUTPATIENT)
Age: 64
End: 2024-07-29

## 2024-07-29 NOTE — DISCUSSION/SUMMARY
[FreeTextEntry1] : Covering provider was requested to send a prescription for Xanax 0.25 mg Chart reviewed, noted last medical record documentation by her primary provider Dr. Martin on 6/25/2024.  Patient was rxed Xanax 0.25 mg TID Patient's primary provider has left the practice and patient will be transferring care to the provider within the practice and has an appointment scheduled on 8/5/2024.  Will send Rx to cover patient till she can be seen by new provider.  Reference #: [id number]  Practitioner Count: 3 Pharmacy Count: 1 Current Opioid Prescriptions: 0 Current Benzodiazepine Prescriptions: 0 Current Stimulant Prescriptions: 0   Patient Demographic Information (PDI)     PDI	First Name	Last Name	Birth Date	Gender	Street Address	City	State	Zip Code [place]	1960	Female	[address] 	[facility]	[address]  Prescription Information    PDI Filter:   PDI	My Rx	Current Rx	Drug Type	Rx Written	Rx Dispensed	Drug	Quantity	Days Supply	Prescriber Name	Prescriber GALO #	Payment Method	Dispenser A	N	N	B	06/26/2024	06/27/2024	alprazolam 0.25 mg tablet	90	30	Jon Martin MD	[id number]	[facility]	N	N		06/26/2024	06/27/2024	pregabalin 200 mg capsule	60	30	Irma Garcia	[id number]	[facility]	N	N	B	05/28/2024	05/30/2024	alprazolam 0.25 mg tablet	90	30	GalJon madrid MD	[id number]	[facility]	N	N		05/28/2024	05/30/2024	pregabalin 200 mg capsule	60	30	Irma Garcia	[id number]	[facility] A	N	N	B	05/01/2024	05/02/2024	alprazolam 0.25 mg tablet	90	30	GalJon madrid MD	[id number]	[facility]	N	N	O	05/02/2024	05/02/2024	oxycodone hcl (ir) 5 mg tablet	6	2	Robin Brand	[id number]	[facility]	N	N	B	04/01/2024	04/01/2024	alprazolam 0.25 mg tablet	90	30	Jon Martin MD	[id number]	Insurance	[facility]	N	N		03/26/2024	03/28/2024	pregabalin 200 mg capsule	60	20	Irma Garcia	[id number]	Insurance	[facility]	N	N	B	02/28/2024	03/04/2024	alprazolam 0.25 mg tablet	90	30	GalJon madrid MD	[id number]	Insurance	[facility]	N	N		02/06/2024	02/26/2024	pregabalin 200 mg capsule	60	20	Irma Garcia	[id number]	Insurance	[facility]	N	N	B	02/05/2024	02/06/2024	alprazolam 0.25 mg tablet	90	30	GalJon madrid MD	[id number]	Insurance	[facility]	N	N	O	02/02/2024	02/03/2024	oxycodone-acetaminophen 5-325 mg tab	6	2	ErnestoShanthi	[id number]	Insurance	[facility]	N	N		01/12/2024	01/29/2024	pregabalin 200 mg capsule	60	30	Lizabeth Sutherland [id number]	Insurance	[facility]	N	N	O	01/12/2024	01/15/2024	buprenorphine-naloxone 2-0.5 mg sl tablet	30	30	Masood Jean-Baptiste	[id number]	Insurance	[facility]	N	N	O	01/12/2024	01/12/2024	oxycodone hcl (ir) 5 mg tablet	8	2	[facility]	[id number]	Insurance	[facility]	N	N	B	01/03/2024	01/03/2024	alprazolam 0.25 mg tablet	90	30	GalakJon MD	[id number]	Insurance	[facility]	N	N		12/12/2023	12/27/2023	pregabalin 200 mg capsule	60	30	Irma Garcia	[id number]	Insurance	[facility]	N	N	O	12/12/2023	12/14/2023	buprenorphine-naloxone 4-1 mg sl film	30	30	Irma Garcia	[id number]	Insurance	[facility]	N	N	B	12/05/2023	12/06/2023	alprazolam 0.25 mg tablet	90	30	GalJon madrid MD	[id number]	Insurance	[facility]	N	N		11/15/2023	12/02/2023	pregabalin 200 mg capsule	60	20	Manjula Gordillo	[id number]	Insurance	[facility]	N	N	O	11/15/2023	11/17/2023	buprenorphine-naloxone 4-1 mg sl film	30	30	Masood Jean-Baptiste	[id number]	Insurance	[facility]	N	N		11/07/2023	11/08/2023	pregabalin 200 mg capsule	60	20	Irma Garcia	[id number]	Insurance	[facility]	N	N	B	11/08/2023	11/08/2023	alprazolam 0.25 mg tablet	90	30	GalJon madrid MD	[id number]	Insurance	[facility]	N	N	O	10/12/2023	10/18/2023	buprenorphine-naloxone 4-1 mg sl film	30	30	Masood Jean-Baptiste	[id number]	Insurance	[facility]	N	N	B	10/10/2023	10/11/2023	alprazolam 0.25 mg tablet	90	30	Jon Martin MD	[id number]	Insurance	[facility]	N	N		09/18/2023	09/18/2023	pregabalin 200 mg capsule	60	20	Irma Garcia	[id number]	Insurance	[facility]	N	N	O	09/18/2023	09/18/2023	buprenorphine-naloxone 4-1 mg sl film	30	30	Irma Garcia	[id number]	Insurance	[facility]	N	N	B	09/11/2023	09/13/2023	alprazolam 0.25 mg tablet	90	30	GalJon madrid MD	[id number]	Insurance	[facility]	N	N	O	08/21/2023	08/22/2023	buprenorphine-naloxone 4-1 mg sl film	30	30	Irma Garcia	[id number]	Insurance	[facility]	N	N		08/21/2023	08/22/2023	pregabalin 200 mg capsule	60	20	Irma Garcia	[id number]	Insurance	[facility]	N	N	B	08/14/2023	08/16/2023	alprazolam 0.25 mg tablet	90	30	Jon Martin MD	[id number]	[facility]

## 2024-08-05 ENCOUNTER — APPOINTMENT (OUTPATIENT)
Dept: PSYCHIATRY | Facility: CLINIC | Age: 64
End: 2024-08-05

## 2024-08-05 PROCEDURE — 90792 PSYCH DIAG EVAL W/MED SRVCS: CPT
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
## 2024-08-05 NOTE — PHYSICAL EXAM
[Walk Is Unsteady] : walk is unsteady [FreeTextEntry2] : walking with a cane [Depressed] : depressed [Irritable] : irritable [FreeTextEntry1] : bruises on R cheek, L arm, scar on L arm [FreeTextEntry8] : "shitty" [de-identified] : smiling to irritable, stable [FreeTextEntry7] : denies suicidal ideation/intent/plan or non-suicidal self-injurious ideation/intent/plan or homicidal ideation/intent/plan

## 2024-08-05 NOTE — PHYSICAL EXAM
[Walk Is Unsteady] : walk is unsteady [FreeTextEntry2] : walking with a cane [Depressed] : depressed [Irritable] : irritable [FreeTextEntry1] : bruises on R cheek, L arm, scar on L arm [FreeTextEntry8] : "shitty" [de-identified] : smiling to irritable, stable [FreeTextEntry7] : denies suicidal ideation/intent/plan or non-suicidal self-injurious ideation/intent/plan or homicidal ideation/intent/plan

## 2024-08-05 NOTE — DISCUSSION/SUMMARY
[Low acute suicide risk] : Low acute suicide risk [Yes] : Yes [FreeTextEntry1] : RISK ASSESSMENT Acute risk factors for self-injurious/aggressive behaviors include: symptoms of anxiety and depression, xanax dependence, recent medical hosp Chronic risk factors include: chronic medical illness, hx trauma, hx polysubstance use, hx opiate use necessitating suboxone use, legal hx with several arrests in the past, 1 past psych hosp Protective factors include: help-seeking, denies past or current suicidal ideation/intent/plan or non-suicidal self-injurious ideation/intent/plan or homicidal ideation/intent/plan, responsibility to brother, denies past suicide attempts Overall, the pt is at a low acute and elevated chronic risk for self-injurious, suicidal or aggressive behaviors, and is appropriate for outpatient care at this time.
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
## 2024-09-20 ENCOUNTER — NON-APPOINTMENT (OUTPATIENT)
Age: 64
End: 2024-09-20

## 2024-10-16 ENCOUNTER — OFFICE (OUTPATIENT)
Dept: URBAN - METROPOLITAN AREA CLINIC 102 | Facility: CLINIC | Age: 64
Setting detail: OPHTHALMOLOGY
End: 2024-10-16
Payer: MEDICARE

## 2024-10-16 DIAGNOSIS — H40.033: ICD-10-CM

## 2024-10-16 DIAGNOSIS — H25.13: ICD-10-CM

## 2024-10-16 DIAGNOSIS — H35.362: ICD-10-CM

## 2024-10-16 DIAGNOSIS — H16.223: ICD-10-CM

## 2024-10-16 DIAGNOSIS — H43.393: ICD-10-CM

## 2024-10-16 DIAGNOSIS — H16.221: ICD-10-CM

## 2024-10-16 DIAGNOSIS — H16.222: ICD-10-CM

## 2024-10-16 DIAGNOSIS — Z79.899: ICD-10-CM

## 2024-10-16 PROCEDURE — 92083 EXTENDED VISUAL FIELD XM: CPT | Performed by: OPHTHALMOLOGY

## 2024-10-16 PROCEDURE — 68761 CLOSE TEAR DUCT OPENING: CPT | Mod: 50 | Performed by: OPHTHALMOLOGY

## 2024-10-16 PROCEDURE — 92134 CPTRZ OPH DX IMG PST SGM RTA: CPT | Performed by: OPHTHALMOLOGY

## 2024-10-16 PROCEDURE — 83861 MICROFLUID ANALY TEARS: CPT | Mod: QW,RT | Performed by: OPHTHALMOLOGY

## 2024-10-16 PROCEDURE — 92020 GONIOSCOPY: CPT | Performed by: OPHTHALMOLOGY

## 2024-10-16 PROCEDURE — 83861 MICROFLUID ANALY TEARS: CPT | Mod: QW,LT | Performed by: OPHTHALMOLOGY

## 2024-10-16 PROCEDURE — 99213 OFFICE O/P EST LOW 20 MIN: CPT | Mod: 25 | Performed by: OPHTHALMOLOGY

## 2024-10-16 ASSESSMENT — CONFRONTATIONAL VISUAL FIELD TEST (CVF)
OS_FINDINGS: FULL
OD_FINDINGS: FULL

## 2024-10-16 ASSESSMENT — REFRACTION_AUTOREFRACTION
OS_SPHERE: +1.25
OD_SPHERE: +1.25
OS_CYLINDER: -0.50
OS_AXIS: 45
OD_CYLINDER: -0.25
OD_AXIS: 21

## 2024-10-16 ASSESSMENT — REFRACTION_CURRENTRX
OD_OVR_VA: 20/
OD_SPHERE: +2.75
OS_SPHERE: +2.75
OS_OVR_VA: 20/

## 2024-10-16 ASSESSMENT — TONOMETRY
OS_IOP_MMHG: 14
OD_IOP_MMHG: 15

## 2024-10-16 ASSESSMENT — DECREASING TEAR LAKE - SEVERITY SCORE
OS_DEC_TEARLAKE: 1+
OD_DEC_TEARLAKE: 1+

## 2024-10-16 ASSESSMENT — SUPERFICIAL PUNCTATE KERATITIS (SPK)
OS_SPK: 1+
OD_SPK: 1+

## 2024-10-16 ASSESSMENT — KERATOMETRY
OD_AXISANGLE_DEGREES: 85
OS_K1POWER_DIOPTERS: 44.00
METHOD_AUTO_MANUAL: AUTO
OS_K2POWER_DIOPTERS: 44.75
OD_K2POWER_DIOPTERS: 44.25
OD_K1POWER_DIOPTERS: 43.50
OS_AXISANGLE_DEGREES: 121

## 2024-10-16 ASSESSMENT — LID EXAM ASSESSMENTS
OD_BLEPHARITIS: RLL RUL 1+
OS_BLEPHARITIS: LLL LUL 1+

## 2024-10-16 ASSESSMENT — VISUAL ACUITY
OS_BCVA: 20/40
OD_BCVA: 20/30-1

## 2024-12-23 ENCOUNTER — NON-APPOINTMENT (OUTPATIENT)
Age: 64
End: 2024-12-23

## 2024-12-23 ENCOUNTER — APPOINTMENT (OUTPATIENT)
Dept: INTERNAL MEDICINE | Facility: CLINIC | Age: 64
End: 2024-12-23
Payer: MEDICARE

## 2024-12-23 VITALS
OXYGEN SATURATION: 92 % | WEIGHT: 120 LBS | BODY MASS INDEX: 22.08 KG/M2 | HEIGHT: 62 IN | SYSTOLIC BLOOD PRESSURE: 140 MMHG | HEART RATE: 107 BPM | DIASTOLIC BLOOD PRESSURE: 90 MMHG

## 2024-12-23 DIAGNOSIS — R56.9 UNSPECIFIED CONVULSIONS: ICD-10-CM

## 2024-12-23 DIAGNOSIS — G62.9 POLYNEUROPATHY, UNSPECIFIED: ICD-10-CM

## 2024-12-23 DIAGNOSIS — R60.0 LOCALIZED EDEMA: ICD-10-CM

## 2024-12-23 DIAGNOSIS — K21.9 GASTRO-ESOPHAGEAL REFLUX DISEASE W/OUT ESOPHAGITIS: ICD-10-CM

## 2024-12-23 DIAGNOSIS — E78.5 HYPERLIPIDEMIA, UNSPECIFIED: ICD-10-CM

## 2024-12-23 DIAGNOSIS — J43.9 EMPHYSEMA, UNSPECIFIED: ICD-10-CM

## 2024-12-23 DIAGNOSIS — Z00.00 ENCOUNTER FOR GENERAL ADULT MEDICAL EXAMINATION W/OUT ABNORMAL FINDINGS: ICD-10-CM

## 2024-12-23 PROCEDURE — G0296 VISIT TO DETERM LDCT ELIG: CPT

## 2024-12-23 PROCEDURE — G0439: CPT

## 2024-12-23 PROCEDURE — 93000 ELECTROCARDIOGRAM COMPLETE: CPT | Mod: 59

## 2024-12-23 PROCEDURE — G0443: CPT

## 2024-12-23 RX ORDER — FUROSEMIDE 20 MG/1
20 TABLET ORAL
Qty: 60 | Refills: 2 | Status: ACTIVE | COMMUNITY
Start: 2024-12-23 | End: 1900-01-01

## 2024-12-23 RX ORDER — PANTOPRAZOLE 20 MG/1
20 TABLET, DELAYED RELEASE ORAL
Qty: 60 | Refills: 3 | Status: ACTIVE | COMMUNITY
Start: 2024-12-23 | End: 1900-01-01

## 2024-12-23 RX ORDER — AMLODIPINE BESYLATE 2.5 MG/1
2.5 TABLET ORAL
Qty: 30 | Refills: 0 | Status: ACTIVE | COMMUNITY
Start: 2024-02-02

## 2025-01-06 NOTE — DISCHARGE NOTE PROVIDER - NSDCADMDATE_GEN_ALL_CORE_FT
A pre-sedation assessment was completed by the physician immediately prior to sedation start.  16-Aug-2020 20:37

## 2025-01-10 NOTE — ED ADULT NURSE NOTE - ISOLATION TYPE:
None Covering for primary RN. Handoff report given to KEVAN Davis on 1C. RN made aware of pts current condition/test results/reason for admission. pt is AOx3, resting in stretcher at this time on cardiac monitor and BIPAP. NADN. pts IV is patent and intact no redness/swelling noted at the site - pt has blood running as ordered. rounding and safety checks complete. pt is vitally stable upon giving report. pt to go to US prior to going to the floor - endorsed to primary RN Ian upon returning from break. any issues endorsed to oncoming RN for followup.

## 2025-01-28 ENCOUNTER — APPOINTMENT (OUTPATIENT)
Dept: INTERNAL MEDICINE | Facility: CLINIC | Age: 65
End: 2025-01-28

## 2025-02-17 ENCOUNTER — OFFICE (OUTPATIENT)
Dept: URBAN - METROPOLITAN AREA CLINIC 102 | Facility: CLINIC | Age: 65
Setting detail: OPHTHALMOLOGY
End: 2025-02-17
Payer: MEDICARE

## 2025-02-17 DIAGNOSIS — H16.223: ICD-10-CM

## 2025-02-17 DIAGNOSIS — H16.222: ICD-10-CM

## 2025-02-17 DIAGNOSIS — Z79.899: ICD-10-CM

## 2025-02-17 DIAGNOSIS — H43.393: ICD-10-CM

## 2025-02-17 DIAGNOSIS — M35.9: ICD-10-CM

## 2025-02-17 DIAGNOSIS — H16.221: ICD-10-CM

## 2025-02-17 DIAGNOSIS — H35.362: ICD-10-CM

## 2025-02-17 PROCEDURE — 68761 CLOSE TEAR DUCT OPENING: CPT | Mod: 50 | Performed by: OPHTHALMOLOGY

## 2025-02-17 PROCEDURE — 83861 MICROFLUID ANALY TEARS: CPT | Mod: QW,LT | Performed by: OPHTHALMOLOGY

## 2025-02-17 PROCEDURE — 99213 OFFICE O/P EST LOW 20 MIN: CPT | Mod: 25 | Performed by: OPHTHALMOLOGY

## 2025-02-17 PROCEDURE — 83861 MICROFLUID ANALY TEARS: CPT | Mod: QW,RT | Performed by: OPHTHALMOLOGY

## 2025-02-17 ASSESSMENT — SUPERFICIAL PUNCTATE KERATITIS (SPK)
OS_SPK: 1+
OD_SPK: 1+

## 2025-02-17 ASSESSMENT — KERATOMETRY
OS_AXISANGLE_DEGREES: 137
OS_K1POWER_DIOPTERS: 44.00
METHOD_AUTO_MANUAL: AUTO
OD_K2POWER_DIOPTERS: 44.75
OD_AXISANGLE_DEGREES: 085
OS_K2POWER_DIOPTERS: 45.00
OD_K1POWER_DIOPTERS: 43.50

## 2025-02-17 ASSESSMENT — REFRACTION_AUTOREFRACTION
OS_CYLINDER: -0.25
OD_AXIS: 000
OS_AXIS: 081
OS_SPHERE: +1.25
OD_CYLINDER: 0.00
OD_SPHERE: +1.00

## 2025-02-17 ASSESSMENT — CONFRONTATIONAL VISUAL FIELD TEST (CVF)
OD_FINDINGS: FULL
OS_FINDINGS: FULL

## 2025-02-17 ASSESSMENT — TONOMETRY
OS_IOP_MMHG: 12
OD_IOP_MMHG: 13

## 2025-02-17 ASSESSMENT — LID EXAM ASSESSMENTS
OD_BLEPHARITIS: RLL RUL 1+
OS_BLEPHARITIS: LLL LUL 1+

## 2025-02-17 ASSESSMENT — REFRACTION_CURRENTRX
OD_SPHERE: +2.75
OD_OVR_VA: 20/
OS_SPHERE: +2.75
OS_OVR_VA: 20/

## 2025-02-17 ASSESSMENT — DECREASING TEAR LAKE - SEVERITY SCORE
OD_DEC_TEARLAKE: 1+
OS_DEC_TEARLAKE: 1+

## 2025-02-17 ASSESSMENT — VISUAL ACUITY
OD_BCVA: 20/25-2
OS_BCVA: 20/25-

## 2025-03-12 NOTE — ED ADULT NURSE NOTE - MUSCULOSKELETAL ASSESSMENT
pt BIBEMS from home c/o hypertension for several days, pt endorses he usually takes medication but ran out and has not been able to  a refill, neg. CP/SOB/HA/dizziness/vision changes, pt systolic 200's with EMS and in triage. WDL

## 2025-03-13 DIAGNOSIS — I10 ESSENTIAL (PRIMARY) HYPERTENSION: ICD-10-CM

## 2025-04-08 ENCOUNTER — APPOINTMENT (OUTPATIENT)
Dept: INTERNAL MEDICINE | Facility: CLINIC | Age: 65
End: 2025-04-08

## 2025-04-08 ENCOUNTER — APPOINTMENT (OUTPATIENT)
Dept: INTERNAL MEDICINE | Facility: CLINIC | Age: 65
End: 2025-04-08
Payer: MEDICARE

## 2025-04-08 DIAGNOSIS — E78.5 HYPERLIPIDEMIA, UNSPECIFIED: ICD-10-CM

## 2025-04-08 DIAGNOSIS — R60.0 LOCALIZED EDEMA: ICD-10-CM

## 2025-04-08 DIAGNOSIS — R73.03 PREDIABETES.: ICD-10-CM

## 2025-04-08 DIAGNOSIS — I10 ESSENTIAL (PRIMARY) HYPERTENSION: ICD-10-CM

## 2025-04-08 DIAGNOSIS — I73.9 PERIPHERAL VASCULAR DISEASE, UNSPECIFIED: ICD-10-CM

## 2025-04-08 PROCEDURE — 99213 OFFICE O/P EST LOW 20 MIN: CPT | Mod: 2W

## 2025-04-16 NOTE — ED ADULT TRIAGE NOTE - PAIN RATING/NUMBER SCALE (0-10): ACTIVITY
A return call is placed to Ray to convey Dr. Haro's recommendations regarding Deep's right ear.  A prescription of Ciprodex gtts has been sent to [facility] direct pharmacy.  Sig; 4 gtts BID X 10 days  Ray is encouraged to call the office back if Deep has not noticed any improvement after using the drops for a week.  A fax of the prescription has also been faxed to [facility] for their records   6 Patient/Family

## 2025-04-23 NOTE — DISCHARGE NOTE PROVIDER - NSDCCPGOAL_GEN_ALL_CORE_FT
Returned call for status update on Repatha. Per [facility] community, PA is still pending as of 4/23/25.   To get better and follow your care plan as instructed.

## 2025-04-25 ENCOUNTER — APPOINTMENT (OUTPATIENT)
Dept: INTERNAL MEDICINE | Facility: CLINIC | Age: 65
End: 2025-04-25
Payer: MEDICARE

## 2025-04-25 ENCOUNTER — NON-APPOINTMENT (OUTPATIENT)
Age: 65
End: 2025-04-25

## 2025-04-25 DIAGNOSIS — I10 ESSENTIAL (PRIMARY) HYPERTENSION: ICD-10-CM

## 2025-04-25 DIAGNOSIS — I25.10 ATHEROSCLEROTIC HEART DISEASE OF NATIVE CORONARY ARTERY W/OUT ANGINA PECTORIS: ICD-10-CM

## 2025-04-25 DIAGNOSIS — I73.9 PERIPHERAL VASCULAR DISEASE, UNSPECIFIED: ICD-10-CM

## 2025-04-25 DIAGNOSIS — Z13.820 ENCOUNTER FOR SCREENING FOR OSTEOPOROSIS: ICD-10-CM

## 2025-04-25 DIAGNOSIS — J43.9 EMPHYSEMA, UNSPECIFIED: ICD-10-CM

## 2025-04-25 DIAGNOSIS — N31.9 NEUROMUSCULAR DYSFUNCTION OF BLADDER, UNSPECIFIED: ICD-10-CM

## 2025-04-25 DIAGNOSIS — R73.03 PREDIABETES.: ICD-10-CM

## 2025-04-25 DIAGNOSIS — R56.9 UNSPECIFIED CONVULSIONS: ICD-10-CM

## 2025-04-25 DIAGNOSIS — R60.0 LOCALIZED EDEMA: ICD-10-CM

## 2025-04-25 DIAGNOSIS — F41.9 ANXIETY DISORDER, UNSPECIFIED: ICD-10-CM

## 2025-04-25 PROCEDURE — 99213 OFFICE O/P EST LOW 20 MIN: CPT | Mod: 2W

## 2025-04-25 PROCEDURE — G2211 COMPLEX E/M VISIT ADD ON: CPT | Mod: 2W

## 2025-05-01 ENCOUNTER — APPOINTMENT (OUTPATIENT)
Dept: RADIOLOGY | Facility: CLINIC | Age: 65
End: 2025-05-01
Payer: MEDICARE

## 2025-05-01 PROCEDURE — 77080 DXA BONE DENSITY AXIAL: CPT

## 2025-05-09 ENCOUNTER — TRANSCRIPTION ENCOUNTER (OUTPATIENT)
Age: 65
End: 2025-05-09

## 2025-05-13 ENCOUNTER — APPOINTMENT (OUTPATIENT)
Dept: INTERNAL MEDICINE | Facility: CLINIC | Age: 65
End: 2025-05-13
Payer: MEDICARE

## 2025-05-13 ENCOUNTER — LABORATORY RESULT (OUTPATIENT)
Age: 65
End: 2025-05-13

## 2025-05-13 VITALS
DIASTOLIC BLOOD PRESSURE: 67 MMHG | BODY MASS INDEX: 20.8 KG/M2 | SYSTOLIC BLOOD PRESSURE: 101 MMHG | OXYGEN SATURATION: 90 % | HEART RATE: 104 BPM | WEIGHT: 113 LBS | HEIGHT: 62 IN

## 2025-05-13 PROCEDURE — 99214 OFFICE O/P EST MOD 30 MIN: CPT

## 2025-05-13 RX ADMIN — DENOSUMAB 0 MG/ML: 60 INJECTION SUBCUTANEOUS at 00:00

## 2025-05-15 LAB
24R-OH-CALCIDIOL SERPL-MCNC: 28.6 PG/ML
ALBUMIN SERPL ELPH-MCNC: 3.8 G/DL
ALP BLD-CCNC: 99 U/L
ALT SERPL-CCNC: 41 U/L
ANION GAP SERPL CALC-SCNC: 19 MMOL/L
AST SERPL-CCNC: 63 U/L
BILIRUB SERPL-MCNC: 0.4 MG/DL
BUN SERPL-MCNC: 14 MG/DL
CALCIUM SERPL-MCNC: 9 MG/DL
CHLORIDE SERPL-SCNC: 99 MMOL/L
CO2 SERPL-SCNC: 18 MMOL/L
CREAT SERPL-MCNC: 0.98 MG/DL
EGFRCR SERPLBLD CKD-EPI 2021: 64 ML/MIN/1.73M2
GLUCOSE SERPL-MCNC: 97 MG/DL
POTASSIUM SERPL-SCNC: 5 MMOL/L
PROT SERPL-MCNC: 7.3 G/DL
SODIUM SERPL-SCNC: 136 MMOL/L

## 2025-05-15 NOTE — ED ADULT TRIAGE NOTE - NS ED TRIAGE HISTORIAN
Patient's INR- 3.3, within therapeutic range. This Sn instructed patient to restart her current dose this evening and recheck on 5/19. dvLPN   Patient

## 2025-05-23 ENCOUNTER — APPOINTMENT (OUTPATIENT)
Dept: INTERNAL MEDICINE | Facility: CLINIC | Age: 65
End: 2025-05-23
Payer: MEDICARE

## 2025-05-23 VITALS
HEART RATE: 93 BPM | BODY MASS INDEX: 20.8 KG/M2 | DIASTOLIC BLOOD PRESSURE: 92 MMHG | HEIGHT: 62 IN | WEIGHT: 113 LBS | SYSTOLIC BLOOD PRESSURE: 150 MMHG

## 2025-05-23 VITALS — OXYGEN SATURATION: 91 %

## 2025-05-23 DIAGNOSIS — I10 ESSENTIAL (PRIMARY) HYPERTENSION: ICD-10-CM

## 2025-05-23 DIAGNOSIS — F41.9 ANXIETY DISORDER, UNSPECIFIED: ICD-10-CM

## 2025-05-23 DIAGNOSIS — J43.9 EMPHYSEMA, UNSPECIFIED: ICD-10-CM

## 2025-05-23 DIAGNOSIS — M81.0 AGE-RELATED OSTEOPOROSIS W/OUT CURRENT PATHOLOGICAL FRACTURE: ICD-10-CM

## 2025-05-23 DIAGNOSIS — E78.5 HYPERLIPIDEMIA, UNSPECIFIED: ICD-10-CM

## 2025-05-23 PROCEDURE — 99214 OFFICE O/P EST MOD 30 MIN: CPT

## 2025-05-23 RX ADMIN — DENOSUMAB 60 MG/ML: 60 INJECTION SUBCUTANEOUS at 00:00

## 2025-06-18 ENCOUNTER — INPATIENT (INPATIENT)
Facility: HOSPITAL | Age: 65
LOS: 6 days | Discharge: HOME HEALTH | DRG: 291 | End: 2025-06-25
Attending: STUDENT IN AN ORGANIZED HEALTH CARE EDUCATION/TRAINING PROGRAM | Admitting: STUDENT IN AN ORGANIZED HEALTH CARE EDUCATION/TRAINING PROGRAM
Payer: MEDICARE

## 2025-06-18 VITALS
DIASTOLIC BLOOD PRESSURE: 65 MMHG | WEIGHT: 119.93 LBS | TEMPERATURE: 97 F | OXYGEN SATURATION: 95 % | RESPIRATION RATE: 19 BRPM | SYSTOLIC BLOOD PRESSURE: 99 MMHG | HEART RATE: 85 BPM

## 2025-06-18 DIAGNOSIS — Z98.890 OTHER SPECIFIED POSTPROCEDURAL STATES: Chronic | ICD-10-CM

## 2025-06-18 DIAGNOSIS — Z96.641 PRESENCE OF RIGHT ARTIFICIAL HIP JOINT: Chronic | ICD-10-CM

## 2025-06-18 DIAGNOSIS — Z90.721 ACQUIRED ABSENCE OF OVARIES, UNILATERAL: Chronic | ICD-10-CM

## 2025-06-18 DIAGNOSIS — Z90.49 ACQUIRED ABSENCE OF OTHER SPECIFIED PARTS OF DIGESTIVE TRACT: Chronic | ICD-10-CM

## 2025-06-18 DIAGNOSIS — Z85.118 PERSONAL HISTORY OF OTHER MALIGNANT NEOPLASM OF BRONCHUS AND LUNG: Chronic | ICD-10-CM

## 2025-06-18 LAB
ALBUMIN SERPL ELPH-MCNC: 3.4 G/DL — SIGNIFICANT CHANGE UP (ref 3.3–5)
ALP SERPL-CCNC: 110 U/L — SIGNIFICANT CHANGE UP (ref 40–120)
ALT FLD-CCNC: 16 U/L — SIGNIFICANT CHANGE UP (ref 10–45)
ANION GAP SERPL CALC-SCNC: 19 MMOL/L — HIGH (ref 5–17)
APTT BLD: 26.9 SEC — SIGNIFICANT CHANGE UP (ref 26.1–36.8)
AST SERPL-CCNC: 40 U/L — SIGNIFICANT CHANGE UP (ref 10–40)
BASOPHILS # BLD AUTO: 0.03 K/UL — SIGNIFICANT CHANGE UP (ref 0–0.2)
BASOPHILS NFR BLD AUTO: 0.4 % — SIGNIFICANT CHANGE UP (ref 0–2)
BILIRUB SERPL-MCNC: 0.8 MG/DL — SIGNIFICANT CHANGE UP (ref 0.2–1.2)
BUN SERPL-MCNC: 9 MG/DL — SIGNIFICANT CHANGE UP (ref 7–23)
CALCIUM SERPL-MCNC: 8 MG/DL — LOW (ref 8.4–10.5)
CHLORIDE SERPL-SCNC: 98 MMOL/L — SIGNIFICANT CHANGE UP (ref 96–108)
CK SERPL-CCNC: 171 U/L — HIGH (ref 25–170)
CO2 SERPL-SCNC: 14 MMOL/L — LOW (ref 22–31)
CREAT SERPL-MCNC: 0.8 MG/DL — SIGNIFICANT CHANGE UP (ref 0.5–1.3)
EGFR: 82 ML/MIN/1.73M2 — SIGNIFICANT CHANGE UP
EGFR: 82 ML/MIN/1.73M2 — SIGNIFICANT CHANGE UP
EOSINOPHIL # BLD AUTO: 0.02 K/UL — SIGNIFICANT CHANGE UP (ref 0–0.5)
EOSINOPHIL NFR BLD AUTO: 0.3 % — SIGNIFICANT CHANGE UP (ref 0–6)
ETHANOL SERPL-MCNC: <10 MG/DL — SIGNIFICANT CHANGE UP (ref 0–10)
GAS PNL BLDV: SIGNIFICANT CHANGE UP
GLUCOSE SERPL-MCNC: 96 MG/DL — SIGNIFICANT CHANGE UP (ref 70–99)
HCG SERPL-ACNC: <2 MIU/ML — SIGNIFICANT CHANGE UP
HCT VFR BLD CALC: 34.4 % — LOW (ref 34.5–45)
HGB BLD-MCNC: 10.5 G/DL — LOW (ref 11.5–15.5)
IMM GRANULOCYTES # BLD AUTO: 0.06 K/UL — SIGNIFICANT CHANGE UP (ref 0–0.07)
IMM GRANULOCYTES NFR BLD AUTO: 0.8 % — SIGNIFICANT CHANGE UP (ref 0–0.9)
INR BLD: 1.49 RATIO — HIGH (ref 0.85–1.16)
LYMPHOCYTES # BLD AUTO: 0.64 K/UL — LOW (ref 1–3.3)
LYMPHOCYTES NFR BLD AUTO: 8.5 % — LOW (ref 13–44)
MCHC RBC-ENTMCNC: 29.5 PG — SIGNIFICANT CHANGE UP (ref 27–34)
MCHC RBC-ENTMCNC: 30.5 G/DL — LOW (ref 32–36)
MCV RBC AUTO: 96.6 FL — SIGNIFICANT CHANGE UP (ref 80–100)
MONOCYTES # BLD AUTO: 0.63 K/UL — SIGNIFICANT CHANGE UP (ref 0–0.9)
MONOCYTES NFR BLD AUTO: 8.3 % — SIGNIFICANT CHANGE UP (ref 2–14)
NEUTROPHILS # BLD AUTO: 6.19 K/UL — SIGNIFICANT CHANGE UP (ref 1.8–7.4)
NEUTROPHILS NFR BLD AUTO: 81.7 % — HIGH (ref 43–77)
NRBC # BLD AUTO: 0 K/UL — SIGNIFICANT CHANGE UP (ref 0–0)
NRBC # FLD: 0 K/UL — SIGNIFICANT CHANGE UP (ref 0–0)
NRBC BLD AUTO-RTO: 0 /100 WBCS — SIGNIFICANT CHANGE UP (ref 0–0)
NT-PROBNP SERPL-SCNC: 3318 PG/ML — HIGH (ref 0–300)
PLATELET # BLD AUTO: 226 K/UL — SIGNIFICANT CHANGE UP (ref 150–400)
PMV BLD: 10.5 FL — SIGNIFICANT CHANGE UP (ref 7–13)
POTASSIUM SERPL-MCNC: 5.9 MMOL/L — HIGH (ref 3.5–5.3)
POTASSIUM SERPL-SCNC: 5.9 MMOL/L — HIGH (ref 3.5–5.3)
PROT SERPL-MCNC: 6.8 G/DL — SIGNIFICANT CHANGE UP (ref 6–8.3)
PROTHROM AB SERPL-ACNC: 16.9 SEC — HIGH (ref 9.9–13.4)
RBC # BLD: 3.56 M/UL — LOW (ref 3.8–5.2)
RBC # FLD: 16.1 % — HIGH (ref 10.3–14.5)
SODIUM SERPL-SCNC: 131 MMOL/L — LOW (ref 135–145)
TROPONIN T, HIGH SENSITIVITY RESULT: 26 NG/L — SIGNIFICANT CHANGE UP (ref 0–51)
WBC # BLD: 7.57 K/UL — SIGNIFICANT CHANGE UP (ref 3.8–10.5)
WBC # FLD AUTO: 7.57 K/UL — SIGNIFICANT CHANGE UP (ref 3.8–10.5)

## 2025-06-18 PROCEDURE — 93010 ELECTROCARDIOGRAM REPORT: CPT

## 2025-06-18 PROCEDURE — 99285 EMERGENCY DEPT VISIT HI MDM: CPT | Mod: GC

## 2025-06-18 PROCEDURE — 71045 X-RAY EXAM CHEST 1 VIEW: CPT | Mod: 26

## 2025-06-18 RX ORDER — CEFTRIAXONE 500 MG/1
1000 INJECTION, POWDER, FOR SOLUTION INTRAMUSCULAR; INTRAVENOUS ONCE
Refills: 0 | Status: COMPLETED | OUTPATIENT
Start: 2025-06-18 | End: 2025-06-18

## 2025-06-18 RX ORDER — VANCOMYCIN HCL IN 5 % DEXTROSE 1.5G/250ML
1000 PLASTIC BAG, INJECTION (ML) INTRAVENOUS ONCE
Refills: 0 | Status: COMPLETED | OUTPATIENT
Start: 2025-06-18 | End: 2025-06-18

## 2025-06-18 RX ORDER — ACETAMINOPHEN 500 MG/5ML
1000 LIQUID (ML) ORAL ONCE
Refills: 0 | Status: COMPLETED | OUTPATIENT
Start: 2025-06-18 | End: 2025-06-18

## 2025-06-18 RX ADMIN — Medication 400 MILLIGRAM(S): at 23:19

## 2025-06-18 RX ADMIN — CEFTRIAXONE 100 MILLIGRAM(S): 500 INJECTION, POWDER, FOR SOLUTION INTRAMUSCULAR; INTRAVENOUS at 23:19

## 2025-06-18 NOTE — ED PROVIDER NOTE - PHYSICAL EXAMINATION
Physical Exam:  Gen: no acute distress, AOx3, nontoxic appearing  Head: NCAT  HEENT: EOMI, PEERLA, normal conjunctiva, tongue midline, oral mucosa moist  Lung: crackles to mid lung, otherwise CTAB  CV: RRR, no murmurs, rubs or gallops  Abd: soft, NT, ND, no guarding, no rigidity, no rebound tenderness, no CVA tenderness  MSK: no visible deformities, ROM normal in UE/LE, no neck / back pain, calf tenderness  Neuro: No focal sensory or motor deficits in cranial nerves, upper and lower extremities  Skin: bilateral shins with erythema, warmth, tenderness

## 2025-06-18 NOTE — ED PROVIDER NOTE - ATTENDING CONTRIBUTION TO CARE
Attending MD Nichols: I personally have seen and examined this patient.  Resident note reviewed and agree on plan of care except where noted.  See below for details.     Seen in [place] [address]  Allergy: PCN     65F with PMH/PSH including chronic pain, lung adenocarcinoma s/p resection, alcohol dependence, opiate dependence, anxiety, s/p laminectomy, R hip ORIF, traumatic SDH, connective tissue disease, CVA, seizures from hyponatremia presents to the ED with fall, lightheadedness, hypoxia.  Reports that this morning she went to clinic and was found to be hypotensive and hypoxic to 50s and was told to go to the Emergency Department.  Reports did not take recommendation and instead went home, fell in her garage.  Denies LOC, head strike.  Reports was on floor for a few hours, called EMS.  Reports in triage hypoxic, improved with 4L NC.  Reports her complaint is bilateral LE redness and pain.  Denies fevers, chills.  Denies chest pain, shortness of breath.  Denies abdominal pain, nausea, vomiting, diarrhea, bloody or black stools. Denies urinary complaints.      Exam:   General: somnolent initially, improved after being placed on NC (changed from 10L NRB to 3L NC)  HENT: head NCAT, airway patent  Eyes: anicteric, no conjunctival injection   Lungs: lungs CTAB, scattered crackles, no tachypnea, no increased work of breathing  Cardiac: +S1S2, no obvious r/g, +murmur  GI: abdomen soft with +BS, NT, ND  : no CVAT  MSK: ranging neck freely, + bilateral LE erythema  Neuro: moving all extremities spontaneously, nonfocal    TO BE COMPLETED Attending MD Nichols: I personally have seen and examined this patient.  Resident note reviewed and agree on plan of care except where noted.  See below for details.     Seen in [place] 55  Allergy: PCN     65F with PMH/PSH including chronic pain, lung adenocarcinoma s/p resection, alcohol dependence, opiate dependence, anxiety, s/p laminectomy, R hip ORIF, traumatic SDH, connective tissue disease, CVA, seizures from hyponatremia presents to the ED with fall, lightheadedness, hypoxia.  Reports that this morning she went to clinic and was found to be hypotensive and hypoxic to 50s and was told to go to the Emergency Department.  Reports did not take recommendation and instead went home, fell in her garage.  Denies LOC, head strike.  Reports was on floor for a few hours, called EMS.  Reports in triage hypoxic, improved with 4L NC.  Reports her complaint is bilateral LE redness and pain.  Denies fevers, chills.  Denies chest pain, shortness of breath.  Denies abdominal pain, nausea, vomiting, diarrhea, bloody or black stools. Denies urinary complaints.      Exam:   General: somnolent initially, improved after being placed on NC (changed from 10L NRB to 3L NC)  HENT: head NCAT, airway patent  Eyes: anicteric, no conjunctival injection   Lungs: lungs CTAB, scattered crackles, no tachypnea, no increased work of breathing  Cardiac: +S1S2, no obvious r/g, +murmur  GI: abdomen soft with +BS, NT, ND  : no CVAT  MSK: ranging neck freely, + bilateral LE erythema and edema  Neuro: moving all extremities spontaneously, nonfocal    A/P: 65F with fall, lightheaded, hypoxia, DDx includes but not limited to COPD exacerbation, fluid overload, HF, PNA, viral URI, PE, metabolic derangement, will keep on O2, pulse ox, will obtain EKG, CXR, CTA Chest, CTH, labs including cultures, UA/UtCx for occult UTI, will nee admission, will keep on monitor and pulse ox, will give empiric abx, will need admission

## 2025-06-18 NOTE — ED ADULT NURSE NOTE - OBJECTIVE STATEMENT
65 y.o F BIB EMS p/w c/o fall. A+Ox3. Pt states went to [facility] this AM due to feeling "off" today, and when there noted hypotension, unsure exact numbers, and O2 sat of 50% on RA. Was told she needs to come to ER but instead went home, reports walking into garage and falling, was unable to get up for about 4-5 hours. Denies hitting head/ LOC. PD arrived w/ EMS and also noted O2 sat in 50s on RA, placed on 6L NC for O2 sat of 94%. Upon initial assessment, abrasion noted to B/L elbows and B/L knees, bleeding controlled. Crackles heard in LS B/L. +1 pitting edema noted to B/L LEs a/w redness of B/L LEs, skin taught. Multiple scattered bruises in multiple stages of healing on B/L arms. Denies any neck pain, CP, abd pain, urinary sx, vision changes, headache, lightheadedness/ dizziness. Slurred speech noted. NIH 0. Multiple PMH in chart. No other complaints at this time, side rails up, bed lowest position, call bell in reach, comfort and safety maintained,

## 2025-06-18 NOTE — ED ADULT NURSE NOTE - CHPI ED NUR DURATION
[de-identified] : 22 year old female here for b12 deficiency and for twitches.  patient saw neurologist, had full workup, was found to have low vitamin b12.  patient feels that she is twitching less and has more energy\par Patients active medications, allergies and issues were all reviewed with the patient at time of visit.\par \par  today

## 2025-06-18 NOTE — ED PROVIDER NOTE - CLINICAL SUMMARY MEDICAL DECISION MAKING FREE TEXT BOX
65 year old woman PMH MI, connective tissue disease, CVA, seizures from hyponatremia, remote substance use history presenting with lightheadedness, fall, and hypoxia, reportinf dizziness and a fall without headstrike, was hypoxic improved with NC, crackles in bilateral lower lungs, bilateral shin cellulitis, neurologically intact, vitals otherwise nonactionable concerning for sepsis secondary to PNA vs cellulitis, acs, chf exacerbation, PE, intracranial injury. sepsis workup, trop, bnp, CT head, CTA PE.

## 2025-06-18 NOTE — ED PROVIDER NOTE - OBJECTIVE STATEMENT
65 year old woman PMH MI, connective tissue disease, CVA, seizures from hyponatremia, remote substance use history presenting with lightheadedness, fall, and hypoxia. She was feeling dizzy this morning so went to her doctor, was found to be hypotensive and hypoxic to 50 and they recommended she go to the ED, but instead she went home, fell in her garage but no syncope, head strike, LOC, blood thinner use, was on the floor for 4-5 hours and then called EMS. in triage she was hypoxic to 50, improved on 4l nc. also reporting bilateral leg redness and pain. She denies any fevers, chills, chest pain, sob, abd pain, n/v/d/c, dysuria,  weakness, numbness, tingling.

## 2025-06-18 NOTE — ED ADULT NURSE NOTE - PAIN: PRESENCE, MLM
How Severe Are Your Spot(S)?: mild What Is The Reason For Today's Visit?: Full Body Skin Examination What Is The Reason For Today's Visit? (Being Monitored For X): concerning skin lesions on an annual basis denies pain/discomfort (Rating = 0)

## 2025-06-18 NOTE — ED ADULT TRIAGE NOTE - CHIEF COMPLAINT QUOTE
pt BIBEMS for fall found down 4-5 hours found to have 02 sat in the 60s improved on nasal cannula 4 LPM

## 2025-06-18 NOTE — ED ADULT NURSE NOTE - NSFALLRISKINTERV_ED_ALL_ED
Assistance OOB with selected safe patient handling equipment if applicable/Assistance with ambulation/Communicate fall risk and risk factors to all staff, patient, and family/Monitor gait and stability/Provide visual cue: yellow wristband, yellow gown, etc/Reinforce activity limits and safety measures with patient and family/Call bell, personal items and telephone in reach/Instruct patient to call for assistance before getting out of bed/chair/stretcher/Non-slip footwear applied when patient is off stretcher/[place] to call system/Physically safe environment - no spills, clutter or unnecessary equipment/Purposeful Proactive Rounding/Room/bathroom lighting operational, light cord in reach

## 2025-06-19 DIAGNOSIS — R06.02 SHORTNESS OF BREATH: ICD-10-CM

## 2025-06-19 DIAGNOSIS — Z71.89 OTHER SPECIFIED COUNSELING: ICD-10-CM

## 2025-06-19 DIAGNOSIS — R09.02 HYPOXEMIA: ICD-10-CM

## 2025-06-19 DIAGNOSIS — Z51.5 ENCOUNTER FOR PALLIATIVE CARE: ICD-10-CM

## 2025-06-19 DIAGNOSIS — M35.1 OTHER OVERLAP SYNDROMES: ICD-10-CM

## 2025-06-19 DIAGNOSIS — J44.9 CHRONIC OBSTRUCTIVE PULMONARY DISEASE, UNSPECIFIED: ICD-10-CM

## 2025-06-19 LAB
AMPHET UR-MCNC: NEGATIVE — SIGNIFICANT CHANGE UP
ANION GAP SERPL CALC-SCNC: 13 MMOL/L — SIGNIFICANT CHANGE UP (ref 5–17)
APPEARANCE UR: CLEAR — SIGNIFICANT CHANGE UP
BACTERIA # UR AUTO: NEGATIVE /HPF — SIGNIFICANT CHANGE UP
BARBITURATES UR SCN-MCNC: NEGATIVE — SIGNIFICANT CHANGE UP
BENZODIAZ UR-MCNC: NEGATIVE — SIGNIFICANT CHANGE UP
BILIRUB UR-MCNC: NEGATIVE — SIGNIFICANT CHANGE UP
BUN SERPL-MCNC: 8 MG/DL — SIGNIFICANT CHANGE UP (ref 7–23)
CALCIUM SERPL-MCNC: 8.1 MG/DL — LOW (ref 8.4–10.5)
CAST: 1 /LPF — SIGNIFICANT CHANGE UP (ref 0–4)
CHLORIDE SERPL-SCNC: 101 MMOL/L — SIGNIFICANT CHANGE UP (ref 96–108)
CO2 SERPL-SCNC: 19 MMOL/L — LOW (ref 22–31)
COCAINE METAB.OTHER UR-MCNC: NEGATIVE — SIGNIFICANT CHANGE UP
COLOR SPEC: YELLOW — SIGNIFICANT CHANGE UP
CREAT SERPL-MCNC: 0.75 MG/DL — SIGNIFICANT CHANGE UP (ref 0.5–1.3)
DIFF PNL FLD: NEGATIVE — SIGNIFICANT CHANGE UP
EGFR: 88 ML/MIN/1.73M2 — SIGNIFICANT CHANGE UP
EGFR: 88 ML/MIN/1.73M2 — SIGNIFICANT CHANGE UP
FLUAV AG NPH QL: SIGNIFICANT CHANGE UP
FLUBV AG NPH QL: SIGNIFICANT CHANGE UP
GAS PNL BLDV: SIGNIFICANT CHANGE UP
GLUCOSE SERPL-MCNC: 96 MG/DL — SIGNIFICANT CHANGE UP (ref 70–99)
GLUCOSE UR QL: NEGATIVE MG/DL — SIGNIFICANT CHANGE UP
KETONES UR QL: NEGATIVE MG/DL — SIGNIFICANT CHANGE UP
LEUKOCYTE ESTERASE UR-ACNC: NEGATIVE — SIGNIFICANT CHANGE UP
METHADONE UR-MCNC: NEGATIVE — SIGNIFICANT CHANGE UP
NITRITE UR-MCNC: NEGATIVE — SIGNIFICANT CHANGE UP
OPIATES UR-MCNC: NEGATIVE — SIGNIFICANT CHANGE UP
OXYCODONE UR-MCNC: POSITIVE
PCP SPEC-MCNC: SIGNIFICANT CHANGE UP
PCP UR-MCNC: NEGATIVE — SIGNIFICANT CHANGE UP
PH UR: 6 — SIGNIFICANT CHANGE UP (ref 5–8)
POTASSIUM SERPL-MCNC: 3.6 MMOL/L — SIGNIFICANT CHANGE UP (ref 3.5–5.3)
POTASSIUM SERPL-SCNC: 3.6 MMOL/L — SIGNIFICANT CHANGE UP (ref 3.5–5.3)
PROT UR-MCNC: NEGATIVE MG/DL — SIGNIFICANT CHANGE UP
RBC CASTS # UR COMP ASSIST: 0 /HPF — SIGNIFICANT CHANGE UP (ref 0–4)
RSV RNA NPH QL NAA+NON-PROBE: SIGNIFICANT CHANGE UP
SARS-COV-2 RNA SPEC QL NAA+PROBE: SIGNIFICANT CHANGE UP
SODIUM SERPL-SCNC: 133 MMOL/L — LOW (ref 135–145)
SOURCE RESPIRATORY: SIGNIFICANT CHANGE UP
SP GR SPEC: 1.01 — SIGNIFICANT CHANGE UP (ref 1–1.03)
SQUAMOUS # UR AUTO: 0 /HPF — SIGNIFICANT CHANGE UP (ref 0–5)
THC UR QL: NEGATIVE — SIGNIFICANT CHANGE UP
TROPONIN T, HIGH SENSITIVITY RESULT: 7 NG/L — SIGNIFICANT CHANGE UP (ref 0–51)
UROBILINOGEN FLD QL: 0.2 MG/DL — SIGNIFICANT CHANGE UP (ref 0.2–1)
WBC UR QL: 0 /HPF — SIGNIFICANT CHANGE UP (ref 0–5)

## 2025-06-19 PROCEDURE — G0545: CPT

## 2025-06-19 PROCEDURE — 99223 1ST HOSP IP/OBS HIGH 75: CPT

## 2025-06-19 PROCEDURE — 71275 CT ANGIOGRAPHY CHEST: CPT | Mod: 26

## 2025-06-19 PROCEDURE — 93308 TTE F-UP OR LMTD: CPT | Mod: 26

## 2025-06-19 PROCEDURE — 76604 US EXAM CHEST: CPT | Mod: 26

## 2025-06-19 PROCEDURE — 99497 ADVNCD CARE PLAN 30 MIN: CPT | Mod: 25

## 2025-06-19 PROCEDURE — 70450 CT HEAD/BRAIN W/O DYE: CPT | Mod: 26

## 2025-06-19 PROCEDURE — 99223 1ST HOSP IP/OBS HIGH 75: CPT | Mod: GC

## 2025-06-19 PROCEDURE — 93970 EXTREMITY STUDY: CPT | Mod: 26

## 2025-06-19 RX ORDER — DULOXETINE 20 MG/1
1 CAPSULE, DELAYED RELEASE ORAL
Refills: 0 | DISCHARGE

## 2025-06-19 RX ORDER — OXYCODONE HYDROCHLORIDE 30 MG/1
10 TABLET ORAL EVERY 6 HOURS
Refills: 0 | Status: DISCONTINUED | OUTPATIENT
Start: 2025-06-19 | End: 2025-06-25

## 2025-06-19 RX ORDER — NICOTINE POLACRILEX 4 MG/1
1 GUM, CHEWING ORAL ONCE
Refills: 0 | Status: COMPLETED | OUTPATIENT
Start: 2025-06-19 | End: 2025-06-19

## 2025-06-19 RX ORDER — OXYCODONE HYDROCHLORIDE 30 MG/1
1 TABLET ORAL
Refills: 0 | DISCHARGE

## 2025-06-19 RX ORDER — AMLODIPINE BESYLATE 10 MG/1
1 TABLET ORAL
Refills: 0 | DISCHARGE

## 2025-06-19 RX ORDER — CEFAZOLIN SODIUM IN 0.9 % NACL 3 G/100 ML
2000 INTRAVENOUS SOLUTION, PIGGYBACK (ML) INTRAVENOUS ONCE
Refills: 0 | Status: COMPLETED | OUTPATIENT
Start: 2025-06-19 | End: 2025-06-19

## 2025-06-19 RX ORDER — FUROSEMIDE 10 MG/ML
20 INJECTION INTRAMUSCULAR; INTRAVENOUS ONCE
Refills: 0 | Status: COMPLETED | OUTPATIENT
Start: 2025-06-19 | End: 2025-06-19

## 2025-06-19 RX ORDER — PREGABALIN 50 MG/1
1 CAPSULE ORAL
Refills: 0 | DISCHARGE

## 2025-06-19 RX ORDER — DULOXETINE 20 MG/1
80 CAPSULE, DELAYED RELEASE ORAL DAILY
Refills: 0 | Status: DISCONTINUED | OUTPATIENT
Start: 2025-06-19 | End: 2025-06-25

## 2025-06-19 RX ORDER — HYDROXYCHLOROQUINE SULFATE 200 MG/1
200 TABLET, FILM COATED ORAL DAILY
Refills: 0 | Status: DISCONTINUED | OUTPATIENT
Start: 2025-06-19 | End: 2025-06-25

## 2025-06-19 RX ORDER — ATORVASTATIN CALCIUM 80 MG/1
20 TABLET, FILM COATED ORAL AT BEDTIME
Refills: 0 | Status: DISCONTINUED | OUTPATIENT
Start: 2025-06-19 | End: 2025-06-25

## 2025-06-19 RX ORDER — SODIUM ZIRCONIUM CYCLOSILICATE 5 G/5G
10 POWDER, FOR SUSPENSION ORAL ONCE
Refills: 0 | Status: COMPLETED | OUTPATIENT
Start: 2025-06-19 | End: 2025-06-19

## 2025-06-19 RX ORDER — ALPRAZOLAM 0.5 MG
1 TABLET, EXTENDED RELEASE 24 HR ORAL
Refills: 0 | DISCHARGE

## 2025-06-19 RX ORDER — HYDROXYCHLOROQUINE SULFATE 200 MG/1
1 TABLET, FILM COATED ORAL
Refills: 0 | DISCHARGE

## 2025-06-19 RX ORDER — NORTRIPTYLINE HCL 75 MG
4 CAPSULE ORAL
Refills: 0 | DISCHARGE

## 2025-06-19 RX ORDER — CALCIUM GLUCONATE 20 MG/ML
1 INJECTION, SOLUTION INTRAVENOUS ONCE
Refills: 0 | Status: COMPLETED | OUTPATIENT
Start: 2025-06-19 | End: 2025-06-19

## 2025-06-19 RX ORDER — HYDROMORPHONE/SOD CHLOR,ISO/PF 2 MG/10 ML
1 SYRINGE (ML) INJECTION EVERY 6 HOURS
Refills: 0 | Status: DISCONTINUED | OUTPATIENT
Start: 2025-06-19 | End: 2025-06-25

## 2025-06-19 RX ORDER — CEFAZOLIN SODIUM IN 0.9 % NACL 3 G/100 ML
2000 INTRAVENOUS SOLUTION, PIGGYBACK (ML) INTRAVENOUS EVERY 8 HOURS
Refills: 0 | Status: DISCONTINUED | OUTPATIENT
Start: 2025-06-19 | End: 2025-06-24

## 2025-06-19 RX ORDER — PREGABALIN 50 MG/1
200 CAPSULE ORAL THREE TIMES A DAY
Refills: 0 | Status: DISCONTINUED | OUTPATIENT
Start: 2025-06-19 | End: 2025-06-25

## 2025-06-19 RX ORDER — ATORVASTATIN CALCIUM 80 MG/1
1 TABLET, FILM COATED ORAL
Refills: 0 | DISCHARGE

## 2025-06-19 RX ORDER — AMLODIPINE BESYLATE 10 MG/1
2.5 TABLET ORAL DAILY
Refills: 0 | Status: DISCONTINUED | OUTPATIENT
Start: 2025-06-19 | End: 2025-06-25

## 2025-06-19 RX ORDER — CYCLOSPORINE OPHTHALMIC SOLUTION 1 MG/ML
1 SOLUTION/ DROPS OPHTHALMIC
Refills: 0 | DISCHARGE

## 2025-06-19 RX ORDER — ASPIRIN 325 MG
81 TABLET ORAL DAILY
Refills: 0 | Status: DISCONTINUED | OUTPATIENT
Start: 2025-06-19 | End: 2025-06-25

## 2025-06-19 RX ORDER — METHYLPREDNISOLONE ACETATE 80 MG/ML
0.5 INJECTION, SUSPENSION INTRA-ARTICULAR; INTRALESIONAL; INTRAMUSCULAR; SOFT TISSUE
Refills: 0 | DISCHARGE

## 2025-06-19 RX ORDER — CEFAZOLIN SODIUM IN 0.9 % NACL 3 G/100 ML
INTRAVENOUS SOLUTION, PIGGYBACK (ML) INTRAVENOUS
Refills: 0 | Status: DISCONTINUED | OUTPATIENT
Start: 2025-06-19 | End: 2025-06-24

## 2025-06-19 RX ORDER — ASPIRIN 325 MG
1 TABLET ORAL
Refills: 0 | DISCHARGE

## 2025-06-19 RX ORDER — METHYLPREDNISOLONE ACETATE 80 MG/ML
2 INJECTION, SUSPENSION INTRA-ARTICULAR; INTRALESIONAL; INTRAMUSCULAR; SOFT TISSUE DAILY
Refills: 0 | Status: DISCONTINUED | OUTPATIENT
Start: 2025-06-19 | End: 2025-06-21

## 2025-06-19 RX ORDER — FUROSEMIDE 10 MG/ML
40 INJECTION INTRAMUSCULAR; INTRAVENOUS
Refills: 0 | Status: DISCONTINUED | OUTPATIENT
Start: 2025-06-19 | End: 2025-06-20

## 2025-06-19 RX ORDER — NORTRIPTYLINE HCL 75 MG
40 CAPSULE ORAL
Refills: 0 | Status: DISCONTINUED | OUTPATIENT
Start: 2025-06-19 | End: 2025-06-25

## 2025-06-19 RX ORDER — ALPRAZOLAM 0.5 MG
0.25 TABLET, EXTENDED RELEASE 24 HR ORAL THREE TIMES A DAY
Refills: 0 | Status: DISCONTINUED | OUTPATIENT
Start: 2025-06-19 | End: 2025-06-25

## 2025-06-19 RX ORDER — DEXTROSE 50 % IN WATER 50 %
25 SYRINGE (ML) INTRAVENOUS ONCE
Refills: 0 | Status: COMPLETED | OUTPATIENT
Start: 2025-06-19 | End: 2025-06-19

## 2025-06-19 RX ADMIN — ATORVASTATIN CALCIUM 20 MILLIGRAM(S): 80 TABLET, FILM COATED ORAL at 22:39

## 2025-06-19 RX ADMIN — Medication 0.25 MILLIGRAM(S): at 22:17

## 2025-06-19 RX ADMIN — Medication 100 MILLIGRAM(S): at 18:04

## 2025-06-19 RX ADMIN — FUROSEMIDE 40 MILLIGRAM(S): 10 INJECTION INTRAMUSCULAR; INTRAVENOUS at 16:35

## 2025-06-19 RX ADMIN — Medication 1 MILLIGRAM(S): at 18:04

## 2025-06-19 RX ADMIN — Medication 81 MILLIGRAM(S): at 16:35

## 2025-06-19 RX ADMIN — Medication 5 UNIT(S): at 01:33

## 2025-06-19 RX ADMIN — OXYCODONE HYDROCHLORIDE 10 MILLIGRAM(S): 30 TABLET ORAL at 23:03

## 2025-06-19 RX ADMIN — Medication 25 GRAM(S): at 01:33

## 2025-06-19 RX ADMIN — Medication 250 MILLIGRAM(S): at 00:31

## 2025-06-19 RX ADMIN — CALCIUM GLUCONATE 100 GRAM(S): 20 INJECTION, SOLUTION INTRAVENOUS at 01:40

## 2025-06-19 RX ADMIN — NICOTINE POLACRILEX 1 PATCH: 4 GUM, CHEWING ORAL at 03:58

## 2025-06-19 RX ADMIN — PREGABALIN 200 MILLIGRAM(S): 50 CAPSULE ORAL at 16:35

## 2025-06-19 RX ADMIN — Medication 40 MILLIGRAM(S): at 22:14

## 2025-06-19 RX ADMIN — NICOTINE POLACRILEX 1 PATCH: 4 GUM, CHEWING ORAL at 19:00

## 2025-06-19 RX ADMIN — Medication 100 MILLIGRAM(S): at 22:39

## 2025-06-19 RX ADMIN — PREGABALIN 200 MILLIGRAM(S): 50 CAPSULE ORAL at 22:15

## 2025-06-19 RX ADMIN — FUROSEMIDE 20 MILLIGRAM(S): 10 INJECTION INTRAMUSCULAR; INTRAVENOUS at 01:35

## 2025-06-19 RX ADMIN — Medication 1 MILLIGRAM(S): at 19:13

## 2025-06-19 RX ADMIN — SODIUM ZIRCONIUM CYCLOSILICATE 10 GRAM(S): 5 POWDER, FOR SUSPENSION ORAL at 01:36

## 2025-06-19 RX ADMIN — HYDROXYCHLOROQUINE SULFATE 200 MILLIGRAM(S): 200 TABLET, FILM COATED ORAL at 16:35

## 2025-06-19 NOTE — H&P ADULT - ASSESSMENT
65-year-old woman with extensive cardiopulmonary and neurologic comorbidities presents after a prolonged mechanical fall precipitated by documented severe hypoxia (SpO2 50 % on room air) and hypotension. CT chest plus exam indicate volume overload with bilateral crackles, ground-glass and interlobular septal thickening, trace effusion, reflux into hepatic veins, and cardiomegaly, all consistent with an acute decompensated right-sided heart-failure/pulmonary-edema picture in the setting of underlying COPD-pulmonary hypertension. Her bilateral lower-extremity erythema and pitting edema may represent stasis dermatitis/cellulitis superimposed on chronic edema. There is no evidence of intracranial hemorrhage or acute stroke to explain her slurred speech, which is likely baseline or hypoxia related. She also endured 4–5 h of immobilization raising concern for rhabdomyolysis and acute kidney injury. Ascending aortic aneurysm (4.1 cm) and chronic thoracic spine/rib fractures are incidental and stable. Overall, she is hemodynamically marginal but responsive to gentle diuresis and supplemental oxygen; immediate priorities are treatment of probable CHF exacerbation versus concomitant infectious/inflammatory lung process, mitigation of rhabdomyolysis risk, and evaluation of bilateral leg cellulitis versus stasis.
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
## 2025-06-19 NOTE — CONSULT NOTE ADULT - PROBLEM SELECTOR RECOMMENDATION 3
see above GOC note  HCP: [facility] (form completed and copy placed in chart)  Code status: full code  goals are for full medical management with no limitations to interventions
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
## 2025-06-19 NOTE — H&P ADULT - PROBLEM SELECTOR PLAN 1
Due to volume overload.  Continue supplemental oxygen (currently 4L NC), titrate to maintain SpO2 > 89%.  Diuresis: Lasix Due to volume overload.  Continue supplemental oxygen (currently 4L NC), titrate to maintain SpO2 > 89%.  Diuresis: Lasix 40mg IV bid

## 2025-06-19 NOTE — CONSULT NOTE ADULT - ASSESSMENT
65-year-old female with a significant past medical history including a prior myocardial infarction (MI), connective tissue disease, cerebrovascular accident (CVA), seizures secondary to hyponatremia, remote substance use, COPD with pulmonary hypertension (on home O2 2L), and pulmonary fibrosis.    This morning, the patient felt "off" and dizzy, prompting her to visit her doctor's office. There, she was found to be hypotensive (exact numbers unknown) and hypoxic with an oxygen saturation of 50% on room air. She was advised to go to the Emergency Department (ED) but instead went home. Upon arriving home, she reports walking into her garage and falling. She was unable to get up for approximately 4-5 hours. She denies hitting her head or experiencing a loss of consciousness during the fall. She denies being on any blood thinners.    Emergency Medical Services (EMS) and police department (PD) were called. On their arrival, her oxygen saturation was again noted to be in the 50s on room air. She was placed on 6L nasal cannula, with improvement in her O2 saturation to 94%. In the ED triage, her oxygen saturation was 50% on room air, improving with 4L nasal cannula.    The patient reports bilateral leg redness and pain. She denies any fevers, chills, chest pain, shortness of breath (despite hypoxia), abdominal pain, nausea, vomiting, diarrhea, constipation, dysuria, weakness, numbness, tingling, neck pain, vision changes, headache, or lightheadedness (though she initially reported dizziness).  (19 Jun 2025 13:42)  Pt claims the erythema of the upper thighs is new but the lower legs is not.   CT personally reviewed with radiology attending -Imaging with bilateral septal thickening and intralobular lines with diffuse GGOs suggestive of pulmonary fibrotic disease with possible superimposed edema/infection. PA enlarged with contrast reflux suggestive of pHTN. ? enlarged esophagus     A/P  #COPD  # ? pulmonary fibrosis  #hypotensive  #hypoxemia  #connective tissue disorder   #leg erythema    - check BC x 2 sets  - check MRSA swab  - Agree with vanco/zosyn  - appreciate pulmonary input  - consider derm input for legs  - rheum input for connnective tissue disorder  - LE arterial and venous dopplers   - Obtain full RVP, sputum culture, urine legionella and strep, Mycoplasma,  - check cortisol level      Zakia Gilbert M.D. ,   please reach via teams   If no answer, or after 5PM/ weekends,  then please call  [phone]

## 2025-06-19 NOTE — CONSULT NOTE ADULT - PROBLEM SELECTOR RECOMMENDATION 4
Goals are established. Patient does not have a hospice qualifying diagnosis but is agreeable to home care if needed upon discharge. Will sign off.    Case discussed with Dr. Knenedy.    Edith Baca MD  Geriatrics and Palliative Medicine Attending  [facility] pager: [phone]

## 2025-06-19 NOTE — H&P ADULT - NSHPLABSRESULTS_GEN_ALL_CORE
Andre Reyes, M.D.  Office: [phone]  Available thru [employer] Teams     Patient is a 65y old  Female who presents with a chief complaint of         SUBJECTIVE / OVERNIGHT EVENTS:    No acute overnight events.        MEDICATIONS  (STANDING):  amLODIPine   Tablet 2.5 milliGRAM(s) Oral daily  aspirin enteric coated 81 milliGRAM(s) Oral daily  atorvastatin 20 milliGRAM(s) Oral at bedtime  DULoxetine 80 milliGRAM(s) Oral daily  furosemide   Injectable 40 milliGRAM(s) IV Push two times a day  hydroxychloroquine 200 milliGRAM(s) Oral daily  nortriptyline 40 milliGRAM(s) Oral two times a day  pantoprazole    Tablet 40 milliGRAM(s) Oral before breakfast  pregabalin 200 milliGRAM(s) Oral three times a day    MEDICATIONS  (PRN):  ALPRAZolam 0.25 milliGRAM(s) Oral three times a day PRN anxiety          T(C): 36.5 (06-19 @ 09:50), Max: 36.8 (06-18 @ 22:00)   HR: 84   BP: 115/69   RR: 22   SpO2: 95%    PHYSICAL EXAM:    CONSTITUTIONAL: NAD, well-developed, well-groomed  EYES: PERRLA; conjunctiva and sclera clear  ENMT: Moist oral mucosa, no pharyngeal injection or exudates; normal dentition  RESPIRATORY: Normal respiratory effort; lungs are clear to auscultation bilaterally  CARDIOVASCULAR: Regular rate and rhythm, normal S1 and S2, no murmur/rub/gallop; No lower extremity edema; Peripheral pulses are 2+ bilaterally  ABDOMEN: Nontender to palpation, normoactive bowel sounds, no rebound/guarding; No hepatosplenomegaly  MUSCULOSKELETAL:  no clubbing or cyanosis of digits; no joint swelling or tenderness to palpation  PSYCH: A+O to person, place, and time; affect appropriate  NEUROLOGY: CN 2-12 are intact and symmetric; no gross sensory deficits       LABS:                        10.5   7.57  )-----------( 226      ( 18 Jun 2025 22:37 )             34.4      06-19    133[L]  |  101  |  8   ----------------------------<  96  3.6   |  19[L]  |  0.75    Ca    8.1[L]      19 Jun 2025 02:55    TPro  6.8  /  Alb  3.4  /  TBili  0.8  /  DBili  x   /  AST  40  /  ALT  16  /  AlkPhos  110  06-18       CAPILLARY BLOOD GLUCOSE      POCT Blood Glucose.: 347 mg/dL (19 Jun 2025 01:33)      RADIOLOGY & ADDITIONAL TESTS:    Imaging Personally Reviewed:  Consultant(s) Notes Reviewed:    Care Discussed with Consultants/Other Providers: labs and imaging reviewed.                        10.5   7.57  )-----------( 226      ( 18 Jun 2025 22:37 )             34.4      06-19    133[L]  |  101  |  8   ----------------------------<  96  3.6   |  19[L]  |  0.75    Ca    8.1[L]      19 Jun 2025 02:55    TPro  6.8  /  Alb  3.4  /  TBili  0.8  /  DBili  x   /  AST  40  /  ALT  16  /  AlkPhos  110  06-18       CAPILLARY BLOOD GLUCOSE      POCT Blood Glucose.: 347 mg/dL (19 Jun 2025 01:33)      RADIOLOGY & ADDITIONAL TESTS:    Imaging Personally Reviewed:    CT Head:  Impression: No acute intracranial hemorrhage, mass effect, or midline shift. Age-appropriate involutional changes. Periventricular and subcortical white matter hypodensities consistent with microvascular type changes. Unchanged chronic curvilinear nondisplaced fracture involving the right occipital bone.    CT Chest (PE Protocol - motion degraded for segmental/subsegmental PAs):  Findings: Diffuse bilateral reticular opacities and interlobular septal thickening. Ground glass opacities in both lower lobes and the lingula. Trace left pleural effusion. Aneurysmal dilatation of the ascending aorta (4.1 cm). Atherosclerotic calcifications of coronary arteries and aorta. Reflux of contrast into IVC and hepatic veins suggesting right-sided heart failure. Cardiomegaly. No pericardial effusion. Mild right hilar lymphadenopathy. Multiple splenic granulomas. Degenerative changes of bones. Age indeterminant severe compression fracture deformity of T6, T4. Chronic compression deformities of T10, T12. Chronic sternum deformity. Old bilateral rib fractures. Age indeterminant fracture deformity of left humeral head.  Impression: No central pulmonary embolism. Groundglass opacities in both lower lobes and the lingula (diagnostic considerations infectious/inflammatory etiologies or pulmonary edema). Multiple compression deformities of the thoracic spine. Age indeterminant fracture deformity of the left humeral head. Ancillary findings as above.    Consultant(s) Notes Reviewed:    Care Discussed with Consultants/Other Providers:

## 2025-06-19 NOTE — H&P ADULT - NSHPREVIEWOFSYSTEMS_GEN_ALL_CORE
Constitutional: Reports feeling "off" and dizzy earlier. Denies fevers, chills.  Neurological: Reports fall without LOC or head strike. Slurred speech noted. Denies weakness, numbness, tingling, headache, vision changes. History of CVA and seizures.  Respiratory: Hypoxia noted (O2 sat 50s RA). Denies shortness of breath despite this. History of COPD, pulmonary HTN, fibrosis.  Cardiovascular: Reports prior MI. Denies chest pain. Hypotension noted at MD office.  Musculoskeletal: Fall. Reports bilateral leg pain. Multiple scattered bruises on arms. Abrasions on bilateral elbows and knees. Denies neck pain. History of multiple fractures.  Skin: Bilateral lower extremity redness, +1 pitting edema, skin taut. Multiple bruises in various stages of healing on bilateral arms. Abrasions.  Gastrointestinal: Denies abdominal pain, nausea, vomiting, diarrhea, constipation.  Genitourinary: Denies dysuria. History of neurogenic bladder. Retaining urine in ED.

## 2025-06-19 NOTE — CONSULT NOTE ADULT - SUBJECTIVE AND OBJECTIVE BOX
Date of Service: 06-19-25 @ 12:49    HPI:     PERTINENT PM/SXH:   Diverticulitis of colon (without mention of hemorrhage)  Chronic pain  S/P Laminectomy  Lumbar 3/10  Depression H/O panic attack  Mixed connective tissue disease  Adenocarcinoma of lung  Alcohol abuse  Opiate dependence  Lung cancer    History of lung cancer  S/P cholecystectomy  S/P lumbar laminectomy  History of hip replacement, total, right  History of oophorectomy, unilateral  History of lung surgery  History of laminectomy  H/O hand surgery    FAMILY HISTORY:  Family history of leukemia  in father    FHx: rheumatoid arthritis  in mother - with RA associated lung fibrosis      SOCIAL HISTORY:   Significant other/partner[ ]  Children[ ]  Yazidism/Spirituality:  Substance hx:  [ ]   Tobacco hx:  [ ]   Alcohol hx: [ ]   Home Opioid hx:  [ ] I-Stop Reference No:  Living Situation: [x ]Home  [ ]Long term care  [ ]Rehab [ ]Other    ADVANCE DIRECTIVES:    DNR/MOLST  [ ]  Living Will  [ ]   DECISION MAKER(s):  [x ] Health Care Proxy(s)  [ ] Surrogate(s)  [ ] Guardian           Name(s): Phone Number(s):  Anita River [phone]  BASELINE (I)ADL(s) (prior to admission):  Cory: [x ]Total  [ ] Moderate [ ]Dependent    Allergies    penicillin (Swelling)  morphine (Vomiting; Nausea)  penicillin (Other)  penicillin (Unknown)    Intolerances    MEDICATIONS  (STANDING):    MEDICATIONS  (PRN):      ITEMS NOT CHECKED ARE NOT PRESENT  PRESENT SYMPTOMS: [ ]Unable to self-report see CPOT, PAINADs, RDOS  Source if other than patient:  [ ]Family   [ ]Team     Pain: [ ]yes [x ]no  QOL impact -   Location -                    Aggravating factors -  Quality -  Radiation -  Timing-  Severity (0-10 scale):  Minimal acceptable level (0-10 scale):       Dyspnea:                no           [ ]Mild [ ]Moderate [ ]Severe  Anxiety:                             [ ]Mild [ ]Moderate [ ]Severe  Fatigue:                             [ ]Mild [ ]Moderate [ ]Severe  Nausea:                             [ ]Mild [ ]Moderate [ ]Severe  Loss of appetite:              [ ]Mild [ ]Moderate [ ]Severe  Constipation:                    [ ]Mild [ ]Moderate [ ]Severe    PCSSQ [Palliative Care Spiritual Screening Question]   Severity (0-10):  Score of 4 or > indicate consideration of Chaplaincy referral.  Chaplaincy Referral: [ ] yes [ ] refused [ ] following [ x] deferred    Caregiver Elizabeth? : [ ] yes [x ] no [ ] deferred:  Social work referral [ ] Patient & Family Centered Care Referral [ ]     Anticipatory Grief Present?: [ ] yes [x ] no  [ ] deferred: Palliative Social work referral [ ]  Patient & Family Centered Care Referral [ ]       Other Symptoms:  [ x]All other review of systems negative   [ ] Unable to obtain due to poor mentation    PHYSICAL EXAM:  Vital Signs Last 24 Hrs  T(C): 36.5 (19 Jun 2025 09:50), Max: 36.8 (18 Jun 2025 22:00)  T(F): 97.7 (19 Jun 2025 09:50), Max: 98.2 (18 Jun 2025 22:00)  HR: 84 (19 Jun 2025 09:50) (70 - 85)  BP: 115/69 (19 Jun 2025 09:50) (81/51 - 160/69)  BP(mean): 93 (19 Jun 2025 05:12) (61 - 99)  RR: 22 (19 Jun 2025 09:50) (12 - 26)  SpO2: 95% (19 Jun 2025 09:50) (95% - 100%)    Parameters below as of 19 Jun 2025 09:50  Patient On (Oxygen Delivery Method): nasal cannula  O2 Flow (L/min): 3   I&O's Summary      GENERAL:  [x]Alert  [x]Oriented x 3  [ ]Lethargic  [ ]Cachexia  [ ]Unarousable  [x]Verbal  [ ]Non-Verbal  Behavioral:   [ ]Anxiety  [ ]Delirium [ ]Agitation [ ]Other  HEENT:  [x]Normal   [ ]Dry mouth   [ ]ET Tube/Trach  [ ]Oral lesions  PULMONARY:   [x]Clear [ ]Tachypnea  [ ]Audible excessive secretions   [ ]Rhonchi        [ ]Right [ ]Left [ ]Bilateral  [ ]Crackles        [ ]Right [ ]Left [ ]Bilateral  [ ]Wheezing     [ ]Right [ ]Left [ ]Bilateral  [ ]Diminished BS [ ] Right [ ]Left [ ]Bilateral  CARDIOVASCULAR:    [x]Regular [ ]Irregular [ ]Tachy  [ ]Hang [ ]Murmur [ ]Other  GASTROINTESTINAL:  [x]Soft  [ ]Distended   [x]+BS  [x]Non tender [ ]Tender  [ ]PEG [ ]OGT/ NGT   Last BM:    GENITOURINARY:  [x]Normal [ ]Incontinent   [ ]Oliguria/Anuria   [ ]Zurita  MUSCULOSKELETAL:   [ ]Normal   [x]Weakness  [ ]Bed/Wheelchair bound [ ]Edema  NEUROLOGIC:   [x]No focal deficits  [ ] Cognitive impairment  [ ] Dysphagia [ ]Dysarthria [ ] Paresis [ ]Other   SKIN:   b/l LE erythema  [ ]Normal  [ ]Rash   [ ]Pressure ulcer(s) [ ]y [ ]n present on admission    CRITICAL CARE:  [ ] Shock Present  [ ]Septic [ ]Cardiogenic [ ]Neurologic [ ]Hypovolemic  [ ]  Vasopressors [ ]  Inotropes   [ ]Respiratory failure present [ ]Mechanical ventilation [ ]Non-invasive ventilatory support [ ]High flow    [ ]Acute  [ ]Chronic [ ]Hypoxic  [ ]Hypercarbic [ ]Other  [ ]Other organ failure     LABS:                        10.5   7.57  )-----------( 226      ( 18 Jun 2025 22:37 )             34.4   06-19    133[L]  |  101  |  8   ----------------------------<  96  3.6   |  19[L]  |  0.75    Ca    8.1[L]      19 Jun 2025 02:55    TPro  6.8  /  Alb  3.4  /  TBili  0.8  /  DBili  x   /  AST  40  /  ALT  16  /  AlkPhos  110  06-18  PT/INR - ( 18 Jun 2025 22:37 )   PT: 16.9 sec;   INR: 1.49 ratio         PTT - ( 18 Jun 2025 22:37 )  PTT:26.9 sec    Urinalysis Basic - ( 19 Jun 2025 02:55 )    Color: x / Appearance: x / SG: x / pH: x  Gluc: 96 mg/dL / Ketone: x  / Bili: x / Urobili: x   Blood: x / Protein: x / Nitrite: x   Leuk Esterase: x / RBC: x / WBC x   Sq Epi: x / Non Sq Epi: x / Bacteria: x      RADIOLOGY & ADDITIONAL STUDIES:  < from: CT Head No Cont (06.19.25 @ 01:15) >  IMPRESSION:  No acute intracranial hemorrhage, mass effect, or midline shift.        --- End of Report ---          MARINO العلي DO; Resident Radiologist  This document has been electronically signed.  RACHEL ATKINSON MD; Attending Radiologist  This document has been electronically signed. Jun 19 2025  4:30AM    < end of copied text >      PROTEIN CALORIE MALNUTRITION PRESENT: [ ]mild [ ]moderate [ ]severe [ ]underweight [ ]morbid obesity  https://www.andeal.org/vault/2440/web/files/ONC/Table_Clinical%20Characteristics%20to%20Document%20Malnutrition-White%20JV%20et%20al%202012.pdf      Weight (kg): 54.4 (06-18-25 @ 21:40)    [ ]PPSV2 < or = to 30% [ ]significant weight loss  [ ]poor nutritional intake  [ ]anasarca[ ]Artificial Nutrition      Other REFERRALS:  [ ]Hospice  [ ]Child Life  [ ]Social Work  [ ]Case management [ ]Holistic Therapy
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
## 2025-06-19 NOTE — CONSULT NOTE ADULT - CONVERSATION DETAILS
Palliative team introduced and role in patient's care explained. ACP discussed including code status and HCP. Pt expressed that she required intubation last year and was successfully extubated, thus she wishes to remain full code. Discussed with patient regarding the importance of a HCP. Shared with patient that a health care proxy is a legal document that appoints someone else to serve as your health care agent regarding healthcare decisions when you cannot do so for yourself. Patient agreeable to completing HCP form during encounter. Form completed naming cousin Anita River [phone] at primary agent. She declined to name alternate agent. All questions answered and support provided.
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
## 2025-06-19 NOTE — H&P ADULT - HISTORY OF PRESENT ILLNESS
65-year-old female with a significant past medical history including a prior myocardial infarction (MI), connective tissue disease, cerebrovascular accident (CVA), seizures secondary to hyponatremia, remote substance use, COPD with pulmonary hypertension (on home O2 2L), and pulmonary fibrosis.    This morning, the patient felt "off" and dizzy, prompting her to visit her doctor's office. There, she was found to be hypotensive (exact numbers unknown) and hypoxic with an oxygen saturation of 50% on room air. She was advised to go to the Emergency Department (ED) but instead went home. Upon arriving home, she reports walking into her garage and falling. She was unable to get up for approximately 4-5 hours. She denies hitting her head or experiencing a loss of consciousness during the fall. She denies being on any blood thinners.    Emergency Medical Services (EMS) and police department (PD) were called. On their arrival, her oxygen saturation was again noted to be in the 50s on room air. She was placed on 6L nasal cannula, with improvement in her O2 saturation to 94%. In the ED triage, her oxygen saturation was 50% on room air, improving with 4L nasal cannula.    The patient reports bilateral leg redness and pain. She denies any fevers, chills, chest pain, shortness of breath (despite hypoxia), abdominal pain, nausea, vomiting, diarrhea, constipation, dysuria, weakness, numbness, tingling, neck pain, vision changes, headache, or lightheadedness (though she initially reported dizziness).  no
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
## 2025-06-19 NOTE — ED ADULT NURSE REASSESSMENT NOTE - NS ED NURSE REASSESS COMMENT FT1
Patient oxygen saturation decreased to 50%, MD Hodgson at bedside. Patient placed on 6L NC, improved gradually. Weaned down to 3L NC

## 2025-06-19 NOTE — CONSULT NOTE ADULT - SUBJECTIVE AND OBJECTIVE BOX
Patient is a 65y old  Female who presents with a chief complaint of Acute Hypoxic Respiratory failure, Acute CHF exacerbation (19 Jun 2025 13:42)      HPI:  65-year-old female with a significant past medical history including a prior myocardial infarction (MI), connective tissue disease, cerebrovascular accident (CVA), seizures secondary to hyponatremia, remote substance use, COPD with pulmonary hypertension (on home O2 2L), and pulmonary fibrosis.    This morning, the patient felt "off" and dizzy, prompting her to visit her doctor's office. There, she was found to be hypotensive (exact numbers unknown) and hypoxic with an oxygen saturation of 50% on room air. She was advised to go to the Emergency Department (ED) but instead went home. Upon arriving home, she reports walking into her garage and falling. She was unable to get up for approximately 4-5 hours. She denies hitting her head or experiencing a loss of consciousness during the fall. She denies being on any blood thinners.    Emergency Medical Services (EMS) and police department (PD) were called. On their arrival, her oxygen saturation was again noted to be in the 50s on room air. She was placed on 6L nasal cannula, with improvement in her O2 saturation to 94%. In the ED triage, her oxygen saturation was 50% on room air, improving with 4L nasal cannula.    The patient reports bilateral leg redness and pain. She denies any fevers, chills, chest pain, shortness of breath (despite hypoxia), abdominal pain, nausea, vomiting, diarrhea, constipation, dysuria, weakness, numbness, tingling, neck pain, vision changes, headache, or lightheadedness (though she initially reported dizziness).  (19 Jun 2025 13:42)      PAST MEDICAL & SURGICAL HISTORY:  Diverticulitis of colon (without mention of hemorrhage)      Chronic pain      S/P Laminectomy  Lumbar 3/10      Depression H/O panic attack  with anxiety      Mixed connective tissue disease      Adenocarcinoma of lung      Alcohol abuse      Opiate dependence      Lung cancer      History of lung cancer  s/p wedge resection of RML      S/P cholecystectomy      S/P lumbar laminectomy      History of hip replacement, total, right  6/7/2020      History of oophorectomy, unilateral  bilateral      History of lung surgery      History of laminectomy      H/O hand surgery          Social history:   Social History:    Marital Status:   Occupation:   Lives with:     Substance Use :  Tobacco Usage:  (   ) never smoked   (   ) former smoker   (   ) current smoker  (     ) pack year  (        ) last tobacco use date  Alcohol Usage:  Travel:  Pets:          FAMILY HISTORY:  Family history of leukemia  in father    FHx: rheumatoid arthritis  in mother - with RA associated lung fibrosis        REVIEW OF SYSTEMS  General:	Denies any malaise fatigue or chills. Fevers absent    Skin:No rash  	  Ophthalmologic:Denies any visual complaints,discharge redness or photophobia  	  ENMT:No nasal discharge,headache,sinus congestion or throat pain.No dental complaints    Respiratory and Thorax:No cough,sputum or chest pain.Denies shortness of breath  	  Cardiovascular:	No chest pain,palpitaions or dizziness    Gastrointestinal:	NO nausea,abdominal pain or diarrhea.    Genitourinary:	No dysuria,frequency. No flank pain    Musculoskeletal:	No joint swelling or pain.No weakness    Neurological:No confusion,diziness.No extremity weakness.No bladder or bowel incontinence	    Psychiatric:No delusions or hallucinations	    Hematology/Lymphatics:	No LN swelling.No gum bleeding     Endocrine:	No recent weight gain or loss.No abnormal heat/cold intolerance    Allergic/Immunologic:	No hives or rash     Allergies    penicillin (Swelling)  morphine (Vomiting; Nausea)  penicillin (Other)  penicillin (Unknown)    Intolerances        Antimicrobials:       MEDICATIONS  (prior antimicrobials ):  cefTRIAXone   IVPB   100 mL/Hr IV Intermittent (06-18-25 @ 23:19)    vancomycin  IVPB.   250 mL/Hr IV Intermittent (06-19-25 @ 00:31)             hydroxychloroquine 200 milliGRAM(s) Oral daily      MEDICATIONS  (STANDING):  amLODIPine   Tablet 2.5 milliGRAM(s) Oral daily  aspirin enteric coated 81 milliGRAM(s) Oral daily  atorvastatin 20 milliGRAM(s) Oral at bedtime  DULoxetine 80 milliGRAM(s) Oral daily  furosemide   Injectable 40 milliGRAM(s) IV Push two times a day  hydroxychloroquine 200 milliGRAM(s) Oral daily  nortriptyline 40 milliGRAM(s) Oral two times a day  pantoprazole    Tablet 40 milliGRAM(s) Oral before breakfast  pregabalin 200 milliGRAM(s) Oral three times a day    MEDICATIONS  (PRN):  ALPRAZolam 0.25 milliGRAM(s) Oral three times a day PRN anxiety        Vital Signs Last 24 Hrs  T(C): 36.5 (19 Jun 2025 09:50), Max: 36.8 (18 Jun 2025 22:00)  T(F): 97.7 (19 Jun 2025 09:50), Max: 98.2 (18 Jun 2025 22:00)  HR: 84 (19 Jun 2025 09:50) (70 - 85)  BP: 115/69 (19 Jun 2025 09:50) (81/51 - 160/69)  BP(mean): 93 (19 Jun 2025 05:12) (61 - 99)  RR: 22 (19 Jun 2025 09:50) (12 - 26)  SpO2: 95% (19 Jun 2025 09:50) (95% - 100%)    Parameters below as of 19 Jun 2025 09:50  Patient On (Oxygen Delivery Method): nasal cannula  O2 Flow (L/min): 3      PHYSICAL EXAM:Pleasant patient in no acute distress.      Constitutional:Comfortable.Awake and alert  No cachexia     Eyes:PERRL EOMI.NO discharge or conjunctival injection    ENMT:No sinus tenderness.No thrush.No pharyngeal exudate or erythema.Fair dental hygiene    Neck:Supple,No LN,no JVD      Respiratory:Good air entry bilaterally,CTA    Cardiovascular:S1 S2 wnl, No murmurs,rub or gallops    Gastrointestinal:Soft BS(+) no tenderness no masses ,No rebound or guarding    Genitourinary:No CVA tendereness     Rectal:    Extremities:No cyanosis,clubbing or edema.    Vascular:peripheral pulses felt    Neurological:AAO X 3,No grossly focal deficits    Skin:No rash     Lymph Nodes:No palpable LNs    Musculoskeletal:No joint swelling or LOM    Psychiatric:Affect normal.                                10.5   7.57  )-----------( 226      ( 18 Jun 2025 22:37 )             34.4     LIVER FUNCTIONS - ( 18 Jun 2025 22:37 )  Alb: 3.4 g/dL / Pro: 6.8 g/dL / ALK PHOS: 110 U/L / ALT: 16 U/L / AST: 40 U/L / GGT: x             06-19    133[L]  |  101  |  8   ----------------------------<  96  3.6   |  19[L]  |  0.75    Ca    8.1[L]      19 Jun 2025 02:55    TPro  6.8  /  Alb  3.4  /  TBili  0.8  /  DBili  x   /  AST  40  /  ALT  16  /  AlkPhos  110  06-18        < from: [facility] Duplex Lower Ext Vein Scan, Bilat (06.19.25 @ 15:35) >  IMPRESSION:  Limited study as patient declined venous compressions during examination.    The visualized segments of the lower extremity veins are patent. No   thrombus was seen.      < end of copied text >      < from: CT Angio Chest PE Protocol w/ IV Cont (06.19.25 @ 01:20) >  IV Contrast: Omnipaque 350  70 cc administered   30 cc discarded  Oral Contrast: NONE.    PROCEDURE:  CT Angiography of the Chest.  Sagittal and coronal reformats were performed as well as 3D (MIP)   reconstructions.    FINDINGS:    LUNGS AND LARGE AIRWAYS: Patent central airways. Diffuse bilateral   reticular opacities and interlobular septal thickening. Ground glass   opacities in both lower lobes and the lingula.  PLEURA: Trace left pleural effusion.  VESSELS: Aneurysmal dilatation of the ascending aorta measuring 4.1 cm.   Atherosclerotic calcifications of the coronary arteries and aorta. There   is reflux of contrast into the IVC and hepatic veins suggesting   right-sided heart failure. Evaluation of segmental and subsegmental   pulmonary arteries is degraded by motion artifact. Within this   limitation, there is no central pulmonary embolism.  HEART: Cardiomegaly. No pericardial effusion.  MEDIASTINUM AND LIZZ: Mild right hilar lymphadenopathy.  CHEST WALL AND LOWER NECK: Within normal limits.  VISUALIZED UPPER ABDOMEN: Multiple splenic granulomas.  BONES: Degenerative changes. Age indeterminant severe compression   fracture deformity of T6 vertebral body. Age indeterminant compression   fracture deformity involving the superior endplate of T4 vertebral body.   Chronic appearing compression deformities of T10 and T12. Chronic   appearing deformity of the sternum. Old bilateral rib fractures. Age   indeterminant fracture deformity of the left humeral head.    IMPRESSION:  No central pulmonary embolism.    Groundglass opacities in both lower lobes and the lingula. Diagnostic   considerations infectious/inflammatory etiologies or pulmonary edema.    Multiple compression deformities of the thoracic spine as above.    Age indeterminant fracture deformity of the left humeral head.    Ancillary findings as above.    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    This morning, the patient felt "off" and dizzy, prompting her to visit her doctor's office. There, she was found to be hypotensive (exact numbers unknown) and hypoxic with an oxygen saturation of 50% on room air. She was advised to go to the Emergency Department (ED) but instead went home. Upon arriving home, she reports walking into her garage and falling. She was unable to get up for approximately 4-5 hours. She denies hitting her head or experiencing a loss of consciousness during the fall. She denies being on any blood thinners.    Emergency Medical Services (EMS) and police department (PD) were called. On their arrival, her oxygen saturation was again noted to be in the 50s on room air. She was placed on 6L nasal cannula, with improvement in her O2 saturation to 94%. In the ED triage, her oxygen saturation was 50% on room air, improving with 4L nasal cannula.    The patient reports bilateral leg redness and pain. She denies any fevers, chills, chest pain, shortness of breath (despite hypoxia), abdominal pain, nausea, vomiting, diarrhea, constipation, dysuria, weakness, numbness, tingling, neck pain, vision changes, headache, or lightheadedness (though she initially reported dizziness).  (19 Jun 2025 13:42)  Pt claims the erythema of the upper thighs is new but the lower legs is not.       PAST MEDICAL & SURGICAL HISTORY:  Diverticulitis of colon (without mention of hemorrhage)      Chronic pain      S/P Laminectomy  Lumbar 3/10      Depression H/O panic attack  with anxiety      Mixed connective tissue disease      Adenocarcinoma of lung      Alcohol abuse      Opiate dependence      Lung cancer      History of lung cancer  s/p wedge resection of RML      S/P cholecystectomy      S/P lumbar laminectomy      History of hip replacement, total, right  6/7/2020      History of oophorectomy, unilateral  bilateral      History of lung surgery      History of laminectomy      H/O hand surgery          Social history:   Marital Status: single  Occupation: former , currently disabled  Lives with:  self    Substance Use : in past   Tobacco Usage:  (   ) never smoked   ( x  ) former smoker   (   ) current smoker  (     ) pack year  (        ) last tobacco use date  Alcohol Usage: " I like my vodka"  Travel: denies  Pets: dog          FAMILY HISTORY:  Family history of leukemia  in father    FHx: rheumatoid arthritis  in mother - with RA associated lung fibrosis        REVIEW OF SYSTEMS  General:	Denies any malaise fatigue or chills. Fevers absent    Skin:No rash  	  Ophthalmologic:Denies any visual complaints,discharge redness or photophobia  	  ENMT:No nasal discharge,headache,sinus congestion or throat pain.No dental complaints    Respiratory and Thorax: as above   Cardiovascular:	No chest pain,palpitaions or dizziness    Gastrointestinal:	NO nausea,abdominal pain or diarrhea.    Genitourinary:	No dysuria,frequency. No flank pain    Musculoskeletal:	No joint swelling or pain    Neurological:No confusion,diziness.No extremity weakness.No bladder or bowel incontinence	    Psychiatric:No delusions or hallucinations	    Hematology/Lymphatics:	No LN swelling.No gum bleeding     Endocrine:	no DM    Allergic/Immunologic:	No hives     Allergies    penicillin (Swelling)  morphine (Vomiting; Nausea)  penicillin (Other)  penicillin (Unknown)    Intolerances        Antimicrobials:       MEDICATIONS  (prior antimicrobials ):  cefTRIAXone   IVPB   100 mL/Hr IV Intermittent (06-18-25 @ 23:19)    vancomycin  IVPB.   250 mL/Hr IV Intermittent (06-19-25 @ 00:31)             hydroxychloroquine 200 milliGRAM(s) Oral daily      MEDICATIONS  (STANDING):  amLODIPine   Tablet 2.5 milliGRAM(s) Oral daily  aspirin enteric coated 81 milliGRAM(s) Oral daily  atorvastatin 20 milliGRAM(s) Oral at bedtime  DULoxetine 80 milliGRAM(s) Oral daily  furosemide   Injectable 40 milliGRAM(s) IV Push two times a day  hydroxychloroquine 200 milliGRAM(s) Oral daily  nortriptyline 40 milliGRAM(s) Oral two times a day  pantoprazole    Tablet 40 milliGRAM(s) Oral before breakfast  pregabalin 200 milliGRAM(s) Oral three times a day    MEDICATIONS  (PRN):  ALPRAZolam 0.25 milliGRAM(s) Oral three times a day PRN anxiety        Vital Signs Last 24 Hrs  T(C): 36.5 (19 Jun 2025 09:50), Max: 36.8 (18 Jun 2025 22:00)  T(F): 97.7 (19 Jun 2025 09:50), Max: 98.2 (18 Jun 2025 22:00)  HR: 84 (19 Jun 2025 09:50) (70 - 85)  BP: 115/69 (19 Jun 2025 09:50) (81/51 - 160/69)  BP(mean): 93 (19 Jun 2025 05:12) (61 - 99)  RR: 22 (19 Jun 2025 09:50) (12 - 26)  SpO2: 95% (19 Jun 2025 09:50) (95% - 100%)    Parameters below as of 19 Jun 2025 09:50  Patient On (Oxygen Delivery Method): nasal cannula  O2 Flow (L/min): 3      PHYSICAL EXAM:Pleasant patient in no acute distress.      Constitutional:Comfortable.Awake and alert  No cachexia     Eyes:PERRL EOMI.NO discharge or conjunctival injection    ENMT:No sinus tenderness.No thrush.No pharyngeal exudate or erythema.  dentures    Neck:Supple,No LN,no JVD    Respiratory:Good air entry bilaterally,CTA    Cardiovascular:S1 S2     Gastrointestinal:Softly distended ) no tenderness      Extremities:  dry skin , bright red to thighs bilaterally    Neurological:AAO X 3,No grossly focal deficits    Skin: LE dry , scaley  , erythematous     Lymph Nodes:No palpable LNs    Musculoskeletal:No joint swelling or LOM    Psychiatric:Affect normal.                          10.5   7.57  )-----------( 226      ( 18 Jun 2025 22:37 )             34.4     LIVER FUNCTIONS - ( 18 Jun 2025 22:37 )  Alb: 3.4 g/dL / Pro: 6.8 g/dL / ALK PHOS: 110 U/L / ALT: 16 U/L / AST: 40 U/L / GGT: x             06-19    133[L]  |  101  |  8   ----------------------------<  96  3.6   |  19[L]  |  0.75    Ca    8.1[L]      19 Jun 2025 02:55    TPro  6.8  /  Alb  3.4  /  TBili  0.8  /  DBili  x   /  AST  40  /  ALT  16  /  AlkPhos  110  06-18        < from: [facility] Duplex Lower Ext Vein Scan, Bilat (06.19.25 @ 15:35) >  IMPRESSION:  Limited study as patient declined venous compressions during examination.    The visualized segments of the lower extremity veins are patent. No   thrombus was seen.      < end of copied text >      < from: CT Angio Chest PE Protocol w/ IV Cont (06.19.25 @ 01:20) >  IV Contrast: Omnipaque 350  70 cc administered   30 cc discarded  Oral Contrast: NONE.    PROCEDURE:  CT Angiography of the Chest.  Sagittal and coronal reformats were performed as well as 3D (MIP)   reconstructions.    FINDINGS:    LUNGS AND LARGE AIRWAYS: Patent central airways. Diffuse bilateral   reticular opacities and interlobular septal thickening. Ground glass   opacities in both lower lobes and the lingula.  PLEURA: Trace left pleural effusion.  VESSELS: Aneurysmal dilatation of the ascending aorta measuring 4.1 cm.   Atherosclerotic calcifications of the coronary arteries and aorta. There   is reflux of contrast into the IVC and hepatic veins suggesting   right-sided heart failure. Evaluation of segmental and subsegmental   pulmonary arteries is degraded by motion artifact. Within this   limitation, there is no central pulmonary embolism.  HEART: Cardiomegaly. No pericardial effusion.  MEDIASTINUM AND LIZZ: Mild right hilar lymphadenopathy.  CHEST WALL AND LOWER NECK: Within normal limits.  VISUALIZED UPPER ABDOMEN: Multiple splenic granulomas.  BONES: Degenerative changes. Age indeterminant severe compression   fracture deformity of T6 vertebral body. Age indeterminant compression   fracture deformity involving the superior endplate of T4 vertebral body.   Chronic appearing compression deformities of T10 and T12. Chronic   appearing deformity of the sternum. Old bilateral rib fractures. Age   indeterminant fracture deformity of the left humeral head.    IMPRESSION:  No central pulmonary embolism.    Groundglass opacities in both lower lobes and the lingula. Diagnostic   considerations infectious/inflammatory etiologies or pulmonary edema.    Multiple compression deformities of the thoracic spine as above.    Age indeterminant fracture deformity of the left humeral head.    Ancillary findings as above.    < end of copied text >      < from: POCUS ED TTE 2D F/U, Limited w/o Cont. (06.19.25 @ 05:10) >          INTERPRETATION:  A focused transthoracic cardiac ultrasound examination   was performed.  No pericardial effusion was present.  No focal wall motion abnormality was identified.  Enlarged RA/RV with severe TR present.  B-line predominant bilateral lung fields. Small left pleural effusion   present.  IVC plethoric and does not collapse with respiration.    IMPRESSION: No Pericardial Effusion.    --- End of Report ---          < end of copied text >

## 2025-06-19 NOTE — H&P ADULT - NSHPPHYSICALEXAM_GEN_ALL_CORE
T(C): 36.5 (06-19 @ 09:50), Max: 36.8 (06-18 @ 22:00)   HR: 84   BP: 115/69   RR: 22   SpO2: 95%    General: No acute distress, alert and oriented x3, nontoxic appearing.  Head: Normocephalic, atraumatic.  HEENT: Extraocular movements intact. Pupils equal, round, and reactive to light (PERRLA). Normal conjunctiva. Tongue midline. Oral mucosa moist.  Lungs/Chest: Crackles heard in lung sounds bilaterally to mid-lung, otherwise clear to auscultation.  Cardiovascular: Regular rate and rhythm. No murmurs, rubs, or gallops.  Abdomen: Soft, non-tender, non-distended. No guarding, rigidity, rebound tenderness, or CVA tenderness.  Musculoskeletal: No visible gross deformities of extremities. Range of motion normal in upper and lower extremities. No neck or calf tenderness. Abrasions to bilateral elbows and bilateral knees (bleeding controlled).  Neurological: Alert and oriented x3. Slurred speech noted. No focal sensory or motor deficits in cranial nerves, upper and lower extremities.  Skin: Multiple scattered bruises in multiple stages of healing on bilateral arms. Bilateral shins with erythema, warmth, and tenderness. +1 pitting edema noted to bilateral lower extremities, skin taut. T(C): 36.5 (06-19 @ 09:50), Max: 36.8 (06-18 @ 22:00)   HR: 84   BP: 115/69   RR: 22   SpO2: 95%    General: No acute distress, alert and oriented x3, nontoxic appearing.  Head: Normocephalic, atraumatic.  HEENT: Extraocular movements intact. Pupils equal, round, and reactive to light (PERRLA). Normal conjunctiva. Tongue midline. Oral mucosa moist.  Lungs/Chest: Crackles heard in lung sounds bilaterally to mid-lung, otherwise clear to auscultation.  Cardiovascular: Regular rate and rhythm. No murmurs, rubs, or gallops., 3+ pitting edema to the thighs  Abdomen: Soft, non-tender, non-distended. No guarding, rigidity, rebound tenderness, or CVA tenderness.  Musculoskeletal: No visible gross deformities of extremities. Range of motion normal in upper and lower extremities. No neck or calf tenderness. Abrasions to bilateral elbows and bilateral knees (bleeding controlled).  Neurological: Alert and oriented x3. Slurred speech noted. No focal sensory or motor deficits in cranial nerves, upper and lower extremities.  Skin: Multiple scattered bruises in multiple stages of healing on bilateral arms. Bilateral shins with erythema, warmth, and tenderness. +3 pitting edema noted to bilateral lower extremities, skin taut and very tender to touch

## 2025-06-19 NOTE — H&P ADULT - CONVERSATION DETAILS
Patient was given the opportunity to discuss advance care planning. Explanation and discussion of advanced directives were performed with the patient at this time she is undecided on intubation and CPR. She remains full code.

## 2025-06-19 NOTE — H&P ADULT - PROBLEM SELECTOR PLAN 4
Obtain Transthoracic Echocardiogram (TTE) to assess cardiac function, given CT scan concern for congestive heart failure.  Continuous telemetry monitoring.  Goal net negative diuresis 1-2 Liters in 24-hour period.  Administer Lasix IV 40 mg BID.  Strict intake and output monitoring.  Monitor for improvement in edema and respiratory status.

## 2025-06-19 NOTE — CONSULT NOTE ADULT - TIME BILLING
Review of medical records, labs, imaging, coordination of care and did not include time spent teaching.
This includes chart review, patient assessment, discussion and collaboration with interdisciplinary team members, excluding ACP.    COUNSELING:  Face to face meeting to discuss Advanced Care Planning- Time Spent 20 minutes

## 2025-06-19 NOTE — H&P ADULT - TIME BILLING
- Ordering, reviewing, and interpreting labs, testing, and imaging.  - Independently obtaining a review of systems and performing a physical exam.  - Reviewing prior hospitalizations and, where necessary, outpatient records.  - Counseling and education regarding the interpretation of the aforementioned items and the plan of care.  - Note: This time excludes time spent teaching house staff.

## 2025-06-20 DIAGNOSIS — R33.9 RETENTION OF URINE, UNSPECIFIED: ICD-10-CM

## 2025-06-20 LAB
ADD ON TEST-SPECIMEN IN LAB: SIGNIFICANT CHANGE UP
ANION GAP SERPL CALC-SCNC: 16 MMOL/L — SIGNIFICANT CHANGE UP (ref 5–17)
BASE EXCESS BLDA CALC-SCNC: 9.5 MMOL/L — HIGH (ref -2–3)
BUN SERPL-MCNC: 5 MG/DL — LOW (ref 7–23)
CALCIUM SERPL-MCNC: 8.2 MG/DL — LOW (ref 8.4–10.5)
CHLORIDE SERPL-SCNC: 97 MMOL/L — SIGNIFICANT CHANGE UP (ref 96–108)
CO2 BLDA-SCNC: 37 MMOL/L — HIGH (ref 19–24)
CO2 SERPL-SCNC: 24 MMOL/L — SIGNIFICANT CHANGE UP (ref 22–31)
CREAT SERPL-MCNC: 0.64 MG/DL — SIGNIFICANT CHANGE UP (ref 0.5–1.3)
CULTURE RESULTS: SIGNIFICANT CHANGE UP
EGFR: 98 ML/MIN/1.73M2 — SIGNIFICANT CHANGE UP
EGFR: 98 ML/MIN/1.73M2 — SIGNIFICANT CHANGE UP
GLUCOSE SERPL-MCNC: 135 MG/DL — HIGH (ref 70–99)
HCO3 BLDA-SCNC: 35 MMOL/L — HIGH (ref 21–28)
HOROWITZ INDEX BLDA+IHG-RTO: 32 — SIGNIFICANT CHANGE UP
MRSA PCR RESULT.: SIGNIFICANT CHANGE UP
PCO2 BLDA: 52 MMHG — HIGH (ref 32–45)
PH BLDA: 7.44 — SIGNIFICANT CHANGE UP (ref 7.35–7.45)
PO2 BLDA: 118 MMHG — HIGH (ref 83–108)
POTASSIUM SERPL-MCNC: 3.7 MMOL/L — SIGNIFICANT CHANGE UP (ref 3.5–5.3)
POTASSIUM SERPL-SCNC: 3.7 MMOL/L — SIGNIFICANT CHANGE UP (ref 3.5–5.3)
PROCALCITONIN SERPL-MCNC: 0.18 NG/ML — HIGH (ref 0.02–0.1)
S AUREUS DNA NOSE QL NAA+PROBE: DETECTED
SAO2 % BLDA: 98.9 % — HIGH (ref 94–98)
SODIUM SERPL-SCNC: 137 MMOL/L — SIGNIFICANT CHANGE UP (ref 135–145)
SPECIMEN SOURCE: SIGNIFICANT CHANGE UP

## 2025-06-20 PROCEDURE — 99233 SBSQ HOSP IP/OBS HIGH 50: CPT | Mod: GC

## 2025-06-20 PROCEDURE — 99232 SBSQ HOSP IP/OBS MODERATE 35: CPT

## 2025-06-20 PROCEDURE — G0545: CPT

## 2025-06-20 RX ORDER — SENNA 187 MG
2 TABLET ORAL AT BEDTIME
Refills: 0 | Status: DISCONTINUED | OUTPATIENT
Start: 2025-06-20 | End: 2025-06-25

## 2025-06-20 RX ORDER — FUROSEMIDE 10 MG/ML
40 INJECTION INTRAMUSCULAR; INTRAVENOUS DAILY
Refills: 0 | Status: DISCONTINUED | OUTPATIENT
Start: 2025-06-21 | End: 2025-06-22

## 2025-06-20 RX ORDER — POLYETHYLENE GLYCOL 3350 17 G/17G
17 POWDER, FOR SOLUTION ORAL DAILY
Refills: 0 | Status: DISCONTINUED | OUTPATIENT
Start: 2025-06-20 | End: 2025-06-25

## 2025-06-20 RX ORDER — HYDROCORTISONE 10 MG/G
1 CREAM TOPICAL
Refills: 0 | Status: DISCONTINUED | OUTPATIENT
Start: 2025-06-20 | End: 2025-06-25

## 2025-06-20 RX ORDER — TAMSULOSIN HYDROCHLORIDE 0.4 MG/1
0.4 CAPSULE ORAL AT BEDTIME
Refills: 0 | Status: DISCONTINUED | OUTPATIENT
Start: 2025-06-20 | End: 2025-06-25

## 2025-06-20 RX ADMIN — Medication 1 MILLIGRAM(S): at 14:08

## 2025-06-20 RX ADMIN — AMLODIPINE BESYLATE 2.5 MILLIGRAM(S): 10 TABLET ORAL at 05:17

## 2025-06-20 RX ADMIN — PREGABALIN 200 MILLIGRAM(S): 50 CAPSULE ORAL at 13:08

## 2025-06-20 RX ADMIN — Medication 81 MILLIGRAM(S): at 09:46

## 2025-06-20 RX ADMIN — FUROSEMIDE 40 MILLIGRAM(S): 10 INJECTION INTRAMUSCULAR; INTRAVENOUS at 13:08

## 2025-06-20 RX ADMIN — Medication 100 MILLIGRAM(S): at 22:10

## 2025-06-20 RX ADMIN — Medication 1 MILLIGRAM(S): at 06:51

## 2025-06-20 RX ADMIN — Medication 1 MILLIGRAM(S): at 01:09

## 2025-06-20 RX ADMIN — Medication 0.25 MILLIGRAM(S): at 17:39

## 2025-06-20 RX ADMIN — DULOXETINE 80 MILLIGRAM(S): 20 CAPSULE, DELAYED RELEASE ORAL at 13:59

## 2025-06-20 RX ADMIN — Medication 40 MILLIGRAM(S): at 05:17

## 2025-06-20 RX ADMIN — METHYLPREDNISOLONE ACETATE 2 MILLIGRAM(S): 80 INJECTION, SUSPENSION INTRA-ARTICULAR; INTRALESIONAL; INTRAMUSCULAR; SOFT TISSUE at 05:16

## 2025-06-20 RX ADMIN — OXYCODONE HYDROCHLORIDE 10 MILLIGRAM(S): 30 TABLET ORAL at 19:50

## 2025-06-20 RX ADMIN — Medication 1 MILLIGRAM(S): at 02:09

## 2025-06-20 RX ADMIN — Medication 100 MILLIGRAM(S): at 13:09

## 2025-06-20 RX ADMIN — Medication 40 MILLIGRAM(S): at 17:39

## 2025-06-20 RX ADMIN — ATORVASTATIN CALCIUM 20 MILLIGRAM(S): 80 TABLET, FILM COATED ORAL at 19:50

## 2025-06-20 RX ADMIN — Medication 1 MILLIGRAM(S): at 13:11

## 2025-06-20 RX ADMIN — Medication 40 MILLIGRAM(S): at 05:16

## 2025-06-20 RX ADMIN — OXYCODONE HYDROCHLORIDE 10 MILLIGRAM(S): 30 TABLET ORAL at 00:00

## 2025-06-20 RX ADMIN — HYDROXYCHLOROQUINE SULFATE 200 MILLIGRAM(S): 200 TABLET, FILM COATED ORAL at 09:46

## 2025-06-20 RX ADMIN — NICOTINE POLACRILEX 1 PATCH: 4 GUM, CHEWING ORAL at 05:55

## 2025-06-20 RX ADMIN — Medication 100 MILLIGRAM(S): at 05:15

## 2025-06-20 RX ADMIN — HYDROCORTISONE 1 APPLICATION(S): 10 CREAM TOPICAL at 17:39

## 2025-06-20 RX ADMIN — OXYCODONE HYDROCHLORIDE 10 MILLIGRAM(S): 30 TABLET ORAL at 20:50

## 2025-06-20 RX ADMIN — Medication 0.25 MILLIGRAM(S): at 09:46

## 2025-06-20 RX ADMIN — TAMSULOSIN HYDROCHLORIDE 0.4 MILLIGRAM(S): 0.4 CAPSULE ORAL at 22:10

## 2025-06-20 RX ADMIN — Medication 2 TABLET(S): at 22:10

## 2025-06-20 RX ADMIN — Medication 1 MILLIGRAM(S): at 07:15

## 2025-06-20 RX ADMIN — PREGABALIN 200 MILLIGRAM(S): 50 CAPSULE ORAL at 22:10

## 2025-06-20 RX ADMIN — PREGABALIN 200 MILLIGRAM(S): 50 CAPSULE ORAL at 05:17

## 2025-06-20 RX ADMIN — FUROSEMIDE 40 MILLIGRAM(S): 10 INJECTION INTRAMUSCULAR; INTRAVENOUS at 05:15

## 2025-06-20 NOTE — PROGRESS NOTE ADULT - SUBJECTIVE AND OBJECTIVE BOX
Patient is a 65y old  Female who presents with a chief complaint of Acute Hypoxic Respiratory failure, Acute CHF exacerbation (20 Jun 2025 15:08)    Being followed by ID for        Interval history:  pt quite lethargic , awakens but then falls back to sleep  according to nurse, was caught vaping in the bathroom earlier   No other acute events      PAST MEDICAL & SURGICAL HISTORY:  Diverticulitis of colon (without mention of hemorrhage)      Chronic pain      S/P Laminectomy  Lumbar 3/10      Depression H/O panic attack  with anxiety      Mixed connective tissue disease      Adenocarcinoma of lung      Alcohol abuse      Opiate dependence      Lung cancer      History of lung cancer  s/p wedge resection of RML      S/P cholecystectomy      S/P lumbar laminectomy      History of hip replacement, total, right  6/7/2020      History of oophorectomy, unilateral  bilateral      History of lung surgery      History of laminectomy      H/O hand surgery        Allergies    penicillin (Swelling)  morphine (Vomiting; Nausea)  penicillin (Other)  penicillin (Unknown)    Intolerances      Antimicrobials:    ceFAZolin   IVPB 2000 milliGRAM(s) IV Intermittent every 8 hours  ceFAZolin   IVPB      hydroxychloroquine 200 milliGRAM(s) Oral daily    MEDICATIONS  (STANDING):  amLODIPine   Tablet 2.5 milliGRAM(s) Oral daily  aspirin enteric coated 81 milliGRAM(s) Oral daily  atorvastatin 20 milliGRAM(s) Oral at bedtime  ceFAZolin   IVPB 2000 milliGRAM(s) IV Intermittent every 8 hours  ceFAZolin   IVPB      DULoxetine 80 milliGRAM(s) Oral daily  furosemide   Injectable 40 milliGRAM(s) IV Push two times a day  hydroxychloroquine 200 milliGRAM(s) Oral daily  methylPREDNISolone 2 milliGRAM(s) Oral daily  nortriptyline 40 milliGRAM(s) Oral two times a day  pantoprazole    Tablet 40 milliGRAM(s) Oral before breakfast  polyethylene glycol 3350 17 Gram(s) Oral daily  pregabalin 200 milliGRAM(s) Oral three times a day  senna 2 Tablet(s) Oral at bedtime  tamsulosin 0.4 milliGRAM(s) Oral at bedtime      Vital Signs Last 24 Hrs  T(C): 36.9 (06-20-25 @ 15:19), Max: 37.2 (06-20-25 @ 05:10)  T(F): 98.4 (06-20-25 @ 15:19), Max: 98.9 (06-20-25 @ 05:10)  HR: 95 (06-20-25 @ 15:19) (70 - 109)  BP: 136/91 (06-20-25 @ 15:19) (117/78 - 148/97)  BP(mean): 111 (06-19-25 @ 23:00) (111 - 111)  RR: 20 (06-20-25 @ 15:19) (20 - 20)  SpO2: 96% (06-20-25 @ 15:19) (92% - 96%)    Physical Exam:    Constitutional well preserved,comfortable,pleasant    HEENT PERRLA EOMI,No pallor or icterus    No oral exudate or erythema    Neck supple no JVD or LN    Chest Good AE,CTA    CVS  S1 S2     Abd soft BS normal No tenderness     Ext bilateral erythema in lower legs and inner thigh    IV site no erythema tenderness or discharge    Joints no swelling or LOM        Lab Data:                          10.5   7.57  )-----------( 226      ( 18 Jun 2025 22:37 )             34.4       06-20    137  |  97  |  5[L]  ----------------------------<  135[H]  3.7   |  24  |  0.64    Ca    8.2[L]      20 Jun 2025 07:02    TPro  6.8  /  Alb  3.4  /  TBili  0.8  /  DBili  x   /  AST  40  /  ALT  16  /  AlkPhos  110  06-18      Urinalysis + Microscopic Examination (06.19.25 @ 18:21)    pH Urine: 6.0   Urine Appearance: Clear   Color: Yellow   Specific Gravity: 1.011   Protein, Urine: Negative mg/dL   Glucose Qualitative, Urine: Negative mg/dL   Ketone , Urine: Negative mg/dL   Blood, Urine: Negative   Bilirubin: Negative   Urobilinogen: 0.2 mg/dL   Leukocyte Esterase Concentration: Negative   Nitrite: Negative   White Blood Cell - Urine: 0 /HPF   Red Blood Cell - Urine: 0 /HPF   Bacteria: Negative /HPF   Cast: 1 /LPF   Epithelial Cells: 0 /HPF          Blood Blood-Peripheral  06-18-25   No growth at 24 hours  --  --      Blood Blood-Peripheral  06-18-25   No growth at 24 hours  --  --    Procalcitonin (06.20.25 @ 07:02)    Procalcitonin: 0.18: This assay is intended for use to determine the change of PCT over time  as an aid in assessing the cumulative 28-day risk of all-cause mortality  for patients diagnosed with severe sepsis or septic shock in the ICU, or  when obtained in the emergency department or other medical wards prior to  ICU admission. This assay was performed by the [employer] PCT  assay. ng/mL                    WBC Count: 7.57 (06-18-25 @ 22:37)       Bilirubin Total: 0.8 mg/dL (06-18-25 @ 22:37)  Aspartate Aminotransferase (AST/SGOT): 40 U/L (06-18-25 @ 22:37)  Alanine Aminotransferase (ALT/SGPT): 16 U/L (06-18-25 @ 22:37)  Alkaline Phosphatase: 110 U/L (06-18-25 @ 22:37)

## 2025-06-20 NOTE — PROGRESS NOTE ADULT - SUBJECTIVE AND OBJECTIVE BOX
Patient seen and examined at bedside.    CARDIOLOGY PROGRESS NOTE:     Overnight Events:     REVIEW OF SYSTEMS:  CONSTITUTIONAL: No weakness, fevers or chills  EYES/ENT: No visual changes;  No dysphagia  NECK: No pain or stiffness  RESPIRATORY: No cough, wheezing, hemoptysis; No shortness of breath  CARDIOVASCULAR: No chest pain or palpitations; No lower extremity edema  GASTROINTESTINAL: No abdominal or epigastric pain. No nausea, vomiting, or hematemesis; No diarrhea or constipation. No melena or hematochezia.  BACK: No back pain  GENITOURINARY: No dysuria, frequency or hematuria  NEUROLOGICAL: No numbness or weakness  SKIN: No itching, burning, rashes, or lesions   All other review of systems is negative unless indicated above.            Current Meds:  ALPRAZolam 0.25 milliGRAM(s) Oral three times a day PRN  amLODIPine   Tablet 2.5 milliGRAM(s) Oral daily  aspirin enteric coated 81 milliGRAM(s) Oral daily  atorvastatin 20 milliGRAM(s) Oral at bedtime  ceFAZolin   IVPB      ceFAZolin   IVPB 2000 milliGRAM(s) IV Intermittent every 8 hours  DULoxetine 80 milliGRAM(s) Oral daily  furosemide   Injectable 40 milliGRAM(s) IV Push two times a day  HYDROmorphone  Injectable 1 milliGRAM(s) IV Push every 6 hours PRN  hydroxychloroquine 200 milliGRAM(s) Oral daily  methylPREDNISolone 2 milliGRAM(s) Oral daily  nortriptyline 40 milliGRAM(s) Oral two times a day  oxyCODONE    IR 10 milliGRAM(s) Oral every 6 hours PRN  pantoprazole    Tablet 40 milliGRAM(s) Oral before breakfast  pregabalin 200 milliGRAM(s) Oral three times a day      Vitals:  T(F): 98.2 (06-20), Max: 98.9 (06-20)  HR: 101 (06-20) (75 - 109)  BP: 144/88 (06-20) (117/78 - 148/97)  RR: 20 (06-20)  SpO2: 92% (06-20)  I&O's Summary    19 Jun 2025 07:01  -  20 Jun 2025 07:00  --------------------------------------------------------  IN: 0 mL / OUT: 2900 mL / NET: -2900 mL        Physical Exam:  GEN: NAD  HEENT: EOMI, clear sclera  PULM: CTA b/l, no wheeze  CV: RRR S1 S2, no murmur, no JVD  ABD: S, NT, ND  EXT: WWP, no edema  PSYCH: normal affect  SKIN: No rash                          10.5   7.57  )-----------( 226      ( 18 Jun 2025 22:37 )             34.4     06-20    137  |  97  |  5[L]  ----------------------------<  135[H]  3.7   |  24  |  0.64    Ca    8.2[L]      20 Jun 2025 07:02    TPro  6.8  /  Alb  3.4  /  TBili  0.8  /  DBili  x   /  AST  40  /  ALT  16  /  AlkPhos  110  06-18    PT/INR - ( 18 Jun 2025 22:37 )   PT: 16.9 sec;   INR: 1.49 ratio         PTT - ( 18 Jun 2025 22:37 )  PTT:26.9 sec              New ECG(s): Personally reviewed           Patient seen and examined at bedside. patient still on NC 4L, So2 92%, reports she is feeling better today after receiving lasix.    CARDIOLOGY PROGRESS NOTE:     Overnight Events: No acute events overnight    REVIEW OF SYSTEMS:  CONSTITUTIONAL: No weakness, fevers or chills  EYES/ENT: No visual changes;  No dysphagia  NECK: No pain or stiffness  RESPIRATORY: No cough, wheezing, hemoptysis; No shortness of breath  CARDIOVASCULAR: No chest pain or palpitations; No lower extremity edema  GASTROINTESTINAL: No abdominal or epigastric pain. No nausea, vomiting, or hematemesis; No diarrhea or constipation. No melena or hematochezia.  BACK: No back pain  GENITOURINARY: No dysuria, frequency or hematuria  NEUROLOGICAL: No numbness or weakness  SKIN: No itching, burning, rashes, or lesions   All other review of systems is negative unless indicated above.            Current Meds:  ALPRAZolam 0.25 milliGRAM(s) Oral three times a day PRN  amLODIPine   Tablet 2.5 milliGRAM(s) Oral daily  aspirin enteric coated 81 milliGRAM(s) Oral daily  atorvastatin 20 milliGRAM(s) Oral at bedtime  ceFAZolin   IVPB      ceFAZolin   IVPB 2000 milliGRAM(s) IV Intermittent every 8 hours  DULoxetine 80 milliGRAM(s) Oral daily  furosemide   Injectable 40 milliGRAM(s) IV Push two times a day  HYDROmorphone  Injectable 1 milliGRAM(s) IV Push every 6 hours PRN  hydroxychloroquine 200 milliGRAM(s) Oral daily  methylPREDNISolone 2 milliGRAM(s) Oral daily  nortriptyline 40 milliGRAM(s) Oral two times a day  oxyCODONE    IR 10 milliGRAM(s) Oral every 6 hours PRN  pantoprazole    Tablet 40 milliGRAM(s) Oral before breakfast  pregabalin 200 milliGRAM(s) Oral three times a day      Vitals:  T(F): 98.2 (06-20), Max: 98.9 (06-20)  HR: 101 (06-20) (75 - 109)  BP: 144/88 (06-20) (117/78 - 148/97)  RR: 20 (06-20)  SpO2: 92% (06-20)  I&O's Summary    19 Jun 2025 07:01  -  20 Jun 2025 07:00  --------------------------------------------------------  IN: 0 mL / OUT: 2900 mL / NET: -2900 mL        Physical Exam:  GEN: NAD  HEENT: EOMI, clear sclera  PULM: CTA b/l, no wheeze, diffuse bilateral fine crackles   CV: tachycardia, regular rhythm, S1 S2, no murmur, no JVD  ABD: S, NT, ND  EXT: bilateral LE redness, tender to touch, could not assess edema  PSYCH: normal affect  SKIN: No rash                          10.5   7.57  )-----------( 226      ( 18 Jun 2025 22:37 )             34.4     06-20    137  |  97  |  5[L]  ----------------------------<  135[H]  3.7   |  24  |  0.64    Ca    8.2[L]      20 Jun 2025 07:02    TPro  6.8  /  Alb  3.4  /  TBili  0.8  /  DBili  x   /  AST  40  /  ALT  16  /  AlkPhos  110  06-18    PT/INR - ( 18 Jun 2025 22:37 )   PT: 16.9 sec;   INR: 1.49 ratio         PTT - ( 18 Jun 2025 22:37 )  PTT:26.9 sec              New ECG(s): Personally reviewed           Patient seen and examined at bedside. patient still on NC 4L, So2 92%, reports she is feeling better today after receiving lasix.    CARDIOLOGY PROGRESS NOTE:     Overnight Events: No acute events overnight    REVIEW OF SYSTEMS:  CONSTITUTIONAL: No weakness, fevers or chills  EYES/ENT: No visual changes;  No dysphagia  NECK: No pain or stiffness  RESPIRATORY: No cough, wheezing, hemoptysis; +shortness of breath  CARDIOVASCULAR: No chest pain or palpitations; No lower extremity edema  GASTROINTESTINAL: No abdominal or epigastric pain. No nausea, vomiting, or hematemesis; No diarrhea or constipation. No melena or hematochezia.  BACK: No back pain  GENITOURINARY: No dysuria, frequency or hematuria  NEUROLOGICAL: No numbness or weakness  SKIN: No itching, burning, rashes, or lesions   All other review of systems is negative unless indicated above.            Current Meds:  ALPRAZolam 0.25 milliGRAM(s) Oral three times a day PRN  amLODIPine   Tablet 2.5 milliGRAM(s) Oral daily  aspirin enteric coated 81 milliGRAM(s) Oral daily  atorvastatin 20 milliGRAM(s) Oral at bedtime  ceFAZolin   IVPB      ceFAZolin   IVPB 2000 milliGRAM(s) IV Intermittent every 8 hours  DULoxetine 80 milliGRAM(s) Oral daily  furosemide   Injectable 40 milliGRAM(s) IV Push two times a day  HYDROmorphone  Injectable 1 milliGRAM(s) IV Push every 6 hours PRN  hydroxychloroquine 200 milliGRAM(s) Oral daily  methylPREDNISolone 2 milliGRAM(s) Oral daily  nortriptyline 40 milliGRAM(s) Oral two times a day  oxyCODONE    IR 10 milliGRAM(s) Oral every 6 hours PRN  pantoprazole    Tablet 40 milliGRAM(s) Oral before breakfast  pregabalin 200 milliGRAM(s) Oral three times a day      Vitals:  T(F): 98.2 (06-20), Max: 98.9 (06-20)  HR: 101 (06-20) (75 - 109)  BP: 144/88 (06-20) (117/78 - 148/97)  RR: 20 (06-20)  SpO2: 92% (06-20)  I&O's Summary    19 Jun 2025 07:01  -  20 Jun 2025 07:00  --------------------------------------------------------  IN: 0 mL / OUT: 2900 mL / NET: -2900 mL        Physical Exam:  GEN: NAD  HEENT: EOMI, clear sclera  PULM: CTA b/l, no wheeze, diffuse bilateral fine crackles   CV: tachycardia, regular rhythm, S1 S2, no murmur, no JVD  ABD: S, NT, ND  EXT: bilateral LE redness, tender to touch, could not assess edema  PSYCH: normal affect  SKIN: No rash                          10.5   7.57  )-----------( 226      ( 18 Jun 2025 22:37 )             34.4     06-20    137  |  97  |  5[L]  ----------------------------<  135[H]  3.7   |  24  |  0.64    Ca    8.2[L]      20 Jun 2025 07:02    TPro  6.8  /  Alb  3.4  /  TBili  0.8  /  DBili  x   /  AST  40  /  ALT  16  /  AlkPhos  110  06-18    PT/INR - ( 18 Jun 2025 22:37 )   PT: 16.9 sec;   INR: 1.49 ratio         PTT - ( 18 Jun 2025 22:37 )  PTT:26.9 sec              New ECG(s): Personally reviewed

## 2025-06-20 NOTE — PROGRESS NOTE ADULT - SUBJECTIVE AND OBJECTIVE BOX
Interval Events:      REVIEW OF SYSTEMS:  Negative except as documented above.      OBJECTIVE:  ICU Vital Signs Last 24 Hrs  T(C): 36.7 (20 Jun 2025 12:28), Max: 37.2 (20 Jun 2025 05:10)  T(F): 98 (20 Jun 2025 12:28), Max: 98.9 (20 Jun 2025 05:10)  HR: 70 (20 Jun 2025 12:28) (70 - 109)  BP: 133/84 (20 Jun 2025 12:28) (117/78 - 148/97)  BP(mean): 111 (19 Jun 2025 23:00) (111 - 111)  ABP: --  ABP(mean): --  RR: 20 (20 Jun 2025 12:28) (20 - 22)  SpO2: 96% (20 Jun 2025 12:28) (92% - 96%)    O2 Parameters below as of 20 Jun 2025 12:28  Patient On (Oxygen Delivery Method): nasal cannula  O2 Flow (L/min): 4            06-19 @ 07:01  -  06-20 @ 07:00  --------------------------------------------------------  IN: 0 mL / OUT: 2900 mL / NET: -2900 mL    06-20 @ 07:01  -  06-20 @ 15:09  --------------------------------------------------------  IN: 0 mL / OUT: 1100 mL / NET: -1100 mL      CAPILLARY BLOOD GLUCOSE      POCT Blood Glucose.: 347 mg/dL (19 Jun 2025 01:33)      PHYSICAL EXAM:  General: NAD  HEENT:  EOMI, sclera anicteric, moist mucus membranes  Neck: supple  Cardiovascular: RRR  Respiratory: CTAB, no wheezes, crackles, or rhonci  Abdomen: soft, nontender  Extremities: warm and well perfused, no edema, no clubbing  Skin: no rashes  Neurological: no focal deficits    HOSPITAL MEDICATIONS:  MEDICATIONS  (STANDING):  amLODIPine   Tablet 2.5 milliGRAM(s) Oral daily  aspirin enteric coated 81 milliGRAM(s) Oral daily  atorvastatin 20 milliGRAM(s) Oral at bedtime  ceFAZolin   IVPB 2000 milliGRAM(s) IV Intermittent every 8 hours  ceFAZolin   IVPB      DULoxetine 80 milliGRAM(s) Oral daily  furosemide   Injectable 40 milliGRAM(s) IV Push two times a day  hydroxychloroquine 200 milliGRAM(s) Oral daily  methylPREDNISolone 2 milliGRAM(s) Oral daily  nortriptyline 40 milliGRAM(s) Oral two times a day  pantoprazole    Tablet 40 milliGRAM(s) Oral before breakfast  polyethylene glycol 3350 17 Gram(s) Oral daily  pregabalin 200 milliGRAM(s) Oral three times a day  senna 2 Tablet(s) Oral at bedtime  tamsulosin 0.4 milliGRAM(s) Oral at bedtime    MEDICATIONS  (PRN):  ALPRAZolam 0.25 milliGRAM(s) Oral three times a day PRN anxiety  HYDROmorphone  Injectable 1 milliGRAM(s) IV Push every 6 hours PRN Severe Pain (7 - 10)  oxyCODONE    IR 10 milliGRAM(s) Oral every 6 hours PRN Moderate Pain (4 - 6)      LABS:                        10.5   7.57  )-----------( 226      ( 18 Jun 2025 22:37 )             34.4     Hgb Trend: 10.5<--  06-20    137  |  97  |  5[L]  ----------------------------<  135[H]  3.7   |  24  |  0.64    Ca    8.2[L]      20 Jun 2025 07:02    TPro  6.8  /  Alb  3.4  /  TBili  0.8  /  DBili  x   /  AST  40  /  ALT  16  /  AlkPhos  110  06-18    Creatinine Trend: 0.64<--, 0.75<--, 0.80<--  PT/INR - ( 18 Jun 2025 22:37 )   PT: 16.9 sec;   INR: 1.49 ratio         PTT - ( 18 Jun 2025 22:37 )  PTT:26.9 sec  Urinalysis Basic - ( 20 Jun 2025 07:02 )    Color: x / Appearance: x / SG: x / pH: x  Gluc: 135 mg/dL / Ketone: x  / Bili: x / Urobili: x   Blood: x / Protein: x / Nitrite: x   Leuk Esterase: x / RBC: x / WBC x   Sq Epi: x / Non Sq Epi: x / Bacteria: x        Venous Blood Gas:  06-19 @ 02:45  7.24/52/41/22/61.6  VBG Lactate: 2.1  Venous Blood Gas:  06-18 @ 22:18  7.21/50/39/20/56.1  VBG Lactate: 4.2      MICROBIOLOGY:     Culture - Blood (collected 18 Jun 2025 22:55)  Source: Blood Blood-Peripheral  Preliminary Report (20 Jun 2025 03:02):    No growth at 24 hours    Culture - Blood (collected 18 Jun 2025 22:48)  Source: Blood Blood-Peripheral  Preliminary Report (20 Jun 2025 03:02):    No growth at 24 hours        RADIOLOGY:  [x] Reviewed and interpreted by me
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
## 2025-06-20 NOTE — PROGRESS NOTE ADULT - SUBJECTIVE AND OBJECTIVE BOX
PROGRESS NOTE:   Authored by Dr. Gaudencio Odom MD, Available on MS Teams    Patient is a 65y old  Female who presents with a chief complaint of Acute Hypoxic Respiratory failure, Acute CHF exacerbation (20 Jun 2025 10:03)      SUBJECTIVE / OVERNIGHT EVENTS: Patient feels very tired, breathing is not great. Has leg pain.    ADDITIONAL REVIEW OF SYSTEMS:    MEDICATIONS  (STANDING):  amLODIPine   Tablet 2.5 milliGRAM(s) Oral daily  aspirin enteric coated 81 milliGRAM(s) Oral daily  atorvastatin 20 milliGRAM(s) Oral at bedtime  ceFAZolin   IVPB      ceFAZolin   IVPB 2000 milliGRAM(s) IV Intermittent every 8 hours  DULoxetine 80 milliGRAM(s) Oral daily  furosemide   Injectable 40 milliGRAM(s) IV Push two times a day  hydroxychloroquine 200 milliGRAM(s) Oral daily  methylPREDNISolone 2 milliGRAM(s) Oral daily  nortriptyline 40 milliGRAM(s) Oral two times a day  pantoprazole    Tablet 40 milliGRAM(s) Oral before breakfast  pregabalin 200 milliGRAM(s) Oral three times a day    MEDICATIONS  (PRN):  ALPRAZolam 0.25 milliGRAM(s) Oral three times a day PRN anxiety  HYDROmorphone  Injectable 1 milliGRAM(s) IV Push every 6 hours PRN Severe Pain (7 - 10)  oxyCODONE    IR 10 milliGRAM(s) Oral every 6 hours PRN Moderate Pain (4 - 6)      CAPILLARY BLOOD GLUCOSE        I&O's Summary    19 Jun 2025 07:01  -  20 Jun 2025 07:00  --------------------------------------------------------  IN: 0 mL / OUT: 2900 mL / NET: -2900 mL    20 Jun 2025 07:01  -  20 Jun 2025 14:19  --------------------------------------------------------  IN: 0 mL / OUT: 1100 mL / NET: -1100 mL        PHYSICAL EXAM:  Vital Signs Last 24 Hrs  T(C): 36.7 (20 Jun 2025 12:28), Max: 37.2 (20 Jun 2025 05:10)  T(F): 98 (20 Jun 2025 12:28), Max: 98.9 (20 Jun 2025 05:10)  HR: 70 (20 Jun 2025 12:28) (70 - 109)  BP: 133/84 (20 Jun 2025 12:28) (117/78 - 148/97)  BP(mean): 111 (19 Jun 2025 23:00) (111 - 111)  RR: 20 (20 Jun 2025 12:28) (20 - 22)  SpO2: 96% (20 Jun 2025 12:28) (92% - 96%)    Parameters below as of 20 Jun 2025 12:28  Patient On (Oxygen Delivery Method): nasal cannula  O2 Flow (L/min): 4      CONSTITUTIONAL: NAD  RESPIRATORY: Normal respiratory effort; dec breath sounds   CARDIOVASCULAR: Regular rate and rhythm, normal S1 and S2, no murmur/rub/gallop; No lower extremity edema  ABDOMEN: Nontender to palpation, normoactive bowel sounds, no rebound/guarding  MUSCLOSKELETAL: no clubbing or cyanosis of digits; dec ROM of b/l LE  SKIN: b/l LE erythema     LABS:                        10.5   7.57  )-----------( 226      ( 18 Jun 2025 22:37 )             34.4     06-20    137  |  97  |  5[L]  ----------------------------<  135[H]  3.7   |  24  |  0.64    Ca    8.2[L]      20 Jun 2025 07:02    TPro  6.8  /  Alb  3.4  /  TBili  0.8  /  DBili  x   /  AST  40  /  ALT  16  /  AlkPhos  110  06-18    PT/INR - ( 18 Jun 2025 22:37 )   PT: 16.9 sec;   INR: 1.49 ratio         PTT - ( 18 Jun 2025 22:37 )  PTT:26.9 sec      Urinalysis Basic - ( 20 Jun 2025 07:02 )    Color: x / Appearance: x / SG: x / pH: x  Gluc: 135 mg/dL / Ketone: x  / Bili: x / Urobili: x   Blood: x / Protein: x / Nitrite: x   Leuk Esterase: x / RBC: x / WBC x   Sq Epi: x / Non Sq Epi: x / Bacteria: x        Culture - Blood (collected 18 Jun 2025 22:55)  Source: Blood Blood-Peripheral  Preliminary Report (20 Jun 2025 03:02):    No growth at 24 hours    Culture - Blood (collected 18 Jun 2025 22:48)  Source: Blood Blood-Peripheral  Preliminary Report (20 Jun 2025 03:02):    No growth at 24 hours

## 2025-06-20 NOTE — PROGRESS NOTE ADULT - ASSESSMENT
65-year-old female with a significant past medical history including a prior myocardial infarction (MI), connective tissue disease, cerebrovascular accident (CVA), seizures secondary to hyponatremia, remote substance use, COPD with pulmonary hypertension (on home O2 2L), and pulmonary fibrosis.    This morning, the patient felt "off" and dizzy, prompting her to visit her doctor's office. There, she was found to be hypotensive (exact numbers unknown) and hypoxic with an oxygen saturation of 50% on room air. She was advised to go to the Emergency Department (ED) but instead went home. Upon arriving home, she reports walking into her garage and falling. She was unable to get up for approximately 4-5 hours. She denies hitting her head or experiencing a loss of consciousness during the fall. She denies being on any blood thinners.    Emergency Medical Services (EMS) and police department (PD) were called. On their arrival, her oxygen saturation was again noted to be in the 50s on room air. She was placed on 6L nasal cannula, with improvement in her O2 saturation to 94%. In the ED triage, her oxygen saturation was 50% on room air, improving with 4L nasal cannula.    The patient reports bilateral leg redness and pain. She denies any fevers, chills, chest pain, shortness of breath (despite hypoxia), abdominal pain, nausea, vomiting, diarrhea, constipation, dysuria, weakness, numbness, tingling, neck pain, vision changes, headache, or lightheadedness (though she initially reported dizziness).  (19 Jun 2025 13:42)  Pt claims the erythema of the upper thighs is new but the lower legs is not.   CT personally reviewed with radiology attending -Imaging with bilateral septal thickening and intralobular lines with diffuse GGOs suggestive of pulmonary fibrotic disease with possible superimposed edema/infection. PA enlarged with contrast reflux suggestive of pHTN. ? enlarged esophagus     A/P  #COPD  # ? pulmonary fibrosis  #hypotensive  #hypoxemia  #connective tissue disorder   #leg erythema    - check BC x 2 sets  - check MRSA swab  - team changed to ancef = seems ok   - appreciate pulmonary input  - suugest derm input for legs- is this related to her Connective tissue disorder   - rheum input for connnective tissue disorder  -  limited dopplers   - pain consult- concerned that patient is over medicating  - CHECK HIV status   - check cortisol level      Zakia Gilbert M.D. ,   please reach via teams   If no answer, or after 5PM/ weekends,  then please call  [phone]      Assessment and plan discussed with the primary team .      ID service will be covering over the weekend. Please call for acute issues or questions. [phone]

## 2025-06-20 NOTE — PATIENT PROFILE ADULT - FALL HARM RISK - HARM RISK INTERVENTIONS
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
## 2025-06-21 DIAGNOSIS — L03.90 CELLULITIS, UNSPECIFIED: ICD-10-CM

## 2025-06-21 DIAGNOSIS — J84.9 INTERSTITIAL PULMONARY DISEASE, UNSPECIFIED: ICD-10-CM

## 2025-06-21 LAB
ANION GAP SERPL CALC-SCNC: 14 MMOL/L — SIGNIFICANT CHANGE UP (ref 5–17)
BLOOD GAS SOURCE: SIGNIFICANT CHANGE UP
BUN SERPL-MCNC: 8 MG/DL — SIGNIFICANT CHANGE UP (ref 7–23)
CALCIUM SERPL-MCNC: 7.8 MG/DL — LOW (ref 8.4–10.5)
CHLORIDE SERPL-SCNC: 97 MMOL/L — SIGNIFICANT CHANGE UP (ref 96–108)
CO2 SERPL-SCNC: 28 MMOL/L — SIGNIFICANT CHANGE UP (ref 22–31)
COHGB MFR BLDV: 3 % — SIGNIFICANT CHANGE UP
CORTIS AM PEAK SERPL-MCNC: 16.9 UG/DL — SIGNIFICANT CHANGE UP (ref 6–18.4)
CREAT SERPL-MCNC: 0.83 MG/DL — SIGNIFICANT CHANGE UP (ref 0.5–1.3)
EGFR: 78 ML/MIN/1.73M2 — SIGNIFICANT CHANGE UP
EGFR: 78 ML/MIN/1.73M2 — SIGNIFICANT CHANGE UP
GAS PNL BLDV: SIGNIFICANT CHANGE UP
GLUCOSE SERPL-MCNC: 122 MG/DL — HIGH (ref 70–99)
HCT VFR BLD CALC: 38.6 % — SIGNIFICANT CHANGE UP (ref 34.5–45)
HGB BLD CALC-MCNC: 14.2 G/DL — SIGNIFICANT CHANGE UP (ref 11.7–16.1)
HGB BLD-MCNC: 11.9 G/DL — SIGNIFICANT CHANGE UP (ref 11.5–15.5)
MAGNESIUM SERPL-MCNC: 1.9 MG/DL — SIGNIFICANT CHANGE UP (ref 1.6–2.6)
MCHC RBC-ENTMCNC: 29.3 PG — SIGNIFICANT CHANGE UP (ref 27–34)
MCHC RBC-ENTMCNC: 30.8 G/DL — LOW (ref 32–36)
MCV RBC AUTO: 95.1 FL — SIGNIFICANT CHANGE UP (ref 80–100)
NRBC # BLD AUTO: 0 K/UL — SIGNIFICANT CHANGE UP (ref 0–0)
NRBC # FLD: 0 K/UL — SIGNIFICANT CHANGE UP (ref 0–0)
NRBC BLD AUTO-RTO: 0 /100 WBCS — SIGNIFICANT CHANGE UP (ref 0–0)
PHOSPHATE SERPL-MCNC: 1.9 MG/DL — LOW (ref 2.5–4.5)
PLATELET # BLD AUTO: 255 K/UL — SIGNIFICANT CHANGE UP (ref 150–400)
PMV BLD: 10.3 FL — SIGNIFICANT CHANGE UP (ref 7–13)
POTASSIUM SERPL-MCNC: 3.5 MMOL/L — SIGNIFICANT CHANGE UP (ref 3.5–5.3)
POTASSIUM SERPL-SCNC: 3.5 MMOL/L — SIGNIFICANT CHANGE UP (ref 3.5–5.3)
RBC # BLD: 4.06 M/UL — SIGNIFICANT CHANGE UP (ref 3.8–5.2)
RBC # FLD: 16 % — HIGH (ref 10.3–14.5)
SODIUM SERPL-SCNC: 139 MMOL/L — SIGNIFICANT CHANGE UP (ref 135–145)
WBC # BLD: 6.88 K/UL — SIGNIFICANT CHANGE UP (ref 3.8–10.5)
WBC # FLD AUTO: 6.88 K/UL — SIGNIFICANT CHANGE UP (ref 3.8–10.5)

## 2025-06-21 PROCEDURE — 99232 SBSQ HOSP IP/OBS MODERATE 35: CPT

## 2025-06-21 RX ORDER — METHYLPREDNISOLONE ACETATE 80 MG/ML
30 INJECTION, SUSPENSION INTRA-ARTICULAR; INTRALESIONAL; INTRAMUSCULAR; SOFT TISSUE
Refills: 0 | Status: DISCONTINUED | OUTPATIENT
Start: 2025-06-21 | End: 2025-06-24

## 2025-06-21 RX ORDER — ENOXAPARIN SODIUM 100 MG/ML
40 INJECTION SUBCUTANEOUS EVERY 24 HOURS
Refills: 0 | Status: DISCONTINUED | OUTPATIENT
Start: 2025-06-21 | End: 2025-06-25

## 2025-06-21 RX ORDER — IPRATROPIUM BROMIDE AND ALBUTEROL SULFATE .5; 2.5 MG/3ML; MG/3ML
3 SOLUTION RESPIRATORY (INHALATION) EVERY 6 HOURS
Refills: 0 | Status: DISCONTINUED | OUTPATIENT
Start: 2025-06-21 | End: 2025-06-25

## 2025-06-21 RX ORDER — SOD PHOS DI, MONO/K PHOS MONO 250 MG
1 TABLET ORAL ONCE
Refills: 0 | Status: COMPLETED | OUTPATIENT
Start: 2025-06-21 | End: 2025-06-21

## 2025-06-21 RX ADMIN — HYDROCORTISONE 1 APPLICATION(S): 10 CREAM TOPICAL at 17:15

## 2025-06-21 RX ADMIN — METHYLPREDNISOLONE ACETATE 30 MILLIGRAM(S): 80 INJECTION, SUSPENSION INTRA-ARTICULAR; INTRALESIONAL; INTRAMUSCULAR; SOFT TISSUE at 11:20

## 2025-06-21 RX ADMIN — PREGABALIN 200 MILLIGRAM(S): 50 CAPSULE ORAL at 21:18

## 2025-06-21 RX ADMIN — ENOXAPARIN SODIUM 40 MILLIGRAM(S): 100 INJECTION SUBCUTANEOUS at 17:14

## 2025-06-21 RX ADMIN — OXYCODONE HYDROCHLORIDE 10 MILLIGRAM(S): 30 TABLET ORAL at 15:58

## 2025-06-21 RX ADMIN — METHYLPREDNISOLONE ACETATE 2 MILLIGRAM(S): 80 INJECTION, SUSPENSION INTRA-ARTICULAR; INTRALESIONAL; INTRAMUSCULAR; SOFT TISSUE at 05:17

## 2025-06-21 RX ADMIN — OXYCODONE HYDROCHLORIDE 10 MILLIGRAM(S): 30 TABLET ORAL at 16:45

## 2025-06-21 RX ADMIN — HYDROCORTISONE 1 APPLICATION(S): 10 CREAM TOPICAL at 05:17

## 2025-06-21 RX ADMIN — Medication 40 MILLIGRAM(S): at 17:15

## 2025-06-21 RX ADMIN — Medication 2 TABLET(S): at 21:18

## 2025-06-21 RX ADMIN — Medication 1 MILLIGRAM(S): at 05:04

## 2025-06-21 RX ADMIN — ATORVASTATIN CALCIUM 20 MILLIGRAM(S): 80 TABLET, FILM COATED ORAL at 21:19

## 2025-06-21 RX ADMIN — METHYLPREDNISOLONE ACETATE 30 MILLIGRAM(S): 80 INJECTION, SUSPENSION INTRA-ARTICULAR; INTRALESIONAL; INTRAMUSCULAR; SOFT TISSUE at 17:14

## 2025-06-21 RX ADMIN — IPRATROPIUM BROMIDE AND ALBUTEROL SULFATE 3 MILLILITER(S): .5; 2.5 SOLUTION RESPIRATORY (INHALATION) at 11:20

## 2025-06-21 RX ADMIN — HYDROXYCHLOROQUINE SULFATE 200 MILLIGRAM(S): 200 TABLET, FILM COATED ORAL at 11:21

## 2025-06-21 RX ADMIN — Medication 81 MILLIGRAM(S): at 11:20

## 2025-06-21 RX ADMIN — Medication 1 MILLIGRAM(S): at 04:44

## 2025-06-21 RX ADMIN — Medication 100 MILLIGRAM(S): at 21:19

## 2025-06-21 RX ADMIN — Medication 1 MILLIGRAM(S): at 12:15

## 2025-06-21 RX ADMIN — PREGABALIN 200 MILLIGRAM(S): 50 CAPSULE ORAL at 14:09

## 2025-06-21 RX ADMIN — Medication 40 MILLIGRAM(S): at 05:16

## 2025-06-21 RX ADMIN — TAMSULOSIN HYDROCHLORIDE 0.4 MILLIGRAM(S): 0.4 CAPSULE ORAL at 21:19

## 2025-06-21 RX ADMIN — Medication 1 PACKET(S): at 11:20

## 2025-06-21 RX ADMIN — Medication 40 MILLIGRAM(S): at 05:17

## 2025-06-21 RX ADMIN — Medication 100 MILLIGRAM(S): at 05:17

## 2025-06-21 RX ADMIN — Medication 100 MILLIGRAM(S): at 14:09

## 2025-06-21 RX ADMIN — AMLODIPINE BESYLATE 2.5 MILLIGRAM(S): 10 TABLET ORAL at 05:17

## 2025-06-21 RX ADMIN — DULOXETINE 80 MILLIGRAM(S): 20 CAPSULE, DELAYED RELEASE ORAL at 11:21

## 2025-06-21 RX ADMIN — PREGABALIN 200 MILLIGRAM(S): 50 CAPSULE ORAL at 05:16

## 2025-06-21 RX ADMIN — IPRATROPIUM BROMIDE AND ALBUTEROL SULFATE 3 MILLILITER(S): .5; 2.5 SOLUTION RESPIRATORY (INHALATION) at 23:03

## 2025-06-21 RX ADMIN — FUROSEMIDE 40 MILLIGRAM(S): 10 INJECTION INTRAMUSCULAR; INTRAVENOUS at 05:16

## 2025-06-21 RX ADMIN — Medication 1 MILLIGRAM(S): at 11:20

## 2025-06-21 RX ADMIN — IPRATROPIUM BROMIDE AND ALBUTEROL SULFATE 3 MILLILITER(S): .5; 2.5 SOLUTION RESPIRATORY (INHALATION) at 17:15

## 2025-06-21 NOTE — DIETITIAN INITIAL EVALUATION ADULT - ORAL INTAKE PTA/DIET HISTORY
Patient visited. Per patient, denies adhering to a therapeutic diet and reports fair appetite prior to admission. Patient reports having one meal daily. Patient reports food allergy to clams (reaction: emesis). No additional food allergies or intolerances reported by the patient. Food and nutrition services to be made aware. Patient reports supplementing with Centrum prior to admission.

## 2025-06-21 NOTE — DIETITIAN INITIAL EVALUATION ADULT - ENERGY INTAKE
Per nursing flowsheets, fair p.o. intake noted yesterday (6/20), 51-75%. Breakfast tray observed, 100% consumed. Patient reports fair appetite this admission. Offered oral nutrition supplement for p.o. optimization, patient accepting./Fair (50-75%)

## 2025-06-21 NOTE — SBIRT NOTE ADULT - NSSBIRTALCNOACTINTDET_GEN_A_CORE
Patient within healthy guidelines. No social work intervention needed at this time. SW remains available.

## 2025-06-21 NOTE — DIETITIAN INITIAL EVALUATION ADULT - REASON INDICATOR FOR ASSESSMENT
Dietitian consult received for:  Dietitian consult received for: "DTI", pressure injury stage 2 or >, MST score 2 or >  Chart reviewed, events noted  Information obtained from: electronic medical record, patient

## 2025-06-21 NOTE — DIETITIAN INITIAL EVALUATION ADULT - PHYSCIAL ASSESSMENT
- Per RD note from previous admission (8/11/2020), patient's weight documented to be 111.3lbs.    - [facility] weight history not available.    - Per chart, patient's current dosing weight 119lbs (6/18/2025). Per nursing flowsheet's patient's weight today 109.5lbs (6/21/2025). Question dosing weight vs flowsheet weight.    - Patient reports unintentional weight loss x one month, however amount lost unclear. Question patient's weight hx accuracy. Per patient, her usual body weight ~102-112lbs.

## 2025-06-21 NOTE — DIETITIAN INITIAL EVALUATION ADULT - NSFNSPHYEXAMSKINFT_GEN_A_CORE
Pressure Injury 1: Bilateral:, buttocks, sacrum, Suspected deep tissue injury  Pressure Injury 2: Right:, heel, Suspected deep tissue injury  Pressure Injury 3: Left:, heel, Suspected deep tissue injury  Pressure Injury 4: Right:, elbow, Suspected deep tissue injury  Pressure Injury 5: Left:, elbow, Suspected deep tissue injury  Pressure Injury 6: none, none  Pressure Injury 7: none, none  Pressure Injury 8: none, none  Pressure Injury 9: none, none  Pressure Injury 10: none, none  Pressure Injury 11: none, none Per nursing flowsheets, krystina buttocks, krystina heel and krystina elbow deep tissue injuries noted.

## 2025-06-21 NOTE — DIETITIAN INITIAL EVALUATION ADULT - PROBLEM SELECTOR PLAN 1
Due to volume overload.  Continue supplemental oxygen (currently 4L NC), titrate to maintain SpO2 > 89%.  Diuresis: Lasix 40mg IV bid

## 2025-06-21 NOTE — DIETITIAN INITIAL EVALUATION ADULT - NUTRITIONGOAL OUTCOME1
mass depletion, and moderate subcutaneous fat loss       Patient to meet > 75% estimated energy requirements

## 2025-06-21 NOTE — DIETITIAN INITIAL EVALUATION ADULT - NUTRITON FOCUSED PHYSICAL EXAM
18 Gx 10cm PowerGlide Midline placed to pts right basilic vein with the use of ultrasound guidance.    Ultrasound guidance: yes  Vessel Caliber: large and patent, compressibility normal  Needle advanced into vessel with real time Ultrasound guidance.  Guidewire confirmed in vessel.  Image recorded and saved.  Sterile sheath used.  Sterile dressing applied  Arm circumference- 32.5  Dressing dated   Education provided to patient & bedside RN re: proper maintenance of line- RN verbalized understanding     yes...

## 2025-06-21 NOTE — DIETITIAN INITIAL EVALUATION ADULT - PERTINENT MEDS FT
MEDICATIONS  (STANDING):  albuterol/ipratropium for Nebulization 3 milliLiter(s) Nebulizer every 6 hours  amLODIPine   Tablet 2.5 milliGRAM(s) Oral daily  aspirin enteric coated 81 milliGRAM(s) Oral daily  atorvastatin 20 milliGRAM(s) Oral at bedtime  ceFAZolin   IVPB 2000 milliGRAM(s) IV Intermittent every 8 hours  ceFAZolin   IVPB      DULoxetine 80 milliGRAM(s) Oral daily  furosemide   Injectable 40 milliGRAM(s) IV Push daily  hydrocortisone 1% Cream 1 Application(s) Topical two times a day  hydroxychloroquine 200 milliGRAM(s) Oral daily  methylPREDNISolone sodium succinate Injectable 30 milliGRAM(s) IV Push two times a day  nortriptyline 40 milliGRAM(s) Oral two times a day  pantoprazole    Tablet 40 milliGRAM(s) Oral before breakfast  polyethylene glycol 3350 17 Gram(s) Oral daily  pregabalin 200 milliGRAM(s) Oral three times a day  senna 2 Tablet(s) Oral at bedtime  tamsulosin 0.4 milliGRAM(s) Oral at bedtime    MEDICATIONS  (PRN):  ALPRAZolam 0.25 milliGRAM(s) Oral three times a day PRN anxiety  HYDROmorphone  Injectable 1 milliGRAM(s) IV Push every 6 hours PRN Severe Pain (7 - 10)  oxyCODONE    IR 10 milliGRAM(s) Oral every 6 hours PRN Moderate Pain (4 - 6)

## 2025-06-21 NOTE — DIETITIAN INITIAL EVALUATION ADULT - ADD RECOMMEND
1. Recommend Low Sodium Diet  2. Recommend Ensure Max Vanilla once daily (150kcals, 30g protein) for p.o. optimization  3. Recommend Multivitamin and Ascorbic Acid pending no medical contraindications to promote pressure injury healing   4. Replete electrolytes prn per team discretion   5. HF noted during admission, recommend daily weights   6. Recommend re-check patient's weight this admission  7. Monitor routine weights, nutrition related labs, diet advancements, p.o. intake and tolerance, oral nutrition supplement compliance, and skin integrity  8. Malnutrition notification sticker placed in patient's chart

## 2025-06-21 NOTE — DIETITIAN INITIAL EVALUATION ADULT - SIGNS/SYMPTOMS
patient likely meeting </= 75% of estimated energy requirement, moderate muscle mass  as evidenced by multiple deep tissue injuries

## 2025-06-21 NOTE — DIETITIAN INITIAL EVALUATION ADULT - OTHER INFO
Pertinent History:   - PMH: Alcohol abuse, adenocarcinoma of lung, connective tissue disease   - Per chart, HF noted during admission    Pertinent Nutrition Related Labs:  - Glucose 122 (H). Elevated glucose noted during admission  - Phosphorus 1.9 (L)  - BNP 3318 (H) 6/18    Pertinent Medications:  - Diuretics in use  - Steroid in use, may elevate blood glucose   - Potassium Phosphate + Sodium Phosphate ordered for repletion

## 2025-06-21 NOTE — DIETITIAN INITIAL EVALUATION ADULT - PERTINENT LABORATORY DATA
06-21    139  |  97  |  8   ----------------------------<  122[H]  3.5   |  28  |  0.83    Ca    7.8[L]      21 Jun 2025 06:37  Phos  1.9     06-21  Mg     1.9     06-21

## 2025-06-21 NOTE — PROGRESS NOTE ADULT - SUBJECTIVE AND OBJECTIVE BOX
PROGRESS NOTE:   Authored by Dr. Gaudencio Odom MD, Available on MS Teams    Patient is a 65y old  Female who presents with a chief complaint of Acute Hypoxic Respiratory failure, Acute CHF exacerbation (21 Jun 2025 14:55)      SUBJECTIVE / OVERNIGHT EVENTS: Patient feels very weak. Breathing is okay at rest, has dyspnea on exertion    ADDITIONAL REVIEW OF SYSTEMS:    MEDICATIONS  (STANDING):  albuterol/ipratropium for Nebulization 3 milliLiter(s) Nebulizer every 6 hours  amLODIPine   Tablet 2.5 milliGRAM(s) Oral daily  aspirin enteric coated 81 milliGRAM(s) Oral daily  atorvastatin 20 milliGRAM(s) Oral at bedtime  ceFAZolin   IVPB 2000 milliGRAM(s) IV Intermittent every 8 hours  ceFAZolin   IVPB      DULoxetine 80 milliGRAM(s) Oral daily  furosemide   Injectable 40 milliGRAM(s) IV Push daily  hydrocortisone 1% Cream 1 Application(s) Topical two times a day  hydroxychloroquine 200 milliGRAM(s) Oral daily  methylPREDNISolone sodium succinate Injectable 30 milliGRAM(s) IV Push two times a day  nortriptyline 40 milliGRAM(s) Oral two times a day  pantoprazole    Tablet 40 milliGRAM(s) Oral before breakfast  polyethylene glycol 3350 17 Gram(s) Oral daily  pregabalin 200 milliGRAM(s) Oral three times a day  senna 2 Tablet(s) Oral at bedtime  tamsulosin 0.4 milliGRAM(s) Oral at bedtime    MEDICATIONS  (PRN):  ALPRAZolam 0.25 milliGRAM(s) Oral three times a day PRN anxiety  HYDROmorphone  Injectable 1 milliGRAM(s) IV Push every 6 hours PRN Severe Pain (7 - 10)  oxyCODONE    IR 10 milliGRAM(s) Oral every 6 hours PRN Moderate Pain (4 - 6)      CAPILLARY BLOOD GLUCOSE        I&O's Summary    20 Jun 2025 07:01  -  21 Jun 2025 07:00  --------------------------------------------------------  IN: 250 mL / OUT: 2750 mL / NET: -2500 mL    21 Jun 2025 07:01  -  21 Jun 2025 17:00  --------------------------------------------------------  IN: 100 mL / OUT: 500 mL / NET: -400 mL        PHYSICAL EXAM:  Vital Signs Last 24 Hrs  T(C): 36.7 (21 Jun 2025 11:26), Max: 36.8 (21 Jun 2025 04:35)  T(F): 98.1 (21 Jun 2025 11:26), Max: 98.2 (21 Jun 2025 04:35)  HR: 99 (21 Jun 2025 11:26) (92 - 99)  BP: 119/79 (21 Jun 2025 11:26) (104/69 - 119/79)  BP(mean): --  RR: 18 (21 Jun 2025 11:26) (17 - 20)  SpO2: 91% (21 Jun 2025 11:26) (91% - 99%)    Parameters below as of 21 Jun 2025 11:26  Patient On (Oxygen Delivery Method): nasal cannula  O2 Flow (L/min): 3      CONSTITUTIONAL: NAD  RESPIRATORY: Normal respiratory effort; crackles at the bases b/l, no wheezing  CARDIOVASCULAR: Regular rate and rhythm, normal S1 and S2, no murmur/rub/gallop; No lower extremity edema  ABDOMEN: Nontender to palpation, normoactive bowel sounds, no rebound/guarding  MUSCLOSKELETAL: no clubbing or cyanosis of digits; no joint swelling or tenderness to palpation  PSYCH: A+O to person, place, and time; affect appropriate  SKIN: bl LE erythema, warm to touch  LABS:                        11.9   6.88  )-----------( 255      ( 21 Jun 2025 06:37 )             38.6     06-21    139  |  97  |  8   ----------------------------<  122[H]  3.5   |  28  |  0.83    Ca    7.8[L]      21 Jun 2025 06:37  Phos  1.9     06-21  Mg     1.9     06-21            Urinalysis Basic - ( 21 Jun 2025 06:37 )    Color: x / Appearance: x / SG: x / pH: x  Gluc: 122 mg/dL / Ketone: x  / Bili: x / Urobili: x   Blood: x / Protein: x / Nitrite: x   Leuk Esterase: x / RBC: x / WBC x   Sq Epi: x / Non Sq Epi: x / Bacteria: x        Culture - Urine (collected 19 Jun 2025 18:21)  Source: Clean Catch Clean Catch (Midstream)  Final Report (20 Jun 2025 17:53):    <10,000 CFU/mL Normal Urogenital Rubia    Culture - Blood (collected 18 Jun 2025 22:55)  Source: Blood Blood-Peripheral  Preliminary Report (21 Jun 2025 03:01):    No growth at 48 Hours    Culture - Blood (collected 18 Jun 2025 22:48)  Source: Blood Blood-Peripheral  Preliminary Report (21 Jun 2025 03:01):    No growth at 48 Hours

## 2025-06-22 LAB
ANION GAP SERPL CALC-SCNC: 16 MMOL/L — SIGNIFICANT CHANGE UP (ref 5–17)
BUN SERPL-MCNC: 12 MG/DL — SIGNIFICANT CHANGE UP (ref 7–23)
CALCIUM SERPL-MCNC: 8 MG/DL — LOW (ref 8.4–10.5)
CHLORIDE SERPL-SCNC: 96 MMOL/L — SIGNIFICANT CHANGE UP (ref 96–108)
CO2 SERPL-SCNC: 27 MMOL/L — SIGNIFICANT CHANGE UP (ref 22–31)
CORTIS AM PEAK SERPL-MCNC: 5.3 UG/DL — LOW (ref 6–18.4)
CREAT SERPL-MCNC: 0.61 MG/DL — SIGNIFICANT CHANGE UP (ref 0.5–1.3)
EGFR: 99 ML/MIN/1.73M2 — SIGNIFICANT CHANGE UP
EGFR: 99 ML/MIN/1.73M2 — SIGNIFICANT CHANGE UP
GAS PNL BLDA: SIGNIFICANT CHANGE UP
GLUCOSE SERPL-MCNC: 137 MG/DL — HIGH (ref 70–99)
HIV 1+2 AB+HIV1 P24 AG SERPL QL IA: SIGNIFICANT CHANGE UP
MAGNESIUM SERPL-MCNC: 2.1 MG/DL — SIGNIFICANT CHANGE UP (ref 1.6–2.6)
PHOSPHATE SERPL-MCNC: 1.9 MG/DL — LOW (ref 2.5–4.5)
POTASSIUM SERPL-MCNC: 4 MMOL/L — SIGNIFICANT CHANGE UP (ref 3.5–5.3)
POTASSIUM SERPL-SCNC: 4 MMOL/L — SIGNIFICANT CHANGE UP (ref 3.5–5.3)
SODIUM SERPL-SCNC: 139 MMOL/L — SIGNIFICANT CHANGE UP (ref 135–145)

## 2025-06-22 PROCEDURE — 99222 1ST HOSP IP/OBS MODERATE 55: CPT | Mod: GC

## 2025-06-22 PROCEDURE — 99232 SBSQ HOSP IP/OBS MODERATE 35: CPT

## 2025-06-22 RX ORDER — FUROSEMIDE 10 MG/ML
40 INJECTION INTRAMUSCULAR; INTRAVENOUS DAILY
Refills: 0 | Status: DISCONTINUED | OUTPATIENT
Start: 2025-06-23 | End: 2025-06-25

## 2025-06-22 RX ORDER — SOD PHOS DI, MONO/K PHOS MONO 250 MG
1 TABLET ORAL
Refills: 0 | Status: COMPLETED | OUTPATIENT
Start: 2025-06-22 | End: 2025-06-22

## 2025-06-22 RX ADMIN — IPRATROPIUM BROMIDE AND ALBUTEROL SULFATE 3 MILLILITER(S): .5; 2.5 SOLUTION RESPIRATORY (INHALATION) at 05:17

## 2025-06-22 RX ADMIN — Medication 1 MILLIGRAM(S): at 22:41

## 2025-06-22 RX ADMIN — OXYCODONE HYDROCHLORIDE 10 MILLIGRAM(S): 30 TABLET ORAL at 02:07

## 2025-06-22 RX ADMIN — Medication 1 MILLIGRAM(S): at 11:19

## 2025-06-22 RX ADMIN — PREGABALIN 200 MILLIGRAM(S): 50 CAPSULE ORAL at 14:18

## 2025-06-22 RX ADMIN — Medication 1 TABLET(S): at 21:26

## 2025-06-22 RX ADMIN — Medication 100 MILLIGRAM(S): at 21:26

## 2025-06-22 RX ADMIN — Medication 81 MILLIGRAM(S): at 11:20

## 2025-06-22 RX ADMIN — Medication 2 TABLET(S): at 21:26

## 2025-06-22 RX ADMIN — Medication 1 TABLET(S): at 09:38

## 2025-06-22 RX ADMIN — AMLODIPINE BESYLATE 2.5 MILLIGRAM(S): 10 TABLET ORAL at 05:16

## 2025-06-22 RX ADMIN — Medication 1 APPLICATION(S): at 17:24

## 2025-06-22 RX ADMIN — IPRATROPIUM BROMIDE AND ALBUTEROL SULFATE 3 MILLILITER(S): .5; 2.5 SOLUTION RESPIRATORY (INHALATION) at 11:19

## 2025-06-22 RX ADMIN — HYDROXYCHLOROQUINE SULFATE 200 MILLIGRAM(S): 200 TABLET, FILM COATED ORAL at 11:22

## 2025-06-22 RX ADMIN — TAMSULOSIN HYDROCHLORIDE 0.4 MILLIGRAM(S): 0.4 CAPSULE ORAL at 21:27

## 2025-06-22 RX ADMIN — FUROSEMIDE 40 MILLIGRAM(S): 10 INJECTION INTRAMUSCULAR; INTRAVENOUS at 05:17

## 2025-06-22 RX ADMIN — ATORVASTATIN CALCIUM 20 MILLIGRAM(S): 80 TABLET, FILM COATED ORAL at 21:26

## 2025-06-22 RX ADMIN — Medication 0.25 MILLIGRAM(S): at 02:07

## 2025-06-22 RX ADMIN — HYDROCORTISONE 1 APPLICATION(S): 10 CREAM TOPICAL at 17:24

## 2025-06-22 RX ADMIN — OXYCODONE HYDROCHLORIDE 10 MILLIGRAM(S): 30 TABLET ORAL at 18:44

## 2025-06-22 RX ADMIN — Medication 0.25 MILLIGRAM(S): at 22:41

## 2025-06-22 RX ADMIN — IPRATROPIUM BROMIDE AND ALBUTEROL SULFATE 3 MILLILITER(S): .5; 2.5 SOLUTION RESPIRATORY (INHALATION) at 17:26

## 2025-06-22 RX ADMIN — Medication 100 MILLIGRAM(S): at 14:17

## 2025-06-22 RX ADMIN — PREGABALIN 200 MILLIGRAM(S): 50 CAPSULE ORAL at 05:16

## 2025-06-22 RX ADMIN — METHYLPREDNISOLONE ACETATE 30 MILLIGRAM(S): 80 INJECTION, SUSPENSION INTRA-ARTICULAR; INTRALESIONAL; INTRAMUSCULAR; SOFT TISSUE at 17:24

## 2025-06-22 RX ADMIN — Medication 40 MILLIGRAM(S): at 05:16

## 2025-06-22 RX ADMIN — OXYCODONE HYDROCHLORIDE 10 MILLIGRAM(S): 30 TABLET ORAL at 17:23

## 2025-06-22 RX ADMIN — Medication 1 MILLIGRAM(S): at 12:15

## 2025-06-22 RX ADMIN — METHYLPREDNISOLONE ACETATE 30 MILLIGRAM(S): 80 INJECTION, SUSPENSION INTRA-ARTICULAR; INTRALESIONAL; INTRAMUSCULAR; SOFT TISSUE at 05:16

## 2025-06-22 RX ADMIN — Medication 100 MILLIGRAM(S): at 05:16

## 2025-06-22 RX ADMIN — DULOXETINE 80 MILLIGRAM(S): 20 CAPSULE, DELAYED RELEASE ORAL at 11:21

## 2025-06-22 RX ADMIN — Medication 0.25 MILLIGRAM(S): at 09:34

## 2025-06-22 RX ADMIN — Medication 1 TABLET(S): at 17:25

## 2025-06-22 RX ADMIN — Medication 40 MILLIGRAM(S): at 17:25

## 2025-06-22 RX ADMIN — IPRATROPIUM BROMIDE AND ALBUTEROL SULFATE 3 MILLILITER(S): .5; 2.5 SOLUTION RESPIRATORY (INHALATION) at 23:08

## 2025-06-22 RX ADMIN — Medication 1 TABLET(S): at 11:22

## 2025-06-22 RX ADMIN — HYDROCORTISONE 1 APPLICATION(S): 10 CREAM TOPICAL at 05:17

## 2025-06-22 RX ADMIN — PREGABALIN 200 MILLIGRAM(S): 50 CAPSULE ORAL at 21:27

## 2025-06-22 RX ADMIN — POLYETHYLENE GLYCOL 3350 17 GRAM(S): 17 POWDER, FOR SOLUTION ORAL at 11:19

## 2025-06-22 RX ADMIN — ENOXAPARIN SODIUM 40 MILLIGRAM(S): 100 INJECTION SUBCUTANEOUS at 17:25

## 2025-06-22 RX ADMIN — OXYCODONE HYDROCHLORIDE 10 MILLIGRAM(S): 30 TABLET ORAL at 03:00

## 2025-06-22 NOTE — CONSULT NOTE ADULT - SUBJECTIVE AND OBJECTIVE BOX
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    This morning, the patient felt "off" and dizzy, prompting her to visit her doctor's office. There, she was found to be hypotensive (exact numbers unknown) and hypoxic with an oxygen saturation of 50% on room air. She was advised to go to the Emergency Department (ED) but instead went home. Upon arriving home, she reports walking into her garage and falling. She was unable to get up for approximately 4-5 hours. She denies hitting her head or experiencing a loss of consciousness during the fall. She denies being on any blood thinners.    Emergency Medical Services (EMS) and police department (PD) were called. On their arrival, her oxygen saturation was again noted to be in the 50s on room air. She was placed on 6L nasal cannula, with improvement in her O2 saturation to 94%. In the ED triage, her oxygen saturation was 50% on room air, improving with 4L nasal cannula.    The patient reports bilateral leg redness and pain. She denies any fevers, chills, chest pain, shortness of breath (despite hypoxia), abdominal pain, nausea, vomiting, diarrhea, constipation, dysuria, weakness, numbness, tingling, neck pain, vision changes, headache, or lightheadedness (though she initially reported dizziness).  (19 Jun 2025 13:42)    Derm HPI:   65-year-old F with significant PMH above who presented with erythema of bilateral lower legs for years, recently worsened. Pt reports these areas are itchy at times for which she previously used clobetasol. Pt endorses hx of chronic leg swelling.     PAST MEDICAL & SURGICAL HISTORY:  Diverticulitis of colon (without mention of hemorrhage)      Chronic pain      S/P Laminectomy  Lumbar 3/10      Depression H/O panic attack  with anxiety      Mixed connective tissue disease      Adenocarcinoma of lung      Alcohol abuse      Opiate dependence      Lung cancer      History of lung cancer  s/p wedge resection of RML      S/P cholecystectomy      S/P lumbar laminectomy      History of hip replacement, total, right  6/7/2020      History of oophorectomy, unilateral  bilateral      History of lung surgery      History of laminectomy      H/O hand surgery          REVIEW OF SYSTEMS      General: no fevers/chills, no lethargy	    Skin/Breast: see HPI  	  Ophthalmologic: no eye pain or change in vision  	  ENMT: no dysphagia or change in hearing    Respiratory and Thorax: no SOB or cough  	  Cardiovascular: no palpitations or chest pain    Gastrointestinal: no abdominal pain or blood in stool     Genitourinary: no dysuria or frequency    Musculoskeletal: no joint pains or weakness	    Neurological: no weakness, numbness , or tingling    MEDICATIONS  (STANDING):  albuterol/ipratropium for Nebulization 3 milliLiter(s) Nebulizer every 6 hours  amLODIPine   Tablet 2.5 milliGRAM(s) Oral daily  aspirin enteric coated 81 milliGRAM(s) Oral daily  atorvastatin 20 milliGRAM(s) Oral at bedtime  ceFAZolin   IVPB 2000 milliGRAM(s) IV Intermittent every 8 hours  ceFAZolin   IVPB      clobetasol 0.05% Ointment 1 Application(s) Topical every 12 hours  DULoxetine 80 milliGRAM(s) Oral daily  enoxaparin Injectable 40 milliGRAM(s) SubCutaneous every 24 hours  hydrocortisone 1% Cream 1 Application(s) Topical two times a day  hydroxychloroquine 200 milliGRAM(s) Oral daily  methylPREDNISolone sodium succinate Injectable 30 milliGRAM(s) IV Push two times a day  nortriptyline 40 milliGRAM(s) Oral two times a day  pantoprazole    Tablet 40 milliGRAM(s) Oral before breakfast  polyethylene glycol 3350 17 Gram(s) Oral daily  pregabalin 200 milliGRAM(s) Oral three times a day  senna 2 Tablet(s) Oral at bedtime  tamsulosin 0.4 milliGRAM(s) Oral at bedtime    ALLERGIES: penicillin  morphine  penicillin  penicillin  Clams      Social History: noncontributory      FAMILY HISTORY:  Family history of leukemia  in father    FHx: rheumatoid arthritis  in mother - with RA associated lung fibrosis          VITAL SIGNS LAST 24 HOURS:  T(F): 98.3 (06-22 @ 20:41), Max: 98.3 (06-22 @ 04:49)  HR: 105 (06-22 @ 20:41) (100 - 111)  BP: 128/82 (06-22 @ 20:41) (107/73 - 128/82)  RR: 19 (06-22 @ 20:41) (18 - 19)    ___________________________________  Physical Exam:     The patient was alert and in no apparent distress.  Oral mucosa showed no ulcerations.  There was no visible lymphadenopathy.  Conjunctivae were not injected.  The scalp, face, trunk and extremities were examined.  There was no hyperhidrosis or bromhidrosis.    Of note on skin exam:   - Erythematous indurated scaly plaques on bilateral lower legs with inverted champagne bottle appearance  ____________________________________    LABS:                        11.9   6.88  )-----------( 255      ( 21 Jun 2025 06:37 )             38.6     06-22    139  |  96  |  12  ----------------------------<  137[H]  4.0   |  27  |  0.61    Ca    8.0[L]      22 Jun 2025 06:35  Phos  1.9     06-22  Mg     2.1     06-22        Urinalysis Basic - ( 22 Jun 2025 06:35 )    Color: x / Appearance: x / SG: x / pH: x  Gluc: 137 mg/dL / Ketone: x  / Bili: x / Urobili: x   Blood: x / Protein: x / Nitrite: x   Leuk Esterase: x / RBC: x / WBC x   Sq Epi: x / Non Sq Epi: x / Bacteria: x

## 2025-06-22 NOTE — CONSULT NOTE ADULT - ASSESSMENT
#Lipodermatosclerosis, bilateral lower legs  - Inflammation of subcutaneous fat, usually on the lower extremities, secondary to chronic venous insufficiency    PLAN:   - START clobetasol 0.05% ointment twice daily prn itch or burning for up to 2 weeks  - Recommend outpatient follow up with dermatology and vascular surgery     Patient was seen at bedside and staffed in person with the dermatology attending Dr. Jones.   Recommendations were communicated with the primary team.  Please page [phone] for further related questions.  Dermatology will sign off.    Darryl Burden MD  Resident Physician, PGY2  [facility] Dermatology  Pager: [phone]  Office: [phone]   #favor Lipodermatosclerosis, bilateral lower legs  - Inflammation of subcutaneous fat, usually on the lower extremities, secondary to chronic venous insufficiency    PLAN:   - START clobetasol 0.05% ointment twice daily prn itch or burning for up to 2 weeks  - Recommend outpatient follow up with dermatology and vascular surgery     Patient was seen at bedside and staffed in person with the dermatology attending Dr. Jones.   Recommendations were communicated with the primary team.  Please page [phone] for further related questions.  Dermatology will sign off.    Darryl Burden MD  Resident Physician, PGY2  [facility] Dermatology  Pager: [phone]  Office: [phone]

## 2025-06-22 NOTE — CONSULT NOTE ADULT - REASON FOR ADMISSION
Acute Hypoxic Respiratory failure, Acute CHF exacerbation
Acute Hypoxic Respiratory failure, Acute CHF exacerbation

## 2025-06-22 NOTE — PROGRESS NOTE ADULT - SUBJECTIVE AND OBJECTIVE BOX
PROGRESS NOTE:   Authored by Dr. Gaudencio Odom MD, Available on MS Teams    Patient is a 65y old  Female who presents with a chief complaint of Acute Hypoxic Respiratory failure, Acute CHF exacerbation (21 Jun 2025 17:00)      SUBJECTIVE / OVERNIGHT EVENTS: Patient feels better today. LE much improved, breathing is improved     ADDITIONAL REVIEW OF SYSTEMS:    MEDICATIONS  (STANDING):  albuterol/ipratropium for Nebulization 3 milliLiter(s) Nebulizer every 6 hours  amLODIPine   Tablet 2.5 milliGRAM(s) Oral daily  aspirin enteric coated 81 milliGRAM(s) Oral daily  atorvastatin 20 milliGRAM(s) Oral at bedtime  ceFAZolin   IVPB 2000 milliGRAM(s) IV Intermittent every 8 hours  ceFAZolin   IVPB      DULoxetine 80 milliGRAM(s) Oral daily  enoxaparin Injectable 40 milliGRAM(s) SubCutaneous every 24 hours  hydrocortisone 1% Cream 1 Application(s) Topical two times a day  hydroxychloroquine 200 milliGRAM(s) Oral daily  methylPREDNISolone sodium succinate Injectable 30 milliGRAM(s) IV Push two times a day  nortriptyline 40 milliGRAM(s) Oral two times a day  pantoprazole    Tablet 40 milliGRAM(s) Oral before breakfast  polyethylene glycol 3350 17 Gram(s) Oral daily  potassium phosphate / sodium phosphate Tablet (K-PHOS No. 2) 1 Tablet(s) Oral four times a day with meals  pregabalin 200 milliGRAM(s) Oral three times a day  senna 2 Tablet(s) Oral at bedtime  tamsulosin 0.4 milliGRAM(s) Oral at bedtime    MEDICATIONS  (PRN):  ALPRAZolam 0.25 milliGRAM(s) Oral three times a day PRN anxiety  HYDROmorphone  Injectable 1 milliGRAM(s) IV Push every 6 hours PRN Severe Pain (7 - 10)  oxyCODONE    IR 10 milliGRAM(s) Oral every 6 hours PRN Moderate Pain (4 - 6)      CAPILLARY BLOOD GLUCOSE        I&O's Summary    21 Jun 2025 07:01  -  22 Jun 2025 07:00  --------------------------------------------------------  IN: 300 mL / OUT: 2600 mL / NET: -2300 mL        PHYSICAL EXAM:  Vital Signs Last 24 Hrs  T(C): 36.8 (22 Jun 2025 04:49), Max: 36.8 (21 Jun 2025 20:15)  T(F): 98.3 (22 Jun 2025 04:49), Max: 98.3 (22 Jun 2025 04:49)  HR: 100 (22 Jun 2025 04:49) (97 - 100)  BP: 126/81 (22 Jun 2025 04:49) (104/68 - 126/81)  BP(mean): --  RR: 18 (22 Jun 2025 04:49) (18 - 18)  SpO2: 100% (22 Jun 2025 04:49) (92% - 100%)    Parameters below as of 22 Jun 2025 04:49  Patient On (Oxygen Delivery Method): nasal cannula w/ humidification  O2 Flow (L/min): 3      CONSTITUTIONAL: NAD  RESPIRATORY: Normal respiratory effort; lungs are clear to auscultation bilaterally  CARDIOVASCULAR: Regular rate and rhythm, normal S1 and S2, no murmur/rub/gallop; No lower extremity edema  ABDOMEN: Nontender to palpation, normoactive bowel sounds, no rebound/guarding  MUSCLOSKELETAL: no clubbing or cyanosis of digits; no joint swelling or tenderness to palpation  PSYCH: A+O to person, place, and time; affect appropriate  SKIN: b/l skin erythema improving     LABS:                        11.9   6.88  )-----------( 255      ( 21 Jun 2025 06:37 )             38.6     06-22    139  |  96  |  12  ----------------------------<  137[H]  4.0   |  27  |  0.61    Ca    8.0[L]      22 Jun 2025 06:35  Phos  1.9     06-22  Mg     2.1     06-22            Urinalysis Basic - ( 22 Jun 2025 06:35 )    Color: x / Appearance: x / SG: x / pH: x  Gluc: 137 mg/dL / Ketone: x  / Bili: x / Urobili: x   Blood: x / Protein: x / Nitrite: x   Leuk Esterase: x / RBC: x / WBC x   Sq Epi: x / Non Sq Epi: x / Bacteria: x        Culture - Urine (collected 19 Jun 2025 18:21)  Source: Clean Catch Clean Catch (Midstream)  Final Report (20 Jun 2025 17:53):    <10,000 CFU/mL Normal Urogenital Rubia

## 2025-06-23 ENCOUNTER — RESULT REVIEW (OUTPATIENT)
Age: 65
End: 2025-06-23

## 2025-06-23 LAB
ANION GAP SERPL CALC-SCNC: 15 MMOL/L — SIGNIFICANT CHANGE UP (ref 5–17)
BUN SERPL-MCNC: 21 MG/DL — SIGNIFICANT CHANGE UP (ref 7–23)
CALCIUM SERPL-MCNC: 8.3 MG/DL — LOW (ref 8.4–10.5)
CHLORIDE SERPL-SCNC: 94 MMOL/L — LOW (ref 96–108)
CO2 SERPL-SCNC: 28 MMOL/L — SIGNIFICANT CHANGE UP (ref 22–31)
CREAT SERPL-MCNC: 0.71 MG/DL — SIGNIFICANT CHANGE UP (ref 0.5–1.3)
EGFR: 94 ML/MIN/1.73M2 — SIGNIFICANT CHANGE UP
EGFR: 94 ML/MIN/1.73M2 — SIGNIFICANT CHANGE UP
GLUCOSE SERPL-MCNC: 124 MG/DL — HIGH (ref 70–99)
MAGNESIUM SERPL-MCNC: 2.2 MG/DL — SIGNIFICANT CHANGE UP (ref 1.6–2.6)
PHOSPHATE SERPL-MCNC: 3.6 MG/DL — SIGNIFICANT CHANGE UP (ref 2.5–4.5)
POTASSIUM SERPL-MCNC: 3.8 MMOL/L — SIGNIFICANT CHANGE UP (ref 3.5–5.3)
POTASSIUM SERPL-SCNC: 3.8 MMOL/L — SIGNIFICANT CHANGE UP (ref 3.5–5.3)
SODIUM SERPL-SCNC: 137 MMOL/L — SIGNIFICANT CHANGE UP (ref 135–145)

## 2025-06-23 PROCEDURE — 99233 SBSQ HOSP IP/OBS HIGH 50: CPT

## 2025-06-23 PROCEDURE — 99233 SBSQ HOSP IP/OBS HIGH 50: CPT | Mod: GC

## 2025-06-23 PROCEDURE — 93306 TTE W/DOPPLER COMPLETE: CPT | Mod: 26

## 2025-06-23 PROCEDURE — G0545: CPT

## 2025-06-23 PROCEDURE — 99232 SBSQ HOSP IP/OBS MODERATE 35: CPT

## 2025-06-23 RX ADMIN — Medication 100 MILLIGRAM(S): at 13:17

## 2025-06-23 RX ADMIN — PREGABALIN 200 MILLIGRAM(S): 50 CAPSULE ORAL at 05:19

## 2025-06-23 RX ADMIN — HYDROXYCHLOROQUINE SULFATE 200 MILLIGRAM(S): 200 TABLET, FILM COATED ORAL at 11:37

## 2025-06-23 RX ADMIN — Medication 40 MILLIGRAM(S): at 17:35

## 2025-06-23 RX ADMIN — AMLODIPINE BESYLATE 2.5 MILLIGRAM(S): 10 TABLET ORAL at 05:19

## 2025-06-23 RX ADMIN — Medication 100 MILLIGRAM(S): at 05:20

## 2025-06-23 RX ADMIN — Medication 40 MILLIGRAM(S): at 05:21

## 2025-06-23 RX ADMIN — Medication 0.25 MILLIGRAM(S): at 11:35

## 2025-06-23 RX ADMIN — METHYLPREDNISOLONE ACETATE 30 MILLIGRAM(S): 80 INJECTION, SUSPENSION INTRA-ARTICULAR; INTRALESIONAL; INTRAMUSCULAR; SOFT TISSUE at 17:41

## 2025-06-23 RX ADMIN — HYDROCORTISONE 1 APPLICATION(S): 10 CREAM TOPICAL at 17:36

## 2025-06-23 RX ADMIN — PREGABALIN 200 MILLIGRAM(S): 50 CAPSULE ORAL at 20:54

## 2025-06-23 RX ADMIN — Medication 100 MILLIGRAM(S): at 20:56

## 2025-06-23 RX ADMIN — DULOXETINE 80 MILLIGRAM(S): 20 CAPSULE, DELAYED RELEASE ORAL at 11:37

## 2025-06-23 RX ADMIN — HYDROCORTISONE 1 APPLICATION(S): 10 CREAM TOPICAL at 05:20

## 2025-06-23 RX ADMIN — IPRATROPIUM BROMIDE AND ALBUTEROL SULFATE 3 MILLILITER(S): .5; 2.5 SOLUTION RESPIRATORY (INHALATION) at 05:18

## 2025-06-23 RX ADMIN — ENOXAPARIN SODIUM 40 MILLIGRAM(S): 100 INJECTION SUBCUTANEOUS at 17:35

## 2025-06-23 RX ADMIN — Medication 40 MILLIGRAM(S): at 05:19

## 2025-06-23 RX ADMIN — Medication 0.25 MILLIGRAM(S): at 20:54

## 2025-06-23 RX ADMIN — Medication 1 MILLIGRAM(S): at 14:22

## 2025-06-23 RX ADMIN — IPRATROPIUM BROMIDE AND ALBUTEROL SULFATE 3 MILLILITER(S): .5; 2.5 SOLUTION RESPIRATORY (INHALATION) at 23:07

## 2025-06-23 RX ADMIN — METHYLPREDNISOLONE ACETATE 30 MILLIGRAM(S): 80 INJECTION, SUSPENSION INTRA-ARTICULAR; INTRALESIONAL; INTRAMUSCULAR; SOFT TISSUE at 05:18

## 2025-06-23 RX ADMIN — FUROSEMIDE 40 MILLIGRAM(S): 10 INJECTION INTRAMUSCULAR; INTRAVENOUS at 05:22

## 2025-06-23 RX ADMIN — Medication 1 MILLIGRAM(S): at 13:22

## 2025-06-23 RX ADMIN — IPRATROPIUM BROMIDE AND ALBUTEROL SULFATE 3 MILLILITER(S): .5; 2.5 SOLUTION RESPIRATORY (INHALATION) at 17:35

## 2025-06-23 RX ADMIN — OXYCODONE HYDROCHLORIDE 10 MILLIGRAM(S): 30 TABLET ORAL at 05:55

## 2025-06-23 RX ADMIN — Medication 2 TABLET(S): at 20:54

## 2025-06-23 RX ADMIN — PREGABALIN 200 MILLIGRAM(S): 50 CAPSULE ORAL at 13:17

## 2025-06-23 RX ADMIN — TAMSULOSIN HYDROCHLORIDE 0.4 MILLIGRAM(S): 0.4 CAPSULE ORAL at 20:55

## 2025-06-23 RX ADMIN — ATORVASTATIN CALCIUM 20 MILLIGRAM(S): 80 TABLET, FILM COATED ORAL at 20:55

## 2025-06-23 RX ADMIN — Medication 1 MILLIGRAM(S): at 00:07

## 2025-06-23 RX ADMIN — Medication 1 APPLICATION(S): at 17:36

## 2025-06-23 RX ADMIN — Medication 1 APPLICATION(S): at 05:20

## 2025-06-23 RX ADMIN — POLYETHYLENE GLYCOL 3350 17 GRAM(S): 17 POWDER, FOR SOLUTION ORAL at 11:37

## 2025-06-23 RX ADMIN — Medication 81 MILLIGRAM(S): at 11:37

## 2025-06-23 RX ADMIN — IPRATROPIUM BROMIDE AND ALBUTEROL SULFATE 3 MILLILITER(S): .5; 2.5 SOLUTION RESPIRATORY (INHALATION) at 11:36

## 2025-06-23 RX ADMIN — OXYCODONE HYDROCHLORIDE 10 MILLIGRAM(S): 30 TABLET ORAL at 05:18

## 2025-06-23 NOTE — PROGRESS NOTE ADULT - SUBJECTIVE AND OBJECTIVE BOX
Patient is a 65y old  Female who presents with a chief complaint of Acute Hypoxic Respiratory failure, Acute CHF exacerbation (23 Jun 2025 10:51)    Being followed by ID for        Interval history:  No other acute events      ROS:  No cough,SOB,CP  No N/V/D  No abd pain  No urinary complaints  No HA  No joint or limb pain  No other complaints    PAST MEDICAL & SURGICAL HISTORY:  Diverticulitis of colon (without mention of hemorrhage)      Chronic pain      S/P Laminectomy  Lumbar 3/10      Depression H/O panic attack  with anxiety      Mixed connective tissue disease      Adenocarcinoma of lung      Alcohol abuse      Opiate dependence      Lung cancer      History of lung cancer  s/p wedge resection of RML      S/P cholecystectomy      S/P lumbar laminectomy      History of hip replacement, total, right  6/7/2020      History of oophorectomy, unilateral  bilateral      History of lung surgery      History of laminectomy      H/O hand surgery        Allergies    penicillin (Swelling)  morphine (Vomiting; Nausea)  penicillin (Other)  penicillin (Unknown)  Clams (Other)    Intolerances      Antimicrobials:    ceFAZolin   IVPB      ceFAZolin   IVPB 2000 milliGRAM(s) IV Intermittent every 8 hours  hydroxychloroquine 200 milliGRAM(s) Oral daily    MEDICATIONS  (STANDING):  albuterol/ipratropium for Nebulization 3 milliLiter(s) Nebulizer every 6 hours  amLODIPine   Tablet 2.5 milliGRAM(s) Oral daily  aspirin enteric coated 81 milliGRAM(s) Oral daily  atorvastatin 20 milliGRAM(s) Oral at bedtime  ceFAZolin   IVPB      ceFAZolin   IVPB 2000 milliGRAM(s) IV Intermittent every 8 hours  clobetasol 0.05% Ointment 1 Application(s) Topical every 12 hours  DULoxetine 80 milliGRAM(s) Oral daily  enoxaparin Injectable 40 milliGRAM(s) SubCutaneous every 24 hours  furosemide    Tablet 40 milliGRAM(s) Oral daily  hydrocortisone 1% Cream 1 Application(s) Topical two times a day  hydroxychloroquine 200 milliGRAM(s) Oral daily  methylPREDNISolone sodium succinate Injectable 30 milliGRAM(s) IV Push two times a day  nortriptyline 40 milliGRAM(s) Oral two times a day  pantoprazole    Tablet 40 milliGRAM(s) Oral before breakfast  polyethylene glycol 3350 17 Gram(s) Oral daily  pregabalin 200 milliGRAM(s) Oral three times a day  senna 2 Tablet(s) Oral at bedtime  tamsulosin 0.4 milliGRAM(s) Oral at bedtime      Vital Signs Last 24 Hrs  T(C): 36.7 (06-23-25 @ 11:04), Max: 36.8 (06-22-25 @ 20:41)  T(F): 98 (06-23-25 @ 11:04), Max: 98.3 (06-22-25 @ 20:41)  HR: 85 (06-23-25 @ 11:04) (85 - 111)  BP: 117/78 (06-23-25 @ 11:04) (105/70 - 128/82)  BP(mean): --  RR: 18 (06-23-25 @ 11:04) (18 - 19)  SpO2: 93% (06-23-25 @ 11:04) (93% - 95%)    Physical Exam:    Constitutional well preserved,comfortable,pleasant    HEENT PERRLA EOMI,No pallor or icterus    No oral exudate or erythema    Neck supple no JVD or LN    Chest Good AE,CTA    CVS RRR S1 S2 WNl No murmur or rub or gallop    Abd soft BS normal No tenderness no masses    Ext No cyanosis clubbing or edema    IV site no erythema tenderness or discharge    Joints no swelling or LOM    CNS AAO X 3 no focal    Lab Data:        06-23    137  |  94[L]  |  21  ----------------------------<  124[H]  3.8   |  28  |  0.71    Ca    8.3[L]      23 Jun 2025 06:46  Phos  3.6     06-23  Mg     2.2     06-23            Clean Catch Clean Catch (Midstream)  06-19-25   <10,000 CFU/mL Normal Urogenital Rubia  --  --      Blood Blood-Peripheral  06-18-25   No growth at 4 days  --  --      Blood Blood-Peripheral  06-18-25   No growth at 4 days  --  --                    WBC Count: 6.88 (06-21-25 @ 06:37)  WBC Count: 7.57 (06-18-25 @ 22:37)       Bilirubin Total: 0.8 mg/dL (06-18-25 @ 22:37)  Aspartate Aminotransferase (AST/SGOT): 40 U/L (06-18-25 @ 22:37)  Alanine Aminotransferase (ALT/SGPT): 16 U/L (06-18-25 @ 22:37)  Alkaline Phosphatase: 110 U/L (06-18-25 @ 22:37)         Patient is a 65y old  Female who presents with a chief complaint of Acute Hypoxic Respiratory failure, Acute CHF exacerbation (23 Jun 2025 10:51)    Being followed by ID for        Interval history:  pt awoken from sleep  legs feel better  derm input appreciated- pt felt to haveLipodermatosclerosis, bilateral lower legs  - Inflammation of subcutaneous fat, usually on the lower extremities, secondary to chronic venous insufficiency  No other acute events        PAST MEDICAL & SURGICAL HISTORY:  Diverticulitis of colon (without mention of hemorrhage)      Chronic pain      S/P Laminectomy  Lumbar 3/10      Depression H/O panic attack  with anxiety      Mixed connective tissue disease      Adenocarcinoma of lung      Alcohol abuse      Opiate dependence      Lung cancer      History of lung cancer  s/p wedge resection of RML      S/P cholecystectomy      S/P lumbar laminectomy      History of hip replacement, total, right  6/7/2020      History of oophorectomy, unilateral  bilateral      History of lung surgery      History of laminectomy      H/O hand surgery        Allergies    penicillin (Swelling)  morphine (Vomiting; Nausea)  penicillin (Other)  penicillin (Unknown)  Clams (Other)    Intolerances      Antimicrobials:    ceFAZolin   IVPB      ceFAZolin   IVPB 2000 milliGRAM(s) IV Intermittent every 8 hours  hydroxychloroquine 200 milliGRAM(s) Oral daily    MEDICATIONS  (STANDING):  albuterol/ipratropium for Nebulization 3 milliLiter(s) Nebulizer every 6 hours  amLODIPine   Tablet 2.5 milliGRAM(s) Oral daily  aspirin enteric coated 81 milliGRAM(s) Oral daily  atorvastatin 20 milliGRAM(s) Oral at bedtime  ceFAZolin   IVPB      ceFAZolin   IVPB 2000 milliGRAM(s) IV Intermittent every 8 hours  clobetasol 0.05% Ointment 1 Application(s) Topical every 12 hours  DULoxetine 80 milliGRAM(s) Oral daily  enoxaparin Injectable 40 milliGRAM(s) SubCutaneous every 24 hours  furosemide    Tablet 40 milliGRAM(s) Oral daily  hydrocortisone 1% Cream 1 Application(s) Topical two times a day  hydroxychloroquine 200 milliGRAM(s) Oral daily  methylPREDNISolone sodium succinate Injectable 30 milliGRAM(s) IV Push two times a day  nortriptyline 40 milliGRAM(s) Oral two times a day  pantoprazole    Tablet 40 milliGRAM(s) Oral before breakfast  polyethylene glycol 3350 17 Gram(s) Oral daily  pregabalin 200 milliGRAM(s) Oral three times a day  senna 2 Tablet(s) Oral at bedtime  tamsulosin 0.4 milliGRAM(s) Oral at bedtime      Vital Signs Last 24 Hrs  T(C): 36.7 (06-23-25 @ 11:04), Max: 36.8 (06-22-25 @ 20:41)  T(F): 98 (06-23-25 @ 11:04), Max: 98.3 (06-22-25 @ 20:41)  HR: 85 (06-23-25 @ 11:04) (85 - 111)  BP: 117/78 (06-23-25 @ 11:04) (105/70 - 128/82)  BP(mean): --  RR: 18 (06-23-25 @ 11:04) (18 - 19)  SpO2: 93% (06-23-25 @ 11:04) (93% - 95%)    Physical Exam:    Constitutional well preserved,comfortable,pleasant    HEENT PERRLA EOMI,No pallor or icterus    No oral exudate or erythema    Neck supple no JVD or LN    Chest Good AE,CTA    CVS  S1 S2     Abd soft BS normal No tenderness     Ext less erythema and edema    IV site no erythema tenderness or discharge    Joints no swelling or LOM    CNS AAO X 3 no focal    Lab Data:        06-23    137  |  94[L]  |  21  ----------------------------<  124[H]  3.8   |  28  |  0.71    Ca    8.3[L]      23 Jun 2025 06:46  Phos  3.6     06-23  Mg     2.2     06-23            Clean Catch Clean Catch (Midstream)  06-19-25   <10,000 CFU/mL Normal Urogenital Rubia  --  --      Blood Blood-Peripheral  06-18-25   No growth at 4 days  --  --      Blood Blood-Peripheral  06-18-25   No growth at 4 days  --  --        HIV-1/2 Antigen/Antibody Screen by [facility] (06.22.25 @ 06:35)    HIV-1/2 Combo Result: Nonreact: Nonreactive: HIV-1 p24 antigen and HIV-1/HIV-2 antibodies were not  detected. Nonreactive results may be due to antigen and/or antibody  levels that are below the limit of detection of this assay.  Reactive: Presumptive evidence of HIV-1 p24 antigen and/or HIV-1/HIV-2  antibodies. This is a preliminary result. Further confirmatory testing  according to the [facility] HIV testing algorithm will follow, and such  confirmatory results must be considered in making a diagnosis related to  HIV infection. Further HIV tests include HIV-1/HIV-2 antibody  differentiation immunoassay and subsequent nucleic acid testing if needed.  This result should be interpreted in conjunction with the patient’s  clinical presentation, history, and other laboratory results. If the  result is inconsistent with clinical evidence, additional testing is  suggested to confirm the result.    Cortisol AM, Serum . (06.22.25 @ 13:46)    Cortisol AM, Serum: 5.3 ug/dL                WBC Count: 6.88 (06-21-25 @ 06:37)  WBC Count: 7.57 (06-18-25 @ 22:37)       Bilirubin Total: 0.8 mg/dL (06-18-25 @ 22:37)  Aspartate Aminotransferase (AST/SGOT): 40 U/L (06-18-25 @ 22:37)  Alanine Aminotransferase (ALT/SGPT): 16 U/L (06-18-25 @ 22:37)  Alkaline Phosphatase: 110 U/L (06-18-25 @ 22:37)
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
## 2025-06-23 NOTE — ADVANCED PRACTICE NURSE CONSULT - ASSESSMENT
Met with the patient on [facility]. Patient is awake, lying on a low air-loss pressure redistribution surface upon arrival. She is alert, but confused and slow to follow commands. Introduced self and role. Permission granted for a skin consult. Patient is thin and frail. Offloading and pericare initiated upon admission as patient is increasingly sedentary 2/2 to illness. Incontinent of urine and stool. Currently utilizing a Primafit to divert urine from the skin.    With assistance, patient turned to her right side to for an assessment of her sacrococcygeal region. Skin is intact without erythema or discoloration.    B/L upper extremities ecchymotic. Elbows are intact without erythema or discoloration related pressure.    B/L feet with hemosiderin staining. +DP palpated bilaterally. Skin between the ankle and the knee are erythematous and hardened with a scaly plaque to the R lower leg. Heels are intact without erythema or discoloration. Presentation of the lower legs consistent with chronic venous insufficiency. Seen by dermatology on 6/22/25.

## 2025-06-23 NOTE — PROGRESS NOTE ADULT - SUBJECTIVE AND OBJECTIVE BOX
Patient seen and examined at bedside.    CARDIOLOGY PROGRESS NOTE:     Overnight Events:     REVIEW OF SYSTEMS:  CONSTITUTIONAL: No weakness, fevers or chills  EYES/ENT: No visual changes;  No dysphagia  NECK: No pain or stiffness  RESPIRATORY: No cough, wheezing, hemoptysis; No shortness of breath  CARDIOVASCULAR: No chest pain or palpitations; No lower extremity edema  GASTROINTESTINAL: No abdominal or epigastric pain. No nausea, vomiting, or hematemesis; No diarrhea or constipation. No melena or hematochezia.  BACK: No back pain  GENITOURINARY: No dysuria, frequency or hematuria  NEUROLOGICAL: No numbness or weakness  SKIN: No itching, burning, rashes, or lesions   All other review of systems is negative unless indicated above.            Current Meds:  albuterol/ipratropium for Nebulization 3 milliLiter(s) Nebulizer every 6 hours  ALPRAZolam 0.25 milliGRAM(s) Oral three times a day PRN  amLODIPine   Tablet 2.5 milliGRAM(s) Oral daily  aspirin enteric coated 81 milliGRAM(s) Oral daily  atorvastatin 20 milliGRAM(s) Oral at bedtime  ceFAZolin   IVPB      ceFAZolin   IVPB 2000 milliGRAM(s) IV Intermittent every 8 hours  clobetasol 0.05% Ointment 1 Application(s) Topical every 12 hours  DULoxetine 80 milliGRAM(s) Oral daily  enoxaparin Injectable 40 milliGRAM(s) SubCutaneous every 24 hours  furosemide    Tablet 40 milliGRAM(s) Oral daily  hydrocortisone 1% Cream 1 Application(s) Topical two times a day  HYDROmorphone  Injectable 1 milliGRAM(s) IV Push every 6 hours PRN  hydroxychloroquine 200 milliGRAM(s) Oral daily  methylPREDNISolone sodium succinate Injectable 30 milliGRAM(s) IV Push two times a day  nortriptyline 40 milliGRAM(s) Oral two times a day  oxyCODONE    IR 10 milliGRAM(s) Oral every 6 hours PRN  pantoprazole    Tablet 40 milliGRAM(s) Oral before breakfast  polyethylene glycol 3350 17 Gram(s) Oral daily  pregabalin 200 milliGRAM(s) Oral three times a day  senna 2 Tablet(s) Oral at bedtime  tamsulosin 0.4 milliGRAM(s) Oral at bedtime      Vitals:  T(F): 98.3 (06-23), Max: 98.3 (06-22)  HR: 103 (06-23) (93 - 111)  BP: 124/80 (06-23) (105/70 - 128/82)  RR: 18 (06-23)  SpO2: 95% (06-23)  I&O's Summary    22 Jun 2025 07:01  -  23 Jun 2025 07:00  --------------------------------------------------------  IN: 620 mL / OUT: 1315 mL / NET: -695 mL        Physical Exam:  GEN: NAD  HEENT: EOMI, clear sclera  PULM: CTA b/l, no wheeze  CV: RRR S1 S2, no murmur, no JVD  ABD: S, NT, ND  EXT: WWP, no edema  PSYCH: normal affect  SKIN: No rash      06-22    139  |  96  |  12  ----------------------------<  137[H]  4.0   |  27  |  0.61    Ca    8.0[L]      22 Jun 2025 06:35  Phos  1.9     06-22  Mg     2.1     06-22                    New ECG(s): Personally reviewed       Patient seen and examined at bedside.    CARDIOLOGY PROGRESS NOTE:     Overnight Events: no acute events  - this AM patient states she feels okay but weak  - denies any chest pain or shortness of breath, orthopnea, states that her "oxygen has been going up and down"    REVIEW OF SYSTEMS:  CONSTITUTIONAL: +weakness, fevers or chills  EYES/ENT: No visual changes;  No dysphagia  NECK: No pain or stiffness  RESPIRATORY: No cough, wheezing, hemoptysis; No shortness of breath  CARDIOVASCULAR: No chest pain or palpitations; No lower extremity edema  GASTROINTESTINAL: No abdominal or epigastric pain. No nausea, vomiting, or hematemesis; No diarrhea or constipation. No melena or hematochezia.  BACK: No back pain  GENITOURINARY: No dysuria, frequency or hematuria  NEUROLOGICAL: No numbness or weakness  SKIN: No itching, burning, rashes, or lesions   All other review of systems is negative unless indicated above.            Current Meds:  albuterol/ipratropium for Nebulization 3 milliLiter(s) Nebulizer every 6 hours  ALPRAZolam 0.25 milliGRAM(s) Oral three times a day PRN  amLODIPine   Tablet 2.5 milliGRAM(s) Oral daily  aspirin enteric coated 81 milliGRAM(s) Oral daily  atorvastatin 20 milliGRAM(s) Oral at bedtime  ceFAZolin   IVPB      ceFAZolin   IVPB 2000 milliGRAM(s) IV Intermittent every 8 hours  clobetasol 0.05% Ointment 1 Application(s) Topical every 12 hours  DULoxetine 80 milliGRAM(s) Oral daily  enoxaparin Injectable 40 milliGRAM(s) SubCutaneous every 24 hours  furosemide    Tablet 40 milliGRAM(s) Oral daily  hydrocortisone 1% Cream 1 Application(s) Topical two times a day  HYDROmorphone  Injectable 1 milliGRAM(s) IV Push every 6 hours PRN  hydroxychloroquine 200 milliGRAM(s) Oral daily  methylPREDNISolone sodium succinate Injectable 30 milliGRAM(s) IV Push two times a day  nortriptyline 40 milliGRAM(s) Oral two times a day  oxyCODONE    IR 10 milliGRAM(s) Oral every 6 hours PRN  pantoprazole    Tablet 40 milliGRAM(s) Oral before breakfast  polyethylene glycol 3350 17 Gram(s) Oral daily  pregabalin 200 milliGRAM(s) Oral three times a day  senna 2 Tablet(s) Oral at bedtime  tamsulosin 0.4 milliGRAM(s) Oral at bedtime      Vitals:  T(F): 98.3 (06-23), Max: 98.3 (06-22)  HR: 103 (06-23) (93 - 111)  BP: 124/80 (06-23) (105/70 - 128/82)  RR: 18 (06-23)  SpO2: 95% (06-23)  I&O's Summary    22 Jun 2025 07:01  -  23 Jun 2025 07:00  --------------------------------------------------------  IN: 620 mL / OUT: 1315 mL / NET: -695 mL        Physical Exam:  GEN: NAD  HEENT: clear sclera  PULM: CTA b/l, no wheeze  CV: RRR S1 S2, no murmur, no JVD  ABD: S, NT, ND  EXT: WWP, no edema  PSYCH: normal affect  SKIN: No rash      06-22    139  |  96  |  12  ----------------------------<  137[H]  4.0   |  27  |  0.61    Ca    8.0[L]      22 Jun 2025 06:35  Phos  1.9     06-22  Mg     2.1     06-22                    New ECG(s): Personally reviewed       Patient seen and examined at bedside.    CARDIOLOGY PROGRESS NOTE:     Overnight Events: no acute events  - this AM patient states she feels okay but weak  - denies any chest pain or shortness of breath, orthopnea, states that her "oxygen has been going up and down"    REVIEW OF SYSTEMS:  CONSTITUTIONAL: +weakness, fevers or chills  EYES/ENT: No visual changes;  No dysphagia  NECK: No pain or stiffness  RESPIRATORY: No cough, wheezing, hemoptysis; No shortness of breath  CARDIOVASCULAR: No chest pain or palpitations; No lower extremity edema  GASTROINTESTINAL: No abdominal or epigastric pain. No nausea, vomiting, or hematemesis; No diarrhea or constipation. No melena or hematochezia.  BACK: No back pain  GENITOURINARY: No dysuria, frequency or hematuria  NEUROLOGICAL: No numbness or weakness  SKIN: No itching, burning, rashes, or lesions  All other review of systems is negative unless indicated above.            Current Meds:  albuterol/ipratropium for Nebulization 3 milliLiter(s) Nebulizer every 6 hours  ALPRAZolam 0.25 milliGRAM(s) Oral three times a day PRN  amLODIPine   Tablet 2.5 milliGRAM(s) Oral daily  aspirin enteric coated 81 milliGRAM(s) Oral daily  atorvastatin 20 milliGRAM(s) Oral at bedtime  ceFAZolin   IVPB      ceFAZolin   IVPB 2000 milliGRAM(s) IV Intermittent every 8 hours  clobetasol 0.05% Ointment 1 Application(s) Topical every 12 hours  DULoxetine 80 milliGRAM(s) Oral daily  enoxaparin Injectable 40 milliGRAM(s) SubCutaneous every 24 hours  furosemide    Tablet 40 milliGRAM(s) Oral daily  hydrocortisone 1% Cream 1 Application(s) Topical two times a day  HYDROmorphone  Injectable 1 milliGRAM(s) IV Push every 6 hours PRN  hydroxychloroquine 200 milliGRAM(s) Oral daily  methylPREDNISolone sodium succinate Injectable 30 milliGRAM(s) IV Push two times a day  nortriptyline 40 milliGRAM(s) Oral two times a day  oxyCODONE    IR 10 milliGRAM(s) Oral every 6 hours PRN  pantoprazole    Tablet 40 milliGRAM(s) Oral before breakfast  polyethylene glycol 3350 17 Gram(s) Oral daily  pregabalin 200 milliGRAM(s) Oral three times a day  senna 2 Tablet(s) Oral at bedtime  tamsulosin 0.4 milliGRAM(s) Oral at bedtime      Vitals:  T(F): 98.3 (06-23), Max: 98.3 (06-22)  HR: 103 (06-23) (93 - 111)  BP: 124/80 (06-23) (105/70 - 128/82)  RR: 18 (06-23)  SpO2: 95% (06-23)  I&O's Summary    22 Jun 2025 07:01  -  23 Jun 2025 07:00  --------------------------------------------------------  IN: 620 mL / OUT: 1315 mL / NET: -695 mL        Physical Exam:  GEN: NAD  HEENT: clear sclera  PULM: CTA b/l, no wheeze  CV: tachycardic, regular rhythm, S1 S2, no murmur, no JVD  ABD: S, NT, ND  EXT: WWP, no edema  PSYCH: normal affect  SKIN: bilateral LE with erythema, bruising on bilateral dorsal foot    06-22    139  |  96  |  12  ----------------------------<  137[H]  4.0   |  27  |  0.61    Ca    8.0[L]      22 Jun 2025 06:35  Phos  1.9     06-22  Mg     2.1     06-22                    New ECG(s): Personally reviewed

## 2025-06-23 NOTE — PROGRESS NOTE ADULT - ASSESSMENT
65-year-old female with a significant past medical history including a prior myocardial infarction (MI), connective tissue disease, cerebrovascular accident (CVA), seizures secondary to hyponatremia, remote substance use, COPD with pulmonary hypertension (on home O2 2L), and pulmonary fibrosis.    This morning, the patient felt "off" and dizzy, prompting her to visit her doctor's office. There, she was found to be hypotensive (exact numbers unknown) and hypoxic with an oxygen saturation of 50% on room air. She was advised to go to the Emergency Department (ED) but instead went home. Upon arriving home, she reports walking into her garage and falling. She was unable to get up for approximately 4-5 hours. She denies hitting her head or experiencing a loss of consciousness during the fall. She denies being on any blood thinners.    Emergency Medical Services (EMS) and police department (PD) were called. On their arrival, her oxygen saturation was again noted to be in the 50s on room air. She was placed on 6L nasal cannula, with improvement in her O2 saturation to 94%. In the ED triage, her oxygen saturation was 50% on room air, improving with 4L nasal cannula.    The patient reports bilateral leg redness and pain. She denies any fevers, chills, chest pain, shortness of breath (despite hypoxia), abdominal pain, nausea, vomiting, diarrhea, constipation, dysuria, weakness, numbness, tingling, neck pain, vision changes, headache, or lightheadedness (though she initially reported dizziness).  (19 Jun 2025 13:42)  Pt claims the erythema of the upper thighs is new but the lower legs is not.   CT personally reviewed with radiology attending -Imaging with bilateral septal thickening and intralobular lines with diffuse GGOs suggestive of pulmonary fibrotic disease with possible superimposed edema/infection. PA enlarged with contrast reflux suggestive of pHTN. ? enlarged esophagus     A/P  #COPD  # ? pulmonary fibrosis  #hypotensive  #hypoxemia  #connective tissue disorder   #leg erythema    -  BC x 2 sets  - check MRSA swab  - team changed to ancef = seems ok   - appreciate pulmonary input  - appreciatederm input for legs-   - pain consult- concerned that patient is over medicating    - AM cortisol level- LOW- TEAM TO ADDRESS    COMPLETE 7 days of abs. can change to po keflex when ready for discharge- today is day #5   Zakia Gilbert M.D. ,   please reach via teams   If no answer, or after 5PM/ weekends,  then please call  [phone]      ID service will be covering tomorrow . Please call for acute issues or questions. [phone]

## 2025-06-23 NOTE — ADVANCED PRACTICE NURSE CONSULT - REASON FOR CONSULT
Consult placed by RN for the DTIs on the B/L elbows, heels, and sacrum, present on admission.    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    This morning, the patient felt "off" and dizzy, prompting her to visit her doctor's office. There, she was found to be hypotensive (exact numbers unknown) and hypoxic with an oxygen saturation of 50% on room air. She was advised to go to the Emergency Department (ED) but instead went home. Upon arriving home, she reports walking into her garage and falling. She was unable to get up for approximately 4-5 hours. She denies hitting her head or experiencing a loss of consciousness during the fall. She denies being on any blood thinners.    Emergency Medical Services (EMS) and police department (PD) were called. On their arrival, her oxygen saturation was again noted to be in the 50s on room air. She was placed on 6L nasal cannula, with improvement in her O2 saturation to 94%. In the ED triage, her oxygen saturation was 50% on room air, improving with 4L nasal cannula.    The patient reports bilateral leg redness and pain. She denies any fevers, chills, chest pain, shortness of breath (despite hypoxia), abdominal pain, nausea, vomiting, diarrhea, constipation, dysuria, weakness, numbness, tingling, neck pain, vision changes, headache, or lightheadedness (though she initially reported dizziness).

## 2025-06-23 NOTE — ADVANCED PRACTICE NURSE CONSULT - RECOMMEDATIONS
Lipodermatosclerosis b/l lower legs  - Refer to dermatology recs    Preventative  - Continue w/ low air loss pressure redistribution bed surface.  - Moisturize skin with Sween24 daily after bathing.  - Continue turning and positioning q2h and utilize offloading devices as per protocol (i.e. Jacob-Lock for heels while in bed).  - Avoid use of diapers and continue with only one breathable underpad while in bed.  - Utilize external catheters if patient unable to toilet OOB.  - Routine sari-care with BID/PRN protective barrier cream (i.e. Pal or Critic-Aid Clear) for soiling.  - Waffle cushion if OOB to chair.  - Nutrition Consult for optimization in pt w/ increased nutritional needs.  - Encourage high quality protein, Patrick/Prosource, MVI & Vit C to promote wound healing.    For questions/comments regarding the recommendations in this consult, please contact Nicci Reich via [employer] Teams. Wound care will not actively follow. For new concerns, please enter new consult. Thank you!

## 2025-06-23 NOTE — PROGRESS NOTE ADULT - SUBJECTIVE AND OBJECTIVE BOX
Andre Reyes, M.D.  Office: [phone]  Available thru [employer] Teams     Patient is a 65y old  Female who presents with a chief complaint of Acute Hypoxic Respiratory failure, Acute CHF exacerbation (23 Jun 2025 08:57)          SUBJECTIVE / OVERNIGHT EVENTS:    No acute overnight events.        MEDICATIONS  (STANDING):  albuterol/ipratropium for Nebulization 3 milliLiter(s) Nebulizer every 6 hours  amLODIPine   Tablet 2.5 milliGRAM(s) Oral daily  aspirin enteric coated 81 milliGRAM(s) Oral daily  atorvastatin 20 milliGRAM(s) Oral at bedtime  ceFAZolin   IVPB      ceFAZolin   IVPB 2000 milliGRAM(s) IV Intermittent every 8 hours  clobetasol 0.05% Ointment 1 Application(s) Topical every 12 hours  DULoxetine 80 milliGRAM(s) Oral daily  enoxaparin Injectable 40 milliGRAM(s) SubCutaneous every 24 hours  furosemide    Tablet 40 milliGRAM(s) Oral daily  hydrocortisone 1% Cream 1 Application(s) Topical two times a day  hydroxychloroquine 200 milliGRAM(s) Oral daily  methylPREDNISolone sodium succinate Injectable 30 milliGRAM(s) IV Push two times a day  nortriptyline 40 milliGRAM(s) Oral two times a day  pantoprazole    Tablet 40 milliGRAM(s) Oral before breakfast  polyethylene glycol 3350 17 Gram(s) Oral daily  pregabalin 200 milliGRAM(s) Oral three times a day  senna 2 Tablet(s) Oral at bedtime  tamsulosin 0.4 milliGRAM(s) Oral at bedtime    MEDICATIONS  (PRN):  ALPRAZolam 0.25 milliGRAM(s) Oral three times a day PRN anxiety  HYDROmorphone  Injectable 1 milliGRAM(s) IV Push every 6 hours PRN Severe Pain (7 - 10)  oxyCODONE    IR 10 milliGRAM(s) Oral every 6 hours PRN Moderate Pain (4 - 6)          T(C): 36.8 (06-23 @ 07:59), Max: 36.8 (06-22 @ 20:41)   HR: 103   BP: 124/80   RR: 18   SpO2: 95%    PHYSICAL EXAM:    CONSTITUTIONAL: NAD, well-developed, well-groomed  EYES: PERRLA; conjunctiva and sclera clear  ENMT: Moist oral mucosa, no pharyngeal injection or exudates; normal dentition  RESPIRATORY: Normal respiratory effort; lungs are clear to auscultation bilaterally  CARDIOVASCULAR: Regular rate and rhythm, normal S1 and S2, no murmur/rub/gallop; No lower extremity edema; Peripheral pulses are 2+ bilaterally  ABDOMEN: Nontender to palpation, normoactive bowel sounds, no rebound/guarding; No hepatosplenomegaly  MUSCULOSKELETAL:  no clubbing or cyanosis of digits; no joint swelling or tenderness to palpation  PSYCH: A+O to person, place, and time; affect appropriate  NEUROLOGY: CN 2-12 are intact and symmetric; no gross sensory deficits       LABS:     06-23    137  |  94[L]  |  21  ----------------------------<  124[H]  3.8   |  28  |  0.71    Ca    8.3[L]      23 Jun 2025 06:46  Phos  3.6     06-23  Mg     2.2     06-23         CAPILLARY BLOOD GLUCOSE          RADIOLOGY & ADDITIONAL TESTS:    Imaging Personally Reviewed:  Consultant(s) Notes Reviewed:    Care Discussed with Consultants/Other Providers:   Andre Reyes, M.D.  Office: [phone]  Available thru [employer] Teams     Patient is a 65y old  Female who presents with a chief complaint of Acute Hypoxic Respiratory failure, Acute CHF exacerbation (23 Jun 2025 08:57)          SUBJECTIVE / OVERNIGHT EVENTS:    No acute overnight events.  She has no new complaints today she feels much better her biggest concern is that she has to be out of the hospital by tomorrow because she does not want to burden her neighbors with the care of her dog    MEDICATIONS  (STANDING):  albuterol/ipratropium for Nebulization 3 milliLiter(s) Nebulizer every 6 hours  amLODIPine   Tablet 2.5 milliGRAM(s) Oral daily  aspirin enteric coated 81 milliGRAM(s) Oral daily  atorvastatin 20 milliGRAM(s) Oral at bedtime  ceFAZolin   IVPB      ceFAZolin   IVPB 2000 milliGRAM(s) IV Intermittent every 8 hours  clobetasol 0.05% Ointment 1 Application(s) Topical every 12 hours  DULoxetine 80 milliGRAM(s) Oral daily  enoxaparin Injectable 40 milliGRAM(s) SubCutaneous every 24 hours  furosemide    Tablet 40 milliGRAM(s) Oral daily  hydrocortisone 1% Cream 1 Application(s) Topical two times a day  hydroxychloroquine 200 milliGRAM(s) Oral daily  methylPREDNISolone sodium succinate Injectable 30 milliGRAM(s) IV Push two times a day  nortriptyline 40 milliGRAM(s) Oral two times a day  pantoprazole    Tablet 40 milliGRAM(s) Oral before breakfast  polyethylene glycol 3350 17 Gram(s) Oral daily  pregabalin 200 milliGRAM(s) Oral three times a day  senna 2 Tablet(s) Oral at bedtime  tamsulosin 0.4 milliGRAM(s) Oral at bedtime    MEDICATIONS  (PRN):  ALPRAZolam 0.25 milliGRAM(s) Oral three times a day PRN anxiety  HYDROmorphone  Injectable 1 milliGRAM(s) IV Push every 6 hours PRN Severe Pain (7 - 10)  oxyCODONE    IR 10 milliGRAM(s) Oral every 6 hours PRN Moderate Pain (4 - 6)          T(C): 36.8 (06-23 @ 07:59), Max: 36.8 (06-22 @ 20:41)   HR: 103   BP: 124/80   RR: 18   SpO2: 95%    PHYSICAL EXAM:    CONSTITUTIONAL: NAD, well-developed, well-groomed  EYES: PERRLA; conjunctiva and sclera clear  ENMT: Moist oral mucosa, no pharyngeal injection or exudates; normal dentition  RESPIRATORY: Normal respiratory effort; lungs are clear to auscultation bilaterally  CARDIOVASCULAR: Regular rate and rhythm, normal S1 and S2, no murmur/rub/gallop; 2+ lower extremity edema; Peripheral pulses are 2+ bilaterally  ABDOMEN: Nontender to palpation, normoactive bowel sounds, no rebound/guarding; No hepatosplenomegaly  MUSCULOSKELETAL:  no clubbing or cyanosis of digits; no joint swelling or tenderness to palpation  PSYCH: A+O to person, place, and time; affect appropriate  NEUROLOGY: CN 2-12 are intact and symmetric; no gross sensory deficits   SKIN: LE are still moderately erythematous b/l      LABS:     06-23    137  |  94[L]  |  21  ----------------------------<  124[H]  3.8   |  28  |  0.71    Ca    8.3[L]      23 Jun 2025 06:46  Phos  3.6     06-23  Mg     2.2     06-23         CAPILLARY BLOOD GLUCOSE          RADIOLOGY & ADDITIONAL TESTS:    Imaging Personally Reviewed:  Consultant(s) Notes Reviewed:    Care Discussed with Consultants/Other Providers:
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
## 2025-06-23 NOTE — PROGRESS NOTE ADULT - SUBJECTIVE AND OBJECTIVE BOX
Interval Events:      REVIEW OF SYSTEMS:  Negative except as documented above.      OBJECTIVE:  ICU Vital Signs Last 24 Hrs  T(C): 36.8 (23 Jun 2025 07:59), Max: 36.8 (22 Jun 2025 20:41)  T(F): 98.3 (23 Jun 2025 07:59), Max: 98.3 (22 Jun 2025 20:41)  HR: 103 (23 Jun 2025 07:59) (93 - 111)  BP: 124/80 (23 Jun 2025 07:59) (105/70 - 128/82)  BP(mean): --  ABP: --  ABP(mean): --  RR: 18 (23 Jun 2025 07:59) (18 - 19)  SpO2: 95% (23 Jun 2025 07:59) (93% - 95%)    O2 Parameters below as of 23 Jun 2025 07:59  Patient On (Oxygen Delivery Method): nasal cannula  O2 Flow (L/min): 3            06-22 @ 07:01  -  06-23 @ 07:00  --------------------------------------------------------  IN: 620 mL / OUT: 1315 mL / NET: -695 mL      CAPILLARY BLOOD GLUCOSE          PHYSICAL EXAM:  General: NAD  HEENT:  EOMI, sclera anicteric, moist mucus membranes  Neck: supple  Cardiovascular: RRR  Respiratory: CTAB, no wheezes, crackles, or rhonci  Abdomen: soft, nontender  Extremities: warm and well perfused, no edema, no clubbing  Skin: no rashes  Neurological: no focal deficits    HOSPITAL MEDICATIONS:  MEDICATIONS  (STANDING):  albuterol/ipratropium for Nebulization 3 milliLiter(s) Nebulizer every 6 hours  amLODIPine   Tablet 2.5 milliGRAM(s) Oral daily  aspirin enteric coated 81 milliGRAM(s) Oral daily  atorvastatin 20 milliGRAM(s) Oral at bedtime  ceFAZolin   IVPB      ceFAZolin   IVPB 2000 milliGRAM(s) IV Intermittent every 8 hours  clobetasol 0.05% Ointment 1 Application(s) Topical every 12 hours  DULoxetine 80 milliGRAM(s) Oral daily  enoxaparin Injectable 40 milliGRAM(s) SubCutaneous every 24 hours  furosemide    Tablet 40 milliGRAM(s) Oral daily  hydrocortisone 1% Cream 1 Application(s) Topical two times a day  hydroxychloroquine 200 milliGRAM(s) Oral daily  methylPREDNISolone sodium succinate Injectable 30 milliGRAM(s) IV Push two times a day  nortriptyline 40 milliGRAM(s) Oral two times a day  pantoprazole    Tablet 40 milliGRAM(s) Oral before breakfast  polyethylene glycol 3350 17 Gram(s) Oral daily  pregabalin 200 milliGRAM(s) Oral three times a day  senna 2 Tablet(s) Oral at bedtime  tamsulosin 0.4 milliGRAM(s) Oral at bedtime    MEDICATIONS  (PRN):  ALPRAZolam 0.25 milliGRAM(s) Oral three times a day PRN anxiety  HYDROmorphone  Injectable 1 milliGRAM(s) IV Push every 6 hours PRN Severe Pain (7 - 10)  oxyCODONE    IR 10 milliGRAM(s) Oral every 6 hours PRN Moderate Pain (4 - 6)      LABS:    Hgb Trend: 11.9<--, 10.5<--  06-23    137  |  94[L]  |  21  ----------------------------<  124[H]  3.8   |  28  |  0.71    Ca    8.3[L]      23 Jun 2025 06:46  Phos  3.6     06-23  Mg     2.2     06-23      Creatinine Trend: 0.71<--, 0.61<--, 0.83<--, 0.64<--, 0.75<--, 0.80<--    Urinalysis Basic - ( 23 Jun 2025 06:46 )    Color: x / Appearance: x / SG: x / pH: x  Gluc: 124 mg/dL / Ketone: x  / Bili: x / Urobili: x   Blood: x / Protein: x / Nitrite: x   Leuk Esterase: x / RBC: x / WBC x   Sq Epi: x / Non Sq Epi: x / Bacteria: x      Arterial Blood Gas:  06-22 @ 06:40  7.46/48/69/34/94.6/8.9  ABG lactate: --        MICROBIOLOGY:       RADIOLOGY:  [x] Reviewed and interpreted by me

## 2025-06-24 ENCOUNTER — TRANSCRIPTION ENCOUNTER (OUTPATIENT)
Age: 65
End: 2025-06-24

## 2025-06-24 LAB
ANION GAP SERPL CALC-SCNC: 10 MMOL/L — SIGNIFICANT CHANGE UP (ref 5–17)
BUN SERPL-MCNC: 21 MG/DL — SIGNIFICANT CHANGE UP (ref 7–23)
CALCIUM SERPL-MCNC: 7.9 MG/DL — LOW (ref 8.4–10.5)
CHLORIDE SERPL-SCNC: 98 MMOL/L — SIGNIFICANT CHANGE UP (ref 96–108)
CO2 SERPL-SCNC: 29 MMOL/L — SIGNIFICANT CHANGE UP (ref 22–31)
CREAT SERPL-MCNC: 0.5 MG/DL — SIGNIFICANT CHANGE UP (ref 0.5–1.3)
CULTURE RESULTS: SIGNIFICANT CHANGE UP
CULTURE RESULTS: SIGNIFICANT CHANGE UP
EGFR: 104 ML/MIN/1.73M2 — SIGNIFICANT CHANGE UP
EGFR: 104 ML/MIN/1.73M2 — SIGNIFICANT CHANGE UP
GLUCOSE SERPL-MCNC: 109 MG/DL — HIGH (ref 70–99)
MAGNESIUM SERPL-MCNC: 2.5 MG/DL — SIGNIFICANT CHANGE UP (ref 1.6–2.6)
POTASSIUM SERPL-MCNC: 3.8 MMOL/L — SIGNIFICANT CHANGE UP (ref 3.5–5.3)
POTASSIUM SERPL-SCNC: 3.8 MMOL/L — SIGNIFICANT CHANGE UP (ref 3.5–5.3)
SODIUM SERPL-SCNC: 137 MMOL/L — SIGNIFICANT CHANGE UP (ref 135–145)
SPECIMEN SOURCE: SIGNIFICANT CHANGE UP
SPECIMEN SOURCE: SIGNIFICANT CHANGE UP

## 2025-06-24 PROCEDURE — 99233 SBSQ HOSP IP/OBS HIGH 50: CPT | Mod: GC

## 2025-06-24 PROCEDURE — 99239 HOSP IP/OBS DSCHRG MGMT >30: CPT

## 2025-06-24 PROCEDURE — 99232 SBSQ HOSP IP/OBS MODERATE 35: CPT | Mod: GC

## 2025-06-24 RX ORDER — METHYLPREDNISOLONE ACETATE 80 MG/ML
30 INJECTION, SUSPENSION INTRA-ARTICULAR; INTRALESIONAL; INTRAMUSCULAR; SOFT TISSUE DAILY
Refills: 0 | Status: DISCONTINUED | OUTPATIENT
Start: 2025-06-25 | End: 2025-06-25

## 2025-06-24 RX ORDER — FUROSEMIDE 10 MG/ML
1 INJECTION INTRAMUSCULAR; INTRAVENOUS
Qty: 30 | Refills: 0
Start: 2025-06-24 | End: 2025-07-23

## 2025-06-24 RX ORDER — FUROSEMIDE 10 MG/ML
1 INJECTION INTRAMUSCULAR; INTRAVENOUS
Refills: 0 | DISCHARGE

## 2025-06-24 RX ORDER — TAMSULOSIN HYDROCHLORIDE 0.4 MG/1
1 CAPSULE ORAL
Qty: 30 | Refills: 0
Start: 2025-06-24 | End: 2025-07-23

## 2025-06-24 RX ORDER — ALBUTEROL SULFATE 2.5 MG/3ML
2 VIAL, NEBULIZER (ML) INHALATION
Qty: 1 | Refills: 0
Start: 2025-06-24

## 2025-06-24 RX ADMIN — Medication 100 MILLIGRAM(S): at 05:29

## 2025-06-24 RX ADMIN — PREGABALIN 200 MILLIGRAM(S): 50 CAPSULE ORAL at 13:10

## 2025-06-24 RX ADMIN — IPRATROPIUM BROMIDE AND ALBUTEROL SULFATE 3 MILLILITER(S): .5; 2.5 SOLUTION RESPIRATORY (INHALATION) at 05:29

## 2025-06-24 RX ADMIN — DULOXETINE 80 MILLIGRAM(S): 20 CAPSULE, DELAYED RELEASE ORAL at 13:05

## 2025-06-24 RX ADMIN — TAMSULOSIN HYDROCHLORIDE 0.4 MILLIGRAM(S): 0.4 CAPSULE ORAL at 22:09

## 2025-06-24 RX ADMIN — Medication 1 MILLIGRAM(S): at 02:57

## 2025-06-24 RX ADMIN — POLYETHYLENE GLYCOL 3350 17 GRAM(S): 17 POWDER, FOR SOLUTION ORAL at 12:51

## 2025-06-24 RX ADMIN — Medication 0.25 MILLIGRAM(S): at 02:00

## 2025-06-24 RX ADMIN — Medication 1 APPLICATION(S): at 17:28

## 2025-06-24 RX ADMIN — Medication 40 MILLIGRAM(S): at 17:28

## 2025-06-24 RX ADMIN — AMLODIPINE BESYLATE 2.5 MILLIGRAM(S): 10 TABLET ORAL at 05:29

## 2025-06-24 RX ADMIN — PREGABALIN 200 MILLIGRAM(S): 50 CAPSULE ORAL at 05:28

## 2025-06-24 RX ADMIN — Medication 1 MILLIGRAM(S): at 03:20

## 2025-06-24 RX ADMIN — Medication 81 MILLIGRAM(S): at 12:52

## 2025-06-24 RX ADMIN — Medication 0.25 MILLIGRAM(S): at 13:05

## 2025-06-24 RX ADMIN — HYDROCORTISONE 1 APPLICATION(S): 10 CREAM TOPICAL at 17:28

## 2025-06-24 RX ADMIN — HYDROXYCHLOROQUINE SULFATE 200 MILLIGRAM(S): 200 TABLET, FILM COATED ORAL at 12:51

## 2025-06-24 RX ADMIN — Medication 40 MILLIGRAM(S): at 05:28

## 2025-06-24 RX ADMIN — METHYLPREDNISOLONE ACETATE 30 MILLIGRAM(S): 80 INJECTION, SUSPENSION INTRA-ARTICULAR; INTRALESIONAL; INTRAMUSCULAR; SOFT TISSUE at 05:26

## 2025-06-24 RX ADMIN — FUROSEMIDE 40 MILLIGRAM(S): 10 INJECTION INTRAMUSCULAR; INTRAVENOUS at 05:28

## 2025-06-24 RX ADMIN — Medication 1 APPLICATION(S): at 05:26

## 2025-06-24 RX ADMIN — ATORVASTATIN CALCIUM 20 MILLIGRAM(S): 80 TABLET, FILM COATED ORAL at 22:09

## 2025-06-24 RX ADMIN — PREGABALIN 200 MILLIGRAM(S): 50 CAPSULE ORAL at 22:09

## 2025-06-24 RX ADMIN — Medication 0.25 MILLIGRAM(S): at 22:09

## 2025-06-24 RX ADMIN — IPRATROPIUM BROMIDE AND ALBUTEROL SULFATE 3 MILLILITER(S): .5; 2.5 SOLUTION RESPIRATORY (INHALATION) at 12:51

## 2025-06-24 RX ADMIN — HYDROCORTISONE 1 APPLICATION(S): 10 CREAM TOPICAL at 05:26

## 2025-06-24 NOTE — PHYSICAL THERAPY INITIAL EVALUATION ADULT - NSPTDISCHREC_GEN_A_CORE
If pt d/c home would require home PT, assist with all mobility, pt owns necessary DME./[facility]acute [facility]

## 2025-06-24 NOTE — PHYSICAL THERAPY INITIAL EVALUATION ADULT - ADDITIONAL COMMENTS
PTA pt was independent with functional mobility and ADLs. Pt lives in a  alone 5 steps to enter 1st floor setup inside. Pt owns RW, W/C.

## 2025-06-24 NOTE — PROVIDER CONTACT NOTE (OTHER) - SITUATION
patient in patient stretcher in [place] and found by RN to be vaping with a vape in her bag
As per patient, resides on her own, without support and taking care of her pet. Patient is oxygen dependant, but admits to not wearing it at home due to "not thinking she needs it."
DTI in sacrum , B/L buttock , B/L heel, B/l elbows
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
## 2025-06-24 NOTE — PROGRESS NOTE ADULT - NS ATTEST RISK PROBLEM GEN_ALL_CORE FT
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   #GERD  #Sepsis d/t LE cellulitis  #CAD
MCTD ILD  COPD  acute on chronic hypoxic respiratory failure   pulmonary htn

## 2025-06-24 NOTE — DISCHARGE NOTE PROVIDER - CARE PROVIDER_API CALL
Natty Brady  Internal Medicine  [address] [place] [id number]  Phone: [phone]  Fax: [phone]  Follow Up Time: 1 week  
Ambulatory

## 2025-06-24 NOTE — DISCHARGE NOTE PROVIDER - NSDCFUADDAPPT_GEN_ALL_CORE_FT
APPTS ARE READY TO BE MADE: [ x] YES    Best Family or Patient Contact (if needed):    Additional Information about above appointments (if needed):    1: HF   2: cardiology  3: pulmonology   4:    Other comments or requests:    APPTS ARE READY TO BE MADE: [x] YES    Best Family or Patient Contact (if needed):    Additional Information about above appointments (if needed):    1: HF   2: cardiology  3: pulmonology   4: dermatology    Other comments or requests:    APPTS ARE READY TO BE MADE: [x] YES    Best Family or Patient Contact (if needed):    Additional Information about above appointments (if needed):    1: HF   2: cardiology  3: pulmonology   4: dermatology    Other comments or requests:       PCP-Patient was outreached but declined scheduling assistance, however there is an appointment reflected on [facility] for the patient. Dr. Brady on 8/12 at 2pm    Patient advised they did not want to proceed with scheduling appointments with the providers on their referrals. They will coordinate care on their own.

## 2025-06-24 NOTE — DISCHARGE NOTE NURSING/CASE MANAGEMENT/SOCIAL WORK - PATIENT PORTAL LINK FT
You can access the FollowMyHealth Patient Portal offered by [facility] by registering at the following website: [email or web address]. By joining [facility]’s FollowMyHealth portal, you will also be able to view your health information using other applications (apps) compatible with our system.

## 2025-06-24 NOTE — DISCHARGE NOTE PROVIDER - NSDCMRMEDTOKEN_GEN_ALL_CORE_FT
ALPRAZolam 0.25 mg oral tablet: 1 tab(s) orally 3 times a day  amLODIPine 2.5 mg oral tablet: 1 tab(s) orally once a day  aspirin 81 mg oral tablet: 1 tab(s) orally once a day  atorvastatin 20 mg oral tablet: 1 tab(s) orally once a day  DULoxetine 20 mg oral delayed release capsule: 1 cap(s) orally once a day take w/ 60mg dose to equal 80mg daily  DULoxetine 60 mg oral delayed release capsule: 1 cap(s) orally once a day take w/ 20mg dose to equal 80mg daily  furosemide 20 mg oral tablet: 1 tab(s) orally once a day  hydroxychloroquine 200 mg oral tablet: 1 tab(s) orally once a day  methylPREDNISolone 4 mg oral tablet: 0.5 tab(s) orally once a day  nortriptyline 10 mg oral capsule: 4 cap(s) orally 2 times a day  oxyCODONE 10 mg oral tablet: 1 tab(s) orally 3 times a day as needed for pain  pantoprazole 20 mg oral delayed release tablet: 1 tab(s) orally once a day (in the morning)  pregabalin 200 mg oral capsule: 1 cap(s) orally 2 - 3 times a day  Restasis 0.05% ophthalmic emulsion: 1 drop(s) in each affected eye 2 times a day  Vitamin/Supplement: Calcium Citrate tablet, Centrum Silver tablet: 1 tablet orally once a day   Albuterol (Eqv-ProAir HFA) 90 mcg/inh inhalation aerosol: 2 inhaled 4 times a day as needed for  shortness of breath and/or wheezing  ALPRAZolam 0.25 mg oral tablet: 1 tab(s) orally 3 times a day  amLODIPine 2.5 mg oral tablet: 1 tab(s) orally once a day  aspirin 81 mg oral tablet: 1 tab(s) orally once a day  atorvastatin 20 mg oral tablet: 1 tab(s) orally once a day  clobetasol 0.05% topical ointment: Apply topically to affected area 2 times a day twice daily prn itch or burning for up to 2 weeks  DULoxetine 20 mg oral delayed release capsule: 1 cap(s) orally once a day take w/ 60mg dose to equal 80mg daily  DULoxetine 60 mg oral delayed release capsule: 1 cap(s) orally once a day take w/ 20mg dose to equal 80mg daily  furosemide 40 mg oral tablet: 1 tab(s) orally once a day  hydroxychloroquine 200 mg oral tablet: 1 tab(s) orally once a day  methylPREDNISolone 4 mg oral tablet: 0.5 tab(s) orally once a day  nortriptyline 10 mg oral capsule: 4 cap(s) orally 2 times a day  oxyCODONE 10 mg oral tablet: 1 tab(s) orally 3 times a day as needed for pain  pantoprazole 20 mg oral delayed release tablet: 1 tab(s) orally once a day (in the morning)  pregabalin 200 mg oral capsule: 1 cap(s) orally 2 - 3 times a day  Restasis 0.05% ophthalmic emulsion: 1 drop(s) in each affected eye 2 times a day  tamsulosin 0.4 mg oral capsule: 1 cap(s) orally once a day (at bedtime)  Vitamin/Supplement: Calcium Citrate tablet, Centrum Silver tablet: 1 tablet orally once a day
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
## 2025-06-24 NOTE — PHYSICAL THERAPY INITIAL EVALUATION ADULT - PERTINENT HX OF CURRENT PROBLEM, REHAB EVAL
65-year-old woman with extensive cardiopulmonary and neurologic comorbidities presents after a prolonged mechanical fall precipitated by documented severe hypoxia (SpO2 50 % on room air) and hypotension. CT chest plus exam indicate volume overload with bilateral crackles, ground-glass and interlobular septal thickening, trace effusion, reflux into hepatic veins, and cardiomegaly, all consistent with an acute decompensated right-sided heart-failure/pulmonary-edema picture in the setting of underlying COPD-pulmonary hypertension. Her bilateral lower-extremity erythema and pitting edema may represent stasis dermatitis/cellulitis superimposed on chronic edema. DX: 	#AHRF/ILD/COPD vs CHF-  CTA negative for PE - Treated with empiric Abx CTX/ Vanco, s/p 1 IV lasix dose in ED, on 3L NC, on IV steroids #Heart failure -s/p  IV lasix, on po Lasix Hyperkalemia K 5.9 /s/p Lokelma/ cocktail, resolved. 	#Bilateral lower extremity swelling r/o Cellulitis, IV ancef c/w IV lasix BID, duplex pending, c/w Cefazolin 	#COPD c/w plaquenil and medrol

## 2025-06-24 NOTE — PROVIDER CONTACT NOTE (OTHER) - ASSESSMENT
vitals noted
As per patient, resides on her own, without support and taking care of her pet. Patient is oxygen dependant, but admits to not wearing it at home due to "not thinking she needs it." PT recommending ERMA, however patient adamantly refusing, despite education regarding fall risk. Patient ambulation assessed in hallway with NP, Dunia Oliva and patient fell back twice w/walker and was caught by hospital staff. Patient deemed unsafe for discharge at this time.
Pt A&O*3 confused and forgetful at times. VSS on 4L NC. Pt went into bag and pulled out vape and smoked vape. Patient told RN to go through bag and remove two vapes.

## 2025-06-24 NOTE — PROGRESS NOTE ADULT - SUBJECTIVE AND OBJECTIVE BOX
Andre Reyes, M.D.  Office: [phone]  Available thru [employer] Teams     Patient is a 65y old  Female who presents with a chief complaint of Acute Hypoxic Respiratory failure, Acute CHF exacerbation (24 Jun 2025 08:55)          SUBJECTIVE / OVERNIGHT EVENTS:    No acute overnight events.        MEDICATIONS  (STANDING):  albuterol/ipratropium for Nebulization 3 milliLiter(s) Nebulizer every 6 hours  amLODIPine   Tablet 2.5 milliGRAM(s) Oral daily  aspirin enteric coated 81 milliGRAM(s) Oral daily  atorvastatin 20 milliGRAM(s) Oral at bedtime  ceFAZolin   IVPB      ceFAZolin   IVPB 2000 milliGRAM(s) IV Intermittent every 8 hours  clobetasol 0.05% Ointment 1 Application(s) Topical every 12 hours  DULoxetine 80 milliGRAM(s) Oral daily  enoxaparin Injectable 40 milliGRAM(s) SubCutaneous every 24 hours  furosemide    Tablet 40 milliGRAM(s) Oral daily  hydrocortisone 1% Cream 1 Application(s) Topical two times a day  hydroxychloroquine 200 milliGRAM(s) Oral daily  methylPREDNISolone sodium succinate Injectable 30 milliGRAM(s) IV Push two times a day  nortriptyline 40 milliGRAM(s) Oral two times a day  pantoprazole    Tablet 40 milliGRAM(s) Oral before breakfast  polyethylene glycol 3350 17 Gram(s) Oral daily  pregabalin 200 milliGRAM(s) Oral three times a day  senna 2 Tablet(s) Oral at bedtime  tamsulosin 0.4 milliGRAM(s) Oral at bedtime    MEDICATIONS  (PRN):  ALPRAZolam 0.25 milliGRAM(s) Oral three times a day PRN anxiety  HYDROmorphone  Injectable 1 milliGRAM(s) IV Push every 6 hours PRN Severe Pain (7 - 10)  oxyCODONE    IR 10 milliGRAM(s) Oral every 6 hours PRN Moderate Pain (4 - 6)          T(C): 36.4 (06-24 @ 04:14), Max: 36.7 (06-23 @ 11:04)   HR: 72   BP: 119/77   RR: 18   SpO2: 97%    PHYSICAL EXAM:    CONSTITUTIONAL: NAD, well-developed, well-groomed  EYES: PERRLA; conjunctiva and sclera clear  ENMT: Moist oral mucosa, no pharyngeal injection or exudates; normal dentition  RESPIRATORY: Normal respiratory effort; lungs are clear to auscultation bilaterally  CARDIOVASCULAR: Regular rate and rhythm, normal S1 and S2, no murmur/rub/gallop; No lower extremity edema; Peripheral pulses are 2+ bilaterally  ABDOMEN: Nontender to palpation, normoactive bowel sounds, no rebound/guarding; No hepatosplenomegaly  MUSCULOSKELETAL:  no clubbing or cyanosis of digits; no joint swelling or tenderness to palpation  PSYCH: A+O to person, place, and time; affect appropriate  NEUROLOGY: CN 2-12 are intact and symmetric; no gross sensory deficits       LABS:     06-24    137  |  98  |  21  ----------------------------<  109[H]  3.8   |  29  |  0.50    Ca    7.9[L]      24 Jun 2025 06:10  Phos  3.6     06-23  Mg     2.5     06-24         CAPILLARY BLOOD GLUCOSE          RADIOLOGY & ADDITIONAL TESTS:    Imaging Personally Reviewed:  Consultant(s) Notes Reviewed:    Care Discussed with Consultants/Other Providers:   Andre Reyes, M.D.  Office: [phone]  Available thru [employer] Teams     Patient is a 65y old  Female who presents with a chief complaint of Acute Hypoxic Respiratory failure, Acute CHF exacerbation (24 Jun 2025 08:55)          SUBJECTIVE / OVERNIGHT EVENTS:    No acute overnight events.  she is feeling much better.      MEDICATIONS  (STANDING):  albuterol/ipratropium for Nebulization 3 milliLiter(s) Nebulizer every 6 hours  amLODIPine   Tablet 2.5 milliGRAM(s) Oral daily  aspirin enteric coated 81 milliGRAM(s) Oral daily  atorvastatin 20 milliGRAM(s) Oral at bedtime  ceFAZolin   IVPB      ceFAZolin   IVPB 2000 milliGRAM(s) IV Intermittent every 8 hours  clobetasol 0.05% Ointment 1 Application(s) Topical every 12 hours  DULoxetine 80 milliGRAM(s) Oral daily  enoxaparin Injectable 40 milliGRAM(s) SubCutaneous every 24 hours  furosemide    Tablet 40 milliGRAM(s) Oral daily  hydrocortisone 1% Cream 1 Application(s) Topical two times a day  hydroxychloroquine 200 milliGRAM(s) Oral daily  methylPREDNISolone sodium succinate Injectable 30 milliGRAM(s) IV Push two times a day  nortriptyline 40 milliGRAM(s) Oral two times a day  pantoprazole    Tablet 40 milliGRAM(s) Oral before breakfast  polyethylene glycol 3350 17 Gram(s) Oral daily  pregabalin 200 milliGRAM(s) Oral three times a day  senna 2 Tablet(s) Oral at bedtime  tamsulosin 0.4 milliGRAM(s) Oral at bedtime    MEDICATIONS  (PRN):  ALPRAZolam 0.25 milliGRAM(s) Oral three times a day PRN anxiety  HYDROmorphone  Injectable 1 milliGRAM(s) IV Push every 6 hours PRN Severe Pain (7 - 10)  oxyCODONE    IR 10 milliGRAM(s) Oral every 6 hours PRN Moderate Pain (4 - 6)          T(C): 36.4 (06-24 @ 04:14), Max: 36.7 (06-23 @ 11:04)   HR: 72   BP: 119/77   RR: 18   SpO2: 97%    PHYSICAL EXAM:    CONSTITUTIONAL: NAD, well-developed, well-groomed  EYES: PERRLA; conjunctiva and sclera clear  ENMT: Moist oral mucosa, no pharyngeal injection or exudates; normal dentition  RESPIRATORY: Normal respiratory effort; lungs are clear to auscultation bilaterally  CARDIOVASCULAR: Regular rate and rhythm, normal S1 and S2, no murmur/rub/gallop; trace lower extremity edema; Peripheral pulses are 2+ bilaterally  ABDOMEN: Nontender to palpation, normoactive bowel sounds, no rebound/guarding; No hepatosplenomegaly  MUSCULOSKELETAL:  no clubbing or cyanosis of digits; no joint swelling or tenderness to palpation  PSYCH: A+O to person, place, and time; affect appropriate  NEUROLOGY: CN 2-12 are intact and symmetric; no gross sensory deficits   SKIN: minimal residual erythema      LABS:     06-24    137  |  98  |  21  ----------------------------<  109[H]  3.8   |  29  |  0.50    Ca    7.9[L]      24 Jun 2025 06:10  Phos  3.6     06-23  Mg     2.5     06-24         CAPILLARY BLOOD GLUCOSE          RADIOLOGY & ADDITIONAL TESTS:    Imaging Personally Reviewed:  Consultant(s) Notes Reviewed:    Care Discussed with Consultants/Other Providers:

## 2025-06-24 NOTE — DISCHARGE NOTE PROVIDER - NSDCCPCAREPLAN_GEN_ALL_CORE_FT
PRINCIPAL DISCHARGE DIAGNOSIS  Diagnosis: Shortness of breath  Assessment and Plan of Treatment: Avoid environmental allergens and asthma triggers.  Participate in physical activity as tolerated.  Seek immediate medical attention if the shortness of breath does not improve with asthma treatments, or if you experience chest pain or palpitations.  Call an ambulance if your lips or nail beds become a bluish color.        SECONDARY DISCHARGE DIAGNOSES  Diagnosis: Hypoxia  Assessment and Plan of Treatment: Avoid environmental allergens and asthma triggers.  Participate in physical activity as tolerated.  Seek immediate medical attention if the shortness of breath does not improve with asthma treatments, or if you experience chest pain or palpitations.  Call an ambulance if your lips or nail beds become a bluish color.      Diagnosis: Cellulitis  Assessment and Plan of Treatment: Take all antibiotics as ordered.  Call you Health care provider upon arrival home to make a one week follow up appointment.  If you develop fever, chills, malaise, or change in mental status call your Health Care Provider or go to the Emergency Department.  Nutrition is important, eat small frequent meals to help ensure you get adequate calories.  Do not stay in bed all day!  Increase your activity daily as tolerated.       PRINCIPAL DISCHARGE DIAGNOSIS  Diagnosis: Acute heart failure with preserved ejection fraction  Assessment and Plan of Treatment: Continue all medications as prescribed, especially the diuretic (furosemide) and topical steroid for your legs.  Use your supplemental oxygen continuously as directed.  Weigh yourself daily and contact your doctor if you gain more than 3 pounds in one day or 5 pounds in a week.  Monitor for signs of worsening heart failure, such as increased shortness of breath, swelling in your legs or abdomen, or difficulty breathing when lying flat.  Due to a high risk for falls, use caution when moving, and use any recommended assistance device      SECONDARY DISCHARGE DIAGNOSES  Diagnosis: Cellulitis  Assessment and Plan of Treatment: You completed a 5 day course of antibiotics for possible cellulitis.  continue steroids cream as per Dermatologist.  Dermatology & Vascular Surgery: Follow up as an outpatient for management of lipodermatosclerosis and chronic venous insufficiency, as recommended.    Diagnosis: Hypoxia  Assessment and Plan of Treatment: Avoid environmental allergens and asthma triggers.  Participate in physical activity as tolerated.  Seek immediate medical attention if the shortness of breath does not improve with asthma treatments, or if you experience chest pain or palpitations.  Call an ambulance if your lips or nail beds become a bluish color.      Diagnosis: ILD (interstitial lung disease)  Assessment and Plan of Treatment: Please follow up with your Primary Pulmologist and Rheumatologist within 14 days after discharge for continued management of this chronic medical issue.

## 2025-06-24 NOTE — PROGRESS NOTE ADULT - SUBJECTIVE AND OBJECTIVE BOX
Patient seen and examined at bedside.    CARDIOLOGY PROGRESS NOTE:     Overnight Events:     REVIEW OF SYSTEMS:  CONSTITUTIONAL: No weakness, fevers or chills  EYES/ENT: No visual changes;  No dysphagia  NECK: No pain or stiffness  RESPIRATORY: No cough, wheezing, hemoptysis; No shortness of breath  CARDIOVASCULAR: No chest pain or palpitations; No lower extremity edema  GASTROINTESTINAL: No abdominal or epigastric pain. No nausea, vomiting, or hematemesis; No diarrhea or constipation. No melena or hematochezia.  BACK: No back pain  GENITOURINARY: No dysuria, frequency or hematuria  NEUROLOGICAL: No numbness or weakness  SKIN: No itching, burning, rashes, or lesions   All other review of systems is negative unless indicated above.            Current Meds:  albuterol/ipratropium for Nebulization 3 milliLiter(s) Nebulizer every 6 hours  ALPRAZolam 0.25 milliGRAM(s) Oral three times a day PRN  amLODIPine   Tablet 2.5 milliGRAM(s) Oral daily  aspirin enteric coated 81 milliGRAM(s) Oral daily  atorvastatin 20 milliGRAM(s) Oral at bedtime  ceFAZolin   IVPB      ceFAZolin   IVPB 2000 milliGRAM(s) IV Intermittent every 8 hours  clobetasol 0.05% Ointment 1 Application(s) Topical every 12 hours  DULoxetine 80 milliGRAM(s) Oral daily  enoxaparin Injectable 40 milliGRAM(s) SubCutaneous every 24 hours  furosemide    Tablet 40 milliGRAM(s) Oral daily  hydrocortisone 1% Cream 1 Application(s) Topical two times a day  HYDROmorphone  Injectable 1 milliGRAM(s) IV Push every 6 hours PRN  hydroxychloroquine 200 milliGRAM(s) Oral daily  methylPREDNISolone sodium succinate Injectable 30 milliGRAM(s) IV Push two times a day  nortriptyline 40 milliGRAM(s) Oral two times a day  oxyCODONE    IR 10 milliGRAM(s) Oral every 6 hours PRN  pantoprazole    Tablet 40 milliGRAM(s) Oral before breakfast  polyethylene glycol 3350 17 Gram(s) Oral daily  pregabalin 200 milliGRAM(s) Oral three times a day  senna 2 Tablet(s) Oral at bedtime  tamsulosin 0.4 milliGRAM(s) Oral at bedtime      Vitals:  T(F): 97.6 (06-24), Max: 98 (06-23)  HR: 72 (06-24) (72 - 89)  BP: 119/77 (06-24) (117/78 - 160/94)  RR: 18 (06-24)  SpO2: 97% (06-24)  I&O's Summary    23 Jun 2025 07:01  -  24 Jun 2025 07:00  --------------------------------------------------------  IN: 520 mL / OUT: 1850 mL / NET: -1330 mL        Physical Exam:  GEN: NAD  HEENT: EOMI, clear sclera  PULM: CTA b/l, no wheeze  CV: RRR S1 S2, no murmur, no JVD  ABD: S, NT, ND  EXT: WWP, no edema  PSYCH: normal affect  SKIN: No rash      06-24    137  |  98  |  21  ----------------------------<  109[H]  3.8   |  29  |  0.50    Ca    7.9[L]      24 Jun 2025 06:10  Phos  3.6     06-23  Mg     2.5     06-24                    New ECG(s): Personally reviewed   Patient seen and examined at bedside.    CARDIOLOGY PROGRESS NOTE:     Overnight Events: no acute events  -pt feeling well, wants to go home  -denies shortness of breath    REVIEW OF SYSTEMS:  CONSTITUTIONAL: No weakness, fevers or chills  EYES/ENT: No visual changes;  No dysphagia  NECK: No pain or stiffness  RESPIRATORY: No cough, wheezing, hemoptysis; No shortness of breath  CARDIOVASCULAR: No chest pain or palpitations; No lower extremity edema  GASTROINTESTINAL: No abdominal or epigastric pain. No nausea, vomiting, or hematemesis; No diarrhea or constipation. No melena or hematochezia.  BACK: No back pain  GENITOURINARY: No dysuria, frequency or hematuria  NEUROLOGICAL: No numbness or weakness  SKIN: No itching, burning, rashes, or lesions   All other review of systems is negative unless indicated above.            Current Meds:  albuterol/ipratropium for Nebulization 3 milliLiter(s) Nebulizer every 6 hours  ALPRAZolam 0.25 milliGRAM(s) Oral three times a day PRN  amLODIPine   Tablet 2.5 milliGRAM(s) Oral daily  aspirin enteric coated 81 milliGRAM(s) Oral daily  atorvastatin 20 milliGRAM(s) Oral at bedtime  ceFAZolin   IVPB      ceFAZolin   IVPB 2000 milliGRAM(s) IV Intermittent every 8 hours  clobetasol 0.05% Ointment 1 Application(s) Topical every 12 hours  DULoxetine 80 milliGRAM(s) Oral daily  enoxaparin Injectable 40 milliGRAM(s) SubCutaneous every 24 hours  furosemide    Tablet 40 milliGRAM(s) Oral daily  hydrocortisone 1% Cream 1 Application(s) Topical two times a day  HYDROmorphone  Injectable 1 milliGRAM(s) IV Push every 6 hours PRN  hydroxychloroquine 200 milliGRAM(s) Oral daily  methylPREDNISolone sodium succinate Injectable 30 milliGRAM(s) IV Push two times a day  nortriptyline 40 milliGRAM(s) Oral two times a day  oxyCODONE    IR 10 milliGRAM(s) Oral every 6 hours PRN  pantoprazole    Tablet 40 milliGRAM(s) Oral before breakfast  polyethylene glycol 3350 17 Gram(s) Oral daily  pregabalin 200 milliGRAM(s) Oral three times a day  senna 2 Tablet(s) Oral at bedtime  tamsulosin 0.4 milliGRAM(s) Oral at bedtime      Vitals:  T(F): 97.6 (06-24), Max: 98 (06-23)  HR: 72 (06-24) (72 - 89)  BP: 119/77 (06-24) (117/78 - 160/94)  RR: 18 (06-24)  SpO2: 97% (06-24)  I&O's Summary    23 Jun 2025 07:01  -  24 Jun 2025 07:00  --------------------------------------------------------  IN: 520 mL / OUT: 1850 mL / NET: -1330 mL        Physical Exam:  GEN: NAD  HEENT: clear sclera  PULM: CTA b/l, no wheeze  CV: RRR S1 S2, no murmur, no JVD  ABD: S, NT, ND  EXT: WWP, no edema  PSYCH: normal affect  SKIN: bilateral LE with erythema    06-24    137  |  98  |  21  ----------------------------<  109[H]  3.8   |  29  |  0.50    Ca    7.9[L]      24 Jun 2025 06:10  Phos  3.6     06-23  Mg     2.5     06-24                    New ECG(s): Personally reviewed   Patient seen and examined at bedside.    CARDIOLOGY PROGRESS NOTE:     Overnight Events: no acute events  -pt feeling well, wants to go home  -denies shortness of breath    REVIEW OF SYSTEMS:  CONSTITUTIONAL: No weakness, fevers or chills  EYES/ENT: No visual changes;  No dysphagia  NECK: No pain or stiffness  RESPIRATORY: No cough, wheezing, hemoptysis; No shortness of breath  CARDIOVASCULAR: No chest pain or palpitations; No lower extremity edema  GASTROINTESTINAL: No abdominal or epigastric pain. No nausea, vomiting, or hematemesis; No diarrhea or constipation. No melena or hematochezia.  BACK: No back pain  GENITOURINARY: No dysuria, frequency or hematuria  NEUROLOGICAL: No numbness or weakness  SKIN: No itching, burning, rashes, or lesions   All other review of systems is negative unless indicated above.            Current Meds:  albuterol/ipratropium for Nebulization 3 milliLiter(s) Nebulizer every 6 hours  ALPRAZolam 0.25 milliGRAM(s) Oral three times a day PRN  amLODIPine   Tablet 2.5 milliGRAM(s) Oral daily  aspirin enteric coated 81 milliGRAM(s) Oral daily  atorvastatin 20 milliGRAM(s) Oral at bedtime  ceFAZolin   IVPB      ceFAZolin   IVPB 2000 milliGRAM(s) IV Intermittent every 8 hours  clobetasol 0.05% Ointment 1 Application(s) Topical every 12 hours  DULoxetine 80 milliGRAM(s) Oral daily  enoxaparin Injectable 40 milliGRAM(s) SubCutaneous every 24 hours  furosemide    Tablet 40 milliGRAM(s) Oral daily  hydrocortisone 1% Cream 1 Application(s) Topical two times a day  HYDROmorphone  Injectable 1 milliGRAM(s) IV Push every 6 hours PRN  hydroxychloroquine 200 milliGRAM(s) Oral daily  methylPREDNISolone sodium succinate Injectable 30 milliGRAM(s) IV Push two times a day  nortriptyline 40 milliGRAM(s) Oral two times a day  oxyCODONE    IR 10 milliGRAM(s) Oral every 6 hours PRN  pantoprazole    Tablet 40 milliGRAM(s) Oral before breakfast  polyethylene glycol 3350 17 Gram(s) Oral daily  pregabalin 200 milliGRAM(s) Oral three times a day  senna 2 Tablet(s) Oral at bedtime  tamsulosin 0.4 milliGRAM(s) Oral at bedtime      Vitals:  T(F): 97.6 (06-24), Max: 98 (06-23)  HR: 72 (06-24) (72 - 89)  BP: 119/77 (06-24) (117/78 - 160/94)  RR: 18 (06-24)  SpO2: 97% (06-24)  I&O's Summary    23 Jun 2025 07:01  -  24 Jun 2025 07:00  --------------------------------------------------------  IN: 520 mL / OUT: 1850 mL / NET: -1330 mL        Physical Exam:  GEN: NAD  HEENT: clear sclera  PULM: CTA b/l, no wheeze  CV: RRR S1 S2, no murmur, no JVD  ABD: S, NT, ND  EXT: WWP, no edema  PSYCH: normal affect  SKIN: bilateral LE with erythema    06-24    137  |  98  |  21  ----------------------------<  109[H]  3.8   |  29  |  0.50    Ca    7.9[L]      24 Jun 2025 06:10  Phos  3.6     06-23  Mg     2.5     06-24      New ECG(s): Personally reviewed

## 2025-06-24 NOTE — DISCHARGE NOTE PROVIDER - ATTENDING DISCHARGE PHYSICAL EXAMINATION:
T(C): 36.5 (06-24 @ 10:50), Max: 36.7 (06-23 @ 20:32)   HR: 80   BP: 126/84   RR: 20   SpO2: 99%    PHYSICAL EXAM:    CONSTITUTIONAL: NAD, well-developed, well-groomed  EYES: PERRLA; conjunctiva and sclera clear  ENMT: Moist oral mucosa, no pharyngeal injection or exudates; normal dentition  RESPIRATORY: Normal respiratory effort; lungs are clear to auscultation bilaterally  CARDIOVASCULAR: Regular rate and rhythm, normal S1 and S2, no murmur/rub/gallop; No lower extremity edema; Peripheral pulses are 2+ bilaterally  ABDOMEN: Nontender to palpation, normoactive bowel sounds, no rebound/guarding; No hepatosplenomegaly  MUSCULOSKELETAL:  no clubbing or cyanosis of digits; no joint swelling or tenderness to palpation  PSYCH: A+O to person, place, and time; affect appropriate  NEUROLOGY: CN 2-12 are intact and symmetric; no gross sensory deficits

## 2025-06-24 NOTE — DISCHARGE NOTE NURSING/CASE MANAGEMENT/SOCIAL WORK - NSDCPEFALRISK_GEN_ALL_CORE
For information on Fall & Injury Prevention, visit: [email or web address] OR  [email or web address] OR  https://www.cdc.gov/steadi/patient.html

## 2025-06-24 NOTE — DISCHARGE NOTE PROVIDER - DETAILS OF MALNUTRITION DIAGNOSIS/DIAGNOSES
This patient has been assessed with a concern for Malnutrition and was treated during this hospitalization for the following Nutrition diagnosis/diagnoses:     -  06/21/2025: Moderate protein-calorie malnutrition

## 2025-06-24 NOTE — PROVIDER CONTACT NOTE (OTHER) - RECOMMENDATIONS
Reassess patients capacity to make decisions, to ensure safe discharge planning.
Provider notified. Patient instructed RN to throw both vapes out in garbage. RN disposed of vapes as instructed
PA made aware, pressure injury prevention implemented

## 2025-06-24 NOTE — DISCHARGE NOTE PROVIDER - NSFOLLOWUPCLINICSTOKEN_GEN_ALL_ED_FT
634159:1-3 days|| ||00\01||False;702089:1-3 days|| ||00\01||False;127516:1-3 days|| ||00\01||False; 266513:1-3 days|| ||00\01||False;862108:1-3 days|| ||00\01||False;738542:1-3 days|| ||00\01||False;497274:2 weeks|| ||00\01||False;

## 2025-06-24 NOTE — PROVIDER CONTACT NOTE (OTHER) - BACKGROUND
PMH alcohol abuse, lung cancer, depression, opiate dependance
64 y/o female with extensive cardiopulmonary and neurologic comorbidities presents after prolonged mechanical fall precipitated by documented severe hypoxia (SPO2 50% on room air) and hypotension.
pt admitted s/p fall

## 2025-06-24 NOTE — DISCHARGE NOTE PROVIDER - HOSPITAL COURSE
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    This morning, the patient felt "off" and dizzy, prompting her to visit her doctor's office. There, she was found to be hypotensive (exact numbers unknown) and hypoxic with an oxygen saturation of 50% on room air. She was advised to go to the Emergency Department (ED) but instead went home. Upon arriving home, she reports walking into her garage and falling. She was unable to get up for approximately 4-5 hours. She denies hitting her head or experiencing a loss of consciousness during the fall. She denies being on any blood thinners.    Emergency Medical Services (EMS) and police department (PD) were called. On their arrival, her oxygen saturation was again noted to be in the 50s on room air. She was placed on 6L nasal cannula, with improvement in her O2 saturation to 94%. In the ED triage, her oxygen saturation was 50% on room air, improving with 4L nasal cannula.    The patient reports bilateral leg redness and pain. She denies any fevers, chills, chest pain, shortness of breath (despite hypoxia), abdominal pain, nausea, vomiting, diarrhea, constipation, dysuria, weakness, numbness, tingling, neck pain, vision changes, headache, or lightheadedness (though she initially reported dizziness).  (2025 13:42)      Hospital Course:    Weight - Discharge/Trend:  Weight in k.6 (25 @ 07:59)  Weight in k.9 (25 @ 07:41)  Weight in k.6 (25 @ 08:00)  Weight in k.7 (25 @ 08:00)      Documented EF:     Guideline Directed Medical Therapy Prescribed/Reason why not prescribed:  B-Blocker:   ARNI/ACE-I/ARB:  MRA:  SGLT2 inhibitor:     Appointment scheduled within 7 days?  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    This morning, the patient felt "off" and dizzy, prompting her to visit her doctor's office. There, she was found to be hypotensive (exact numbers unknown) and hypoxic with an oxygen saturation of 50% on room air. She was advised to go to the Emergency Department (ED) but instead went home. Upon arriving home, she reports walking into her garage and falling. She was unable to get up for approximately 4-5 hours. She denies hitting her head or experiencing a loss of consciousness during the fall. She denies being on any blood thinners.    Emergency Medical Services (EMS) and police department (PD) were called. On their arrival, her oxygen saturation was again noted to be in the 50s on room air. She was placed on 6L nasal cannula, with improvement in her O2 saturation to 94%. In the ED triage, her oxygen saturation was 50% on room air, improving with 4L nasal cannula.    The patient reports bilateral leg redness and pain. She denies any fevers, chills, chest pain, shortness of breath (despite hypoxia), abdominal pain, nausea, vomiting, diarrhea, constipation, dysuria, weakness, numbness, tingling, neck pain, vision changes, headache, or lightheadedness (though she initially reported dizziness).  (2025 13:42)      Hospital Course:    65-year-old woman with a complex medical history including mixed connective tissue disease (MCTD), interstitial lung disease (ILD), COPD on home oxygen, and a history of seizures and neurogenic bladder. She presented after a prolonged mechanical fall at home, where she was unable to get up for approximately four hours. The fall was precipitated by severe hypoxia (SpO2 50% on room air) and hypotension.    Upon evaluation, she was found to be in acute decompensated right-sided heart failure. A CT scan and physical exam revealed signs of significant volume overload, including an enlarged right ventricle, bilateral crackles, and edema. A transthoracic echocardiogram confirmed a preserved left ventricular ejection fraction of 54% but showed a severely enlarged right ventricle and mild to moderate pulmonary hypertension, consistent with her underlying ILD. Her slurred speech was determined to be non-acute, with no evidence of stroke.    Her hospital management focused on gentle diuresis with furosemide and supplemental oxygen, to which she responded well. Her respiratory failure and heart failure are now compensated. The bilateral lower extremity erythema and warmth, initially treated as cellulitis with IV Ancef, was evaluated by dermatology. They diagnosed it as severe lipodermatosclerosis secondary to chronic venous insufficiency and recommended treatment with topical clobetasol. The patient was also diagnosed with moderate protein-calorie malnutrition and started on oral nutritional supplements. She is now hemodynamically stable and clinically improved for discharge.    Weight - Discharge/Trend:  Weight in k.6 (25 @ 07:59)  Weight in k.9 (25 @ 07:41)  Weight in k.6 (25 @ 08:00)  Weight in k.7 (25 @ 08:00)    Meds  increased Furosemide (Lasix): to 40 mg by mouth once daily  Plaquenil (hydroxychloroquine): As per home dose  Methylprednisolone: Wean back to home dose of 2 mg by mouth once a day  Clobetasol 0.05% ointment: Apply to both lower legs twice daily as needed for itch or burning for up to two weeks    Active or Pending Issues Requiring Follow-up:  Pulm, Rheum, Cardiology    Advanced Directives:   [ x] Full code  [ ] DNR  [ ] Hospice    Discharge Diagnoses:    Acute hypoxic respiratory failure  Acute decompensated heart failure with preserved ejection fraction (HFpEF)  Bilateral lower extremity cellulitis vs. stasis dermatitis

## 2025-06-24 NOTE — DISCHARGE NOTE NURSING/CASE MANAGEMENT/SOCIAL WORK - FINANCIAL ASSISTANCE
[facility] provides services at a reduced cost to those who are determined to be eligible through [facility]’s financial assistance program. Information regarding [facility]’s financial assistance program can be found by going to [email or web address] or by calling [phone].

## 2025-06-24 NOTE — PROVIDER CONTACT NOTE (OTHER) - ACTION/TREATMENT ORDERED:
Wound consult is ordered , will continue to monitor
Provider notified. Patient instructed RN to throw both vapes out in garbage. RN disposed of vapes as instructed by patient. RN manager also informed of situation
Provider stated patient discharge cancelled and that she wants psychiatry to establish patients capacity for decision making. No other interventions at this time. Will c/w monitoring.

## 2025-06-24 NOTE — PROGRESS NOTE ADULT - SUBJECTIVE AND OBJECTIVE BOX
Interval Events:      REVIEW OF SYSTEMS:  Negative except as documented above.      OBJECTIVE:  ICU Vital Signs Last 24 Hrs  T(C): 36.4 (24 Jun 2025 04:14), Max: 36.7 (23 Jun 2025 11:04)  T(F): 97.6 (24 Jun 2025 04:14), Max: 98 (23 Jun 2025 11:04)  HR: 72 (24 Jun 2025 04:14) (72 - 89)  BP: 119/77 (24 Jun 2025 04:14) (117/78 - 160/94)  BP(mean): --  ABP: --  ABP(mean): --  RR: 18 (24 Jun 2025 04:14) (18 - 18)  SpO2: 97% (24 Jun 2025 04:14) (93% - 97%)    O2 Parameters below as of 24 Jun 2025 04:14  Patient On (Oxygen Delivery Method): nasal cannula  O2 Flow (L/min): 3            06-23 @ 07:01  -  06-24 @ 07:00  --------------------------------------------------------  IN: 520 mL / OUT: 1850 mL / NET: -1330 mL      CAPILLARY BLOOD GLUCOSE          PHYSICAL EXAM:  General: NAD  HEENT:  EOMI, sclera anicteric, moist mucus membranes  Neck: supple  Cardiovascular: RRR  Respiratory: CTAB, no wheezes, crackles, or rhonci  Abdomen: soft, nontender  Extremities: warm and well perfused, no edema, no clubbing  Skin: no rashes  Neurological: no focal deficits    HOSPITAL MEDICATIONS:  MEDICATIONS  (STANDING):  albuterol/ipratropium for Nebulization 3 milliLiter(s) Nebulizer every 6 hours  amLODIPine   Tablet 2.5 milliGRAM(s) Oral daily  aspirin enteric coated 81 milliGRAM(s) Oral daily  atorvastatin 20 milliGRAM(s) Oral at bedtime  ceFAZolin   IVPB      ceFAZolin   IVPB 2000 milliGRAM(s) IV Intermittent every 8 hours  clobetasol 0.05% Ointment 1 Application(s) Topical every 12 hours  DULoxetine 80 milliGRAM(s) Oral daily  enoxaparin Injectable 40 milliGRAM(s) SubCutaneous every 24 hours  furosemide    Tablet 40 milliGRAM(s) Oral daily  hydrocortisone 1% Cream 1 Application(s) Topical two times a day  hydroxychloroquine 200 milliGRAM(s) Oral daily  methylPREDNISolone sodium succinate Injectable 30 milliGRAM(s) IV Push two times a day  nortriptyline 40 milliGRAM(s) Oral two times a day  pantoprazole    Tablet 40 milliGRAM(s) Oral before breakfast  polyethylene glycol 3350 17 Gram(s) Oral daily  pregabalin 200 milliGRAM(s) Oral three times a day  senna 2 Tablet(s) Oral at bedtime  tamsulosin 0.4 milliGRAM(s) Oral at bedtime    MEDICATIONS  (PRN):  ALPRAZolam 0.25 milliGRAM(s) Oral three times a day PRN anxiety  HYDROmorphone  Injectable 1 milliGRAM(s) IV Push every 6 hours PRN Severe Pain (7 - 10)  oxyCODONE    IR 10 milliGRAM(s) Oral every 6 hours PRN Moderate Pain (4 - 6)      LABS:    Hgb Trend: 11.9<--, 10.5<--  06-24    137  |  98  |  21  ----------------------------<  109[H]  3.8   |  29  |  0.50    Ca    7.9[L]      24 Jun 2025 06:10  Phos  3.6     06-23  Mg     2.5     06-24      Creatinine Trend: 0.50<--, 0.71<--, 0.61<--, 0.83<--, 0.64<--, 0.75<--    Urinalysis Basic - ( 24 Jun 2025 06:10 )    Color: x / Appearance: x / SG: x / pH: x  Gluc: 109 mg/dL / Ketone: x  / Bili: x / Urobili: x   Blood: x / Protein: x / Nitrite: x   Leuk Esterase: x / RBC: x / WBC x   Sq Epi: x / Non Sq Epi: x / Bacteria: x            MICROBIOLOGY:       RADIOLOGY:  [x] Reviewed and interpreted by me

## 2025-06-24 NOTE — DISCHARGE NOTE PROVIDER - NSFOLLOWUPCLINICS_GEN_ALL_ED_FT
Heart HOME - HF  Cardiology  [address] [facility] office, [address] [place], [place]   Phone:   Fax:   Follow Up Time: 1-3 days    [facility] - Cardiology  Cardiology  [place]   Phone:   Fax:   Follow Up Time: 1-3 days    Home Program  Pulmonary  [place]   Phone:   Fax:   Follow Up Time: 1-3 days     [facility] - Cardiology  Cardiology  [place]   Phone:   Fax:   Follow Up Time: 1-3 days    Heart [facility] - HF  Cardiology  [address] [facility], [address] [place] [address]   Phone:   Fax:   Follow Up Time: 1-3 days    Home Program  Pulmonary  [place]   Phone:   Fax:   Follow Up Time: 1-3 days    [facility] Dermatolgy  Dermatology  [address] [facility], [address] [place] [address]  Phone: [phone]  Fax:   Follow Up Time: 2 weeks

## 2025-06-24 NOTE — PROGRESS NOTE ADULT - TIME BILLING
Review of medical records, labs, imaging, coordination of care and did not include time spent teaching.
- Ordering, reviewing, and interpreting labs, testing, and imaging.  - Independently obtaining a review of systems and performing a physical exam.  - Reviewing prior hospitalizations and, where necessary, outpatient records.  - Counseling and education regarding the interpretation of the aforementioned items and the plan of care.  - Note: This time excludes time spent teaching house staff.
ADHFpEF, pulmonary hypertension due to pulmonary fibrosis and mixed connective tissue disease, coronary artery disease
HFpEF compensated, pulmonary hypertension due to pulmonary fibrosis and mixed connective tissue disease, coronary artery disease
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
## 2025-06-24 NOTE — PROVIDER CONTACT NOTE (OTHER) - REASON
patient found vaping in hospital
DTI in sacrum , B/L buttock , B/L heel, B/l elbows
Discharge cancelled due to patients poor capacity to ambulate and poor decision making at this time.

## 2025-06-24 NOTE — CHART NOTE - NSCHARTNOTEFT_GEN_A_CORE
Consult received; case reviewed with attending on call; discussed preliminary thoughts with team, full consult to follow
NUTRITION FOLLOW UP NOTE    PATIENT SEEN FOR: Malnutrition follow up, dietitian consult received for: STAR HF    SOURCE: [x] Patient  [x] Current Medical Record  [] RN  [] Family/support person at bedside  [] Patient unavailable/inappropriate  [] Other:    CHART REVIEWED/EVENTS NOTED.  [] No changes to nutrition care plan to note  [x] Nutrition Status:  Per chart review:  - PMH: Alcohol abuse, adenocarcinoma of lung, connective tissue disease   - Per chart, HF noted during admission    DIET ORDER:   Diet, Regular:   Supplement Feeding Modality:  Oral  Ensure Max Cans or Servings Per Day:  1       Frequency:  Daily (06-23-25)      CURRENT DIET ORDER IS:  [] Appropriate:  [] Inadequate:  [x] Other: Please see below for recommendations.    NUTRITION INTAKE/PROVISION:  [x] PO: Per nursing flowsheets, fluctuating poor/fair p.o. intake noted during admission, %. Patient visited today (6/24), and reports overall fair appetite this admission. Ordered oral nutrition supplement observed at bedside, 0% consumed. Patient reports disliking oral nutrition supplement.   [] Enteral Nutrition:  [] Parenteral Nutrition:    ANTHROPOMETRICS:  Drug Dosing Weight  Height (cm): 157.5 (06 May 2024 11:56)  Weight (kg): 54.4 (18 Jun 2025 21:40)  BMI (kg/m2): 21.9 (18 Jun 2025 21:40)    Weights:   Per nursing flowsheets, patient's recent weights this admission: 109.5lbs (6/21), 111.5lbs (6/22), 110lbs (6/23), 107.8lbs (6/24). Weights obtained via bed scale, question accuracy. HF and edema noted, and diuretics in use; weight fluctuations to be expected, continue to monitor.    MEDICATIONS:  MEDICATIONS  (STANDING):  albuterol/ipratropium for Nebulization 3 milliLiter(s) Nebulizer every 6 hours  amLODIPine   Tablet 2.5 milliGRAM(s) Oral daily  aspirin enteric coated 81 milliGRAM(s) Oral daily  atorvastatin 20 milliGRAM(s) Oral at bedtime  clobetasol 0.05% Ointment 1 Application(s) Topical every 12 hours  DULoxetine 80 milliGRAM(s) Oral daily  enoxaparin Injectable 40 milliGRAM(s) SubCutaneous every 24 hours  furosemide    Tablet 40 milliGRAM(s) Oral daily  hydrocortisone 1% Cream 1 Application(s) Topical two times a day  hydroxychloroquine 200 milliGRAM(s) Oral daily  methylPREDNISolone sodium succinate Injectable 30 milliGRAM(s) IV Push two times a day  nortriptyline 40 milliGRAM(s) Oral two times a day  pantoprazole    Tablet 40 milliGRAM(s) Oral before breakfast  polyethylene glycol 3350 17 Gram(s) Oral daily  pregabalin 200 milliGRAM(s) Oral three times a day  senna 2 Tablet(s) Oral at bedtime  tamsulosin 0.4 milliGRAM(s) Oral at bedtime    MEDICATIONS  (PRN):  ALPRAZolam 0.25 milliGRAM(s) Oral three times a day PRN anxiety  HYDROmorphone  Injectable 1 milliGRAM(s) IV Push every 6 hours PRN Severe Pain (7 - 10)  oxyCODONE    IR 10 milliGRAM(s) Oral every 6 hours PRN Moderate Pain (4 - 6)      NUTRITIONALLY PERTINENT LABS:  06-24 Na137 mmol/L Glu 109 mg/dL[H] K+ 3.8 mmol/L Cr  0.50 mg/dL BUN 21 mg/dL 06-23 Phos 3.6 mg/dL 06-18 Alb 3.4 g/dL06-18 ALT 16 U/L AST 40 U/L Alkaline Phosphatase 110 U/L      NUTRITIONALLY PERTINENT MEDICATIONS/LABS:  [x] Reviewed  [x] Relevant notes on medications/labs:  Pertinent Medications:   Diuretics in use     Pertinent Labs:  - Glucose 109 (H)  - Hypophosphatemia noted during admission, now resolved    EDEMA:  [x] Reviewed  [x] Relevant notes: Per nursing flowsheets, 1+ krsytina ankle, right knee and krystina foot edema noted     GI/ I&O:  [x] Reviewed  [] Relevant notes:  [x] Other: Bowel regimen ordered (Miralax, Senna).    SKIN:   [] No pressure injuries documented, per nursing flowsheet  [x] Pressure injury previously noted  [x] Change in pressure injury documentation: Per wound care 6/23, "Preventative". No pressure injury noted.   [] Other:    ESTIMATED NEEDS:  [x] No change:  [] Updated:  Energy: 8852-0254 kcal/day (30-35 kcal/kg)  Protein: 59.7-74.7 g/day (1.2-1.5 g/kg)  Fluid:   ml/day or [x] defer to team  Based on:  Ideal body weight (49.8kg)    NUTRITION DIAGNOSIS:  [x] Prior Dx: 1. Moderate Chronic Malnutrition 2. Increased Nutrient Needs  [] New Dx:    EDUCATION:  [x] Yes: Reviewed CHF diet education. Discussed Na restriction, foods high in Na to avoid, reading food labels, tips for limiting Na in your diet. Reviewed daily weights, Wt gain parameters to contact MD. Discussed Na intake in relation to fluid retention, edema and Wt gain. Pt verbalized understanding and accepted Heart Failure Nutrition Therapy Handout.  RD remains available for diet education review.     Handouts provided:  - Heart Failure Nutrition Therapy  - Heart Healthy Label Reading Tips  - Heart Healthy Shopping Tips  - Sodium (salt) Content of Foods       NUTRITION CARE PLAN:  1. Diet: Recommend Low Sodium Diet   - RD to honor and provide patient's dietary preferences as able to encourage p.o. intake   2. Supplements: Recommend d/c Ensure Max once daily as patient reports not consuming   3. Multivitamin/mineral supplementation: Defer to medical team  4. Continue daily weights   5. Monitor routine weights, nutrition related labs, diet advancements, p.o. intake and tolerance, oral nutrition supplement compliance, and skin integrity    [] Achieved - Continue current nutrition intervention(s)  [] Current medical condition precludes nutrition intervention at this time.    MONITORING AND EVALUATION:   RD remains available upon request and will follow up per protocol.    Margaret Randolph, MS, RD, CDN   Available on MS teams
Called by RN for patient using VAPE pen in her room. 2 vape pens were confiscated, patient allowed Rn to look through her possessions and no other paraphernalia found.  Additionally bladder scan with ~650cc noted. Patient has had multiple elevated readings. Reviewed with Dr. michelle, will order araya catheter.  will monitor and consider TOV in 24-48 hours.
RD CHF education chart note    Patient was visited by RD for CHF education. Heart failure education provided to the patient in detail. Discussed heart failure nutrition therapy, sodium and fluid intake, importance of diet adherence, daily weights monitoring with the patient. Reinforced importance of weight gain parameters and importance of contacting MD’s about weight changes. Provided handouts on heart failure nutrition therapy, reading heart healthy nutrition labels, heart healthy shopping tips and low sodium food list. Patient verbalized understanding demonstrated by teach back method. RD contact information left with patient for any future questioning.    Margaret Randolph, MS, RD, CDN   Available on MS teams
WALTERJULIO CESAR NAZARIO    Discharge cancelled due to the patients poor capacity of ambulating, planning, and making important health decisions at this time. PT recommended ERMA, pt refused due to the burden of a pet at home.   As per patient, resides on her own, without support and taking care of her pet. Patient is an oxygen dependant, but admits to not wearing it at home "because does not think that she needs it at all".  Patient admits to driving herself to Dr. navarro, grocery shopping, and doing meal preps.  Admits to multiple fallings at home due to tripping?   GOC discussed with patient at the bedside and she likes everything to be done for her in case of emergency, CPR and Intubation included.     In order to expedite safe discharge, it is important to establish patients capacity and therefore, I would recommend Psychiatry evaluation for such.   Discussed with attending, bedside RN, and the patient.                         Dunia Oliva, [facility]   Medicine Department  [id number]

## 2025-06-25 VITALS
DIASTOLIC BLOOD PRESSURE: 51 MMHG | SYSTOLIC BLOOD PRESSURE: 126 MMHG | HEART RATE: 88 BPM | RESPIRATION RATE: 18 BRPM | OXYGEN SATURATION: 97 %

## 2025-06-25 LAB
ALBUMIN SERPL ELPH-MCNC: 3.7 G/DL — SIGNIFICANT CHANGE UP (ref 3.3–5)
ALP SERPL-CCNC: 72 U/L — SIGNIFICANT CHANGE UP (ref 40–120)
ALT FLD-CCNC: 10 U/L — SIGNIFICANT CHANGE UP (ref 10–45)
ANION GAP SERPL CALC-SCNC: 14 MMOL/L — SIGNIFICANT CHANGE UP (ref 5–17)
AST SERPL-CCNC: 34 U/L — SIGNIFICANT CHANGE UP (ref 10–40)
BILIRUB SERPL-MCNC: 0.5 MG/DL — SIGNIFICANT CHANGE UP (ref 0.2–1.2)
BUN SERPL-MCNC: 18 MG/DL — SIGNIFICANT CHANGE UP (ref 7–23)
CALCIUM SERPL-MCNC: 8.4 MG/DL — SIGNIFICANT CHANGE UP (ref 8.4–10.5)
CHLORIDE SERPL-SCNC: 98 MMOL/L — SIGNIFICANT CHANGE UP (ref 96–108)
CO2 SERPL-SCNC: 25 MMOL/L — SIGNIFICANT CHANGE UP (ref 22–31)
CREAT SERPL-MCNC: 0.53 MG/DL — SIGNIFICANT CHANGE UP (ref 0.5–1.3)
EGFR: 103 ML/MIN/1.73M2 — SIGNIFICANT CHANGE UP
EGFR: 103 ML/MIN/1.73M2 — SIGNIFICANT CHANGE UP
GLUCOSE SERPL-MCNC: 98 MG/DL — SIGNIFICANT CHANGE UP (ref 70–99)
HCT VFR BLD CALC: 43.8 % — SIGNIFICANT CHANGE UP (ref 34.5–45)
HGB BLD-MCNC: 13.6 G/DL — SIGNIFICANT CHANGE UP (ref 11.5–15.5)
MCHC RBC-ENTMCNC: 28.9 PG — SIGNIFICANT CHANGE UP (ref 27–34)
MCHC RBC-ENTMCNC: 31.1 G/DL — LOW (ref 32–36)
MCV RBC AUTO: 93.2 FL — SIGNIFICANT CHANGE UP (ref 80–100)
NRBC # BLD AUTO: 0 K/UL — SIGNIFICANT CHANGE UP (ref 0–0)
NRBC # FLD: 0 K/UL — SIGNIFICANT CHANGE UP (ref 0–0)
NRBC BLD AUTO-RTO: 0 /100 WBCS — SIGNIFICANT CHANGE UP (ref 0–0)
PLATELET # BLD AUTO: 305 K/UL — SIGNIFICANT CHANGE UP (ref 150–400)
PMV BLD: 10.3 FL — SIGNIFICANT CHANGE UP (ref 7–13)
POTASSIUM SERPL-MCNC: 3.4 MMOL/L — LOW (ref 3.5–5.3)
POTASSIUM SERPL-SCNC: 3.4 MMOL/L — LOW (ref 3.5–5.3)
PROT SERPL-MCNC: 7 G/DL — SIGNIFICANT CHANGE UP (ref 6–8.3)
RBC # BLD: 4.7 M/UL — SIGNIFICANT CHANGE UP (ref 3.8–5.2)
RBC # FLD: 15.4 % — HIGH (ref 10.3–14.5)
SODIUM SERPL-SCNC: 137 MMOL/L — SIGNIFICANT CHANGE UP (ref 135–145)
WBC # BLD: 6.87 K/UL — SIGNIFICANT CHANGE UP (ref 3.8–10.5)
WBC # FLD AUTO: 6.87 K/UL — SIGNIFICANT CHANGE UP (ref 3.8–10.5)

## 2025-06-25 PROCEDURE — 83605 ASSAY OF LACTIC ACID: CPT

## 2025-06-25 PROCEDURE — 85014 HEMATOCRIT: CPT

## 2025-06-25 PROCEDURE — 81001 URINALYSIS AUTO W/SCOPE: CPT

## 2025-06-25 PROCEDURE — 83880 ASSAY OF NATRIURETIC PEPTIDE: CPT

## 2025-06-25 PROCEDURE — 70450 CT HEAD/BRAIN W/O DYE: CPT

## 2025-06-25 PROCEDURE — 93970 EXTREMITY STUDY: CPT

## 2025-06-25 PROCEDURE — 87640 STAPH A DNA AMP PROBE: CPT

## 2025-06-25 PROCEDURE — 82375 ASSAY CARBOXYHB QUANT: CPT

## 2025-06-25 PROCEDURE — 80307 DRUG TEST PRSMV CHEM ANLYZR: CPT

## 2025-06-25 PROCEDURE — 85025 COMPLETE CBC W/AUTO DIFF WBC: CPT

## 2025-06-25 PROCEDURE — 80053 COMPREHEN METABOLIC PANEL: CPT

## 2025-06-25 PROCEDURE — 82435 ASSAY OF BLOOD CHLORIDE: CPT

## 2025-06-25 PROCEDURE — 87637 SARSCOV2&INF A&B&RSV AMP PRB: CPT

## 2025-06-25 PROCEDURE — 36415 COLL VENOUS BLD VENIPUNCTURE: CPT

## 2025-06-25 PROCEDURE — 82550 ASSAY OF CK (CPK): CPT

## 2025-06-25 PROCEDURE — 85730 THROMBOPLASTIN TIME PARTIAL: CPT

## 2025-06-25 PROCEDURE — 85018 HEMOGLOBIN: CPT

## 2025-06-25 PROCEDURE — 84100 ASSAY OF PHOSPHORUS: CPT

## 2025-06-25 PROCEDURE — 87086 URINE CULTURE/COLONY COUNT: CPT

## 2025-06-25 PROCEDURE — 36600 WITHDRAWAL OF ARTERIAL BLOOD: CPT

## 2025-06-25 PROCEDURE — 84702 CHORIONIC GONADOTROPIN TEST: CPT

## 2025-06-25 PROCEDURE — 84295 ASSAY OF SERUM SODIUM: CPT

## 2025-06-25 PROCEDURE — 82803 BLOOD GASES ANY COMBINATION: CPT

## 2025-06-25 PROCEDURE — 99232 SBSQ HOSP IP/OBS MODERATE 35: CPT

## 2025-06-25 PROCEDURE — 93005 ELECTROCARDIOGRAM TRACING: CPT

## 2025-06-25 PROCEDURE — 0241U: CPT

## 2025-06-25 PROCEDURE — 96375 TX/PRO/DX INJ NEW DRUG ADDON: CPT

## 2025-06-25 PROCEDURE — 82947 ASSAY GLUCOSE BLOOD QUANT: CPT

## 2025-06-25 PROCEDURE — 84145 PROCALCITONIN (PCT): CPT

## 2025-06-25 PROCEDURE — 85027 COMPLETE CBC AUTOMATED: CPT

## 2025-06-25 PROCEDURE — 97161 PT EVAL LOW COMPLEX 20 MIN: CPT

## 2025-06-25 PROCEDURE — 94640 AIRWAY INHALATION TREATMENT: CPT

## 2025-06-25 PROCEDURE — 71275 CT ANGIOGRAPHY CHEST: CPT

## 2025-06-25 PROCEDURE — 76604 US EXAM CHEST: CPT

## 2025-06-25 PROCEDURE — 85610 PROTHROMBIN TIME: CPT

## 2025-06-25 PROCEDURE — 71045 X-RAY EXAM CHEST 1 VIEW: CPT

## 2025-06-25 PROCEDURE — 82330 ASSAY OF CALCIUM: CPT

## 2025-06-25 PROCEDURE — 87641 MR-STAPH DNA AMP PROBE: CPT

## 2025-06-25 PROCEDURE — 82962 GLUCOSE BLOOD TEST: CPT

## 2025-06-25 PROCEDURE — 96374 THER/PROPH/DIAG INJ IV PUSH: CPT

## 2025-06-25 PROCEDURE — 83735 ASSAY OF MAGNESIUM: CPT

## 2025-06-25 PROCEDURE — 80048 BASIC METABOLIC PNL TOTAL CA: CPT

## 2025-06-25 PROCEDURE — 93306 TTE W/DOPPLER COMPLETE: CPT

## 2025-06-25 PROCEDURE — 82533 TOTAL CORTISOL: CPT

## 2025-06-25 PROCEDURE — 93308 TTE F-UP OR LMTD: CPT

## 2025-06-25 PROCEDURE — 87040 BLOOD CULTURE FOR BACTERIA: CPT

## 2025-06-25 PROCEDURE — 99285 EMERGENCY DEPT VISIT HI MDM: CPT

## 2025-06-25 PROCEDURE — 84484 ASSAY OF TROPONIN QUANT: CPT

## 2025-06-25 PROCEDURE — 84132 ASSAY OF SERUM POTASSIUM: CPT

## 2025-06-25 PROCEDURE — 87389 HIV-1 AG W/HIV-1&-2 AB AG IA: CPT

## 2025-06-25 RX ADMIN — AMLODIPINE BESYLATE 2.5 MILLIGRAM(S): 10 TABLET ORAL at 05:50

## 2025-06-25 RX ADMIN — HYDROCORTISONE 1 APPLICATION(S): 10 CREAM TOPICAL at 05:52

## 2025-06-25 RX ADMIN — METHYLPREDNISOLONE ACETATE 30 MILLIGRAM(S): 80 INJECTION, SUSPENSION INTRA-ARTICULAR; INTRALESIONAL; INTRAMUSCULAR; SOFT TISSUE at 05:51

## 2025-06-25 RX ADMIN — Medication 40 MILLIGRAM(S): at 05:49

## 2025-06-25 RX ADMIN — Medication 1 APPLICATION(S): at 05:52

## 2025-06-25 RX ADMIN — Medication 40 MILLIGRAM(S): at 05:50

## 2025-06-25 RX ADMIN — FUROSEMIDE 40 MILLIGRAM(S): 10 INJECTION INTRAMUSCULAR; INTRAVENOUS at 05:50

## 2025-06-25 RX ADMIN — Medication 0.25 MILLIGRAM(S): at 09:27

## 2025-06-25 RX ADMIN — PREGABALIN 200 MILLIGRAM(S): 50 CAPSULE ORAL at 05:50

## 2025-06-25 NOTE — PROGRESS NOTE ADULT - NSPROGADDITIONALINFOA_GEN_ALL_CORE
d/w DESTINEY Solis
d/w DESTINEY Moss
d/w ACP
Discharge time spent: 40 min   I had an extensive discussion with the patient regarding our concerns that she is weak and a fall risk at home. She understands clearly that she is a fall risk she states that she would not want to go to subacute rehab. She has complete medical decision-making capacity, she understands the risks of going home. She's stable for discharge today.
Discharge time spent: 40 min

## 2025-06-25 NOTE — PROGRESS NOTE ADULT - REASON FOR ADMISSION
Acute Hypoxic Respiratory failure, Acute CHF exacerbation

## 2025-06-25 NOTE — PROGRESS NOTE ADULT - PROBLEM SELECTOR PLAN 1
Due to volume overload vs ILD flare  Continue supplemental oxygen (currently 4L NC), titrate to maintain SpO2 > 89%.  Diuresis: switch to po lasix starting tomorrow   Carboxyhemoglobin wnl
Due to volume overload vs ILD flare  Continue supplemental oxygen (currently 3L NC), titrate to maintain SpO2 > 89%. Home O2 is 2L  Diuresis: switch po lasix
Due to volume overload vs ILD flare  Continue supplemental oxygen (currently 4L NC), titrate to maintain SpO2 > 89%.  Diuresis: Lasix 40mg IV qd  Carboxyhemoglobin wnl
Due to volume overload vs ILD flare  Continue supplemental oxygen (currently 3L NC), titrate to maintain SpO2 > 89%. Home O2 is 2L  Diuresis: switch po lasix 40mg daily
Due to volume overload vs ILD flare  Continue supplemental oxygen (currently 3L NC), titrate to maintain SpO2 > 89%. Home O2 is 2L  Diuresis: switch po lasix 40mg daily
Due to volume overload  Continue supplemental oxygen (currently 4L NC), titrate to maintain SpO2 > 89%.  Diuresis: Lasix 40mg IV bid  Pulm recs appreciated, full RVP, sputum culture, urine legionella and strep, Mycoplasma, MRSA PCR

## 2025-06-25 NOTE — PROGRESS NOTE ADULT - PROBLEM SELECTOR PLAN 3
continue Plaquenil  c/w methylPREDNISolone 2 mg oral tablet orally once a day
continue Plaquenil
continue Plaquenil
c/w medrol and Plaquenil
continue Plaquenil
continue Plaquenil  c/w methylPREDNISolone 2 mg oral tablet orally once a day

## 2025-06-25 NOTE — PROGRESS NOTE ADULT - PROVIDER SPECIALTY LIST ADULT
Hospitalist
Pulmonology
Cardiology
Infectious Disease
Infectious Disease
Internal Medicine
Pulmonology
Cardiology
Pulmonology
Cardiology
Internal Medicine
Internal Medicine

## 2025-06-25 NOTE — PROGRESS NOTE ADULT - PROBLEM SELECTOR PROBLEM 3
Mixed connective tissue disease
Chronic obstructive pulmonary disease

## 2025-06-25 NOTE — PROGRESS NOTE ADULT - PROBLEM SELECTOR PLAN 2
Pulm recs appreciated,   - will plan to wean steroids back to her home steroid dose  - ABG wnl
Patient found to be retaining urine  - s/p araya catheter  - strict Is&Os
Pulm recs appreciated, started on Solumedrol 30mg BID IV for now   - f/u ABG
Pulm recs appreciated, started on Solumedrol 30mg BID IV for now   - ABG wnl
Pulm recs appreciated,   - c/w Solumedrol 30mg BID IV for now   - AB candyl
Pulm recs appreciated,   - wean steroids back to her home steroid dose  - ABG wnl

## 2025-06-25 NOTE — PROGRESS NOTE ADULT - PROBLEM SELECTOR PLAN 4
b/l LE erythema, warmth to touch. Cellulitis vs MCTD  - ID recs appreciated, c/w ancef for now  - Dermatology consult appreciated --> Lipodermatosclerosis, bilateral lower legs, Inflammation of subcutaneous fat, usually on the lower extremities, secondary to chronic venous insufficiency --> START clobetasol 0.05% ointment twice daily prn itch or burning for up to 2 weeks; they recommend outpatient follow up with dermatology and vascular surgery.
b/l LE erythema, warmth to touch. Cellulitis vs MCTD  - ID recs appreciated, c/w ancef for now  - dermatology consulted, f/u recs
b/l LE erythema, warmth to touch. Cellulitis vs MCTD  - ID recs appreciated, c/w ancef for now  - dermatology consulted, f/u recs
b/l LE erythema, warmth to touch. Cellulitis vs MCTD  - ID recs appreciated, c/w ancef for now  - Dermatology consult appreciated --> Lipodermatosclerosis, bilateral lower legs, Inflammation of subcutaneous fat, usually on the lower extremities, secondary to chronic venous insufficiency --> START clobetasol 0.05% ointment twice daily prn itch or burning for up to 2 weeks; they recommend outpatient follow up with dermatology and vascular surgery.
continue Plaquenil
b/l LE erythema, warmth to touch. Cellulitis vs MCTD  - ID recs appreciated, c/w ancef for now  - Dermatology consult appreciated --> Lipodermatosclerosis, bilateral lower legs, Inflammation of subcutaneous fat, usually on the lower extremities, secondary to chronic venous insufficiency --> START clobetasol 0.05% ointment twice daily prn itch or burning for up to 2 weeks; they recommend outpatient follow up with dermatology and vascular surgery.

## 2025-06-25 NOTE — PROGRESS NOTE ADULT - PROBLEM SELECTOR PROBLEM 5
R/O Heart failure

## 2025-06-25 NOTE — PROGRESS NOTE ADULT - SUBJECTIVE AND OBJECTIVE BOX
Andre Reyes, M.D.  Office: [phone]  Available thru [employer] Teams     Patient is a 65y old  Female who presents with a chief complaint of Acute Hypoxic Respiratory failure, Acute CHF exacerbation (24 Jun 2025 15:48)          SUBJECTIVE / OVERNIGHT EVENTS:    No acute overnight events.        MEDICATIONS  (STANDING):  albuterol/ipratropium for Nebulization 3 milliLiter(s) Nebulizer every 6 hours  amLODIPine   Tablet 2.5 milliGRAM(s) Oral daily  aspirin enteric coated 81 milliGRAM(s) Oral daily  atorvastatin 20 milliGRAM(s) Oral at bedtime  clobetasol 0.05% Ointment 1 Application(s) Topical every 12 hours  DULoxetine 80 milliGRAM(s) Oral daily  enoxaparin Injectable 40 milliGRAM(s) SubCutaneous every 24 hours  furosemide    Tablet 40 milliGRAM(s) Oral daily  hydrocortisone 1% Cream 1 Application(s) Topical two times a day  hydroxychloroquine 200 milliGRAM(s) Oral daily  methylPREDNISolone sodium succinate Injectable 30 milliGRAM(s) IV Push daily  nortriptyline 40 milliGRAM(s) Oral two times a day  pantoprazole    Tablet 40 milliGRAM(s) Oral before breakfast  polyethylene glycol 3350 17 Gram(s) Oral daily  pregabalin 200 milliGRAM(s) Oral three times a day  senna 2 Tablet(s) Oral at bedtime  tamsulosin 0.4 milliGRAM(s) Oral at bedtime    MEDICATIONS  (PRN):  ALPRAZolam 0.25 milliGRAM(s) Oral three times a day PRN anxiety  HYDROmorphone  Injectable 1 milliGRAM(s) IV Push every 6 hours PRN Severe Pain (7 - 10)  oxyCODONE    IR 10 milliGRAM(s) Oral every 6 hours PRN Moderate Pain (4 - 6)          T(C): 36.6 (06-25 @ 04:38), Max: 36.7 (06-24 @ 16:40)   HR: 81   BP: 130/80   RR: 18   SpO2: 96%    PHYSICAL EXAM:    CONSTITUTIONAL: NAD, well-developed, well-groomed  EYES: PERRLA; conjunctiva and sclera clear  ENMT: Moist oral mucosa, no pharyngeal injection or exudates; normal dentition  RESPIRATORY: Normal respiratory effort; lungs are clear to auscultation bilaterally  CARDIOVASCULAR: Regular rate and rhythm, normal S1 and S2, no murmur/rub/gallop; No lower extremity edema; Peripheral pulses are 2+ bilaterally  ABDOMEN: Nontender to palpation, normoactive bowel sounds, no rebound/guarding; No hepatosplenomegaly  MUSCULOSKELETAL:  no clubbing or cyanosis of digits; no joint swelling or tenderness to palpation  PSYCH: A+O to person, place, and time; affect appropriate  NEUROLOGY: CN 2-12 are intact and symmetric; no gross sensory deficits       LABS:                        13.6   6.87  )-----------( 305      ( 25 Jun 2025 06:43 )             43.8      06-25    137  |  98  |  18  ----------------------------<  98  3.4[L]   |  25  |  0.53    Ca    8.4      25 Jun 2025 06:43  Mg     2.5     06-24    TPro  7.0  /  Alb  3.7  /  TBili  0.5  /  DBili  x   /  AST  34  /  ALT  10  /  AlkPhos  72  06-25       CAPILLARY BLOOD GLUCOSE          RADIOLOGY & ADDITIONAL TESTS:    Imaging Personally Reviewed:  Consultant(s) Notes Reviewed:    Care Discussed with Consultants/Other Providers:   Andre Reyes, M.D.  Office: [phone]  Available thru [employer] Teams     Patient is a 65y old  Female who presents with a chief complaint of Acute Hypoxic Respiratory failure, Acute CHF exacerbation (24 Jun 2025 15:48)          SUBJECTIVE / OVERNIGHT EVENTS:    No acute overnight events.  no complaints  wants to go home ASAP      MEDICATIONS  (STANDING):  albuterol/ipratropium for Nebulization 3 milliLiter(s) Nebulizer every 6 hours  amLODIPine   Tablet 2.5 milliGRAM(s) Oral daily  aspirin enteric coated 81 milliGRAM(s) Oral daily  atorvastatin 20 milliGRAM(s) Oral at bedtime  clobetasol 0.05% Ointment 1 Application(s) Topical every 12 hours  DULoxetine 80 milliGRAM(s) Oral daily  enoxaparin Injectable 40 milliGRAM(s) SubCutaneous every 24 hours  furosemide    Tablet 40 milliGRAM(s) Oral daily  hydrocortisone 1% Cream 1 Application(s) Topical two times a day  hydroxychloroquine 200 milliGRAM(s) Oral daily  methylPREDNISolone sodium succinate Injectable 30 milliGRAM(s) IV Push daily  nortriptyline 40 milliGRAM(s) Oral two times a day  pantoprazole    Tablet 40 milliGRAM(s) Oral before breakfast  polyethylene glycol 3350 17 Gram(s) Oral daily  pregabalin 200 milliGRAM(s) Oral three times a day  senna 2 Tablet(s) Oral at bedtime  tamsulosin 0.4 milliGRAM(s) Oral at bedtime    MEDICATIONS  (PRN):  ALPRAZolam 0.25 milliGRAM(s) Oral three times a day PRN anxiety  HYDROmorphone  Injectable 1 milliGRAM(s) IV Push every 6 hours PRN Severe Pain (7 - 10)  oxyCODONE    IR 10 milliGRAM(s) Oral every 6 hours PRN Moderate Pain (4 - 6)          T(C): 36.6 (06-25 @ 04:38), Max: 36.7 (06-24 @ 16:40)   HR: 81   BP: 130/80   RR: 18   SpO2: 96%    PHYSICAL EXAM:    CONSTITUTIONAL: NAD, well-developed, well-groomed  EYES: PERRLA; conjunctiva and sclera clear  ENMT: Moist oral mucosa, no pharyngeal injection or exudates; normal dentition  RESPIRATORY: Normal respiratory effort; lungs are clear to auscultation bilaterally  CARDIOVASCULAR: Regular rate and rhythm, normal S1 and S2, no murmur/rub/gallop; No lower extremity edema; Peripheral pulses are 2+ bilaterally  ABDOMEN: Nontender to palpation, normoactive bowel sounds, no rebound/guarding; No hepatosplenomegaly  MUSCULOSKELETAL:  no clubbing or cyanosis of digits; no joint swelling or tenderness to palpation  PSYCH: A+O to person, place, and time; affect appropriate  NEUROLOGY: CN 2-12 are intact and symmetric; no gross sensory deficits       LABS:                        13.6   6.87  )-----------( 305      ( 25 Jun 2025 06:43 )             43.8      06-25    137  |  98  |  18  ----------------------------<  98  3.4[L]   |  25  |  0.53    Ca    8.4      25 Jun 2025 06:43  Mg     2.5     06-24    TPro  7.0  /  Alb  3.7  /  TBili  0.5  /  DBili  x   /  AST  34  /  ALT  10  /  AlkPhos  72  06-25       CAPILLARY BLOOD GLUCOSE          RADIOLOGY & ADDITIONAL TESTS:    Imaging Personally Reviewed:  Consultant(s) Notes Reviewed:    Care Discussed with Consultants/Other Providers:

## 2025-06-25 NOTE — PROGRESS NOTE ADULT - PROBLEM SELECTOR PROBLEM 2
Urinary retention
ILD (interstitial lung disease)

## 2025-06-25 NOTE — PROGRESS NOTE ADULT - CONVERSATION DETAILS
we completed a MOLST. Pt states that she would like a trial of everything but she would not want to be on a ventilator indefinitely. She is FULL CODE, she wishes for a trial of intubation

## 2025-06-25 NOTE — PROGRESS NOTE ADULT - NUTRITIONAL ASSESSMENT
This patient has been assessed with a concern for Malnutrition and has been determined to have a diagnosis/diagnoses of Moderate protein-calorie malnutrition.    This patient is being managed with:   Diet Regular-  Supplement Feeding Modality:  Oral  Ensure Max Cans or Servings Per Day:  1       Frequency:  Daily  Entered: Jun 23 2025 12:15PM  
This patient has been assessed with a concern for Malnutrition and has been determined to have a diagnosis/diagnoses of Moderate protein-calorie malnutrition.    This patient is being managed with:   Diet Regular-  Low Sodium  Supplement Feeding Modality:  Oral  Ensure Max Cans or Servings Per Day:  1       Frequency:  Daily  Entered: Jun 21 2025  3:17PM  

## 2025-06-25 NOTE — PROGRESS NOTE ADULT - PROBLEM SELECTOR PLAN 5
TTE: 1. Left ventricular cavity is normal in size. Left ventricular wall thickness is normal. Left ventricular systolic function is normal with an ejection fraction of 54 % by Desouza's method of disks. 2. Severely enlarged right ventricular cavity size and probably normal right ventricular systolic function. 3. Mild to moderate pulmonary hypertension.
TTE: 1. Left ventricular cavity is normal in size. Left ventricular wall thickness is normal. Left ventricular systolic function is normal with an ejection fraction of 54 % by Desouza's method of disks. 2. Severely enlarged right ventricular cavity size and probably normal right ventricular systolic function. 3. Mild to moderate pulmonary hypertension.    heart failure with preserved EF was ruled in.  c/w lasix 40 mg daily
Obtain Transthoracic Echocardiogram (TTE) to assess cardiac function, given CT scan concern for congestive heart failure.  Continuous telemetry monitoring.  Goal net negative diuresis 1-2 Liters in 24-hour period.  Administer Lasix IV 40 mg BID.  Strict intake and output monitoring.  Monitor for improvement in edema and respiratory status.
Obtain Transthoracic Echocardiogram (TTE) to assess cardiac function, given CT scan concern for congestive heart failure.  Continuous telemetry monitoring.  Goal net negative diuresis 1-2 Liters in 24-hour period.  Administer Lasix IV 40 mg qd  Strict intake and output monitoring.  Monitor for improvement in edema and respiratory status.
Obtain Transthoracic Echocardiogram (TTE) to assess cardiac function, given CT scan concern for congestive heart failure.  Continuous telemetry monitoring.  Goal net negative diuresis 1-2 Liters in 24-hour period.  s/p IV lasix, can switch to po lasix starting tomorrow   Strict intake and output monitoring.  Monitor for improvement in edema and respiratory status.
TTE: 1. Left ventricular cavity is normal in size. Left ventricular wall thickness is normal. Left ventricular systolic function is normal with an ejection fraction of 54 % by Desouza's method of disks. 2. Severely enlarged right ventricular cavity size and probably normal right ventricular systolic function. 3. Mild to moderate pulmonary hypertension.    heart failure with preserved EF was ruled in.  c/w lasix 40 mg daily

## 2025-06-26 ENCOUNTER — NON-APPOINTMENT (OUTPATIENT)
Age: 65
End: 2025-06-26

## 2025-07-01 ENCOUNTER — APPOINTMENT (OUTPATIENT)
Dept: INTERNAL MEDICINE | Facility: CLINIC | Age: 65
End: 2025-07-01

## 2025-07-01 VITALS
OXYGEN SATURATION: 78 % | DIASTOLIC BLOOD PRESSURE: 50 MMHG | HEART RATE: 100 BPM | HEIGHT: 62 IN | SYSTOLIC BLOOD PRESSURE: 112 MMHG

## 2025-07-01 PROBLEM — J43.9 PULMONARY EMPHYSEMA: Status: ACTIVE | Noted: 2021-03-01

## 2025-07-01 PROCEDURE — G2211 COMPLEX E/M VISIT ADD ON: CPT

## 2025-07-01 PROCEDURE — 99215 OFFICE O/P EST HI 40 MIN: CPT

## 2025-07-11 ENCOUNTER — APPOINTMENT (OUTPATIENT)
Dept: AFTER HOURS CARE | Facility: EMERGENCY ROOM | Age: 65
End: 2025-07-11

## 2025-07-11 PROCEDURE — 99215 OFFICE O/P EST HI 40 MIN: CPT | Mod: 2W

## 2025-08-12 ENCOUNTER — APPOINTMENT (OUTPATIENT)
Dept: INTERNAL MEDICINE | Facility: CLINIC | Age: 65
End: 2025-08-12
Payer: MEDICARE

## 2025-08-12 VITALS
OXYGEN SATURATION: 85 % | DIASTOLIC BLOOD PRESSURE: 86 MMHG | HEART RATE: 89 BPM | HEIGHT: 62 IN | BODY MASS INDEX: 19.14 KG/M2 | WEIGHT: 104 LBS | SYSTOLIC BLOOD PRESSURE: 142 MMHG

## 2025-08-12 DIAGNOSIS — I10 ESSENTIAL (PRIMARY) HYPERTENSION: ICD-10-CM

## 2025-08-12 DIAGNOSIS — E78.5 HYPERLIPIDEMIA, UNSPECIFIED: ICD-10-CM

## 2025-08-12 DIAGNOSIS — R11.0 NAUSEA: ICD-10-CM

## 2025-08-12 DIAGNOSIS — J43.9 EMPHYSEMA, UNSPECIFIED: ICD-10-CM

## 2025-08-12 DIAGNOSIS — F41.9 ANXIETY DISORDER, UNSPECIFIED: ICD-10-CM
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
## 2025-08-12 DIAGNOSIS — J84.10 PULMONARY FIBROSIS, UNSPECIFIED: ICD-10-CM

## 2025-08-12 DIAGNOSIS — I50.30 UNSPECIFIED DIASTOLIC (CONGESTIVE) HEART FAILURE: ICD-10-CM

## 2025-08-12 DIAGNOSIS — C34.90 MALIGNANT NEOPLASM OF UNSPECIFIED PART OF UNSPECIFIED BRONCHUS OR LUNG: ICD-10-CM

## 2025-08-12 DIAGNOSIS — J96.91 RESPIRATORY FAILURE, UNSPECIFIED WITH HYPOXIA: ICD-10-CM
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
## 2025-08-12 DIAGNOSIS — M35.1 OTHER OVERLAP SYNDROMES: ICD-10-CM

## 2025-08-12 PROCEDURE — 99215 OFFICE O/P EST HI 40 MIN: CPT

## 2025-08-12 PROCEDURE — G2211 COMPLEX E/M VISIT ADD ON: CPT

## 2025-08-12 RX ORDER — ONDANSETRON 4 MG/1
4 TABLET, ORALLY DISINTEGRATING ORAL EVERY 8 HOURS
Qty: 42 | Refills: 0 | Status: ACTIVE | COMMUNITY
Start: 2025-08-12 | End: 1900-01-01

## 2025-08-13 ENCOUNTER — RX RENEWAL (OUTPATIENT)
Age: 65
End: 2025-08-13

## 2025-09-15 ENCOUNTER — RX RENEWAL (OUTPATIENT)
Age: 65
End: 2025-09-15

## 2025-09-23 PROBLEM — S81.812A LACERATION OF LEFT LOWER LEG, INITIAL ENCOUNTER: Status: ACTIVE | Noted: 2025-09-23
